# Patient Record
Sex: FEMALE | Race: BLACK OR AFRICAN AMERICAN | Employment: OTHER | URBAN - NONMETROPOLITAN AREA
[De-identification: names, ages, dates, MRNs, and addresses within clinical notes are randomized per-mention and may not be internally consistent; named-entity substitution may affect disease eponyms.]

---

## 2021-01-20 DIAGNOSIS — C50.919 CARCINOMA OF FEMALE BREAST, UNSPECIFIED ESTROGEN RECEPTOR STATUS, UNSPECIFIED LATERALITY, UNSPECIFIED SITE OF BREAST (HCC): Primary | ICD-10-CM

## 2021-01-20 NOTE — PROGRESS NOTES
MEDICAL ONCOLOGY CONSULTATION    Pt Name: Doretha Preea  MRN: [id number]  YOB: 1965  Date of evaluation: 1/22/2021    REASON FOR CONSULTATION:  Breast Cancer  REQUESTING PHYSICIAN: Dr. Matthew Weaver    History Obtained From:  patient and old medical records    HISTORY OF PRESENT ILLNESS:    Diagnosis  · Adenocarcinoma of right breast diagnosed 2006  · Stage IIIA, [id number]  · ER/SC Positive, HER-2 bruce/ihc 1+  · April 2013-RECURRENCE of grade 3 adenocarcinoma in the axillary region  · Genetic testing- BRCA 1&2 Negative  · 2014 (?) RECURRENCE in the axillary skin  · 01/05/2017- METASTATIC DISEASE with lung, hilar, and axillary mets    Treatment Summary  · 2006- Neoadjuvant chemotherapy with AC x 4 cycles followed by Taxol  · 2007- Lumpectomy with axillary lymph node dissection  · 2007- Adjuvant radiation therapy to whole breast and axillary region  · Did not take tamoxifen due to cost  · April 2013- RECURRENCE  · Neoadjuvant chemotherapy with 2 cycles of Taxotere followed by Doxil  · 10/14/2013- Right Breast Mastectomy and Axillary Dissection  · 01/23/2014- Completion of adjuvant RT to chest wall and axillary lymph node with Dr. Dustin Roberts  · 02/14- Initiated adjuvant endocrine therapy with tamoxifen  · 2014 (?) RECURRENCE in the axillary skin  · 2014 (?) Surgical resection of the axillary skin  · 2015 (?) YVETTE/BSO  · 09/19/2016- Bilateral exchange, nipple reconstruction and fat grafting and removal of PAC   · 2016- Switched to aromatase inhibitor  · 01/05/2017- METASTATIC DISEASE with lung, hilar, and axillary mets  · 01/11/2017-06/17/2018- Single agent Abraxane x 13 cycles  · 06/27/2018-09/17/2018- Gemzar  · 10/10/2018-Faslodex every 4 weeks/palbociclib      Cancer History: Inga Mckeon was first seen by me on 1/21/2021. She was referred to [facility] of care for history of recurrent metastatic breast cancer. She has been in remission as per the latest CT scan. She is currently on Faslodex and palbociclib. She has received several lines therapy as described below. She moved from [place] to [place] to stay closer to her parents. Her medical history is complex. Further details as below. · 2006- Neoadjuvant chemotherapy with AC x 4 cycles followed by Taxol AC was complicated by viral meningitis; patient experienced neuropathy after 3 doses of Taxol and was switched to Taxotere for last dose  · 2007- Lumpectomy with axillary lymph node dissection 1.9cm, grade 2. No LVI. 1 of 14 lymph nodes positive for malignancy (1/14) [id number]  · 2007- Adjuvant Radiation Therapy to whole breast and axillary region  · April 2013- RECURRENCE presenting as mass in the axillary region  · Neoadjuvant chemotherapy with 2 cycles of Taxotere followed by Doxil- did not have good response to treatment  · 2013- Preop PET/CTshowed 2.8cm, right axillary lymph node with uptake. · 10/14/2013- Right Breast Mastectomy and Axillary Dissection Right breast had benign tissue with fibrous, negative for carcinoma. Axillary contents demonstrated invasive ductal carcinoma, involving fibroadipose tissue and tendinous tissue. 3 benign lymph nodes (0/3). Yusra grade 3. 3.0cm in greatest gross dimensions. Carcinoma permeates among soft tissues and in the right axilla with no apparent involvement of lymph nodes.    · 01/23/2014- Completion of adjuvant radiation therapy to chest wall and axillary lymph node with Dr. Maryjo Jeffries  · 02/14- Initiated adjuvant endocrine therapy with tamoxifen  · 2014 (?) RECURRENCE in the axillary skin  · 2014 (?) Surgical resection of the axillary skin pathology demonstrating tumor at cauterized margin  · 2015 (?) YVETTE/BSO · 09/19/2016- Bilateral exchange, nipple reconstruction and fat grafting and removal of PAC  · 2016- Switched to aromatase inhibitor patient was non compliant  · 01/05/2017- METASTATIC DISEASE Presented with respiratory failure. CTA Pulmonary showed numerous nodules and masses throughout both lung fields, compatible with metastatic disease. Bilateral hilar adenopathy seen. · 01/11/2017-06/17/2018- Single agent Abraxane x 13 cycles  · 03/15/2017- CT Chest W Contrast Interval decrease in maximal diameter of essentially all the multiple metastatic pulmonary nodules. The average difference is approximately 25%. Some of the much smaller ones have disappeared. Bilateral hilar and aortopulmonary window adenopathy is also similarly smaller. · 06/07/2017- CT Chest W Contrast Multiple lung nodules seen bilaterally. Most of the lung nodules show interval improvement with decreased size by 1 to 3mm. Mediastinal and bilateral hilar adenopathy seen with improvement. · 07/24/2017- PET/CT scan showed marked improvement, is minimal abnormal, has excellent response to therapy  · 02/01/2018- PET/CT scan Numerous bilateral lung masses and nodules, the majority of which do not have appreciable abnormal FDG uptake. The largest mass in the left infrahilar region of the left lower lobe demonstrates a maximum SUV of 3.3. Mediastinal lymphadenopathy. No evidence of metastatic disease in the abdomen or pelvis. · 06/21/2018- CT Chest/Abdomen/Pelvis Multiple lung mets, mild bilateral hilar and mediastinal lymph nodes.  No masses or evidence of metastatic disease in the abdomen or pelvis  · 06/27/2018-09/17/2018- Gemzar  · 09/28/2018- CT Pulmonary Multiple lung mets, mild bilateral hilar and mediastinal lymph nodes  · 10/10/2018-12/21/2020- Faslodex every 4 weeks · 12/14/2018- CT Chest showed definite decrease in the maximal diameter and volume of all left and right metastatic nodules. Somewhat enlarged paratracheal and left axillary lymph nodes are relatively unchanged. · 04/22/2019- MRI Cervical Spine W WO Contrast Unremarkable  · 04/22/2019- MRI Brain W WO Contrast No enhancing lesions in the brain and no evidence of metastatic disease. · 07/27/2019- CTA Pulmonary showed essentially complete disappearance of the pulmonary metastatic disease. Several tiny flat peripheral nodules are the only sequela. The tiny ones on the right are unchanged, the tiny ones on the left are even smaller. There is no longer any active metastatic disease in either lung. · 12/19/2019- CT Chest/Abdomen/Pelvis Small, tiny subpleural nodules right upper and right midlung field as well as left lung base has remained unchanged. No new focal parenchymal abnormality seen. No adenopathy seen. There is no abdominal or pelvic mass, adenopathy or fluid collection. No lytic or sclerotic bony lesions, but there is a possibility of osteopenia and/or osteoporosis.    · 02/17/2020- DEXA Bone Density showed osteopenia of lumbar spine, T-score -1.8, and left femoral neck, T-score -2.0    CA 27.29 Dynamics  11/05/2013- 23  01/21/2014- 19  06/10/2014-17  09/09/2014- 18  12/09/2014- 12.8  03/03/2015- 19  06/23/2015- 23.4  02/15/2016- 18.8  05/06/2016- 9.2  12/06/2016- 15.9  06/07/2018- 30.6  09/17/2018- 32  12/03/2018- 42.6  01/07/2019- 21.7  03/04/2019- 16.3  03/05/2019- 15.0  04/03/2019- 17.5  05/08/2019- 18.8  06/05/2019- 17.1  07/18/2019- 25.9  08/15/2019- 27.6  09/06/2019- 18.6  10/16/2019- 13.7  12/18/2019- 20.2  01/22/2020- 15.3  02/17/2020- 17.1  03/18/2020- 16.4  06/19/2020- 22.1  08/21/2020- 13.2  10/26/2020- 17.1  11/23/2020- 20.4  12/21/2020- 27.6    Past Medical History:    Past Medical History:   Diagnosis Date    Back pain     Breast cancer ([facility] [address]     Breast cancer with lung mets  GERD (gastroesophageal reflux disease)     Hypertension     Neuropathy     Post chemo treatment neuropathy       Past Surgical History:    Past Surgical History:   Procedure Laterality Date    BREAST LUMPECTOMY Right     2006    COSMETIC SURGERY      Bilateral breast implants    HYSTERECTOMY, TOTAL ABDOMINAL  2015    JOINT REPLACEMENT      knee pain    MASTECTOMY, BILATERAL      Right 2013 & Left 2016       Social History:    · Marital status, children: Single, 2 children-female  · Smoking status: no  · ETOH status: no  · Profession: Disabled  · Resides: [place], [place]  · Recent trips: Moved from [place]  · Pets: no    Family History:   Family History   Problem Relation Age of Onset    Cancer Other         Father's side-Prostate cancer, Multiple Myeloma & Bone cancer       Current Hospital Medications:    Current Outpatient Medications   Medication Sig Dispense Refill    Fluticasone Furoate-Vilanterol (BREO ELLIPTA IN) Inhale into the lungs      cyclobenzaprine (FLEXERIL) 10 MG tablet Take 10 mg by mouth 3 times daily as needed for Muscle spasms      famotidine (PEPCID) 40 MG tablet Take 40 mg by mouth daily      gabapentin (NEURONTIN) 300 MG capsule Take 600 mg by mouth 3 times daily.  guaiFENesin-codeine (GUAIFENESIN AC) 100-10 MG/5ML liquid Take 5 mLs by mouth 3 times daily as needed for Cough.  HYDROcodone-acetaminophen (NORCO) 7.5-325 MG per tablet Take 1 tablet by mouth every 6 hours as needed for Pain.       montelukast (SINGULAIR) 10 MG tablet Take 10 mg by mouth nightly      Albuterol Sulfate (PROAIR HFA IN) Inhale into the lungs      benzonatate (TESSALON) 100 MG capsule Take 100 mg by mouth 3 times daily as needed for Cough      Cholecalciferol (VITAMIN D3) 125 MCG (5000 UT) TABS Take by mouth      lisinopril (PRINIVIL;ZESTRIL) 10 MG tablet Take 10 mg by mouth daily      palbociclib (IBRANCE) 125 MG tablet Take 125 mg by mouth daily      HYDROXYZINE HCL PO Take by mouth  acyclovir (ZOVIRAX) 800 MG tablet Take 800 mg by mouth 2 times daily      ondansetron (ZOFRAN) 4 MG tablet Take 4 mg by mouth every 8 hours as needed for Nausea or Vomiting      venlafaxine (EFFEXOR) 37.5 MG tablet Take 37.5 mg by mouth 3 times daily      diclofenac (VOLTAREN) 0.1 % ophthalmic solution 1 drop 4 times daily      alclomethasone (ACLOVATE) 0.05 % cream Apply topically 2 times daily Apply topically 2 times daily.  meloxicam (MOBIC) 15 MG tablet Take 15 mg by mouth daily      pantoprazole (PROTONIX) 40 MG tablet Take 40 mg by mouth daily      ciprofloxacin (CILOXAN) 0.3 % ophthalmic solution 1 drop every 2 hours      triamcinolone (KENALOG) 0.1 % cream Apply topically 2 times daily Apply topically 2 times daily.  chlorhexidine (PERIDEX) 0.12 % solution Take 15 mLs by mouth 2 times daily       No current facility-administered medications for this visit. Allergies: No Known Allergies      Subjective   REVIEW OF SYSTEMS:   CONSTITUTIONAL: no fever, no night sweats, fatigue, night sweats, loss of appetite, hot flashes;   HEENT:hoarseness, oral ulcers, no blurring of vision, no double vision, no hearing difficulty, no tinnitus, no ulceration, no dysplasia, no epistaxis;  LUNGS: cough, no hemoptysis,  wheeze,  shortness of breath on exertion;  CARDIOVASCULAR: no palpitation, no chest pain, shortness of breath on exertion;  GI: heartburn, no abdominal pain, no nausea, no vomiting, ocassional diarrhea,  constipation;  PATRICK: no dysuria, no hematuria, no frequency or urgency, no nephrolithiasis;  MUSCULOSKELETAL: neck/back pain,  joint pain, no swelling, no stiffness;  ENDOCRINE: no polyuria, no polydipsia, no cold & heat intolerance;  HEMATOLOGY: no easy bruising or bleeding, no history of clotting disorder;  DERMATOLOGY: no skin rash, no eczema, no pruritus;  PSYCHIATRY: depression, no anxiety, no panic attacks, no suicidal ideation, no homicidal ideation; NEUROLOGY: balance issues due to back pain/neuropathy headaches, no syncope, no seizures, no numbness or tingling of hands, no numbness or tingling of feet, no paresis;    Objective   /84   Pulse 90   Temp 97.1 °F (36.2 °C)   Ht 5' 6\" (1.676 m)   Wt 186 lb 8 oz (84.6 kg)   SpO2 98%   BMI 30.10 kg/m²     PHYSICAL EXAM:  CONSTITUTIONAL: Alert, appropriate, no acute distress  EYES: Non icteric, EOM intact, pupils equal round   ENT: Mucus membranes moist, no oral pharyngeal lesions, external inspection of ears and nose are normal  NECK: Supple, no masses. No palpable thyroid mass  CHEST/LUNGS: CTA bilaterally, normal respiratory effort   CARDIOVASCULAR: RRR, no murmurs. No lower extremity edema  ABDOMEN: soft non-tender, active bowel sounds, no HSM. No palpable masses  EXTREMITIES: warm, full ROM in all 4 extremities, no focal weakness. SKIN: warm, dry with no rashes or lesions  LYMPH: No cervical, clavicular, axillary, or inguinal lymphadenopathy  NEUROLOGIC: follows commands, non focal   PSYCH: mood and affect appropriate.   Alert and oriented to time, place, person        LABORATORY RESULTS REVIEWED/ANALYZED BY ME:  Lab Results   Component Value Date    WBC 4.57 01/21/2021    HGB 11.0 (L) 01/21/2021    HCT 33.1 (L) 01/21/2021    .1 (H) 01/21/2021     01/21/2021     Lab Results   Component Value Date    NEUTROABS 1.83 01/21/2021     Lab Results   Component Value Date     01/21/2021    K 4.0 01/21/2021     01/21/2021    CO2 26 01/21/2021    BUN 15 01/21/2021    CREATININE 0.8 01/21/2021    GLUCOSE 86 01/21/2021    CALCIUM 8.5 01/21/2021    PROT 6.9 01/21/2021    LABALBU 4.0 01/21/2021    BILITOT 0.9 01/21/2021    ALKPHOS 98 01/21/2021    AST 18 01/21/2021    ALT 9 01/21/2021    LABGLOM >60 01/21/2021    GLOB 2.9 01/21/2021       RADIOLOGY STUDIES REVIEWED BY ME:  None available    ASSESSMENT:    Orders Placed This Encounter   Procedures  CT ABDOMEN PELVIS W IV CONTRAST Additional Contrast? Oral     Standing Status:   Future     Standing Expiration Date:   1/21/2022     Order Specific Question:   Additional Contrast?     Answer:   Oral     Order Specific Question:   Reason for exam:     Answer:   history metastatic breast cancer    CT CHEST W CONTRAST     Standing Status:   Future     Standing Expiration Date:   1/21/2022     Order Specific Question:   Reason for exam:     Answer:   history metastatic breast cancer    Vitamin B12     Standing Status:   Future     Number of Occurrences:   1     Standing Expiration Date:   1/21/2022    Cancer Antigen 15-3     Standing Status:   Future     Number of Occurrences:   1     Standing Expiration Date:   1/21/2022    Cancer Antigen 27.29     Standing Status:   Future     Standing Expiration Date:   1/21/2022    Comprehensive Metabolic Panel     Standing Status:   Future     Number of Occurrences:   1     Standing Expiration Date:   1/21/2022   Alba Lesches, MD, [facility]     Referral Priority:   Routine     Referral Type:   Eval and Treat     Referral Reason:   Specialty Services Required     Referred to Provider:   Mariela Whitley MD     Requested Specialty:   Family Medicine     Number of Visits Requested:   1      Kassandra Stevens was seen today for new patient. Diagnoses and all orders for this visit:    Carcinoma of female breast, unspecified estrogen receptor status, unspecified laterality, unspecified site of breast ([facility] [address]  -     CT ABDOMEN PELVIS W IV CONTRAST Additional Contrast? Oral; Future  -     CT CHEST W CONTRAST; Future  -     Vitamin B12; Future  -     Cancer Antigen 15-3; Future  -     Cancer Antigen 27.29; Future  -     Alba Lesches, MD, [place], [place]  -     Comprehensive Metabolic Panel;  Future    Care plan discussed with patient    History of non anemic vitamin B12 deficiency  -     Vitamin B12; Future    Metastatic breast cancer ([facility] [address] -     CT ABDOMEN PELVIS W IV CONTRAST Additional Contrast? Oral; Future  -     CT CHEST W CONTRAST; Future  -     Vitamin B12; Future  -     Cancer Antigen 15-3; Future  -     Cancer Antigen 27.29; Future  -     Sergio Wynn MD, [facility] [place]  -     Comprehensive Metabolic Panel; Future    B12 deficiency    Metastatic breast cancer, hormone sensitive  The patient was counseled today about diagnosis, staging, prognosis, diagnostic tests, medications, side effects and disease management. Currently Faslodex/Ibrance. Regimen:  Faslodex to 500 mg subcutaneous every 4 weeks. Continue Ibrance days 121 q. 28 days    Clinical/laboratory assessment of toxicity for Ibrance      B12 deficiencyB12 levels 189. Recommended B12 2000 mcg p.o. daily. Will start B12 injections 1000mcg with Faslodex. PLAN:  CT chest, abdomen, pelvis next available  CBC CMP CA 15-3 CA 27-29 B12   Faslodex 500mg today and every 28 days  Refer to Dr. Jodi Gil for PCP care  RTC with MD 4 weeks  Continue Ibrance  B12 2000 mcg p.o. daily    I, Katherine Westbrook, am scribing for Mari Hoang MD. Electronically signed by Katherine Westbrook RN on 1/22/2021 at 11:07 AM CST. I, Dr Buffy Mc, personally performed the services described in this documentation as scribed by Katherine Westbrook RN in my presence and is both accurate and complete. I have seen, examined and reviewed this patient medication list, appropriate labs and imaging studies. I reviewed relevant medical records and others physicians notes. I discussed the plans of care with the patient. I answered all the questions to the patients satisfaction. I have also reviewed the chief complaint (CC) and part of the history (History of Present Illness (HPI), Past Family Social History [facility]), or Review of Systems (ROS) and made changes when appropriated.

## 2021-01-21 ENCOUNTER — OFFICE VISIT (OUTPATIENT)
Dept: HEMATOLOGY | Age: 56
End: 2021-01-21
Payer: MEDICARE

## 2021-01-21 ENCOUNTER — HOSPITAL ENCOUNTER (OUTPATIENT)
Dept: INFUSION THERAPY | Age: 56
Discharge: HOME | End: 2021-01-21
Payer: MEDICARE

## 2021-01-21 VITALS
HEIGHT: 66 IN | SYSTOLIC BLOOD PRESSURE: 136 MMHG | TEMPERATURE: 97.1 F | DIASTOLIC BLOOD PRESSURE: 84 MMHG | WEIGHT: 186.5 LBS | OXYGEN SATURATION: 98 % | BODY MASS INDEX: 29.97 KG/M2 | HEART RATE: 90 BPM

## 2021-01-21 DIAGNOSIS — I87.8 POOR VENOUS ACCESS: ICD-10-CM

## 2021-01-21 DIAGNOSIS — Z86.39 HISTORY OF NON ANEMIC VITAMIN B12 DEFICIENCY: ICD-10-CM

## 2021-01-21 DIAGNOSIS — C50.919 CARCINOMA OF FEMALE BREAST, UNSPECIFIED ESTROGEN RECEPTOR STATUS, UNSPECIFIED LATERALITY, UNSPECIFIED SITE OF BREAST (HCC): Primary | ICD-10-CM

## 2021-01-21 DIAGNOSIS — Z71.89 CARE PLAN DISCUSSED WITH PATIENT: ICD-10-CM

## 2021-01-21 DIAGNOSIS — C50.919 METASTATIC BREAST CANCER (HCC): ICD-10-CM

## 2021-01-21 DIAGNOSIS — E53.8 B12 DEFICIENCY: ICD-10-CM

## 2021-01-21 LAB
ALBUMIN SERPL-MCNC: 4 G/DL (ref 3.5–5.2)
ALP BLD-CCNC: 98 U/L (ref 35–104)
ALT SERPL-CCNC: 9 U/L (ref 9–52)
ANION GAP SERPL CALCULATED.3IONS-SCNC: 9 MMOL/L (ref 7–19)
AST SERPL-CCNC: 18 U/L (ref 14–36)
BASOPHILS ABSOLUTE: 0.01 K/UL (ref 0.01–0.08)
BASOPHILS RELATIVE PERCENT: 0.2 % (ref 0.1–1.2)
BILIRUB SERPL-MCNC: 0.9 MG/DL (ref 0.2–1.3)
BUN BLDV-MCNC: 15 MG/DL (ref 7–17)
CA 15-3: 23.1 U/ML (ref 0–35)
CALCIUM SERPL-MCNC: 8.5 MG/DL (ref 8.4–10.2)
CHLORIDE BLD-SCNC: 108 MMOL/L (ref 98–111)
CO2: 26 MMOL/L (ref 22–29)
CREAT SERPL-MCNC: 0.8 MG/DL (ref 0.5–1)
EOSINOPHILS ABSOLUTE: 0.02 K/UL (ref 0.04–0.54)
EOSINOPHILS RELATIVE PERCENT: 0.4 % (ref 0.7–7)
GFR NON-AFRICAN AMERICAN: >60
GLOBULIN: 2.9 G/DL
GLUCOSE BLD-MCNC: 86 MG/DL (ref 74–106)
HCT VFR BLD CALC: 33.1 % (ref 34.1–44.9)
HEMOGLOBIN: 11 G/DL (ref 11.2–15.7)
LYMPHOCYTES ABSOLUTE: 2.48 K/UL (ref 1.18–3.74)
LYMPHOCYTES RELATIVE PERCENT: 54.3 % (ref 19.3–53.1)
MCH RBC QN AUTO: 35.6 PG (ref 25.6–32.2)
MCHC RBC AUTO-ENTMCNC: 33.2 G/DL (ref 32.3–35.5)
MCV RBC AUTO: 107.1 FL (ref 79.4–94.8)
MONOCYTES ABSOLUTE: 0.23 K/UL (ref 0.24–0.82)
MONOCYTES RELATIVE PERCENT: 5 % (ref 4.7–12.5)
NEUTROPHILS ABSOLUTE: 1.83 K/UL (ref 1.56–6.13)
NEUTROPHILS RELATIVE PERCENT: 40.1 % (ref 34–71.1)
PDW BLD-RTO: 18.1 % (ref 11.7–14.4)
PLATELET # BLD: 232 K/UL (ref 182–369)
PMV BLD AUTO: 8.6 FL (ref 7.4–10.4)
POTASSIUM SERPL-SCNC: 4 MMOL/L (ref 3.5–5.1)
RBC # BLD: 3.09 M/UL (ref 3.93–5.22)
SODIUM BLD-SCNC: 143 MMOL/L (ref 137–145)
TOTAL PROTEIN: 6.9 G/DL (ref 6.3–8.2)
VITAMIN B-12: 189 PG/ML (ref 239–931)
WBC # BLD: 4.57 K/UL (ref 3.98–10.04)

## 2021-01-21 PROCEDURE — G8427 DOCREV CUR MEDS BY ELIG CLIN: HCPCS | Performed by: INTERNAL MEDICINE

## 2021-01-21 PROCEDURE — 80053 COMPREHEN METABOLIC PANEL: CPT

## 2021-01-21 PROCEDURE — 3017F COLORECTAL CA SCREEN DOC REV: CPT | Performed by: INTERNAL MEDICINE

## 2021-01-21 PROCEDURE — G8484 FLU IMMUNIZE NO ADMIN: HCPCS | Performed by: INTERNAL MEDICINE

## 2021-01-21 PROCEDURE — 96401 CHEMO ANTI-NEOPL SQ/IM: CPT

## 2021-01-21 PROCEDURE — 6360000002 HC RX W HCPCS: Performed by: INTERNAL MEDICINE

## 2021-01-21 PROCEDURE — 85025 COMPLETE CBC W/AUTO DIFF WBC: CPT

## 2021-01-21 PROCEDURE — 82607 VITAMIN B-12: CPT

## 2021-01-21 PROCEDURE — 99205 OFFICE O/P NEW HI 60 MIN: CPT | Performed by: INTERNAL MEDICINE

## 2021-01-21 PROCEDURE — 96402 CHEMO HORMON ANTINEOPL SQ/IM: CPT

## 2021-01-21 PROCEDURE — 86300 IMMUNOASSAY TUMOR CA 15-3: CPT

## 2021-01-21 PROCEDURE — 96523 IRRIG DRUG DELIVERY DEVICE: CPT

## 2021-01-21 PROCEDURE — 4004F PT TOBACCO SCREEN RCVD TLK: CPT | Performed by: INTERNAL MEDICINE

## 2021-01-21 PROCEDURE — G8417 CALC BMI ABV UP PARAM F/U: HCPCS | Performed by: INTERNAL MEDICINE

## 2021-01-21 RX ORDER — BENZONATATE 100 MG/1
100 CAPSULE ORAL 3 TIMES DAILY PRN
COMMUNITY
End: 2022-01-31

## 2021-01-21 RX ORDER — GABAPENTIN 300 MG/1
600 CAPSULE ORAL 3 TIMES DAILY
COMMUNITY
End: 2021-04-01 | Stop reason: SDUPTHER

## 2021-01-21 RX ORDER — ONDANSETRON 4 MG/1
4 TABLET, FILM COATED ORAL EVERY 8 HOURS PRN
COMMUNITY

## 2021-01-21 RX ORDER — PANTOPRAZOLE SODIUM 40 MG/1
40 TABLET, DELAYED RELEASE ORAL DAILY
COMMUNITY
End: 2021-03-10 | Stop reason: SDUPTHER

## 2021-01-21 RX ORDER — DICLOFENAC SODIUM 1 MG/ML
1 SOLUTION/ DROPS OPHTHALMIC 4 TIMES DAILY
Status: ON HOLD | COMMUNITY
End: 2021-09-24

## 2021-01-21 RX ORDER — TRIAMCINOLONE ACETONIDE 1 MG/G
CREAM TOPICAL 2 TIMES DAILY
COMMUNITY
End: 2021-09-14 | Stop reason: SDUPTHER

## 2021-01-21 RX ORDER — MONTELUKAST SODIUM 10 MG/1
10 TABLET ORAL NIGHTLY
Status: ON HOLD | COMMUNITY
End: 2021-09-24

## 2021-01-21 RX ORDER — PALBOCICLIB 125 MG/1
125 TABLET, FILM COATED ORAL DAILY
COMMUNITY
End: 2021-02-03 | Stop reason: SDUPTHER

## 2021-01-21 RX ORDER — CHLORHEXIDINE GLUCONATE 0.12 MG/ML
15 RINSE ORAL 2 TIMES DAILY
Status: ON HOLD | COMMUNITY
End: 2021-10-12 | Stop reason: HOSPADM

## 2021-01-21 RX ORDER — LAMOTRIGINE 25 MG/1
250 TABLET ORAL ONCE
Status: COMPLETED | OUTPATIENT
Start: 2021-01-21 | End: 2021-01-21

## 2021-01-21 RX ORDER — HYDROCODONE BITARTRATE AND ACETAMINOPHEN 7.5; 325 MG/1; MG/1
1 TABLET ORAL EVERY 6 HOURS PRN
Status: ON HOLD | COMMUNITY
End: 2021-09-27 | Stop reason: HOSPADM

## 2021-01-21 RX ORDER — CODEINE PHOSPHATE AND GUAIFENESIN 10; 100 MG/5ML; MG/5ML
5 SOLUTION ORAL 3 TIMES DAILY PRN
COMMUNITY

## 2021-01-21 RX ORDER — FAMOTIDINE 40 MG/1
40 TABLET, FILM COATED ORAL DAILY
COMMUNITY
End: 2021-03-10

## 2021-01-21 RX ORDER — MELOXICAM 15 MG/1
15 TABLET ORAL DAILY
COMMUNITY
End: 2021-10-21 | Stop reason: ALTCHOICE

## 2021-01-21 RX ORDER — SODIUM CHLORIDE 0.9 % (FLUSH) 0.9 %
20 SYRINGE (ML) INJECTION PRN
Status: CANCELLED | OUTPATIENT
Start: 2021-01-21

## 2021-01-21 RX ORDER — CIPROFLOXACIN HYDROCHLORIDE 3.5 MG/ML
1 SOLUTION/ DROPS TOPICAL
COMMUNITY
End: 2021-10-05

## 2021-01-21 RX ORDER — VENLAFAXINE 37.5 MG/1
37.5 TABLET ORAL 3 TIMES DAILY
Status: ON HOLD | COMMUNITY
End: 2021-09-24

## 2021-01-21 RX ORDER — LISINOPRIL 10 MG/1
10 TABLET ORAL DAILY
COMMUNITY

## 2021-01-21 RX ORDER — ACYCLOVIR 800 MG/1
800 TABLET ORAL 2 TIMES DAILY PRN
COMMUNITY

## 2021-01-21 RX ORDER — HEPARIN SODIUM (PORCINE) LOCK FLUSH IV SOLN 100 UNIT/ML 100 UNIT/ML
SOLUTION INTRAVENOUS
Status: DISCONTINUED
Start: 2021-01-21 | End: 2021-01-21 | Stop reason: WASHOUT

## 2021-01-21 RX ORDER — SODIUM CHLORIDE 0.9 % (FLUSH) 0.9 %
10 SYRINGE (ML) INJECTION PRN
Status: CANCELLED | OUTPATIENT
Start: 2021-01-21

## 2021-01-21 RX ORDER — CYCLOBENZAPRINE HCL 10 MG
10 TABLET ORAL 3 TIMES DAILY PRN
COMMUNITY
End: 2022-05-25 | Stop reason: SDUPTHER

## 2021-01-21 RX ORDER — HEPARIN SODIUM (PORCINE) LOCK FLUSH IV SOLN 100 UNIT/ML 100 UNIT/ML
500 SOLUTION INTRAVENOUS PRN
Status: CANCELLED | OUTPATIENT
Start: 2021-01-21

## 2021-01-21 RX ADMIN — FULVESTRANT 250 MG: 50 INJECTION, SOLUTION INTRAMUSCULAR at 11:58

## 2021-01-22 ENCOUNTER — TELEPHONE (OUTPATIENT)
Dept: HEMATOLOGY | Age: 56
End: 2021-01-22

## 2021-01-22 DIAGNOSIS — D50.8 OTHER IRON DEFICIENCY ANEMIA: ICD-10-CM

## 2021-01-22 PROBLEM — D50.9 IRON DEFICIENCY ANEMIA: Status: ACTIVE | Noted: 2021-01-22

## 2021-01-22 RX ORDER — CYANOCOBALAMIN 1000 UG/ML
1000 INJECTION INTRAMUSCULAR; SUBCUTANEOUS ONCE
Status: CANCELLED
Start: 2021-02-18

## 2021-01-22 NOTE — TELEPHONE ENCOUNTER
Call to pt to inform her all labs were WNL except for B12 which was low. Dr. Gordon Garcia recommends pt to take oral OTC B12 2000 daily and will start B12 injections at next visit. Pt voiced understanding.

## 2021-02-03 ENCOUNTER — TELEPHONE (OUTPATIENT)
Dept: HEMATOLOGY | Age: 56
End: 2021-02-03

## 2021-02-03 RX ORDER — PALBOCICLIB 125 MG/1
125 TABLET, FILM COATED ORAL DAILY
Qty: 21 TABLET | Refills: 5 | Status: SHIPPED | OUTPATIENT
Start: 2021-02-03 | End: 2021-07-23

## 2021-02-18 ENCOUNTER — HOSPITAL ENCOUNTER (OUTPATIENT)
Dept: INFUSION THERAPY | Age: 56
End: 2021-02-18
Payer: MEDICAID

## 2021-02-25 ENCOUNTER — HOSPITAL ENCOUNTER (OUTPATIENT)
Dept: CT IMAGING | Age: 56
Discharge: HOME | End: 2021-02-25
Payer: MEDICARE

## 2021-02-25 DIAGNOSIS — C50.919 METASTATIC BREAST CANCER (HCC): ICD-10-CM

## 2021-02-25 DIAGNOSIS — C50.919 CARCINOMA OF FEMALE BREAST, UNSPECIFIED ESTROGEN RECEPTOR STATUS, UNSPECIFIED LATERALITY, UNSPECIFIED SITE OF BREAST (HCC): ICD-10-CM

## 2021-02-25 PROCEDURE — 6360000004 HC RX CONTRAST MEDICATION: Performed by: INTERNAL MEDICINE

## 2021-02-25 PROCEDURE — 71260 CT THORAX DX C+: CPT

## 2021-02-25 PROCEDURE — 74177 CT ABD & PELVIS W/CONTRAST: CPT

## 2021-02-25 RX ADMIN — IOPAMIDOL 75 ML: 755 INJECTION, SOLUTION INTRAVENOUS at 10:13

## 2021-02-25 NOTE — PROGRESS NOTES
MEDICAL ONCOLOGY PROGRESS NOTE    Pt Name: Deedee Benjamin  MRN: [id number]  YOB: 1965  Date of evaluation: 3/1/2021    HISTORY OF PRESENT ILLNESS:    Diagnosis  · Grade III Adenocarcinoma of right breast diagnosed 2006  · Stage IIIA, [id number]  · ER/MO Positive, HER-2 bruce/ihc 1+  · April 2013-RECURRENCE in the right axillary region  · Genetic testing- BRCA 1&2 Negative  · 2014 (?) RECURRENCE in the axillary skin  · 01/05/2017- METASTATIC DISEASE with lung, hilar, and axillary lymph node mets  · Osteopenia    Treatment Summary  · 2006- Neoadjuvant chemotherapy with AC x 4 cycles followed by Taxol  · 2007- Lumpectomy with axillary lymph node dissection  · 2007- Adjuvant radiation therapy to whole breast and axillary region  · Did not take tamoxifen due to cost  · April 2013- RECURRENCE  · Neoadjuvant chemotherapy with 2 cycles of Taxotere followed by Doxil  · 10/14/2013- Right Breast Mastectomy and Axillary Dissection  · 01/23/2014- Completion of adjuvant RT to chest wall and axillary lymph node with Dr. Loree Valdez  · 02/14- Initiated adjuvant endocrine therapy with tamoxifen  · 2014 (?) RECURRENCE in the axillary skin  · 2014 (?) Surgical resection of the axillary skin  · 2015 (?) YVETTE/BSO  · 09/19/2016- Bilateral exchange, nipple reconstruction and fat grafting and removal of PAC   · 2016- Switched to aromatase inhibitor  · 01/05/2017- METASTATIC DISEASE with lung, hilar, and axillary mets  · 01/11/2017-06/17/2018- Single agent Abraxane x 13 cycles  · 06/27/2018-09/17/2018- Gemzar  · 10/10/2018-Faslodex every 4 weeks/palbociclib    The patient is a very pleasant 56years old female who presents today for continuation of her treatment with Faslodex and palbociclib. She is here today for her Faslodex treatment. She did have CT scans to assess her disease status. She has been tolerating treatment quite well. She denies any fever chills, GI toxicity nausea vomiting. She denies any respiratory symptoms.   She does complains of occasional spasms in the right flank area. She denies any history of nephrolithiasis. She is here today to assess treatment toxicity, disease management and discuss results of her CT scans. Cancer History:  Raul Domingo was first seen by me on 1/21/2021. She was referred to [facility] of care for history of recurrent metastatic breast cancer. She has been in remission as per the latest CT scan. She is currently on Faslodex and palbociclib. She has received several lines therapy as described below. She moved from [place] to [place] to stay closer to her parents. Her medical history is complex. Further details as below. · 2006- Neoadjuvant chemotherapy with AC x 4 cycles followed by Taxol AC was complicated by viral meningitis; patient experienced neuropathy after 3 doses of Taxol and was switched to Taxotere for last dose  · 2007- Lumpectomy with axillary lymph node dissection 1.9cm, grade 2. No LVI. 1 of 14 lymph nodes positive for malignancy (1/14) [id number]  · 2007- Adjuvant Radiation Therapy to whole breast and axillary region  · April 2013- RECURRENCE presenting as mass in the axillary region  · Neoadjuvant chemotherapy with 2 cycles of Taxotere followed by Doxil- did not have good response to treatment  · 2013- Preop PET/CTshowed 2.8cm, right axillary lymph node with uptake. · 10/14/2013- Right Breast Mastectomy and Axillary Dissection Right breast had benign tissue with fibrous, negative for carcinoma. Axillary contents demonstrated invasive ductal carcinoma, involving fibroadipose tissue and tendinous tissue. 3 benign lymph nodes (0/3). [place] grade 3. 3.0cm in greatest gross dimensions. Carcinoma permeates among soft tissues and in the right axilla with no apparent involvement of lymph nodes.    · 01/23/2014- Completion of adjuvant radiation therapy to chest wall and axillary lymph node with Dr. Nicholas Trinidad  · 02/14- Initiated adjuvant endocrine therapy with tamoxifen  · 2014 (?) RECURRENCE in the axillary skin  · 2014 (?) Surgical resection of the axillary skin pathology demonstrating tumor at cauterized margin  · 2015 (?) YVETTE/BSO  · 09/19/2016- Bilateral exchange, nipple reconstruction and fat grafting and removal of PAC  · 2016- Switched to aromatase inhibitor patient was non compliant  · 01/05/2017- METASTATIC DISEASE Presented with respiratory failure. CTA Pulmonary showed numerous nodules and masses throughout both lung fields, compatible with metastatic disease. Bilateral hilar adenopathy seen. · 01/11/2017-06/17/2018- Single agent Abraxane x 13 cycles  · 03/15/2017- CT Chest W Contrast Interval decrease in maximal diameter of essentially all the multiple metastatic pulmonary nodules. The average difference is approximately 25%. Some of the much smaller ones have disappeared. Bilateral hilar and aortopulmonary window adenopathy is also similarly smaller. · 06/07/2017- CT Chest W Contrast Multiple lung nodules seen bilaterally. Most of the lung nodules show interval improvement with decreased size by 1 to 3mm. Mediastinal and bilateral hilar adenopathy seen with improvement. · 07/24/2017- PET/CT scan showed marked improvement, is minimal abnormal, has excellent response to therapy  · 02/01/2018- PET/CT scan Numerous bilateral lung masses and nodules, the majority of which do not have appreciable abnormal FDG uptake. The largest mass in the left infrahilar region of the left lower lobe demonstrates a maximum SUV of 3.3. Mediastinal lymphadenopathy. No evidence of metastatic disease in the abdomen or pelvis. · 06/21/2018- CT Chest/Abdomen/Pelvis Multiple lung mets, mild bilateral hilar and mediastinal lymph nodes.  No masses or evidence of metastatic disease in the abdomen or pelvis  · 06/27/2018-09/17/2018- Gemzar  · 09/28/2018- CT Pulmonary Multiple lung mets, mild bilateral hilar and mediastinal lymph nodes  · 10/10/2018-12/21/2020- Faslodex every 4 weeks  · 12/14/2018- CT Chest showed definite decrease in the maximal diameter and volume of all left and right metastatic nodules. Somewhat enlarged paratracheal and left axillary lymph nodes are relatively unchanged. · 04/22/2019- MRI Cervical Spine W WO Contrast Unremarkable  · 04/22/2019- MRI Brain W WO Contrast No enhancing lesions in the brain and no evidence of metastatic disease. · 07/27/2019- CTA Pulmonary showed essentially complete disappearance of the pulmonary metastatic disease. Several tiny flat peripheral nodules are the only sequela. The tiny ones on the right are unchanged, the tiny ones on the left are even smaller. There is no longer any active metastatic disease in either lung. · 12/19/2019- CT Chest/Abdomen/Pelvis Small, tiny subpleural nodules right upper and right midlung field as well as left lung base has remained unchanged. No new focal parenchymal abnormality seen. No adenopathy seen. There is no abdominal or pelvic mass, adenopathy or fluid collection. No lytic or sclerotic bony lesions, but there is a possibility of osteopenia and/or osteoporosis. · 02/17/2020- DEXA Bone Density showed osteopenia of lumbar spine, T-score -1.8, and left femoral neck, T-score -2.0  · 2/25/21 Ct Abdomen Pelvis W Iv Contrast  No evidence of metastatic disease in the abdomen or pelvis. · 2/26/21 Ct Chest W Contrast Tiny nodules in the right lung described above probably represent small foci of pleural thickening due to chronic inflammatory process or small noncalcified granulomas. No features to suggest malignancy or metastatic disease. Bilateral breast implants without complication. · 3/1/2021discussed the results CT chest abdomen pelvis. Essentially no clear evidence of metastatic disease. This is consistent with excellent response to treatment. Continue current treatment.       CA 27.29 Dynamics  11/05/2013- 23  01/21/2014- 19  06/10/2014-17 09/09/2014- 18  12/09/2014- 12.8  03/03/2015- 19 06/23/2015- 23.4  02/15/2016- 18.8  05/06/2016- 9.2  12/06/2016- 15.9  06/07/2018- 30.6  09/17/2018- 32  12/03/2018- 42.6  01/07/2019- 21.7  03/04/2019- 16.3  03/05/2019- 15.0  04/03/2019- 17.5  05/08/2019- 18.8  06/05/2019- 17.1  07/18/2019- 25.9  08/15/2019- 27.6  09/06/2019- 18.6  10/16/2019- 13.7  12/18/2019- 20.2  01/22/2020- 15.3  02/17/2020- 17.1  03/18/2020- 16.4  06/19/2020- 22.1  08/21/2020- 13.2  10/26/2020- 17.1  11/23/2020- 20.4  12/21/2020- 27.6  01/21/2021- 20.3    Past Medical History:    Past Medical History:   Diagnosis Date    Back pain     Breast cancer ([facility] [address]     Breast cancer with lung mets    GERD (gastroesophageal reflux disease)     Hypertension     Neuropathy     Post chemo treatment neuropathy       Past Surgical History:    Past Surgical History:   Procedure Laterality Date    BREAST LUMPECTOMY Right     2006    COSMETIC SURGERY      Bilateral breast implants    HYSTERECTOMY, TOTAL ABDOMINAL  2015    JOINT REPLACEMENT      knee pain    MASTECTOMY, BILATERAL      Right 2013 & Left 2016       Social History:    Marital status, children: Single, 2 children-female  Smoking status: no  ETOH status: no  Profession: Disabled  Resides: [address]  Recent trips: Moved from [address] [place] [address] [place]: no    Family History:   Family History   Problem Relation Age of Onset    Cancer Other         Father's side-Prostate cancer, Multiple Myeloma & Bone cancer       Current Hospital Medications:    Current Outpatient Medications   Medication Sig Dispense Refill    palbociclib (IBRANCE) 125 MG tablet Take 125 mg by mouth daily 21 tablet 5    Fluticasone Furoate-Vilanterol (BREO ELLIPTA IN) Inhale into the lungs      cyclobenzaprine (FLEXERIL) 10 MG tablet Take 10 mg by mouth 3 times daily as needed for Muscle spasms      famotidine (PEPCID) 40 MG tablet Take 40 mg by mouth daily      gabapentin (NEURONTIN) 300 MG capsule Take 600 mg by mouth 3 times daily.        guaiFENesin-codeine (GUAIFENESIN AC) 100-10 MG/5ML liquid Take 5 mLs by mouth 3 times daily as needed for Cough.  HYDROcodone-acetaminophen (NORCO) 7.5-325 MG per tablet Take 1 tablet by mouth every 6 hours as needed for Pain.  montelukast (SINGULAIR) 10 MG tablet Take 10 mg by mouth nightly      Albuterol Sulfate (PROAIR HFA IN) Inhale into the lungs      benzonatate (TESSALON) 100 MG capsule Take 100 mg by mouth 3 times daily as needed for Cough      Cholecalciferol (VITAMIN D3) 125 MCG (5000 UT) TABS Take by mouth      lisinopril (PRINIVIL;ZESTRIL) 10 MG tablet Take 10 mg by mouth daily      HYDROXYZINE HCL PO Take by mouth      acyclovir (ZOVIRAX) 800 MG tablet Take 800 mg by mouth 2 times daily      ondansetron (ZOFRAN) 4 MG tablet Take 4 mg by mouth every 8 hours as needed for Nausea or Vomiting      venlafaxine (EFFEXOR) 37.5 MG tablet Take 37.5 mg by mouth 3 times daily      diclofenac (VOLTAREN) 0.1 % ophthalmic solution 1 drop 4 times daily      alclomethasone (ACLOVATE) 0.05 % cream Apply topically 2 times daily Apply topically 2 times daily.  meloxicam (MOBIC) 15 MG tablet Take 15 mg by mouth daily      pantoprazole (PROTONIX) 40 MG tablet Take 40 mg by mouth daily      ciprofloxacin (CILOXAN) 0.3 % ophthalmic solution 1 drop every 2 hours      triamcinolone (KENALOG) 0.1 % cream Apply topically 2 times daily Apply topically 2 times daily.  chlorhexidine (PERIDEX) 0.12 % solution Take 15 mLs by mouth 2 times daily       No current facility-administered medications for this visit. Facility-Administered Medications Ordered in Other Visits   Medication Dose Route Frequency Provider Last Rate Last Admin    sodium chloride flush 0.9 % injection 20 mL  20 mL Intravenous PRN Benjamin Orellana MD   20 mL at 03/01/21 1322    heparin flush 100 UNIT/ML injection 500 Units  500 Units Intracatheter PRN Benjamin Orellana MD   500 Units at 03/01/21 1322       Allergies:    Allergies   Allergen Reactions    No Known Allergies          Subjective   REVIEW OF SYSTEMS:   CONSTITUTIONAL: no fever, no night sweats, fatigue;  HEENT: no blurring of vision, no double vision, no hearing difficulty, no tinnitus, no ulceration, no dysplasia, no epistaxis;  LUNGS: cough, no hemoptysis, no wheeze, no shortness of breath;  CARDIOVASCULAR: no palpitation, no chest pain, no shortness of breath;  GI: heartburn, no abdominal pain, no nausea, no vomiting, no diarrhea,  No constipation;  PATRICK: no dysuria, no hematuria, no frequency or urgency, no nephrolithiasis;  MUSCULOSKELETAL: no joint pain, no swelling, no stiffness; muscle spaspm/pain in right rib area  ENDOCRINE: no polyuria, no polydipsia, no cold & heat intolerance;  HEMATOLOGY: no easy bruising or bleeding, no history of clotting disorder;  DERMATOLOGY: no skin rash, no eczema, no pruritus;  PSYCHIATRY: no depression, no anxiety, no panic attacks, no suicidal ideation, no homicidal ideation;  NEUROLOGY: no syncope, no seizures, no numbness or tingling of hands, no numbness or tingling of feet, no paresis;    Objective   BP (!) 146/78   Pulse 84   Temp 98.6 °F (37 °C)   Ht 5' 6\" (1.676 m)   Wt 180 lb (81.6 kg)   SpO2 98%   BMI 29.05 kg/m²     PHYSICAL EXAM:  CONSTITUTIONAL: Alert, appropriate, no acute distress  EYES: Non icteric, EOM intact, pupils equal round   ENT: Mucus membranes moist, no oral pharyngeal lesions, external inspection of ears and nose are normal  NECK: Supple, no masses. No palpable thyroid mass  CHEST/LUNGS: CTA bilaterally, normal respiratory effort   CARDIOVASCULAR: RRR, no murmurs. No lower extremity edema  ABDOMEN: soft non-tender, active bowel sounds, no HSM. No palpable masses  EXTREMITIES: warm, full ROM in all 4 extremities, no focal weakness. SKIN: warm, dry with no rashes or lesions  LYMPH: No cervical, clavicular, axillary, or inguinal lymphadenopathy  NEUROLOGIC: follows commands, non focal   PSYCH: mood and affect appropriate.   Alert and oriented to time, place, person        LABORATORY RESULTS REVIEWED/ANALYZED BY ME:  1/21/2021- CA 15-3- 23.1, CA 27.29- 20.3, Vitamin B12- 189    Lab Results   Component Value Date    WBC 6.05 03/01/2021    HGB 12.2 03/01/2021    HCT 35.6 03/01/2021    .5 (H) 03/01/2021     03/01/2021     Lab Results   Component Value Date    NEUTROABS 2.65 03/01/2021     Lab Results   Component Value Date     01/21/2021    K 4.0 01/21/2021     01/21/2021    CO2 26 01/21/2021    BUN 15 01/21/2021    CREATININE 0.8 01/21/2021    GLUCOSE 86 01/21/2021    CALCIUM 8.5 01/21/2021    PROT 6.9 01/21/2021    LABALBU 4.0 01/21/2021    BILITOT 0.9 01/21/2021    ALKPHOS 98 01/21/2021    AST 18 01/21/2021    ALT 9 01/21/2021    LABGLOM >60 01/21/2021    GLOB 2.9 01/21/2021     My analysisthe CBC showed a high . Normal hemoglobin 12.2 normal WBC and normal ANC. LFTs were normal.  Normal kidney function. RADIOLOGY STUDIES REVIEWED BY ME:  Ct Abdomen Pelvis W Iv Contrast Additional Contrast? Oral    Result Date: 2/25/2021  Impression: No evidence of metastatic disease in the abdomen or pelvis. Signed by Dr Genny Lucas on 2/25/2021 10:43 AM    Ct Chest W Contrast    Result Date: 2/26/2021  Tiny nodules in the right lung described above probably represent small foci of pleural thickening due to chronic inflammatory process or small noncalcified granulomas. No features to suggest malignancy or metastatic disease. Bilateral breast implants without complication. Signed by Dr Ervin Maier on 2/26/2021 8:37 AM    My interpretationI personally reviewed the CT chest abdomen pelvis that showed no gross evidence of metastatic disease or disease progression.     ASSESSMENT:    Orders Placed This Encounter   Procedures    Comprehensive Metabolic Panel     Standing Status:   Future     Standing Expiration Date:   3/1/2022    Methylmalonic Acid, Serum     Standing Status:   Future     Standing Expiration Date: 3/1/2022    Vitamin B12     With [facility]     Standing Status:   Future     Standing Expiration Date:   3/1/2022      Cora Lenz was seen today for follow-up. Diagnoses and all orders for this visit:    Carcinoma of female breast, unspecified estrogen receptor status, unspecified laterality, unspecified site of breast ([facility] [address]  -     Comprehensive Metabolic Panel; Future    Care plan discussed with patient    Chemotherapy management, encounter for    Adverse effect of chemotherapy, subsequent encounter    B12 deficiency  -     Methylmalonic Acid, Serum; Future  -     Vitamin B12; Future    Metastatic breast cancer, hormone sensitive  2/25/2021discussed the results of CT chest abdomen pelvis. No evidence of disease progression. In fact, excellent response with no measurable metastatic disease at this time. 1/21/2001CA 15-3 = 23, CA 27-29 = 20.3. Currently Faslodex/Ibrance. Regimen:  Faslodex to 500 mg subcutaneous every 4 weeks. Continue Ibrance days 121 q. 28 days    Clinical/laboratory assessment of toxicity for Ibrance      B12 deficiencyB12 levels 189. Recommended B12 2000 mcg p.o. daily. Will start B12 injections today 1000mcg with Faslodex. PLAN:  CBC, CMP, Vitamin B12, Methylmalonic acid today   Faslodex 500mg today and every 28 days +1000mcg  Follow up with Dr. Vijaya Mendieta for PCP care  RTC with MD 4 weeks  Continue Javier Armstrong am scribing for Pricilla Daniels MD. Electronically signed by Malissa Lr RN on 3/1/2021 at 4:17 PM CST. I, Dr Teresita Bryson, personally performed the services described in this documentation as scribed by Malissa Lr RN in my presence and is both accurate and complete. I have seen, examined and reviewed this patient medication list, appropriate labs and imaging studies. I reviewed relevant medical records and others physicians notes. I discussed the plans of care with the patient. I answered all the questions to the patients satisfaction. I have also reviewed the chief complaint (CC) and part of the history (History of Present Illness (HPI), Past Family Social History [facility]), or Review of Systems (ROS) and made changes when appropriated.        (Please note that portions of this note were completed with a voice recognition program. Efforts were made to edit the dictations but occasionally words are mis-transcribed.)      Marques Roy MD    03/01/21  4:09 PM

## 2021-03-01 ENCOUNTER — HOSPITAL ENCOUNTER (OUTPATIENT)
Dept: INFUSION THERAPY | Age: 56
Discharge: HOME | End: 2021-03-01
Payer: MEDICARE

## 2021-03-01 ENCOUNTER — OFFICE VISIT (OUTPATIENT)
Dept: HEMATOLOGY | Age: 56
End: 2021-03-01
Payer: MEDICARE

## 2021-03-01 VITALS
HEART RATE: 84 BPM | BODY MASS INDEX: 28.93 KG/M2 | HEIGHT: 66 IN | DIASTOLIC BLOOD PRESSURE: 78 MMHG | OXYGEN SATURATION: 98 % | TEMPERATURE: 98.6 F | WEIGHT: 180 LBS | SYSTOLIC BLOOD PRESSURE: 146 MMHG

## 2021-03-01 VITALS
TEMPERATURE: 98.6 F | SYSTOLIC BLOOD PRESSURE: 146 MMHG | DIASTOLIC BLOOD PRESSURE: 78 MMHG | BODY MASS INDEX: 28.93 KG/M2 | HEIGHT: 66 IN | HEART RATE: 84 BPM | OXYGEN SATURATION: 98 % | WEIGHT: 180 LBS

## 2021-03-01 DIAGNOSIS — C50.919 CARCINOMA OF FEMALE BREAST, UNSPECIFIED ESTROGEN RECEPTOR STATUS, UNSPECIFIED LATERALITY, UNSPECIFIED SITE OF BREAST (HCC): Primary | ICD-10-CM

## 2021-03-01 DIAGNOSIS — Z51.11 CHEMOTHERAPY MANAGEMENT, ENCOUNTER FOR: ICD-10-CM

## 2021-03-01 DIAGNOSIS — T45.1X5D ADVERSE EFFECT OF CHEMOTHERAPY, SUBSEQUENT ENCOUNTER: ICD-10-CM

## 2021-03-01 DIAGNOSIS — E53.8 B12 DEFICIENCY: ICD-10-CM

## 2021-03-01 DIAGNOSIS — Z71.89 CARE PLAN DISCUSSED WITH PATIENT: ICD-10-CM

## 2021-03-01 DIAGNOSIS — I87.8 POOR VENOUS ACCESS: ICD-10-CM

## 2021-03-01 DIAGNOSIS — D50.8 OTHER IRON DEFICIENCY ANEMIA: ICD-10-CM

## 2021-03-01 LAB
BASOPHILS ABSOLUTE: 0.04 K/UL (ref 0.01–0.08)
BASOPHILS RELATIVE PERCENT: 0.7 % (ref 0.1–1.2)
EOSINOPHILS ABSOLUTE: 0.02 K/UL (ref 0.04–0.54)
EOSINOPHILS RELATIVE PERCENT: 0.3 % (ref 0.7–7)
HCT VFR BLD CALC: 35.6 % (ref 34.1–44.9)
HEMOGLOBIN: 12.2 G/DL (ref 11.2–15.7)
LYMPHOCYTES ABSOLUTE: 2.69 K/UL (ref 1.18–3.74)
LYMPHOCYTES RELATIVE PERCENT: 44.5 % (ref 19.3–53.1)
MCH RBC QN AUTO: 37.5 PG (ref 25.6–32.2)
MCHC RBC AUTO-ENTMCNC: 34.3 G/DL (ref 32.3–35.5)
MCV RBC AUTO: 109.5 FL (ref 79.4–94.8)
MONOCYTES ABSOLUTE: 0.65 K/UL (ref 0.24–0.82)
MONOCYTES RELATIVE PERCENT: 10.7 % (ref 4.7–12.5)
NEUTROPHILS ABSOLUTE: 2.65 K/UL (ref 1.56–6.13)
NEUTROPHILS RELATIVE PERCENT: 43.8 % (ref 34–71.1)
PDW BLD-RTO: 15 % (ref 11.7–14.4)
PLATELET # BLD: 221 K/UL (ref 182–369)
PMV BLD AUTO: 10 FL (ref 7.4–10.4)
RBC # BLD: 3.25 M/UL (ref 3.93–5.22)
WBC # BLD: 6.05 K/UL (ref 3.98–10.04)

## 2021-03-01 PROCEDURE — 4004F PT TOBACCO SCREEN RCVD TLK: CPT | Performed by: INTERNAL MEDICINE

## 2021-03-01 PROCEDURE — 85025 COMPLETE CBC W/AUTO DIFF WBC: CPT

## 2021-03-01 PROCEDURE — 96372 THER/PROPH/DIAG INJ SC/IM: CPT

## 2021-03-01 PROCEDURE — 6360000002 HC RX W HCPCS: Performed by: INTERNAL MEDICINE

## 2021-03-01 PROCEDURE — 96401 CHEMO ANTI-NEOPL SQ/IM: CPT

## 2021-03-01 PROCEDURE — 96402 CHEMO HORMON ANTINEOPL SQ/IM: CPT

## 2021-03-01 PROCEDURE — 96523 IRRIG DRUG DELIVERY DEVICE: CPT

## 2021-03-01 PROCEDURE — 3017F COLORECTAL CA SCREEN DOC REV: CPT | Performed by: INTERNAL MEDICINE

## 2021-03-01 PROCEDURE — G8427 DOCREV CUR MEDS BY ELIG CLIN: HCPCS | Performed by: INTERNAL MEDICINE

## 2021-03-01 PROCEDURE — 2580000003 HC RX 258: Performed by: INTERNAL MEDICINE

## 2021-03-01 PROCEDURE — 99214 OFFICE O/P EST MOD 30 MIN: CPT | Performed by: INTERNAL MEDICINE

## 2021-03-01 PROCEDURE — G8417 CALC BMI ABV UP PARAM F/U: HCPCS | Performed by: INTERNAL MEDICINE

## 2021-03-01 PROCEDURE — G8484 FLU IMMUNIZE NO ADMIN: HCPCS | Performed by: INTERNAL MEDICINE

## 2021-03-01 RX ORDER — SODIUM CHLORIDE 0.9 % (FLUSH) 0.9 %
20 SYRINGE (ML) INJECTION PRN
Status: CANCELLED | OUTPATIENT
Start: 2021-03-01

## 2021-03-01 RX ORDER — HEPARIN SODIUM (PORCINE) LOCK FLUSH IV SOLN 100 UNIT/ML 100 UNIT/ML
500 SOLUTION INTRAVENOUS PRN
Status: DISCONTINUED | OUTPATIENT
Start: 2021-03-01 | End: 2021-03-02 | Stop reason: HOSPADM

## 2021-03-01 RX ORDER — EPINEPHRINE 1 MG/ML
0.3 INJECTION, SOLUTION, CONCENTRATE INTRAVENOUS PRN
Status: CANCELLED | OUTPATIENT
Start: 2021-03-01

## 2021-03-01 RX ORDER — CYANOCOBALAMIN 1000 UG/ML
1000 INJECTION INTRAMUSCULAR; SUBCUTANEOUS ONCE
Status: COMPLETED
Start: 2021-03-01 | End: 2021-03-01

## 2021-03-01 RX ORDER — DIPHENHYDRAMINE HYDROCHLORIDE 50 MG/ML
50 INJECTION INTRAMUSCULAR; INTRAVENOUS ONCE
Status: CANCELLED | OUTPATIENT
Start: 2021-03-01 | End: 2021-03-01

## 2021-03-01 RX ORDER — LAMOTRIGINE 25 MG/1
250 TABLET ORAL ONCE
Status: COMPLETED | OUTPATIENT
Start: 2021-03-01 | End: 2021-03-01

## 2021-03-01 RX ORDER — CYANOCOBALAMIN 1000 UG/ML
1000 INJECTION INTRAMUSCULAR; SUBCUTANEOUS ONCE
Status: CANCELLED
Start: 2021-03-29

## 2021-03-01 RX ORDER — METHYLPREDNISOLONE SODIUM SUCCINATE 125 MG/2ML
125 INJECTION, POWDER, LYOPHILIZED, FOR SOLUTION INTRAMUSCULAR; INTRAVENOUS ONCE
Status: CANCELLED | OUTPATIENT
Start: 2021-03-01 | End: 2021-03-01

## 2021-03-01 RX ORDER — SODIUM CHLORIDE 0.9 % (FLUSH) 0.9 %
20 SYRINGE (ML) INJECTION PRN
Status: DISCONTINUED | OUTPATIENT
Start: 2021-03-01 | End: 2021-03-02 | Stop reason: HOSPADM

## 2021-03-01 RX ORDER — LAMOTRIGINE 25 MG/1
250 TABLET ORAL ONCE
Status: CANCELLED | OUTPATIENT
Start: 2021-03-01 | End: 2021-03-01

## 2021-03-01 RX ORDER — HEPARIN SODIUM (PORCINE) LOCK FLUSH IV SOLN 100 UNIT/ML 100 UNIT/ML
500 SOLUTION INTRAVENOUS PRN
Status: CANCELLED | OUTPATIENT
Start: 2021-03-01

## 2021-03-01 RX ORDER — SODIUM CHLORIDE 9 MG/ML
INJECTION, SOLUTION INTRAVENOUS CONTINUOUS
Status: CANCELLED | OUTPATIENT
Start: 2021-03-01

## 2021-03-01 RX ORDER — SODIUM CHLORIDE 0.9 % (FLUSH) 0.9 %
10 SYRINGE (ML) INJECTION PRN
Status: CANCELLED | OUTPATIENT
Start: 2021-03-01

## 2021-03-01 RX ADMIN — CYANOCOBALAMIN 1000 MCG: 1000 INJECTION, SOLUTION INTRAMUSCULAR; SUBCUTANEOUS at 13:02

## 2021-03-01 RX ADMIN — SODIUM CHLORIDE, PRESERVATIVE FREE 20 ML: 5 INJECTION INTRAVENOUS at 13:22

## 2021-03-01 RX ADMIN — HEPARIN 500 UNITS: 100 SYRINGE at 13:22

## 2021-03-01 RX ADMIN — FULVESTRANT 250 MG: 50 INJECTION, SOLUTION INTRAMUSCULAR at 13:03

## 2021-03-10 RX ORDER — PANTOPRAZOLE SODIUM 40 MG/1
40 TABLET, DELAYED RELEASE ORAL DAILY
Qty: 90 TABLET | Refills: 0 | Status: SHIPPED | OUTPATIENT
Start: 2021-03-10 | End: 2021-05-19

## 2021-03-29 ENCOUNTER — HOSPITAL ENCOUNTER (OUTPATIENT)
Dept: INFUSION THERAPY | Age: 56
End: 2021-03-29
Payer: MEDICARE

## 2021-03-31 NOTE — PROGRESS NOTES
MEDICAL ONCOLOGY PROGRESS NOTE    Pt Name: Brian Pemberton  MRN: [id number]  [place]: 1965  Date of evaluation: 4/1/2021    HISTORY OF PRESENT ILLNESS:    Reason for MD visit: Disease/toxicity management      Diagnosis  · Grade III Adenocarcinoma of right breast diagnosed 2006  · Stage IIIA, [id number]  · ER/SD Positive, HER-2 bruce/ihc 1+  · April 2013-RECURRENCE in the right axillary region  · Genetic testing- BRCA 1&2 Negative  · 2014 (?) RECURRENCE in the axillary skin  · 01/05/2017- METASTATIC DISEASE with lung, hilar, and axillary lymph node mets  · Osteopenia    Treatment Summary  · 2006- Neoadjuvant chemotherapy with AC x 4 cycles followed by Taxol  · 2007- Lumpectomy with axillary lymph node dissection  · 2007- Adjuvant radiation therapy to whole breast and axillary region  · Did not take tamoxifen due to cost  · April 2013- RECURRENCE  · Neoadjuvant chemotherapy with 2 cycles of Taxotere followed by Doxil  · 10/14/2013- Right Breast Mastectomy and Axillary Dissection  · 01/23/2014- Completion of adjuvant RT to chest wall and axillary lymph node with Dr. Noel Chris  · 02/14- Initiated adjuvant endocrine therapy with tamoxifen  · 2014 (?) RECURRENCE in the axillary skin  · 2014 (?) Surgical resection of the axillary skin  · 2015 (?) YVETTE/BSO  · 09/19/2016- Bilateral exchange, nipple reconstruction and fat grafting and removal of PAC   · 2016- Switched to aromatase inhibitor  · 01/05/2017- METASTATIC DISEASE with lung, hilar, and axillary mets  · 01/11/2017-06/17/2018- Single agent Abraxane x 13 cycles  · 06/27/2018-09/17/2018- Gemzar  · 10/10/2018-Faslodex every 4 weeks/palbociclib    The patient is a very pleasant 49years old female who presents today for continuation of her treatment with Faslodex and palbociclib. She is here today for her Faslodex treatment. She did have CT scans to assess her disease status. She has been tolerating treatment quite well.   She denies any fever chills, GI toxicity nausea vomiting. She denies any respiratory symptoms. She is here today to assess treatment toxicity, disease management. She has some back issues. She also has complaint of pain in the right hand. She has been seen by her PCP for steroid injections. Cancer History:  Carly Soto was first seen by me on 1/21/2021. She was referred to [facility] of care for history of recurrent metastatic breast cancer. She has been in remission as per the latest CT scan. She is currently on Faslodex and palbociclib. She has received several lines therapy as described below. She moved from [place] to [place] to stay closer to her parents. Her medical history is complex. Further details as below. · 2006- Neoadjuvant chemotherapy with AC x 4 cycles followed by Taxol AC was complicated by viral meningitis; patient experienced neuropathy after 3 doses of Taxol and was switched to Taxotere for last dose  · 2007- Lumpectomy with axillary lymph node dissection 1.9cm, grade 2. No LVI. 1 of 14 lymph nodes positive for malignancy (1/14) [id number]  · 2007- Adjuvant Radiation Therapy to whole breast and axillary region  · April 2013- RECURRENCE presenting as mass in the axillary region  · Neoadjuvant chemotherapy with 2 cycles of Taxotere followed by Doxil- did not have good response to treatment  · 2013- Preop PET/CTshowed 2.8cm, right axillary lymph node with uptake. · 10/14/2013- Right Breast Mastectomy and Axillary Dissection Right breast had benign tissue with fibrous, negative for carcinoma. Axillary contents demonstrated invasive ductal carcinoma, involving fibroadipose tissue and tendinous tissue. 3 benign lymph nodes (0/3). Yusra grade 3. 3.0cm in greatest gross dimensions. Carcinoma permeates among soft tissues and in the right axilla with no apparent involvement of lymph nodes.    · 01/23/2014- Completion of adjuvant radiation therapy to chest wall and axillary lymph node with Dr. Boby Valdez  · 02/14- Initiated adjuvant endocrine therapy with tamoxifen  · 2014 (?) RECURRENCE in the axillary skin  · 2014 (?) Surgical resection of the axillary skin pathology demonstrating tumor at cauterized margin  · 2015 (?) YVETTE/BSO  · 09/19/2016- Bilateral exchange, nipple reconstruction and fat grafting and removal of PAC  · 2016- Switched to aromatase inhibitor patient was non compliant  · 01/05/2017- METASTATIC DISEASE Presented with respiratory failure. CTA Pulmonary showed numerous nodules and masses throughout both lung fields, compatible with metastatic disease. Bilateral hilar adenopathy seen. · 01/11/2017-06/17/2018- Single agent Abraxane x 13 cycles  · 03/15/2017- CT Chest W Contrast Interval decrease in maximal diameter of essentially all the multiple metastatic pulmonary nodules. The average difference is approximately 25%. Some of the much smaller ones have disappeared. Bilateral hilar and aortopulmonary window adenopathy is also similarly smaller. · 06/07/2017- CT Chest W Contrast Multiple lung nodules seen bilaterally. Most of the lung nodules show interval improvement with decreased size by 1 to 3mm. Mediastinal and bilateral hilar adenopathy seen with improvement. · 07/24/2017- PET/CT scan showed marked improvement, is minimal abnormal, has excellent response to therapy  · 02/01/2018- PET/CT scan Numerous bilateral lung masses and nodules, the majority of which do not have appreciable abnormal FDG uptake. The largest mass in the left infrahilar region of the left lower lobe demonstrates a maximum SUV of 3.3. Mediastinal lymphadenopathy. No evidence of metastatic disease in the abdomen or pelvis. · 06/21/2018- CT Chest/Abdomen/Pelvis Multiple lung mets, mild bilateral hilar and mediastinal lymph nodes.  No masses or evidence of metastatic disease in the abdomen or pelvis  · 06/27/2018-09/17/2018- Gemzar  · 09/28/2018- CT Pulmonary Multiple lung mets, mild bilateral hilar and mediastinal lymph nodes  · 10/10/2018-12/21/2020- Faslodex every 4 weeks  · 12/14/2018- CT Chest showed definite decrease in the maximal diameter and volume of all left and right metastatic nodules. Somewhat enlarged paratracheal and left axillary lymph nodes are relatively unchanged. · 04/22/2019- MRI Cervical Spine W WO Contrast Unremarkable  · 04/22/2019- MRI Brain W WO Contrast No enhancing lesions in the brain and no evidence of metastatic disease. · 07/27/2019- CTA Pulmonary showed essentially complete disappearance of the pulmonary metastatic disease. Several tiny flat peripheral nodules are the only sequela. The tiny ones on the right are unchanged, the tiny ones on the left are even smaller. There is no longer any active metastatic disease in either lung. · 12/19/2019- CT Chest/Abdomen/Pelvis Small, tiny subpleural nodules right upper and right midlung field as well as left lung base has remained unchanged. No new focal parenchymal abnormality seen. No adenopathy seen. There is no abdominal or pelvic mass, adenopathy or fluid collection. No lytic or sclerotic bony lesions, but there is a possibility of osteopenia and/or osteoporosis. · 02/17/2020- DEXA Bone Density showed osteopenia of lumbar spine, T-score -1.8, and left femoral neck, T-score -2.0  · 2/25/21 Ct Abdomen Pelvis W Iv Contrast  No evidence of metastatic disease in the abdomen or pelvis. · 2/26/21 Ct Chest W Contrast Tiny nodules in the right lung described above probably represent small foci of pleural thickening due to chronic inflammatory process or small noncalcified granulomas. No features to suggest malignancy or metastatic disease. Bilateral breast implants without complication. · 3/1/2021discussed the results CT chest abdomen pelvis. Essentially no clear evidence of metastatic disease. This is consistent with excellent response to treatment. Continue current treatment.       CA 27.29 Dynamics  11/05/2013- 23 01/21/2014- 19  06/10/2014-17 Allergen Reactions    No Known Allergies          Subjective   REVIEW OF SYSTEMS:   CONSTITUTIONAL: no fever, no night sweats, moderate fatigue;  HEENT: no blurring of vision, no double vision, no hearing difficulty, no tinnitus, no ulceration, no dysplasia, no epistaxis;  LUNGS: cough, no hemoptysis, no wheeze, no shortness of breath;  CARDIOVASCULAR: no palpitation, no chest pain, no shortness of breath;  GI:  no abdominal pain, no nausea, no vomiting, no diarrhea,  No constipation;  PATRICK: no dysuria, no hematuria, no frequency or urgency, no nephrolithiasis;  MUSCULOSKELETAL: no joint pain, no swelling, no stiffness; pain in hand  ENDOCRINE: no polyuria, no polydipsia, no cold & heat intolerance;  HEMATOLOGY: no easy bruising or bleeding, no history of clotting disorder;  DERMATOLOGY: no skin rash, no eczema, no pruritus;  PSYCHIATRY: no depression, no anxiety, no panic attacks, no suicidal ideation, no homicidal ideation;  NEUROLOGY: no syncope, no seizures, no numbness or tingling of hands, no numbness or tingling of feet, no paresis;    Objective   BP (!) 134/96   Pulse 113   Temp 97.3 °F (36.3 °C)   Ht 5' 6\" (1.676 m)   Wt 186 lb 14.4 oz (84.8 kg)   SpO2 94%   BMI 30.17 kg/m²     PHYSICAL EXAM:  CONSTITUTIONAL: Alert, appropriate, no acute distress  EYES: Non icteric, EOM intact, pupils equal round   ENT: Mucus membranes moist, no oral pharyngeal lesions, external inspection of ears and nose are normal  NECK: Supple, no masses. No palpable thyroid mass  CHEST/LUNGS: CTA bilaterally, normal respiratory effort   CARDIOVASCULAR: RRR, no murmurs. No lower extremity edema  ABDOMEN: soft non-tender, active bowel sounds, no HSM. No palpable masses  EXTREMITIES: warm, full ROM in all 4 extremities, no focal weakness. SKIN: warm, dry with no rashes or lesions  LYMPH: No cervical, clavicular, axillary, or inguinal lymphadenopathy  NEUROLOGIC: follows commands, non focal   PSYCH: mood and affect appropriate. Alert and oriented to time, place, person    LABORATORY RESULTS REVIEWED/ANALYZED BY ME:  3/1/2021- Methylmalonic Acid 171, Vitamin B12 >2000    Lab Results   Component Value Date    WBC 6.42 04/01/2021    HGB 12.4 04/01/2021    HCT 35.5 04/01/2021    .0 (H) 04/01/2021     04/01/2021     Lab Results   Component Value Date    NEUTROABS 3.55 04/01/2021       RADIOLOGY STUDIES REVIEWED BY ME:  No results found. ASSESSMENT:    Orders Placed This Encounter   Procedures    Cancer Antigen 27.29     Standing Status:   Future     Standing Expiration Date:   4/1/2022    Cancer Antigen 15-3     Standing Status:   Future     Standing Expiration Date:   4/1/2022    CEA     Standing Status:   Future     Standing Expiration Date:   4/1/2022    Comprehensive Metabolic Panel     Standing Status:   Future     Standing Expiration Date:   4/1/2022      Dwayne Cooper was seen today for follow-up. Diagnoses and all orders for this visit:    Carcinoma of female breast, unspecified estrogen receptor status, unspecified laterality, unspecified site of breast ([facility] [address]  -     Cancer Antigen 27.29; Future  -     Cancer Antigen 15-3; Future  -     CEA; Future  -     Comprehensive Metabolic Panel; Future    Chemotherapy management, encounter for    Adverse effect of chemotherapy, subsequent encounter    Care plan discussed with patient  -     gabapentin (NEURONTIN) 300 MG capsule; Take 2 capsules by mouth 3 times daily for 30 days. Chemotherapy-induced neuropathy (HCC)  -     gabapentin (NEURONTIN) 300 MG capsule; Take 2 capsules by mouth 3 times daily for 30 days. Metastatic breast cancer, hormone sensitive  2/25/2021discussed the results of CT chest abdomen pelvis. No evidence of disease progression. In fact, excellent response with no measurable metastatic disease at this time. 1/21/2001CA 15-3 = 23, CA 27-29 = 20.3. Currently Faslodex/Ibrance. Regimen:  Faslodex to 500 mg subcutaneous every 4 weeks.   Continue Ibrance days 121 q. 28 days    Clinical/laboratory assessment of toxicity for Ibrance      B12 deficiencyB12 levels 189. Recommended B12 2000 mcg p.o. daily. Will start B12 injections today 1000mcg with Faslodex. 3/1/2021- Methylmalonic Acid 171, Vitamin B12 >2000 Hold B12 at this time    Chemotherapy-induced neuropathycontinue gabapentin. PLAN:  CBC, CMP, CEA, CA 15-3, CA 27.29 today   Faslodex 500mg today and every 28 days +1000mcg  Follow up with Dr. Ervin Vela for PCP care  Last port flush 3/1  Repeat scans June 2021  Refilled gabapentin today  RTC with MD 4 weeks  Continue Esperanza Torres, ben scribing for Austen Olson MD. Electronically signed by Mehdi Heaton RN on 4/1/2021 at 4:17 PM CST. I, Dr Gregory Carey, personally performed the services described in this documentation as scribed by Mehdi Heaton RN in my presence and is both accurate and complete. I have seen, examined and reviewed this patient medication list, appropriate labs and imaging studies. I reviewed relevant medical records and others physicians notes. I discussed the plans of care with the patient. I answered all the questions to the patients satisfaction. I have also reviewed the chief complaint (CC) and part of the history (History of Present Illness (HPI), Past Family Social History [facility]), or Review of Systems (ROS) and made changes when appropriated.        (Please note that portions of this note were completed with a voice recognition program. Efforts were made to edit the dictations but occasionally words are mis-transcribed.)      Austen Olson MD    04/01/21  4:02 PM

## 2021-04-01 ENCOUNTER — OFFICE VISIT (OUTPATIENT)
Dept: HEMATOLOGY | Age: 56
End: 2021-04-01
Payer: MEDICARE

## 2021-04-01 ENCOUNTER — HOSPITAL ENCOUNTER (OUTPATIENT)
Dept: INFUSION THERAPY | Age: 56
Discharge: HOME | End: 2021-04-01
Payer: MEDICARE

## 2021-04-01 VITALS
SYSTOLIC BLOOD PRESSURE: 134 MMHG | DIASTOLIC BLOOD PRESSURE: 96 MMHG | TEMPERATURE: 97.3 F | WEIGHT: 186.9 LBS | OXYGEN SATURATION: 94 % | HEIGHT: 66 IN | HEART RATE: 113 BPM | BODY MASS INDEX: 30.04 KG/M2

## 2021-04-01 DIAGNOSIS — C50.919 CARCINOMA OF FEMALE BREAST, UNSPECIFIED ESTROGEN RECEPTOR STATUS, UNSPECIFIED LATERALITY, UNSPECIFIED SITE OF BREAST (HCC): Primary | ICD-10-CM

## 2021-04-01 DIAGNOSIS — T45.1X5D ADVERSE EFFECT OF CHEMOTHERAPY, SUBSEQUENT ENCOUNTER: ICD-10-CM

## 2021-04-01 DIAGNOSIS — G62.0 CHEMOTHERAPY-INDUCED NEUROPATHY (HCC): ICD-10-CM

## 2021-04-01 DIAGNOSIS — Z71.89 CARE PLAN DISCUSSED WITH PATIENT: ICD-10-CM

## 2021-04-01 DIAGNOSIS — Z51.11 CHEMOTHERAPY MANAGEMENT, ENCOUNTER FOR: ICD-10-CM

## 2021-04-01 DIAGNOSIS — I87.8 POOR VENOUS ACCESS: ICD-10-CM

## 2021-04-01 DIAGNOSIS — T45.1X5A CHEMOTHERAPY-INDUCED NEUROPATHY (HCC): ICD-10-CM

## 2021-04-01 LAB
BASOPHILS ABSOLUTE: 0.03 K/UL (ref 0.01–0.08)
BASOPHILS RELATIVE PERCENT: 0.5 % (ref 0.1–1.2)
EOSINOPHILS ABSOLUTE: 0.01 K/UL (ref 0.04–0.54)
EOSINOPHILS RELATIVE PERCENT: 0.2 % (ref 0.7–7)
HCT VFR BLD CALC: 35.5 % (ref 34.1–44.9)
HEMOGLOBIN: 12.4 G/DL (ref 11.2–15.7)
LYMPHOCYTES ABSOLUTE: 2.39 K/UL (ref 1.18–3.74)
LYMPHOCYTES RELATIVE PERCENT: 37.2 % (ref 19.3–53.1)
MCH RBC QN AUTO: 37 PG (ref 25.6–32.2)
MCHC RBC AUTO-ENTMCNC: 34.9 G/DL (ref 32.3–35.5)
MCV RBC AUTO: 106 FL (ref 79.4–94.8)
MONOCYTES ABSOLUTE: 0.44 K/UL (ref 0.24–0.82)
MONOCYTES RELATIVE PERCENT: 6.9 % (ref 4.7–12.5)
NEUTROPHILS ABSOLUTE: 3.55 K/UL (ref 1.56–6.13)
NEUTROPHILS RELATIVE PERCENT: 55.2 % (ref 34–71.1)
PDW BLD-RTO: 13.6 % (ref 11.7–14.4)
PLATELET # BLD: 266 K/UL (ref 182–369)
PMV BLD AUTO: 9.5 FL (ref 7.4–10.4)
RBC # BLD: 3.35 M/UL (ref 3.93–5.22)
WBC # BLD: 6.42 K/UL (ref 3.98–10.04)

## 2021-04-01 PROCEDURE — 4004F PT TOBACCO SCREEN RCVD TLK: CPT | Performed by: INTERNAL MEDICINE

## 2021-04-01 PROCEDURE — 96523 IRRIG DRUG DELIVERY DEVICE: CPT

## 2021-04-01 PROCEDURE — 2580000003 HC RX 258: Performed by: INTERNAL MEDICINE

## 2021-04-01 PROCEDURE — 99214 OFFICE O/P EST MOD 30 MIN: CPT | Performed by: INTERNAL MEDICINE

## 2021-04-01 PROCEDURE — 96402 CHEMO HORMON ANTINEOPL SQ/IM: CPT

## 2021-04-01 PROCEDURE — G8417 CALC BMI ABV UP PARAM F/U: HCPCS | Performed by: INTERNAL MEDICINE

## 2021-04-01 PROCEDURE — 3017F COLORECTAL CA SCREEN DOC REV: CPT | Performed by: INTERNAL MEDICINE

## 2021-04-01 PROCEDURE — G8427 DOCREV CUR MEDS BY ELIG CLIN: HCPCS | Performed by: INTERNAL MEDICINE

## 2021-04-01 PROCEDURE — 96401 CHEMO ANTI-NEOPL SQ/IM: CPT

## 2021-04-01 PROCEDURE — 6360000002 HC RX W HCPCS: Performed by: INTERNAL MEDICINE

## 2021-04-01 PROCEDURE — 85025 COMPLETE CBC W/AUTO DIFF WBC: CPT

## 2021-04-01 RX ORDER — SODIUM CHLORIDE 0.9 % (FLUSH) 0.9 %
20 SYRINGE (ML) INJECTION PRN
Status: DISCONTINUED | OUTPATIENT
Start: 2021-04-01 | End: 2021-04-02 | Stop reason: HOSPADM

## 2021-04-01 RX ORDER — HEPARIN SODIUM (PORCINE) LOCK FLUSH IV SOLN 100 UNIT/ML 100 UNIT/ML
500 SOLUTION INTRAVENOUS PRN
Status: CANCELLED | OUTPATIENT
Start: 2021-04-01

## 2021-04-01 RX ORDER — SODIUM CHLORIDE 0.9 % (FLUSH) 0.9 %
20 SYRINGE (ML) INJECTION PRN
Status: CANCELLED | OUTPATIENT
Start: 2021-04-01

## 2021-04-01 RX ORDER — LAMOTRIGINE 25 MG/1
250 TABLET ORAL ONCE
Status: CANCELLED | OUTPATIENT
Start: 2021-04-01 | End: 2021-04-01

## 2021-04-01 RX ORDER — HEPARIN SODIUM (PORCINE) LOCK FLUSH IV SOLN 100 UNIT/ML 100 UNIT/ML
500 SOLUTION INTRAVENOUS PRN
Status: DISCONTINUED | OUTPATIENT
Start: 2021-04-01 | End: 2021-04-02 | Stop reason: HOSPADM

## 2021-04-01 RX ORDER — LAMOTRIGINE 25 MG/1
500 TABLET ORAL ONCE
Status: COMPLETED | OUTPATIENT
Start: 2021-04-01 | End: 2021-04-01

## 2021-04-01 RX ORDER — SODIUM CHLORIDE 0.9 % (FLUSH) 0.9 %
10 SYRINGE (ML) INJECTION PRN
Status: CANCELLED | OUTPATIENT
Start: 2021-04-01

## 2021-04-01 RX ORDER — GABAPENTIN 300 MG/1
600 CAPSULE ORAL 3 TIMES DAILY
Qty: 180 CAPSULE | Refills: 0 | Status: SHIPPED | OUTPATIENT
Start: 2021-04-01 | End: 2021-05-19

## 2021-04-01 RX ADMIN — FULVESTRANT 500 MG: 50 INJECTION, SOLUTION INTRAMUSCULAR at 13:14

## 2021-04-01 RX ADMIN — SODIUM CHLORIDE, PRESERVATIVE FREE 20 ML: 5 INJECTION INTRAVENOUS at 13:18

## 2021-04-01 RX ADMIN — HEPARIN 500 UNITS: 100 SYRINGE at 13:18

## 2021-04-08 PROBLEM — N95.1 MENOPAUSAL SYMPTOM: Status: ACTIVE | Noted: 2021-04-08

## 2021-04-08 PROBLEM — D26.1: Status: ACTIVE | Noted: 2021-04-08

## 2021-04-08 RX ORDER — HYDROXYZINE HYDROCHLORIDE 10 MG/1
TABLET, FILM COATED ORAL
COMMUNITY
Start: 2021-03-09 | End: 2021-06-03

## 2021-04-08 RX ORDER — FAMOTIDINE 40 MG/1
TABLET, FILM COATED ORAL
COMMUNITY
End: 2021-05-28 | Stop reason: SDUPTHER

## 2021-04-09 ENCOUNTER — OFFICE VISIT (OUTPATIENT)
Dept: FAMILY MEDICINE CLINIC | Age: 56
End: 2021-04-09
Payer: MEDICARE

## 2021-04-09 VITALS
TEMPERATURE: 97.9 F | HEART RATE: 94 BPM | DIASTOLIC BLOOD PRESSURE: 78 MMHG | OXYGEN SATURATION: 96 % | BODY MASS INDEX: 29.92 KG/M2 | WEIGHT: 186.2 LBS | HEIGHT: 66 IN | SYSTOLIC BLOOD PRESSURE: 126 MMHG

## 2021-04-09 DIAGNOSIS — Z13.220 SCREENING FOR HYPERLIPIDEMIA: ICD-10-CM

## 2021-04-09 DIAGNOSIS — G89.4 CHRONIC PAIN SYNDROME: Primary | ICD-10-CM

## 2021-04-09 DIAGNOSIS — R06.00 DYSPNEA AND RESPIRATORY ABNORMALITIES: ICD-10-CM

## 2021-04-09 DIAGNOSIS — E66.9 OBESITY (BMI 30-39.9): ICD-10-CM

## 2021-04-09 DIAGNOSIS — R06.89 DYSPNEA AND RESPIRATORY ABNORMALITIES: ICD-10-CM

## 2021-04-09 DIAGNOSIS — M06.062 RHEUMATOID ARTHRITIS INVOLVING BOTH KNEES WITH NEGATIVE RHEUMATOID FACTOR (HCC): ICD-10-CM

## 2021-04-09 DIAGNOSIS — C50.919 CARCINOMA OF FEMALE BREAST, UNSPECIFIED ESTROGEN RECEPTOR STATUS, UNSPECIFIED LATERALITY, UNSPECIFIED SITE OF BREAST (HCC): ICD-10-CM

## 2021-04-09 DIAGNOSIS — M85.672 OTHER CYST OF BONE, LEFT ANKLE AND FOOT: ICD-10-CM

## 2021-04-09 DIAGNOSIS — M06.061 RHEUMATOID ARTHRITIS INVOLVING BOTH KNEES WITH NEGATIVE RHEUMATOID FACTOR (HCC): ICD-10-CM

## 2021-04-09 DIAGNOSIS — M25.531 RIGHT WRIST PAIN: ICD-10-CM

## 2021-04-09 PROCEDURE — 99204 OFFICE O/P NEW MOD 45 MIN: CPT | Performed by: FAMILY MEDICINE

## 2021-04-09 PROCEDURE — 3017F COLORECTAL CA SCREEN DOC REV: CPT | Performed by: FAMILY MEDICINE

## 2021-04-09 PROCEDURE — G8417 CALC BMI ABV UP PARAM F/U: HCPCS | Performed by: FAMILY MEDICINE

## 2021-04-09 PROCEDURE — 1036F TOBACCO NON-USER: CPT | Performed by: FAMILY MEDICINE

## 2021-04-09 PROCEDURE — G8427 DOCREV CUR MEDS BY ELIG CLIN: HCPCS | Performed by: FAMILY MEDICINE

## 2021-04-09 ASSESSMENT — PATIENT HEALTH QUESTIONNAIRE - PHQ9
SUM OF ALL RESPONSES TO PHQ QUESTIONS 1-9: 1
SUM OF ALL RESPONSES TO PHQ9 QUESTIONS 1 & 2: 1

## 2021-04-09 NOTE — PROGRESS NOTES
[facility] PHYSICIAN SERVICES  [facility] FAMILY MEDICINE  [address] [address]  Dept: [phone]  Dept Fax: [phone]  Loc: [phone]    Subjective:     Lisa Swanson is a 49 y.o. female who presents today for her medical conditions/complaints as noted below. Lisa Swanson is c/o of New Patient        HPI:   Patient presents establish care. She recently moved to the area from [place]. She is referred to me Dr. Jalil Gonzalez. She has a rather complex medical history including stage IV breast cancer diagnosed in 2006, status post mastectomy and neoadjuvant radiation, currently on chemo per Dr. Jalil Gonzalez. She states that she had a PCP in [place], Dr. Ricky Porras, but primarily saw her oncologist Dr. Porter Obando for \"pretty much everything. \"  Main concerns today are uncontrolled chronic back pain, knee pain, and wrist pain. She is requesting referrals to rheumatology for management of her RA, pain management, and orthopedics. She was seen by all of these specialists in [place]. We also discussed health maintenance recommendations, but she would like to get pain control first before proceeding with these. Her other main concern is a new \" knot\" on the dorsal aspect of her left foot. She denies any accident or injury. It is not painful. She first noticed it 1 week ago. She hopes it is \"just a cyst.\"      PHQ Scores 4/9/2021   PHQ2 Score 1   PHQ9 Score 1     Interpretation of Total Score Depression Severity: 1-4 = Minimal depression, 5-9 = Mild depression, 10-14 = Moderate depression, 15-19 = Moderately severe depression, 20-27 = Severe depression     No flowsheet data found. Interpretation of CLEMENCIA-7 score: 5-9 = mild anxiety, 10-14 = moderate anxiety, 15+ = severe anxiety. Recommend referral to behavioral health for scores 10 or greater.      Past Medical History:   Diagnosis Date    Back pain     Breast cancer ([address]     Breast cancer with lung mets    GERD (gastroesophageal reflux disease)     Hypertension     Neuropathy     Post chemo treatment neuropathy     Past Surgical History:   Procedure Laterality Date    BREAST LUMPECTOMY Right     2006    COSMETIC SURGERY      Bilateral breast implants    HYSTERECTOMY, TOTAL ABDOMINAL  2015    JOINT REPLACEMENT      knee pain    MASTECTOMY, BILATERAL      Right 2013 & Left 2016       Family History   Problem Relation Age of Onset    Cancer Other         Father's side-Prostate cancer, Multiple Myeloma & Bone cancer       Social History     Tobacco Use    Smoking status: Former Smoker     Types: Cigarettes    Smokeless tobacco: Never Used   Substance Use Topics    Alcohol use: Not Currently      Current Outpatient Medications   Medication Sig Dispense Refill    hydrOXYzine (ATARAX) 10 MG tablet TAKE 1 TABLET BY MOUTH EVERY 72 HOURS      famotidine (PEPCID) 40 MG tablet famotidine   tab 40mgfamotidine      gabapentin (NEURONTIN) 300 MG capsule Take 2 capsules by mouth 3 times daily for 30 days. 180 capsule 0    pantoprazole (PROTONIX) 40 MG tablet Take 1 tablet by mouth daily 90 tablet 0    palbociclib (IBRANCE) 125 MG tablet Take 125 mg by mouth daily 21 tablet 5    Fluticasone Furoate-Vilanterol (BREO ELLIPTA IN) Inhale into the lungs      cyclobenzaprine (FLEXERIL) 10 MG tablet Take 10 mg by mouth 3 times daily as needed for Muscle spasms      guaiFENesin-codeine (GUAIFENESIN AC) 100-10 MG/5ML liquid Take 5 mLs by mouth 3 times daily as needed for Cough.  HYDROcodone-acetaminophen (NORCO) 7.5-325 MG per tablet Take 1 tablet by mouth every 6 hours as needed for Pain.       montelukast (SINGULAIR) 10 MG tablet Take 10 mg by mouth nightly      Albuterol Sulfate (PROAIR HFA IN) Inhale into the lungs      benzonatate (TESSALON) 100 MG capsule Take 100 mg by mouth 3 times daily as needed for Cough      Cholecalciferol (VITAMIN D3) 125 MCG (5000 UT) TABS Take by mouth      lisinopril (PRINIVIL;ZESTRIL) 10 MG tablet Take 10 mg by mouth daily      acyclovir (ZOVIRAX) 800 MG tablet Take 800 mg by mouth 2 times daily      ondansetron (ZOFRAN) 4 MG tablet Take 4 mg by mouth every 8 hours as needed for Nausea or Vomiting      venlafaxine (EFFEXOR) 37.5 MG tablet Take 37.5 mg by mouth 3 times daily      diclofenac (VOLTAREN) 0.1 % ophthalmic solution 1 drop 4 times daily      alclomethasone (ACLOVATE) 0.05 % cream Apply topically 2 times daily Apply topically 2 times daily.  meloxicam (MOBIC) 15 MG tablet Take 15 mg by mouth daily      ciprofloxacin (CILOXAN) 0.3 % ophthalmic solution 1 drop every 2 hours      triamcinolone (KENALOG) 0.1 % cream Apply topically 2 times daily Apply topically 2 times daily.  chlorhexidine (PERIDEX) 0.12 % solution Take 15 mLs by mouth 2 times daily       No current facility-administered medications for this visit. Allergies   Allergen Reactions    No Known Allergies        Review of Systems   Constitutional: Negative for chills and fever. HENT: Negative for facial swelling and mouth sores. Eyes: Negative for discharge and itching. Respiratory: Negative for apnea and stridor. Cardiovascular: Negative for chest pain and palpitations. Gastrointestinal: Negative for nausea and vomiting. Endocrine: Negative for cold intolerance and heat intolerance. Genitourinary: Negative for frequency and urgency. Musculoskeletal: Positive for arthralgias, back pain and joint swelling. Skin: Negative for color change and rash. See HPI for visit specific review of symptoms. All others negative      Objective:   /78   Pulse 94   Temp 97.9 °F (36.6 °C)   Ht 5' 6\" (1.676 m)   Wt 186 lb 3.2 oz (84.5 kg)   SpO2 96%   BMI 30.05 kg/m²   Physical Exam  Vitals signs reviewed. Constitutional:       Appearance: She is well-developed. She is obese. HENT:      Head: Normocephalic. Mouth/Throat:      Lips: Pink.       Mouth: Mucous membranes are moist. Eyes:      Conjunctiva/sclera: Conjunctivae normal.      Pupils: Pupils are equal, round, and reactive to light. Neck:      Musculoskeletal: Normal range of motion and neck supple. Cardiovascular:      Rate and Rhythm: Normal rate and regular rhythm. Chest Wall: No thrill. Heart sounds: Normal heart sounds. Pulmonary:      Effort: Pulmonary effort is normal. No respiratory distress. Breath sounds: Normal breath sounds. Abdominal:      General: Bowel sounds are normal. There is no distension. Palpations: Abdomen is soft. There is no hepatomegaly. Tenderness: There is no abdominal tenderness. Musculoskeletal: Normal range of motion. Skin:     General: Skin is warm and dry. Capillary Refill: Capillary refill takes less than 2 seconds. Neurological:      Mental Status: She is alert and oriented to person, place, and time. Cranial Nerves: No cranial nerve deficit.    Psychiatric:         Behavior: Behavior normal.           Lab Review   Recent Results (from the past 672 hour(s))   CBC Auto Differential    Collection Time: 04/01/21 12:40 PM   Result Value Ref Range    WBC 6.42 3.98 - 10.04 K/uL    RBC 3.35 (L) 3.93 - 5.22 M/uL    Hemoglobin 12.4 11.2 - 15.7 g/dL    Hematocrit 35.5 34.1 - 44.9 %    .0 (H) 79.4 - 94.8 fL    MCH 37.0 (H) 25.6 - 32.2 pg    MCHC 34.9 32.3 - 35.5 g/dL    RDW 13.6 11.7 - 14.4 %    Platelets 233 903 - 724 K/uL    MPV 9.5 7.4 - 10.4 fL    Neutrophils % 55.2 34.0 - 71.1 %    Lymphocytes % 37.2 19.3 - 53.1 %    Monocytes % 6.9 4.7 - 12.5 %    Eosinophils % 0.2 (L) 0.7 - 7.0 %    Basophils % 0.5 0.1 - 1.2 %    Neutrophils Absolute 3.55 1.56 - 6.13 K/uL    Lymphocytes Absolute 2.39 1.18 - 3.74 K/uL    Monocytes Absolute 0.44 0.24 - 0.82 K/uL    Eosinophils Absolute 0.01 (L) 0.04 - 0.54 K/uL    Basophils Absolute 0.03 0.01 - 0.08 K/uL   Comprehensive Metabolic Panel    Collection Time: 04/01/21  3:33 PM   Result Value Ref Range    Glucose 68 65 - 99 mg/dL    BUN 8 6 - 24 mg/dL    CREATININE 0.89 0.57 - 1.00 mg/dL    GFR Non-African American 74 >59 mL/min/1.73    GFR African American 85 >59 mL/min/1.73    BUN/Creatinine Ratio 9 9 - 23    Sodium 140 134 - 144 mmol/L    Potassium 4.3 3.5 - 5.2 mmol/L    Chloride 103 96 - 106 mmol/L    CO2 18 (L) 20 - 29 mmol/L    Calcium 9.3 8.7 - 10.2 mg/dL    Total Protein 7.4 6.0 - 8.5 g/dL    Albumin 4.6 3.8 - 4.9 g/dL    Globulin 2.8 1.5 - 4.5 g/dL    Albumin/Globulin Ratio 1.6 1.2 - 2.2    Total Bilirubin 0.7 0.0 - 1.2 mg/dL    Alkaline Phosphatase 103 39 - 117 IU/L    AST 17 0 - 40 IU/L    ALT 7 0 - 32 IU/L   CEA    Collection Time: 04/01/21  3:33 PM   Result Value Ref Range    CEA 1.6 0.0 - 4.7 ng/mL   Cancer Antigen 27.29    Collection Time: 04/01/21  3:33 PM   Result Value Ref Range    CA 27.29 33.5 0.0 - 38.6 U/mL   Cancer Antigen 15-3    Collection Time: 04/01/21  3:33 PM   Result Value Ref Range    CA 15-3 31.6 (H) 0.0 - 25.0 U/mL               Assessment & Plan: The following diagnoses and conditions are stable with no further orders unless indicated:    Patient Active Problem List    Diagnosis Date Noted    Other cyst of bone, left ankle and foot 04/10/2021     Overview Note:     No trauma. Advised I believe this is most likely a cyst.  Will monitor closely, if change in size or becomes more painful, will consider x-rays.  Obesity (BMI 30-39.9) 04/09/2021     Overview Note:     BMI 30.05      Dyspnea and respiratory abnormalities 04/09/2021     Overview Note:     Patient has been prescribed Breo Ellipta and albuterol. She states she was never diagnosed with COPD or asthma, was told the cancer \"affected my lungs. \"  She denies any shortness of breath today. Continue current medications.  Chronic pain syndrome 04/09/2021     Overview Note:     Refer to pain management as requested.  Right wrist pain 04/09/2021     Overview Note:     Refer to orthopedics as requested.       Rheumatoid arthritis involving both knees with negative rheumatoid factor ([facility] [address] 04/09/2021     Overview Note:     Refer to rheumatology as requested.  Menopausal symptom 04/08/2021    Benign neoplasm of body of uterus 04/08/2021    Iron deficiency anemia 01/22/2021    Carcinoma of female breast ([facility] [address] 01/21/2021    Poor venous access 01/21/2021        Helga Molina was seen today for new patient. Diagnoses and all orders for this visit:    Chronic pain syndrome  -     External Referral To Pain Clinic    Carcinoma of female breast, unspecified estrogen receptor status, unspecified laterality, unspecified site of breast [facility]    Screening for hyperlipidemia  -     Lipid Panel; Future    Obesity (BMI 30-39. 9)    Right wrist pain  -     Bruce Hahn MD, Orthopaedic Surgery, [place]    Rheumatoid arthritis involving both knees with negative rheumatoid factor (HCC)  -     [facility] - Lb Rosa MD, Rheumatology, [place]    Dyspnea and respiratory abnormalities    Other cyst of bone, left ankle and foot      Over 50% of the total visit time of 45 minutes was spent on counseling and/or coordination of care of -review of medical records, updating of chart, discussion of symptoms and plan. He brought in her records from both oncology and pain management in [place], I reviewed these and they were also scanned into her chart. 1. Chronic pain syndrome    2. Carcinoma of female breast, unspecified estrogen receptor status, unspecified laterality, unspecified site of breast ([facility]    3. Screening for hyperlipidemia    4. Obesity (BMI 30-39.9)    5. Right wrist pain    6. Rheumatoid arthritis involving both knees with negative rheumatoid factor (HCC)    7. Dyspnea and respiratory abnormalities    8.  Other cyst of bone, left ankle and foot         Health Maintenance   Topic Date Due    Pneumococcal 0-64 years Vaccine (1 of 3 - PCV13) Never done    COVID-19 Vaccine (1) Never done    DTaP/Tdap/Td vaccine (1 - Tdap) Never done   [employer] Lipid screen  Never done    Shingles Vaccine (1 of 2) Never done    Colon cancer screen colonoscopy  Never done    Flu vaccine (Season Ended) 09/01/2021    Potassium monitoring  04/01/2022    Creatinine monitoring  04/01/2022    Hepatitis A vaccine  Aged Out    Hepatitis B vaccine  Aged Out    Hib vaccine  Aged Out    Meningococcal (ACWY) vaccine  Aged Out    Hepatitis C screen  Discontinued    HIV screen  Discontinued     Agreeable for AWV. Discussed importance of Covid-19 vaccination, unless contraindicated due to her chemotherapy or otherwise, I recommend she receive the Covid vaccines. We also discussed colon cancer screening options. She is leaning towards Cologuard, but she wants to get her pain under control first \"before doing any of that. \"    Return in about 6 months (around 10/9/2021) for AWV 40 minute appt, virtual visit. Discussed use, benefit, and side effects of prescribed medications. All patient questions answered. Pt voiced understanding. Reviewed health maintenance. Instructed to continue current medications, diet and exercise. Patient agreedwith treatment plan. Follow up as directed. Old records reviewed, where available.     Grisel De La Rosa MD    Note:  dictated using Dragon software

## 2021-04-10 PROBLEM — M85.672 OTHER CYST OF BONE, LEFT ANKLE AND FOOT: Status: ACTIVE | Noted: 2021-04-10

## 2021-04-10 ASSESSMENT — ENCOUNTER SYMPTOMS
EYE ITCHING: 0
APNEA: 0
STRIDOR: 0
COLOR CHANGE: 0
BACK PAIN: 1
VOMITING: 0
EYE DISCHARGE: 0
NAUSEA: 0
FACIAL SWELLING: 0

## 2021-05-05 NOTE — PROGRESS NOTES
MEDICAL ONCOLOGY PROGRESS NOTE    Pt Name: Christian Alan  MRN: [id number]  [place]: 1965  Date of evaluation: 5/6/2021    HISTORY OF PRESENT ILLNESS:    Reason for MD visit: Disease/toxicity management      Diagnosis  · Grade III Adenocarcinoma of right breast diagnosed 2006  · Stage IIIA, [id number]  · ER/OR Positive, HER-2 bruce/ihc 1+  · April 2013-RECURRENCE in the right axillary region  · Genetic testing- BRCA 1&2 Negative  · 2014 (?) RECURRENCE in the axillary skin  · 01/05/2017- METASTATIC DISEASE with lung, hilar, and axillary lymph node mets  · Osteopenia    Treatment Summary  · 2006- Neoadjuvant chemotherapy with AC x 4 cycles followed by Taxol  · 2007- Lumpectomy with axillary lymph node dissection  · 2007- Adjuvant radiation therapy to whole breast and axillary region  · Did not take tamoxifen due to cost  · April 2013- RECURRENCE  · Neoadjuvant chemotherapy with 2 cycles of Taxotere followed by Doxil  · 10/14/2013- Right Breast Mastectomy and Axillary Dissection  · 01/23/2014- Completion of adjuvant RT to chest wall and axillary lymph node with Dr. Jennifer Tamayo  · 02/14- Initiated adjuvant endocrine therapy with tamoxifen  · 2014 (?) RECURRENCE in the axillary skin  · 2014 (?) Surgical resection of the axillary skin  · 2015 (?) YVETTE/BSO  · 09/19/2016- Bilateral exchange, nipple reconstruction and fat grafting and removal of PAC   · 2016- Switched to aromatase inhibitor  · 01/05/2017- METASTATIC DISEASE with lung, hilar, and axillary mets  · 01/11/2017-06/17/2018- Single agent Abraxane x 13 cycles  · 06/27/2018-09/17/2018- Gemzar  · 10/10/2018-Faslodex every 4 weeks/palbociclib    The patient is a very pleasant 49years old female who presents today for continuation of her treatment with Faslodex and palbociclib. She is here today for her Faslodex treatment. She did have CT scans to assess her disease status in February 2021 that showed no evidence of progression.   She has been tolerating treatment quite well.  She has been dealing with a severe back pain issues for a while. She is scheduled to be seen by pain management. She has also been seen by orthopedics and had injections in her right wrist today. She says she does not feel well today. She feels like she had a bug bite in the internal aspect of her right arm. She denies any fever or chills. She feels nauseated. This happened about 3 days ago. She had axillary dissection on the same side and is concerned about risk of cellulitis and lymphedema. Cancer History:  Jannie Duverney was first seen by me on 1/21/2021. She was referred to [facility] for history of recurrent metastatic breast cancer. She has been in remission as per the latest CT scan. She is currently on Faslodex and palbociclib. She has received several lines therapy as described below. She moved from [place] to [facility] to stay closer to her parents. Her medical history is complex. Further details as below. · 2006- Neoadjuvant chemotherapy with AC x 4 cycles followed by Taxol AC was complicated by viral meningitis; patient experienced neuropathy after 3 doses of Taxol and was switched to Taxotere for last dose  · 2007- Lumpectomy with axillary lymph node dissection 1.9cm, grade 2. No LVI. 1 of 14 lymph nodes positive for malignancy (1/14) [id number]  · 2007- Adjuvant Radiation Therapy to whole breast and axillary region  · April 2013- RECURRENCE presenting as mass in the axillary region  · Neoadjuvant chemotherapy with 2 cycles of Taxotere followed by Doxil- did not have good response to treatment  · 2013- Preop PET/CTshowed 2.8cm, right axillary lymph node with uptake. · 10/14/2013- Right Breast Mastectomy and Axillary Dissection Right breast had benign tissue with fibrous, negative for carcinoma. Axillary contents demonstrated invasive ductal carcinoma, involving fibroadipose tissue and tendinous tissue. 3 benign lymph nodes (0/3).  Yusra grade 3. 3.0cm in greatest gross dimensions. Carcinoma permeates among soft tissues and in the right axilla with no apparent involvement of lymph nodes. · 01/23/2014- Completion of adjuvant radiation therapy to chest wall and axillary lymph node with Dr. Lexis Pichardo  · 02/14- Initiated adjuvant endocrine therapy with tamoxifen  · 2014 (?) RECURRENCE in the axillary skin  · 2014 (?) Surgical resection of the axillary skin pathology demonstrating tumor at cauterized margin  · 2015 (?) YVETTE/BSO  · 09/19/2016- Bilateral exchange, nipple reconstruction and fat grafting and removal of PAC  · 2016- Switched to aromatase inhibitor patient was non compliant  · 01/05/2017- METASTATIC DISEASE Presented with respiratory failure. CTA Pulmonary showed numerous nodules and masses throughout both lung fields, compatible with metastatic disease. Bilateral hilar adenopathy seen. · 01/11/2017-06/17/2018- Single agent Abraxane x 13 cycles  · 03/15/2017- CT Chest W Contrast Interval decrease in maximal diameter of essentially all the multiple metastatic pulmonary nodules. The average difference is approximately 25%. Some of the much smaller ones have disappeared. Bilateral hilar and aortopulmonary window adenopathy is also similarly smaller. · 06/07/2017- CT Chest W Contrast Multiple lung nodules seen bilaterally. Most of the lung nodules show interval improvement with decreased size by 1 to 3mm. Mediastinal and bilateral hilar adenopathy seen with improvement. · 07/24/2017- PET/CT scan showed marked improvement, is minimal abnormal, has excellent response to therapy  · 02/01/2018- PET/CT scan Numerous bilateral lung masses and nodules, the majority of which do not have appreciable abnormal FDG uptake. The largest mass in the left infrahilar region of the left lower lobe demonstrates a maximum SUV of 3.3. Mediastinal lymphadenopathy. No evidence of metastatic disease in the abdomen or pelvis.    · 06/21/2018- CT Chest/Abdomen/Pelvis Multiple lung mets, mild bilateral hilar and mediastinal lymph nodes. No masses or evidence of metastatic disease in the abdomen or pelvis  · 06/27/2018-09/17/2018- Gemzar  · 09/28/2018- CT Pulmonary Multiple lung mets, mild bilateral hilar and mediastinal lymph nodes  · 10/10/2018-12/21/2020- Faslodex every 4 weeks  · 12/14/2018- CT Chest showed definite decrease in the maximal diameter and volume of all left and right metastatic nodules. Somewhat enlarged paratracheal and left axillary lymph nodes are relatively unchanged. · 04/22/2019- MRI Cervical Spine W WO Contrast Unremarkable  · 04/22/2019- MRI Brain W WO Contrast No enhancing lesions in the brain and no evidence of metastatic disease. · 07/27/2019- CTA Pulmonary showed essentially complete disappearance of the pulmonary metastatic disease. Several tiny flat peripheral nodules are the only sequela. The tiny ones on the right are unchanged, the tiny ones on the left are even smaller. There is no longer any active metastatic disease in either lung. · 12/19/2019- CT Chest/Abdomen/Pelvis Small, tiny subpleural nodules right upper and right midlung field as well as left lung base has remained unchanged. No new focal parenchymal abnormality seen. No adenopathy seen. There is no abdominal or pelvic mass, adenopathy or fluid collection. No lytic or sclerotic bony lesions, but there is a possibility of osteopenia and/or osteoporosis. · 02/17/2020- DEXA Bone Density showed osteopenia of lumbar spine, T-score -1.8, and left femoral neck, T-score -2.0  · 2/25/21 Ct Abdomen Pelvis W Iv Contrast  No evidence of metastatic disease in the abdomen or pelvis. · 2/26/21 Ct Chest W Contrast Tiny nodules in the right lung described above probably represent small foci of pleural thickening due to chronic inflammatory process or small noncalcified granulomas. No features to suggest malignancy or metastatic disease. Bilateral breast implants without complication.    · 3/1/2021discussed the results CT chest abdomen pelvis. Essentially no clear evidence of metastatic disease. This is consistent with excellent response to treatment. Continue current treatment. · 4/1/2021  31.6 CA 27-29 33.5 CEA 1.6       CA 27.29 [employer]  01/21/2021- 20.3  04/01/2021 33.5      Past Medical History:    Past Medical History:   Diagnosis Date    Back pain     Breast cancer ([address]     Breast cancer with lung mets    GERD (gastroesophageal reflux disease)     Hypertension     Neuropathy     Post chemo treatment neuropathy       Past Surgical History:    Past Surgical History:   Procedure Laterality Date    BREAST LUMPECTOMY Right     2006    COSMETIC SURGERY      Bilateral breast implants    HYSTERECTOMY, TOTAL ABDOMINAL  2015    JOINT REPLACEMENT      knee pain    MASTECTOMY, BILATERAL      Right 2013 & Left 2016       Social History:    Marital status, children: Single, 2 children-female  Smoking status: no  ETOH status: no  Profession: Disabled  [place] [id number] [address]  Recent trips: Moved from [facility] [place]: no    Family History:   Family History   Problem Relation Age of Onset    Cancer Other         Father's side-Prostate cancer, Multiple Myeloma & Bone cancer       Current Hospital Medications:    Current Outpatient Medications   Medication Sig Dispense Refill    doxycycline hyclate (VIBRA-TABS) 100 MG tablet Take 1 tablet by mouth 2 times daily for 7 days 14 tablet 0    hydrOXYzine (ATARAX) 10 MG tablet TAKE 1 TABLET BY MOUTH EVERY 72 HOURS      famotidine (PEPCID) 40 MG tablet famotidine   tab 40mgfamotidine      gabapentin (NEURONTIN) 300 MG capsule Take 2 capsules by mouth 3 times daily for 30 days.  180 capsule 0    pantoprazole (PROTONIX) 40 MG tablet Take 1 tablet by mouth daily 90 tablet 0    palbociclib (IBRANCE) 125 MG tablet Take 125 mg by mouth daily 21 tablet 5    Fluticasone Furoate-Vilanterol (BREO ELLIPTA IN) Inhale into the lungs      cyclobenzaprine (FLEXERIL) 10 MG tablet SYSTEMS:   CONSTITUTIONAL: no fever, no night sweats, moderate fatigue;  HEENT: no blurring of vision, no double vision, no hearing difficulty, no tinnitus, no ulceration, no dysplasia, no epistaxis;  LUNGS: cough, no hemoptysis, no wheeze, no shortness of breath;  CARDIOVASCULAR: no palpitation, no chest pain, no shortness of breath;  GI:  no abdominal pain,  nausea, no vomiting, no diarrhea,  No constipation;  PATRICK: no dysuria, no hematuria, no frequency or urgency, no nephrolithiasis;  MUSCULOSKELETAL: Back pain, no swelling, no stiffness; pain in hand  ENDOCRINE: no polyuria, no polydipsia, no cold & heat intolerance;  HEMATOLOGY: no easy bruising or bleeding, no history of clotting disorder;  DERMATOLOGY: Mild erythema medial aspect of right arm, no eczema, no pruritus;  PSYCHIATRY: no depression, no anxiety, no panic attacks, no suicidal ideation, no homicidal ideation;  NEUROLOGY: no syncope, no seizures, no numbness or tingling of hands, no numbness or tingling of feet, no paresis;    Objective   BP (!) 140/66   Pulse 92   Temp 97.1 °F (36.2 °C)   Ht 5' 6\" (1.676 m)   Wt 180 lb 8 oz (81.9 kg)   SpO2 94%   BMI 29.13 kg/m²     PHYSICAL EXAM:  CONSTITUTIONAL: Alert, appropriate, no acute distress  EYES: Non icteric, EOM intact, pupils equal round   ENT: Mucus membranes moist, no oral pharyngeal lesions, external inspection of ears and nose are normal  NECK: Supple, no masses. No palpable thyroid mass  CHEST/LUNGS: CTA bilaterally, normal respiratory effort   CARDIOVASCULAR: RRR, no murmurs. No lower extremity edema  ABDOMEN: soft non-tender, active bowel sounds, no HSM. No palpable masses  EXTREMITIES: warm, full ROM in all 4 extremities, no focal weakness. SKIN: Mild erythema medial aspect of right arm, warm, dry with no rashes or lesions  LYMPH: No cervical, clavicular, axillary, or inguinal lymphadenopathy  NEUROLOGIC: follows commands, non focal   PSYCH: mood and affect appropriate.   Alert and oriented to time, place, person    LABORATORY RESULTS REVIEWED/ANALYZED BY ME:  4/1/2021   31.6  CA 27-29 33.5  CEA 1.6  Lab Results   Component Value Date    WBC 3.53 (L) 05/06/2021    HGB 10.9 (L) 05/06/2021    HCT 33.1 (L) 05/06/2021    .8 (H) 05/06/2021     05/06/2021     Lab Results   Component Value Date    NEUTROABS 2.55 05/06/2021     Lab Results   Component Value Date     04/01/2021    K 4.3 04/01/2021     04/01/2021    CO2 18 (L) 04/01/2021    BUN 8 04/01/2021    CREATININE 0.89 04/01/2021    GLUCOSE 68 04/01/2021    CALCIUM 9.3 04/01/2021    PROT 7.4 04/01/2021    LABALBU 4.6 04/01/2021    BILITOT 0.7 04/01/2021    ALKPHOS 103 04/01/2021    AST 17 04/01/2021    ALT 7 04/01/2021    LABGLOM 74 04/01/2021    GFRAA 85 04/01/2021    AGRATIO 1.6 04/01/2021    GLOB 2.8 04/01/2021     RADIOLOGY STUDIES REVIEWED BY ME:  No results found. ASSESSMENT:    No orders of the defined types were placed in this encounter. Melanie Gao was seen today for follow-up. Diagnoses and all orders for this visit:    Carcinoma of female breast, unspecified estrogen receptor status, unspecified laterality, unspecified site of breast ([address]    Chemotherapy management, encounter for    Adverse effect of chemotherapy, subsequent encounter    Care plan discussed with patient    Chemotherapy-induced neuropathy ([address]    Skin infection  Comments:  Right armprescribed doxycycline 100 mg p.o. twice daily x7 days  Orders:  -     doxycycline hyclate (VIBRA-TABS) 100 MG tablet; Take 1 tablet by mouth 2 times daily for 7 days    Bug bite, initial encounter  -     doxycycline hyclate (VIBRA-TABS) 100 MG tablet; Take 1 tablet by mouth 2 times daily for 7 days    Antineoplastic chemotherapy induced anemia    At risk for lymphedema      Metastatic breast cancer, hormone sensitive  2/25/2021discussed the results of CT chest abdomen pelvis. No evidence of disease progression.   In fact, excellent response with no occasionally words are mis-transcribed.)      Harsha Lara MD    05/06/21  3:39 PM

## 2021-05-06 ENCOUNTER — OFFICE VISIT (OUTPATIENT)
Dept: HEMATOLOGY | Age: 56
End: 2021-05-06
Payer: MEDICARE

## 2021-05-06 ENCOUNTER — HOSPITAL ENCOUNTER (OUTPATIENT)
Dept: GENERAL RADIOLOGY | Age: 56
Discharge: HOME | End: 2021-05-06
Payer: MEDICARE

## 2021-05-06 ENCOUNTER — HOSPITAL ENCOUNTER (OUTPATIENT)
Dept: INFUSION THERAPY | Age: 56
Discharge: HOME | End: 2021-05-06
Payer: MEDICARE

## 2021-05-06 VITALS
HEIGHT: 66 IN | BODY MASS INDEX: 29.01 KG/M2 | DIASTOLIC BLOOD PRESSURE: 66 MMHG | TEMPERATURE: 97.1 F | HEART RATE: 92 BPM | OXYGEN SATURATION: 94 % | SYSTOLIC BLOOD PRESSURE: 140 MMHG | WEIGHT: 180.5 LBS

## 2021-05-06 DIAGNOSIS — L08.9 SKIN INFECTION: ICD-10-CM

## 2021-05-06 DIAGNOSIS — T45.1X5D ADVERSE EFFECT OF CHEMOTHERAPY, SUBSEQUENT ENCOUNTER: ICD-10-CM
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
## 2021-05-06 DIAGNOSIS — M25.531 ARTHRALGIA OF RIGHT WRIST: ICD-10-CM

## 2021-05-06 DIAGNOSIS — T45.1X5A CHEMOTHERAPY-INDUCED NEUROPATHY (HCC): ICD-10-CM

## 2021-05-06 DIAGNOSIS — G62.0 CHEMOTHERAPY-INDUCED NEUROPATHY (HCC): ICD-10-CM

## 2021-05-06 DIAGNOSIS — I87.8 POOR VENOUS ACCESS: ICD-10-CM

## 2021-05-06 DIAGNOSIS — D64.81 ANTINEOPLASTIC CHEMOTHERAPY INDUCED ANEMIA: ICD-10-CM

## 2021-05-06 DIAGNOSIS — Z71.89 CARE PLAN DISCUSSED WITH PATIENT: ICD-10-CM

## 2021-05-06 DIAGNOSIS — C50.919 CARCINOMA OF FEMALE BREAST, UNSPECIFIED ESTROGEN RECEPTOR STATUS, UNSPECIFIED LATERALITY, UNSPECIFIED SITE OF BREAST (HCC): Primary | ICD-10-CM

## 2021-05-06 DIAGNOSIS — Z91.89 AT RISK FOR LYMPHEDEMA: ICD-10-CM

## 2021-05-06 DIAGNOSIS — T45.1X5A ANTINEOPLASTIC CHEMOTHERAPY INDUCED ANEMIA: ICD-10-CM

## 2021-05-06 DIAGNOSIS — Z51.11 CHEMOTHERAPY MANAGEMENT, ENCOUNTER FOR: ICD-10-CM

## 2021-05-06 LAB
BASOPHILS ABSOLUTE: 0.01 K/UL (ref 0.01–0.08)
BASOPHILS RELATIVE PERCENT: 0.3 % (ref 0.1–1.2)
EOSINOPHILS ABSOLUTE: 0.01 K/UL (ref 0.04–0.54)
EOSINOPHILS RELATIVE PERCENT: 0.3 % (ref 0.7–7)
HCT VFR BLD CALC: 33.1 % (ref 34.1–44.9)
HEMOGLOBIN: 10.9 G/DL (ref 11.2–15.7)
LYMPHOCYTES ABSOLUTE: 0.86 K/UL (ref 1.18–3.74)
LYMPHOCYTES RELATIVE PERCENT: 24.4 % (ref 19.3–53.1)
MCH RBC QN AUTO: 35.5 PG (ref 25.6–32.2)
MCHC RBC AUTO-ENTMCNC: 32.9 G/DL (ref 32.3–35.5)
MCV RBC AUTO: 107.8 FL (ref 79.4–94.8)
MONOCYTES ABSOLUTE: 0.1 K/UL (ref 0.24–0.82)
MONOCYTES RELATIVE PERCENT: 2.8 % (ref 4.7–12.5)
NEUTROPHILS ABSOLUTE: 2.55 K/UL (ref 1.56–6.13)
NEUTROPHILS RELATIVE PERCENT: 72.2 % (ref 34–71.1)
PDW BLD-RTO: 14.5 % (ref 11.7–14.4)
PLATELET # BLD: 326 K/UL (ref 182–369)
PMV BLD AUTO: 9.2 FL (ref 7.4–10.4)
RBC # BLD: 3.07 M/UL (ref 3.93–5.22)
WBC # BLD: 3.53 K/UL (ref 3.98–10.04)

## 2021-05-06 PROCEDURE — 6360000004 HC RX CONTRAST MEDICATION: Performed by: PHYSICIAN ASSISTANT

## 2021-05-06 PROCEDURE — 6360000002 HC RX W HCPCS: Performed by: PHYSICIAN ASSISTANT

## 2021-05-06 PROCEDURE — 85025 COMPLETE CBC W/AUTO DIFF WBC: CPT

## 2021-05-06 PROCEDURE — 77002 NEEDLE LOCALIZATION BY XRAY: CPT

## 2021-05-06 PROCEDURE — 3017F COLORECTAL CA SCREEN DOC REV: CPT | Performed by: INTERNAL MEDICINE

## 2021-05-06 PROCEDURE — 99214 OFFICE O/P EST MOD 30 MIN: CPT | Performed by: INTERNAL MEDICINE

## 2021-05-06 PROCEDURE — 6360000002 HC RX W HCPCS: Performed by: INTERNAL MEDICINE

## 2021-05-06 PROCEDURE — 2500000003 HC RX 250 WO HCPCS: Performed by: PHYSICIAN ASSISTANT

## 2021-05-06 PROCEDURE — 96523 IRRIG DRUG DELIVERY DEVICE: CPT

## 2021-05-06 PROCEDURE — G8427 DOCREV CUR MEDS BY ELIG CLIN: HCPCS | Performed by: INTERNAL MEDICINE

## 2021-05-06 PROCEDURE — 96402 CHEMO HORMON ANTINEOPL SQ/IM: CPT

## 2021-05-06 PROCEDURE — G8417 CALC BMI ABV UP PARAM F/U: HCPCS | Performed by: INTERNAL MEDICINE

## 2021-05-06 PROCEDURE — 99211 OFF/OP EST MAY X REQ PHY/QHP: CPT

## 2021-05-06 PROCEDURE — 20605 DRAIN/INJ JOINT/BURSA W/O US: CPT

## 2021-05-06 PROCEDURE — 1036F TOBACCO NON-USER: CPT | Performed by: INTERNAL MEDICINE

## 2021-05-06 RX ORDER — SODIUM CHLORIDE 0.9 % (FLUSH) 0.9 %
20 SYRINGE (ML) INJECTION PRN
Status: CANCELLED | OUTPATIENT
Start: 2021-05-06

## 2021-05-06 RX ORDER — LAMOTRIGINE 25 MG/1
250 TABLET ORAL ONCE
Status: CANCELLED | OUTPATIENT
Start: 2021-05-06 | End: 2021-05-06

## 2021-05-06 RX ORDER — LAMOTRIGINE 25 MG/1
250 TABLET ORAL ONCE
Status: COMPLETED | OUTPATIENT
Start: 2021-05-06 | End: 2021-05-06

## 2021-05-06 RX ORDER — LIDOCAINE HYDROCHLORIDE 10 MG/ML
0.5 INJECTION, SOLUTION EPIDURAL; INFILTRATION; INTRACAUDAL; PERINEURAL ONCE
Status: COMPLETED | OUTPATIENT
Start: 2021-05-06 | End: 2021-05-06

## 2021-05-06 RX ORDER — BETAMETHASONE SODIUM PHOSPHATE AND BETAMETHASONE ACETATE 3; 3 MG/ML; MG/ML
3 INJECTION, SUSPENSION INTRA-ARTICULAR; INTRALESIONAL; INTRAMUSCULAR; SOFT TISSUE ONCE
Status: COMPLETED | OUTPATIENT
Start: 2021-05-06 | End: 2021-05-06

## 2021-05-06 RX ORDER — DOXYCYCLINE HYCLATE 100 MG
100 TABLET ORAL 2 TIMES DAILY
Qty: 14 TABLET | Refills: 0 | Status: SHIPPED | OUTPATIENT
Start: 2021-05-06 | End: 2021-05-13

## 2021-05-06 RX ORDER — SODIUM CHLORIDE 0.9 % (FLUSH) 0.9 %
20 SYRINGE (ML) INJECTION PRN
Status: DISCONTINUED | OUTPATIENT
Start: 2021-05-06 | End: 2021-05-07 | Stop reason: HOSPADM

## 2021-05-06 RX ORDER — HEPARIN SODIUM (PORCINE) LOCK FLUSH IV SOLN 100 UNIT/ML 100 UNIT/ML
500 SOLUTION INTRAVENOUS PRN
Status: DISCONTINUED | OUTPATIENT
Start: 2021-05-06 | End: 2021-05-07 | Stop reason: HOSPADM

## 2021-05-06 RX ORDER — HEPARIN SODIUM (PORCINE) LOCK FLUSH IV SOLN 100 UNIT/ML 100 UNIT/ML
500 SOLUTION INTRAVENOUS PRN
Status: CANCELLED | OUTPATIENT
Start: 2021-05-06

## 2021-05-06 RX ORDER — SODIUM CHLORIDE 0.9 % (FLUSH) 0.9 %
10 SYRINGE (ML) INJECTION PRN
Status: CANCELLED | OUTPATIENT
Start: 2021-05-06

## 2021-05-06 RX ADMIN — IOPAMIDOL 2 ML: 408 INJECTION, SOLUTION INTRATHECAL at 09:57

## 2021-05-06 RX ADMIN — Medication 3 MG: at 10:57

## 2021-05-06 RX ADMIN — FULVESTRANT 250 MG: 50 INJECTION, SOLUTION INTRAMUSCULAR at 12:54

## 2021-05-06 RX ADMIN — LIDOCAINE HYDROCHLORIDE 0.5 ML: 10 INJECTION, SOLUTION EPIDURAL; INFILTRATION; INTRACAUDAL; PERINEURAL at 09:45

## 2021-05-18 DIAGNOSIS — G62.0 CHEMOTHERAPY-INDUCED NEUROPATHY (HCC): ICD-10-CM

## 2021-05-18 DIAGNOSIS — T45.1X5A CHEMOTHERAPY-INDUCED NEUROPATHY (HCC): ICD-10-CM

## 2021-05-18 DIAGNOSIS — Z71.89 CARE PLAN DISCUSSED WITH PATIENT: ICD-10-CM

## 2021-05-19 RX ORDER — GABAPENTIN 300 MG/1
600 CAPSULE ORAL 3 TIMES DAILY
Qty: 180 CAPSULE | Refills: 0 | Status: SHIPPED | OUTPATIENT
Start: 2021-05-19 | End: 2022-01-03

## 2021-05-19 RX ORDER — PANTOPRAZOLE SODIUM 40 MG/1
TABLET, DELAYED RELEASE ORAL
Qty: 90 TABLET | Refills: 0 | Status: SHIPPED | OUTPATIENT
Start: 2021-05-19 | End: 2021-08-12

## 2021-05-20 ENCOUNTER — HOSPITAL ENCOUNTER (OUTPATIENT)
Dept: GENERAL RADIOLOGY | Facility: HOSPITAL | Age: 56
Discharge: HOME | End: 2021-05-20
Admitting: PAIN MEDICINE

## 2021-05-20 ENCOUNTER — TRANSCRIBE ORDERS (OUTPATIENT)
Dept: ADMINISTRATIVE | Facility: HOSPITAL | Age: 56
End: 2021-05-20

## 2021-05-20 DIAGNOSIS — M54.50 LOW BACK PAIN, UNSPECIFIED BACK PAIN LATERALITY, UNSPECIFIED CHRONICITY, UNSPECIFIED WHETHER SCIATICA PRESENT: Primary | ICD-10-CM

## 2021-05-20 PROCEDURE — 72114 X-RAY EXAM L-S SPINE BENDING: CPT

## 2021-05-21 ENCOUNTER — VIRTUAL VISIT (OUTPATIENT)
Dept: FAMILY MEDICINE CLINIC | Age: 56
End: 2021-05-21
Payer: MEDICARE
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
## 2021-05-21 DIAGNOSIS — S90.861A INSECT BITE OF RIGHT FOOT, INITIAL ENCOUNTER: Primary | ICD-10-CM

## 2021-05-21 PROCEDURE — 99214 OFFICE O/P EST MOD 30 MIN: CPT | Performed by: FAMILY MEDICINE

## 2021-05-21 PROCEDURE — G8427 DOCREV CUR MEDS BY ELIG CLIN: HCPCS | Performed by: FAMILY MEDICINE

## 2021-05-21 PROCEDURE — 3017F COLORECTAL CA SCREEN DOC REV: CPT | Performed by: FAMILY MEDICINE

## 2021-05-21 RX ORDER — CLINDAMYCIN HYDROCHLORIDE 300 MG/1
300 CAPSULE ORAL 4 TIMES DAILY
Qty: 40 CAPSULE | Refills: 0 | Status: SHIPPED | OUTPATIENT
Start: 2021-05-21 | End: 2021-05-31

## 2021-05-21 ASSESSMENT — ENCOUNTER SYMPTOMS
VOMITING: 0
BACK PAIN: 0
COLOR CHANGE: 0
APNEA: 0
EYE DISCHARGE: 0
NAUSEA: 0
STRIDOR: 0
FACIAL SWELLING: 0
EYE ITCHING: 0

## 2021-05-21 NOTE — PROGRESS NOTES
Madi Garcia (:  1965) is a 49 y.o. female,Established patient, here for evaluation of the following chief complaint(s): Rash         ASSESSMENT/PLAN:  1. Insect bite of right foot, initial encounter  -     clindamycin (CLEOCIN) 300 MG capsule; Take 1 capsule by mouth 4 times daily for 10 days, Disp-40 capsule, R-0Normal    I have low suspicion for DVT, however it is unilateral and she does have history of cancer managed by heme-onc. Unfortunately, it is Friday after 5 PM at the time I am doing the video visit. Will empirically treat for cellulitis with clindamycin, patient advised if symptoms persist would recommend that she contact the office Monday to set up venous duplex. If symptoms worsen over the weekend, advised to go to care or ED. She voiced understanding. Return if symptoms worsen or fail to improve. SUBJECTIVE/OBJECTIVE:  HPI  Patient presents with swelling and redness of her right foot. She first noticed itching 2 days ago, and thinks she might of gotten bitten by something but is not sure what. Dr. Jalil Olmedo recently gave her a prescription for doxycycline for a similar rash on her left arm. Yesterday, she noted redness which is gradually worsening. When she first saw it was \" a [place] the size of three quarters adjacent to 1 another. \"  She denies any recent travel, nor immobilization. She denies any calf tenderness as such, but does have pain while walking. Review of Systems   Constitutional: Negative for chills and fever. HENT: Negative for facial swelling and mouth sores. Eyes: Negative for discharge and itching. Respiratory: Negative for apnea and stridor. Cardiovascular: Positive for leg swelling. Negative for chest pain and palpitations. Gastrointestinal: Negative for nausea and vomiting. Endocrine: Negative for cold intolerance and heat intolerance. Genitourinary: Negative for frequency and urgency.    Musculoskeletal: Negative for arthralgias and back pain. Skin: Positive for rash. Negative for color change. No flowsheet data found. Physical Exam    [INSTRUCTIONS:  \"[x]\" Indicates a positive item  \"[]\" Indicates a negative item  -- DELETE ALL ITEMS NOT EXAMINED]    Constitutional: [x] Appears well-developed and well-nourished [x] No apparent distress      [] Abnormal -     Mental status: [x] Alert and awake  [x] Oriented to person/place/time [x] Able to follow commands    [] Abnormal -     Eyes:   EOM    [x]  Normal    [] Abnormal -   Sclera  [x]  Normal    [] Abnormal -          Discharge [x]  None visible   [] Abnormal -     HENT: [x] Normocephalic, atraumatic  [] Abnormal -   [x] Mouth/Throat: Mucous membranes are moist    External Ears [x] Normal  [] Abnormal -    Neck: [x] No visualized mass [] Abnormal -     Pulmonary/Chest: [x] Respiratory effort normal   [x] No visualized signs of difficulty breathing or respiratory distress        [] Abnormal -      Musculoskeletal:   [x] Normal gait with no signs of ataxia         [x] Normal range of motion of neck        [] Abnormal -     Neurological:        [x] No Facial Asymmetry (Cranial nerve 7 motor function) (limited exam due to video visit)          [x] No gaze palsy        [] Abnormal -          Skin:        [x] No significant exanthematous lesions or discoloration noted on facial skin         [] Abnormal -            Psychiatric:       [x] Normal Affect [] Abnormal -        [x] No Hallucinations    Other pertinent observable physical exam findings:-Redness and swelling of dorsum of right foot. No appreciable calf tenderness, however difficult to appreciate on video          On this date 5/21/2021 I have spent 30 minutes reviewing previous notes, test results and face to face (virtual) with the patient discussing the diagnosis and importance of compliance with the treatment plan as well as documenting on the day of the visit.       Edwin Bond, was evaluated through a synchronous (real-time) audio-video encounter. The patient (or guardian if applicable) is aware that this is a billable service. Verbal consent to proceed has been obtained within the past 12 months. The visit was conducted pursuant to the emergency declaration under the [facility], [address] [place] [address] authority and the [employer] Act. Patient identification was verified, and a caregiver was present when appropriate. The patient was located in a state where the provider was credentialed to provide care.       An electronic signature was used to authenticate this note.    --Dina Waters MD

## 2021-05-28 ENCOUNTER — OFFICE VISIT (OUTPATIENT)
Dept: FAMILY MEDICINE CLINIC | Age: 56
End: 2021-05-28
Payer: MEDICARE

## 2021-05-28 VITALS
HEIGHT: 66 IN | WEIGHT: 175 LBS | OXYGEN SATURATION: 95 % | DIASTOLIC BLOOD PRESSURE: 82 MMHG | BODY MASS INDEX: 28.12 KG/M2 | HEART RATE: 102 BPM | SYSTOLIC BLOOD PRESSURE: 112 MMHG | TEMPERATURE: 96.8 F | RESPIRATION RATE: 18 BRPM

## 2021-05-28 DIAGNOSIS — L50.9 URTICARIA: Primary | ICD-10-CM

## 2021-05-28 DIAGNOSIS — C50.919 CARCINOMA OF FEMALE BREAST, UNSPECIFIED ESTROGEN RECEPTOR STATUS, UNSPECIFIED LATERALITY, UNSPECIFIED SITE OF BREAST (HCC): ICD-10-CM

## 2021-05-28 DIAGNOSIS — K21.9 GASTROESOPHAGEAL REFLUX DISEASE WITHOUT ESOPHAGITIS: ICD-10-CM

## 2021-05-28 PROCEDURE — 3017F COLORECTAL CA SCREEN DOC REV: CPT | Performed by: NURSE PRACTITIONER

## 2021-05-28 PROCEDURE — G8427 DOCREV CUR MEDS BY ELIG CLIN: HCPCS | Performed by: NURSE PRACTITIONER

## 2021-05-28 PROCEDURE — 99213 OFFICE O/P EST LOW 20 MIN: CPT | Performed by: NURSE PRACTITIONER

## 2021-05-28 PROCEDURE — 96372 THER/PROPH/DIAG INJ SC/IM: CPT | Performed by: NURSE PRACTITIONER

## 2021-05-28 PROCEDURE — 1036F TOBACCO NON-USER: CPT | Performed by: NURSE PRACTITIONER

## 2021-05-28 PROCEDURE — G8417 CALC BMI ABV UP PARAM F/U: HCPCS | Performed by: NURSE PRACTITIONER

## 2021-05-28 RX ORDER — HYDROXYZINE HYDROCHLORIDE 25 MG/1
25 TABLET, FILM COATED ORAL EVERY 6 HOURS PRN
Qty: 28 TABLET | Refills: 0 | Status: SHIPPED | OUTPATIENT
Start: 2021-05-28 | End: 2021-06-03 | Stop reason: SDUPTHER

## 2021-05-28 RX ORDER — TRIAMCINOLONE ACETONIDE 1 MG/G
CREAM TOPICAL
Qty: 30 G | Refills: 0 | Status: ON HOLD | OUTPATIENT
Start: 2021-05-28 | End: 2021-09-24

## 2021-05-28 RX ORDER — FAMOTIDINE 20 MG/1
20 TABLET, FILM COATED ORAL 2 TIMES DAILY
Qty: 28 TABLET | Refills: 0 | Status: ON HOLD | OUTPATIENT
Start: 2021-05-28 | End: 2021-09-17 | Stop reason: ALTCHOICE

## 2021-05-28 RX ORDER — METHYLPREDNISOLONE ACETATE 80 MG/ML
80 INJECTION, SUSPENSION INTRA-ARTICULAR; INTRALESIONAL; INTRAMUSCULAR; SOFT TISSUE ONCE
Status: COMPLETED | OUTPATIENT
Start: 2021-05-28 | End: 2021-05-28

## 2021-05-28 RX ORDER — PREDNISONE 20 MG/1
TABLET ORAL
Qty: 12 TABLET | Refills: 0 | Status: ON HOLD | OUTPATIENT
Start: 2021-05-28 | End: 2021-09-17 | Stop reason: ALTCHOICE

## 2021-05-28 RX ADMIN — METHYLPREDNISOLONE ACETATE 80 MG: 80 INJECTION, SUSPENSION INTRA-ARTICULAR; INTRALESIONAL; INTRAMUSCULAR; SOFT TISSUE at 14:43

## 2021-05-28 ASSESSMENT — ENCOUNTER SYMPTOMS
SHORTNESS OF BREATH: 0
DIARRHEA: 0
CHEST TIGHTNESS: 0
NAUSEA: 0
WHEEZING: 0
ABDOMINAL PAIN: 0
COUGH: 0
SORE THROAT: 0

## 2021-05-28 NOTE — PROGRESS NOTES
Administrations This Visit     methylPREDNISolone acetate (DEPO-MEDROL) injection 80 mg     Admin Date  05/28/2021  14:43 Action  Given Dose  80 mg Route  Intramuscular Site  Ventrogluteal Left  Administered By  Abdullahi Garcia LPN    Ordering Provider: BRITANY Haddad    NDC: 8356-5607-85    Lot#: [id number]    : [id number]     Patient Supplied?: No

## 2021-05-28 NOTE — PROGRESS NOTES
Carlos Kay is a 49 y.o. female who presents today for  Chief Complaint   Patient presents with    Allergic Reaction       HPI:  Pt of Dr. Vinny Lopez. She developed a rash yesterday. She thinks it started on her neck. She initially had itching to the neck, back, chest.  Then noticed the rash to the back of her neck. Now has spread to chest, back, abdomen. Very pruritic. She started clindamycin 1 week ago for a foot infection which has improved. No other new medications. No new lotion, soap, etc.  She is allergic to synthetic wigs. She got a new wig recently, tried it on and cut it with some of the hair falling on her neck and chest, upper back. She has tried benadryl and hydroxyzine 10 mg with no improvement. She takes protonix as needed for GERD. She has taken pepcid in the past but does not have any at home. She has breast cancer diagnosed several years ago, currently undergoing tx per oncology with oral chemo and injections. Review of Systems   Constitutional: Negative for chills and fever. HENT: Negative for congestion, ear pain and sore throat. Respiratory: Negative for cough, chest tightness, shortness of breath and wheezing. Cardiovascular: Negative for chest pain. Gastrointestinal: Negative for abdominal pain, diarrhea and nausea. Musculoskeletal: Negative for arthralgias and myalgias. Skin: Positive for rash. Past Medical History:   Diagnosis Date    Back pain     Breast cancer ([facility] [address]     Breast cancer with lung mets    GERD (gastroesophageal reflux disease)     Hypertension     Neuropathy     Post chemo treatment neuropathy       Current Outpatient Medications   Medication Sig Dispense Refill    predniSONE (DELTASONE) 20 MG tablet Take 3 tablets by mouth daily x 2 days, 2 tabs daily x 2 days, then 1 tab daily x 2 days.  12 tablet 0    hydrOXYzine (ATARAX) 25 MG tablet Take 1 tablet by mouth every 6 hours as needed for Itching 28 tablet 0    famotidine (PEPCID) 20 MG tablet Take 1 tablet by mouth 2 times daily for 14 days 28 tablet 0    triamcinolone (KENALOG) 0.1 % cream Apply topically sparingly 2 times daily as needed for rash. 30 g 0    clindamycin (CLEOCIN) 300 MG capsule Take 1 capsule by mouth 4 times daily for 10 days 40 capsule 0    pantoprazole (PROTONIX) 40 MG tablet TAKE 1 TABLET BY MOUTH EVERY DAY 90 tablet 0    gabapentin (NEURONTIN) 300 MG capsule TAKE 2 CAPSULES BY MOUTH 3 TIMES DAILY FOR 30 DAYS. 180 capsule 0    hydrOXYzine (ATARAX) 10 MG tablet TAKE 1 TABLET BY MOUTH EVERY 72 HOURS      palbociclib (IBRANCE) 125 MG tablet Take 125 mg by mouth daily 21 tablet 5    Fluticasone Furoate-Vilanterol (BREO ELLIPTA IN) Inhale into the lungs      cyclobenzaprine (FLEXERIL) 10 MG tablet Take 10 mg by mouth 3 times daily as needed for Muscle spasms      guaiFENesin-codeine (GUAIFENESIN AC) 100-10 MG/5ML liquid Take 5 mLs by mouth 3 times daily as needed for Cough.  HYDROcodone-acetaminophen (NORCO) 7.5-325 MG per tablet Take 1 tablet by mouth every 6 hours as needed for Pain.  montelukast (SINGULAIR) 10 MG tablet Take 10 mg by mouth nightly      Albuterol Sulfate (PROAIR HFA IN) Inhale into the lungs      benzonatate (TESSALON) 100 MG capsule Take 100 mg by mouth 3 times daily as needed for Cough      Cholecalciferol (VITAMIN D3) 125 MCG (5000 UT) TABS Take by mouth      lisinopril (PRINIVIL;ZESTRIL) 10 MG tablet Take 10 mg by mouth daily      acyclovir (ZOVIRAX) 800 MG tablet Take 800 mg by mouth 2 times daily      ondansetron (ZOFRAN) 4 MG tablet Take 4 mg by mouth every 8 hours as needed for Nausea or Vomiting      venlafaxine (EFFEXOR) 37.5 MG tablet Take 37.5 mg by mouth 3 times daily      diclofenac (VOLTAREN) 0.1 % ophthalmic solution 1 drop 4 times daily      alclomethasone (ACLOVATE) 0.05 % cream Apply topically 2 times daily Apply topically 2 times daily.       meloxicam (MOBIC) 15 MG tablet Take 15 mg by mouth daily  ciprofloxacin (CILOXAN) 0.3 % ophthalmic solution 1 drop every 2 hours      triamcinolone (KENALOG) 0.1 % cream Apply topically 2 times daily Apply topically 2 times daily.  chlorhexidine (PERIDEX) 0.12 % solution Take 15 mLs by mouth 2 times daily       Current Facility-Administered Medications   Medication Dose Route Frequency Provider Last Rate Last Admin    methylPREDNISolone acetate (DEPO-MEDROL) injection 80 mg  80 mg Intramuscular Once Brianne ValentinBRITANY           Allergies   Allergen Reactions    No Known Allergies        Past Surgical History:   Procedure Laterality Date    BREAST LUMPECTOMY Right     2006    COSMETIC SURGERY      Bilateral breast implants    HYSTERECTOMY, TOTAL ABDOMINAL  2015    JOINT REPLACEMENT      knee pain    MASTECTOMY, BILATERAL      Right 2013 & Left 2016       Social History     Tobacco Use    Smoking status: Former Smoker     Types: Cigarettes    Smokeless tobacco: Never Used   Substance Use Topics    Alcohol use: Not Currently    Drug use: Never       Family History   Problem Relation Age of Onset    Cancer Other         Father's side-Prostate cancer, Multiple Myeloma & Bone cancer       /82   Pulse 102   Temp 96.8 °F (36 °C) (Temporal)   Resp 18   Ht 5' 6\" (1.676 m)   Wt 175 lb (79.4 kg)   SpO2 95%   BMI 28.25 kg/m²     Physical Exam  Vitals reviewed. Constitutional:       General: She is not in acute distress. Appearance: Normal appearance. She is well-developed. HENT:      Head: Normocephalic. Eyes:      Conjunctiva/sclera: Conjunctivae normal.      Pupils: Pupils are equal, round, and reactive to light. Neck:      Thyroid: No thyromegaly. Vascular: No carotid bruit or JVD. Trachea: No tracheal deviation. Cardiovascular:      Rate and Rhythm: Normal rate and regular rhythm. Heart sounds: Normal heart sounds. No murmur heard. Pulmonary:      Effort: Pulmonary effort is normal. No respiratory distress. Breath sounds: Normal breath sounds. No wheezing or rhonchi. Musculoskeletal:         General: Normal range of motion. Cervical back: Normal range of motion and neck supple. Lymphadenopathy:      Cervical: No cervical adenopathy. Skin:     General: Skin is warm and dry. Findings: Rash present. Comments: Diffuse rash noted to upper body. Several small papular lesions/welts noted to posterior neck. Similar welts to chest, breasts, abdomen, chest.  Large urticarial lesion noted to back with mild erythema   Neurological:      Mental Status: She is alert. Psychiatric:         Mood and Affect: Mood normal.         Behavior: Behavior normal.         Thought Content: Thought content normal.         ASSESSMENT/PLAN:  1. Urticaria  -Possibly due to clindamycin and/or synthetic wig. -Depo-Medrol 80 mg IM in office  -Start prednisone taper tomorrow  -Advised famotidine 20 mg twice daily x14 days. She may stop the pantoprazole while taking.  -Hydroxyzine 25 mg every 6 hours as needed for itching. Stop the 10 mg dose she has at home.  -We will refill triamcinolone 0.1% cream that she can use topically to small areas. Advised avoid use on face. She has used this in the past with improvement. 2. Carcinoma of female breast, unspecified estrogen receptor status, unspecified laterality, unspecified site of breast ([address]  -Continue with recommendations per oncology    3. Gastroesophageal reflux disease without esophagitis  -Switch to famotidine for 2 weeks due to rash. Then may resume pantoprazole. Return for as scheduled. Chaparro Velazco was seen today for allergic reaction. Diagnoses and all orders for this visit:    Urticaria    Carcinoma of female breast, unspecified estrogen receptor status, unspecified laterality, unspecified site of breast ([address]    Gastroesophageal reflux disease without esophagitis    Other orders  -     predniSONE (DELTASONE) 20 MG tablet;  Take 3 tablets by mouth daily x 2 days, 2 tabs daily x 2 days, then 1 tab daily x 2 days. -     methylPREDNISolone acetate (DEPO-MEDROL) injection 80 mg  -     hydrOXYzine (ATARAX) 25 MG tablet; Take 1 tablet by mouth every 6 hours as needed for Itching  -     famotidine (PEPCID) 20 MG tablet; Take 1 tablet by mouth 2 times daily for 14 days  -     triamcinolone (KENALOG) 0.1 % cream; Apply topically sparingly 2 times daily as needed for rash. Medications Discontinued During This Encounter   Medication Reason    famotidine (PEPCID) 40 MG tablet REORDER     Patient Instructions   -Start prednisone taper tomorrow morning (5/29)  -Famotidine 1 tablet twice daily x 14 days in place of pantoprazole  -Hydroxyzine 25 mg every 6 hours as needed for itching      Patient voicesunderstanding and agrees to plans along with risks and benefits of plan. Counseling:  Enma Malorie case, medications and options were discussed in detail. Patient was instructed to call the office if she questionsregarding her treatment. Should her conditions worsen, she should return to office to be reassessed by Oral Husbands, BRITANY. she Should to go the closest Emergency Department for any emergency. They verbalizedunderstanding the above instructions. Return for as scheduled.

## 2021-06-02 NOTE — PROGRESS NOTES
10/14/2013- Right Breast Mastectomy and Axillary Dissection Right breast had benign tissue with fibrous, negative for carcinoma. Axillary contents demonstrated invasive ductal carcinoma, involving fibroadipose tissue and tendinous tissue. 3 benign lymph nodes (0/3). Yusra grade 3. 3.0cm in greatest gross dimensions. Carcinoma permeates among soft tissues and in the right axilla with no apparent involvement of lymph nodes. · 01/23/2014- Completion of adjuvant radiation therapy to chest wall and axillary lymph node with Dr. Shweta Leigh  · 02/14- Initiated adjuvant endocrine therapy with tamoxifen  · 2014 (?) RECURRENCE in the axillary skin  · 2014 (?) Surgical resection of the axillary skin pathology demonstrating tumor at cauterized margin  · 2015 (?) YVETTE/BSO  · 09/19/2016- Bilateral exchange, nipple reconstruction and fat grafting and removal of PAC  · 2016- Switched to aromatase inhibitor patient was non compliant  · 01/05/2017- METASTATIC DISEASE Presented with respiratory failure. CTA Pulmonary showed numerous nodules and masses throughout both lung fields, compatible with metastatic disease. Bilateral hilar adenopathy seen. · 01/11/2017-06/17/2018- Single agent Abraxane x 13 cycles  · 03/15/2017- CT Chest W Contrast Interval decrease in maximal diameter of essentially all the multiple metastatic pulmonary nodules. The average difference is approximately 25%. Some of the much smaller ones have disappeared. Bilateral hilar and aortopulmonary window adenopathy is also similarly smaller. · 06/07/2017- CT Chest W Contrast Multiple lung nodules seen bilaterally. Most of the lung nodules show interval improvement with decreased size by 1 to 3mm. Mediastinal and bilateral hilar adenopathy seen with improvement.   · 07/24/2017- PET/CT scan showed marked improvement, is minimal abnormal, has excellent response to therapy  · 02/01/2018- PET/CT scan Numerous bilateral lung masses and nodules, the majority of which do not have appreciable abnormal FDG uptake. The largest mass in the left infrahilar region of the left lower lobe demonstrates a maximum SUV of 3.3. Mediastinal lymphadenopathy. No evidence of metastatic disease in the abdomen or pelvis. · 06/21/2018- CT Chest/Abdomen/Pelvis Multiple lung mets, mild bilateral hilar and mediastinal lymph nodes. No masses or evidence of metastatic disease in the abdomen or pelvis  · 06/27/2018-09/17/2018- Gemzar  · 09/28/2018- CT Pulmonary Multiple lung mets, mild bilateral hilar and mediastinal lymph nodes  · 10/10/2018-12/21/2020- Faslodex every 4 weeks  · 12/14/2018- CT Chest showed definite decrease in the maximal diameter and volume of all left and right metastatic nodules. Somewhat enlarged paratracheal and left axillary lymph nodes are relatively unchanged. · 04/22/2019- MRI Cervical Spine W WO Contrast Unremarkable  · 04/22/2019- MRI Brain W WO Contrast No enhancing lesions in the brain and no evidence of metastatic disease. · 07/27/2019- CTA Pulmonary showed essentially complete disappearance of the pulmonary metastatic disease. Several tiny flat peripheral nodules are the only sequela. The tiny ones on the right are unchanged, the tiny ones on the left are even smaller. There is no longer any active metastatic disease in either lung. · 12/19/2019- CT Chest/Abdomen/Pelvis Small, tiny subpleural nodules right upper and right midlung field as well as left lung base has remained unchanged. No new focal parenchymal abnormality seen. No adenopathy seen. There is no abdominal or pelvic mass, adenopathy or fluid collection. No lytic or sclerotic bony lesions, but there is a possibility of osteopenia and/or osteoporosis. · 02/17/2020- DEXA Bone Density showed osteopenia of lumbar spine, T-score -1.8, and left femoral neck, T-score -2.0  · 2/25/21 Ct Abdomen Pelvis W Iv Contrast  No evidence of metastatic disease in the abdomen or pelvis.    · 2/26/21 Ct Chest W Contrast Tiny nodules in the right lung described above probably represent small foci of pleural thickening due to chronic inflammatory process or small noncalcified granulomas. No features to suggest malignancy or metastatic disease. Bilateral breast implants without complication. · 3/1/2021discussed the results CT chest abdomen pelvis. Essentially no clear evidence of metastatic disease. This is consistent with excellent response to treatment. Continue current treatment. · 4/1/2021  31.6 CA 27-29 33.5 CEA 1.6         CA 27.29 [employer]  01/21/2021- 20.3  04/01/2021 33.5      Past Medical History:    Past Medical History:   Diagnosis Date    Back pain     Breast cancer ([address]     Breast cancer with lung mets    GERD (gastroesophageal reflux disease)     Hypertension     Neuropathy     Post chemo treatment neuropathy       Past Surgical History:    Past Surgical History:   Procedure Laterality Date    BREAST LUMPECTOMY Right     2006    COSMETIC SURGERY      Bilateral breast implants    HYSTERECTOMY, TOTAL ABDOMINAL  2015    JOINT REPLACEMENT      knee pain    MASTECTOMY, BILATERAL      Right 2013 & Left 2016       Social History:    Marital status, children: Single, 2 children-female  Smoking status: no  ETOH status: no  Profession: Disabled  Resides: [place], [place]  Recent trips: Moved from [address] [place] [address] [place]: no    Family History:   Family History   Problem Relation Age of Onset    Cancer Other         Father's side-Prostate cancer, Multiple Myeloma & Bone cancer       Current Hospital Medications:    Current Outpatient Medications   Medication Sig Dispense Refill    NARCAN 4 MG/0.1ML LIQD nasal spray       hydrOXYzine (ATARAX) 25 MG tablet Take 1 tablet by mouth every 6 hours as needed for Itching 30 tablet 0    predniSONE (DELTASONE) 20 MG tablet Take 3 tablets by mouth daily x 2 days, 2 tabs daily x 2 days, then 1 tab daily x 2 days.  12 tablet 0    famotidine (PEPCID) 20 MG tablet Take 1 tablet by mouth 2 times daily for 14 days 28 tablet 0    pantoprazole (PROTONIX) 40 MG tablet TAKE 1 TABLET BY MOUTH EVERY DAY 90 tablet 0    gabapentin (NEURONTIN) 300 MG capsule TAKE 2 CAPSULES BY MOUTH 3 TIMES DAILY FOR 30 DAYS. 180 capsule 0    palbociclib (IBRANCE) 125 MG tablet Take 125 mg by mouth daily 21 tablet 5    Fluticasone Furoate-Vilanterol (BREO ELLIPTA IN) Inhale into the lungs      cyclobenzaprine (FLEXERIL) 10 MG tablet Take 10 mg by mouth 3 times daily as needed for Muscle spasms      guaiFENesin-codeine (GUAIFENESIN AC) 100-10 MG/5ML liquid Take 5 mLs by mouth 3 times daily as needed for Cough.  HYDROcodone-acetaminophen (NORCO) 7.5-325 MG per tablet Take 1 tablet by mouth every 6 hours as needed for Pain.  montelukast (SINGULAIR) 10 MG tablet Take 10 mg by mouth nightly      Albuterol Sulfate (PROAIR HFA IN) Inhale into the lungs      benzonatate (TESSALON) 100 MG capsule Take 100 mg by mouth 3 times daily as needed for Cough      Cholecalciferol (VITAMIN D3) 125 MCG (5000 UT) TABS Take by mouth      lisinopril (PRINIVIL;ZESTRIL) 10 MG tablet Take 10 mg by mouth daily      acyclovir (ZOVIRAX) 800 MG tablet Take 800 mg by mouth 2 times daily      ondansetron (ZOFRAN) 4 MG tablet Take 4 mg by mouth every 8 hours as needed for Nausea or Vomiting      venlafaxine (EFFEXOR) 37.5 MG tablet Take 37.5 mg by mouth 3 times daily      diclofenac (VOLTAREN) 0.1 % ophthalmic solution 1 drop 4 times daily      alclomethasone (ACLOVATE) 0.05 % cream Apply topically 2 times daily Apply topically 2 times daily.  meloxicam (MOBIC) 15 MG tablet Take 15 mg by mouth daily      ciprofloxacin (CILOXAN) 0.3 % ophthalmic solution 1 drop every 2 hours      triamcinolone (KENALOG) 0.1 % cream Apply topically 2 times daily Apply topically 2 times daily.       chlorhexidine (PERIDEX) 0.12 % solution Take 15 mLs by mouth 2 times daily      triamcinolone (KENALOG) 0.1 % cream Apply topically sparingly 2 times daily as needed for rash. (Patient not taking: Reported on 6/3/2021) 30 g 0     No current facility-administered medications for this visit. Facility-Administered Medications Ordered in Other Visits   Medication Dose Route Frequency Provider Last Rate Last Admin    sodium chloride flush 0.9 % injection 20 mL  20 mL Intravenous PRN Jose Nichole MD   20 mL at 06/03/21 1403    heparin flush 100 UNIT/ML injection 500 Units  500 Units Intracatheter PRN Jose Nichole MD   500 Units at 06/03/21 1403       Allergies:    Allergies   Allergen Reactions    Clindamycin/Lincomycin Hives         Subjective   REVIEW OF SYSTEMS:   CONSTITUTIONAL: no fever, no night sweats, moderate fatigue;  HEENT: no blurring of vision, no double vision, no hearing difficulty, no tinnitus, no ulceration, no dysplasia, no epistaxis;  LUNGS: cough, no hemoptysis, no wheeze, no shortness of breath;  CARDIOVASCULAR: no palpitation, no chest pain, no shortness of breath;  GI:  no abdominal pain,  nausea, no vomiting, no diarrhea,  No constipation;  PATRICK: no dysuria, no hematuria, no frequency or urgency, no nephrolithiasis;  MUSCULOSKELETAL: Back pain, no swelling, no stiffness; pain in hand  ENDOCRINE: no polyuria, no polydipsia, no cold & heat intolerance;  HEMATOLOGY: no easy bruising or bleeding, no history of clotting disorder;  DERMATOLOGY: Mild erythema medial aspect of right arm, no eczema, no pruritus;  PSYCHIATRY: no depression, no anxiety, no panic attacks, no suicidal ideation, no homicidal ideation;  NEUROLOGY: no syncope, no seizures, no numbness or tingling of hands, no numbness or tingling of feet, no paresis;    Objective   BP (!) 144/88   Pulse 104   Ht 5' 6\" (1.676 m)   Wt 175 lb 4.8 oz (79.5 kg)   SpO2 98%   BMI 28.29 kg/m²     PHYSICAL EXAM:  CONSTITUTIONAL: Alert, appropriate, no acute distress  EYES: Non icteric, EOM intact, pupils equal round   ENT: Mucus membranes moist, no oral pharyngeal lesions, external inspection of ears and nose are normal  NECK: Supple, no masses. No palpable thyroid mass  CHEST/LUNGS: CTA bilaterally, normal respiratory effort   CARDIOVASCULAR: RRR, no murmurs. No lower extremity edema  ABDOMEN: soft non-tender, active bowel sounds, no HSM. No palpable masses  EXTREMITIES: warm, full ROM in all 4 extremities, no focal weakness. SKIN: Mild erythema medial aspect of right arm, warm, dry with no rashes or lesions  LYMPH: No cervical, clavicular, axillary, or inguinal lymphadenopathy  NEUROLOGIC: follows commands, non focal   PSYCH: mood and affect appropriate. Alert and oriented to time, place, person    LABORATORY RESULTS REVIEWED/ANALYZED BY ME:  Lab Results   Component Value Date    WBC 6.52 06/03/2021    HGB 12.7 06/03/2021    HCT 36.6 06/03/2021    .0 (H) 06/03/2021     06/03/2021     Lab Results   Component Value Date    NEUTROABS 3.41 06/03/2021       RADIOLOGY STUDIES REVIEWED BY ME:  No results found. ASSESSMENT:    Orders Placed This Encounter   Procedures    Cancer Antigen 15-3     Standing Status:   Future     Number of Occurrences:   1     Standing Expiration Date:   6/3/2022    Cancer Antigen 27.29     Standing Status:   Future     Standing Expiration Date:   6/3/2022    CEA     Standing Status:   Future     Number of Occurrences:   1     Standing Expiration Date:   6/3/2022    Comprehensive Metabolic Panel     Standing Status:   Future     Number of Occurrences:   1     Standing Expiration Date:   6/3/2022    Washington Stokes MD, General Surgery, [place]     Referral Priority:   Routine     Referral Type:   Eval and Treat     Referral Reason:   Specialty Services Required     Referred to Provider:   Adonis Seip, MD     Requested Specialty:   General Surgery     Number of Visits Requested:   1      Mercy Phan was seen today for follow-up.     Diagnoses and all orders for this visit:    Carcinoma of female breast, unspecified estrogen receptor described in this documentation as scribed by Elise Akbar RN in my presence and is both accurate and complete. I have seen, examined and reviewed this patient medication list, appropriate labs and imaging studies. I reviewed relevant medical records and others physicians notes. I discussed the plans of care with the patient. I answered all the questions to the patients satisfaction. I have also reviewed the chief complaint (CC) and part of the history (History of Present Illness (HPI), Past Family Social History [facility]), or Review of Systems (ROS) and made changes when appropriated.        (Please note that portions of this note were completed with a voice recognition program. Efforts were made to edit the dictations but occasionally words are mis-transcribed.)      Jon Novak MD    06/03/21  3:14 PM

## 2021-06-03 ENCOUNTER — HOSPITAL ENCOUNTER (OUTPATIENT)
Dept: INFUSION THERAPY | Age: 56
Discharge: HOME | End: 2021-06-03
Payer: MEDICARE

## 2021-06-03 ENCOUNTER — OFFICE VISIT (OUTPATIENT)
Dept: HEMATOLOGY | Age: 56
End: 2021-06-03
Payer: MEDICARE

## 2021-06-03 VITALS
SYSTOLIC BLOOD PRESSURE: 144 MMHG | BODY MASS INDEX: 28.17 KG/M2 | HEIGHT: 66 IN | OXYGEN SATURATION: 98 % | DIASTOLIC BLOOD PRESSURE: 88 MMHG | WEIGHT: 175.3 LBS | HEART RATE: 104 BPM

## 2021-06-03 DIAGNOSIS — L29.9 ITCHING: ICD-10-CM

## 2021-06-03 DIAGNOSIS — C50.919 CARCINOMA OF FEMALE BREAST, UNSPECIFIED ESTROGEN RECEPTOR STATUS, UNSPECIFIED LATERALITY, UNSPECIFIED SITE OF BREAST (HCC): Primary | ICD-10-CM

## 2021-06-03 DIAGNOSIS — Z51.11 CHEMOTHERAPY MANAGEMENT, ENCOUNTER FOR: ICD-10-CM

## 2021-06-03 DIAGNOSIS — I87.8 POOR VENOUS ACCESS: ICD-10-CM

## 2021-06-03 DIAGNOSIS — T45.1X5D ADVERSE EFFECT OF CHEMOTHERAPY, SUBSEQUENT ENCOUNTER: ICD-10-CM

## 2021-06-03 DIAGNOSIS — Z71.89 CARE PLAN DISCUSSED WITH PATIENT: ICD-10-CM

## 2021-06-03 LAB
ALBUMIN SERPL-MCNC: 3.8 G/DL (ref 3.5–5.2)
ALP BLD-CCNC: 92 U/L (ref 35–104)
ALT SERPL-CCNC: 13 U/L (ref 9–52)
ANION GAP SERPL CALCULATED.3IONS-SCNC: 11 MMOL/L (ref 7–19)
AST SERPL-CCNC: 15 U/L (ref 14–36)
BASOPHILS ABSOLUTE: 0.02 K/UL (ref 0.01–0.08)
BASOPHILS RELATIVE PERCENT: 0.3 % (ref 0.1–1.2)
BILIRUB SERPL-MCNC: 1 MG/DL (ref 0.2–1.3)
BUN BLDV-MCNC: 14 MG/DL (ref 7–17)
CA 15-3: 23.3 U/ML (ref 0–35)
CALCIUM SERPL-MCNC: 8.1 MG/DL (ref 8.4–10.2)
CEA: 1.8 NG/ML (ref 0–3)
CHLORIDE BLD-SCNC: 104 MMOL/L (ref 98–111)
CO2: 28 MMOL/L (ref 22–29)
CREAT SERPL-MCNC: 0.7 MG/DL (ref 0.5–1)
EOSINOPHILS ABSOLUTE: 0.03 K/UL (ref 0.04–0.54)
EOSINOPHILS RELATIVE PERCENT: 0.5 % (ref 0.7–7)
GFR NON-AFRICAN AMERICAN: >60
GLOBULIN: 2.6 G/DL
GLUCOSE BLD-MCNC: 105 MG/DL (ref 74–106)
HCT VFR BLD CALC: 36.6 % (ref 34.1–44.9)
HEMOGLOBIN: 12.7 G/DL (ref 11.2–15.7)
LYMPHOCYTES ABSOLUTE: 2.6 K/UL (ref 1.18–3.74)
LYMPHOCYTES RELATIVE PERCENT: 39.9 % (ref 19.3–53.1)
MCH RBC QN AUTO: 36.1 PG (ref 25.6–32.2)
MCHC RBC AUTO-ENTMCNC: 34.7 G/DL (ref 32.3–35.5)
MCV RBC AUTO: 104 FL (ref 79.4–94.8)
MONOCYTES ABSOLUTE: 0.46 K/UL (ref 0.24–0.82)
MONOCYTES RELATIVE PERCENT: 7.1 % (ref 4.7–12.5)
NEUTROPHILS ABSOLUTE: 3.41 K/UL (ref 1.56–6.13)
NEUTROPHILS RELATIVE PERCENT: 52.2 % (ref 34–71.1)
PDW BLD-RTO: 14.3 % (ref 11.7–14.4)
PLATELET # BLD: 254 K/UL (ref 182–369)
PMV BLD AUTO: 9.7 FL (ref 7.4–10.4)
POTASSIUM SERPL-SCNC: 3.3 MMOL/L (ref 3.5–5.1)
RBC # BLD: 3.52 M/UL (ref 3.93–5.22)
SODIUM BLD-SCNC: 143 MMOL/L (ref 137–145)
TOTAL PROTEIN: 6.5 G/DL (ref 6.3–8.2)
WBC # BLD: 6.52 K/UL (ref 3.98–10.04)

## 2021-06-03 PROCEDURE — 99211 OFF/OP EST MAY X REQ PHY/QHP: CPT

## 2021-06-03 PROCEDURE — 99214 OFFICE O/P EST MOD 30 MIN: CPT | Performed by: INTERNAL MEDICINE

## 2021-06-03 PROCEDURE — 82378 CARCINOEMBRYONIC ANTIGEN: CPT

## 2021-06-03 PROCEDURE — 3017F COLORECTAL CA SCREEN DOC REV: CPT | Performed by: INTERNAL MEDICINE

## 2021-06-03 PROCEDURE — 80053 COMPREHEN METABOLIC PANEL: CPT

## 2021-06-03 PROCEDURE — 2580000003 HC RX 258: Performed by: INTERNAL MEDICINE

## 2021-06-03 PROCEDURE — 96523 IRRIG DRUG DELIVERY DEVICE: CPT

## 2021-06-03 PROCEDURE — 1036F TOBACCO NON-USER: CPT | Performed by: INTERNAL MEDICINE

## 2021-06-03 PROCEDURE — 85025 COMPLETE CBC W/AUTO DIFF WBC: CPT

## 2021-06-03 PROCEDURE — 86300 IMMUNOASSAY TUMOR CA 15-3: CPT

## 2021-06-03 PROCEDURE — 96401 CHEMO ANTI-NEOPL SQ/IM: CPT

## 2021-06-03 PROCEDURE — G8427 DOCREV CUR MEDS BY ELIG CLIN: HCPCS | Performed by: INTERNAL MEDICINE

## 2021-06-03 PROCEDURE — 6360000002 HC RX W HCPCS: Performed by: INTERNAL MEDICINE

## 2021-06-03 PROCEDURE — G8417 CALC BMI ABV UP PARAM F/U: HCPCS | Performed by: INTERNAL MEDICINE

## 2021-06-03 RX ORDER — LAMOTRIGINE 25 MG/1
250 TABLET ORAL ONCE
Status: COMPLETED | OUTPATIENT
Start: 2021-06-03 | End: 2021-06-03

## 2021-06-03 RX ORDER — POTASSIUM CHLORIDE 20 MEQ/1
20 TABLET, EXTENDED RELEASE ORAL DAILY
Qty: 7 TABLET | Refills: 0 | Status: ON HOLD | OUTPATIENT
Start: 2021-06-03 | End: 2021-09-27 | Stop reason: HOSPADM

## 2021-06-03 RX ORDER — SODIUM CHLORIDE 0.9 % (FLUSH) 0.9 %
20 SYRINGE (ML) INJECTION PRN
Status: DISCONTINUED | OUTPATIENT
Start: 2021-06-03 | End: 2021-06-04 | Stop reason: HOSPADM

## 2021-06-03 RX ORDER — HEPARIN SODIUM (PORCINE) LOCK FLUSH IV SOLN 100 UNIT/ML 100 UNIT/ML
500 SOLUTION INTRAVENOUS PRN
Status: DISCONTINUED | OUTPATIENT
Start: 2021-06-03 | End: 2021-06-04 | Stop reason: HOSPADM

## 2021-06-03 RX ORDER — NALOXONE HYDROCHLORIDE 4 MG/.1ML
SPRAY NASAL
COMMUNITY
Start: 2021-05-20

## 2021-06-03 RX ORDER — SODIUM CHLORIDE 0.9 % (FLUSH) 0.9 %
20 SYRINGE (ML) INJECTION PRN
Status: CANCELLED | OUTPATIENT
Start: 2021-06-03

## 2021-06-03 RX ORDER — HEPARIN SODIUM (PORCINE) LOCK FLUSH IV SOLN 100 UNIT/ML 100 UNIT/ML
500 SOLUTION INTRAVENOUS PRN
Status: CANCELLED | OUTPATIENT
Start: 2021-06-03

## 2021-06-03 RX ORDER — SODIUM CHLORIDE 0.9 % (FLUSH) 0.9 %
10 SYRINGE (ML) INJECTION PRN
Status: CANCELLED | OUTPATIENT
Start: 2021-06-03

## 2021-06-03 RX ORDER — HYDROXYZINE HYDROCHLORIDE 25 MG/1
25 TABLET, FILM COATED ORAL EVERY 6 HOURS PRN
Qty: 30 TABLET | Refills: 0 | Status: SHIPPED | OUTPATIENT
Start: 2021-06-03 | End: 2021-06-10

## 2021-06-03 RX ADMIN — FULVESTRANT 250 MG: 50 INJECTION, SOLUTION INTRAMUSCULAR at 14:03

## 2021-06-03 RX ADMIN — SODIUM CHLORIDE, PRESERVATIVE FREE 20 ML: 5 INJECTION INTRAVENOUS at 14:03

## 2021-06-03 RX ADMIN — HEPARIN 500 UNITS: 100 SYRINGE at 14:03

## 2021-06-24 NOTE — PROGRESS NOTES
care for history of recurrent metastatic breast cancer. She has been in remission as per the latest CT scan. She is currently on Faslodex and palbociclib. She has received several lines therapy as described below. She moved from [place] to [place] to stay closer to her parents. Her medical history is complex. Further details as below. · 2006- Neoadjuvant chemotherapy with AC x 4 cycles followed by Taxol AC was complicated by viral meningitis; patient experienced neuropathy after 3 doses of Taxol and was switched to Taxotere for last dose  · 2007- Lumpectomy with axillary lymph node dissection 1.9cm, grade 2. No LVI. 1 of 14 lymph nodes positive for malignancy (1/14) [id number]  · 2007- Adjuvant Radiation Therapy to whole breast and axillary region  · April 2013- RECURRENCE presenting as mass in the axillary region  · Neoadjuvant chemotherapy with 2 cycles of Taxotere followed by Doxil- did not have good response to treatment  · 2013- Preop PET/CTshowed 2.8cm, right axillary lymph node with uptake. · 10/14/2013- Right Breast Mastectomy and Axillary Dissection Right breast had benign tissue with fibrous, negative for carcinoma. Axillary contents demonstrated invasive ductal carcinoma, involving fibroadipose tissue and tendinous tissue. 3 benign lymph nodes (0/3). Yusra grade 3. 3.0cm in greatest gross dimensions. Carcinoma permeates among soft tissues and in the right axilla with no apparent involvement of lymph nodes.    · 01/23/2014- Completion of adjuvant radiation therapy to chest wall and axillary lymph node with Dr. Bharati Sheffield  · 02/14- Initiated adjuvant endocrine therapy with tamoxifen  · 2014 (?) RECURRENCE in the axillary skin  · 2014 (?) Surgical resection of the axillary skin pathology demonstrating tumor at cauterized margin  · 2015 (?) YVETTE/BSO  · 09/19/2016- Bilateral exchange, nipple reconstruction and fat grafting and removal of PAC  · 2016- Switched to aromatase inhibitor patient was non compliant  · 01/05/2017- METASTATIC DISEASE Presented with respiratory failure. CTA Pulmonary showed numerous nodules and masses throughout both lung fields, compatible with metastatic disease. Bilateral hilar adenopathy seen. · 01/11/2017-06/17/2018- Single agent Abraxane x 13 cycles  · 03/15/2017- CT Chest W Contrast Interval decrease in maximal diameter of essentially all the multiple metastatic pulmonary nodules. The average difference is approximately 25%. Some of the much smaller ones have disappeared. Bilateral hilar and aortopulmonary window adenopathy is also similarly smaller. · 06/07/2017- CT Chest W Contrast Multiple lung nodules seen bilaterally. Most of the lung nodules show interval improvement with decreased size by 1 to 3mm. Mediastinal and bilateral hilar adenopathy seen with improvement. · 07/24/2017- PET/CT scan showed marked improvement, is minimal abnormal, has excellent response to therapy  · 02/01/2018- PET/CT scan Numerous bilateral lung masses and nodules, the majority of which do not have appreciable abnormal FDG uptake. The largest mass in the left infrahilar region of the left lower lobe demonstrates a maximum SUV of 3.3. Mediastinal lymphadenopathy. No evidence of metastatic disease in the abdomen or pelvis. · 06/21/2018- CT Chest/Abdomen/Pelvis Multiple lung mets, mild bilateral hilar and mediastinal lymph nodes. No masses or evidence of metastatic disease in the abdomen or pelvis  · 06/27/2018-09/17/2018- Gemzar  · 09/28/2018- CT Pulmonary Multiple lung mets, mild bilateral hilar and mediastinal lymph nodes  · 10/10/2018-12/21/2020- Faslodex every 4 weeks  · 12/14/2018- CT Chest showed definite decrease in the maximal diameter and volume of all left and right metastatic nodules. Somewhat enlarged paratracheal and left axillary lymph nodes are relatively unchanged.    · 04/22/2019- MRI Cervical Spine W WO Contrast Unremarkable  · 04/22/2019- MRI Brain W WO Contrast No enhancing lesions in the brain and no evidence of metastatic disease. · 07/27/2019- CTA Pulmonary showed essentially complete disappearance of the pulmonary metastatic disease. Several tiny flat peripheral nodules are the only sequela. The tiny ones on the right are unchanged, the tiny ones on the left are even smaller. There is no longer any active metastatic disease in either lung. · 12/19/2019- CT Chest/Abdomen/Pelvis Small, tiny subpleural nodules right upper and right midlung field as well as left lung base has remained unchanged. No new focal parenchymal abnormality seen. No adenopathy seen. There is no abdominal or pelvic mass, adenopathy or fluid collection. No lytic or sclerotic bony lesions, but there is a possibility of osteopenia and/or osteoporosis. · 02/17/2020- DEXA Bone Density showed osteopenia of lumbar spine, T-score -1.8, and left femoral neck, T-score -2.0  · 2/25/21 Ct Abdomen Pelvis W Iv Contrast  No evidence of metastatic disease in the abdomen or pelvis. · 2/26/21 Ct Chest W Contrast Tiny nodules in the right lung described above probably represent small foci of pleural thickening due to chronic inflammatory process or small noncalcified granulomas. No features to suggest malignancy or metastatic disease. Bilateral breast implants without complication. · 3/1/2021discussed the results CT chest abdomen pelvis. Essentially no clear evidence of metastatic disease. This is consistent with excellent response to treatment. Continue current treatment.    · 4/1/2021 = 31.6 CA 27-29= 33.5 CEA= 1.6   · 6/3/21 CA 15-3= 23.3 CA 27-29= 25.6  CEA= 1.8     CA 27.29 [employer]  01/21/2021- 20.3  04/01/2021 33.5  06/03/2021 25.6    Past Medical History:    Past Medical History:   Diagnosis Date    Back pain     Breast cancer ([address]     Breast cancer with lung mets    GERD (gastroesophageal reflux disease)     Hypertension     Neuropathy     Post chemo treatment neuropathy       Past Surgical History:    Past Surgical History:   Procedure Laterality Date    BREAST LUMPECTOMY Right     2006    COSMETIC SURGERY      Bilateral breast implants    HYSTERECTOMY, TOTAL ABDOMINAL  2015    JOINT REPLACEMENT      knee pain    MASTECTOMY, BILATERAL      Right 2013 & Left 2016       Social History:    Marital status, children: Single, 2 children-female  Smoking status: no  ETOH status: no  Profession: Disabled  Resides: [place], [place]  Recent trips: Moved from [address] [place] [address] López: no    Family History:   Family History   Problem Relation Age of Onset    Cancer Other         Father's side-Prostate cancer, Multiple Myeloma & Bone cancer       Current Hospital Medications:    Current Outpatient Medications   Medication Sig Dispense Refill    NARCAN 4 MG/0.1ML LIQD nasal spray       predniSONE (DELTASONE) 20 MG tablet Take 3 tablets by mouth daily x 2 days, 2 tabs daily x 2 days, then 1 tab daily x 2 days. 12 tablet 0    triamcinolone (KENALOG) 0.1 % cream Apply topically sparingly 2 times daily as needed for rash. 30 g 0    pantoprazole (PROTONIX) 40 MG tablet TAKE 1 TABLET BY MOUTH EVERY DAY 90 tablet 0    palbociclib (IBRANCE) 125 MG tablet Take 125 mg by mouth daily 21 tablet 5    Fluticasone Furoate-Vilanterol (BREO ELLIPTA IN) Inhale into the lungs      cyclobenzaprine (FLEXERIL) 10 MG tablet Take 10 mg by mouth 3 times daily as needed for Muscle spasms      guaiFENesin-codeine (GUAIFENESIN AC) 100-10 MG/5ML liquid Take 5 mLs by mouth 3 times daily as needed for Cough.  HYDROcodone-acetaminophen (NORCO) 7.5-325 MG per tablet Take 1 tablet by mouth every 6 hours as needed for Pain.       montelukast (SINGULAIR) 10 MG tablet Take 10 mg by mouth nightly      Albuterol Sulfate (PROAIR HFA IN) Inhale into the lungs      benzonatate (TESSALON) 100 MG capsule Take 100 mg by mouth 3 times daily as needed for Cough      Cholecalciferol (VITAMIN D3) 125 MCG (5000 UT) TABS Take by mouth      lisinopril (PRINIVIL;ZESTRIL) 10 MG tablet Take 10 mg by mouth daily      acyclovir (ZOVIRAX) 800 MG tablet Take 800 mg by mouth 2 times daily      ondansetron (ZOFRAN) 4 MG tablet Take 4 mg by mouth every 8 hours as needed for Nausea or Vomiting      venlafaxine (EFFEXOR) 37.5 MG tablet Take 37.5 mg by mouth 3 times daily      diclofenac (VOLTAREN) 0.1 % ophthalmic solution 1 drop 4 times daily      alclomethasone (ACLOVATE) 0.05 % cream Apply topically 2 times daily Apply topically 2 times daily.  meloxicam (MOBIC) 15 MG tablet Take 15 mg by mouth daily      ciprofloxacin (CILOXAN) 0.3 % ophthalmic solution 1 drop every 2 hours      triamcinolone (KENALOG) 0.1 % cream Apply topically 2 times daily Apply topically 2 times daily.  chlorhexidine (PERIDEX) 0.12 % solution Take 15 mLs by mouth 2 times daily      potassium chloride (KLOR-CON M) 20 MEQ extended release tablet Take 1 tablet by mouth daily for 7 days 7 tablet 0    famotidine (PEPCID) 20 MG tablet Take 1 tablet by mouth 2 times daily for 14 days 28 tablet 0    gabapentin (NEURONTIN) 300 MG capsule TAKE 2 CAPSULES BY MOUTH 3 TIMES DAILY FOR 30 DAYS. 180 capsule 0     No current facility-administered medications for this visit. Facility-Administered Medications Ordered in Other Visits   Medication Dose Route Frequency Provider Last Rate Last Admin    sodium chloride flush 0.9 % injection 20 mL  20 mL Intravenous PRN Caren Mcadams MD        heparin flush 100 UNIT/ML injection 500 Units  500 Units Intracatheter PRN Caren Mcadams MD           Allergies:    Allergies   Allergen Reactions    Clindamycin/Lincomycin Hives         Subjective   REVIEW OF SYSTEMS:   CONSTITUTIONAL: no fever, no night sweats, moderate fatigue;  HEENT: no blurring of vision, no double vision, no hearing difficulty, no tinnitus, no ulceration, no dysplasia, no epistaxis;  LUNGS: cough, no hemoptysis, no wheeze, no shortness of breath;  CARDIOVASCULAR: no palpitation, no chest pain, no shortness of breath;  GI:  no abdominal pain,  nausea, no vomiting, no diarrhea,  No constipation;  PATRICK: no dysuria, no hematuria, no frequency or urgency, no nephrolithiasis;  MUSCULOSKELETAL: Back pain, no swelling, no stiffness; pain in hand  ENDOCRINE: no polyuria, no polydipsia, no cold & heat intolerance;  HEMATOLOGY: no easy bruising or bleeding, no history of clotting disorder;  DERMATOLOGY: Mild erythema medial aspect of right arm, no eczema, no pruritus;  PSYCHIATRY: no depression, no anxiety, no panic attacks, no suicidal ideation, no homicidal ideation;  NEUROLOGY: no syncope, no seizures, no numbness or tingling of hands, no numbness or tingling of feet, no paresis;    Objective   /72   Pulse 80   Ht 5' 6\" (1.676 m)   Wt 167 lb 11.2 oz (76.1 kg)   SpO2 97%   BMI 27.07 kg/m²     PHYSICAL EXAM:  CONSTITUTIONAL: Alert, appropriate, no acute distress  EYES: Non icteric, EOM intact, pupils equal round   ENT: Mucus membranes moist, no oral pharyngeal lesions, external inspection of ears and nose are normal  NECK: Supple, no masses. No palpable thyroid mass  CHEST/LUNGS: CTA bilaterally, normal respiratory effort   CARDIOVASCULAR: RRR, no murmurs. No lower extremity edema  ABDOMEN: soft non-tender, active bowel sounds, no HSM. No palpable masses  EXTREMITIES: warm, full ROM in all 4 extremities, no focal weakness. SKIN: Mild erythema medial aspect of right arm, warm, dry with no rashes or lesions  LYMPH: No cervical, clavicular, axillary, or inguinal lymphadenopathy  NEUROLOGIC: follows commands, non focal   PSYCH: mood and affect appropriate. Alert and oriented to time, place, person    LABORATORY RESULTS REVIEWED/ANALYZED BY ME:  6/3/21  CEA 1.8  CA 15-3 23.3  CA 27-29 25.6    RADIOLOGY STUDIES REVIEWED BY ME:  No results found. ASSESSMENT:    No orders of the defined types were placed in this encounter. Noel was seen today for follow-up.     Diagnoses and all orders for this visit:    Carcinoma of female breast, unspecified estrogen receptor status, unspecified laterality, unspecified site of breast [facility])    Care plan discussed with patient    Antineoplastic drugs causing adverse effect, sequela      Metastatic breast cancer, hormone sensitive  2/25/2021discussed the results of CT chest abdomen pelvis. No evidence of disease progression. In fact, excellent response with no measurable metastatic disease at this time. 1/21/2001CA 15-3 = 23, CA 27-29 = 20.3. Currently Faslodex/Ibrance. 6/3/2021CA 15-323, CA 27-2925, CEA 1.8    Regimen:  Faslodex to 500 mg subcutaneous every 4 weeks. Continue Ibrance days 121 q. 28 days    Clinical/laboratory assessment of toxicity for Ibrance      B12 deficiencyB12 levels 189. Recommended B12 2000 mcg p.o. daily. Will start B12 injections today 1000mcg with Faslodex. 3/1/2021- Methylmalonic Acid 171, Vitamin B12 >2000 Hold B12 at this time    Chemotherapy-induced neuropathycontinue gabapentin. Chemotherapy-induced anemialikely related to treatment. Hb 12.2    PLAN:  · Continue Faslodex today and every 28 days   · Follow up with Dr. Merari Franco for PCP care  · CBC CMP CEA CA 15-3 CA 27-29 today  · Port flush 4 weeks  · Repeat scans Aug 2021  · Refilled gabapentin today  · RTC with MD 4 weeks  · Continue Bryon Edmondson am scribing for Katelyn Jade MD. Electronically signed by Emilee Costa RN on 7/1/2021 at 4:35 PM CDT. I, Dr Chiquis Moralez, personally performed the services described in this documentation as scribed by Emilee Costa RN in my presence and is both accurate and complete. I have seen, examined and reviewed this patient medication list, appropriate labs and imaging studies. I reviewed relevant medical records and others physicians notes. I discussed the plans of care with the patient. I answered all the questions to the patients satisfaction.  I have also reviewed the chief complaint (CC) and part of the history (History of Present Illness (HPI), Past Family Social History ([address] [place] [address] Nw), or Review of Systems (ROS) and made changes when appropriated.        (Please note that portions of this note were completed with a voice recognition program. Efforts were made to edit the dictations but occasionally words are mis-transcribed.)      Ana Muller MD    07/01/21  5:17 PM

## 2021-07-01 ENCOUNTER — HOSPITAL ENCOUNTER (OUTPATIENT)
Dept: INFUSION THERAPY | Age: 56
Discharge: HOME | End: 2021-07-01
Payer: MEDICARE

## 2021-07-01 ENCOUNTER — OFFICE VISIT (OUTPATIENT)
Dept: HEMATOLOGY | Age: 56
End: 2021-07-01
Payer: MEDICARE

## 2021-07-01 VITALS
BODY MASS INDEX: 26.95 KG/M2 | DIASTOLIC BLOOD PRESSURE: 72 MMHG | OXYGEN SATURATION: 97 % | HEART RATE: 80 BPM | WEIGHT: 167.7 LBS | HEIGHT: 66 IN | SYSTOLIC BLOOD PRESSURE: 130 MMHG

## 2021-07-01 DIAGNOSIS — C50.919 CARCINOMA OF FEMALE BREAST, UNSPECIFIED ESTROGEN RECEPTOR STATUS, UNSPECIFIED LATERALITY, UNSPECIFIED SITE OF BREAST (HCC): Primary | ICD-10-CM

## 2021-07-01 DIAGNOSIS — I87.8 POOR VENOUS ACCESS: ICD-10-CM

## 2021-07-01 DIAGNOSIS — Z71.89 CARE PLAN DISCUSSED WITH PATIENT: ICD-10-CM

## 2021-07-01 DIAGNOSIS — T45.1X5S ANTINEOPLASTIC DRUGS CAUSING ADVERSE EFFECT, SEQUELA: ICD-10-CM

## 2021-07-01 LAB
BASOPHILS ABSOLUTE: 0.01 K/UL (ref 0.01–0.08)
BASOPHILS RELATIVE PERCENT: 0.2 % (ref 0.1–1.2)
EOSINOPHILS ABSOLUTE: 0.04 K/UL (ref 0.04–0.54)
EOSINOPHILS RELATIVE PERCENT: 1 % (ref 0.7–7)
HCT VFR BLD CALC: 35.8 % (ref 34.1–44.9)
HEMOGLOBIN: 12.2 G/DL (ref 11.2–15.7)
LYMPHOCYTES ABSOLUTE: 1.93 K/UL (ref 1.18–3.74)
LYMPHOCYTES RELATIVE PERCENT: 47.2 % (ref 19.3–53.1)
MCH RBC QN AUTO: 36.5 PG (ref 25.6–32.2)
MCHC RBC AUTO-ENTMCNC: 34.1 G/DL (ref 32.3–35.5)
MCV RBC AUTO: 107.2 FL (ref 79.4–94.8)
MONOCYTES ABSOLUTE: 0.39 K/UL (ref 0.24–0.82)
MONOCYTES RELATIVE PERCENT: 9.5 % (ref 4.7–12.5)
NEUTROPHILS ABSOLUTE: 1.72 K/UL (ref 1.56–6.13)
NEUTROPHILS RELATIVE PERCENT: 42.1 % (ref 34–71.1)
PDW BLD-RTO: 13.8 % (ref 11.7–14.4)
PLATELET # BLD: 182 K/UL (ref 182–369)
PMV BLD AUTO: 9.6 FL (ref 7.4–10.4)
RBC # BLD: 3.34 M/UL (ref 3.93–5.22)
WBC # BLD: 4.09 K/UL (ref 3.98–10.04)

## 2021-07-01 PROCEDURE — 3017F COLORECTAL CA SCREEN DOC REV: CPT | Performed by: INTERNAL MEDICINE

## 2021-07-01 PROCEDURE — 85025 COMPLETE CBC W/AUTO DIFF WBC: CPT

## 2021-07-01 PROCEDURE — G8417 CALC BMI ABV UP PARAM F/U: HCPCS | Performed by: INTERNAL MEDICINE

## 2021-07-01 PROCEDURE — 96401 CHEMO ANTI-NEOPL SQ/IM: CPT

## 2021-07-01 PROCEDURE — 6360000002 HC RX W HCPCS: Performed by: INTERNAL MEDICINE

## 2021-07-01 PROCEDURE — G8427 DOCREV CUR MEDS BY ELIG CLIN: HCPCS | Performed by: INTERNAL MEDICINE

## 2021-07-01 PROCEDURE — 99214 OFFICE O/P EST MOD 30 MIN: CPT | Performed by: INTERNAL MEDICINE

## 2021-07-01 PROCEDURE — 99211 OFF/OP EST MAY X REQ PHY/QHP: CPT

## 2021-07-01 PROCEDURE — 96523 IRRIG DRUG DELIVERY DEVICE: CPT

## 2021-07-01 PROCEDURE — 96402 CHEMO HORMON ANTINEOPL SQ/IM: CPT

## 2021-07-01 PROCEDURE — 1036F TOBACCO NON-USER: CPT | Performed by: INTERNAL MEDICINE

## 2021-07-01 RX ORDER — SODIUM CHLORIDE 0.9 % (FLUSH) 0.9 %
20 SYRINGE (ML) INJECTION PRN
Status: CANCELLED | OUTPATIENT
Start: 2021-07-01

## 2021-07-01 RX ORDER — LAMOTRIGINE 25 MG/1
250 TABLET ORAL ONCE
Status: COMPLETED | OUTPATIENT
Start: 2021-07-01 | End: 2021-07-01

## 2021-07-01 RX ORDER — SODIUM CHLORIDE 0.9 % (FLUSH) 0.9 %
10 SYRINGE (ML) INJECTION PRN
Status: CANCELLED | OUTPATIENT
Start: 2021-07-01

## 2021-07-01 RX ORDER — SODIUM CHLORIDE 0.9 % (FLUSH) 0.9 %
20 SYRINGE (ML) INJECTION PRN
Status: DISCONTINUED | OUTPATIENT
Start: 2021-07-01 | End: 2021-07-02 | Stop reason: HOSPADM

## 2021-07-01 RX ORDER — HEPARIN SODIUM (PORCINE) LOCK FLUSH IV SOLN 100 UNIT/ML 100 UNIT/ML
500 SOLUTION INTRAVENOUS PRN
Status: DISCONTINUED | OUTPATIENT
Start: 2021-07-01 | End: 2021-07-02 | Stop reason: HOSPADM

## 2021-07-01 RX ORDER — HEPARIN SODIUM (PORCINE) LOCK FLUSH IV SOLN 100 UNIT/ML 100 UNIT/ML
500 SOLUTION INTRAVENOUS PRN
Status: CANCELLED | OUTPATIENT
Start: 2021-07-01

## 2021-07-01 RX ADMIN — FULVESTRANT 250 MG: 50 INJECTION, SOLUTION INTRAMUSCULAR at 15:09

## 2021-07-01 RX ADMIN — FULVESTRANT 250 MG: 50 INJECTION, SOLUTION INTRAMUSCULAR at 15:10

## 2021-07-13 NOTE — PROGRESS NOTES
HISTORY OF PRESENT ILLNESS:    Ms. Kim Lentz is a 49 y.o. black female who presents today to discuss revision of her reconstruction. 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 It was ER/CT positive and she underwent neoadjuvant chemotherapy with AC followed by Taxol. In 2007 she underwent a right lumpectomy with axillary node dissection and received adjuvant radiation therapy to the breast and axilla. She did not take antihormonal therapy due to cost.    She recurred in 2013 and underwent 2 cycles of Taxotere followed by Doxil and then underwent right mastectomy and axillary node dissection. This was followed by completion of radiation therapy to her chest wall and axillary nodes. In 2016 she underwent implant exchange and a prophylactic mastectomy on the left. She was also switched to aromatase inhibitor. She subsequent developed metastatic disease with lung, hilar, and axillary metastases and has been on Abraxane, Gemzar, and is now doing monthly Faslodex with palbociclib        BREAST EXAM:    She has bilateral mastectomy and reconstruction incisions with no palpable abnormalities. The left breast is drifting laterally and this is bothering her. I think she could be helped by an implant exchange with a capsulorrhaphy laterally to improve this. IMPRESSION: Metastatic breast cancer with bilateral mastectomy and reconstruction. Left implant drifting laterally                           She has no measurable disease at this time. PLAN: I would recommend seeing her back in a couple weeks. She will bring in her implant information at that time. She will also get a surgical bra. . At that time she can see Trisha Stafford for genetic testing and to discuss nipple tattooing. DISCUSSION:  We discussed the risks and benefits of surgery at some length. She does have metastatic disease but is responding quite well to current therapy.       I

## 2021-07-14 ENCOUNTER — OFFICE VISIT (OUTPATIENT)
Dept: SURGERY | Age: 56
End: 2021-07-14
Payer: MEDICARE

## 2021-07-14 VITALS
WEIGHT: 167 LBS | DIASTOLIC BLOOD PRESSURE: 80 MMHG | HEIGHT: 66 IN | HEART RATE: 80 BPM | BODY MASS INDEX: 26.84 KG/M2 | SYSTOLIC BLOOD PRESSURE: 138 MMHG

## 2021-07-14 DIAGNOSIS — C50.919 CARCINOMA OF FEMALE BREAST, UNSPECIFIED ESTROGEN RECEPTOR STATUS, UNSPECIFIED LATERALITY, UNSPECIFIED SITE OF BREAST (HCC): Primary | ICD-10-CM

## 2021-07-14 PROCEDURE — G8417 CALC BMI ABV UP PARAM F/U: HCPCS | Performed by: SURGERY

## 2021-07-14 PROCEDURE — 1036F TOBACCO NON-USER: CPT | Performed by: SURGERY

## 2021-07-14 PROCEDURE — 99203 OFFICE O/P NEW LOW 30 MIN: CPT | Performed by: SURGERY

## 2021-07-14 PROCEDURE — G8427 DOCREV CUR MEDS BY ELIG CLIN: HCPCS | Performed by: SURGERY

## 2021-07-14 PROCEDURE — 3017F COLORECTAL CA SCREEN DOC REV: CPT | Performed by: SURGERY

## 2021-07-14 NOTE — Clinical Note
Follow-up in 2 weeks for physical exam She will bring her implant information, she will get an appropriate bra She will see Denton Sanchez for genetic testing and discussion of nipple tattooing

## 2021-07-21 NOTE — PROGRESS NOTES
MEDICAL ONCOLOGY PROGRESS NOTE    Pt Name: Joshua Beaver  MRN: [id number]  YOB: 1965  Date of evaluation: 7/29/2021    HISTORY OF PRESENT ILLNESS:    Reason for MD visit: Disease/toxicity management  Karen Navas is here today for monitoring for breast cancer and toxicity assessment. She had significant fatigue during last cycle of Ibrance. She denies any nausea vomiting diarrhea. She denies any febrile episode. Diagnosis  · Grade III Adenocarcinoma of right breast diagnosed 2006  · Stage IIIA, [id number]  · ER/IN Positive, HER-2 bruce/ihc 1+  · April 2013-RECURRENCE in the right axillary region  · Genetic testing- BRCA 1&2 Negative  · 2014 (?) RECURRENCE in the axillary skin  · 01/05/2017- METASTATIC DISEASE with lung, hilar, and axillary lymph node mets  · Osteopenia    Treatment Summary  · 2006- Neoadjuvant chemotherapy with AC x 4 cycles followed by Taxol  · 2007- Lumpectomy with axillary lymph node dissection  · 2007- Adjuvant radiation therapy to whole breast and axillary region  · Did not take tamoxifen due to cost  · April 2013- RECURRENCE  · Neoadjuvant chemotherapy with 2 cycles of Taxotere followed by Doxil  · 10/14/2013- Right Breast Mastectomy and Axillary Dissection  · 01/23/2014- Completion of adjuvant RT to chest wall and axillary lymph node with Dr. Carmen Duong  · 02/14- Initiated adjuvant endocrine therapy with tamoxifen  · 2014 (?) RECURRENCE in the axillary skin  · 2014 (?) Surgical resection of the axillary skin  · 2015 (?) YVETTE/BSO  · 09/19/2016- Bilateral exchange, nipple reconstruction and fat grafting and removal of PAC   · 2016- Switched to aromatase inhibitor  · 01/05/2017- METASTATIC DISEASE with lung, hilar, and axillary mets  · 01/11/2017-06/17/2018- Single agent Abraxane x 13 cycles  · 06/27/2018-09/17/2018- Gemzar  · 10/10/2018-Faslodex every 4 weeks/palbociclib    Cancer History:  Karen Navas was first seen by me on 1/21/2021.   She was referred to establish continued of care for history of recurrent metastatic breast cancer. She has been in remission as per the latest CT scan. She is currently on Faslodex and palbociclib. She has received several lines therapy as described below. She moved from [place] to [place] to stay closer to her parents. Her medical history is complex. Further details as below. · 2006- Neoadjuvant chemotherapy with AC x 4 cycles followed by Taxol AC was complicated by viral meningitis; patient experienced neuropathy after 3 doses of Taxol and was switched to Taxotere for last dose  · 2007- Lumpectomy with axillary lymph node dissection 1.9cm, grade 2. No LVI. 1 of 14 lymph nodes positive for malignancy (1/14) [id number]  · 2007- Adjuvant Radiation Therapy to whole breast and axillary region  · April 2013- RECURRENCE presenting as mass in the axillary region  · Neoadjuvant chemotherapy with 2 cycles of Taxotere followed by Doxil- did not have good response to treatment  · 2013- Preop PET/CTshowed 2.8cm, right axillary lymph node with uptake. · 10/14/2013- Right Breast Mastectomy and Axillary Dissection Right breast had benign tissue with fibrous, negative for carcinoma. Axillary contents demonstrated invasive ductal carcinoma, involving fibroadipose tissue and tendinous tissue. 3 benign lymph nodes (0/3). Yusra grade 3. 3.0cm in greatest gross dimensions. Carcinoma permeates among soft tissues and in the right axilla with no apparent involvement of lymph nodes.    · 01/23/2014- Completion of adjuvant radiation therapy to chest wall and axillary lymph node with Dr. Aidan Medina  · 02/14- Initiated adjuvant endocrine therapy with tamoxifen  · 2014 (?) RECURRENCE in the axillary skin  · 2014 (?) Surgical resection of the axillary skin pathology demonstrating tumor at cauterized margin  · 2015 (?) YVETTE/BSO  · 09/19/2016- Bilateral exchange, nipple reconstruction and fat grafting and removal of PAC  · 2016- Switched to aromatase inhibitor patient was non lesions in the brain and no evidence of metastatic disease. · 07/27/2019- CTA Pulmonary showed essentially complete disappearance of the pulmonary metastatic disease. Several tiny flat peripheral nodules are the only sequela. The tiny ones on the right are unchanged, the tiny ones on the left are even smaller. There is no longer any active metastatic disease in either lung. · 12/19/2019- CT Chest/Abdomen/Pelvis Small, tiny subpleural nodules right upper and right midlung field as well as left lung base has remained unchanged. No new focal parenchymal abnormality seen. No adenopathy seen. There is no abdominal or pelvic mass, adenopathy or fluid collection. No lytic or sclerotic bony lesions, but there is a possibility of osteopenia and/or osteoporosis. · 02/17/2020- DEXA Bone Density showed osteopenia of lumbar spine, T-score -1.8, and left femoral neck, T-score -2.0  · 2/25/21 Ct Abdomen Pelvis W Iv Contrast  No evidence of metastatic disease in the abdomen or pelvis. · 2/26/21 Ct Chest W Contrast Tiny nodules in the right lung described above probably represent small foci of pleural thickening due to chronic inflammatory process or small noncalcified granulomas. No features to suggest malignancy or metastatic disease. Bilateral breast implants without complication. · 3/1/2021discussed the results CT chest abdomen pelvis. Essentially no clear evidence of metastatic disease. This is consistent with excellent response to treatment. Continue current treatment.    · 4/1/2021 = 31.6 CA 27-29= 33.5 CEA= 1.6   · 6/3/21 CA 15-3= 23.3 CA 27-29= 25.6  CEA= 1.8     CA 27.29 [employer]  01/21/2021- 20.3  04/01/2021 33.5  06/03/2021 25.6    Past Medical History:    Past Medical History:   Diagnosis Date    Back pain     Breast cancer ([address]     Breast cancer with lung mets    GERD (gastroesophageal reflux disease)     Hypertension     Neuropathy     Post chemo treatment neuropathy       Past Surgical History:    Past Surgical History:   Procedure Laterality Date    BREAST LUMPECTOMY Right     2006    COSMETIC SURGERY      Bilateral breast implants    HYSTERECTOMY, TOTAL ABDOMINAL  2015    JOINT REPLACEMENT      knee pain    MASTECTOMY, BILATERAL      Right 2013 & Left 2016       Social History:    Marital status, children: Single, 2 children-female  Smoking status: no  ETOH status: no  Profession: Disabled  Resides: [place], [place]  Recent trips: Moved from [address] [place] [address] Cochran: no    Family History:   Family History   Problem Relation Age of Onset    Prostate Cancer Father        Current Hospital Medications:    Current Outpatient Medications   Medication Sig Dispense Refill    IBRANCE 125 MG tablet Take 1 tablet (125mg) by mouth once daily. 21 tablet 3    NARCAN 4 MG/0.1ML LIQD nasal spray       predniSONE (DELTASONE) 20 MG tablet Take 3 tablets by mouth daily x 2 days, 2 tabs daily x 2 days, then 1 tab daily x 2 days. 12 tablet 0    triamcinolone (KENALOG) 0.1 % cream Apply topically sparingly 2 times daily as needed for rash. 30 g 0    pantoprazole (PROTONIX) 40 MG tablet TAKE 1 TABLET BY MOUTH EVERY DAY 90 tablet 0    Fluticasone Furoate-Vilanterol (BREO ELLIPTA IN) Inhale into the lungs      cyclobenzaprine (FLEXERIL) 10 MG tablet Take 10 mg by mouth 3 times daily as needed for Muscle spasms      guaiFENesin-codeine (GUAIFENESIN AC) 100-10 MG/5ML liquid Take 5 mLs by mouth 3 times daily as needed for Cough.  HYDROcodone-acetaminophen (NORCO) 7.5-325 MG per tablet Take 1 tablet by mouth every 6 hours as needed for Pain.       montelukast (SINGULAIR) 10 MG tablet Take 10 mg by mouth nightly      Albuterol Sulfate (PROAIR HFA IN) Inhale into the lungs      benzonatate (TESSALON) 100 MG capsule Take 100 mg by mouth 3 times daily as needed for Cough      Cholecalciferol (VITAMIN D3) 125 MCG (5000 UT) TABS Take by mouth      lisinopril (PRINIVIL;ZESTRIL) 10 MG tablet Take 10 mg by mouth daily      acyclovir (ZOVIRAX) 800 MG tablet Take 800 mg by mouth 2 times daily      ondansetron (ZOFRAN) 4 MG tablet Take 4 mg by mouth every 8 hours as needed for Nausea or Vomiting      venlafaxine (EFFEXOR) 37.5 MG tablet Take 37.5 mg by mouth 3 times daily      diclofenac (VOLTAREN) 0.1 % ophthalmic solution 1 drop 4 times daily      alclomethasone (ACLOVATE) 0.05 % cream Apply topically 2 times daily Apply topically 2 times daily.  meloxicam (MOBIC) 15 MG tablet Take 15 mg by mouth daily      ciprofloxacin (CILOXAN) 0.3 % ophthalmic solution 1 drop every 2 hours      triamcinolone (KENALOG) 0.1 % cream Apply topically 2 times daily Apply topically 2 times daily.  chlorhexidine (PERIDEX) 0.12 % solution Take 15 mLs by mouth 2 times daily      potassium chloride (KLOR-CON M) 20 MEQ extended release tablet Take 1 tablet by mouth daily for 7 days 7 tablet 0    famotidine (PEPCID) 20 MG tablet Take 1 tablet by mouth 2 times daily for 14 days 28 tablet 0    gabapentin (NEURONTIN) 300 MG capsule TAKE 2 CAPSULES BY MOUTH 3 TIMES DAILY FOR 30 DAYS. 180 capsule 0     No current facility-administered medications for this visit. Facility-Administered Medications Ordered in Other Visits   Medication Dose Route Frequency Provider Last Rate Last Admin    sodium chloride flush 0.9 % injection 20 mL  20 mL Intravenous PRN Leopold Georgis, MD   20 mL at 07/29/21 1255    heparin flush 100 UNIT/ML injection 500 Units  500 Units Intracatheter PRN Leopold Georgis, MD   500 Units at 07/29/21 1254       Allergies:    Allergies   Allergen Reactions    Clindamycin/Lincomycin Hives         Subjective   REVIEW OF SYSTEMS:   CONSTITUTIONAL: no fever, no night sweats, moderate fatigue;  HEENT: no blurring of vision, no double vision, no hearing difficulty, no tinnitus, no ulceration, no dysplasia, no epistaxis;  LUNGS: cough, no hemoptysis, no wheeze, no shortness of breath;  CARDIOVASCULAR: no palpitation, no chest pain, no shortness of breath;  GI:  no abdominal pain,  nausea, no vomiting, no diarrhea,  No constipation;  PATRICK: no dysuria, no hematuria, no frequency or urgency, no nephrolithiasis;  MUSCULOSKELETAL: Back pain, no swelling, no stiffness; pain in hand  ENDOCRINE: no polyuria, no polydipsia, no cold & heat intolerance;  HEMATOLOGY: no easy bruising or bleeding, no history of clotting disorder;  DERMATOLOGY: Mild erythema medial aspect of right arm, no eczema, no pruritus;  PSYCHIATRY: no depression, no anxiety, no panic attacks, no suicidal ideation, no homicidal ideation;  NEUROLOGY: no syncope, no seizures, no numbness or tingling of hands, no numbness or tingling of feet, no paresis;    Objective   /68   Pulse 85   Ht 5' 6\" (1.676 m)   Wt 176 lb 3.2 oz (79.9 kg)   SpO2 99%   BMI 28.44 kg/m²     PHYSICAL EXAM:  CONSTITUTIONAL: Alert, appropriate, no acute distress  EYES: Non icteric, EOM intact, pupils equal round   ENT: Mucus membranes moist, no oral pharyngeal lesions, external inspection of ears and nose are normal  NECK: Supple, no masses. No palpable thyroid mass  CHEST/LUNGS: CTA bilaterally, normal respiratory effort   CARDIOVASCULAR: RRR, no murmurs. No lower extremity edema  ABDOMEN: soft non-tender, active bowel sounds, no HSM. No palpable masses  EXTREMITIES: warm, full ROM in all 4 extremities, no focal weakness. SKIN: Mild erythema medial aspect of right arm, warm, dry with no rashes or lesions  LYMPH: No cervical, clavicular, axillary, or inguinal lymphadenopathy  NEUROLOGIC: follows commands, non focal   PSYCH: mood and affect appropriate.   Alert and oriented to time, place, person    LABORATORY RESULTS REVIEWED/ANALYZED BY ME:  Lab Results   Component Value Date    WBC 3.62 (L) 07/29/2021    HGB 11.8 07/29/2021    HCT 36.1 07/29/2021    .4 (H) 07/29/2021     (L) 07/29/2021     Lab Results   Component Value Date    NEUTROABS 1.55 (L) 07/29/2021     Lab Results Component Value Date     06/03/2021    K 3.3 (L) 06/03/2021     06/03/2021    CO2 28 06/03/2021    BUN 14 06/03/2021    CREATININE 0.7 06/03/2021    GLUCOSE 105 06/03/2021    CALCIUM 8.1 (L) 06/03/2021    PROT 6.5 06/03/2021    LABALBU 3.8 06/03/2021    BILITOT 1.0 06/03/2021    ALKPHOS 92 06/03/2021    AST 15 06/03/2021    ALT 13 06/03/2021    LABGLOM >60 06/03/2021    GFRAA 85 04/01/2021    AGRATIO 1.6 04/01/2021    GLOB 2.6 06/03/2021     RADIOLOGY STUDIES REVIEWED BY ME:  No results found. ASSESSMENT:    Orders Placed This Encounter   Procedures    CT CHEST W CONTRAST     Standing Status:   Future     Standing Expiration Date:   7/29/2022     Scheduling Instructions:      sched before next visit     Order Specific Question:   Reason for exam:     Answer:   f/u    CT ABDOMEN PELVIS W IV CONTRAST Additional Contrast? Oral     Standing Status:   Future     Standing Expiration Date:   7/29/2022     Scheduling Instructions:      sched before next visit     Order Specific Question:   Additional Contrast?     Answer:   Oral     Order Specific Question:   Reason for exam:     Answer:   f/u breast cancer    CEA     Standing Status:   Future     Number of Occurrences:   1     Standing Expiration Date:   7/29/2022    Cancer Antigen 27.29     Standing Status:   Future     Standing Expiration Date:   7/29/2022    Cancer Antigen 15-3     Standing Status:   Future     Number of Occurrences:   1     Standing Expiration Date:   7/29/2022    Comprehensive Metabolic Panel     Standing Status:   Future     Number of Occurrences:   1     Standing Expiration Date:   7/29/2022      Jersey Leonard was seen today for follow-up. Diagnoses and all orders for this visit:    Carcinoma of female breast, unspecified estrogen receptor status, unspecified laterality, unspecified site of breast ([facility] [address]  -     CEA; Future  -     Cancer Antigen 27.29; Future  -     Cancer Antigen 15-3;  Future  -     Comprehensive Metabolic Panel; Future  -     CT CHEST W CONTRAST; Future  -     CT ABDOMEN PELVIS W IV CONTRAST Additional Contrast? Oral; Future    Care plan discussed with patient    Antineoplastic drugs causing adverse effect, sequela    Use of fulvestrant (Faslodex)      Metastatic breast cancer, hormone sensitive  2/25/2021discussed the results of CT chest abdomen pelvis. No evidence of disease progression. In fact, excellent response with no measurable metastatic disease at this time. 1/21/2001CA 15-3 = 23, CA 27-29 = 20.3. Currently Faslodex/Ibrance. 6/3/2021CA 15-323, CA 27-2925, CEA 1.8    Regimen:  Faslodex to 500 mg subcutaneous every 4 weeks. Continue Ibrance days 121 q. 28 days    Clinical/laboratory assessment of toxicity for Ibrance      B12 deficiencyB12 levels 189. Recommended B12 2000 mcg p.o. daily. Will start B12 injections today 1000mcg with Faslodex. 3/1/2021- Methylmalonic Acid 171, Vitamin B12 >2000 Hold B12 at this time    Chemotherapy-induced neuropathycontinue gabapentin. Chemotherapy-induced anemialikely related to treatment. Hb 12.2    PLAN:  · Continue Faslodex today and every 28 days   · Follow up with Dr. Clara Chand for PCP care  · CBC CMP CEA CA 15-3 CA 27-29 today  · Port flush 4 weeks  · CT chest abdomen pelvis in 3 weeks  · RTC with MD 4 weeks  · Continue Joe Funk am scribing for Leopold Georgis, MD. Electronically signed by Edwardo Menard RN on 7/29/2021 at 4:36 PM CDT. I, Dr Victoria Pisano, personally performed the services described in this documentation as scribed by Edwardo Menard RN in my presence and is both accurate and complete. I have seen, examined and reviewed this patient medication list, appropriate labs and imaging studies. I reviewed relevant medical records and others physicians notes. I discussed the plans of care with the patient. I answered all the questions to the patients satisfaction.  I have also reviewed the chief complaint (CC) and part of the history (History of Present Illness (HPI), Past Family Social History [facility]), or Review of Systems (ROS) and made changes when appropriated.        (Please note that portions of this note were completed with a voice recognition program. Efforts were made to edit the dictations but occasionally words are mis-transcribed.)      Caren Mcadams MD    07/29/21  1:51 PM

## 2021-07-23 RX ORDER — PALBOCICLIB 125 MG/1
TABLET, FILM COATED ORAL
Qty: 21 TABLET | Refills: 3 | Status: SHIPPED | OUTPATIENT
Start: 2021-07-23 | End: 2022-01-25 | Stop reason: SDUPTHER

## 2021-07-29 ENCOUNTER — OFFICE VISIT (OUTPATIENT)
Dept: HEMATOLOGY | Age: 56
End: 2021-07-29
Payer: MEDICARE

## 2021-07-29 ENCOUNTER — HOSPITAL ENCOUNTER (OUTPATIENT)
Dept: INFUSION THERAPY | Age: 56
Discharge: HOME | End: 2021-07-29
Payer: MEDICARE

## 2021-07-29 VITALS
SYSTOLIC BLOOD PRESSURE: 122 MMHG | BODY MASS INDEX: 28.32 KG/M2 | WEIGHT: 176.2 LBS | HEART RATE: 85 BPM | DIASTOLIC BLOOD PRESSURE: 68 MMHG | HEIGHT: 66 IN | OXYGEN SATURATION: 99 %

## 2021-07-29 DIAGNOSIS — I87.8 POOR VENOUS ACCESS: ICD-10-CM

## 2021-07-29 DIAGNOSIS — C50.919 CARCINOMA OF FEMALE BREAST, UNSPECIFIED ESTROGEN RECEPTOR STATUS, UNSPECIFIED LATERALITY, UNSPECIFIED SITE OF BREAST (HCC): Primary | ICD-10-CM

## 2021-07-29 DIAGNOSIS — Z71.89 CARE PLAN DISCUSSED WITH PATIENT: ICD-10-CM

## 2021-07-29 DIAGNOSIS — Z79.818 USE OF FULVESTRANT (FASLODEX): ICD-10-CM

## 2021-07-29 DIAGNOSIS — T45.1X5S ANTINEOPLASTIC DRUGS CAUSING ADVERSE EFFECT, SEQUELA: ICD-10-CM

## 2021-07-29 LAB
ALBUMIN SERPL-MCNC: 4.2 G/DL (ref 3.5–5.2)
ALP BLD-CCNC: 100 U/L (ref 35–104)
ALT SERPL-CCNC: 8 U/L (ref 9–52)
ANION GAP SERPL CALCULATED.3IONS-SCNC: 8 MMOL/L (ref 7–19)
AST SERPL-CCNC: 18 U/L (ref 14–36)
BASOPHILS ABSOLUTE: 0.02 K/UL (ref 0.01–0.08)
BASOPHILS RELATIVE PERCENT: 0.6 % (ref 0.1–1.2)
BILIRUB SERPL-MCNC: 1.1 MG/DL (ref 0.2–1.3)
BUN BLDV-MCNC: 9 MG/DL (ref 7–17)
CA 15-3: 21.5 U/ML (ref 0–35)
CALCIUM SERPL-MCNC: 8.8 MG/DL (ref 8.4–10.2)
CEA: 1.7 NG/ML (ref 0–3)
CHLORIDE BLD-SCNC: 105 MMOL/L (ref 98–111)
CO2: 28 MMOL/L (ref 22–29)
CREAT SERPL-MCNC: 0.7 MG/DL (ref 0.5–1)
EOSINOPHILS ABSOLUTE: 0.01 K/UL (ref 0.04–0.54)
EOSINOPHILS RELATIVE PERCENT: 0.3 % (ref 0.7–7)
GFR NON-AFRICAN AMERICAN: >60
GLOBULIN: 2.8 G/DL
GLUCOSE BLD-MCNC: 86 MG/DL (ref 74–106)
HCT VFR BLD CALC: 36.1 % (ref 34.1–44.9)
HEMOGLOBIN: 11.8 G/DL (ref 11.2–15.7)
LYMPHOCYTES ABSOLUTE: 1.82 K/UL (ref 1.18–3.74)
LYMPHOCYTES RELATIVE PERCENT: 50.3 % (ref 19.3–53.1)
MCH RBC QN AUTO: 35.1 PG (ref 25.6–32.2)
MCHC RBC AUTO-ENTMCNC: 32.7 G/DL (ref 32.3–35.5)
MCV RBC AUTO: 107.4 FL (ref 79.4–94.8)
MONOCYTES ABSOLUTE: 0.22 K/UL (ref 0.24–0.82)
MONOCYTES RELATIVE PERCENT: 6.1 % (ref 4.7–12.5)
NEUTROPHILS ABSOLUTE: 1.55 K/UL (ref 1.56–6.13)
NEUTROPHILS RELATIVE PERCENT: 42.7 % (ref 34–71.1)
PDW BLD-RTO: 14.1 % (ref 11.7–14.4)
PLATELET # BLD: 164 K/UL (ref 182–369)
PMV BLD AUTO: 9.7 FL (ref 7.4–10.4)
POTASSIUM SERPL-SCNC: 3.8 MMOL/L (ref 3.5–5.1)
RBC # BLD: 3.36 M/UL (ref 3.93–5.22)
SODIUM BLD-SCNC: 141 MMOL/L (ref 137–145)
TOTAL PROTEIN: 7 G/DL (ref 6.3–8.2)
WBC # BLD: 3.62 K/UL (ref 3.98–10.04)

## 2021-07-29 PROCEDURE — G8427 DOCREV CUR MEDS BY ELIG CLIN: HCPCS | Performed by: INTERNAL MEDICINE

## 2021-07-29 PROCEDURE — 99214 OFFICE O/P EST MOD 30 MIN: CPT | Performed by: INTERNAL MEDICINE

## 2021-07-29 PROCEDURE — 80053 COMPREHEN METABOLIC PANEL: CPT

## 2021-07-29 PROCEDURE — 85025 COMPLETE CBC W/AUTO DIFF WBC: CPT

## 2021-07-29 PROCEDURE — 96523 IRRIG DRUG DELIVERY DEVICE: CPT

## 2021-07-29 PROCEDURE — 82378 CARCINOEMBRYONIC ANTIGEN: CPT

## 2021-07-29 PROCEDURE — 2580000003 HC RX 258: Performed by: INTERNAL MEDICINE

## 2021-07-29 PROCEDURE — 86300 IMMUNOASSAY TUMOR CA 15-3: CPT

## 2021-07-29 PROCEDURE — G8417 CALC BMI ABV UP PARAM F/U: HCPCS | Performed by: INTERNAL MEDICINE

## 2021-07-29 PROCEDURE — 96401 CHEMO ANTI-NEOPL SQ/IM: CPT

## 2021-07-29 PROCEDURE — 99212 OFFICE O/P EST SF 10 MIN: CPT

## 2021-07-29 PROCEDURE — 96402 CHEMO HORMON ANTINEOPL SQ/IM: CPT

## 2021-07-29 PROCEDURE — 3017F COLORECTAL CA SCREEN DOC REV: CPT | Performed by: INTERNAL MEDICINE

## 2021-07-29 PROCEDURE — 6360000002 HC RX W HCPCS: Performed by: INTERNAL MEDICINE

## 2021-07-29 PROCEDURE — 1036F TOBACCO NON-USER: CPT | Performed by: INTERNAL MEDICINE

## 2021-07-29 RX ORDER — HEPARIN SODIUM (PORCINE) LOCK FLUSH IV SOLN 100 UNIT/ML 100 UNIT/ML
500 SOLUTION INTRAVENOUS PRN
Status: DISCONTINUED | OUTPATIENT
Start: 2021-07-29 | End: 2021-07-30 | Stop reason: HOSPADM

## 2021-07-29 RX ORDER — SODIUM CHLORIDE 0.9 % (FLUSH) 0.9 %
20 SYRINGE (ML) INJECTION PRN
Status: DISCONTINUED | OUTPATIENT
Start: 2021-07-29 | End: 2021-07-30 | Stop reason: HOSPADM

## 2021-07-29 RX ORDER — LAMOTRIGINE 25 MG/1
250 TABLET ORAL ONCE
Status: COMPLETED | OUTPATIENT
Start: 2021-07-29 | End: 2021-07-29

## 2021-07-29 RX ORDER — HEPARIN SODIUM (PORCINE) LOCK FLUSH IV SOLN 100 UNIT/ML 100 UNIT/ML
500 SOLUTION INTRAVENOUS PRN
Status: CANCELLED | OUTPATIENT
Start: 2021-07-29

## 2021-07-29 RX ORDER — SODIUM CHLORIDE 0.9 % (FLUSH) 0.9 %
20 SYRINGE (ML) INJECTION PRN
Status: CANCELLED | OUTPATIENT
Start: 2021-07-29

## 2021-07-29 RX ORDER — SODIUM CHLORIDE 0.9 % (FLUSH) 0.9 %
10 SYRINGE (ML) INJECTION PRN
Status: CANCELLED | OUTPATIENT
Start: 2021-07-29

## 2021-07-29 RX ADMIN — FULVESTRANT 250 MG: 50 INJECTION INTRAMUSCULAR at 12:55

## 2021-07-29 RX ADMIN — SODIUM CHLORIDE, PRESERVATIVE FREE 20 ML: 5 INJECTION INTRAVENOUS at 12:55

## 2021-07-29 RX ADMIN — HEPARIN 500 UNITS: 100 SYRINGE at 12:54

## 2021-08-03 NOTE — PROGRESS NOTES
HISTORY OF PRESENT ILLNESS:    Ms. Kim Lentz is a 49 y.o. black female who presents today for a 2 week follow up. 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 It was ER/CA positive and she underwent neoadjuvant chemotherapy with AC followed by Taxol. In 2007 she underwent a right lumpectomy with axillary node dissection and received adjuvant radiation therapy to the breast and axilla. She did not take antihormonal therapy due to cost.    She recurred in 2013 and underwent 2 cycles of Taxotere followed by Doxil and then underwent right mastectomy and axillary node dissection. This was followed by completion of radiation therapy to her chest wall and axillary nodes. In 2016 she underwent implant exchange and a prophylactic mastectomy on the left. She was also switched to aromatase inhibitor. She subsequent developed metastatic disease with lung, hilar, and axillary metastases and has been on Abraxane, Gemzar, and is now doing monthly Faslodex with palbociclib    She currently has [place] implants. 590 cc on the right. She has an 800 cc implant on the left. BREAST EXAM:    She has bilateral mastectomy and reconstruction incisions with no palpable abnormalities. The left breast is drifting laterally and this is bothering her. I think she could be helped by an implant exchange with a capsulorrhaphy laterally to improve this. IMPRESSION: Metastatic breast cancer with bilateral mastectomy and reconstruction. Left implant drifting laterally                           She has no measurable disease at this time. PLAN: Left revision reconstruction at her convenience this fall at [facility]. We will schedule for revision reconstructed breast, implant exchange, open surgery on the capsule, and left pectoral block. I would recommend seeing her back a day or so prior to her surgery   She will also get a surgical bra. . She has seen Trisha Stafford for genetic testing and to discuss nipple tattooing. DISCUSSION:  We discussed the risks and benefits of surgery at some length. She does have metastatic disease but is responding quite well to current therapy. I have seen, examined and reviewed this patient medication list, appropriate labs and imaging studies. I reviewed relevant medical records and others physicians notes. I discussed the plans of care with the patient. I answered all the questions to the patients satisfaction. I, Dr Krystian Linder, personally performed the services described in this documentation as scribed by Gloria Velasco MA in my presence and is both accurate and complete. (Please note that portions of this note were completed with a voice recognition program. Efforts were made to edit the dictations but occasionally words are mis-transcribed.)  Over 50% of the total visit time of 30 minutes in face to face encounter with the patient, out of which more than 50% of the time was spent in counseling patient or family and coordination of care. Counseling included but was not limited to time spent reviewing labs, imaging studies/ treatment plan and answering questions.

## 2021-08-05 ENCOUNTER — OFFICE VISIT (OUTPATIENT)
Dept: SURGERY | Age: 56
End: 2021-08-05
Payer: MEDICARE

## 2021-08-05 VITALS — SYSTOLIC BLOOD PRESSURE: 120 MMHG | HEART RATE: 80 BPM | DIASTOLIC BLOOD PRESSURE: 70 MMHG

## 2021-08-05 DIAGNOSIS — C50.919 CARCINOMA OF FEMALE BREAST, UNSPECIFIED ESTROGEN RECEPTOR STATUS, UNSPECIFIED LATERALITY, UNSPECIFIED SITE OF BREAST (HCC): Primary | ICD-10-CM

## 2021-08-05 DIAGNOSIS — Z90.13 STATUS POST BILATERAL MASTECTOMY: ICD-10-CM

## 2021-08-05 PROCEDURE — G8417 CALC BMI ABV UP PARAM F/U: HCPCS | Performed by: SURGERY

## 2021-08-05 PROCEDURE — G8427 DOCREV CUR MEDS BY ELIG CLIN: HCPCS | Performed by: SURGERY

## 2021-08-05 PROCEDURE — 1036F TOBACCO NON-USER: CPT | Performed by: SURGERY

## 2021-08-05 PROCEDURE — 99214 OFFICE O/P EST MOD 30 MIN: CPT | Performed by: SURGERY

## 2021-08-05 PROCEDURE — 3017F COLORECTAL CA SCREEN DOC REV: CPT | Performed by: SURGERY

## 2021-08-05 NOTE — Clinical Note
Revision left reconstructed breast at [facility] at her convenience 700-800 cc extra projection implants Pec block, surgery of capsule, implant exchange, call if questions

## 2021-08-06 ENCOUNTER — HOSPITAL ENCOUNTER (OUTPATIENT)
Dept: MRI IMAGING | Age: 56
Discharge: HOME | End: 2021-08-06
Payer: MEDICARE

## 2021-08-06 DIAGNOSIS — M54.16 LUMBAR RADICULOPATHY: ICD-10-CM

## 2021-08-06 PROBLEM — Z90.13 STATUS POST BILATERAL MASTECTOMY: Status: ACTIVE | Noted: 2021-08-06

## 2021-08-06 PROCEDURE — 72148 MRI LUMBAR SPINE W/O DYE: CPT

## 2021-08-12 DIAGNOSIS — T45.1X5A CHEMOTHERAPY-INDUCED NEUROPATHY (HCC): ICD-10-CM

## 2021-08-12 DIAGNOSIS — G62.0 CHEMOTHERAPY-INDUCED NEUROPATHY (HCC): ICD-10-CM

## 2021-08-12 RX ORDER — PANTOPRAZOLE SODIUM 40 MG/1
TABLET, DELAYED RELEASE ORAL
Qty: 90 TABLET | Refills: 0 | Status: SHIPPED | OUTPATIENT
Start: 2021-08-12

## 2021-08-19 ENCOUNTER — HOSPITAL ENCOUNTER (OUTPATIENT)
Dept: CT IMAGING | Age: 56
Discharge: HOME | End: 2021-08-19
Payer: MEDICARE

## 2021-08-19 ENCOUNTER — HOSPITAL ENCOUNTER (OUTPATIENT)
Dept: INFUSION THERAPY | Age: 56
Discharge: HOME | End: 2021-08-19
Payer: MEDICARE

## 2021-08-19 DIAGNOSIS — C50.919 CARCINOMA OF FEMALE BREAST, UNSPECIFIED ESTROGEN RECEPTOR STATUS, UNSPECIFIED LATERALITY, UNSPECIFIED SITE OF BREAST (HCC): ICD-10-CM

## 2021-08-19 DIAGNOSIS — I87.8 POOR VENOUS ACCESS: Primary | ICD-10-CM

## 2021-08-19 PROCEDURE — 74177 CT ABD & PELVIS W/CONTRAST: CPT

## 2021-08-19 PROCEDURE — 96523 IRRIG DRUG DELIVERY DEVICE: CPT

## 2021-08-19 PROCEDURE — 6360000002 HC RX W HCPCS: Performed by: INTERNAL MEDICINE

## 2021-08-19 PROCEDURE — 71260 CT THORAX DX C+: CPT

## 2021-08-19 PROCEDURE — 2580000003 HC RX 258: Performed by: INTERNAL MEDICINE

## 2021-08-19 PROCEDURE — 6360000004 HC RX CONTRAST MEDICATION: Performed by: INTERNAL MEDICINE

## 2021-08-19 RX ORDER — HEPARIN SODIUM (PORCINE) LOCK FLUSH IV SOLN 100 UNIT/ML 100 UNIT/ML
500 SOLUTION INTRAVENOUS PRN
Status: DISCONTINUED | OUTPATIENT
Start: 2021-08-19 | End: 2021-08-20 | Stop reason: HOSPADM

## 2021-08-19 RX ORDER — SODIUM CHLORIDE 0.9 % (FLUSH) 0.9 %
10 SYRINGE (ML) INJECTION PRN
Status: DISCONTINUED | OUTPATIENT
Start: 2021-08-19 | End: 2021-08-20 | Stop reason: HOSPADM

## 2021-08-19 RX ORDER — SODIUM CHLORIDE 0.9 % (FLUSH) 0.9 %
10 SYRINGE (ML) INJECTION PRN
Status: CANCELLED | OUTPATIENT
Start: 2021-08-19

## 2021-08-19 RX ORDER — SODIUM CHLORIDE 0.9 % (FLUSH) 0.9 %
20 SYRINGE (ML) INJECTION PRN
Status: CANCELLED | OUTPATIENT
Start: 2021-08-19

## 2021-08-19 RX ORDER — HEPARIN SODIUM (PORCINE) LOCK FLUSH IV SOLN 100 UNIT/ML 100 UNIT/ML
500 SOLUTION INTRAVENOUS PRN
Status: CANCELLED | OUTPATIENT
Start: 2021-08-19

## 2021-08-19 RX ADMIN — HEPARIN 500 UNITS: 100 SYRINGE at 11:00

## 2021-08-19 RX ADMIN — IOPAMIDOL 75 ML: 755 INJECTION, SOLUTION INTRAVENOUS at 11:01

## 2021-08-19 RX ADMIN — SODIUM CHLORIDE, PRESERVATIVE FREE 10 ML: 5 INJECTION INTRAVENOUS at 11:00

## 2021-08-23 NOTE — PROGRESS NOTES
MEDICAL ONCOLOGY PROGRESS NOTE    Pt Name: Celestine Berman  MRN: [id number]  YOB: 1965  Date of evaluation: 8/26/2021    HISTORY OF PRESENT ILLNESS:    Reason for MD visit: Disease/toxicity management  Manpreet Mccloud is here today for monitoring for breast cancer and toxicity assessment. She had significant fatigue during last cycle of Ibrance. She denies any nausea vomiting diarrhea. She denies any febrile episode. Fela Rodríguez has complaints of cough and congestion today. She is afebrile. She denies any recent COVID-19 contact. She has not been vaccinated.     Diagnosis  · Grade III Adenocarcinoma of right breast diagnosed 2006  · Stage IIIA, [id number]  · ER/DE Positive, HER-2 bruce/ihc 1+  · April 2013-RECURRENCE in the right axillary region  · Genetic testing- BRCA 1&2 Negative  · 2014 (?) RECURRENCE in the axillary skin  · 01/05/2017- METASTATIC DISEASE with lung, hilar, and axillary lymph node mets  · Osteopenia    Treatment Summary  · 2006- Neoadjuvant chemotherapy with AC x 4 cycles followed by Taxol  · 2007- Lumpectomy with axillary lymph node dissection  · 2007- Adjuvant radiation therapy to whole breast and axillary region  · Did not take tamoxifen due to cost  · April 2013- RECURRENCE  · Neoadjuvant chemotherapy with 2 cycles of Taxotere followed by Doxil  · 10/14/2013- Right Breast Mastectomy and Axillary Dissection  · 01/23/2014- Completion of adjuvant RT to chest wall and axillary lymph node with Dr. Zuleima Odonnell  · 02/14- Initiated adjuvant endocrine therapy with tamoxifen  · 2014 (?) RECURRENCE in the axillary skin  · 2014 (?) Surgical resection of the axillary skin  · 2015 (?) YVETTE/BSO  · 09/19/2016- Bilateral exchange, nipple reconstruction and fat grafting and removal of PAC   · 2016- Switched to aromatase inhibitor  · 01/05/2017- METASTATIC DISEASE with lung, hilar, and axillary mets  · 01/11/2017-06/17/2018- Single agent Abraxane x 13 cycles  · 06/27/2018-09/17/2018- Gemzar  · 10/10/2018-Faslodex every 4 weeks/palbociclib    Cancer History:  Mirella Gilliland was first seen by me on 1/21/2021. She was referred to [facility] of care for history of recurrent metastatic breast cancer. She has been in remission as per the latest CT scan. She is currently on Faslodex and palbociclib. She has received several lines therapy as described below. She moved from [place] to [facility] to stay closer to her parents. Her medical history is complex. Further details as below. · 2006- Neoadjuvant chemotherapy with AC x 4 cycles followed by Taxol AC was complicated by viral meningitis; patient experienced neuropathy after 3 doses of Taxol and was switched to Taxotere for last dose  · 2007- Lumpectomy with axillary lymph node dissection 1.9cm, grade 2. No LVI. 1 of 14 lymph nodes positive for malignancy (1/14) [id number]  · 2007- Adjuvant Radiation Therapy to whole breast and axillary region  · April 2013- RECURRENCE presenting as mass in the axillary region  · Neoadjuvant chemotherapy with 2 cycles of Taxotere followed by Doxil- did not have good response to treatment  · 2013- Preop PET/CTshowed 2.8cm, right axillary lymph node with uptake. · 10/14/2013- Right Breast Mastectomy and Axillary Dissection Right breast had benign tissue with fibrous, negative for carcinoma. Axillary contents demonstrated invasive ductal carcinoma, involving fibroadipose tissue and tendinous tissue. 3 benign lymph nodes (0/3). Yusra grade 3. 3.0cm in greatest gross dimensions. Carcinoma permeates among soft tissues and in the right axilla with no apparent involvement of lymph nodes.    · 01/23/2014- Completion of adjuvant radiation therapy to chest wall and axillary lymph node with Dr. Josie Cadet  · 02/14- Initiated adjuvant endocrine therapy with tamoxifen  · 2014 (?) RECURRENCE in the axillary skin  · 2014 (?) Surgical resection of the axillary skin pathology demonstrating tumor at cauterized margin  · 2015 (?) YVETTE/BSO  · 09/19/2016- Bilateral exchange, nipple reconstruction and fat grafting and removal of PAC  · 2016- Switched to aromatase inhibitor patient was non compliant  · 01/05/2017- METASTATIC DISEASE Presented with respiratory failure. CTA Pulmonary showed numerous nodules and masses throughout both lung fields, compatible with metastatic disease. Bilateral hilar adenopathy seen. · 01/11/2017-06/17/2018- Single agent Abraxane x 13 cycles  · 03/15/2017- CT Chest W Contrast Interval decrease in maximal diameter of essentially all the multiple metastatic pulmonary nodules. The average difference is approximately 25%. Some of the much smaller ones have disappeared. Bilateral hilar and aortopulmonary window adenopathy is also similarly smaller. · 06/07/2017- CT Chest W Contrast Multiple lung nodules seen bilaterally. Most of the lung nodules show interval improvement with decreased size by 1 to 3mm. Mediastinal and bilateral hilar adenopathy seen with improvement. · 07/24/2017- PET/CT scan showed marked improvement, is minimal abnormal, has excellent response to therapy  · 02/01/2018- PET/CT scan Numerous bilateral lung masses and nodules, the majority of which do not have appreciable abnormal FDG uptake. The largest mass in the left infrahilar region of the left lower lobe demonstrates a maximum SUV of 3.3. Mediastinal lymphadenopathy. No evidence of metastatic disease in the abdomen or pelvis. · 06/21/2018- CT Chest/Abdomen/Pelvis Multiple lung mets, mild bilateral hilar and mediastinal lymph nodes. No masses or evidence of metastatic disease in the abdomen or pelvis  · 06/27/2018-09/17/2018- Gemzar  · 09/28/2018- CT Pulmonary Multiple lung mets, mild bilateral hilar and mediastinal lymph nodes  · 10/10/2018-12/21/2020- Faslodex every 4 weeks  · 12/14/2018- CT Chest showed definite decrease in the maximal diameter and volume of all left and right metastatic nodules.  Somewhat enlarged defect in the left lower lobe bronchus versus a postobstructive airspace disease. This too may be further characterized with PET CT versus direct visualization. · 8/19/2021 CT Abd/Pelvis A stable CT scan of the abdomen and pelvis. No finding to suggest neoplastic/metastatic disease. CA 27.29 [employer]  01/21/2021- 20.3  04/01/2021 33.5  06/03/2021 25.6  07/29/2021 26.0    Past Medical History:    Past Medical History:   Diagnosis Date    Back pain     Breast cancer ([address]     Breast cancer with lung mets    GERD (gastroesophageal reflux disease)     Hypertension     Neuropathy     Post chemo treatment neuropathy       Past Surgical History:    Past Surgical History:   Procedure Laterality Date    BREAST LUMPECTOMY Right     2006    COSMETIC SURGERY      Bilateral breast implants    HYSTERECTOMY, TOTAL ABDOMINAL  2015    JOINT REPLACEMENT      knee pain    MASTECTOMY, BILATERAL      Right 2013 & Left 2016       Social History:    Marital status, children: Single, 2 children-female  Smoking status: no  ETOH status: no  Profession: Disabled  Resides: [place], [place]  Recent trips: Moved from [address] [place] [address] López: no    Family History:   Family History   Problem Relation Age of Onset    Prostate Cancer Father        Current Hospital Medications:    Current Outpatient Medications   Medication Sig Dispense Refill    pantoprazole (PROTONIX) 40 MG tablet TAKE 1 TABLET BY MOUTH EVERY DAY 90 tablet 0    mupirocin (BACTROBAN) 2 % ointment Apply to bilateral nares BID for 5 days prior to surgery. 30 g 0    IBRANCE 125 MG tablet Take 1 tablet (125mg) by mouth once daily. 21 tablet 3    NARCAN 4 MG/0.1ML LIQD nasal spray       predniSONE (DELTASONE) 20 MG tablet Take 3 tablets by mouth daily x 2 days, 2 tabs daily x 2 days, then 1 tab daily x 2 days. 12 tablet 0    triamcinolone (KENALOG) 0.1 % cream Apply topically sparingly 2 times daily as needed for rash.  30 g 0    Fluticasone Furoate-Vilanterol (BREO ELLIPTA IN) Reactions    Clindamycin/Lincomycin Hives         Subjective   REVIEW OF SYSTEMS:   CONSTITUTIONAL: no fever, no night sweats, no fatigue;  HEENT: no blurring of vision, no double vision, no hearing difficulty, no tinnitus, no ulceration, no dysplasia, no epistaxis;   LUNGS: Productive cough, no hemoptysis, no wheeze,   shortness of breath;  CARDIOVASCULAR: no palpitation, no chest pain, no shortness of breath;  GI: no abdominal pain, no nausea, no vomiting, no diarrhea, no constipation;  PATRICK: no dysuria, no hematuria, no frequency or urgency, no nephrolithiasis;  MUSCULOSKELETAL: no joint pain, no swelling, no stiffness;  ENDOCRINE: no polyuria, no polydipsia, no cold or heat intolerance;  HEMATOLOGY: no easy bruising or bleeding, no history of clotting disorder;  DERMATOLOGY: no skin rash, no eczema, no pruritus;  PSYCHIATRY: no depression, no anxiety, no panic attacks, no suicidal ideation, no homicidal ideation;  NEUROLOGY: no syncope, no seizures, no numbness or tingling of hands, no numbness or tingling of feet, no paresis;     PHYSICAL EXAM:  /70   Pulse 97   Ht 5' 6\" (1.676 m)   Wt 179 lb 9.6 oz (81.5 kg)   SpO2 95%   BMI 28.99 kg/m²     CONSTITUTIONAL: Alert, appropriate, no acute distress  EYES: Non icteric, EOM intact, pupils equal round   ENT: Mucus membranes moist, no oral pharyngeal lesions, external inspection of ears and nose are normal.  NECK: Supple, no masses. No palpable thyroid mass  CHEST/LUNGS: CTA bilaterally, normal respiratory effort   CARDIOVASCULAR: RRR, no murmurs. No lower extremity edema  ABDOMEN: soft non-tender, active bowel sounds, no HSM. No palpable masses  EXTREMITIES: warm, full ROM in all 4 extremities, no focal weakness. SKIN: warm, dry with no rashes or lesions  LYMPH: No cervical, clavicular, axillary, or inguinal lymphadenopathy  NEUROLOGIC: follows commands, non focal   PSYCH: mood and affect appropriate.   Alert and oriented to time, place, person    LABORATORY RESULTS REVIEWED/ANALYZED BY ME:  Lab Results   Component Value Date    WBC 4.21 08/26/2021    HGB 12.2 08/26/2021    HCT 36.8 08/26/2021    .9 (H) 08/26/2021     08/26/2021     Lab Results   Component Value Date    NEUTROABS 2.03 08/26/2021       RADIOLOGY STUDIES REVIEWED BY ME:  8/19/2021 CT Chest  Left lower lobe pleural-based nodular 1.7 x 0.9 cm isindeterminate. PET/CT recommended. Feeding defect in the left lower lobe bronchus versus a postobstructive airspace disease. This too may be further characterized with PET CT versus direct visualization. 8/19/2021 CT Abd/Pelvis A stable CT scan of the abdomen and pelvis. No finding to suggest neoplastic/metastatic disease. My assessmentI personally reviewed the images of the CT chest.  Irregular shaped nodule in the left lower lobe close to the left lower lobe bronchus. Filling defect in the airway. ASSESSMENT:    No orders of the defined types were placed in this encounter. Dustin Cunningham was seen today for follow-up. Diagnoses and all orders for this visit:    Cough  -     Cancel: COVID-19; Future    Chest congestion  -     Cancel: COVID-19; Future    Nonintractable headache, unspecified chronicity pattern, unspecified headache type  -     Cancel: COVID-19; Future    Lung nodule    Care plan discussed with patient    Carcinoma of female breast, unspecified estrogen receptor status, unspecified laterality, unspecified site of breast ([facility]    Suspected COVID-19 virus infection      Metastatic breast cancer, hormone sensitive  2/25/2021discussed the results of CT chest abdomen pelvis. No evidence of disease progression. In fact, excellent response with no measurable metastatic disease at this time. 1/21/2021CA 15-3 = 23, CA 27-29 = 20.3. Currently Faslodex/Ibrance. 6/3/2021CA 15-323, CA 27-2925, CEA 1.8  Regimen:  Faslodex to 500 mg subcutaneous every 4 weeks.   Continue Ibrance days 949 q. 28 days    Clinical/laboratory assessment of toxicity for Ibrance      8/19/2021CT chest/abdomen/pelvisno evidence of metastatic disease. Findings of a pulmonary lung nodule in the left lower lobe    B12 deficiencyB12 levels 189. Recommended B12 2000 mcg p.o. daily. Will start B12 injections today 1000mcg with Faslodex. 3/1/2021- Methylmalonic Acid 171, Vitamin B12 >2000 Hold B12 at this time    Chemotherapy-induced neuropathycontinue gabapentin. Chemotherapy-induced anemialikely related to treatment. Hb 12.2    COVID-19 vaccination responsepatient has not been vaccinated. She has symptoms of pulmonary congestion today and cough. Refer to outpatient for stat COVID-19 test  Addendum: COVID-19 test negative    PLAN:  · Hold Faslodex today-patient will call to schedule   · Follow up with Dr. Merari Franco for PCP care  · CBC CMP CEA CA 15-3 CA 27-29 today  · Port flush 4 weeks  · Refer to pulmonary  · RTC with MD 4 weeks  · Continue Ibrance  · Outpatient for COVID-19 test    I, Deloris Guerrier, am scribing for Katelyn Jade MD. Electronically signed by Deloris Guerrier MA on 8/26/2021 at 4:18 PM CDT. I, Dr Chiquis Moralez, personally performed the services described in this documentation as scribed by Deloris Guerrier MA in my presence and is both accurate and complete. I have seen, examined and reviewed this patient medication list, appropriate labs and imaging studies. I reviewed relevant medical records and others physicians notes. I discussed the plans of care with the patient. I answered all the questions to the patients satisfaction. I have also reviewed the chief complaint (CC) and part of the history (History of Present Illness (HPI), Past Family Social History [facility]), or Review of Systems (ROS) and made changes when appropriated.        (Please note that portions of this note were completed with a voice recognition program. Efforts were made to edit the dictations but occasionally words are mis-transcribed.)      Jos Bhatia MD    08/26/21  3:46 PM

## 2021-08-26 ENCOUNTER — OFFICE VISIT (OUTPATIENT)
Dept: HEMATOLOGY | Age: 56
End: 2021-08-26
Payer: MEDICARE

## 2021-08-26 ENCOUNTER — HOSPITAL ENCOUNTER (OUTPATIENT)
Dept: INFUSION THERAPY | Age: 56
Discharge: HOME | End: 2021-08-26
Payer: MEDICARE

## 2021-08-26 VITALS
BODY MASS INDEX: 28.87 KG/M2 | HEIGHT: 66 IN | HEART RATE: 97 BPM | DIASTOLIC BLOOD PRESSURE: 70 MMHG | SYSTOLIC BLOOD PRESSURE: 120 MMHG | WEIGHT: 179.6 LBS | OXYGEN SATURATION: 95 %

## 2021-08-26 DIAGNOSIS — R51.9 NONINTRACTABLE HEADACHE, UNSPECIFIED CHRONICITY PATTERN, UNSPECIFIED HEADACHE TYPE: ICD-10-CM

## 2021-08-26 DIAGNOSIS — Z20.822 SUSPECTED COVID-19 VIRUS INFECTION: ICD-10-CM

## 2021-08-26 DIAGNOSIS — R91.1 LUNG NODULE: ICD-10-CM

## 2021-08-26 DIAGNOSIS — R91.1 LEFT LOWER LOBE PULMONARY NODULE: ICD-10-CM

## 2021-08-26 DIAGNOSIS — R05.9 COUGH: Primary | ICD-10-CM

## 2021-08-26 DIAGNOSIS — Z71.89 CARE PLAN DISCUSSED WITH PATIENT: ICD-10-CM

## 2021-08-26 DIAGNOSIS — C50.919 CARCINOMA OF FEMALE BREAST, UNSPECIFIED ESTROGEN RECEPTOR STATUS, UNSPECIFIED LATERALITY, UNSPECIFIED SITE OF BREAST (HCC): ICD-10-CM

## 2021-08-26 DIAGNOSIS — R09.89 CHEST CONGESTION: ICD-10-CM

## 2021-08-26 DIAGNOSIS — R93.89 ABNORMAL CT OF THE CHEST: ICD-10-CM

## 2021-08-26 DIAGNOSIS — R91.1 LUNG NODULE: Primary | ICD-10-CM

## 2021-08-26 LAB
BASOPHILS ABSOLUTE: 0.02 K/UL (ref 0.01–0.08)
BASOPHILS RELATIVE PERCENT: 0.5 % (ref 0.1–1.2)
EOSINOPHILS ABSOLUTE: 0.03 K/UL (ref 0.04–0.54)
EOSINOPHILS RELATIVE PERCENT: 0.7 % (ref 0.7–7)
HCT VFR BLD CALC: 36.8 % (ref 34.1–44.9)
HEMOGLOBIN: 12.2 G/DL (ref 11.2–15.7)
LYMPHOCYTES ABSOLUTE: 1.88 K/UL (ref 1.18–3.74)
LYMPHOCYTES RELATIVE PERCENT: 44.7 % (ref 19.3–53.1)
MCH RBC QN AUTO: 36.1 PG (ref 25.6–32.2)
MCHC RBC AUTO-ENTMCNC: 33.2 G/DL (ref 32.3–35.5)
MCV RBC AUTO: 108.9 FL (ref 79.4–94.8)
MONOCYTES ABSOLUTE: 0.25 K/UL (ref 0.24–0.82)
MONOCYTES RELATIVE PERCENT: 5.9 % (ref 4.7–12.5)
NEUTROPHILS ABSOLUTE: 2.03 K/UL (ref 1.56–6.13)
NEUTROPHILS RELATIVE PERCENT: 48.2 % (ref 34–71.1)
PDW BLD-RTO: 14.8 % (ref 11.7–14.4)
PLATELET # BLD: 206 K/UL (ref 182–369)
PMV BLD AUTO: 9.8 FL (ref 7.4–10.4)
RBC # BLD: 3.38 M/UL (ref 3.93–5.22)
WBC # BLD: 4.21 K/UL (ref 3.98–10.04)

## 2021-08-26 PROCEDURE — 3017F COLORECTAL CA SCREEN DOC REV: CPT | Performed by: INTERNAL MEDICINE

## 2021-08-26 PROCEDURE — G8427 DOCREV CUR MEDS BY ELIG CLIN: HCPCS | Performed by: INTERNAL MEDICINE

## 2021-08-26 PROCEDURE — G8417 CALC BMI ABV UP PARAM F/U: HCPCS | Performed by: INTERNAL MEDICINE

## 2021-08-26 PROCEDURE — 99212 OFFICE O/P EST SF 10 MIN: CPT

## 2021-08-26 PROCEDURE — 99215 OFFICE O/P EST HI 40 MIN: CPT | Performed by: INTERNAL MEDICINE

## 2021-08-26 PROCEDURE — 85025 COMPLETE CBC W/AUTO DIFF WBC: CPT

## 2021-08-26 PROCEDURE — 1036F TOBACCO NON-USER: CPT | Performed by: INTERNAL MEDICINE

## 2021-08-26 NOTE — PROGRESS NOTES
Call to pt with LISA left informing her that today's COVID test was negative and she can schedule an appointment for her Faslodex shots and port flush at anytime. Signer also informed her that Dr. Bárbara Decker wants her to be seen by Dr. Arthur Simmons due LLL lung nodule seen on 8/19 CT scan.

## 2021-08-27 ENCOUNTER — HOSPITAL ENCOUNTER (OUTPATIENT)
Dept: INFUSION THERAPY | Age: 56
Discharge: HOME | End: 2021-08-27
Payer: MEDICARE

## 2021-08-27 DIAGNOSIS — C50.919 CARCINOMA OF FEMALE BREAST, UNSPECIFIED ESTROGEN RECEPTOR STATUS, UNSPECIFIED LATERALITY, UNSPECIFIED SITE OF BREAST (HCC): Primary | ICD-10-CM

## 2021-08-27 DIAGNOSIS — E07.9 THYROID DISORDER: ICD-10-CM

## 2021-08-27 DIAGNOSIS — I87.8 POOR VENOUS ACCESS: ICD-10-CM

## 2021-08-27 LAB
ALBUMIN SERPL-MCNC: 4.2 G/DL (ref 3.5–5.2)
ALP BLD-CCNC: 98 U/L (ref 35–104)
ALT SERPL-CCNC: 9 U/L (ref 9–52)
ANION GAP SERPL CALCULATED.3IONS-SCNC: 8 MMOL/L (ref 7–19)
AST SERPL-CCNC: 38 U/L (ref 14–36)
BILIRUB SERPL-MCNC: 1.9 MG/DL (ref 0.2–1.3)
BUN BLDV-MCNC: 10 MG/DL (ref 7–17)
CALCIUM SERPL-MCNC: 8.9 MG/DL (ref 8.4–10.2)
CHLORIDE BLD-SCNC: 106 MMOL/L (ref 98–111)
CO2: 28 MMOL/L (ref 22–29)
CREAT SERPL-MCNC: 0.6 MG/DL (ref 0.5–1)
GFR NON-AFRICAN AMERICAN: >60
GLOBULIN: 3.3 G/DL
GLUCOSE BLD-MCNC: 96 MG/DL (ref 74–106)
POTASSIUM SERPL-SCNC: 4 MMOL/L (ref 3.5–5.1)
SODIUM BLD-SCNC: 142 MMOL/L (ref 137–145)
TOTAL PROTEIN: 7.5 G/DL (ref 6.3–8.2)
TSH SERPL DL<=0.05 MIU/L-ACNC: 1.82 UIU/ML (ref 0.47–4.68)

## 2021-08-27 PROCEDURE — 96401 CHEMO ANTI-NEOPL SQ/IM: CPT

## 2021-08-27 PROCEDURE — 80053 COMPREHEN METABOLIC PANEL: CPT

## 2021-08-27 PROCEDURE — 84443 ASSAY THYROID STIM HORMONE: CPT

## 2021-08-27 PROCEDURE — 2580000003 HC RX 258: Performed by: INTERNAL MEDICINE

## 2021-08-27 PROCEDURE — 96402 CHEMO HORMON ANTINEOPL SQ/IM: CPT

## 2021-08-27 PROCEDURE — 36415 COLL VENOUS BLD VENIPUNCTURE: CPT

## 2021-08-27 PROCEDURE — 6360000002 HC RX W HCPCS: Performed by: INTERNAL MEDICINE

## 2021-08-27 PROCEDURE — 96523 IRRIG DRUG DELIVERY DEVICE: CPT

## 2021-08-27 RX ORDER — LAMOTRIGINE 25 MG/1
250 TABLET ORAL ONCE
Status: COMPLETED | OUTPATIENT
Start: 2021-08-27 | End: 2021-08-27

## 2021-08-27 RX ORDER — SODIUM CHLORIDE 0.9 % (FLUSH) 0.9 %
10 SYRINGE (ML) INJECTION PRN
Status: DISCONTINUED | OUTPATIENT
Start: 2021-08-27 | End: 2021-08-28 | Stop reason: HOSPADM

## 2021-08-27 RX ORDER — SODIUM CHLORIDE 0.9 % (FLUSH) 0.9 %
10 SYRINGE (ML) INJECTION PRN
Status: CANCELLED | OUTPATIENT
Start: 2021-08-27

## 2021-08-27 RX ORDER — HEPARIN SODIUM (PORCINE) LOCK FLUSH IV SOLN 100 UNIT/ML 100 UNIT/ML
500 SOLUTION INTRAVENOUS PRN
Status: DISCONTINUED | OUTPATIENT
Start: 2021-08-27 | End: 2021-08-28 | Stop reason: HOSPADM

## 2021-08-27 RX ORDER — HEPARIN SODIUM (PORCINE) LOCK FLUSH IV SOLN 100 UNIT/ML 100 UNIT/ML
500 SOLUTION INTRAVENOUS PRN
Status: CANCELLED | OUTPATIENT
Start: 2021-08-27

## 2021-08-27 RX ORDER — SODIUM CHLORIDE 0.9 % (FLUSH) 0.9 %
20 SYRINGE (ML) INJECTION PRN
Status: CANCELLED | OUTPATIENT
Start: 2021-08-27

## 2021-08-27 RX ADMIN — SODIUM CHLORIDE, PRESERVATIVE FREE 10 ML: 5 INJECTION INTRAVENOUS at 11:28

## 2021-08-27 RX ADMIN — FULVESTRANT 250 MG: 50 INJECTION, SOLUTION INTRAMUSCULAR at 11:29

## 2021-08-27 RX ADMIN — HEPARIN 500 UNITS: 100 SYRINGE at 11:28

## 2021-09-02 ENCOUNTER — OFFICE VISIT (OUTPATIENT)
Dept: PULMONOLOGY | Age: 56
End: 2021-09-02
Payer: MEDICARE

## 2021-09-02 VITALS
BODY MASS INDEX: 28.28 KG/M2 | OXYGEN SATURATION: 99 % | HEART RATE: 107 BPM | RESPIRATION RATE: 18 BRPM | SYSTOLIC BLOOD PRESSURE: 118 MMHG | DIASTOLIC BLOOD PRESSURE: 82 MMHG | HEIGHT: 66 IN | TEMPERATURE: 96.9 F | WEIGHT: 176 LBS

## 2021-09-02 DIAGNOSIS — R06.2 WHEEZING: ICD-10-CM

## 2021-09-02 DIAGNOSIS — J20.9 ACUTE PURULENT BRONCHITIS: ICD-10-CM

## 2021-09-02 DIAGNOSIS — C50.919 CARCINOMA OF FEMALE BREAST, UNSPECIFIED ESTROGEN RECEPTOR STATUS, UNSPECIFIED LATERALITY, UNSPECIFIED SITE OF BREAST (HCC): ICD-10-CM

## 2021-09-02 DIAGNOSIS — R91.1 LUNG NODULE: Primary | ICD-10-CM

## 2021-09-02 DIAGNOSIS — Z90.13 STATUS POST BILATERAL MASTECTOMY: ICD-10-CM

## 2021-09-02 PROCEDURE — 99204 OFFICE O/P NEW MOD 45 MIN: CPT | Performed by: INTERNAL MEDICINE

## 2021-09-02 PROCEDURE — 3017F COLORECTAL CA SCREEN DOC REV: CPT | Performed by: INTERNAL MEDICINE

## 2021-09-02 PROCEDURE — G8417 CALC BMI ABV UP PARAM F/U: HCPCS | Performed by: INTERNAL MEDICINE

## 2021-09-02 PROCEDURE — 1036F TOBACCO NON-USER: CPT | Performed by: INTERNAL MEDICINE

## 2021-09-02 PROCEDURE — G8427 DOCREV CUR MEDS BY ELIG CLIN: HCPCS | Performed by: INTERNAL MEDICINE

## 2021-09-02 RX ORDER — FLUTICASONE FUROATE AND VILANTEROL TRIFENATATE 100; 25 UG/1; UG/1
1 POWDER RESPIRATORY (INHALATION) DAILY
Qty: 1 EACH | Refills: 5 | Status: SHIPPED | OUTPATIENT
Start: 2021-09-02 | End: 2021-12-23

## 2021-09-02 RX ORDER — ALBUTEROL SULFATE 90 UG/1
2 AEROSOL, METERED RESPIRATORY (INHALATION) 4 TIMES DAILY PRN
Qty: 18 G | Refills: 5 | Status: SHIPPED | OUTPATIENT
Start: 2021-09-02 | End: 2021-09-02 | Stop reason: SDUPTHER

## 2021-09-02 RX ORDER — PREDNISONE 10 MG/1
TABLET ORAL
Qty: 35 TABLET | Refills: 0 | Status: ON HOLD | OUTPATIENT
Start: 2021-09-02 | End: 2021-09-17 | Stop reason: ALTCHOICE

## 2021-09-02 RX ORDER — CEFUROXIME AXETIL 500 MG/1
500 TABLET ORAL 2 TIMES DAILY
Qty: 14 TABLET | Refills: 0 | Status: SHIPPED | OUTPATIENT
Start: 2021-09-02 | End: 2021-09-09

## 2021-09-02 ASSESSMENT — ENCOUNTER SYMPTOMS
SHORTNESS OF BREATH: 1
CHEST TIGHTNESS: 0
APNEA: 0
ABDOMINAL PAIN: 0
BACK PAIN: 0
WHEEZING: 0
ANAL BLEEDING: 0
COUGH: 1
RHINORRHEA: 0
ABDOMINAL DISTENTION: 0

## 2021-09-02 NOTE — PROGRESS NOTES
Samples of this drug (Breo) were given to the patient, quantity 2, Lot Number [id number] exp 2/22 and [id number] exp 7/22.

## 2021-09-02 NOTE — PROGRESS NOTES
Received call from [facility] pharmacy stating patient's insurance requires brand name only. Resent to pharmacy.

## 2021-09-02 NOTE — PROGRESS NOTES
Pulmonary and Sleep Medicine    Dae Rivas (:  1965) is a 49 y.o. female,New patient, here for evaluation of the following chief complaint(s):  New Patient (Left lower lobe nodule, Abnormal CT of the chest)      ASSESSMENT/PLAN:  1. Lung nodule  -     CT CHEST W CONTRAST; Future  2. Acute purulent bronchitis  -     predniSONE (DELTASONE) 10 MG tablet; 40 mg a day and taper by 10 mg every third day to off, Disp-35 tablet, R-0Normal  -     cefUROXime (CEFTIN) 500 MG tablet; Take 1 tablet by mouth 2 times daily for 7 days, Disp-14 tablet, R-0Normal  3. Carcinoma of female breast, unspecified estrogen receptor status, unspecified laterality, unspecified site of breast [facility]  4. Status post bilateral mastectomy and reconstruction   5. Wheezing  -     fluticasone-vilanterol (BREO ELLIPTA) 100-25 MCG/INH AEPB inhaler; Inhale 1 puff into the lungs daily, Disp-1 each, R-5Normal        Lung nodule of unclear etiology at this time. Possible but less likely single metastatic focus. She did have metastatic disease in her lung previously that was FDG negative on PET scan. That subsided with treatment. It is likely that the findings on the CT might be related to her acute respiratory symptoms. We will treat her for purulent bronchitis at this time. Repeat CT of the chest in 1 month. Reevaluate her at that time. Resume breo. Otis bAarca MD, [facility]    Return in about 4 weeks (around 2021). SUBJECTIVE/OBJECTIVE:  She is here for evaluation of lung nodule. She does have a history of breast cancer with metastatic disease to the lung on multiple recurrent episodes in her axilla. She had a CT of the chest on the  of this month for evaluation of a cough. She was found to have a 1.7 cm x 2.9 cm left lower lobe nodule. She did use rescue inhaler and felt it helped her in the past.  Also she was on Breo in the past and she felt it helped her symptoms. Denies smoking.   Denies unintentional weight loss. No fever. Tested negative for Covid recently. Prior to Visit Medications    Medication Sig Taking? Authorizing Provider   pantoprazole (PROTONIX) 40 MG tablet TAKE 1 TABLET BY MOUTH EVERY DAY Yes Pavel Moctezuma MD   mupirocin (BACTROBAN) 2 % ointment Apply to bilateral nares BID for 5 days prior to surgery. Yes Jose Burger PA-C   IBRANCE 125 MG tablet Take 1 tablet (125mg) by mouth once daily. Yes Pavel Moctezuma MD   NARCAN 4 MG/0.1ML LIQD nasal spray  Yes Historical Provider, MD   potassium chloride (KLOR-CON M) 20 MEQ extended release tablet Take 1 tablet by mouth daily for 7 days Yes Pavel Moctezuma MD   predniSONE (DELTASONE) 20 MG tablet Take 3 tablets by mouth daily x 2 days, 2 tabs daily x 2 days, then 1 tab daily x 2 days. Yes BRITANY Sargent   famotidine (PEPCID) 20 MG tablet Take 1 tablet by mouth 2 times daily for 14 days Yes BRITANY Sargent   triamcinolone (KENALOG) 0.1 % cream Apply topically sparingly 2 times daily as needed for rash. Yes [employer] BRITANY Perez   gabapentin (NEURONTIN) 300 MG capsule TAKE 2 CAPSULES BY MOUTH 3 TIMES DAILY FOR 30 DAYS. Yes Pavel Moctezuma MD   Fluticasone Furoate-Vilanterol (BREO ELLIPTA IN) Inhale into the lungs Yes Historical Provider, MD   cyclobenzaprine (FLEXERIL) 10 MG tablet Take 10 mg by mouth 3 times daily as needed for Muscle spasms Yes Historical Provider, MD   guaiFENesin-codeine (GUAIFENESIN AC) 100-10 MG/5ML liquid Take 5 mLs by mouth 3 times daily as needed for Cough. Yes Historical Provider, MD   HYDROcodone-acetaminophen (NORCO) 7.5-325 MG per tablet Take 1 tablet by mouth every 6 hours as needed for Pain.  Yes Historical Provider, MD   montelukast (SINGULAIR) 10 MG tablet Take 10 mg by mouth nightly Yes Historical Provider, MD   Albuterol Sulfate (PROAIR HFA IN) Inhale into the lungs Yes Historical Provider, MD   benzonatate (TESSALON) 100 MG capsule Take 100 mg by mouth 3 times daily as needed for Cough Yes Historical Provider, MD   Cholecalciferol (VITAMIN D3) 125 MCG (5000 UT) TABS Take by mouth Yes Historical Provider, MD   lisinopril (PRINIVIL;ZESTRIL) 10 MG tablet Take 10 mg by mouth daily Yes Historical Provider, MD   acyclovir (ZOVIRAX) 800 MG tablet Take 800 mg by mouth 2 times daily Yes Historical Provider, MD   ondansetron (ZOFRAN) 4 MG tablet Take 4 mg by mouth every 8 hours as needed for Nausea or Vomiting Yes Historical Provider, MD   venlafaxine (EFFEXOR) 37.5 MG tablet Take 37.5 mg by mouth 3 times daily Yes Historical Provider, MD   diclofenac (VOLTAREN) 0.1 % ophthalmic solution 1 drop 4 times daily Yes Historical Provider, MD   alclomethasone (ACLOVATE) 0.05 % cream Apply topically 2 times daily Apply topically 2 times daily. Yes Historical Provider, MD   meloxicam (MOBIC) 15 MG tablet Take 15 mg by mouth daily Yes Historical Provider, MD   ciprofloxacin (CILOXAN) 0.3 % ophthalmic solution 1 drop every 2 hours Yes Historical Provider, MD   triamcinolone (KENALOG) 0.1 % cream Apply topically 2 times daily Apply topically 2 times daily. Yes Historical Provider, MD   chlorhexidine (PERIDEX) 0.12 % solution Take 15 mLs by mouth 2 times daily Yes Historical Provider, MD        Review of Systems   Constitutional: Negative for activity change, appetite change, chills, diaphoresis and fatigue. HENT: Negative for congestion, dental problem, drooling, ear discharge, postnasal drip and rhinorrhea. Eyes: Negative for visual disturbance. Respiratory: Positive for cough and shortness of breath. Negative for apnea, chest tightness and wheezing. Gastrointestinal: Negative for abdominal distention, abdominal pain and anal bleeding. Endocrine: Negative for cold intolerance, heat intolerance and polydipsia. Genitourinary: Negative for difficulty urinating, dysuria, enuresis and flank pain. Musculoskeletal: Negative for arthralgias, back pain and gait problem. Allergic/Immunologic: Negative for environmental allergies. Neurological: Negative for dizziness, facial asymmetry, light-headedness and headaches. Vitals:    09/02/21 0916   BP: 118/82   Pulse: 107   Resp: 18   Temp: 96.9 °F (36.1 °C)   SpO2: 99%     Physical Exam  Vitals reviewed. Constitutional:       Appearance: Normal appearance. HENT:      Head: Normocephalic and atraumatic. Nose: Nose normal.   Eyes:      Extraocular Movements: Extraocular movements intact. Conjunctiva/sclera: Conjunctivae normal.   Cardiovascular:      Rate and Rhythm: Normal rate and regular rhythm. Heart sounds: No murmur heard. No friction rub. Pulmonary:      Effort: Pulmonary effort is normal. No respiratory distress. Breath sounds: Normal breath sounds. No stridor. No wheezing, rhonchi or rales. Abdominal:      General: There is no distension. Palpations: There is no mass. Tenderness: There is no abdominal tenderness. There is no guarding or rebound. Musculoskeletal:      Cervical back: Normal range of motion and neck supple. Neurological:      Mental Status: She is alert and oriented to person, place, and time. This note was generated used a voice recognition software. Errors in voice recognition may have occurred. An electronic signature was used to authenticate this note.     --Chico Garza MD

## 2021-09-08 ENCOUNTER — HOSPITAL ENCOUNTER (OUTPATIENT)
Dept: NON INVASIVE DIAGNOSTICS | Age: 56
Discharge: HOME | End: 2021-09-08
Payer: MEDICARE

## 2021-09-08 DIAGNOSIS — Z01.818 PREOP TESTING: ICD-10-CM

## 2021-09-08 LAB
EKG P AXIS: 47 DEGREES
EKG P-R INTERVAL: 126 MS
EKG Q-T INTERVAL: 356 MS
EKG QRS DURATION: 72 MS
EKG QTC CALCULATION (BAZETT): 392 MS
EKG T AXIS: 24 DEGREES

## 2021-09-08 PROCEDURE — 93005 ELECTROCARDIOGRAM TRACING: CPT | Performed by: ANESTHESIOLOGY

## 2021-09-13 ENCOUNTER — OFFICE VISIT (OUTPATIENT)
Age: 56
End: 2021-09-13

## 2021-09-13 DIAGNOSIS — Z11.59 SCREENING FOR VIRAL DISEASE: Primary | ICD-10-CM

## 2021-09-13 LAB — SARS-COV-2, PCR: NOT DETECTED

## 2021-09-13 PROCEDURE — 99999 PR OFFICE/OUTPT VISIT,PROCEDURE ONLY: CPT | Performed by: NURSE PRACTITIONER

## 2021-09-14 ENCOUNTER — HOSPITAL ENCOUNTER (OUTPATIENT)
Dept: PREADMISSION TESTING | Age: 56
Discharge: HOME | End: 2021-09-18
Payer: MEDICARE

## 2021-09-14 VITALS — WEIGHT: 179 LBS | HEIGHT: 66 IN | BODY MASS INDEX: 28.77 KG/M2

## 2021-09-14 RX ORDER — CELECOXIB 200 MG/1
200 CAPSULE ORAL ONCE
Status: CANCELLED | OUTPATIENT
Start: 2021-09-17

## 2021-09-14 RX ORDER — GABAPENTIN 300 MG/1
300 CAPSULE ORAL ONCE
Status: CANCELLED | OUTPATIENT
Start: 2021-09-17

## 2021-09-14 RX ORDER — ACETAMINOPHEN 325 MG/1
975 TABLET ORAL ONCE
Status: CANCELLED | OUTPATIENT
Start: 2021-09-17

## 2021-09-17 ENCOUNTER — HOSPITAL ENCOUNTER (OUTPATIENT)
Age: 56
Setting detail: OUTPATIENT SURGERY
Discharge: HOME | End: 2021-09-17
Attending: SURGERY | Admitting: SURGERY
Payer: MEDICARE

## 2021-09-17 ENCOUNTER — ANESTHESIA (OUTPATIENT)
Dept: OPERATING ROOM | Age: 56
End: 2021-09-17
Payer: MEDICARE

## 2021-09-17 ENCOUNTER — ANESTHESIA EVENT (OUTPATIENT)
Dept: OPERATING ROOM | Age: 56
End: 2021-09-17
Payer: MEDICARE

## 2021-09-17 VITALS
DIASTOLIC BLOOD PRESSURE: 56 MMHG | OXYGEN SATURATION: 98 % | SYSTOLIC BLOOD PRESSURE: 114 MMHG | RESPIRATION RATE: 11 BRPM | TEMPERATURE: 97 F

## 2021-09-17 VITALS
TEMPERATURE: 97.3 F | OXYGEN SATURATION: 98 % | DIASTOLIC BLOOD PRESSURE: 88 MMHG | HEART RATE: 79 BPM | RESPIRATION RATE: 18 BRPM | SYSTOLIC BLOOD PRESSURE: 137 MMHG

## 2021-09-17 DIAGNOSIS — Z98.890 STATUS POST BILATERAL BREAST RECONSTRUCTION: Primary | ICD-10-CM

## 2021-09-17 PROCEDURE — 2580000003 HC RX 258: Performed by: ANESTHESIOLOGY

## 2021-09-17 PROCEDURE — 2720000010 HC SURG SUPPLY STERILE: Performed by: SURGERY

## 2021-09-17 PROCEDURE — 7100000000 HC PACU RECOVERY - FIRST 15 MIN: Performed by: SURGERY

## 2021-09-17 PROCEDURE — 2580000003 HC RX 258: Performed by: SURGERY

## 2021-09-17 PROCEDURE — 3600000015 HC SURGERY LEVEL 5 ADDTL 15MIN: Performed by: SURGERY

## 2021-09-17 PROCEDURE — 6360000002 HC RX W HCPCS

## 2021-09-17 PROCEDURE — C1789 PROSTHESIS, BREAST, IMP: HCPCS | Performed by: SURGERY

## 2021-09-17 PROCEDURE — 2500000003 HC RX 250 WO HCPCS

## 2021-09-17 PROCEDURE — 19380 REVJ RECONSTRUCTED BREAST: CPT | Performed by: PHYSICIAN ASSISTANT

## 2021-09-17 PROCEDURE — 7100000001 HC PACU RECOVERY - ADDTL 15 MIN: Performed by: SURGERY

## 2021-09-17 PROCEDURE — 19340 INSJ BREAST IMPLT SM D MAST: CPT | Performed by: SURGERY

## 2021-09-17 PROCEDURE — 6360000002 HC RX W HCPCS: Performed by: SURGERY

## 2021-09-17 PROCEDURE — 19340 INSJ BREAST IMPLT SM D MAST: CPT | Performed by: PHYSICIAN ASSISTANT

## 2021-09-17 PROCEDURE — 7100000011 HC PHASE II RECOVERY - ADDTL 15 MIN: Performed by: SURGERY

## 2021-09-17 PROCEDURE — 2709999900 HC NON-CHARGEABLE SUPPLY: Performed by: SURGERY

## 2021-09-17 PROCEDURE — 3700000000 HC ANESTHESIA ATTENDED CARE: Performed by: SURGERY

## 2021-09-17 PROCEDURE — C1713 ANCHOR/SCREW BN/BN,TIS/BN: HCPCS | Performed by: SURGERY

## 2021-09-17 PROCEDURE — 3600000005 HC SURGERY LEVEL 5 BASE: Performed by: SURGERY

## 2021-09-17 PROCEDURE — 7100000010 HC PHASE II RECOVERY - FIRST 15 MIN: Performed by: SURGERY

## 2021-09-17 PROCEDURE — 19380 REVJ RECONSTRUCTED BREAST: CPT | Performed by: SURGERY

## 2021-09-17 PROCEDURE — 2500000003 HC RX 250 WO HCPCS: Performed by: SURGERY

## 2021-09-17 PROCEDURE — 6370000000 HC RX 637 (ALT 250 FOR IP): Performed by: ANESTHESIOLOGY

## 2021-09-17 PROCEDURE — 3700000001 HC ADD 15 MINUTES (ANESTHESIA): Performed by: SURGERY

## 2021-09-17 PROCEDURE — 6370000000 HC RX 637 (ALT 250 FOR IP): Performed by: SURGERY

## 2021-09-17 DEVICE — IMPLANTABLE DEVICE: Type: IMPLANTABLE DEVICE | Site: BREAST | Status: FUNCTIONAL

## 2021-09-17 RX ORDER — PROMETHAZINE HYDROCHLORIDE 25 MG/ML
6.25 INJECTION, SOLUTION INTRAMUSCULAR; INTRAVENOUS
Status: DISCONTINUED | OUTPATIENT
Start: 2021-09-17 | End: 2021-09-17 | Stop reason: HOSPADM

## 2021-09-17 RX ORDER — PROPOFOL 10 MG/ML
INJECTION, EMULSION INTRAVENOUS PRN
Status: DISCONTINUED | OUTPATIENT
Start: 2021-09-17 | End: 2021-09-17 | Stop reason: SDUPTHER

## 2021-09-17 RX ORDER — CELECOXIB 200 MG/1
200 CAPSULE ORAL ONCE
Status: COMPLETED | OUTPATIENT
Start: 2021-09-17 | End: 2021-09-17

## 2021-09-17 RX ORDER — ONDANSETRON 2 MG/ML
INJECTION INTRAMUSCULAR; INTRAVENOUS PRN
Status: DISCONTINUED | OUTPATIENT
Start: 2021-09-17 | End: 2021-09-17 | Stop reason: SDUPTHER

## 2021-09-17 RX ORDER — SODIUM CHLORIDE, SODIUM LACTATE, POTASSIUM CHLORIDE, CALCIUM CHLORIDE 600; 310; 30; 20 MG/100ML; MG/100ML; MG/100ML; MG/100ML
INJECTION, SOLUTION INTRAVENOUS CONTINUOUS
Status: DISCONTINUED | OUTPATIENT
Start: 2021-09-17 | End: 2021-09-17 | Stop reason: HOSPADM

## 2021-09-17 RX ORDER — LABETALOL HYDROCHLORIDE 5 MG/ML
5 INJECTION, SOLUTION INTRAVENOUS EVERY 10 MIN PRN
Status: DISCONTINUED | OUTPATIENT
Start: 2021-09-17 | End: 2021-09-17 | Stop reason: HOSPADM

## 2021-09-17 RX ORDER — PROPOFOL 10 MG/ML
INJECTION, EMULSION INTRAVENOUS CONTINUOUS PRN
Status: DISCONTINUED | OUTPATIENT
Start: 2021-09-17 | End: 2021-09-17 | Stop reason: SDUPTHER

## 2021-09-17 RX ORDER — LIDOCAINE HYDROCHLORIDE 10 MG/ML
1 INJECTION, SOLUTION EPIDURAL; INFILTRATION; INTRACAUDAL; PERINEURAL
Status: DISCONTINUED | OUTPATIENT
Start: 2021-09-17 | End: 2021-09-17 | Stop reason: HOSPADM

## 2021-09-17 RX ORDER — MORPHINE SULFATE 4 MG/ML
4 INJECTION, SOLUTION INTRAMUSCULAR; INTRAVENOUS
Status: DISCONTINUED | OUTPATIENT
Start: 2021-09-17 | End: 2021-09-17 | Stop reason: HOSPADM

## 2021-09-17 RX ORDER — FENTANYL CITRATE 50 UG/ML
50 INJECTION, SOLUTION INTRAMUSCULAR; INTRAVENOUS
Status: DISCONTINUED | OUTPATIENT
Start: 2021-09-17 | End: 2021-09-17 | Stop reason: HOSPADM

## 2021-09-17 RX ORDER — SODIUM CHLORIDE 0.9 % (FLUSH) 0.9 %
5-40 SYRINGE (ML) INJECTION PRN
Status: DISCONTINUED | OUTPATIENT
Start: 2021-09-17 | End: 2021-09-17 | Stop reason: HOSPADM

## 2021-09-17 RX ORDER — HYDROCODONE BITARTRATE AND ACETAMINOPHEN 10; 325 MG/1; MG/1
1 TABLET ORAL EVERY 6 HOURS PRN
Qty: 12 TABLET | Refills: 0 | Status: ON HOLD | OUTPATIENT
Start: 2021-09-17 | End: 2021-10-12 | Stop reason: HOSPADM

## 2021-09-17 RX ORDER — SODIUM CHLORIDE 9 MG/ML
25 INJECTION, SOLUTION INTRAVENOUS PRN
Status: DISCONTINUED | OUTPATIENT
Start: 2021-09-17 | End: 2021-09-17 | Stop reason: HOSPADM

## 2021-09-17 RX ORDER — GABAPENTIN 300 MG/1
300 CAPSULE ORAL ONCE
Status: COMPLETED | OUTPATIENT
Start: 2021-09-17 | End: 2021-09-17

## 2021-09-17 RX ORDER — FENTANYL CITRATE 50 UG/ML
INJECTION, SOLUTION INTRAMUSCULAR; INTRAVENOUS PRN
Status: DISCONTINUED | OUTPATIENT
Start: 2021-09-17 | End: 2021-09-17 | Stop reason: SDUPTHER

## 2021-09-17 RX ORDER — ENALAPRILAT 2.5 MG/2ML
1.25 INJECTION INTRAVENOUS
Status: DISCONTINUED | OUTPATIENT
Start: 2021-09-17 | End: 2021-09-17 | Stop reason: HOSPADM

## 2021-09-17 RX ORDER — MEPERIDINE HYDROCHLORIDE 25 MG/ML
12.5 INJECTION INTRAMUSCULAR; INTRAVENOUS; SUBCUTANEOUS EVERY 5 MIN PRN
Status: DISCONTINUED | OUTPATIENT
Start: 2021-09-17 | End: 2021-09-17 | Stop reason: HOSPADM

## 2021-09-17 RX ORDER — LIDOCAINE HYDROCHLORIDE 10 MG/ML
INJECTION, SOLUTION EPIDURAL; INFILTRATION; INTRACAUDAL; PERINEURAL PRN
Status: DISCONTINUED | OUTPATIENT
Start: 2021-09-17 | End: 2021-09-17 | Stop reason: SDUPTHER

## 2021-09-17 RX ORDER — ACETAMINOPHEN 325 MG/1
975 TABLET ORAL ONCE
Status: COMPLETED | OUTPATIENT
Start: 2021-09-17 | End: 2021-09-17

## 2021-09-17 RX ORDER — HYDROCODONE BITARTRATE AND ACETAMINOPHEN 7.5; 325 MG/1; MG/1
1 TABLET ORAL ONCE
Status: COMPLETED | OUTPATIENT
Start: 2021-09-17 | End: 2021-09-17

## 2021-09-17 RX ORDER — SCOLOPAMINE TRANSDERMAL SYSTEM 1 MG/1
1 PATCH, EXTENDED RELEASE TRANSDERMAL ONCE
Status: DISCONTINUED | OUTPATIENT
Start: 2021-09-17 | End: 2021-09-17 | Stop reason: HOSPADM

## 2021-09-17 RX ORDER — HYDROMORPHONE HYDROCHLORIDE 1 MG/ML
0.25 INJECTION, SOLUTION INTRAMUSCULAR; INTRAVENOUS; SUBCUTANEOUS EVERY 5 MIN PRN
Status: DISCONTINUED | OUTPATIENT
Start: 2021-09-17 | End: 2021-09-17 | Stop reason: HOSPADM

## 2021-09-17 RX ORDER — DIPHENHYDRAMINE HYDROCHLORIDE 50 MG/ML
12.5 INJECTION INTRAMUSCULAR; INTRAVENOUS
Status: DISCONTINUED | OUTPATIENT
Start: 2021-09-17 | End: 2021-09-17 | Stop reason: HOSPADM

## 2021-09-17 RX ORDER — HYDROMORPHONE HYDROCHLORIDE 1 MG/ML
0.5 INJECTION, SOLUTION INTRAMUSCULAR; INTRAVENOUS; SUBCUTANEOUS EVERY 5 MIN PRN
Status: DISCONTINUED | OUTPATIENT
Start: 2021-09-17 | End: 2021-09-17 | Stop reason: HOSPADM

## 2021-09-17 RX ORDER — METOCLOPRAMIDE HYDROCHLORIDE 5 MG/ML
10 INJECTION INTRAMUSCULAR; INTRAVENOUS
Status: DISCONTINUED | OUTPATIENT
Start: 2021-09-17 | End: 2021-09-17 | Stop reason: HOSPADM

## 2021-09-17 RX ORDER — MORPHINE SULFATE 4 MG/ML
4 INJECTION, SOLUTION INTRAMUSCULAR; INTRAVENOUS EVERY 5 MIN PRN
Status: DISCONTINUED | OUTPATIENT
Start: 2021-09-17 | End: 2021-09-17 | Stop reason: HOSPADM

## 2021-09-17 RX ORDER — MIDAZOLAM HYDROCHLORIDE 1 MG/ML
2 INJECTION INTRAMUSCULAR; INTRAVENOUS
Status: DISCONTINUED | OUTPATIENT
Start: 2021-09-17 | End: 2021-09-17 | Stop reason: HOSPADM

## 2021-09-17 RX ORDER — SODIUM CHLORIDE 0.9 % (FLUSH) 0.9 %
5-40 SYRINGE (ML) INJECTION EVERY 12 HOURS SCHEDULED
Status: DISCONTINUED | OUTPATIENT
Start: 2021-09-17 | End: 2021-09-17 | Stop reason: HOSPADM

## 2021-09-17 RX ORDER — MORPHINE SULFATE 2 MG/ML
2 INJECTION, SOLUTION INTRAMUSCULAR; INTRAVENOUS EVERY 5 MIN PRN
Status: DISCONTINUED | OUTPATIENT
Start: 2021-09-17 | End: 2021-09-17 | Stop reason: HOSPADM

## 2021-09-17 RX ORDER — HYDRALAZINE HYDROCHLORIDE 20 MG/ML
5 INJECTION INTRAMUSCULAR; INTRAVENOUS EVERY 10 MIN PRN
Status: DISCONTINUED | OUTPATIENT
Start: 2021-09-17 | End: 2021-09-17 | Stop reason: HOSPADM

## 2021-09-17 RX ADMIN — FENTANYL CITRATE 50 MCG: 50 INJECTION, SOLUTION INTRAMUSCULAR; INTRAVENOUS at 10:12

## 2021-09-17 RX ADMIN — PROPOFOL 50 MG: 10 INJECTION, EMULSION INTRAVENOUS at 10:25

## 2021-09-17 RX ADMIN — HYDROCODONE BITARTRATE AND ACETAMINOPHEN 1 TABLET: 7.5; 325 TABLET ORAL at 13:56

## 2021-09-17 RX ADMIN — GABAPENTIN 300 MG: 300 CAPSULE ORAL at 09:33

## 2021-09-17 RX ADMIN — LIDOCAINE HYDROCHLORIDE 50 MG: 10 INJECTION, SOLUTION EPIDURAL; INFILTRATION; INTRACAUDAL; PERINEURAL at 10:12

## 2021-09-17 RX ADMIN — PHENYLEPHRINE HYDROCHLORIDE 80 MCG: 10 INJECTION INTRAVENOUS at 10:40

## 2021-09-17 RX ADMIN — CELECOXIB 200 MG: 200 CAPSULE ORAL at 09:33

## 2021-09-17 RX ADMIN — FENTANYL CITRATE 50 MCG: 50 INJECTION, SOLUTION INTRAMUSCULAR; INTRAVENOUS at 10:16

## 2021-09-17 RX ADMIN — PROPOFOL 180 MCG/KG/MIN: 10 INJECTION, EMULSION INTRAVENOUS at 10:12

## 2021-09-17 RX ADMIN — SODIUM CHLORIDE, SODIUM LACTATE, POTASSIUM CHLORIDE, AND CALCIUM CHLORIDE: 600; 310; 30; 20 INJECTION, SOLUTION INTRAVENOUS at 09:33

## 2021-09-17 RX ADMIN — Medication 2000 MG: at 10:15

## 2021-09-17 RX ADMIN — PHENYLEPHRINE HYDROCHLORIDE 80 MCG: 10 INJECTION INTRAVENOUS at 10:46

## 2021-09-17 RX ADMIN — ONDANSETRON HYDROCHLORIDE 4 MG: 2 INJECTION, SOLUTION INTRAMUSCULAR; INTRAVENOUS at 10:16

## 2021-09-17 RX ADMIN — ACETAMINOPHEN 975 MG: 325 TABLET ORAL at 09:33

## 2021-09-17 RX ADMIN — HYDROMORPHONE HYDROCHLORIDE 0.5 MG: 1 INJECTION, SOLUTION INTRAMUSCULAR; INTRAVENOUS; SUBCUTANEOUS at 10:28

## 2021-09-17 RX ADMIN — HYDROMORPHONE HYDROCHLORIDE 0.5 MG: 1 INJECTION, SOLUTION INTRAMUSCULAR; INTRAVENOUS; SUBCUTANEOUS at 12:23

## 2021-09-17 RX ADMIN — SODIUM CHLORIDE, SODIUM LACTATE, POTASSIUM CHLORIDE, AND CALCIUM CHLORIDE: 600; 310; 30; 20 INJECTION, SOLUTION INTRAVENOUS at 12:12

## 2021-09-17 ASSESSMENT — PAIN SCALES - GENERAL
PAINLEVEL_OUTOF10: 8
PAINLEVEL_OUTOF10: 7
PAINLEVEL_OUTOF10: 0
PAINLEVEL_OUTOF10: 0
PAINLEVEL_OUTOF10: 8
PAINLEVEL_OUTOF10: 6

## 2021-09-17 ASSESSMENT — LIFESTYLE VARIABLES: SMOKING_STATUS: 0

## 2021-09-17 NOTE — ANESTHESIA POSTPROCEDURE EVALUATION
Department of Anesthesiology  Postprocedure Note    Patient: Dorie Cabello  MRN: [id number]  YOB: 1965  Date of evaluation: 9/17/2021  Time:  12:28 PM     Procedure Summary     Date: 09/17/21 Room / Location: [address]    Anesthesia Start: 1008 Anesthesia Stop: 6126    Procedure: Revision left reconstructed breast, implant (Left Breast) Diagnosis: (C50.919, Z90.13)    Surgeons: Brandyn Wilson MD Responsible Provider: BRITANY Khan CRNA    Anesthesia Type: general ASA Status: 2          Anesthesia Type: general    Dena Phase I:      Dena Phase II:      Last vitals: Reviewed and per EMR flowsheets.        Anesthesia Post Evaluation    Patient location during evaluation: PACU  Patient participation: complete - patient participated  Level of consciousness: awake and alert  Pain score: 0  Airway patency: patent  Nausea & Vomiting: no nausea and no vomiting  Complications: no  Cardiovascular status: hemodynamically stable and blood pressure returned to baseline  Respiratory status: acceptable and room air  Hydration status: stable

## 2021-09-17 NOTE — BRIEF OP NOTE
Brief Postoperative Note      DATE OF PROCEDURE: 9/17/2021     SURGEON: Manpreet Bangura MD    PREOPERATIVE DIAGNOSIS: C50.919, Z90.13    POSTOPERATIVE DIAGNOSIS: Same     OPERATION: Procedure(s):  Revision left reconstructed breast, implant liposuction lateral breast, crescent mastopexy    ANESTHESIA: General    ESTIMATED BLOOD LOSS: Minimal    COMPLICATIONS: None. SPECIMENS: * No specimens in log *    DRAINS: None    The patient tolerated the procedure well.     Electronically signed by Manpreet Bangura MD  on 9/17/2021 at 12:32 PM

## 2021-09-17 NOTE — ANESTHESIA PRE PROCEDURE
Department of Anesthesiology  Preprocedure Note       Name:  Reed Persaud   Age:  49 y.o.  :  1965                                          MRN:  [id number]         Date:  2021      Surgeon: James Zelaya):  Murray Montes MD    Procedure: Procedure(s):  Revision left reconstructed breast, implant    Medications prior to admission:   Prior to Admission medications    Medication Sig Start Date End Date Taking? Authorizing Provider   fluticasone-vilanterol (BREO ELLIPTA) 100-25 MCG/INH AEPB inhaler Inhale 1 puff into the lungs daily 21  Yes Ashley Morrison MD   IBRANCE 125 MG tablet Take 1 tablet (125mg) by mouth once daily. 21  Yes Orlando Nelson MD   cyclobenzaprine (FLEXERIL) 10 MG tablet Take 10 mg by mouth 3 times daily as needed for Muscle spasms   Yes Historical Provider, MD   HYDROcodone-acetaminophen (NORCO) 7.5-325 MG per tablet Take 1 tablet by mouth every 6 hours as needed for Pain. Yes Historical Provider, MD   VENTOLIN  (90 Base) MCG/ACT inhaler Inhale 2 puffs into the lungs 4 times daily as needed for Wheezing 21   Ashley Morrison MD   pantoprazole (PROTONIX) 40 MG tablet TAKE 1 TABLET BY MOUTH EVERY DAY  Patient taking differently: Take 40 mg by mouth daily  21   Orlando Nelson MD   mupirocin (BACTROBAN) 2 % ointment Apply to bilateral nares BID for 5 days prior to surgery. 21   Ana Sanchez PA-C   NARCAN 4 MG/0.1ML LIQD nasal spray  21   Historical Provider, MD   potassium chloride (KLOR-CON M) 20 MEQ extended release tablet Take 1 tablet by mouth daily for 7 days 6/3/21 9/2/21  Orlando Nelson MD   triamcinolone (KENALOG) 0.1 % cream Apply topically sparingly 2 times daily as needed for rash. 21   BRITANY Magallanes   gabapentin (NEURONTIN) 300 MG capsule TAKE 2 CAPSULES BY MOUTH 3 TIMES DAILY FOR 30 DAYS.  21  Orlando Nelson MD   guaiFENesin-codeine (GUAIFENESIN AC) 100-10 MG/5ML liquid Take 5 mLs by mouth 3 times daily as needed for Cough. Historical Provider, MD   montelukast (SINGULAIR) 10 MG tablet Take 10 mg by mouth nightly    Historical Provider, MD   Albuterol Sulfate (PROAIR HFA IN) Inhale 1 each into the lungs daily as needed     Historical Provider, MD   benzonatate (TESSALON) 100 MG capsule Take 100 mg by mouth 3 times daily as needed for Cough    Historical Provider, MD   Cholecalciferol (VITAMIN D3) 125 MCG (5000 UT) TABS Take 1 tablet by mouth daily     Historical Provider, MD   lisinopril (PRINIVIL;ZESTRIL) 10 MG tablet Take 10 mg by mouth daily    Historical Provider, MD   acyclovir (ZOVIRAX) 800 MG tablet Take 800 mg by mouth 2 times daily as needed     Historical Provider, MD   ondansetron (ZOFRAN) 4 MG tablet Take 4 mg by mouth every 8 hours as needed for Nausea or Vomiting    Historical Provider, MD   venlafaxine (EFFEXOR) 37.5 MG tablet Take 37.5 mg by mouth 3 times daily    Historical Provider, MD   diclofenac (VOLTAREN) 0.1 % ophthalmic solution 1 drop 4 times daily    Historical Provider, MD   alclomethasone (ACLOVATE) 0.05 % cream Apply topically 2 times daily Apply topically 2 times daily.     Historical Provider, MD   meloxicam (MOBIC) 15 MG tablet Take 15 mg by mouth daily    Historical Provider, MD   ciprofloxacin (CILOXAN) 0.3 % ophthalmic solution 1 drop every 2 hours    Historical Provider, MD   chlorhexidine (PERIDEX) 0.12 % solution Take 15 mLs by mouth 2 times daily    Historical Provider, MD       Current medications:    Current Facility-Administered Medications   Medication Dose Route Frequency Provider Last Rate Last Admin    lactated ringers infusion   IntraVENous Continuous Yoana Healy MD        acetaminophen (TYLENOL) tablet 975 mg  975 mg Oral Once Ines Velazquez MD        celecoxib (CELEBREX) capsule 200 mg  200 mg Oral Once Ines Velazquez MD        gabapentin (NEURONTIN) capsule 300 mg  300 mg Oral Once Ines Velazquez MD Allergies: Allergies   Allergen Reactions    Clindamycin/Lincomycin Hives       Problem List:    Patient Active Problem List   Diagnosis Code    Carcinoma of female breast ([facility] C50.919    Poor venous access I87.8    Iron deficiency anemia D50.9    Menopausal symptom N95.1    Benign neoplasm of body of uterus D26.1    Obesity (BMI 30-39. 9) E66.9    Dyspnea and respiratory abnormalities R06.00, R06.89    Chronic pain syndrome G89.4    Right wrist pain M25.531    Rheumatoid arthritis involving both knees with negative rheumatoid factor ([facility] M06.061, M06.062    Other cyst of bone, left ankle and foot M85.672    Status post bilateral mastectomy and reconstruction  Z90.13    Acute purulent bronchitis J20.8    Lung nodule R91.1       Past Medical History:        Diagnosis Date    Back pain     Breast cancer ([facility]     Breast cancer with lung mets (RIGHT)    GERD (gastroesophageal reflux disease)     Hypertension     Neuropathy     Post chemo treatment neuropathy       Past Surgical History:        Procedure Laterality Date    BREAST LUMPECTOMY Right     2006    COSMETIC SURGERY      Bilateral breast implants    HYSTERECTOMY, TOTAL ABDOMINAL  2015    MASTECTOMY, BILATERAL      Right 2013 & Left 2016       Social History:    Social History     Tobacco Use    Smoking status: Never Smoker    Smokeless tobacco: Never Used   Substance Use Topics    Alcohol use: Not Currently                                Counseling given: Not Answered      Vital Signs (Current): There were no vitals filed for this visit.                                            BP Readings from Last 3 Encounters:   09/02/21 118/82   08/26/21 120/70   08/05/21 120/70       NPO Status: Time of last liquid consumption: 0000                        Time of last solid consumption: 0000                        Date of last liquid consumption: 09/16/21                        Date of last solid food consumption: 09/16/21    BMI:   Wt Readings from Last 3 Encounters:   09/14/21 179 lb (81.2 kg)   09/02/21 176 lb (79.8 kg)   08/26/21 179 lb 9.6 oz (81.5 kg)     There is no height or weight on file to calculate BMI.    CBC:   Lab Results   Component Value Date    WBC 4.21 08/26/2021    RBC 3.38 08/26/2021    HGB 12.2 08/26/2021    HCT 36.8 08/26/2021    .9 08/26/2021    RDW 14.8 08/26/2021     08/26/2021       CMP:   Lab Results   Component Value Date     08/27/2021    K 4.0 08/27/2021     08/27/2021    CO2 28 08/27/2021    BUN 10 08/27/2021    CREATININE 0.6 08/27/2021    GFRAA 85 04/01/2021    AGRATIO 1.6 04/01/2021    LABGLOM >60 08/27/2021    GLUCOSE 96 08/27/2021    PROT 7.5 08/27/2021    CALCIUM 8.9 08/27/2021    BILITOT 1.9 08/27/2021    ALKPHOS 98 08/27/2021    AST 38 08/27/2021    ALT 9 08/27/2021       POC Tests: No results for input(s): POCGLU, POCNA, POCK, POCCL, POCBUN, POCHEMO, POCHCT in the last 72 hours.     Coags: No results found for: PROTIME, INR, APTT    HCG (If Applicable): No results found for: PREGTESTUR, PREGSERUM, HCG, HCGQUANT     ABGs: No results found for: PHART, PO2ART, HFW2AMV, PIS2FWQ, BEART, R7JYPYEY     Type & Screen (If Applicable):  No results found for: LABABO, LABRH    Drug/Infectious Status (If Applicable):  No results found for: HIV, HEPCAB    COVID-19 Screening (If Applicable):   Lab Results   Component Value Date    COVID19 Not Detected 09/13/2021           Anesthesia Evaluation  Patient summary reviewed and Nursing notes reviewed no history of anesthetic complications:   Airway: Mallampati: II  TM distance: >3 FB   Neck ROM: full  Mouth opening: > = 3 FB Dental: normal exam         Pulmonary:normal exam    (+) asthma:     (-) not a current smoker                          ROS comment: Breast ca lung mets   Cardiovascular:    (+) hypertension:,          Beta Blocker:  Not on Beta Blocker         Neuro/Psych:      (-) seizures and CVA           GI/Hepatic/Renal:   (+) GERD: well controlled,           Endo/Other:    (+) : arthritis:., malignancy/cancer. (-) diabetes mellitus               Abdominal:             Vascular: negative vascular ROS. Other Findings:             Anesthesia Plan      general     ASA 2       Induction: intravenous. MIPS: Postoperative opioids intended and Prophylactic antiemetics administered. Anesthetic plan and risks discussed with patient and spouse. Plan discussed with CRNA.                   Sotero Rausch MD   9/17/2021

## 2021-09-17 NOTE — H&P
Cam Rivera MD (Physician)   General Surgery     HISTORY OF PRESENT ILLNESS:     Ms. Marybel Lamb is a 49 y.o. black female who presents today for a 2 week follow up.     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 It was ER/AL positive and she underwent neoadjuvant chemotherapy with AC followed by Taxol. In 2007 she underwent a right lumpectomy with axillary node dissection and received adjuvant radiation therapy to the breast and axilla. She did not take antihormonal therapy due to cost.     She recurred in 2013 and underwent 2 cycles of Taxotere followed by Doxil and then underwent right mastectomy and axillary node dissection. This was followed by completion of radiation therapy to her chest wall and axillary nodes.     In 2016 she underwent implant exchange and a prophylactic mastectomy on the left. She was also switched to aromatase inhibitor.     She subsequent developed metastatic disease with lung, hilar, and axillary metastases and has been on Abraxane, Gemzar, and is now doing monthly Faslodex with palbociclib     She currently has [place] implants. 590 cc on the right. She has an 800 cc implant on the left.     Eli Bangura is a 49 y.o. female with the following history as recorded in [facility]:  Patient Active Problem List    Diagnosis Date Noted    Acute purulent bronchitis 09/02/2021    Lung nodule 09/02/2021    Status post bilateral mastectomy and reconstruction  08/06/2021    Other cyst of bone, left ankle and foot 04/10/2021    Obesity (BMI 30-39.9) 04/09/2021    Dyspnea and respiratory abnormalities 04/09/2021    Chronic pain syndrome 04/09/2021    Right wrist pain 04/09/2021    Rheumatoid arthritis involving both knees with negative rheumatoid factor ([address] 04/09/2021    Menopausal symptom 04/08/2021    Benign neoplasm of body of uterus 04/08/2021    Iron deficiency anemia 01/22/2021    Carcinoma of female breast ([address] 01/21/2021    Poor venous access 01/21/2021     Current Facility-Administered Medications   Medication Dose Route Frequency Provider Last Rate Last Admin    lactated ringers infusion   IntraVENous Continuous Adan Danielle  mL/hr at 09/17/21 0933 New Bag at 09/17/21 0933    morphine injection 4 mg  4 mg IntraVENous Once PRN Bernadette Ramirez MD        fentaNYL (SUBLIMAZE) injection 50 mcg  50 mcg IntraVENous Once PRN Bernadette Ramirez MD        lactated ringers infusion   IntraVENous Continuous Bernadette Ramirez MD        sodium chloride flush 0.9 % injection 5-40 mL  5-40 mL IntraVENous 2 times per day Bernadette Ramirez MD        sodium chloride flush 0.9 % injection 5-40 mL  5-40 mL IntraVENous PRN Bernadette Ramirez MD        0.9 % sodium chloride infusion  25 mL IntraVENous PRN Bernadette Ramirez MD        famotidine (PEPCID) injection 20 mg  20 mg IntraVENous Once Bernadette Ramirez MD        scopolamine (TRANSDERM-SCOP) transdermal patch 1 patch  1 patch TransDERmal Once Bernadette Ramirez MD   1 patch at 09/17/21 0935    lidocaine PF 1 % injection 1 mL  1 mL IntraDERmal Once PRN Bernadette Ramirez MD        midazolam (VERSED) injection 2 mg  2 mg IntraVENous Once PRN Bernadette Ramirez MD         Allergies: Clindamycin/lincomycin  Past Medical History:   Diagnosis Date    Back pain     Breast cancer ([address]     Breast cancer with lung mets (RIGHT)    GERD (gastroesophageal reflux disease)     Hypertension     Neuropathy     Post chemo treatment neuropathy     Past Surgical History:   Procedure Laterality Date    BREAST LUMPECTOMY Right     2006    COSMETIC SURGERY      Bilateral breast implants    HYSTERECTOMY, TOTAL ABDOMINAL  2015    MASTECTOMY, BILATERAL      Right 2013 & Left 2016     Family History   Problem Relation Age of Onset    Prostate Cancer Father      Social History     Tobacco Use    Smoking status: Never Smoker    Smokeless tobacco: Never Used   Substance Use Topics    Alcohol use: Not Currently discussed the risks and benefits of surgery at some length. She does have metastatic disease but is responding quite well to current therapy.        I have seen, examined and reviewed this patient medication list, appropriate labs and imaging studies. I reviewed relevant medical records and others physicians notes. I discussed the plans of care with the patient. I answered all the questions to the patients satisfaction. I, Dr Hanh Scales, personally performed the services described in this documentation as scribed by Aleta Morin MA in my presence and is both accurate and complete.     (Please note that portions of this note were completed with a voice recognition program. Efforts were made to edit the dictations but occasionally words are mis-transcribed.)  Over 50% of the total visit time of 30 minutes in face to face encounter with the patient, out of which more than 50% of the time was spent in counseling patient or family and coordination of care.  Counseling included but was not limited to time spent reviewing labs, imaging studies/ treatment plan and answering questions.

## 2021-09-18 NOTE — OP NOTE
[facility] [address] [facility]                                OPERATIVE REPORT    PATIENT NAME: Lenny Poe                    :        1965  MED REC NO:   [id number]                              ROOM:  ACCOUNT NO:   [de-identified]                           ADMIT DATE: 2021  PROVIDER:     Elaine Soto MD    DATE OF PROCEDURE:  2021    PREOPERATIVE DIAGNOSES:  1.  Status post bilateral mastectomy and reconstruction for breast  cancer with lateral drift on the left. 2.  Breast asymmetry reconstruction. POSTOPERATIVE DIAGNOSIS:  Status post bilateral mastectomy and  reconstruction for breast cancer with lateral drift on the left. PROCEDURES:  1. Left pectoral block. 2.  Removal of left 800 mL MENTOR implant. 3.  Surgery/capsulorrhaphy, left breast capsule. 4.  Placement of new left 700 mL implant. 5.  Suction-assisted lipectomy of lateral breast fat pad. 6.  [place] mastopexy. SURGEON:  Elaine Soto MD    ASSISTANT:  Yoly Scales PA-C. He was present for all portions of  the case including all critical portions as well as closure. ANESTHESIA:  Pec block with LMA. INDICATIONS:  The patient is a 72-year-old woman who is several years  status post bilateral mastectomy and reconstruction. She has known  metastatic breast cancer, but is currently under treatment and has no  evidence of disease. She has been having problems with her left  reconstruction drifting laterally and inferiorly, and wishes this to be  repaired. We discussed the risks and benefits of this. She understood  and was agreeable. OPERATIVE PROCEDURE:  Today, she was brought to the operating room, was  adequately sedated. She did require LMA for airway management and then  we proceeded with the left pectoral block.   We prepped the skin with  chlorhexidine and utilized a solution of 50 mL of 0.2% ropivacaine, 8 mg  of Decadron and 50 mL of saline. We used ultrasound guidance to inject  the intercostal perforators at the lateral sternal border. We also  injected the inframammary crease and the infraclavicular area. We then  injected the interpectoral space and lateral intercostal perforators  again with ultrasound guidance. We then re-prepped and re-draped, made  a curvilinear incision, re-opened her old reconstruction incision and  dissected down to the capsule which we opened and removed the 800 mL  MENTOR implant, it was intact. We irrigated copiously. We then  performed our lateral capsulorrhaphy using a combination of Bovie  electrocautery and 2-0 PDS sutures to tighten up the lateral aspect of  this defect. We then utilized several different sizers to assess the  most effective with the amount of space we could diminish. The 695 mL  sizer seemed to be the best choice. At this time, we also used our  tumescent solution on an approximately 10 x 15 cm area of her lateral  chest wall adjacent to the breast.  Then, excessive fat pad that was  really bothering her, we injected approximately 100 mL of 1/16%  Xylocaine, saline and epinephrine into this area without difficulty. We  then proceeded with a 6 mm liposuction trocar to liposuction this area,  got it nice and flat. We had approximately 60 mL of effluent and then  held pressure on this. We then using no-touch technique placed a  permanent implant using the [facility], a [facility] SoftTouch  breast implant, reference number SSX-700, serial number [id number]. We  inserted with a Cornelius funnel and it fit beautifully. We then closed  with 2-0 and 3-0 Vicryl and 4-0 Stratafix for the skin. When this was  completed, we sat her up again and preoperatively, she had some  asymmetry of the nipples, it was about 1 cm difference with the right  being higher.   We then performed a crescent mastopexy on the left  elevating the left nipple approximately 1 cm, closed this with 4-0  Stratafix as well. Prineo dressing was applied overall. A surgical bra  was then applied. Estimated blood loss, minimal, complications, none. She tolerated all this quite well.         Anamaria Blackburn MD    D: 09/17/2021 13:59:10      T: 09/17/2021 17:19:59     [email or web address]  Job#: [id number]     Doc#: [id number]    CC:

## 2021-09-21 NOTE — PROGRESS NOTES
HISTORY OF PRESENT ILLNESS:    Ms. Slick Hung is a 49 y.o. black female who presents today for a 2 week follow up. 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 It was ER/[place] positive and she underwent neoadjuvant chemotherapy with AC followed by Taxol. In 2007 she underwent a right lumpectomy with axillary node dissection and received adjuvant radiation therapy to the breast and axilla. She did not take antihormonal therapy due to cost.    She recurred in 2013 and underwent 2 cycles of Taxotere followed by Doxil and then underwent right mastectomy and axillary node dissection. This was followed by completion of radiation therapy to her chest wall and axillary nodes. In 2016 she underwent implant exchange and a prophylactic mastectomy on the left. She was also switched to aromatase inhibitor. She subsequent developed metastatic disease with lung, hilar, and axillary metastases and has been on Abraxane, Gemzar, and is now doing monthly Faslodex with palbociclib    She currently has [place] implants. 590 cc on the right. She has an 800 cc implant on the left. She is now status post revision of left mastectomy reconstruction due to lateral drift on 9/17/2021. PHYSICAL EXAM:  The  wounds look good with no evidence of infection, fluid accumulation, or skin necrosis. IMPRESSION:    Doing well status post revision of left reconstructed breast    PLAN: I will see her back in one month for exam. She will call us with any new concerns. I have seen, examined and reviewed this patient medication list, appropriate labs and imaging studies. I reviewed relevant medical records and others physicians notes. I discussed the plans of care with the patient. I answered all the questions to the patients satisfaction.   I, Dr Anish Oconnor, personally performed the services described in this documentation as scribed by Tevin Souza MA in my presence and is both accurate and complete. (Please note that portions of this note were completed with a voice recognition program. Efforts were made to edit the dictations but occasionally words are mis-transcribed.)  Over 50% of the total visit time of 15 minutes in face to face encounter with the patient, out of which more than 50% of the time was spent in counseling patient or family and coordination of care. Counseling included but was not limited to time spent reviewing labs, imaging studies/ treatment plan and answering questions.

## 2021-09-22 ENCOUNTER — OFFICE VISIT (OUTPATIENT)
Dept: SURGERY | Age: 56
End: 2021-09-22

## 2021-09-22 VITALS — SYSTOLIC BLOOD PRESSURE: 124 MMHG | DIASTOLIC BLOOD PRESSURE: 80 MMHG | HEART RATE: 76 BPM

## 2021-09-22 DIAGNOSIS — Z98.890 S/P BREAST RECONSTRUCTION, LEFT: ICD-10-CM

## 2021-09-22 PROCEDURE — 99024 POSTOP FOLLOW-UP VISIT: CPT | Performed by: SURGERY

## 2021-09-22 NOTE — PROGRESS NOTES
MEDICAL ONCOLOGY PROGRESS NOTE    Pt Name: Joaquín De La Cruz  MRN: [id number]  YOB: 1965  Date of evaluation: 9/24/2021    HISTORY OF PRESENT ILLNESS:    Reason for MD visit: Disease/toxicity management  Jessenia Goldman is here today for monitoring for breast cancer and toxicity assessment. She is currently on Faslodex/Ibrance. She has been tolerating treatment quite well. She received Faslodex once a month. She had breast reconstructive surgery with Dr. Nicolas Slater last Friday. She noticed significant swelling of the left upper extremity over the last for 5 days. She noticed the swelling before the surgery. She has significant discomfort related to the left upper extremity swelling  She was sent stat to the vascular lab. Extensive acute DVT involving the left upper extremity. Discussed with vascular on-call, hospitalist and also ER. The patient will be directed the ER for admission and start IV heparin. Vascular and hematology oncology will be consulted for further management inpatient. The patient has not yet been vaccinated for COVID-19. 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     Diagnosis  · Grade III Adenocarcinoma of right breast diagnosed 2006  · Stage IIIA, [id number]  · ER/AR Positive, HER-2 bruce/ihc 1+  · April 2013-RECURRENCE in the right axillary region  · Genetic testing- BRCA 1&2 Negative  · 2014 (?) RECURRENCE in the axillary skin  · 01/05/2017- METASTATIC DISEASE with lung, hilar, and axillary lymph node mets  · Osteopenia    Treatment Summary  · 2006- Neoadjuvant chemotherapy with AC x 4 cycles followed by Taxol  · 2007- Lumpectomy with axillary lymph node dissection  · 2007- Adjuvant radiation therapy to whole breast and axillary region  · Did not take tamoxifen due to cost  · April 2013- RECURRENCE  · Neoadjuvant chemotherapy with 2 cycles of Taxotere followed by Doxil  · 10/14/2013- Right Breast Mastectomy and Axillary Dissection  · 01/23/2014- Completion of adjuvant RT to chest wall and axillary lymph node with Dr. Aidan Mdeina  · 02/14- Initiated adjuvant endocrine therapy with tamoxifen  · 2014 (?) RECURRENCE in the axillary skin  · 2014 (?) Surgical resection of the axillary skin  · 2015 (?) YVETTE/BSO  · 09/19/2016- Bilateral exchange, nipple reconstruction and fat grafting and removal of PAC   · 2016- Switched to aromatase inhibitor  · 01/05/2017- METASTATIC DISEASE with lung, hilar, and axillary mets  · 01/11/2017-06/17/2018- Single agent Abraxane x 13 cycles  · 06/27/2018-09/17/2018- Gemzar  · 10/10/2018-Faslodex every 4 weeks/palbociclib    Cancer History:  Terrell Powers was first seen by me on 1/21/2021. She was referred to [facility] care for history of recurrent metastatic breast cancer. She has been in remission as per the latest CT scan. She is currently on Faslodex and palbociclib. She has received several lines therapy as described below. She moved from [place] to [place] to stay closer to her parents. Her medical history is complex. Further details as below. · 2006- Neoadjuvant chemotherapy with AC x 4 cycles followed by Taxol AC was complicated by viral meningitis; patient experienced neuropathy after 3 doses of Taxol and was switched to Taxotere for last dose  · 2007- Lumpectomy with axillary lymph node dissection 1.9cm, grade 2. No LVI. 1 of 14 lymph nodes positive for malignancy (1/14) [id number]  · 2007- Adjuvant Radiation Therapy to whole breast and axillary region  · April 2013- RECURRENCE presenting as mass in the axillary region  · Neoadjuvant chemotherapy with 2 cycles of Taxotere followed by Doxil- did not have good response to treatment  · 2013- Preop PET/CTshowed 2.8cm, right axillary lymph node with uptake. · 10/14/2013- Right Breast Mastectomy and Axillary Dissection Right breast had benign tissue with fibrous, negative for carcinoma.  Axillary contents demonstrated invasive ductal carcinoma, involving fibroadipose tissue and tendinous tissue. 3 benign lymph nodes (0/3). Yusra grade 3. 3.0cm in greatest gross dimensions. Carcinoma permeates among soft tissues and in the right axilla with no apparent involvement of lymph nodes. · 01/23/2014- Completion of adjuvant radiation therapy to chest wall and axillary lymph node with Dr. Carmen Duong  · 02/14- Initiated adjuvant endocrine therapy with tamoxifen  · 2014 (?) RECURRENCE in the axillary skin  · 2014 (?) Surgical resection of the axillary skin pathology demonstrating tumor at cauterized margin  · 2015 (?) YVETTE/BSO  · 09/19/2016- Bilateral exchange, nipple reconstruction and fat grafting and removal of PAC  · 2016- Switched to aromatase inhibitor patient was non compliant  · 01/05/2017- METASTATIC DISEASE Presented with respiratory failure. CTA Pulmonary showed numerous nodules and masses throughout both lung fields, compatible with metastatic disease. Bilateral hilar adenopathy seen. · 01/11/2017-06/17/2018- Single agent Abraxane x 13 cycles  · 03/15/2017- CT Chest W Contrast Interval decrease in maximal diameter of essentially all the multiple metastatic pulmonary nodules. The average difference is approximately 25%. Some of the much smaller ones have disappeared. Bilateral hilar and aortopulmonary window adenopathy is also similarly smaller. · 06/07/2017- CT Chest W Contrast Multiple lung nodules seen bilaterally. Most of the lung nodules show interval improvement with decreased size by 1 to 3mm. Mediastinal and bilateral hilar adenopathy seen with improvement. · 07/24/2017- PET/CT scan showed marked improvement, is minimal abnormal, has excellent response to therapy  · 02/01/2018- PET/CT scan Numerous bilateral lung masses and nodules, the majority of which do not have appreciable abnormal FDG uptake. The largest mass in the left infrahilar region of the left lower lobe demonstrates a maximum SUV of 3.3. Mediastinal lymphadenopathy.  No evidence of metastatic disease disease. Bilateral breast implants without complication. · 3/1/2021discussed the results CT chest abdomen pelvis. Essentially no clear evidence of metastatic disease. This is consistent with excellent response to treatment. Continue current treatment. · 4/1/2021 = 31.6 CA 27-29= 33.5 CEA= 1.6   · 6/3/21 CA 15-3= 23.3 CA 27-29= 25.6  CEA= 1.8   · 7/29/2021 CEA=1.7 CA 15-3=21.50 CA 27.29= 26.0  · 8/19/2021 CT Chest  Left lower lobe pleural-based nodular 1.7 x 0.9 cm is indeterminate. PET/CT recommended. Feeding defect in the left lower lobe bronchus versus a postobstructive airspace disease. This too may be further characterized with PET CT versus direct visualization. · 8/19/2021 CT Abd/Pelvis A stable CT scan of the abdomen and pelvis. No finding to suggest neoplastic/metastatic disease.      CA 27.29 Dynamics  01/21/2021- 20.3  04/01/2021 33.5  06/03/2021 25.6  07/29/2021 26.0    Past Medical History:    Past Medical History:   Diagnosis Date    Back pain     Breast cancer ([address]     Breast cancer with lung mets (RIGHT)    GERD (gastroesophageal reflux disease)     Hypertension     Neuropathy     Post chemo treatment neuropathy       Past Surgical History:    Past Surgical History:   Procedure Laterality Date    BREAST LUMPECTOMY Right     2006    BREAST RECONSTRUCTION Left 9/17/2021    Revision left reconstructed breast, implant performed by Marcela Guidry MD at [address] [place] [address]      Bilateral breast implants    HYSTERECTOMY, TOTAL ABDOMINAL  2015    MASTECTOMY, BILATERAL      Right 2013 & Left 2016       Social History:    Marital status, children: Single, 2 children-female  Smoking status: no  ETOH status: no  Profession: Disabled  Resides: [place], [place]  Recent trips: Moved from [facility] [place]: no    Family History:   Family History   Problem Relation Age of Onset    Prostate Cancer Father        Current Hospital Medications:    Current Outpatient Medications   Medication Sig Dispense Refill    HYDROcodone-acetaminophen (NORCO)  MG per tablet Take 1 tablet by mouth every 6 hours as needed for Pain. Intended supply: 30 days 12 tablet 0    fluticasone-vilanterol (BREO ELLIPTA) 100-25 MCG/INH AEPB inhaler Inhale 1 puff into the lungs daily 1 each 5    VENTOLIN  (90 Base) MCG/ACT inhaler Inhale 2 puffs into the lungs 4 times daily as needed for Wheezing 18 g 5    pantoprazole (PROTONIX) 40 MG tablet TAKE 1 TABLET BY MOUTH EVERY DAY (Patient taking differently: Take 40 mg by mouth daily ) 90 tablet 0    mupirocin (BACTROBAN) 2 % ointment Apply to bilateral nares BID for 5 days prior to surgery. 30 g 0    IBRANCE 125 MG tablet Take 1 tablet (125mg) by mouth once daily. 21 tablet 3    NARCAN 4 MG/0.1ML LIQD nasal spray       triamcinolone (KENALOG) 0.1 % cream Apply topically sparingly 2 times daily as needed for rash. 30 g 0    cyclobenzaprine (FLEXERIL) 10 MG tablet Take 10 mg by mouth 3 times daily as needed for Muscle spasms      guaiFENesin-codeine (GUAIFENESIN AC) 100-10 MG/5ML liquid Take 5 mLs by mouth 3 times daily as needed for Cough.  HYDROcodone-acetaminophen (NORCO) 7.5-325 MG per tablet Take 1 tablet by mouth every 6 hours as needed for Pain.       montelukast (SINGULAIR) 10 MG tablet Take 10 mg by mouth nightly      Albuterol Sulfate (PROAIR HFA IN) Inhale 1 each into the lungs daily as needed       benzonatate (TESSALON) 100 MG capsule Take 100 mg by mouth 3 times daily as needed for Cough      Cholecalciferol (VITAMIN D3) 125 MCG (5000 UT) TABS Take 1 tablet by mouth daily       lisinopril (PRINIVIL;ZESTRIL) 10 MG tablet Take 10 mg by mouth daily      acyclovir (ZOVIRAX) 800 MG tablet Take 800 mg by mouth 2 times daily as needed       ondansetron (ZOFRAN) 4 MG tablet Take 4 mg by mouth every 8 hours as needed for Nausea or Vomiting      venlafaxine (EFFEXOR) 37.5 MG tablet Take 37.5 mg by mouth 3 times daily      diclofenac (VOLTAREN) 0.1 % ophthalmic solution 1 drop 4 times daily      alclomethasone (ACLOVATE) 0.05 % cream Apply topically 2 times daily Apply topically 2 times daily.  meloxicam (MOBIC) 15 MG tablet Take 15 mg by mouth daily      ciprofloxacin (CILOXAN) 0.3 % ophthalmic solution 1 drop every 2 hours      chlorhexidine (PERIDEX) 0.12 % solution Take 15 mLs by mouth 2 times daily      potassium chloride (KLOR-CON M) 20 MEQ extended release tablet Take 1 tablet by mouth daily for 7 days 7 tablet 0    gabapentin (NEURONTIN) 300 MG capsule TAKE 2 CAPSULES BY MOUTH 3 TIMES DAILY FOR 30 DAYS. 180 capsule 0     No current facility-administered medications for this visit. Allergies:    Allergies   Allergen Reactions    Clindamycin/Lincomycin Hives         Subjective   REVIEW OF SYSTEMS:   CONSTITUTIONAL: no fever, no night sweats, fatigue;  HEENT: no blurring of vision, no double vision, no hearing difficulty, no tinnitus, no ulceration, no dysplasia, no epistaxis;   LUNGS: Productive cough, no hemoptysis, no wheeze,   shortness of breath;  CARDIOVASCULAR: no palpitation, no chest pain, no shortness of breath;  GI: no abdominal pain, no nausea, no vomiting, no diarrhea, no constipation;  PATRICK: no dysuria, no hematuria, no frequency or urgency, no nephrolithiasis;  MUSCULOSKELETAL: no joint pain, no swelling, no stiffness;  ENDOCRINE: no polyuria, no polydipsia, no cold or heat intolerance;  HEMATOLOGY: no easy bruising or bleeding, no history of clotting disorder;  DERMATOLOGY: no skin rash, no eczema, no pruritus;  PSYCHIATRY: no depression, no anxiety, no panic attacks, no suicidal ideation, no homicidal ideation;  NEUROLOGY: no syncope, no seizures, no numbness or tingling of hands, no numbness or tingling of feet, no paresis;       /60   Ht 5' 6\" (1.676 m)   Wt 185 lb 8 oz (84.1 kg)   BMI 29.94 kg/m²     PHYSICAL EXAM:  CONSTITUTIONAL: Alert, appropriate, no acute distress  EYES: Non icteric, EOM intact, pupils equal round   ENT: Mucus membranes moist, no oral pharyngeal lesions, external inspection of ears and nose are normal.  NECK: Supple, no masses. No palpable thyroid mass  CHEST/LUNGS: CTA bilaterally, normal respiratory effort   CARDIOVASCULAR: RRR, no murmurs. No lower extremity edema  ABDOMEN: soft non-tender, active bowel sounds, no HSM. No palpable masses  EXTREMITIES: warm, full ROM in all 4 extremities, no focal weakness. SKIN: warm, dry with no rashes or lesions  LYMPH: No cervical, clavicular, axillary, or inguinal lymphadenopathy  NEUROLOGIC: follows commands, non focal   PSYCH: mood and affect appropriate. Alert and oriented to time, place, person    LABORATORY RESULTS REVIEWED/ANALYZED BY ME:  Lab Results   Component Value Date    WBC 4.71 09/24/2021    HGB 11.2 09/24/2021    HCT 33.6 (L) 09/24/2021    .4 (H) 09/24/2021     09/24/2021     Lab Results   Component Value Date    NEUTROABS 2.47 09/24/2021     Lab Results   Component Value Date     08/27/2021    K 4.0 08/27/2021     08/27/2021    CO2 28 08/27/2021    BUN 10 08/27/2021    CREATININE 0.6 08/27/2021    GLUCOSE 96 08/27/2021    CALCIUM 8.9 08/27/2021    PROT 7.5 08/27/2021    LABALBU 4.2 08/27/2021    BILITOT 1.9 (H) 08/27/2021    ALKPHOS 98 08/27/2021    AST 38 (H) 08/27/2021    ALT 9 08/27/2021    LABGLOM >60 08/27/2021    GFRAA 85 04/01/2021    AGRATIO 1.6 04/01/2021    GLOB 3.3 08/27/2021           RADIOLOGY STUDIES REVIEWED BY ME:  None      ASSESSMENT:    Orders Placed This Encounter   Procedures    US DUP UPPER EXTREMITY LEFT VENOUS     Standing Status:   Future     Number of Occurrences:   1     Standing Expiration Date:   9/24/2022     Order Specific Question:   Reason for exam:     Answer:   new swelling and pain left arm      Wendi Ring was seen today for follow-up.     Diagnoses and all orders for this visit:    Pain in left arm  -     US DUP UPPER EXTREMITY LEFT VENOUS; Future    Left arm swelling  -     US DUP upper chest port ultrasound showed acute DVT likely related to port. Discussed with vascular. The patient will be admitted to the hospital. Discussed with hospitalist. Start IV heparin. Consult vascular/consult hematology oncology. PLAN:  · Faslodex today and every 4 weeks  · Left arm ultrasound venous study today   · Follow up with Dr. Rashid Veloz for PCP care  · CBC CMP CEA CA 15-3 CA 27-29 next visit  · Port flush 4 weeks  · Follow-up with pulmonary for lung nodule  · RTC with MD 4 weeks  · Continue Ibrance  · Admitted to the hospital for acute DVT. Discussed with vascular. I have seen, examined and reviewed this patient medication list, appropriate labs and imaging studies. I reviewed relevant medical records and others physicians notes. I discussed the plans of care with the patient. I answered all the questions to the patients satisfaction. I have also reviewed the chief complaint (CC) and part of the history (History of Present Illness (HPI), Past Family Social History [facility]), or Review of Systems (ROS) and made changes when appropriated.        (Please note that portions of this note were completed with a voice recognition program. Efforts were made to edit the dictations but occasionally words are mis-transcribed.)      Roland Dominguez MD    09/24/21  4:42 PM

## 2021-09-23 PROBLEM — Z98.890 S/P BREAST RECONSTRUCTION, LEFT: Status: ACTIVE | Noted: 2021-09-23

## 2021-09-24 ENCOUNTER — HOSPITAL ENCOUNTER (OUTPATIENT)
Dept: VASCULAR LAB | Age: 56
Discharge: HOME | DRG: 271 | End: 2021-09-24
Payer: MEDICARE

## 2021-09-24 ENCOUNTER — APPOINTMENT (OUTPATIENT)
Dept: GENERAL RADIOLOGY | Age: 56
DRG: 271 | End: 2021-09-24
Payer: MEDICARE

## 2021-09-24 ENCOUNTER — HOSPITAL ENCOUNTER (OUTPATIENT)
Dept: INFUSION THERAPY | Age: 56
Discharge: HOME | DRG: 271 | End: 2021-09-24
Payer: MEDICARE

## 2021-09-24 ENCOUNTER — HOSPITAL ENCOUNTER (INPATIENT)
Age: 56
LOS: 3 days | Discharge: HOME | DRG: 271 | End: 2021-09-27
Attending: EMERGENCY MEDICINE | Admitting: INTERNAL MEDICINE
Payer: MEDICARE

## 2021-09-24 ENCOUNTER — OFFICE VISIT (OUTPATIENT)
Dept: HEMATOLOGY | Age: 56
DRG: 271 | End: 2021-09-24
Payer: MEDICARE

## 2021-09-24 VITALS
BODY MASS INDEX: 29.81 KG/M2 | DIASTOLIC BLOOD PRESSURE: 60 MMHG | SYSTOLIC BLOOD PRESSURE: 130 MMHG | WEIGHT: 185.5 LBS | HEIGHT: 66 IN

## 2021-09-24 DIAGNOSIS — I82.622 ACUTE DEEP VEIN THROMBOSIS (DVT) OF LEFT UPPER EXTREMITY, UNSPECIFIED VEIN (HCC): ICD-10-CM

## 2021-09-24 DIAGNOSIS — C50.919 CARCINOMA OF FEMALE BREAST, UNSPECIFIED ESTROGEN RECEPTOR STATUS, UNSPECIFIED LATERALITY, UNSPECIFIED SITE OF BREAST (HCC): Primary | ICD-10-CM

## 2021-09-24 DIAGNOSIS — T45.1X5S ANTINEOPLASTIC DRUGS CAUSING ADVERSE EFFECT, SEQUELA: ICD-10-CM

## 2021-09-24 DIAGNOSIS — G62.0 CHEMOTHERAPY-INDUCED NEUROPATHY (HCC): ICD-10-CM

## 2021-09-24 DIAGNOSIS — M79.602 PAIN IN LEFT ARM: ICD-10-CM

## 2021-09-24 DIAGNOSIS — C50.919 CARCINOMA OF FEMALE BREAST, UNSPECIFIED ESTROGEN RECEPTOR STATUS, UNSPECIFIED LATERALITY, UNSPECIFIED SITE OF BREAST (HCC): ICD-10-CM

## 2021-09-24 DIAGNOSIS — Z71.89 COORDINATION OF COMPLEX CARE: ICD-10-CM

## 2021-09-24 DIAGNOSIS — T45.1X5A CHEMOTHERAPY-INDUCED NEUROPATHY (HCC): ICD-10-CM

## 2021-09-24 DIAGNOSIS — M79.89 LEFT ARM SWELLING: ICD-10-CM

## 2021-09-24 DIAGNOSIS — R91.1 LEFT LOWER LOBE PULMONARY NODULE: ICD-10-CM

## 2021-09-24 DIAGNOSIS — M79.602 PAIN IN LEFT ARM: Primary | ICD-10-CM

## 2021-09-24 DIAGNOSIS — Z71.89 CARE PLAN DISCUSSED WITH PATIENT: ICD-10-CM

## 2021-09-24 DIAGNOSIS — I82.622 ARM DVT (DEEP VENOUS THROMBOEMBOLISM), ACUTE, LEFT (HCC): Primary | ICD-10-CM

## 2021-09-24 LAB
ALBUMIN SERPL-MCNC: 4.1 G/DL (ref 3.5–5.2)
ALP BLD-CCNC: 99 U/L (ref 35–104)
ALT SERPL-CCNC: 29 U/L (ref 5–33)
ANION GAP SERPL CALCULATED.3IONS-SCNC: 10 MMOL/L (ref 7–19)
APTT: 49.2 SEC (ref 26–36.2)
AST SERPL-CCNC: 22 U/L (ref 5–32)
BASOPHILS ABSOLUTE: 0.02 K/UL (ref 0.01–0.08)
BASOPHILS RELATIVE PERCENT: 0.4 % (ref 0.1–1.2)
BILIRUB SERPL-MCNC: 0.7 MG/DL (ref 0.2–1.2)
BUN BLDV-MCNC: 15 MG/DL (ref 6–20)
CALCIUM SERPL-MCNC: 9 MG/DL (ref 8.6–10)
CHLORIDE BLD-SCNC: 105 MMOL/L (ref 98–111)
CO2: 26 MMOL/L (ref 22–29)
CREAT SERPL-MCNC: 0.5 MG/DL (ref 0.5–0.9)
EOSINOPHILS ABSOLUTE: 0.03 K/UL (ref 0.04–0.54)
EOSINOPHILS RELATIVE PERCENT: 0.6 % (ref 0.7–7)
GFR AFRICAN AMERICAN: >59
GFR NON-AFRICAN AMERICAN: >60
GLUCOSE BLD-MCNC: 93 MG/DL (ref 74–109)
HCT VFR BLD CALC: 33.6 % (ref 34.1–44.9)
HCT VFR BLD CALC: 34.1 % (ref 37–47)
HEMOGLOBIN: 10.9 G/DL (ref 12–16)
HEMOGLOBIN: 11.2 G/DL (ref 11.2–15.7)
INR BLD: 1.02 (ref 0.88–1.18)
LYMPHOCYTES ABSOLUTE: 1.85 K/UL (ref 1.18–3.74)
LYMPHOCYTES RELATIVE PERCENT: 39.3 % (ref 19.3–53.1)
MCH RBC QN AUTO: 34.9 PG (ref 27–31)
MCH RBC QN AUTO: 36.5 PG (ref 25.6–32.2)
MCHC RBC AUTO-ENTMCNC: 32 G/DL (ref 33–37)
MCHC RBC AUTO-ENTMCNC: 33.3 G/DL (ref 32.3–35.5)
MCV RBC AUTO: 109.3 FL (ref 81–99)
MCV RBC AUTO: 109.4 FL (ref 79.4–94.8)
MONOCYTES ABSOLUTE: 0.34 K/UL (ref 0.24–0.82)
MONOCYTES RELATIVE PERCENT: 7.2 % (ref 4.7–12.5)
NEUTROPHILS ABSOLUTE: 2.47 K/UL (ref 1.56–6.13)
NEUTROPHILS RELATIVE PERCENT: 52.5 % (ref 34–71.1)
PDW BLD-RTO: 14.3 % (ref 11.7–14.4)
PDW BLD-RTO: 14.9 % (ref 11.5–14.5)
PLATELET # BLD: 184 K/UL (ref 182–369)
PLATELET # BLD: 187 K/UL (ref 130–400)
PMV BLD AUTO: 9 FL (ref 9.4–12.3)
PMV BLD AUTO: 9.3 FL (ref 7.4–10.4)
POTASSIUM REFLEX MAGNESIUM: 3.8 MMOL/L (ref 3.5–5)
PRO-BNP: 82 PG/ML (ref 0–900)
PROTHROMBIN TIME: 13.6 SEC (ref 12–14.6)
RBC # BLD: 3.07 M/UL (ref 3.93–5.22)
RBC # BLD: 3.12 M/UL (ref 4.2–5.4)
SARS-COV-2, NAAT: NOT DETECTED
SODIUM BLD-SCNC: 141 MMOL/L (ref 136–145)
TOTAL PROTEIN: 7.1 G/DL (ref 6.6–8.7)
TROPONIN: <0.01 NG/ML (ref 0–0.03)
WBC # BLD: 4.4 K/UL (ref 4.8–10.8)
WBC # BLD: 4.71 K/UL (ref 3.98–10.04)

## 2021-09-24 PROCEDURE — 96402 CHEMO HORMON ANTINEOPL SQ/IM: CPT

## 2021-09-24 PROCEDURE — 36415 COLL VENOUS BLD VENIPUNCTURE: CPT

## 2021-09-24 PROCEDURE — 93971 EXTREMITY STUDY: CPT

## 2021-09-24 PROCEDURE — 99212 OFFICE O/P EST SF 10 MIN: CPT

## 2021-09-24 PROCEDURE — 85610 PROTHROMBIN TIME: CPT

## 2021-09-24 PROCEDURE — 6360000002 HC RX W HCPCS: Performed by: EMERGENCY MEDICINE

## 2021-09-24 PROCEDURE — 96401 CHEMO ANTI-NEOPL SQ/IM: CPT

## 2021-09-24 PROCEDURE — 87635 SARS-COV-2 COVID-19 AMP PRB: CPT

## 2021-09-24 PROCEDURE — 96374 THER/PROPH/DIAG INJ IV PUSH: CPT

## 2021-09-24 PROCEDURE — 84484 ASSAY OF TROPONIN QUANT: CPT

## 2021-09-24 PROCEDURE — 99284 EMERGENCY DEPT VISIT MOD MDM: CPT

## 2021-09-24 PROCEDURE — 85730 THROMBOPLASTIN TIME PARTIAL: CPT

## 2021-09-24 PROCEDURE — 83880 ASSAY OF NATRIURETIC PEPTIDE: CPT

## 2021-09-24 PROCEDURE — 85025 COMPLETE CBC W/AUTO DIFF WBC: CPT

## 2021-09-24 PROCEDURE — 71045 X-RAY EXAM CHEST 1 VIEW: CPT

## 2021-09-24 PROCEDURE — 6360000002 HC RX W HCPCS: Performed by: INTERNAL MEDICINE

## 2021-09-24 PROCEDURE — 80053 COMPREHEN METABOLIC PANEL: CPT

## 2021-09-24 PROCEDURE — G8427 DOCREV CUR MEDS BY ELIG CLIN: HCPCS | Performed by: INTERNAL MEDICINE

## 2021-09-24 PROCEDURE — G8417 CALC BMI ABV UP PARAM F/U: HCPCS | Performed by: INTERNAL MEDICINE

## 2021-09-24 PROCEDURE — 3017F COLORECTAL CA SCREEN DOC REV: CPT | Performed by: INTERNAL MEDICINE

## 2021-09-24 PROCEDURE — 2140000000 HC CCU INTERMEDIATE R&B

## 2021-09-24 PROCEDURE — 99215 OFFICE O/P EST HI 40 MIN: CPT | Performed by: INTERNAL MEDICINE

## 2021-09-24 PROCEDURE — 93005 ELECTROCARDIOGRAM TRACING: CPT | Performed by: EMERGENCY MEDICINE

## 2021-09-24 PROCEDURE — 1036F TOBACCO NON-USER: CPT | Performed by: INTERNAL MEDICINE

## 2021-09-24 PROCEDURE — 85027 COMPLETE CBC AUTOMATED: CPT

## 2021-09-24 RX ORDER — HEPARIN SODIUM 10000 [USP'U]/100ML
5-30 INJECTION, SOLUTION INTRAVENOUS CONTINUOUS
Status: DISCONTINUED | OUTPATIENT
Start: 2021-09-24 | End: 2021-09-26

## 2021-09-24 RX ORDER — CYCLOBENZAPRINE HCL 10 MG
10 TABLET ORAL 3 TIMES DAILY PRN
Status: DISCONTINUED | OUTPATIENT
Start: 2021-09-24 | End: 2021-09-27 | Stop reason: HOSPADM

## 2021-09-24 RX ORDER — LISINOPRIL 10 MG/1
10 TABLET ORAL DAILY
Status: DISCONTINUED | OUTPATIENT
Start: 2021-09-25 | End: 2021-09-27 | Stop reason: HOSPADM

## 2021-09-24 RX ORDER — CODEINE PHOSPHATE AND GUAIFENESIN 10; 100 MG/5ML; MG/5ML
10 SOLUTION ORAL 3 TIMES DAILY PRN
Status: DISCONTINUED | OUTPATIENT
Start: 2021-09-24 | End: 2021-09-27 | Stop reason: HOSPADM

## 2021-09-24 RX ORDER — VITAMIN B COMPLEX
2000 TABLET ORAL DAILY
Status: DISCONTINUED | OUTPATIENT
Start: 2021-09-25 | End: 2021-09-27 | Stop reason: HOSPADM

## 2021-09-24 RX ORDER — LAMOTRIGINE 25 MG/1
250 TABLET ORAL ONCE
Status: COMPLETED | OUTPATIENT
Start: 2021-09-24 | End: 2021-09-24

## 2021-09-24 RX ORDER — PANTOPRAZOLE SODIUM 40 MG/1
40 TABLET, DELAYED RELEASE ORAL DAILY
Status: DISCONTINUED | OUTPATIENT
Start: 2021-09-25 | End: 2021-09-27 | Stop reason: HOSPADM

## 2021-09-24 RX ORDER — VENLAFAXINE 37.5 MG/1
37.5 TABLET ORAL 3 TIMES DAILY
Status: DISCONTINUED | OUTPATIENT
Start: 2021-09-24 | End: 2021-09-25

## 2021-09-24 RX ORDER — CHLORHEXIDINE GLUCONATE 0.12 MG/ML
15 RINSE ORAL 2 TIMES DAILY
Status: DISCONTINUED | OUTPATIENT
Start: 2021-09-25 | End: 2021-09-25

## 2021-09-24 RX ORDER — CIPROFLOXACIN HYDROCHLORIDE 3.5 MG/ML
1 SOLUTION/ DROPS TOPICAL
Status: DISCONTINUED | OUTPATIENT
Start: 2021-09-25 | End: 2021-09-25

## 2021-09-24 RX ORDER — MONTELUKAST SODIUM 10 MG/1
10 TABLET ORAL NIGHTLY
Status: DISCONTINUED | OUTPATIENT
Start: 2021-09-24 | End: 2021-09-25

## 2021-09-24 RX ORDER — BUDESONIDE 0.5 MG/2ML
0.5 INHALANT ORAL EVERY 12 HOURS
Status: DISCONTINUED | OUTPATIENT
Start: 2021-09-24 | End: 2021-09-27 | Stop reason: HOSPADM

## 2021-09-24 RX ORDER — BENZONATATE 100 MG/1
100 CAPSULE ORAL 3 TIMES DAILY PRN
Status: DISCONTINUED | OUTPATIENT
Start: 2021-09-24 | End: 2021-09-27 | Stop reason: HOSPADM

## 2021-09-24 RX ORDER — ALBUTEROL SULFATE 2.5 MG/3ML
2.5 SOLUTION RESPIRATORY (INHALATION) EVERY 4 HOURS PRN
Status: DISCONTINUED | OUTPATIENT
Start: 2021-09-24 | End: 2021-09-27 | Stop reason: HOSPADM

## 2021-09-24 RX ORDER — GABAPENTIN 300 MG/1
600 CAPSULE ORAL 3 TIMES DAILY
Status: DISCONTINUED | OUTPATIENT
Start: 2021-09-24 | End: 2021-09-27 | Stop reason: HOSPADM

## 2021-09-24 RX ORDER — ARFORMOTEROL TARTRATE 15 UG/2ML
15 SOLUTION RESPIRATORY (INHALATION) 2 TIMES DAILY
Status: DISCONTINUED | OUTPATIENT
Start: 2021-09-24 | End: 2021-09-27 | Stop reason: HOSPADM

## 2021-09-24 RX ORDER — NALOXONE HYDROCHLORIDE 0.4 MG/ML
0.4 INJECTION, SOLUTION INTRAMUSCULAR; INTRAVENOUS; SUBCUTANEOUS PRN
Status: DISCONTINUED | OUTPATIENT
Start: 2021-09-24 | End: 2021-09-27 | Stop reason: HOSPADM

## 2021-09-24 RX ORDER — HEPARIN SODIUM 1000 [USP'U]/ML
80 INJECTION, SOLUTION INTRAVENOUS; SUBCUTANEOUS ONCE
Status: COMPLETED | OUTPATIENT
Start: 2021-09-24 | End: 2021-09-24

## 2021-09-24 RX ORDER — ACYCLOVIR 800 MG/1
800 TABLET ORAL 2 TIMES DAILY PRN
Status: DISCONTINUED | OUTPATIENT
Start: 2021-09-24 | End: 2021-09-27 | Stop reason: HOSPADM

## 2021-09-24 RX ORDER — HEPARIN SODIUM 1000 [USP'U]/ML
40 INJECTION, SOLUTION INTRAVENOUS; SUBCUTANEOUS PRN
Status: DISCONTINUED | OUTPATIENT
Start: 2021-09-24 | End: 2021-09-27 | Stop reason: HOSPADM

## 2021-09-24 RX ORDER — HEPARIN SODIUM 1000 [USP'U]/ML
80 INJECTION, SOLUTION INTRAVENOUS; SUBCUTANEOUS PRN
Status: DISCONTINUED | OUTPATIENT
Start: 2021-09-24 | End: 2021-09-27 | Stop reason: HOSPADM

## 2021-09-24 RX ORDER — MELOXICAM 7.5 MG/1
15 TABLET ORAL DAILY
Status: DISCONTINUED | OUTPATIENT
Start: 2021-09-25 | End: 2021-09-27 | Stop reason: HOSPADM

## 2021-09-24 RX ADMIN — FULVESTRANT 250 MG: 50 INJECTION, SOLUTION INTRAMUSCULAR at 15:11

## 2021-09-24 RX ADMIN — HEPARIN SODIUM 6680 UNITS: 1000 INJECTION INTRAVENOUS; SUBCUTANEOUS at 22:02

## 2021-09-24 RX ADMIN — HEPARIN SODIUM 18 UNITS/KG/HR: 10000 INJECTION, SOLUTION INTRAVENOUS at 22:02

## 2021-09-24 ASSESSMENT — PAIN SCALES - GENERAL: PAINLEVEL_OUTOF10: 7

## 2021-09-25 LAB
ANION GAP SERPL CALCULATED.3IONS-SCNC: 10 MMOL/L (ref 7–19)
APTT: 160.6 SEC (ref 26–36.2)
APTT: 71.1 SEC (ref 26–36.2)
APTT: 73.7 SEC (ref 26–36.2)
APTT: 75.7 SEC (ref 26–36.2)
BASOPHILS ABSOLUTE: 0 K/UL (ref 0–0.2)
BASOPHILS RELATIVE PERCENT: 0 % (ref 0–1)
BUN BLDV-MCNC: 13 MG/DL (ref 6–20)
CALCIUM SERPL-MCNC: 8.6 MG/DL (ref 8.6–10)
CHLORIDE BLD-SCNC: 106 MMOL/L (ref 98–111)
CO2: 27 MMOL/L (ref 22–29)
CREAT SERPL-MCNC: 0.6 MG/DL (ref 0.5–0.9)
EOSINOPHILS ABSOLUTE: 0.04 K/UL (ref 0–0.6)
EOSINOPHILS RELATIVE PERCENT: 1 % (ref 0–5)
GFR AFRICAN AMERICAN: >59
GFR NON-AFRICAN AMERICAN: >60
GLUCOSE BLD-MCNC: 158 MG/DL (ref 74–109)
HCT VFR BLD CALC: 30.7 % (ref 37–47)
HEMOGLOBIN: 9.9 G/DL (ref 12–16)
LYMPHOCYTES ABSOLUTE: 2.2 K/UL (ref 1.1–4.5)
LYMPHOCYTES RELATIVE PERCENT: 51 % (ref 20–40)
MACROCYTES: ABNORMAL
MCH RBC QN AUTO: 35 PG (ref 27–31)
MCHC RBC AUTO-ENTMCNC: 32.2 G/DL (ref 33–37)
MCV RBC AUTO: 108.5 FL (ref 81–99)
MONOCYTES ABSOLUTE: 0.2 K/UL (ref 0–0.9)
MONOCYTES RELATIVE PERCENT: 4 % (ref 0–10)
NEUTROPHILS ABSOLUTE: 1.9 K/UL (ref 1.5–7.5)
NEUTROPHILS RELATIVE PERCENT: 44 % (ref 50–65)
PDW BLD-RTO: 14.9 % (ref 11.5–14.5)
PLATELET # BLD: 187 K/UL (ref 130–400)
PLATELET SLIDE REVIEW: ADEQUATE
PMV BLD AUTO: 8.9 FL (ref 9.4–12.3)
POLYCHROMASIA: ABNORMAL
POTASSIUM REFLEX MAGNESIUM: 3.6 MMOL/L (ref 3.5–5)
RBC # BLD: 2.83 M/UL (ref 4.2–5.4)
SODIUM BLD-SCNC: 143 MMOL/L (ref 136–145)
TEAR DROP CELLS: ABNORMAL
WBC # BLD: 4.3 K/UL (ref 4.8–10.8)

## 2021-09-25 PROCEDURE — 2140000000 HC CCU INTERMEDIATE R&B

## 2021-09-25 PROCEDURE — 85007 BL SMEAR W/DIFF WBC COUNT: CPT

## 2021-09-25 PROCEDURE — 80048 BASIC METABOLIC PNL TOTAL CA: CPT

## 2021-09-25 PROCEDURE — 85730 THROMBOPLASTIN TIME PARTIAL: CPT

## 2021-09-25 PROCEDURE — 6360000002 HC RX W HCPCS: Performed by: HOSPITALIST

## 2021-09-25 PROCEDURE — 83516 IMMUNOASSAY NONANTIBODY: CPT

## 2021-09-25 PROCEDURE — 99223 1ST HOSP IP/OBS HIGH 75: CPT | Performed by: INTERNAL MEDICINE

## 2021-09-25 PROCEDURE — 94640 AIRWAY INHALATION TREATMENT: CPT

## 2021-09-25 PROCEDURE — 85027 COMPLETE CBC AUTOMATED: CPT

## 2021-09-25 PROCEDURE — 6370000000 HC RX 637 (ALT 250 FOR IP): Performed by: HOSPITALIST

## 2021-09-25 PROCEDURE — 6360000002 HC RX W HCPCS: Performed by: EMERGENCY MEDICINE

## 2021-09-25 PROCEDURE — 36415 COLL VENOUS BLD VENIPUNCTURE: CPT

## 2021-09-25 RX ORDER — HYDROCODONE BITARTRATE AND ACETAMINOPHEN 10; 325 MG/1; MG/1
1 TABLET ORAL EVERY 6 HOURS PRN
Status: DISCONTINUED | OUTPATIENT
Start: 2021-09-25 | End: 2021-09-27 | Stop reason: HOSPADM

## 2021-09-25 RX ORDER — HYDROCODONE BITARTRATE AND ACETAMINOPHEN 7.5; 325 MG/1; MG/1
1 TABLET ORAL EVERY 6 HOURS PRN
Status: DISCONTINUED | OUTPATIENT
Start: 2021-09-25 | End: 2021-09-27 | Stop reason: HOSPADM

## 2021-09-25 RX ADMIN — GABAPENTIN 600 MG: 300 CAPSULE ORAL at 01:55

## 2021-09-25 RX ADMIN — CYCLOBENZAPRINE 10 MG: 10 TABLET, FILM COATED ORAL at 01:55

## 2021-09-25 RX ADMIN — GABAPENTIN 600 MG: 300 CAPSULE ORAL at 09:01

## 2021-09-25 RX ADMIN — HYDROCODONE BITARTRATE AND ACETAMINOPHEN 1 TABLET: 10; 325 TABLET ORAL at 05:13

## 2021-09-25 RX ADMIN — BUDESONIDE 500 MCG: 0.5 SUSPENSION RESPIRATORY (INHALATION) at 19:08

## 2021-09-25 RX ADMIN — ARFORMOTEROL TARTRATE 15 MCG: 15 SOLUTION RESPIRATORY (INHALATION) at 19:08

## 2021-09-25 RX ADMIN — ARFORMOTEROL TARTRATE 15 MCG: 15 SOLUTION RESPIRATORY (INHALATION) at 06:31

## 2021-09-25 RX ADMIN — Medication 2000 UNITS: at 09:01

## 2021-09-25 RX ADMIN — HYDROCODONE BITARTRATE AND ACETAMINOPHEN 1 TABLET: 10; 325 TABLET ORAL at 23:50

## 2021-09-25 RX ADMIN — GABAPENTIN 600 MG: 300 CAPSULE ORAL at 14:15

## 2021-09-25 RX ADMIN — HEPARIN SODIUM 14 UNITS/KG/HR: 10000 INJECTION, SOLUTION INTRAVENOUS at 20:07

## 2021-09-25 RX ADMIN — PANTOPRAZOLE SODIUM 40 MG: 40 TABLET, DELAYED RELEASE ORAL at 09:01

## 2021-09-25 RX ADMIN — HEPARIN SODIUM 14 UNITS/KG/HR: 10000 INJECTION, SOLUTION INTRAVENOUS at 05:16

## 2021-09-25 RX ADMIN — IPRATROPIUM BROMIDE 0.5 MG: 0.5 SOLUTION RESPIRATORY (INHALATION) at 19:08

## 2021-09-25 RX ADMIN — MELOXICAM 15 MG: 7.5 TABLET ORAL at 09:01

## 2021-09-25 RX ADMIN — IPRATROPIUM BROMIDE 0.5 MG: 0.5 SOLUTION RESPIRATORY (INHALATION) at 06:31

## 2021-09-25 RX ADMIN — BUDESONIDE 500 MCG: 0.5 SUSPENSION RESPIRATORY (INHALATION) at 06:31

## 2021-09-25 RX ADMIN — CYCLOBENZAPRINE 10 MG: 10 TABLET, FILM COATED ORAL at 23:50

## 2021-09-25 RX ADMIN — LISINOPRIL 10 MG: 10 TABLET ORAL at 09:01

## 2021-09-25 RX ADMIN — IPRATROPIUM BROMIDE 0.5 MG: 0.5 SOLUTION RESPIRATORY (INHALATION) at 14:24

## 2021-09-25 RX ADMIN — GABAPENTIN 600 MG: 300 CAPSULE ORAL at 20:07

## 2021-09-25 RX ADMIN — IPRATROPIUM BROMIDE 0.5 MG: 0.5 SOLUTION RESPIRATORY (INHALATION) at 10:32

## 2021-09-25 ASSESSMENT — ENCOUNTER SYMPTOMS
NAUSEA: 1
DIARRHEA: 0
VOMITING: 0
SHORTNESS OF BREATH: 1

## 2021-09-25 ASSESSMENT — PAIN DESCRIPTION - PROGRESSION: CLINICAL_PROGRESSION: GRADUALLY IMPROVING

## 2021-09-25 ASSESSMENT — PAIN SCALES - GENERAL
PAINLEVEL_OUTOF10: 0
PAINLEVEL_OUTOF10: 2
PAINLEVEL_OUTOF10: 6
PAINLEVEL_OUTOF10: 0
PAINLEVEL_OUTOF10: 0
PAINLEVEL_OUTOF10: 8

## 2021-09-25 ASSESSMENT — PAIN DESCRIPTION - FREQUENCY: FREQUENCY: INTERMITTENT

## 2021-09-25 ASSESSMENT — PAIN DESCRIPTION - DESCRIPTORS: DESCRIPTORS: ACHING;DISCOMFORT

## 2021-09-25 ASSESSMENT — PAIN DESCRIPTION - DIRECTION: RADIATING_TOWARDS: GENERALIZED

## 2021-09-25 ASSESSMENT — PAIN DESCRIPTION - LOCATION: LOCATION: GENERALIZED

## 2021-09-25 ASSESSMENT — PAIN - FUNCTIONAL ASSESSMENT: PAIN_FUNCTIONAL_ASSESSMENT: PREVENTS OR INTERFERES SOME ACTIVE ACTIVITIES AND ADLS

## 2021-09-25 ASSESSMENT — PAIN DESCRIPTION - ONSET: ONSET: ON-GOING

## 2021-09-25 ASSESSMENT — PAIN DESCRIPTION - PAIN TYPE: TYPE: ACUTE PAIN;CHRONIC PAIN

## 2021-09-25 NOTE — PROGRESS NOTES
Ricki Francois arrived to room # 721-2. Presented with: DVT  Mental Status: Patient is oriented, alert, coherent, logical, thought processes intact and able to concentrate and follow conversation. Vitals:    09/24/21 2322   BP: (!) 163/82   Pulse: 99   Resp: 18   Temp: 97.3 °F (36.3 °C)   SpO2: 98%     Patient safety contract and falls prevention contract reviewed with patient Yes. Oriented Patient to room. Call light within reach. Yes.   Needs, issues or concerns expressed at this time: no.      Electronically signed by Simone Christian RN on 9/25/2021 at 4:34 AM

## 2021-09-25 NOTE — CONSULTS
Reason for Consult: Continuity of care regarding left upper extremity DVT    Requesting Physician: Dr. Bisi Morfin:    Chief Complaint   Patient presents with    Arm Pain     left arm pain, DVT         History Obtained From:  History obtained from the patient and medical record    HISTORY OF PRESENT ILLNESS:    Alden Solorzano is a very pleasant 56-year-old -American female. Alden Solorzano is under treatment by Dr. Justin Collet with Faslodex/Ibrance for the diagnosis of stage IV metastatic breast cancer. Alden Solorzano was referred yesterday, 9/24/2021, from Dr. Antonio Robledo clinic to the ED for admission with an acute DVT of the left upper extremity for management. She has a left anterior chest wall port.     Diagnosis  · Grade III Adenocarcinoma of right breast diagnosed 2006  · Stage IIIA, [id number]  · ER/MN Positive, HER-2 bruce/ihc 1+  · April 2013-RECURRENCE in the right axillary region  · Genetic testing- BRCA 1&2 Negative  · 2014 (?) RECURRENCE in the axillary skin  · 01/05/2017- METASTATIC DISEASE with lung, hilar, and axillary lymph node mets  · Osteopenia     Treatment Summary  · 2006- Neoadjuvant chemotherapy with AC x 4 cycles followed by Taxol  · 2007- Lumpectomy with axillary lymph node dissection  · 2007- Adjuvant radiation therapy to whole breast and axillary region  · Did not take tamoxifen due to cost  · April 2013- RECURRENCE  · Neoadjuvant chemotherapy with 2 cycles of Taxotere followed by Doxil  · 10/14/2013- Right Breast Mastectomy and Axillary Dissection  · 01/23/2014- Completion of adjuvant RT to chest wall and axillary lymph node with Dr. Barbara Silver  · 02/14- Initiated adjuvant endocrine therapy with tamoxifen  · 2014 (?) RECURRENCE in the axillary skin  · 2014 (?) Surgical resection of the axillary skin  · 2015 (?) YVETTE/BSO  · 09/19/2016- Bilateral exchange, nipple reconstruction and fat grafting and removal of PAC   · 2016- Switched to aromatase inhibitor  · 01/05/2017- METASTATIC DISEASE with lung, hilar, and axillary mets  · 01/11/2017-06/17/2018- Single agent Abraxane x 13 cycles  · 06/27/2018-09/17/2018- Gemzar  · 10/10/2018-Faslodex every 4 weeks/palbociclib     Cancer History:  Marques Webb was first seen by me on 1/21/2021. She was referred to [facility] of care for history of recurrent metastatic breast cancer. She has been in remission as per the latest CT scan. She is currently on Faslodex and palbociclib. She has received several lines therapy as described below. She moved from [place] to [place] to stay closer to her parents. Her medical history is complex. Further details as below. · 2006- Neoadjuvant chemotherapy with AC x 4 cycles followed by Taxol AC was complicated by viral meningitis; patient experienced neuropathy after 3 doses of Taxol and was switched to Taxotere for last dose  · 2007- Lumpectomy with axillary lymph node dissection 1.9cm, grade 2. No LVI. 1 of 14 lymph nodes positive for malignancy (1/14) [id number]  · 2007- Adjuvant Radiation Therapy to whole breast and axillary region  · April 2013- RECURRENCE presenting as mass in the axillary region  · Neoadjuvant chemotherapy with 2 cycles of Taxotere followed by Doxil- did not have good response to treatment  · 2013- Preop PET/CTshowed 2.8cm, right axillary lymph node with uptake. · 10/14/2013- Right Breast Mastectomy and Axillary Dissection Right breast had benign tissue with fibrous, negative for carcinoma. Axillary contents demonstrated invasive ductal carcinoma, involving fibroadipose tissue and tendinous tissue. 3 benign lymph nodes (0/3). Yusra grade 3. 3.0cm in greatest gross dimensions. Carcinoma permeates among soft tissues and in the right axilla with no apparent involvement of lymph nodes.    · 01/23/2014- Completion of adjuvant radiation therapy to chest wall and axillary lymph node with Dr. Serena Vyas  · 02/14- Initiated adjuvant endocrine therapy with tamoxifen  · 2014 (?) RECURRENCE in the axillary skin  · 2014 (?) Surgical resection of the axillary skin pathology demonstrating tumor at cauterized margin  · 2015 (?) YVETTE/BSO  · 09/19/2016- Bilateral exchange, nipple reconstruction and fat grafting and removal of PAC  · 2016- Switched to aromatase inhibitor patient was non compliant  · 01/05/2017- METASTATIC DISEASE Presented with respiratory failure. CTA Pulmonary showed numerous nodules and masses throughout both lung fields, compatible with metastatic disease. Bilateral hilar adenopathy seen. · 01/11/2017-06/17/2018- Single agent Abraxane x 13 cycles  · 03/15/2017- CT Chest W Contrast Interval decrease in maximal diameter of essentially all the multiple metastatic pulmonary nodules. The average difference is approximately 25%. Some of the much smaller ones have disappeared. Bilateral hilar and aortopulmonary window adenopathy is also similarly smaller. · 06/07/2017- CT Chest W Contrast Multiple lung nodules seen bilaterally. Most of the lung nodules show interval improvement with decreased size by 1 to 3mm. Mediastinal and bilateral hilar adenopathy seen with improvement. · 07/24/2017- PET/CT scan showed marked improvement, is minimal abnormal, has excellent response to therapy  · 02/01/2018- PET/CT scan Numerous bilateral lung masses and nodules, the majority of which do not have appreciable abnormal FDG uptake. The largest mass in the left infrahilar region of the left lower lobe demonstrates a maximum SUV of 3.3. Mediastinal lymphadenopathy. No evidence of metastatic disease in the abdomen or pelvis. · 06/21/2018- CT Chest/Abdomen/Pelvis Multiple lung mets, mild bilateral hilar and mediastinal lymph nodes.  No masses or evidence of metastatic disease in the abdomen or pelvis  · 06/27/2018-09/17/2018- Gemzar  · 09/28/2018- CT Pulmonary Multiple lung mets, mild bilateral hilar and mediastinal lymph nodes  · 10/10/2018-12/21/2020- Faslodex every 4 weeks  · 12/14/2018- CT Chest showed definite decrease in the maximal diameter and volume of all left and right metastatic nodules. Somewhat enlarged paratracheal and left axillary lymph nodes are relatively unchanged. · 04/22/2019- MRI Cervical Spine W WO Contrast Unremarkable  · 04/22/2019- MRI Brain W WO Contrast No enhancing lesions in the brain and no evidence of metastatic disease. · 07/27/2019- CTA Pulmonary showed essentially complete disappearance of the pulmonary metastatic disease. Several tiny flat peripheral nodules are the only sequela. The tiny ones on the right are unchanged, the tiny ones on the left are even smaller. There is no longer any active metastatic disease in either lung. · 12/19/2019- CT Chest/Abdomen/Pelvis Small, tiny subpleural nodules right upper and right midlung field as well as left lung base has remained unchanged. No new focal parenchymal abnormality seen. No adenopathy seen. There is no abdominal or pelvic mass, adenopathy or fluid collection. No lytic or sclerotic bony lesions, but there is a possibility of osteopenia and/or osteoporosis. · 02/17/2020- DEXA Bone Density showed osteopenia of lumbar spine, T-score -1.8, and left femoral neck, T-score -2.0  · 2/25/21 Ct Abdomen Pelvis W Iv Contrast  No evidence of metastatic disease in the abdomen or pelvis. · 2/26/21 Ct Chest W Contrast Tiny nodules in the right lung described above probably represent small foci of pleural thickening due to chronic inflammatory process or small noncalcified granulomas. No features to suggest malignancy or metastatic disease. Bilateral breast implants without complication. · 3/1/2021-discussed the results CT chest abdomen pelvis. Essentially no clear evidence of metastatic disease. This is consistent with excellent response to treatment. Continue current treatment.    · 4/1/2021 = 31.6 CA 27-29= 33.5 CEA= 1.6   · 6/3/21 CA 15-3= 23.3 CA 27-29= 25.6  CEA= 1.8   · 7/29/2021 CEA=1.7 CA 15-3=21.50 CA 27.29= 26.0  · 8/19/2021 CT Chest  Left lower lobe pleural-based nodular 1.7 x 0.9 cm is indeterminate. PET/CT recommended. Feeding defect in the left lower lobe bronchus versus a postobstructive airspace disease. This too may be further characterized with PET CT versus direct visualization. · 8/19/2021 CT Abd/Pelvis A stable CT scan of the abdomen and pelvis. No finding to suggest neoplastic/metastatic disease. CA 27.29 Dynamics  01/21/2021- 20.3  04/01/2021 33.5  06/03/2021 25.6  07/29/2021 26.0        Past Medical History:    Past Medical History:   Diagnosis Date    Breast cancer ([facility] [address]     Breast cancer with lung mets (RIGHT)    Chronic back pain     GERD (gastroesophageal reflux disease)     Hypertension     Neuropathy     Post chemo treatment neuropathy         Past Surgical History:    Past Surgical History:   Procedure Laterality Date    BREAST LUMPECTOMY Right     2006    BREAST RECONSTRUCTION Left 09/17/2021    Revision left reconstructed breast, implant performed by Star Andrews MD at [facility] [address] [place] [address]  2016    Bilateral breast implants    HYSTERECTOMY, TOTAL ABDOMINAL  2015    MASTECTOMY, BILATERAL      Right 2013 & Left 2016         Immunizations: There is no immunization history on file for this patient.       Current Hospital Medications:    Current Facility-Administered Medications   Medication Dose Route Frequency Provider Last Rate Last Admin    HYDROcodone-acetaminophen ([address]) 7.5-325 MG per tablet 1 tablet  1 tablet Oral Q6H PRN Jumana Macias MD        HYDROcodone-acetaminophen [facility])  MG per tablet 1 tablet  1 tablet Oral Q6H PRN Jumana Macias MD   1 tablet at 09/25/21 0513    [START ON 9/26/2021] palbociclib (IBRANCE) tablet TABS 125 mg  125 mg Oral Nightly Danielle Bauer MD        heparin (porcine) injection 6,680 Units  80 Units/kg IntraVENous PRN Carmen Simpson MD        heparin (porcine) injection 3,340 Units  40 Units/kg IntraVENous PRN Carmen Simpson MD of Systems:  Constitutional: Negative for chills, fatigue, fever or significant weight loss. HENT: Negative for congestion, hearing loss, nosebleeds or sore throat. Eyes: Negative for photophobia, pain, discharge, redness and visual disturbance. Respiratory: Negative for cough, shortness of breath, or wheezing. Cardiovascular: Negative for chest pain, palpitations or leg swelling. Gastrointestinal: Negative for abdominal pain, blood in stool, constipation, diarrhea, nausea or vomiting. Genitourinary: Negative for dysuria, flank pain, frequency, hematuria or urgency. Musculoskeletal: Left arm swelling and erythema  Skin: Negative for rash or petechiae. Neurological: Negative for tremors, seizures, syncope, weakness or headaches. Hematological: No active bruising or bleeding. Psychiatric/Behavioral: Negative for hallucinations. Objective:  Vitals:    Vitals:    09/25/21 0607   BP: 111/63   Pulse: 93   Resp: 18   Temp: 96.6 °F (35.9 °C)   SpO2: 96%       Physical Exam   Constitutional: Oriented to person, place, and time. No acute distress. Head: Normocephalic and atraumatic. Nose: Nose normal.   Mouth/Throat: Oropharynx is clear and moist. No oropharyngeal exudate. Eyes: Pupils are equal and round. Conjunctivae and EOM are normal. No scleral icterus. Neck: Normal range of motion. Neck supple. No JVD. No appreciable thyromegaly. Cardiovascular: Normal rate, regular rhythm, normal heart sounds and intact distal pulses. Exam reveals no gallop, murmurs or friction rub. Pulmonary/Chest: Effort normal and breath sounds normal. No respiratory distress. No wheezes. Abdominal: Soft. Bowel sounds are normal. No organomegally or masses. No tenderness. There is no rebound and no guarding. Musculoskeletal: Left arm swelling and erythema  Lymphadenopathy: No cervical, axillary or inguinal lymphadenopathy. Neurological: Alert and oriented to person, place, and time.  Cranial nerves are intact. Neurological exam is nonfocal  Skin: Skin is warm and dry. No rash noted. No erythema. No pallor. Psychiatric: Judgment normal.         DATA:    Labs:  General Labs:  CBC:   Lab Results   Component Value Date    WBC 4.3 (L) 09/25/2021    HGB 9.9 (L) 09/25/2021    HCT 30.7 (L) 09/25/2021    .5 (H) 09/25/2021     09/25/2021       CMP:    Lab Results   Component Value Date     09/25/2021    K 3.6 09/25/2021     09/25/2021    CO2 27 09/25/2021    BUN 13 09/25/2021    CREATININE 0.6 09/25/2021    GLUCOSE 158 (H) 09/25/2021    CALCIUM 8.6 09/25/2021    PROT 7.1 09/24/2021    LABALBU 4.1 09/24/2021    BILITOT 0.7 09/24/2021    ALKPHOS 99 09/24/2021    AST 22 09/24/2021    ALT 29 09/24/2021    LABGLOM >60 09/25/2021    GFRAA >59 09/25/2021    AGRATIO 1.6 04/01/2021    GLOB 3.3 08/27/2021         30 Day lookback of cultures:    Blood Culture Recent: No results for input(s): BC in the last 720 hours. Gram Stain Recent: No results for input(s): LABGRAM in the last 720 hours. Resp Culture Recent: No results for input(s): CULTRESP in the last 720 hours. Body Fluid Recent : No results for input(s): BFCX in the last 720 hours. MRSA Recent : No results for input(s): [facility] in the last 720 hours. Urine Culture Recent : No results for input(s): LABURIN in the last 720 hours. Organism Recent : No results for input(s): ORG in the last 720 hours. IMPRESSION/RECOMMENDATIONS:    #1  Acute left upper extremity DVT/left upper chest wall port    Noel is a very pleasant 79-year-old -American female. Noel is under treatment by Dr. Ari Sanchez with Faslodex/Ibrance for the diagnosis of stage IV metastatic breast cancer. Noel was referred yesterday, 9/24/2021, from Dr. Sade jackson to the ED for admission with an acute DVT of the left upper extremity for management. She has a left anterior chest wall port. She was admitted and placed on IV heparin.      A noninvasive venous study of the left upper extremity documented the following:  · Acute appearing deep vein thrombosis (DVT) is seen in the internal jugular vein subclavian axillary brachial ulnar , left upper extremity(ies). · Superficial venous thrombosis is visualized in the basilic and cephalic vein of the left upper extremity. A preliminary prothrombotic work-up with antiphospholipid syndrome testing will be initiated, further serological work-up will be done in outpatient setting under the direction of Dr. Yoana Desai. Awaiting input from vascular surgery. #2  Metastatic breast cancer, hormone sensitive    2/25/2021-discussed the results of CT chest abdomen pelvis. No evidence of disease progression. In fact, excellent response with no measurable metastatic disease at this time. 1/21/2021-CA 15-3 = 23, CA 27-29 = 20.3. Currently Faslodex/Ibrance. 6/3/2021-CA 15-3-23, CA 27-29-25, CEA 1.8  Regimen:  Faslodex to 500 mg subcutaneous every 4 weeks. Continue Ibrance days 1-21 q. 28 days     Clinical/laboratory assessment of toxicity for Ibrance       8/19/2021-CT chest/abdomen/pelvis-no evidence of metastatic disease. Findings of a pulmonary lung nodule in the left lower lobe     Essentially, good response to Faslodex/Ibrance. Last cancer marker 7/29/2001 were normal.       #3    B12 deficiency-     B12 levels 189. Recommended B12 2000 mcg p.o. daily. Will start B12 injections today 1000mcg with Faslodex. 3/1/2021- Methylmalonic Acid 171, Vitamin B12 >2000. Hold B12 at this time     #4  Chemotherapy-induced neuropathy-    continue gabapentin.     #5  Chemotherapy-induced anemia    -likely related to treatment. Hb 9.9 today    #6   COVID-19 vaccination response-    Patient has not been vaccinated. Encouraged to get vaccinated. SARS-COV-2   NOT detected on 9/24/2021    #7  Left lower lobe lung nodule-    She is currently being seen by pulmonary at [facility]. They believe that this could be inflammatory.  She received a trial of antibiotics and will have a repeat CT scan with them.         Eliza Manriquez MD    09/25/21  9:50 AM

## 2021-09-25 NOTE — PROGRESS NOTES
4 Eyes Skin Assessment    Wandra Ped is being assessed upon: Admission    I agree that I, Simone Christian, RN, along with April, RN (either 2 RN's or 1 LPN and 1 RN) have performed a thorough Head to Toe Skin Assessment on the patient. ALL assessment sites listed below have been assessed. Areas assessed by both nurses:     [x]   Head, Face, and Ears   [x]   Shoulders, Back, and Chest  [x]   Arms, Elbows, and Hands   [x]   Coccyx, Sacrum, and Ischium  [x]   Legs, Feet, and Heels    Does the Patient have Skin Breakdown?  No    Jose Prevention initiated: No  Wound Care Orders initiated: No    WOC nurse consulted for Pressure Injury (Stage 3,4, Unstageable, DTI, NWPT, and Complex wounds) and New or Established Ostomies: NA        Primary Nurse eSignature: Simone Christian RN on 9/25/2021 at 4:33 AM      Co-Signer eSignature: {Esignature:[id number]}

## 2021-09-25 NOTE — PROGRESS NOTES
[facility] Progress Note    Patient:  Concepcion Eugene  YOB: 1965  Date of Service: 9/25/2021  MRN: [id number]   Acct: [de-identified]   Primary Care Physician: Atul Painter MD  Advance Directive: No Order  Admit Date: 9/24/2021       Hospital Day: 1      CHIEF COMPLAINT:    Chief Complaint   Patient presents with    Arm Pain     left arm pain, DVT       9/25/2021 8:12 AM  Subjective / Interval History:   09/25/2021  Writer assumes care of patient this AM.  Left arm pain fairly controlled. Laying comfortably in bed in no acute distress. No acute changes or acute overnight event reported. Patient denies any acute complaints or distress at this time. Review of Systems:   Review of Systems  ROS: 14 point review of systems is negative except as specifically addressed above.     No diet orders on file  No intake or output data in the 24 hours ending 09/25/21 0812    Medications:   heparin (PORCINE) Infusion 14 Units/kg/hr (09/25/21 0516)     Current Facility-Administered Medications   Medication Dose Route Frequency Provider Last Rate Last Admin    HYDROcodone-acetaminophen ([place]) 7.5-325 MG per tablet 1 tablet  1 tablet Oral Q6H PRN Julian Martinez MD        HYDROcodone-acetaminophen [facility])  MG per tablet 1 tablet  1 tablet Oral Q6H PRN Julian Martinez MD   1 tablet at 09/25/21 0513    heparin (porcine) injection 6,680 Units  80 Units/kg IntraVENous PRN Lenka Bryant MD        heparin (porcine) injection 3,340 Units  40 Units/kg IntraVENous PRN Lenka Bryant MD        heparin 25,000 units in dextrose 5% 250 mL (premix) infusion  5-30 Units/kg/hr IntraVENous Continuous Lenka Bryant MD 11.7 mL/hr at 09/25/21 0516 14 Units/kg/hr at 09/25/21 0516    Arformoterol Tartrate (BROVANA) nebulizer solution 15 mcg  15 mcg Nebulization BID Julian Martinez MD   15 mcg at 09/25/21 0631    budesonide (PULMICORT) nebulizer suspension 500 mcg  0.5 mg Nebulization Q12H Favio Bhatti MD   500 mcg at 09/25/21 0631    albuterol (PROVENTIL) nebulizer solution 2.5 mg  2.5 mg Nebulization Q4H PRN Favio Bhatti MD        ipratropium (ATROVENT) 0.02 % nebulizer solution 0.5 mg  0.5 mg Nebulization 4x daily Favio Bhatti MD   0.5 mg at 09/25/21 0631    benzonatate (TESSALON) capsule 100 mg  100 mg Oral TID PRN Favio Bhatti MD        pantoprazole (PROTONIX) tablet 40 mg  40 mg Oral Daily Favio Bhatti MD        meloxicam JESSIKA HOBBS [facility]) tablet 15 mg  15 mg Oral Daily Favio Bhatti MD        lisinopril (PRINIVIL;ZESTRIL) tablet 10 mg  10 mg Oral Daily Favio Bhatti MD        naloxone [facility]) injection 0.4 mg  0.4 mg IntraVENous PRN Favio Bhatti MD        Vitamin D (CHOLECALCIFEROL) tablet 2,000 Units  2,000 Units Oral Daily Favio Bhatti MD        acyclovir (ZOVIRAX) tablet 800 mg  800 mg Oral BID PRN Favio Bhatti MD        cyclobenzaprine (FLEXERIL) tablet 10 mg  10 mg Oral TID PRN Favio Bhatti MD   10 mg at 09/25/21 0155    guaiFENesin-codeine (GUAIFENESIN AC) 100-10 MG/5ML liquid 10 mL  10 mL Oral TID PRN Favio Bhatti MD        gabapentin (NEURONTIN) capsule 600 mg  600 mg Oral TID Favio Bhatti MD   600 mg at 09/25/21 0155    palbociclib (IBRANCE) tablet TABS 125 mg  125 mg Oral Daily Favio Bhatti MD             heparin (PORCINE) Infusion 14 Units/kg/hr (09/25/21 0516)      Arformoterol Tartrate  15 mcg Nebulization BID    budesonide  0.5 mg Nebulization Q12H    ipratropium  0.5 mg Nebulization 4x daily    pantoprazole  40 mg Oral Daily    meloxicam  15 mg Oral Daily    lisinopril  10 mg Oral Daily    Vitamin D  2,000 Units Oral Daily    gabapentin  600 mg Oral TID    palbociclib  125 mg Oral Daily     HYDROcodone-acetaminophen, HYDROcodone-acetaminophen, heparin (porcine), heparin (porcine), albuterol, benzonatate, naloxone, acyclovir, cyclobenzaprine, guaiFENesin-codeine  No diet orders on file       Labs:   CBC with DIFF:  Recent Labs 09/24/21  1517 09/24/21 2030 09/25/21  0341   WBC 4.71 4.4* 4.3*   RBC 3.07* 3.12* 2.83*   HGB 11.2 10.9* 9.9*   HCT 33.6* 34.1* 30.7*   .4* 109.3* 108.5*   MCH 36.5* 34.9* 35.0*   MCHC 33.3 32.0* 32.2*   RDW 14.3 14.9* 14.9*    187 187   MPV 9.3 9.0* 8.9*   NEUTOPHILPCT 52.5  --  44.0*   LYMPHOPCT 39.3  --  51.0*   MONOPCT 7.2  --  4.0   EOSRELPCT 0.6*  --  1.0   BASOPCT 0.4  --  0.0   NEUTROABS 2.47  --  1.9   LYMPHSABS 1.85  --  2.2   MONOSABS 0.34  --  0.20   EOSABS 0.03*  --  0.04   BASOSABS 0.02  --  0.00       CMP/BMP:  Recent Labs     09/24/21 2030 09/25/21  0341    143   K 3.8 3.6    106   CO2 26 27   ANIONGAP 10 10   GLUCOSE 93 158*   BUN 15 13   CREATININE 0.5 0.6   LABGLOM >60 >60   CALCIUM 9.0 8.6   PROT 7.1  --    LABALBU 4.1  --    BILITOT 0.7  --    ALKPHOS 99  --    ALT 29  --    AST 22  --          CRP:  No results for input(s): CRP in the last 72 hours. Sed Rate:  No results for input(s): SEDRATE in the last 72 hours. HgBA1c:  No components found for: HGBA1C  FLP:  No results found for: TRIG, HDL, LDLCALC, LDLDIRECT, LABVLDL  TSH:    Lab Results   Component Value Date    TSH 1.820 08/27/2021     Troponin T:   Recent Labs     09/24/21 2030   TROPONINI <0.01     Pro-BNP: No results for input(s): BNP in the last 72 hours. INR:   Recent Labs     09/24/21 2030   INR 1.02     ABGs: No results found for: PHART, PO2ART, [id number]  UA:No results for input(s): NITRITE, COLORU, PHUR, LABCAST, WBCUA, RBCUA, MUCUS, TRICHOMONAS, YEAST, BACTERIA, CLARITYU, SPECGRAV, LEUKOCYTESUR, UROBILINOGEN, BILIRUBINUR, BLOODU, GLUCOSEU, AMORPHOUS in the last 72 hours. Invalid input(s): [place] [facility]      Culture Results:    No results for input(s): CXSURG in the last 720 hours. Blood Culture Recent: No results for input(s): BC in the last 720 hours. No results for input(s): BC, BLOODCULT2, ORG in the last 72 hours.           Cultures:   No results for input(s): CULTURE in the last 72 hours. No results for input(s): BC, Dave Grubbsl in the last 72 hours. No results for input(s): CXSURG in the last 72 hours. No results for input(s): MG, PHOS in the last 72 hours. Recent Labs     09/24/21 2030   AST 22   ALT 29   BILITOT 0.7   ALKPHOS 99         RAD  VL Extremity Venous Left    Result Date: 9/24/2021  Vascular Upper Extremities Veins Procedure  Demographics   Patient Name    Josefina Thompson Age                   49   Patient Number  [id number]          Gender                Female   Visit Number    [id number]       Interpreting          Ulysses Middleton                                  Physician   Date of Birth   1965      Referring Physician   Mary ArteagaIsabel Hernán   Accession       [id number]      [address] [place], [place]  Number  Procedure Type of Study:   Veins:Upper Extremities Veins, UE VENOUS UNILATERAL/FLU. Indications for Study:Arm swelling and Arm pain. Impression   Acute appearing deep vein thrombosis (DVT) is seen in the internal jugular  vein subclavian axillary brachial ulnar , left upper extremity(ies). Superficial venous thrombosis is visualized in the basilic and cephalic  vein of the left upper extremity. Signature   ----------------------------------------------------------------  Electronically signed by Rose Andrea(Interpreting  physician) on 09/24/2021 04:47 PM  ----------------------------------------------------------------  Velocities are measured in cm/s ; Diameters are measured in mm Right UE Vein Measurements 2D Measurements +---------------+-----------------+----------------------+-----------------+ ! Location       ! Visualized       ! Compressibility       ! Thrombosis       ! +---------------+-----------------+----------------------+-----------------+ ! IJV            ! Yes              ! Yes                   ! None             ! +---------------+-----------------+----------------------+-----------------+ ! SCV            ! Yes !Yes                   !None             ! +---------------+-----------------+----------------------+-----------------+ Left UE Vein Measurements 2D Measurements +---------------+-----------------+----------------------+-----------------+ ! Location       ! Visualized       ! Compressibility       ! Thrombosis       ! +---------------+-----------------+----------------------+-----------------+ ! IJV            ! Yes              ! No                    !                 ! +---------------+-----------------+----------------------+-----------------+ ! SCV            ! Yes              ! No                    !                 ! +---------------+-----------------+----------------------+-----------------+ ! Axillary       ! Yes              ! No                    !                 ! +---------------+-----------------+----------------------+-----------------+ ! Brachial       !Yes              ! No                    !                 ! +---------------+-----------------+----------------------+-----------------+ ! Radial         !Yes              ! Yes                   ! None             ! +---------------+-----------------+----------------------+-----------------+ ! Ulnar          ! Yes              ! No                    !                 ! +---------------+-----------------+----------------------+-----------------+ ! Cephalic       ! Yes              ! No                    !                 ! +---------------+-----------------+----------------------+-----------------+ ! Basilic        ! Yes              ! No                    !                 ! +---------------+-----------------+----------------------+-----------------+    XR CHEST PORTABLE    Result Date: 9/24/2021  XR CHEST PORTABLE 9/24/2021 8:00 PM HISTORY: Chest pain COMPARISON: 7/28/2019 CXR: A single frontal view of the chest is performed. Findings: Lungs are hypoventilated. Bilateral breast implants with associated soft tissue attenuation. No pulmonary consolidation or edema. Cardiac and mediastinal contours normal. No pleural effusion or pneumothorax. Left subclavian port in place with tip overlying the SVC. Right axillary surgical clips. 1. Bilateral breast implants. No acute pulmonary process identified. Left subclavian port. Signed by Dr Warren Mayorga      Objective:   Vitals:   /63   Pulse 93   Temp 96.6 °F (35.9 °C) (Temporal)   Resp 18   Ht 5' 6\" (1.676 m)   Wt 183 lb (83 kg)   SpO2 96%   BMI 29.54 kg/m²       Patient Vitals for the past 24 hrs:   BP Temp Temp src Pulse Resp SpO2 Height Weight   09/25/21 0607 111/63 96.6 °F (35.9 °C) Temporal 93 18 96 % -- --   09/25/21 0515 -- -- -- -- -- -- -- 183 lb (83 kg)   09/24/21 2322 (!) 163/82 97.3 °F (36.3 °C) Temporal 99 18 98 % -- 183 lb 9.6 oz (83.3 kg)   09/24/21 1934 (!) 150/91 98.2 °F (36.8 °C) -- 98 20 93 % 5' 6\" (1.676 m) 184 lb (83.5 kg)       24HR INTAKE/OUTPUT:  No intake or output data in the 24 hours ending 09/25/21 8271    Physical Exam  Vitals and nursing note reviewed. Constitutional:       General: She is not in acute distress. Appearance: Normal appearance. She is obese. She is not ill-appearing, toxic-appearing or diaphoretic. HENT:      Head: Normocephalic and atraumatic. Right Ear: External ear normal.      Left Ear: External ear normal.      Nose: Nose normal. No congestion or rhinorrhea. Mouth/Throat:      Mouth: Mucous membranes are moist.      Pharynx: Oropharynx is clear. No oropharyngeal exudate or posterior oropharyngeal erythema. Eyes:      General: No scleral icterus. Right eye: No discharge. Left eye: No discharge. Extraocular Movements: Extraocular movements intact. Conjunctiva/sclera: Conjunctivae normal.      Pupils: Pupils are equal, round, and reactive to light. Cardiovascular:      Rate and Rhythm: Normal rate and regular rhythm. Pulses: Normal pulses. Heart sounds: Normal heart sounds. No murmur heard. No friction rub.  No gallop. Pulmonary:      Effort: Pulmonary effort is normal. No respiratory distress. Breath sounds: Normal breath sounds. No stridor. No wheezing, rhonchi or rales. Chest:      Chest wall: No tenderness. Abdominal:      General: Bowel sounds are normal. There is no distension. Palpations: Abdomen is soft. Tenderness: There is no abdominal tenderness. There is no guarding or rebound. Musculoskeletal:         General: No swelling, tenderness, deformity or signs of injury. Normal range of motion. Cervical back: Normal range of motion and neck supple. No rigidity or tenderness. No muscular tenderness. Right lower leg: No edema. Left lower leg: No edema. Skin:     General: Skin is warm and dry. Capillary Refill: Capillary refill takes less than 2 seconds. Coloration: Skin is not jaundiced or pale. Findings: No bruising, erythema, lesion or rash. Neurological:      General: No focal deficit present. Mental Status: She is alert and oriented to person, place, and time. Cranial Nerves: No cranial nerve deficit. Sensory: No sensory deficit. Motor: No weakness. Coordination: Coordination normal.   Psychiatric:         Mood and Affect: Mood normal.         Behavior: Behavior normal.         Thought Content: Thought content normal.         Judgment: Judgment normal.         Assessment/plan:         Hospital Problems         Last Modified POA    Acute deep vein thrombosis (DVT) of left upper extremity ([address] 9/24/2021 Yes          Active Problems:    Acute deep vein thrombosis (DVT) of left upper extremity ([address]  Resolved Problems:    * No resolved hospital problems. *      Brief Summary  Ms. Campbell Barton, a 47-year-old female, history of grade 3 adenocarcinoma of right breast (dx 2006 -on chemo), S/p bilateral mastectomy and breast reconstruction (09/17/2021), admitted to [facility] (09/24/2021), on account of acute DVT left upper extremity.     Patient was transferred from his oncologist office, after with a 5-day history of significant swelling in the left upper extremity. Patient was sent to vascular lab. Patient was noted to have an extensive acute DVT involving the left upper Extremity. Patient admitted to [facility] (09/24/2021), further work-up and management, including initiation of heparin ggt. History of grade 3 adenocarcinoma of right breast -- S/p bilateral mastectomy and breast reconstruction (09/17/2021),  Acute DVT of left upper extremity  · Oncology and vascular surgery consulted on admission  · Heparin ggt  · Continue monitoring      Continue management of other chronic medical conditions - see above and orders. Advance Directive: No Order    No diet orders on file         Consults Made:   IP CONSULT TO ONCOLOGY  IP CONSULT TO VASCULAR SURGERY  IP CONSULT TO HEM/ONC  IP CONSULT TO VASCULAR SURGERY    DVT prophylaxis: Heparin       Discharge planning: tbd    Time Spent is 25 mins in the examination, evaluation, counseling and review of medications, assessment and plan.      Electronically signed by   Amanda Nguyen MD, MPH, MD,   Internal Medicine Hospitalist   9/25/2021 8:12 AM

## 2021-09-25 NOTE — H&P
Patient Information:  Patient: Radha Pacheco  MRN: [id number]   Acct: [de-identified]  YOB: 1965  Admit Date: 9/24/2021      Primary Care Physician: Lynnette Trevino MD  Advance Directive: Full Code  Health Care Proxy: her Father, Mr. Alexandre Roy, [phone]        SUBJECTIVE:    Chief Complaint   Patient presents with    Arm Pain     left arm pain, DVT     EP Sign Out:  Sent in by hem-onc, they have asked for hospitalist to please admit and consult them and vascular    HPI:  Mrs. Radha Pacheco is a pleasant 49year old  lady who discovered last week (ten days ago) that she has skin discoloration and swelling. She has not had any change since then, it is getting tighter and redder in color. She went in to see Dr. Landon Casillas today and was referred for [address]. She was then sent from the office to the hospital ED for treatment. Review of Systems:   Review of Systems   Constitutional: Positive for diaphoresis. Negative for chills, fatigue and fever. Respiratory: Positive for shortness of breath. Cardiovascular: Negative for chest pain, palpitations and leg swelling. Gastrointestinal: Positive for nausea. Negative for diarrhea and vomiting. Neurological: Positive for headaches. Negative for syncope, weakness and light-headedness. Psychiatric/Behavioral: Negative for confusion.      Past Medical History:   Diagnosis Date    Breast cancer [address] [address]     Breast cancer with lung mets (RIGHT)    Chronic back pain     GERD (gastroesophageal reflux disease)     Hypertension     Neuropathy     Post chemo treatment neuropathy     Past Psychiatric History:  Denies any    Past Surgical History:   Procedure Laterality Date    BREAST LUMPECTOMY Right     2006    BREAST RECONSTRUCTION Left 09/17/2021    Revision left reconstructed breast, implant performed by Derrick Rodriguez MD at [address]  2016    Bilateral breast implants    HYSTERECTOMY, TOTAL ABDOMINAL  2015    MG tablet Take 10 mg by mouth 3 times daily as needed for Muscle spasms   Yes Historical Provider, MD   Albuterol Sulfate (PROAIR HFA IN) Inhale 1 each into the lungs daily as needed    Yes Historical Provider, MD   Cholecalciferol (VITAMIN D3) 125 MCG (5000 UT) TABS Take 1 tablet by mouth daily    Yes Historical Provider, MD   lisinopril (PRINIVIL;ZESTRIL) 10 MG tablet Take 10 mg by mouth daily   Yes Historical Provider, MD   ondansetron (ZOFRAN) 4 MG tablet Take 4 mg by mouth every 8 hours as needed for Nausea or Vomiting   Yes Historical Provider, MD   meloxicam (MOBIC) 15 MG tablet Take 15 mg by mouth daily   Yes Historical Provider, MD   VENTOLIN  (90 Base) MCG/ACT inhaler Inhale 2 puffs into the lungs 4 times daily as needed for Wheezing 9/2/21   Ashley Morrison MD   potassium chloride (KLOR-CON M) 20 MEQ extended release tablet Take 1 tablet by mouth daily for 7 days 6/3/21 9/2/21  Mary Powers MD   guaiFENesin-codeine (GUAIFENESIN AC) 100-10 MG/5ML liquid Take 5 mLs by mouth 3 times daily as needed for Cough. Historical Provider, MD   HYDROcodone-acetaminophen (NORCO) 7.5-325 MG per tablet Take 1 tablet by mouth every 6 hours as needed for Pain. Historical Provider, MD   benzonatate (TESSALON) 100 MG capsule Take 100 mg by mouth 3 times daily as needed for Cough    Historical Provider, MD   acyclovir (ZOVIRAX) 800 MG tablet Take 800 mg by mouth 2 times daily as needed     Historical Provider, MD   alclomethasone (ACLOVATE) 0.05 % cream Apply topically 2 times daily Apply topically 2 times daily.     Historical Provider, MD   ciprofloxacin (CILOXAN) 0.3 % ophthalmic solution 1 drop every 2 hours    Historical Provider, MD   chlorhexidine (PERIDEX) 0.12 % solution Take 15 mLs by mouth 2 times daily    Historical Provider, MD         OBJECTIVE:    Vitals:    09/24/21 2322   BP: (!) 163/82   Pulse: 99   Resp: 18   Temp: 97.3 °F (36.3 °C)   SpO2: 98%   breathing on room air    BP (!) 163/82   Pulse 99   Temp 97.3 °F (36.3 °C) (Temporal)   Resp 18   Ht 5' 6\" (1.676 m)   Wt 183 lb (83 kg)   SpO2 98%   BMI 29.54 kg/m²     No intake or output data in the 24 hours ending 09/25/21 0517    Physical Exam  Vitals reviewed. Constitutional:       General: She is not in acute distress. Appearance: Normal appearance. She is normal weight. She is not ill-appearing or toxic-appearing. HENT:      Head: Normocephalic and atraumatic. Nose: No congestion or rhinorrhea. Eyes:      General:         Right eye: No discharge. Left eye: No discharge. Neck:      Comments: Supple, trachea appears midline  Cardiovascular:      Rate and Rhythm: Normal rate and regular rhythm. Heart sounds: No murmur heard. No friction rub. Pulmonary:      Effort: No respiratory distress. Breath sounds: No stridor. No wheezing, rhonchi or rales. Abdominal:      General: Bowel sounds are normal.      Palpations: Abdomen is soft. Tenderness: There is no abdominal tenderness. There is no guarding or rebound. Musculoskeletal:         General: No tenderness or signs of injury. Right lower leg: No edema. Left lower leg: No edema. Skin:     General: Skin is warm. Comments: nondiaphoretic   Neurological:      Mental Status: She is alert and oriented to person, place, and time.    Psychiatric:         Mood and Affect: Mood normal.         Behavior: Behavior normal.        LABORATORY DATA:    CBC:   Recent Labs     09/24/21  1517 09/24/21 2030 09/25/21  0341   WBC 4.71 4.4* 4.3*   HGB 11.2 10.9* 9.9*   HCT 33.6* 34.1* 30.7*    187 187     BMP:   Recent Labs     09/24/21 2030 09/25/21  0341    143   K 3.8 3.6    106   CO2 26 27   BUN 15 13   CREATININE 0.5 0.6   CALCIUM 9.0 8.6     Hepatic Profile:   Recent Labs     09/24/21 2030   AST 22   ALT 29   BILITOT 0.7   ALKPHOS 99     Coag Panel:   Recent Labs     09/24/21 2030 09/25/21  0341   INR 1.02  -- PROTIME 13.6  --    APTT 49.2* 160.6*     Cardiac Enzymes:   Recent Labs     09/24/21 2030   TROPONINI <0.01     Pro-BNP:   Recent Labs     09/24/21 2030   PROBNP 82       IMAGING:  XR CHEST PORTABLE  Result Date: 9/24/2021  1. Bilateral breast implants. No acute pulmonary process identified. Left subclavian port. Signed by Dr Jag Barlow:    Port associated DVT Left Upper Chest:  Hem/Onc consulting - they sent her in  Vasc Surg consulting  On Heparin GGT as per Hem/Onc notes - reccs  CBC with manual in AM, daily with auto diff to follow    Chronic Medical Problems:  Continue home regimen as indicated   Arformoterol Tartrate  15 mcg Nebulization BID    budesonide  0.5 mg Nebulization Q12H    ipratropium  0.5 mg Nebulization 4x daily    pantoprazole  40 mg Oral Daily    meloxicam  15 mg Oral Daily    lisinopril  10 mg Oral Daily    Vitamin D  2,000 Units Oral Daily    gabapentin  600 mg Oral TID    palbociclib  125 mg Oral Daily     Supoportive and Prophylactic Txx:  DVT Prophylaxis: Heparin GGT  GI (PUD) Ppx: not indicated acutely but covered as per home regimen  PT consult for evalutation and Txx as indicated: not indicated  NPO except meds  HYDROcodone-acetaminophen, HYDROcodone-acetaminophen, heparin (porcine), heparin (porcine), albuterol, benzonatate, naloxone, acyclovir, cyclobenzaprine, guaiFENesin-codeine      Care time of >50 minutes  Pt seen/examined and admitted to inpatient status. Inpatient status is used for patients with an expected LOS extending past two midnights due to medical therapy and or critical care needs, otherwise patients are placed to OBServation status. Signed:  Electronically signed by Sofia Euceda MD on 9/25/21 at 5:30 AM CDT.

## 2021-09-25 NOTE — ED PROVIDER NOTES
[facility]  eMERGENCY dEPARTMENT eNCOUnter      Pt Name: Clarisa Quinones  MRN: [id number]  [place] 1965  Date of evaluation: 9/24/2021  Provider: Gopal Simpson MD    CHIEF COMPLAINT       Chief Complaint   Patient presents with    Arm Pain     left arm pain, DVT         HISTORY OF PRESENT ILLNESS   (Location/Symptom, Timing/Onset,Context/Setting, Quality, Duration, Modifying Factors, Severity)  Note limiting factors. Clarisa Quinones is a 49 y.o. female who presents to the emergency department due to left arm swelling and pain. Noticed this about 1 or 2 weeks ago. Has history of bilateral mastectomy due to breast cancer in the past.  Still follows with oncology but breast cancer has been in remission. Had breast reconstruction done 1 week ago and has been followed by Dr. River Lan postoperatively. Most recent visit was 2 days ago. Told everything post operatively was doing well but she tells me she has had some swelling and discomfort in her left arm that started a little before surgery did. When she saw Dr. Ari Sanchez for her chemo today she notified him about the arm discomfort and swelling. Ultrasound was done that showed extensive DVT in left arm. Patient does have significant swelling left arm compared to the right. Pain involves the entire left arm goes up in the left axilla. It also radiates from the left side of her neck a little bit. Denies any chest pain currently but tells me she has had some occasional sharp chest pains in the left side of her chest at times. Nothing recently but has had some sharp chest pain a few weeks ago and again about 2 months ago. Has felt a little short of breath for the past week or 2. No history of DVT or PE previously. Dr. Ari Sanchez sent patient to ER with plan for admission. HPI    NursingNotes were reviewed.     REVIEW OF SYSTEMS    (2-9 systems for level 4, 10 or more for level 5)     Review of Systems   Constitutional: Negative for fever.   Eyes: Negative for pain. Respiratory: Positive for shortness of breath (none currently). Cardiovascular: Positive for chest pain (none currently). Negative for palpitations and leg swelling. Gastrointestinal: Negative for abdominal pain, diarrhea and vomiting. Genitourinary: Negative for dysuria. Skin: Negative for rash. Neurological: Negative for weakness and headaches. All other systems reviewed and are negative. A complete review of systems was performed and is negative except as noted above in the HPI. PAST MEDICAL HISTORY     Past Medical History:   Diagnosis Date    Breast cancer ([facility] [address]     Breast cancer with lung mets (RIGHT)    Chronic back pain     GERD (gastroesophageal reflux disease)     Hypertension     Neuropathy     Post chemo treatment neuropathy         SURGICAL HISTORY       Past Surgical History:   Procedure Laterality Date    BREAST LUMPECTOMY Right     2006    BREAST RECONSTRUCTION Left 09/17/2021    Revision left reconstructed breast, implant performed by Leia Galdamez MD at [address] [place] [address]  2016    Bilateral breast implants    EMBOLECTOMY Left 9/26/2021    LEFT UPPER EXTREMITY  MECHANICAL SUCTION THROMBECTOMY performed by Colby Mazariegos DO at [facility], TOTAL ABDOMINAL  2015   [facility], BILATERAL      Right 2013 & Left 2016         CURRENT MEDICATIONS       Discharge Medication List as of 9/27/2021  4:31 PM      CONTINUE these medications which have NOT CHANGED    Details   HYDROcodone-acetaminophen (NORCO)  MG per tablet Take 1 tablet by mouth every 6 hours as needed for Pain.  Intended supply: 30 days, Disp-12 tablet, R-0Normal      fluticasone-vilanterol (BREO ELLIPTA) 100-25 MCG/INH AEPB inhaler Inhale 1 puff into the lungs daily, Disp-1 each, R-5Normal      pantoprazole (PROTONIX) 40 MG tablet TAKE 1 TABLET BY MOUTH EVERY DAY, Disp-90 tablet, R-0Normal      IBRANCE 125 MG tablet Take 1 tablet (125mg) by mouth once daily. , Disp-21 tablet, R-3Normal      NARCAN 4 MG/0.1ML LIQD nasal spray DAWHistorical Med      gabapentin (NEURONTIN) 300 MG capsule TAKE 2 CAPSULES BY MOUTH 3 TIMES DAILY FOR 30 DAYS., Disp-180 capsule, R-0Normal      cyclobenzaprine (FLEXERIL) 10 MG tablet Take 10 mg by mouth 3 times daily as needed for Muscle spasmsHistorical Med      !! Albuterol Sulfate (PROAIR HFA IN) Inhale 1 each into the lungs daily as needed Historical Med      Cholecalciferol (VITAMIN D3) 125 MCG (5000 UT) TABS Take 1 tablet by mouth daily Historical Med      lisinopril (PRINIVIL;ZESTRIL) 10 MG tablet Take 10 mg by mouth dailyHistorical Med      ondansetron (ZOFRAN) 4 MG tablet Take 4 mg by mouth every 8 hours as needed for Nausea or VomitingHistorical Med      meloxicam (MOBIC) 15 MG tablet Take 15 mg by mouth dailyHistorical Med      famotidine (PEPCID) 20 MG tablet Take 1 tablet by mouth daily, Disp-30 tablet, R-5Normal      !! VENTOLIN  (90 Base) MCG/ACT inhaler Inhale 2 puffs into the lungs 4 times daily as needed for Wheezing, Disp-18 g, R-5, DAWMust be Ventolin (brand name only)Normal      guaiFENesin-codeine (GUAIFENESIN AC) 100-10 MG/5ML liquid Take 5 mLs by mouth 3 times daily as needed for Cough. Historical Med      benzonatate (TESSALON) 100 MG capsule Take 100 mg by mouth 3 times daily as needed for CoughHistorical Med      acyclovir (ZOVIRAX) 800 MG tablet Take 800 mg by mouth 2 times daily as needed Historical Med      alclomethasone (ACLOVATE) 0.05 % cream Apply topically 2 times daily Apply topically 2 times daily. , Topical, 2 TIMES DAILY, Historical Med      ciprofloxacin (CILOXAN) 0.3 % ophthalmic solution 1 drop every 2 hoursHistorical Med      chlorhexidine (PERIDEX) 0.12 % solution Take 15 mLs by mouth 2 times dailyHistorical Med       !! - Potential duplicate medications found. Please discuss with provider.           ALLERGIES     Clindamycin/lincomycin    FAMILY HISTORY       Family History Problem Relation Age of Onset    Hypertension Mother     Obesity Mother     Prostate Cancer Father     No Known Problems Sister     No Known Problems Daughter     No Known Problems Daughter           SOCIAL HISTORY       Social History     Socioeconomic History    Marital status:      Spouse name: None    Number of children: 2    Years of education: None    Highest education level: None   Occupational History    Occupation:      Comment: disabled   Tobacco Use    Smoking status: Never Smoker    Smokeless tobacco: Never Used   Vaping Use    Vaping Use: Never used   Substance and Sexual Activity    Alcohol use: Not Currently     Comment: used to drink socially \"a long time ago\"    Drug use: Not Currently     Types: Marijuana     Comment: \"that was a long time ago\"    Sexual activity: None     Comment: has 2 kids   Other Topics Concern    None   Social History Narrative    CODE STATUS: Full Code    HEALTH CARE PROXY: her Father, Mr. Aida Elizabeth, [phone]    AMBULATES: uses a cane sometimes, usually independent    DOMICILED: stays with friends at this time, has no stairs that she uses, no pets there     Social Determinants of Health     Financial Resource Strain:     Difficulty of Paying Living Expenses:    Food Insecurity:     Worried About Running Out of Food in the Last Year:     Ran Out of Food in the Last Year:    Transportation Needs:     Lack of Transportation (Medical):      Lack of Transportation (Non-Medical):    Physical Activity:     Days of Exercise per Week:     Minutes of Exercise per Session:    Stress:     Feeling of Stress :    Social Connections:     Frequency of Communication with Friends and Family:     Frequency of Social Gatherings with Friends and Family:     Attends Jew Services:     Active Member of Clubs or Organizations:     Attends Club or Organization Meetings:     Marital Status:    Intimate Partner Violence:     Fear of Current or Ex-Partner:     Emotionally Abused:     Physically Abused:     Sexually Abused:        SCREENINGS    Shari Coma Scale  Eye Opening: Spontaneous  Best Verbal Response: Oriented  Best Motor Response: Obeys commands  Shari Coma Scale Score: 15        PHYSICAL EXAM    (up to 7 for level 4, 8 or more for level 5)     ED Triage Vitals [09/24/21 1934]   BP Temp Temp src Pulse Resp SpO2 Height Weight   (!) 150/91 98.2 °F (36.8 °C) -- 98 20 93 % 5' 6\" (1.676 m) 184 lb (83.5 kg)       Physical Exam  Vitals reviewed. Constitutional:       General: She is not in acute distress. Appearance: She is well-developed. HENT:      Head: Normocephalic and atraumatic. Eyes:      General: No scleral icterus. Pupils: Pupils are equal, round, and reactive to light. Neck:      Vascular: No JVD. Cardiovascular:      Rate and Rhythm: Normal rate and regular rhythm. Pulses: Normal pulses. Heart sounds: Normal heart sounds. Pulmonary:      Effort: Pulmonary effort is normal. No respiratory distress. Breath sounds: Normal breath sounds. Chest:      Comments: Patient declined breast exam.  Norm Holt that it was just examined very recently by Dr. Fenton Aase during postop visit. Abdominal:      General: There is no distension. Palpations: Abdomen is soft. Tenderness: There is no abdominal tenderness. Musculoskeletal:         General: Swelling (LUE) present. No tenderness or deformity. Cervical back: Normal range of motion and neck supple. Right lower leg: No edema. Left lower leg: No edema. Skin:     General: Skin is warm and dry. Capillary Refill: Capillary refill takes less than 2 seconds. Neurological:      General: No focal deficit present. Mental Status: She is alert and oriented to person, place, and time.    Psychiatric:         Mood and Affect: Mood normal.         Behavior: Behavior normal.         DIAGNOSTIC RESULTS     EKG: All EKG's are interpreted by the Emergency Department Physician who either signs or Co-signs this chart in the absence of a cardiologist.    Normal sinus rhythm. Normal QT. No signs of acute ischemia. RADIOLOGY:   Non-plain film images such as CT, Ultrasound and MRI are read by the radiologist. Praneeth Minors images are visualized and preliminarily interpreted by the emergency physician with the below findings:      Interpretation per the Radiologist below, if available at the time of this note:    XR CHEST PORTABLE   Final Result   1. Bilateral breast implants. No acute pulmonary process identified. Left subclavian port.    Signed by Dr Alexsandra Johnson       [address] [place] [address]    (Results Pending)         ED BEDSIDE ULTRASOUND:   Performed by ED Physician - none    LABS:  Labs Reviewed   CBC - Abnormal; Notable for the following components:       Result Value    WBC 4.4 (*)     RBC 3.12 (*)     Hemoglobin 10.9 (*)     Hematocrit 34.1 (*)     .3 (*)     MCH 34.9 (*)     MCHC 32.0 (*)     RDW 14.9 (*)     MPV 9.0 (*)     All other components within normal limits   APTT - Abnormal; Notable for the following components:    aPTT 49.2 (*)     All other components within normal limits   APTT - Abnormal; Notable for the following components:    aPTT 160.6 (*)     All other components within normal limits    Narrative:     CALL  [employer] tel. ,  Coag results called to and read back by Shanta MI on 7th, 09/25/2021 04:18, by  [facility]   APTT - Abnormal; Notable for the following components:    aPTT 73.7 (*)     All other components within normal limits    Narrative:     CALL  [place]  [employer] tel. ,  Coag results called to and read back by Petra/HIWOT/NICK, 09/25/2021 09:56, by  [facility]   CBC WITH MANUAL DIFFERENTIAL - Abnormal; Notable for the following components:    WBC 4.3 (*)     RBC 2.83 (*)     Hemoglobin 9.9 (*)     Hematocrit 30.7 (*)     .5 (*)     MCH 35.0 (*)     MCHC 32.2 (*)     RDW 14.9 (*)     MPV 8.9 (*)     Neutrophils % 44.0 (*)     Lymphocytes % 51.0 (*)     Macrocytes 1+ (*)     Polychromasia 1+ (*)     Tear Drop Cells Occasional (*)     All other components within normal limits   BASIC METABOLIC PANEL W/ REFLEX TO MG FOR LOW K - Abnormal; Notable for the following components:    Glucose 158 (*)     All other components within normal limits   APTT - Abnormal; Notable for the following components:    aPTT 75.7 (*)     All other components within normal limits    Narrative:     CALL  [employer] tel. ,  Coag results called to and read back by Carmen/NICK, 09/25/2021 15:34, by  [facility]   APTT - Abnormal; Notable for the following components:    aPTT 71.1 (*)     All other components within normal limits    Narrative:     CALL  [employer] tel. ,  Coag results called to and read back by Elli Jenkins RN ON 7TH, 09/25/2021  23:22, by [facility]   APTT - Abnormal; Notable for the following components:    aPTT 78.1 (*)     All other components within normal limits    Narrative:     CALL  [employer] tel. ,  Coag results called to and read back by Elli Jenkins RN ON 7TH, 09/26/2021  05:09, by [facility]   CBC WITH AUTO DIFFERENTIAL - Abnormal; Notable for the following components:    WBC 4.1 (*)     RBC 2.73 (*)     Hemoglobin 9.6 (*)     Hematocrit 30.5 (*)     .7 (*)     MCH 35.2 (*)     MCHC 31.5 (*)     RDW 15.5 (*)     Neutrophils % 48.4 (*)     Monocytes % 11.1 (*)     All other components within normal limits   BASIC METABOLIC PANEL W/ REFLEX TO MG FOR LOW K - Abnormal; Notable for the following components:    Glucose 120 (*)     Calcium 8.3 (*)     All other components within normal limits   APTT - Abnormal; Notable for the following components:    aPTT 151.6 (*)     All other components within normal limits    Narrative:     CALL  [employer] tel. ,  Coag results called to and read back by Moises/NICK, 09/26/2021 16:14, by  Ran Nichols has been rescheduled by CARLOS at 09/26/2021 09:11 Reason: Pt   in procedure    APTT - Abnormal; Notable for the following components:    aPTT 25.6 (*)     All other components within normal limits    Narrative:     every 6 hr  Collection has been rescheduled by [facility] at 2021 02:18 Reason:   Collect 0600 per RN Anna. PT no longer on Heparin   CBC WITH AUTO DIFFERENTIAL - Abnormal; Notable for the following components:    RBC 2.66 (*)     Hemoglobin 9.6 (*)     Hematocrit 30.2 (*)     .5 (*)     MCH 36.1 (*)     MCHC 31.8 (*)     RDW 15.9 (*)     MPV 9.1 (*)     Neutrophils % 69.5 (*)     All other components within normal limits    Narrative:     Collection has been rescheduled by [facility] at 2021 02:18 Reason:   Collect 0600 per RN Anna. PT no longer on Heparin   BASIC METABOLIC PANEL W/ REFLEX TO MG FOR LOW K - Abnormal; Notable for the following components:    Glucose 148 (*)     Calcium 8.5 (*)     All other components within normal limits    Narrative:     Collection has been rescheduled by [facility] at 2021 02:18 Reason:   Collect 0600 per RN Anna.  PT no longer on Heparin   COVID-19, RAPID   COMPREHENSIVE METABOLIC PANEL W/ REFLEX TO MG FOR LOW K   PROTIME-INR   TROPONIN   BRAIN NATRIURETIC PEPTIDE   SURGICAL PATHOLOGY    Narrative:                                          [place] [address]  Department of Pathology  FINAL SURGICAL PATHOLOGY REPORT  Patient Name:  Kya Valdovinos            Accession No:  [id number]   Age Sex:   1965    49 Y F        Pt Type: I     DIS 02                                             Location:  Account #      [de-identified]                 Collected:     2021  Med Rec #      [id number]                    Received:      2021  Attend Phys:   Heidi Marvin MD          Completed:     2021  Perform Phys:  Agustin Cullen MD            FINAL DIAGNOSIS:    Arm, excision of left arm thrombus: Fragments of organizing and organized  thrombi with dystrophic calcification. CBG/CBG    PREOP DIAGNOSIS:  ISCHEMIA LEFT ARM    SPECIMEN(S):  THROMBUS, THROMBUS LEFT UPPER EXTREMITY    GROSS DESCRIPTION:   Received is a container labeled \"ischemic, left  arm. \" Received in fixative is a 3.3 x 2.6 x 1.4 cm aggregate of brown-tan  soft tissue fragments and skin. Representative sections are submitted in  a single cassette, numerous. TS/PJS        CPT: [id number] Lucinda Brantley MD  (Electronic Signature)  09/28/2021                                                                     Page 1 of 1   ANTIPHOSPHOLIPID ANTIBODY PANEL   SURGICAL PATHOLOGY       All other labs were within normal range or not returned as of this dictation. EMERGENCY DEPARTMENT COURSE and DIFFERENTIALDIAGNOSIS/MDM:   Vitals:    Vitals:    09/26/21 2000 09/27/21 0159 09/27/21 0623 09/27/21 1425   BP:  114/70 139/63    Pulse: 103 100 95    Resp:  12 18 20   Temp:  96.3 °F (35.7 °C) 97.9 °F (36.6 °C)    TempSrc:  Temporal Temporal    SpO2:  97% 98% 97%   Weight:       Height:           MDM  Dr. Collin Summers will see in consult. She actually already saw the patient today. She will follow in consult and recommended heparin which I have ordered. She said do not do CTA of chest as she does not feel that PE is likely and we will be anticoagulating patient for her DVT anyway. We will plan to admit to hospitalist.  Patient updated about plan. Case discussed with Dr. Fink Bare is agreeable plan of care and admission. I spoke with Dr. Kandi Dos Santos who said that Dr. Leontine Moritz will see in consult over the weekend. Patient resting comfortably and updated about plan. CONSULTS:  IP CONSULT TO ONCOLOGY  IP CONSULT TO VASCULAR SURGERY  IP CONSULT TO HEM/ONC  IP CONSULT TO VASCULAR SURGERY  IP CONSULT TO SOCIAL WORK    PROCEDURES:  Unless otherwise notedbelow, none     Procedures    FINAL IMPRESSION     1.  Arm DVT (deep venous thromboembolism), acute, left ([facility] [address]          DISPOSITION/PLAN   DISPOSITION Admitted 09/24/2021 10:08:14 PM      PATIENT REFERRED TO:  @FUP@    DISCHARGE MEDICATIONS:  Discharge Medication List as of 9/27/2021  4:31 PM      START taking these medications    Details   apixaban starter pack (ELIQUIS) 5 MG TBPK tablet Take 1 tablet by mouth See Admin Instructions, Disp-74 tablet, R-0Normal                (Please note that portions of this note were completed with a voice recognition program.  Efforts were made to edit the dictations butoccasionally words are mis-transcribed.)    Vinayak Harris MD (electronically signed)  AttendingEmergency Physician         Vinayak Harris MD  09/29/21 5500

## 2021-09-25 NOTE — ED NOTES
Port accessed via sterile technique. Blood return noted, labs collected and sent.       Deisy Demarco RN  09/24/21 203

## 2021-09-25 NOTE — CONSULTS
Vascular Surgery Consultation  Patient is known to the hematology oncology service. She has a breast cancer and recently underwent reconstruction. She was noticed to have left upper extremity extensive DVT sent from the hematology office to the vascular lab. Those were the findings. She was discussed to be admitted to the hospital and placed on heparin drip with subsequent vascular surgery consultation and mechanical thrombectomy as patient has contraindication to pharmacomechanical thrombolysis with TPA because of recent surgery. Patient has left subclavian port placed in [place] years ago. She is not on chemotherapy at this point. The port is just flushed. Jaimee Easley is a 49 y.o. female with the following history reviewed and recorded in [employer]:  Patient Active Problem List    Diagnosis Date Noted    Acute deep vein thrombosis (DVT) of left upper extremity ([facility] [address] 09/24/2021    S/P revision of left breast reconstruction 9/17/2021 09/23/2021    Status post bilateral breast reconstruction     Acute purulent bronchitis 09/02/2021    Lung nodule 09/02/2021    S/P bilateral mastectomy 08/06/2021    Other cyst of bone, left ankle and foot 04/10/2021     No trauma. Advised I believe this is most likely a cyst.  Will monitor closely, if change in size or becomes more painful, will consider x-rays.  Obesity (BMI 30-39.9) 04/09/2021     BMI 30.05      Dyspnea and respiratory abnormalities 04/09/2021     Patient has been prescribed Breo Ellipta and albuterol. She states she was never diagnosed with COPD or asthma, was told the cancer \"affected my lungs. \"  She denies any shortness of breath today. Continue current medications.  Chronic pain syndrome 04/09/2021     Refer to pain management as requested.  Right wrist pain 04/09/2021     Refer to orthopedics as requested.       Rheumatoid arthritis involving both knees with negative rheumatoid factor ([facility] [address] 04/09/2021     Refer to rheumatology as requested.       Menopausal symptom 04/08/2021    Benign neoplasm of body of uterus 04/08/2021    Iron deficiency anemia 01/22/2021    Carcinoma of female breast ([facility] [address] 01/21/2021    Poor venous access 01/21/2021     Current Facility-Administered Medications   Medication Dose Route Frequency Provider Last Rate Last Admin    HYDROcodone-acetaminophen ([address]) 7.5-325 MG per tablet 1 tablet  1 tablet Oral Q6H PRN Rakesh Roy MD        HYDROcodone-acetaminophen [facility])  MG per tablet 1 tablet  1 tablet Oral Q6H PRN Rakesh Roy MD   1 tablet at 09/25/21 0513    [START ON 9/26/2021] palbociclib (IBRANCE) tablet TABS 125 mg  125 mg Oral Nightly Elizabeth Carlos MD        heparin (porcine) injection 6,680 Units  80 Units/kg IntraVENous PRN Joel Mendoza MD        heparin (porcine) injection 3,340 Units  40 Units/kg IntraVENous PRN Joel Mendoza MD        heparin 25,000 units in dextrose 5% 250 mL (premix) infusion  5-30 Units/kg/hr IntraVENous Continuous Joel Mendoza MD 11.7 mL/hr at 09/25/21 0516 14 Units/kg/hr at 09/25/21 0516    Arformoterol Tartrate (BROVANA) nebulizer solution 15 mcg  15 mcg Nebulization BID Rakesh Roy MD   15 mcg at 09/25/21 0631    budesonide (PULMICORT) nebulizer suspension 500 mcg  0.5 mg Nebulization Q12H Rakesh Roy MD   500 mcg at 09/25/21 0631    albuterol (PROVENTIL) nebulizer solution 2.5 mg  2.5 mg Nebulization Q4H PRN Rakesh Roy MD        ipratropium (ATROVENT) 0.02 % nebulizer solution 0.5 mg  0.5 mg Nebulization 4x daily Rakesh Roy MD   0.5 mg at 09/25/21 0631    benzonatate (TESSALON) capsule 100 mg  100 mg Oral TID PRN Rakesh Roy MD        pantoprazole (PROTONIX) tablet 40 mg  40 mg Oral Daily Rakesh Roy MD   40 mg at 09/25/21 0901    meloxicam (MOBIC) tablet 15 mg  15 mg Oral Daily Rakesh Roy MD   15 mg at 09/25/21 0901    lisinopril (PRINIVIL;ZESTRIL) tablet 10 mg  10 mg Oral Daily Mariajose Stallworth Juan R Tovar MD   10 mg at 09/25/21 0901    naloxone [facility]) injection 0.4 mg  0.4 mg IntraVENous PRN Yolanda Zurita MD        Vitamin D (CHOLECALCIFEROL) tablet 2,000 Units  2,000 Units Oral Daily Yolanda Zurita MD   2,000 Units at 09/25/21 0901    acyclovir (ZOVIRAX) tablet 800 mg  800 mg Oral BID PRN Yolanda Zurita MD        cyclobenzaprine (FLEXERIL) tablet 10 mg  10 mg Oral TID PRN Yolanda Zurita MD   10 mg at 09/25/21 0155    guaiFENesin-codeine (GUAIFENESIN AC) 100-10 MG/5ML liquid 10 mL  10 mL Oral TID PRN Yolanda Zurita MD        gabapentin (NEURONTIN) capsule 600 mg  600 mg Oral TID Yolanda Zurita MD   600 mg at 09/25/21 0901     Allergies: Clindamycin/lincomycin  Past Medical History:   Diagnosis Date    Breast cancer ([facility] [address]     Breast cancer with lung mets (RIGHT)    Chronic back pain     GERD (gastroesophageal reflux disease)     Hypertension     Neuropathy     Post chemo treatment neuropathy     Past Surgical History:   Procedure Laterality Date    BREAST LUMPECTOMY Right     2006    BREAST RECONSTRUCTION Left 09/17/2021    Revision left reconstructed breast, implant performed by Lula Jaquez MD at [address] [place] [address]  2016    Bilateral breast implants    HYSTERECTOMY, TOTAL ABDOMINAL  2015    MASTECTOMY, BILATERAL      Right 2013 & Left 2016     Family History   Problem Relation Age of Onset    Hypertension Mother     Obesity Mother     Prostate Cancer Father     No Known Problems Sister     No Known Problems Daughter     No Known Problems Daughter      Social History     Tobacco Use    Smoking status: Never Smoker    Smokeless tobacco: Never Used   Substance Use Topics    Alcohol use: Not Currently     Comment: used to drink socially \"a long time ago\"         Review of Systems    Constitutional - no significant activity change, appetite change, or unexpected weight change. No fever or chills. No diaphoresis or significant fatigue.   HENT - no significant rhinorrhea or epistaxis. No tinnitus or significant hearing loss. Eyes - no sudden vision change or amaurosis. Respiratory - no significant shortness of breath, wheezing, or stridor. No apnea, cough, or chest tightness associated with shortness of breath. Cardiovascular - no chest pain, syncope, or significant dizziness. No palpitations or significant leg swelling. No claudication. Gastrointestinal - has not had abdominal swelling or pain. No blood in stool. No severe constipation, diarrhea, nausea, or vomiting. Genitourinary - No difficulty urinating, dysuria, frequency, or urgency. No flank pain or hematuria. Musculoskeletal - left upper extremity swelling. Skin - no color change, rash, pallor,  or new wound. Neurologic - no dizziness, facial asymmetry, or light headedness. No seizures. No speech difficulty or lateralizing weakness. Hematologic - no easy bruising or excessive bleeding. Psychiatric - no severe anxiety or nervousness. No confusion. All other review of systems are negative. Physical Exam    /63   Pulse 93   Temp 96.6 °F (35.9 °C) (Temporal)   Resp 18   Ht 5' 6\" (1.676 m)   Wt 183 lb (83 kg)   SpO2 96%   BMI 29.54 kg/m²     Constitutional - well developed, well nourished. No diaphoresis or acute distress. HENT - head normocephalic. Left port in place. Eyes - conjunctiva normal.  EOMS normal.  No exudate. No icterus. Neck- ROM appears normal, no tracheal deviation. Cardiovascular - Regular rate and rhythm. Extremities - Radial and brachial pulses are 2+ to palpation bilaterally. No cyanosis, clubbing,+ left upper extremity edema since breast reconstruction surgery. No signs atheroembolic event. Pulmonary - effort appears normal.    No respiratory distress. Genitourinary - deferred. Musculoskeletal - ROM appears normal.  No significant edema. Neurologic - alert and oriented X 3. Physiologic. Skin - warm, dry, and intact. No rash, erythema, or pallor. Psychiatric - mood, affect, and behavior appear normal.  Judgment and thought processes appear normal.              Assessment    DVT of left upper extremity    Plan    Discussed risks, benfits and alternatives of performing suction thrombectomy of extensive left upper extremity DVT. Patient agreed to the operative intervention.   NPO p MN for left upper extremity suction thrombectomy  Continue Heparin drip

## 2021-09-26 ENCOUNTER — APPOINTMENT (OUTPATIENT)
Dept: INTERVENTIONAL RADIOLOGY/VASCULAR | Age: 56
DRG: 271 | End: 2021-09-26
Payer: MEDICARE

## 2021-09-26 ENCOUNTER — ANESTHESIA (OUTPATIENT)
Dept: OPERATING ROOM | Age: 56
DRG: 271 | End: 2021-09-26
Payer: MEDICARE

## 2021-09-26 ENCOUNTER — ANESTHESIA EVENT (OUTPATIENT)
Dept: OPERATING ROOM | Age: 56
DRG: 271 | End: 2021-09-26
Payer: MEDICARE

## 2021-09-26 VITALS — TEMPERATURE: 97.7 F | OXYGEN SATURATION: 98 % | SYSTOLIC BLOOD PRESSURE: 118 MMHG | DIASTOLIC BLOOD PRESSURE: 57 MMHG

## 2021-09-26 LAB
ANION GAP SERPL CALCULATED.3IONS-SCNC: 12 MMOL/L (ref 7–19)
APTT: 151.6 SEC (ref 26–36.2)
APTT: 78.1 SEC (ref 26–36.2)
BASOPHILS ABSOLUTE: 0 K/UL (ref 0–0.2)
BASOPHILS RELATIVE PERCENT: 0.2 % (ref 0–1)
BUN BLDV-MCNC: 18 MG/DL (ref 6–20)
CALCIUM SERPL-MCNC: 8.3 MG/DL (ref 8.6–10)
CHLORIDE BLD-SCNC: 108 MMOL/L (ref 98–111)
CO2: 23 MMOL/L (ref 22–29)
CREAT SERPL-MCNC: 0.7 MG/DL (ref 0.5–0.9)
EOSINOPHILS ABSOLUTE: 0 K/UL (ref 0–0.6)
EOSINOPHILS RELATIVE PERCENT: 1 % (ref 0–5)
GFR AFRICAN AMERICAN: >59
GFR NON-AFRICAN AMERICAN: >60
GLUCOSE BLD-MCNC: 120 MG/DL (ref 74–109)
HCT VFR BLD CALC: 30.5 % (ref 37–47)
HEMOGLOBIN: 9.6 G/DL (ref 12–16)
IMMATURE GRANULOCYTES #: 0 K/UL
LYMPHOCYTES ABSOLUTE: 1.6 K/UL (ref 1.1–4.5)
LYMPHOCYTES RELATIVE PERCENT: 38.8 % (ref 20–40)
MCH RBC QN AUTO: 35.2 PG (ref 27–31)
MCHC RBC AUTO-ENTMCNC: 31.5 G/DL (ref 33–37)
MCV RBC AUTO: 111.7 FL (ref 81–99)
MONOCYTES ABSOLUTE: 0.5 K/UL (ref 0–0.9)
MONOCYTES RELATIVE PERCENT: 11.1 % (ref 0–10)
NEUTROPHILS ABSOLUTE: 2 K/UL (ref 1.5–7.5)
NEUTROPHILS RELATIVE PERCENT: 48.4 % (ref 50–65)
PDW BLD-RTO: 15.5 % (ref 11.5–14.5)
PLATELET # BLD: 206 K/UL (ref 130–400)
PMV BLD AUTO: 9.6 FL (ref 9.4–12.3)
POTASSIUM REFLEX MAGNESIUM: 4.4 MMOL/L (ref 3.5–5)
RBC # BLD: 2.73 M/UL (ref 4.2–5.4)
SODIUM BLD-SCNC: 143 MMOL/L (ref 136–145)
WBC # BLD: 4.1 K/UL (ref 4.8–10.8)

## 2021-09-26 PROCEDURE — 2500000003 HC RX 250 WO HCPCS: Performed by: ANESTHESIOLOGY

## 2021-09-26 PROCEDURE — 94640 AIRWAY INHALATION TREATMENT: CPT

## 2021-09-26 PROCEDURE — 3600000005 HC SURGERY LEVEL 5 BASE: Performed by: SURGERY

## 2021-09-26 PROCEDURE — 88304 TISSUE EXAM BY PATHOLOGIST: CPT

## 2021-09-26 PROCEDURE — 2580000003 HC RX 258: Performed by: SURGERY

## 2021-09-26 PROCEDURE — 6370000000 HC RX 637 (ALT 250 FOR IP): Performed by: SURGERY

## 2021-09-26 PROCEDURE — 37187 VENOUS MECH THROMBECTOMY: CPT | Performed by: SURGERY

## 2021-09-26 PROCEDURE — 6360000002 HC RX W HCPCS: Performed by: HOSPITALIST

## 2021-09-26 PROCEDURE — C1887 CATHETER, GUIDING: HCPCS | Performed by: SURGERY

## 2021-09-26 PROCEDURE — 75820 VEIN X-RAY ARM/LEG: CPT

## 2021-09-26 PROCEDURE — 3700000001 HC ADD 15 MINUTES (ANESTHESIA): Performed by: SURGERY

## 2021-09-26 PROCEDURE — 3600000015 HC SURGERY LEVEL 5 ADDTL 15MIN: Performed by: SURGERY

## 2021-09-26 PROCEDURE — 7100000001 HC PACU RECOVERY - ADDTL 15 MIN: Performed by: SURGERY

## 2021-09-26 PROCEDURE — C1894 INTRO/SHEATH, NON-LASER: HCPCS | Performed by: SURGERY

## 2021-09-26 PROCEDURE — 85025 COMPLETE CBC W/AUTO DIFF WBC: CPT

## 2021-09-26 PROCEDURE — 2580000003 HC RX 258: Performed by: ANESTHESIOLOGY

## 2021-09-26 PROCEDURE — 80048 BASIC METABOLIC PNL TOTAL CA: CPT

## 2021-09-26 PROCEDURE — 2700000000 HC OXYGEN THERAPY PER DAY

## 2021-09-26 PROCEDURE — 05C63ZZ EXTIRPATION OF MATTER FROM LEFT SUBCLAVIAN VEIN, PERCUTANEOUS APPROACH: ICD-10-PCS | Performed by: SURGERY

## 2021-09-26 PROCEDURE — 2709999900 HC NON-CHARGEABLE SUPPLY: Performed by: SURGERY

## 2021-09-26 PROCEDURE — C1893 INTRO/SHEATH, FIXED,NON-PEEL: HCPCS | Performed by: SURGERY

## 2021-09-26 PROCEDURE — 36415 COLL VENOUS BLD VENIPUNCTURE: CPT

## 2021-09-26 PROCEDURE — 6360000002 HC RX W HCPCS: Performed by: SURGERY

## 2021-09-26 PROCEDURE — C1769 GUIDE WIRE: HCPCS | Performed by: SURGERY

## 2021-09-26 PROCEDURE — 85730 THROMBOPLASTIN TIME PARTIAL: CPT

## 2021-09-26 PROCEDURE — 36005 INJECTION EXT VENOGRAPHY: CPT | Performed by: SURGERY

## 2021-09-26 PROCEDURE — 3700000000 HC ANESTHESIA ATTENDED CARE: Performed by: SURGERY

## 2021-09-26 PROCEDURE — 6360000002 HC RX W HCPCS: Performed by: ANESTHESIOLOGY

## 2021-09-26 PROCEDURE — 7100000000 HC PACU RECOVERY - FIRST 15 MIN: Performed by: SURGERY

## 2021-09-26 PROCEDURE — 75820 VEIN X-RAY ARM/LEG: CPT | Performed by: SURGERY

## 2021-09-26 PROCEDURE — 05C83ZZ EXTIRPATION OF MATTER FROM LEFT AXILLARY VEIN, PERCUTANEOUS APPROACH: ICD-10-PCS | Performed by: SURGERY

## 2021-09-26 PROCEDURE — 99232 SBSQ HOSP IP/OBS MODERATE 35: CPT | Performed by: INTERNAL MEDICINE

## 2021-09-26 PROCEDURE — 6370000000 HC RX 637 (ALT 250 FOR IP): Performed by: HOSPITALIST

## 2021-09-26 PROCEDURE — 2140000000 HC CCU INTERMEDIATE R&B

## 2021-09-26 RX ORDER — FENTANYL CITRATE 50 UG/ML
INJECTION, SOLUTION INTRAMUSCULAR; INTRAVENOUS PRN
Status: DISCONTINUED | OUTPATIENT
Start: 2021-09-26 | End: 2021-09-26 | Stop reason: SDUPTHER

## 2021-09-26 RX ORDER — HYDRALAZINE HYDROCHLORIDE 20 MG/ML
5 INJECTION INTRAMUSCULAR; INTRAVENOUS EVERY 10 MIN PRN
Status: DISCONTINUED | OUTPATIENT
Start: 2021-09-26 | End: 2021-09-26

## 2021-09-26 RX ORDER — SODIUM CHLORIDE 0.9 % (FLUSH) 0.9 %
5-40 SYRINGE (ML) INJECTION PRN
Status: DISCONTINUED | OUTPATIENT
Start: 2021-09-26 | End: 2021-09-27 | Stop reason: HOSPADM

## 2021-09-26 RX ORDER — LABETALOL HYDROCHLORIDE 5 MG/ML
5 INJECTION, SOLUTION INTRAVENOUS EVERY 10 MIN PRN
Status: DISCONTINUED | OUTPATIENT
Start: 2021-09-26 | End: 2021-09-26

## 2021-09-26 RX ORDER — SODIUM CHLORIDE 0.9 % (FLUSH) 0.9 %
5-40 SYRINGE (ML) INJECTION EVERY 12 HOURS SCHEDULED
Status: DISCONTINUED | OUTPATIENT
Start: 2021-09-26 | End: 2021-09-27 | Stop reason: HOSPADM

## 2021-09-26 RX ORDER — SODIUM CHLORIDE, SODIUM LACTATE, POTASSIUM CHLORIDE, CALCIUM CHLORIDE 600; 310; 30; 20 MG/100ML; MG/100ML; MG/100ML; MG/100ML
INJECTION, SOLUTION INTRAVENOUS CONTINUOUS PRN
Status: DISCONTINUED | OUTPATIENT
Start: 2021-09-26 | End: 2021-09-26 | Stop reason: SDUPTHER

## 2021-09-26 RX ORDER — MORPHINE SULFATE 4 MG/ML
4 INJECTION, SOLUTION INTRAMUSCULAR; INTRAVENOUS EVERY 5 MIN PRN
Status: DISCONTINUED | OUTPATIENT
Start: 2021-09-26 | End: 2021-09-26

## 2021-09-26 RX ORDER — HYDROMORPHONE HYDROCHLORIDE 1 MG/ML
0.25 INJECTION, SOLUTION INTRAMUSCULAR; INTRAVENOUS; SUBCUTANEOUS EVERY 5 MIN PRN
Status: DISCONTINUED | OUTPATIENT
Start: 2021-09-26 | End: 2021-09-26

## 2021-09-26 RX ORDER — PROPOFOL 10 MG/ML
INJECTION, EMULSION INTRAVENOUS PRN
Status: DISCONTINUED | OUTPATIENT
Start: 2021-09-26 | End: 2021-09-26 | Stop reason: SDUPTHER

## 2021-09-26 RX ORDER — ROCURONIUM BROMIDE 10 MG/ML
INJECTION, SOLUTION INTRAVENOUS PRN
Status: DISCONTINUED | OUTPATIENT
Start: 2021-09-26 | End: 2021-09-26 | Stop reason: SDUPTHER

## 2021-09-26 RX ORDER — ENALAPRILAT 2.5 MG/2ML
1.25 INJECTION INTRAVENOUS
Status: DISCONTINUED | OUTPATIENT
Start: 2021-09-26 | End: 2021-09-26

## 2021-09-26 RX ORDER — MIDAZOLAM HYDROCHLORIDE 1 MG/ML
INJECTION INTRAMUSCULAR; INTRAVENOUS PRN
Status: DISCONTINUED | OUTPATIENT
Start: 2021-09-26 | End: 2021-09-26 | Stop reason: SDUPTHER

## 2021-09-26 RX ORDER — MEPERIDINE HYDROCHLORIDE 25 MG/ML
12.5 INJECTION INTRAMUSCULAR; INTRAVENOUS; SUBCUTANEOUS EVERY 5 MIN PRN
Status: DISCONTINUED | OUTPATIENT
Start: 2021-09-26 | End: 2021-09-26

## 2021-09-26 RX ORDER — HYDROMORPHONE HYDROCHLORIDE 1 MG/ML
0.5 INJECTION, SOLUTION INTRAMUSCULAR; INTRAVENOUS; SUBCUTANEOUS EVERY 5 MIN PRN
Status: DISCONTINUED | OUTPATIENT
Start: 2021-09-26 | End: 2021-09-26

## 2021-09-26 RX ORDER — PROMETHAZINE HYDROCHLORIDE 25 MG/ML
6.25 INJECTION, SOLUTION INTRAMUSCULAR; INTRAVENOUS
Status: DISCONTINUED | OUTPATIENT
Start: 2021-09-26 | End: 2021-09-26

## 2021-09-26 RX ORDER — SODIUM CHLORIDE 9 MG/ML
INJECTION, SOLUTION INTRAVENOUS CONTINUOUS
Status: ACTIVE | OUTPATIENT
Start: 2021-09-26 | End: 2021-09-26

## 2021-09-26 RX ORDER — EPHEDRINE SULFATE 50 MG/ML
INJECTION, SOLUTION INTRAVENOUS PRN
Status: DISCONTINUED | OUTPATIENT
Start: 2021-09-26 | End: 2021-09-26 | Stop reason: SDUPTHER

## 2021-09-26 RX ORDER — MORPHINE SULFATE 2 MG/ML
2 INJECTION, SOLUTION INTRAMUSCULAR; INTRAVENOUS EVERY 5 MIN PRN
Status: DISCONTINUED | OUTPATIENT
Start: 2021-09-26 | End: 2021-09-26

## 2021-09-26 RX ORDER — LIDOCAINE HYDROCHLORIDE 10 MG/ML
INJECTION, SOLUTION EPIDURAL; INFILTRATION; INTRACAUDAL; PERINEURAL PRN
Status: DISCONTINUED | OUTPATIENT
Start: 2021-09-26 | End: 2021-09-26 | Stop reason: SDUPTHER

## 2021-09-26 RX ORDER — METOCLOPRAMIDE HYDROCHLORIDE 5 MG/ML
10 INJECTION INTRAMUSCULAR; INTRAVENOUS
Status: DISCONTINUED | OUTPATIENT
Start: 2021-09-26 | End: 2021-09-26

## 2021-09-26 RX ORDER — DEXAMETHASONE SODIUM PHOSPHATE 10 MG/ML
INJECTION, SOLUTION INTRAMUSCULAR; INTRAVENOUS PRN
Status: DISCONTINUED | OUTPATIENT
Start: 2021-09-26 | End: 2021-09-26 | Stop reason: SDUPTHER

## 2021-09-26 RX ORDER — CEFAZOLIN SODIUM 1 G/3ML
INJECTION, POWDER, FOR SOLUTION INTRAMUSCULAR; INTRAVENOUS PRN
Status: DISCONTINUED | OUTPATIENT
Start: 2021-09-26 | End: 2021-09-26 | Stop reason: SDUPTHER

## 2021-09-26 RX ORDER — SODIUM CHLORIDE 9 MG/ML
25 INJECTION, SOLUTION INTRAVENOUS PRN
Status: DISCONTINUED | OUTPATIENT
Start: 2021-09-26 | End: 2021-09-27 | Stop reason: HOSPADM

## 2021-09-26 RX ORDER — HEPARIN SODIUM 1000 [USP'U]/ML
INJECTION, SOLUTION INTRAVENOUS; SUBCUTANEOUS PRN
Status: DISCONTINUED | OUTPATIENT
Start: 2021-09-26 | End: 2021-09-26 | Stop reason: SDUPTHER

## 2021-09-26 RX ORDER — DIPHENHYDRAMINE HYDROCHLORIDE 50 MG/ML
12.5 INJECTION INTRAMUSCULAR; INTRAVENOUS
Status: DISCONTINUED | OUTPATIENT
Start: 2021-09-26 | End: 2021-09-26

## 2021-09-26 RX ADMIN — Medication 2000 UNITS: at 08:12

## 2021-09-26 RX ADMIN — SUGAMMADEX 200 MG: 100 INJECTION, SOLUTION INTRAVENOUS at 10:23

## 2021-09-26 RX ADMIN — APIXABAN 10 MG: 5 TABLET, FILM COATED ORAL at 20:55

## 2021-09-26 RX ADMIN — CEFAZOLIN SODIUM 2000 MG: 1 INJECTION, POWDER, FOR SOLUTION INTRAMUSCULAR; INTRAVENOUS at 09:30

## 2021-09-26 RX ADMIN — IPRATROPIUM BROMIDE 0.5 MG: 0.5 SOLUTION RESPIRATORY (INHALATION) at 07:09

## 2021-09-26 RX ADMIN — MIDAZOLAM 2 MG: 1 INJECTION INTRAMUSCULAR; INTRAVENOUS at 09:19

## 2021-09-26 RX ADMIN — ROCURONIUM BROMIDE 50 MG: 10 INJECTION, SOLUTION INTRAVENOUS at 09:32

## 2021-09-26 RX ADMIN — IPRATROPIUM BROMIDE 0.5 MG: 0.5 SOLUTION RESPIRATORY (INHALATION) at 19:24

## 2021-09-26 RX ADMIN — MORPHINE SULFATE 2 MG: 4 INJECTION INTRAVENOUS at 11:09

## 2021-09-26 RX ADMIN — ARFORMOTEROL TARTRATE 15 MCG: 15 SOLUTION RESPIRATORY (INHALATION) at 07:09

## 2021-09-26 RX ADMIN — IPRATROPIUM BROMIDE 0.5 MG: 0.5 SOLUTION RESPIRATORY (INHALATION) at 14:29

## 2021-09-26 RX ADMIN — EPHEDRINE SULFATE 10 MG: 50 INJECTION INTRAMUSCULAR; INTRAVENOUS; SUBCUTANEOUS at 10:08

## 2021-09-26 RX ADMIN — HEPARIN SODIUM 5000 UNITS: 1000 INJECTION, SOLUTION INTRAVENOUS; SUBCUTANEOUS at 10:11

## 2021-09-26 RX ADMIN — CYCLOBENZAPRINE 10 MG: 10 TABLET, FILM COATED ORAL at 21:01

## 2021-09-26 RX ADMIN — PHENYLEPHRINE HYDROCHLORIDE 200 MCG: 10 INJECTION INTRAVENOUS at 09:38

## 2021-09-26 RX ADMIN — PROPOFOL 150 MG: 10 INJECTION, EMULSION INTRAVENOUS at 09:25

## 2021-09-26 RX ADMIN — PHENYLEPHRINE HYDROCHLORIDE 100 MCG: 10 INJECTION INTRAVENOUS at 09:50

## 2021-09-26 RX ADMIN — PANTOPRAZOLE SODIUM 40 MG: 40 TABLET, DELAYED RELEASE ORAL at 08:12

## 2021-09-26 RX ADMIN — HYDROCODONE BITARTRATE AND ACETAMINOPHEN 1 TABLET: 10; 325 TABLET ORAL at 21:02

## 2021-09-26 RX ADMIN — LISINOPRIL 10 MG: 10 TABLET ORAL at 08:12

## 2021-09-26 RX ADMIN — PHENYLEPHRINE HYDROCHLORIDE 100 MCG: 10 INJECTION INTRAVENOUS at 09:56

## 2021-09-26 RX ADMIN — DEXAMETHASONE SODIUM PHOSPHATE 10 MG: 10 INJECTION, SOLUTION INTRAMUSCULAR; INTRAVENOUS at 09:40

## 2021-09-26 RX ADMIN — ROCURONIUM BROMIDE 50 MG: 10 INJECTION, SOLUTION INTRAVENOUS at 09:29

## 2021-09-26 RX ADMIN — PHENYLEPHRINE HYDROCHLORIDE 200 MCG: 10 INJECTION INTRAVENOUS at 09:46

## 2021-09-26 RX ADMIN — FENTANYL CITRATE 50 MCG: 50 INJECTION, SOLUTION INTRAMUSCULAR; INTRAVENOUS at 10:19

## 2021-09-26 RX ADMIN — GABAPENTIN 600 MG: 300 CAPSULE ORAL at 08:13

## 2021-09-26 RX ADMIN — MELOXICAM 15 MG: 7.5 TABLET ORAL at 08:12

## 2021-09-26 RX ADMIN — BUDESONIDE 500 MCG: 0.5 SUSPENSION RESPIRATORY (INHALATION) at 07:09

## 2021-09-26 RX ADMIN — BUDESONIDE 500 MCG: 0.5 SUSPENSION RESPIRATORY (INHALATION) at 19:24

## 2021-09-26 RX ADMIN — ARFORMOTEROL TARTRATE 15 MCG: 15 SOLUTION RESPIRATORY (INHALATION) at 19:24

## 2021-09-26 RX ADMIN — GABAPENTIN 600 MG: 300 CAPSULE ORAL at 13:51

## 2021-09-26 RX ADMIN — LIDOCAINE HYDROCHLORIDE 5 ML: 10 INJECTION, SOLUTION EPIDURAL; INFILTRATION; INTRACAUDAL; PERINEURAL at 09:26

## 2021-09-26 RX ADMIN — GABAPENTIN 600 MG: 300 CAPSULE ORAL at 20:55

## 2021-09-26 RX ADMIN — SODIUM CHLORIDE, SODIUM LACTATE, POTASSIUM CHLORIDE, AND CALCIUM CHLORIDE: 600; 310; 30; 20 INJECTION, SOLUTION INTRAVENOUS at 09:15

## 2021-09-26 RX ADMIN — SODIUM CHLORIDE: 9 INJECTION, SOLUTION INTRAVENOUS at 11:43

## 2021-09-26 RX ADMIN — HYDROCODONE BITARTRATE AND ACETAMINOPHEN 1 TABLET: 10; 325 TABLET ORAL at 07:32

## 2021-09-26 ASSESSMENT — PAIN SCALES - GENERAL
PAINLEVEL_OUTOF10: 7
PAINLEVEL_OUTOF10: 4
PAINLEVEL_OUTOF10: 5
PAINLEVEL_OUTOF10: 0
PAINLEVEL_OUTOF10: 2
PAINLEVEL_OUTOF10: 6
PAINLEVEL_OUTOF10: 0

## 2021-09-26 ASSESSMENT — LIFESTYLE VARIABLES: SMOKING_STATUS: 0

## 2021-09-26 NOTE — PLAN OF CARE
Problem: Venous Thromboembolism:  Goal: Will show no signs or symptoms of venous thromboembolism  Description: Will show no signs or symptoms of venous thromboembolism  9/25/2021 2247 by Kelton Smith RN  Outcome: Ongoing  9/25/2021 1052 by Josephus Kussmaul, RN  Outcome: Ongoing  Goal: Absence of signs or symptoms of impaired coagulation  Description: Absence of signs or symptoms of impaired coagulation  9/25/2021 2247 by Kelton Smith RN  Outcome: Ongoing  9/25/2021 1052 by Josephus Kussmaul, RN  Outcome: Ongoing     Problem: Falls - Risk of:  Goal: Will remain free from falls  Description: Will remain free from falls  Outcome: Ongoing  Goal: Absence of physical injury  Description: Absence of physical injury  Outcome: Ongoing

## 2021-09-26 NOTE — PROGRESS NOTES
[facility] Progress Note    Patient:  Dameon Gonzalez  YOB: 1965  Date of Service: 9/26/2021  MRN: [id number]   Acct: [de-identified]   Primary Care Physician: Rodo Oquendo MD  Advance Directive: No Order  Admit Date: 9/24/2021       Hospital Day: 2      CHIEF COMPLAINT:    Chief Complaint   Patient presents with    Arm Pain     left arm pain, DVT       9/26/2021 8:47 AM  Subjective / Interval History:   09/26/2021  Patient seen and examine. Doing well. No new complaints. Laying comfortably in bed in no acute distress. Doing well. Denies any acute complaints or distress at this time. No acute changes or acute overnight event reported. Left arm pain fairly controlled. 09/25/2021  Writer assumes care of patient this AM.  Left arm pain fairly controlled. Laying comfortably in bed in no acute distress. No acute changes or acute overnight event reported. Patient denies any acute complaints or distress at this time. Review of Systems:   Review of Systems  ROS: 14 point review of systems is negative except as specifically addressed above.     Diet NPO    Intake/Output Summary (Last 24 hours) at 9/26/2021 0847  Last data filed at 9/25/2021 1422  Gross per 24 hour   Intake 240 ml   Output --   Net 240 ml       Medications:   heparin (PORCINE) Infusion 14 Units/kg/hr (09/25/21 2007)     Current Facility-Administered Medications   Medication Dose Route Frequency Provider Last Rate Last Admin    HYDROcodone-acetaminophen ([address]) 7.5-325 MG per tablet 1 tablet  1 tablet Oral Q6H PRN Ramírez Tello MD        HYDROcodone-acetaminophen [facility])  MG per tablet 1 tablet  1 tablet Oral Q6H PRN Ramírez Tello MD   1 tablet at 09/26/21 0732    palbociclib (IBRANCE) tablet TABS 125 mg  125 mg Oral Nightly Dominic Doss MD        heparin (porcine) injection 6,680 Units  80 Units/kg IntraVENous PRN Krzysztof Stearns MD        heparin (porcine) injection 3,340 Units  40 Units/kg IntraVENous PRN Raymon Mena MD        heparin 25,000 units in dextrose 5% 250 mL (premix) infusion  5-30 Units/kg/hr IntraVENous Continuous Raymon Mena MD 11.7 mL/hr at 09/25/21 2007 14 Units/kg/hr at 09/25/21 2007    Arformoterol Tartrate (BROVANA) nebulizer solution 15 mcg  15 mcg Nebulization BID Quique Harris MD   15 mcg at 09/26/21 0709    budesonide (PULMICORT) nebulizer suspension 500 mcg  0.5 mg Nebulization Q12H Quique Harris MD   500 mcg at 09/26/21 0709    albuterol (PROVENTIL) nebulizer solution 2.5 mg  2.5 mg Nebulization Q4H PRN Quique Harris MD        ipratropium (ATROVENT) 0.02 % nebulizer solution 0.5 mg  0.5 mg Nebulization 4x daily Quique Harris MD   0.5 mg at 09/26/21 5850    benzonatate (TESSALON) capsule 100 mg  100 mg Oral TID PRN Quique Harris MD        pantoprazole (PROTONIX) tablet 40 mg  40 mg Oral Daily Quique Harris MD   40 mg at 09/26/21 7291    meloxicam (MOBIC) tablet 15 mg  15 mg Oral Daily Quique Harris MD   15 mg at 09/26/21 1233    lisinopril (PRINIVIL;ZESTRIL) tablet 10 mg  10 mg Oral Daily Quique Harris MD   10 mg at 09/26/21 5906    naloxone (NARCAN) injection 0.4 mg  0.4 mg IntraVENous PRN Quique Harris MD        Vitamin D (CHOLECALCIFEROL) tablet 2,000 Units  2,000 Units Oral Daily Quique Harris MD   2,000 Units at 09/26/21 5861    acyclovir (ZOVIRAX) tablet 800 mg  800 mg Oral BID PRN Quique Harris MD        cyclobenzaprine (FLEXERIL) tablet 10 mg  10 mg Oral TID PRN Quique Harris MD   10 mg at 09/25/21 2350    guaiFENesin-codeine (GUAIFENESIN AC) 100-10 MG/5ML liquid 10 mL  10 mL Oral TID PRN Quique Harris MD        gabapentin (NEURONTIN) capsule 600 mg  600 mg Oral TID Quique Harris MD   600 mg at 09/26/21 0813         heparin (PORCINE) Infusion 14 Units/kg/hr (09/25/21 2007)      palbociclib  125 mg Oral Nightly    Arformoterol Tartrate  15 mcg Nebulization BID    budesonide  0.5 mg Nebulization Q12H    ipratropium  0.5 mg Nebulization 4x daily    pantoprazole  40 mg Oral Daily    meloxicam  15 mg Oral Daily    lisinopril  10 mg Oral Daily    Vitamin D  2,000 Units Oral Daily    gabapentin  600 mg Oral TID     HYDROcodone-acetaminophen, HYDROcodone-acetaminophen, heparin (porcine), heparin (porcine), albuterol, benzonatate, naloxone, acyclovir, cyclobenzaprine, guaiFENesin-codeine  Diet NPO       Labs:   CBC with DIFF:  Recent Labs     09/24/21  1517 09/24/21  1517 09/24/21 2030 09/25/21 0341 09/26/21  0324   WBC 4.71   < > 4.4* 4.3* 4.1*   RBC 3.07*   < > 3.12* 2.83* 2.73*   HGB 11.2   < > 10.9* 9.9* 9.6*   HCT 33.6*   < > 34.1* 30.7* 30.5*   .4*   < > 109.3* 108.5* 111.7*   MCH 36.5*   < > 34.9* 35.0* 35.2*   MCHC 33.3   < > 32.0* 32.2* 31.5*   RDW 14.3   < > 14.9* 14.9* 15.5*      < > 187 187 206   MPV 9.3   < > 9.0* 8.9* 9.6   NEUTOPHILPCT 52.5  --   --  44.0* 48.4*   LYMPHOPCT 39.3  --   --  51.0* 38.8   MONOPCT 7.2  --   --  4.0 11.1*   EOSRELPCT 0.6*  --   --  1.0 1.0   BASOPCT 0.4  --   --  0.0 0.2   NEUTROABS 2.47  --   --  1.9 2.0   LYMPHSABS 1.85  --   --  2.2 1.6   MONOSABS 0.34  --   --  0.20 0.50   EOSABS 0.03*  --   --  0.04 0.00   BASOSABS 0.02  --   --  0.00 0.00    < > = values in this interval not displayed. CMP/BMP:  Recent Labs     09/24/21 2030 09/25/21 0341 09/26/21  0324    143 143   K 3.8 3.6 4.4    106 108   CO2 26 27 23   ANIONGAP 10 10 12   GLUCOSE 93 158* 120*   BUN 15 13 18   CREATININE 0.5 0.6 0.7   LABGLOM >60 >60 >60   CALCIUM 9.0 8.6 8.3*   PROT 7.1  --   --    LABALBU 4.1  --   --    BILITOT 0.7  --   --    ALKPHOS 99  --   --    ALT 29  --   --    AST 22  --   --          CRP:  No results for input(s): CRP in the last 72 hours. Sed Rate:  No results for input(s): SEDRATE in the last 72 hours.       HgBA1c:  No components found for: HGBA1C  FLP:  No results found for: TRIG, HDL, LDLCALC, LDLDIRECT, LABVLDL  TSH:    Lab Results   Component Value Date    TSH 1.820 08/27/2021     Troponin T:   Recent Labs     09/24/21 2030   TROPONINI <0.01     Pro-BNP: No results for input(s): BNP in the last 72 hours. INR:   Recent Labs     09/24/21 2030   INR 1.02     ABGs: No results found for: PHART, PO2ART, BBB2GXW  UA:No results for input(s): NITRITE, COLORU, PHUR, LABCAST, WBCUA, RBCUA, MUCUS, TRICHOMONAS, YEAST, BACTERIA, CLARITYU, SPECGRAV, LEUKOCYTESUR, UROBILINOGEN, BILIRUBINUR, BLOODU, GLUCOSEU, AMORPHOUS in the last 72 hours. Invalid input(s): Serena Boor      Culture Results:    No results for input(s): CXSURG in the last 720 hours. Blood Culture Recent: No results for input(s): BC in the last 720 hours. No results for input(s): BC, BLOODCULT2, ORG in the last 72 hours. Cultures:   No results for input(s): CULTURE in the last 72 hours. No results for input(s): BC, Satya Puff in the last 72 hours. No results for input(s): CXSURG in the last 72 hours. No results for input(s): MG, PHOS in the last 72 hours. Recent Labs     09/24/21 2030   AST 22   ALT 29   BILITOT 0.7   ALKPHOS 99         RAD  VL Extremity Venous Left    Result Date: 9/24/2021  Vascular Upper Extremities Veins Procedure  Demographics   Patient Name    Alicia Brandon Age                   49   Patient Number  [id number]          Gender                Female   Visit Number    [id number]       Interpreting          Alyssa Newsome                                  Physician   Date of Birth   1965      Referring Physician   Margaret Birch. [facility]   Accession       [id number]      [facility], [facility]  Number  Procedure Type of Study:   Veins:Upper Extremities Veins, UE VENOUS UNILATERAL/FLU. Indications for Study:Arm swelling and Arm pain. Impression   Acute appearing deep vein thrombosis (DVT) is seen in the internal jugular  vein subclavian axillary brachial ulnar , left upper extremity(ies).   Superficial venous thrombosis is visualized in the basilic and cephalic  vein of the left upper extremity. Signature   ----------------------------------------------------------------  Electronically signed by Zenobia Bernheim Victoria(Interpreting  physician) on 09/24/2021 04:47 PM  ----------------------------------------------------------------  Velocities are measured in cm/s ; Diameters are measured in mm Right UE Vein Measurements 2D Measurements +---------------+-----------------+----------------------+-----------------+ ! Location       ! Visualized       ! Compressibility       ! Thrombosis       ! +---------------+-----------------+----------------------+-----------------+ ! IJV            ! Yes              ! Yes                   ! None             ! +---------------+-----------------+----------------------+-----------------+ ! SCV            ! Yes              ! Yes                   ! None             ! +---------------+-----------------+----------------------+-----------------+ Left UE Vein Measurements 2D Measurements +---------------+-----------------+----------------------+-----------------+ ! Location       ! Visualized       ! Compressibility       ! Thrombosis       ! +---------------+-----------------+----------------------+-----------------+ ! IJV            ! Yes              ! No                    !                 ! +---------------+-----------------+----------------------+-----------------+ ! SCV            ! Yes              ! No                    !                 ! +---------------+-----------------+----------------------+-----------------+ ! Axillary       ! Yes              ! No                    !                 ! +---------------+-----------------+----------------------+-----------------+ ! Brachial       !Yes              ! No                    !                 ! +---------------+-----------------+----------------------+-----------------+ ! Radial         !Yes              ! Yes                   ! None             ! +---------------+-----------------+----------------------+-----------------+ ! Ulnar          ! Yes              ! No                    !                 ! +---------------+-----------------+----------------------+-----------------+ ! Cephalic       ! Yes              ! No                    !                 ! +---------------+-----------------+----------------------+-----------------+ ! Basilic        ! Yes              ! No                    !                 ! +---------------+-----------------+----------------------+-----------------+    XR CHEST PORTABLE    Result Date: 9/24/2021  XR CHEST PORTABLE 9/24/2021 8:00 PM HISTORY: Chest pain COMPARISON: 7/28/2019 CXR: A single frontal view of the chest is performed. Findings: Lungs are hypoventilated. Bilateral breast implants with associated soft tissue attenuation. No pulmonary consolidation or edema. Cardiac and mediastinal contours normal. No pleural effusion or pneumothorax. Left subclavian port in place with tip overlying the SVC. Right axillary surgical clips. 1. Bilateral breast implants. No acute pulmonary process identified. Left subclavian port.  Signed by Dr Marta Russell      Objective:   Vitals:   /65   Pulse 89   Temp 97.2 °F (36.2 °C) (Temporal)   Resp 16   Ht 5' 6\" (1.676 m)   Wt 183 lb (83 kg)   SpO2 100%   BMI 29.54 kg/m²       Patient Vitals for the past 24 hrs:   BP Temp Temp src Pulse Resp SpO2   09/26/21 0826 122/65 97.2 °F (36.2 °C) Temporal 89 16 100 %   09/26/21 0224 114/69 97.3 °F (36.3 °C) Temporal 85 16 97 %   09/25/21 2332 130/81 97.4 °F (36.3 °C) Temporal 92 18 99 %   09/25/21 2031 133/70 97.4 °F (36.3 °C) Temporal 87 18 93 %   09/25/21 1422 125/75 97.8 °F (36.6 °C) -- 96 20 95 %   09/25/21 1032 122/77 97.6 °F (36.4 °C) Temporal 76 20 98 %       24HR INTAKE/OUTPUT:      Intake/Output Summary (Last 24 hours) at 9/26/2021 0847  Last data filed at 9/25/2021 1422  Gross per 24 hour   Intake 240 ml   Output --   Net 240 ml Physical Exam  Vitals and nursing note reviewed. Constitutional:       General: She is not in acute distress. Appearance: Normal appearance. She is obese. She is not ill-appearing, toxic-appearing or diaphoretic. HENT:      Head: Normocephalic and atraumatic. Right Ear: External ear normal.      Left Ear: External ear normal.      Nose: Nose normal. No congestion or rhinorrhea. Mouth/Throat:      Mouth: Mucous membranes are moist.      Pharynx: Oropharynx is clear. No oropharyngeal exudate or posterior oropharyngeal erythema. Eyes:      General: No scleral icterus. Right eye: No discharge. Left eye: No discharge. Extraocular Movements: Extraocular movements intact. Conjunctiva/sclera: Conjunctivae normal.      Pupils: Pupils are equal, round, and reactive to light. Cardiovascular:      Rate and Rhythm: Normal rate and regular rhythm. Pulses: Normal pulses. Heart sounds: Normal heart sounds. No murmur heard. No friction rub. No gallop. Pulmonary:      Effort: Pulmonary effort is normal. No respiratory distress. Breath sounds: Normal breath sounds. No stridor. No wheezing, rhonchi or rales. Chest:      Chest wall: No tenderness. Abdominal:      General: Bowel sounds are normal. There is no distension. Palpations: Abdomen is soft. Tenderness: There is no abdominal tenderness. There is no guarding or rebound. Musculoskeletal:         General: No swelling, tenderness, deformity or signs of injury. Normal range of motion. Cervical back: Normal range of motion and neck supple. No rigidity or tenderness. No muscular tenderness. Right lower leg: No edema. Left lower leg: No edema. Skin:     General: Skin is warm and dry. Capillary Refill: Capillary refill takes less than 2 seconds. Coloration: Skin is not jaundiced or pale. Findings: No bruising, erythema, lesion or rash.    Neurological:      General: No focal deficit present. Mental Status: She is alert and oriented to person, place, and time. Cranial Nerves: No cranial nerve deficit. Sensory: No sensory deficit. Motor: No weakness. Coordination: Coordination normal.   Psychiatric:         Mood and Affect: Mood normal.         Behavior: Behavior normal.         Thought Content: Thought content normal.         Judgment: Judgment normal.         Assessment/plan:         Hospital Problems         Last Modified POA    Acute deep vein thrombosis (DVT) of left upper extremity ([address] 9/24/2021 Yes          Active Problems:    Acute deep vein thrombosis (DVT) of left upper extremity ([address]  Resolved Problems:    * No resolved hospital problems. *      Brief Summary  Ms. Jero Ryan, a 53-year-old female, history of grade 3 adenocarcinoma of right breast (dx 2006 -on chemo), S/p bilateral mastectomy and breast reconstruction (09/17/2021), admitted to [facility] (09/24/2021), on account of acute DVT left upper extremity. Patient was transferred from his oncologist office, after with a 5-day history of significant swelling in the left upper extremity. Patient was sent to vascular lab. Patient was noted to have an extensive acute DVT involving the left upper Extremity. Patient admitted to [facility] (09/24/2021), further work-up and management, including initiation of heparin ggt. History of grade 3 adenocarcinoma of right breast -- S/p bilateral mastectomy and breast reconstruction (09/17/2021),  Acute DVT of left upper extremity  · Left Upper Ext Venous US (09/24/2021): Impression  Acute appearing deep vein thrombosis (DVT) is seen in the internal jugular vein subclavian axillary brachial ulnar , left upper extremity(ies).  Superficial venous thrombosis is visualized in the basilic and cephalic vein of the left upper extremity.   · Heparin ggt  · Continue monitoring  · Oncology and Vascular surgery following-appreciate recommendations  · Left upper extremity venous mechanical thrombectomy (09/26/2021)      Continue management of other chronic medical conditions - see above and orders. Advance Directive: No Order    Diet NPO         Consults Made:   IP CONSULT TO ONCOLOGY  IP CONSULT TO VASCULAR SURGERY  IP CONSULT TO HEM/ONC  IP CONSULT TO VASCULAR SURGERY    DVT prophylaxis: Heparin       Discharge planning: tbd    Time Spent is 25 mins in the examination, evaluation, counseling and review of medications, assessment and plan.      Electronically signed by   Nini Aguila MD, MPH, MD,   Internal Medicine Hospitalist   9/26/2021 8:47 AM

## 2021-09-26 NOTE — ANESTHESIA POSTPROCEDURE EVALUATION
Department of Anesthesiology  Postprocedure Note    Patient: Phi Blair  MRN: [id number]  YOB: 1965  Date of evaluation: 9/26/2021  Time:  10:47 AM     Procedure Summary     Date: 09/26/21 Room / Location: [facility] [place] [address] / [facility]    Anesthesia Start: 0915 Anesthesia Stop: 3078    Procedure: LEFT UPPER EXTREMITY  MECHANICAL SUCTION THROMBECTOMY (Left ) Diagnosis: (ischemia left arm)    Surgeons: Luly Rico DO Responsible Provider:     Anesthesia Type: general ASA Status: 3          Anesthesia Type: general    Dena Phase I:      Dena Phase II:      Last vitals: Reviewed and per EMR flowsheets.        Anesthesia Post Evaluation    Patient location during evaluation: PACU  Patient participation: complete - patient participated  Level of consciousness: awake and alert  Pain score: 0  Airway patency: patent  Nausea & Vomiting: no nausea and no vomiting  Complications: no  Cardiovascular status: blood pressure returned to baseline  Respiratory status: acceptable  Hydration status: stable

## 2021-09-26 NOTE — BRIEF OP NOTE
Brief Postoperative Note      Patient: Dionisio Carbajal  YOB: 1965  MRN: [id number]    Date of Procedure: 9/26/2021    Pre-Op Diagnosis: left upper extremity DVT with sever symptoms    Post-Op Diagnosis: Same       Left upper extremity venous mechanical thrombectomy using 16Fr Flowtriever    Surgeon(s):  Tom De Dios DO    Assistant:  * No surgical staff found *    Anesthesia: General    Estimated Blood Loss (mL): less than 199     Complications: None    Specimens:   ID Type Source Tests Collected by Time Destination   A : thrombus left upper extremity [facility] 9[facility]26[facility]2021 1021        Implants:  * No implants in log *      Drains:   Urethral Catheter Straight-tip 16 fr (Active)       Findings: outflow occlusion of proximal subclavian  vein at the port tubing, thrombus distal to the tubing in the left subclavian and axillary vein. Thrombus content chronic with acute component.     Electronically signed by Tom De Dios DO on 9/26/2021 at 10:43 AM

## 2021-09-26 NOTE — PROGRESS NOTES
PROGRESS NOTE    Patient name: Nomi Stein  Patient : 1965  Room: 721      SUBJECTIVE:  She has a cough. Getting breathing tx. Arm less swollen    INTERVAL HISTORY  Deny Andrews is a very pleasant 82-year-old -American female. Deny Andrews is under treatment by Dr. Alyson Hardin with Faslodex/Ibrance for the diagnosis of stage IV metastatic breast cancer. Deny Andrews was referred yesterday, 2021, from Dr. Hamlet Bueno clinic to the ED for admission with an acute DVT of the left upper extremity for management.    She has a left anterior chest wall port.     Diagnosis  · Grade III Adenocarcinoma of right breast diagnosed   · Stage IIIA, [id number]  · ER/MD Positive, HER-2 bruce/ihc 1+  · 2013-RECURRENCE in the right axillary region  · Genetic testing- BRCA 1&2 Negative  ·  (?) RECURRENCE in the axillary skin  · 2017- METASTATIC DISEASE with lung, hilar, and axillary lymph node mets  · Osteopenia     Treatment Summary  · - Neoadjuvant chemotherapy with AC x 4 cycles followed by Taxol  · - Lumpectomy with axillary lymph node dissection  · - Adjuvant radiation therapy to whole breast and axillary region  · Did not take tamoxifen due to cost  · 2013- RECURRENCE  · Neoadjuvant chemotherapy with 2 cycles of Taxotere followed by Doxil  · 10/14/2013- Right Breast Mastectomy and Axillary Dissection  · 2014- Completion of adjuvant RT to chest wall and axillary lymph node with Dr. Aria Howard  · - Initiated adjuvant endocrine therapy with tamoxifen  ·  (?) RECURRENCE in the axillary skin  ·  (?) Surgical resection of the axillary skin  ·  (?) YVETTE/BSO  · 2016- Bilateral exchange, nipple reconstruction and fat grafting and removal of PAC   · - Switched to aromatase inhibitor  · 2017- METASTATIC DISEASE with lung, hilar, and axillary mets  · 2017-2018- Single agent Abraxane x 13 cycles  · 2018-2018- Gemzar  · 10/10/2018-Faslodex every 4 weeks/palbociclib     Cancer History:  Kavita King was first seen by me on 1/21/2021. Reid Diamond was referred to establish continued of care for history of recurrent metastatic breast cancer. Reid Diamond has been in remission as per the latest CT scan. Reid Diamond is currently on Faslodex and palbociclib.  She has received several lines therapy as described below.  She moved from [place] to [facility] to stay closer to her parents. Naz Rodriguez medical history is complex.  Further details as below. · 2006- Neoadjuvant chemotherapy with AC x 4 cycles followed by [facility] was complicated by viral meningitis; patient experienced neuropathy after 3 doses of Taxol and was switched to Taxotere for last dose  · 2007- Lumpectomy with axillary lymph node dissection 1.9cm, grade 2. No LVI. 1 of 14 lymph nodes positive for malignancy (1/14) [id number]  · 2007- Adjuvant Radiation Therapy to whole breast and axillary region  · April 2013- RECURRENCE presenting as mass in the axillary region  · Neoadjuvant chemotherapy with 2 cycles of Taxotere followed by Doxil- did not have good response to treatment  · 2013- Preop PET/CTshowed 2.8cm, right axillary lymph node with uptake. · 10/14/2013- Right Breast Mastectomy and Axillary Dissection Right breast had benign tissue with fibrous, negative for carcinoma. Axillary contents demonstrated invasive ductal carcinoma, involving fibroadipose tissue and tendinous tissue. 3 benign lymph nodes (0/3). Patterson grade 3. 3.0cm in greatest gross dimensions. Carcinoma permeates among soft tissues and in the right axilla with no apparent involvement of lymph nodes.    · 01/23/2014- Completion of adjuvant radiation therapy to chest wall and axillary lymph node with Dr. Barbara Silver  · 02/14- Initiated adjuvant endocrine therapy with tamoxifen  · 2014 (?) RECURRENCE in the axillary skin  · 2014 (?) Surgical resection of the axillary skin pathology demonstrating tumor at cauterized margin  · 2015 (?) YVETTE/BSO  · 09/19/2016- Bilateral exchange, nipple reconstruction and fat grafting and removal of PAC  · 2016- Switched to aromatase inhibitor patient was non compliant  · 01/05/2017- METASTATIC DISEASE Presented with respiratory failure. CTA Pulmonary showed numerous nodules and masses throughout both lung fields, compatible with metastatic disease. Bilateral hilar adenopathy seen. · 01/11/2017-06/17/2018- Single agent Abraxane x 13 cycles  · 03/15/2017- CT Chest W Contrast Interval decrease in maximal diameter of essentially all the multiple metastatic pulmonary nodules. The average difference is approximately 25%. Some of the much smaller ones have disappeared. Bilateral hilar and aortopulmonary window adenopathy is also similarly smaller. · 06/07/2017- CT Chest W Contrast Multiple lung nodules seen bilaterally. Most of the lung nodules show interval improvement with decreased size by 1 to 3mm. Mediastinal and bilateral hilar adenopathy seen with improvement. · 07/24/2017- PET/CT scan showed marked improvement, is minimal abnormal, has excellent response to therapy  · 02/01/2018- PET/CT scan Numerous bilateral lung masses and nodules, the majority of which do not have appreciable abnormal FDG uptake. The largest mass in the left infrahilar region of the left lower lobe demonstrates a maximum SUV of 3.3. Mediastinal lymphadenopathy. No evidence of metastatic disease in the abdomen or pelvis. · 06/21/2018- CT Chest/Abdomen/Pelvis Multiple lung mets, mild bilateral hilar and mediastinal lymph nodes. No masses or evidence of metastatic disease in the abdomen or pelvis  · 06/27/2018-09/17/2018- Gemzar  · 09/28/2018- CT Pulmonary Multiple lung mets, mild bilateral hilar and mediastinal lymph nodes  · 10/10/2018-12/21/2020- Faslodex every 4 weeks  · 12/14/2018- CT Chest showed definite decrease in the maximal diameter and volume of all left and right metastatic nodules.  Somewhat enlarged paratracheal and left axillary lymph nodes are relatively unchanged. · 04/22/2019- MRI Cervical Spine W WO Contrast Unremarkable  · 04/22/2019- MRI Brain W WO Contrast No enhancing lesions in the brain and no evidence of metastatic disease. · 07/27/2019- CTA Pulmonary showed essentially complete disappearance of the pulmonary metastatic disease. Several tiny flat peripheral nodules are the only sequela. The tiny ones on the right are unchanged, the tiny ones on the left are even smaller. There is no longer any active metastatic disease in either lung. · 12/19/2019- CT Chest/Abdomen/Pelvis Small, tiny subpleural nodules right upper and right midlung field as well as left lung base has remained unchanged. No new focal parenchymal abnormality seen. No adenopathy seen. There is no abdominal or pelvic mass, adenopathy or fluid collection. No lytic or sclerotic bony lesions, but there is a possibility of osteopenia and/or osteoporosis. · 02/17/2020- DEXA Bone Density showed osteopenia of lumbar spine, T-score -1.8, and left femoral neck, T-score -2.0  · 2/25/21 Ct Abdomen Pelvis W Iv Contrast  No evidence of metastatic disease in the abdomen or pelvis. · 2/26/21 Ct Chest W Contrast Tiny nodules in the right lung described above probably represent small foci of pleural thickening due to chronic inflammatory process or small noncalcified granulomas. No features to suggest malignancy or metastatic disease. Bilateral breast implants without complication. · 3/1/2021-discussed the results CT chest abdomen pelvis.  Essentially no clear evidence of metastatic disease.  This is consistent with excellent response to treatment.  Continue current treatment. · 4/1/2021 = 31.6 CA 27-29= 33.5 CEA= 1.6   · 6/3/21 CA 15-3= 23.3 CA 27-29= 25.6  CEA= 1.8   · 7/29/2021 CEA=1.7 CA 15-3=21.50 CA 27.29= 26.0  · 8/19/2021 CT Chest  Left lower lobe pleural-based nodular 1.7 x 0.9 cm is indeterminate. PET/CT recommended.  Feeding defect in the left lower lobe bronchus versus Recent: No results for input(s): BC in the last 720 hours. Gram Stain Recent: No results for input(s): LABGRAM in the last 720 hours. Resp Culture Recent: No results for input(s): CULTRESP in the last 720 hours. Body Fluid Recent : No results for input(s): BFCX in the last 720 hours. MRSA Recent : No results for input(s): [facility] in the last 720 hours. Urine Culture Recent : No results for input(s): LABURIN in the last 720 hours. Organism Recent : No results for input(s): ORG in the last 720 hours. ASSESSMENT/PLAN:  #1  Acute left upper extremity DVT/left upper chest wall port     Catina Porter is a very pleasant 53-year-old -American female. Catina Porter is under treatment by Dr. Chelsea Lopez with Faslodex/Ibrance for the diagnosis of stage IV metastatic breast cancer. Catina Porter was referred yesterday, 9/24/2021, from Dr. Christian Foley clinic to the ED for admission with an acute DVT of the left upper extremity for management. She has a left anterior chest wall port.     She was admitted and placed on IV heparin.      A noninvasive venous study of the left upper extremity documented the following:  · Acute appearing deep vein thrombosis (DVT) is seen in the internal jugular vein subclavian axillary brachial ulnar , left upper extremity(ies). · Superficial venous thrombosis is visualized in the basilic and cephalic vein of the left upper extremity.     A preliminary prothrombotic work-up with antiphospholipid syndrome testing will be initiated, further serological work-up will be done in outpatient setting under the direction of Dr. Chelsea Lopez.     Heparin gtt continues  Vascular surgery to see     #2  Metastatic breast cancer, hormone sensitive     2/25/2021-discussed the results of CT chest abdomen pelvis.  No evidence of disease progression.  In fact, excellent response with no measurable metastatic disease at this time. 1/21/2021-CA 15-3 = 23, CA 27-29 = 20.3. Currently Faslodex/Ibrance.    6/3/2021-CA 15-3-23, CA 27-29-25, CEA 1.8  Regimen:  Faslodex to 500 mg subcutaneous every 4 weeks. Continue Ibrance days 1-21 q. 28 days     Clinical/laboratory assessment of toxicity for Ibrance       8/19/2021-CT chest/abdomen/pelvis-no evidence of metastatic disease.  Findings of a pulmonary lung nodule in the left lower lobe     Essentially, good response to Faslodex/Ibrance. Last cancer marker 7/29/2001 were normal.        #3    B12 deficiency-      B12 levels 189.  Recommended B12 2000 mcg p.o. daily.  Will start B12 injections today 1000mcg with Faslodex. 3/1/2021- Methylmalonic Acid 171, Vitamin B12 >2000. Hold B12 at this time     #4  Chemotherapy-induced neuropathy-     continue gabapentin.     #5  Chemotherapy-induced anemia     -likely related to treatment.           #6   COVID-19 vaccination response-     Patient has not been vaccinated. Encouraged to get vaccinated. SARS-COV-2   NOT detected on 9/24/2021     #7  Left lower lobe lung nodule-     She is currently being seen by pulmonary at [facility]. They believe that this could be inflammatory. She received a trial of antibiotics and will have a repeat CT scan with them.         Carlos Callahan PA-C    09/26/21  8:39 AM    Physician's attestation and contribution:  I, Dr Eliza Manriquez, personally and independently performed an evaluation on Judy Desai        I have reviewed relevant medical information/data to include but not limited to the medication list, relevant appropriate lab work and imaging when applicable. I reviewed other physician's notes, ancillary services and nurses assessments. I have reviewed the above documentation completed by Palak Cedillo PA-C   Please see my additional addended and/or modified contributions to the history of present illness, physical examination, and assessment/medical decision-making and plan that reflects my findings and impressions.   I discussed essential elements of the care plan with Palak Cedillo PA-C and the patient. I have encouraged and answered all the questions raised to the patient's understanding and satisfaction. I concur with the above stated. Subjective-symptomatically stable this morning. Left upper extremity still a bit swollen but less so than yesterday. She received a breathing treatment and is coughing up secretions. Objective- good clear breath sounds bilaterally in upper and lower lung fields anteriorly and posteriorly. Heart regular rate rhythm normal S1-S2 no S3  Abdominal exam is benign without acute changes. Mild edema of the left upper extremity compared to the right    Assessment/plan:  Anticipating suction thrombectomy of extensive left upper extremity DVT by Dr. Briseida Carlton today.     Electronically signed by Yakelin Messer MD on 9/26/21 at 9:36 AM CDT

## 2021-09-26 NOTE — OP NOTE
heparin. Patient was continued on heparin drip. Of note I also discussed with Dr. Gasper Rabago about findings and possible further plans. She had port placed a long time ago in [place]. Then I dilated the entrance with 10 [place] sheath and placed 16 [place]. I perform mechanical thrombectomy of the area with good resolution of the thrombus distal to the giving. Was noticed to be slight extravasation around the basilic vein. This could be due to the access site. The tubing and the wire was removed and the exit site was sutured using 3-0 nylon U stitch. Manual pressure held until hemostasis. Sterile dressing was placed and the arm was wrapped with Ace wrap. Patient tolerated procedure well and was transferred to PACU in stable condition. We will discuss with patient plan of removal of Chemo-Port as this has not been used for years. Then will try to get through her subclavian stenosis after port removal and perform balloon angioplasty with possible stenting of the site to remove the outflow stenosis.     Electronically signed by Dimitri Rodriguez DO on 9/26/2021 at 10:46 AM

## 2021-09-26 NOTE — ANESTHESIA PRE PROCEDURE
Department of Anesthesiology  Preprocedure Note       Name:  Avery Gaxiola   Age:  49 y.o.  :  1965                                          MRN:  [id number]         Date:  2021      Surgeon: Venecia Reddy):  Donald Osorio DO    Procedure: Procedure(s):  LEFT UPPER EXTREMITY SUCTION THROMBECTOMY    Medications prior to admission:   Prior to Admission medications    Medication Sig Start Date End Date Taking? Authorizing Provider   HYDROcodone-acetaminophen (NORCO)  MG per tablet Take 1 tablet by mouth every 6 hours as needed for Pain. Intended supply: 30 days 21  Lupis Medina MD   fluticasone-vilanterol (BREO ELLIPTA) 100-25 MCG/INH AEPB inhaler Inhale 1 puff into the lungs daily 21   Ashley Morrison MD   VENTOLIN  (90 Base) MCG/ACT inhaler Inhale 2 puffs into the lungs 4 times daily as needed for Wheezing 21   Ashley Morrison MD   pantoprazole (PROTONIX) 40 MG tablet TAKE 1 TABLET BY MOUTH EVERY DAY  Patient taking differently: Take 40 mg by mouth daily  21   Pavel Moctezuma MD   IBRANCE 125 MG tablet Take 1 tablet (125mg) by mouth once daily. 21   Pavel Moctezuma MD   NARCAN 4 MG/0.1ML LIQD nasal spray  21   Historical Provider, MD   potassium chloride (KLOR-CON M) 20 MEQ extended release tablet Take 1 tablet by mouth daily for 7 days 6/3/21 9/2/21  Pavel Moctezuma MD   gabapentin (NEURONTIN) 300 MG capsule TAKE 2 CAPSULES BY MOUTH 3 TIMES DAILY FOR 30 DAYS. 21  Pavel Moctezuma MD   cyclobenzaprine (FLEXERIL) 10 MG tablet Take 10 mg by mouth 3 times daily as needed for Muscle spasms    Historical Provider, MD   guaiFENesin-codeine (GUAIFENESIN AC) 100-10 MG/5ML liquid Take 5 mLs by mouth 3 times daily as needed for Cough. Historical Provider, MD   HYDROcodone-acetaminophen (NORCO) 7.5-325 MG per tablet Take 1 tablet by mouth every 6 hours as needed for Pain.     Historical Provider, MD   Albuterol Sulfate (PROAIR HFA IN) Inhale 1 each into the lungs daily as needed     Historical Provider, MD   benzonatate (TESSALON) 100 MG capsule Take 100 mg by mouth 3 times daily as needed for Cough    Historical Provider, MD   Cholecalciferol (VITAMIN D3) 125 MCG (5000 UT) TABS Take 1 tablet by mouth daily     Historical Provider, MD   lisinopril (PRINIVIL;ZESTRIL) 10 MG tablet Take 10 mg by mouth daily    Historical Provider, MD   acyclovir (ZOVIRAX) 800 MG tablet Take 800 mg by mouth 2 times daily as needed     Historical Provider, MD   ondansetron (ZOFRAN) 4 MG tablet Take 4 mg by mouth every 8 hours as needed for Nausea or Vomiting    Historical Provider, MD   alclomethasone (ACLOVATE) 0.05 % cream Apply topically 2 times daily Apply topically 2 times daily. Historical Provider, MD   meloxicam (MOBIC) 15 MG tablet Take 15 mg by mouth daily    Historical Provider, MD   ciprofloxacin (CILOXAN) 0.3 % ophthalmic solution 1 drop every 2 hours    Historical Provider, MD   chlorhexidine (PERIDEX) 0.12 % solution Take 15 mLs by mouth 2 times daily    Historical Provider, MD       Current medications:    No current facility-administered medications for this visit. No current outpatient medications on file.      Facility-Administered Medications Ordered in Other Visits   Medication Dose Route Frequency Provider Last Rate Last Admin    HYDROcodone-acetaminophen ([address]) 7.5-325 MG per tablet 1 tablet  1 tablet Oral Q6H PRN Barbara Naqvi MD        HYDROcodone-acetaminophen [facility])  MG per tablet 1 tablet  1 tablet Oral Q6H PRN Barbara Naqvi MD   1 tablet at 09/26/21 0732    palbociclib (IBRANCE) tablet TABS 125 mg  125 mg Oral Nightly Mauricio Hernandez MD        heparin (porcine) injection 6,680 Units  80 Units/kg IntraVENous PRN Tuan Lees MD        heparin (porcine) injection 3,340 Units  40 Units/kg IntraVENous PRN Tuan Lees MD        heparin 25,000 units in dextrose 5% 250 mL (premix) infusion  5-30 Units/kg/hr IntraVENous Continuous Teressa Garcia MD 11.7 mL/hr at 09/25/21 2007 14 Units/kg/hr at 09/25/21 2007    Arformoterol Tartrate (BROVANA) nebulizer solution 15 mcg  15 mcg Nebulization BID Chinyere Fontaine MD   15 mcg at 09/26/21 0709    budesonide (PULMICORT) nebulizer suspension 500 mcg  0.5 mg Nebulization Q12H Chinyere Fontaine MD   500 mcg at 09/26/21 0709    albuterol (PROVENTIL) nebulizer solution 2.5 mg  2.5 mg Nebulization Q4H PRN Chinyere Fontaine MD        ipratropium (ATROVENT) 0.02 % nebulizer solution 0.5 mg  0.5 mg Nebulization 4x daily Chinyere Fontaine MD   0.5 mg at 09/26/21 3923    benzonatate (TESSALON) capsule 100 mg  100 mg Oral TID PRN Chinyere Fontaine MD        pantoprazole (PROTONIX) tablet 40 mg  40 mg Oral Daily Chinyere Fontaine MD   40 mg at 09/26/21 8097    meloxicam (MOBIC) tablet 15 mg  15 mg Oral Daily Chinyere Fontaine MD   15 mg at 09/26/21 8698    lisinopril (PRINIVIL;ZESTRIL) tablet 10 mg  10 mg Oral Daily Chinyere Fontaine MD   10 mg at 09/26/21 3037    naloxone (NARCAN) injection 0.4 mg  0.4 mg IntraVENous PRN Chinyere Fontaine MD        Vitamin D (CHOLECALCIFEROL) tablet 2,000 Units  2,000 Units Oral Daily Chinyere Fontaine MD   2,000 Units at 09/26/21 1364    acyclovir (ZOVIRAX) tablet 800 mg  800 mg Oral BID PRN Chinyere Fontaine MD        cyclobenzaprine (FLEXERIL) tablet 10 mg  10 mg Oral TID PRN Chinyere Fontaine MD   10 mg at 09/25/21 6230    guaiFENesin-codeine (GUAIFENESIN AC) 100-10 MG/5ML liquid 10 mL  10 mL Oral TID PRN Chinyere Fontaine MD        gabapentin (NEURONTIN) capsule 600 mg  600 mg Oral TID Chinyere Fontaine MD   600 mg at 09/26/21 0813       Allergies:     Allergies   Allergen Reactions    Clindamycin/Lincomycin Hives, Itching and Rash       Problem List:    Patient Active Problem List   Diagnosis Code    [facility] C50.919    Poor venous access I87.8    [place] deficiency anemia D50.9    Menopausal symptom N95.1    Benign neoplasm of body of uterus D26.1    Obesity (BMI 30-39. 9) E66.9    Dyspnea and respiratory abnormalities R06.00, R06.89    Chronic pain syndrome G89.4    Right wrist pain M25.531    Rheumatoid arthritis involving both knees with negative rheumatoid factor ([facility] M06.061, M06.062    Other cyst of bone, left ankle and foot M85.672    S/P bilateral mastectomy Z90.13    Acute purulent bronchitis J20.8    Lung nodule R91.1    Status post bilateral breast reconstruction Z98.890    S/P revision of left breast reconstruction 9/17/2021 Z98.890    Acute deep vein thrombosis (DVT) of left upper extremity (HCC) M31.447       Past Medical History:        Diagnosis Date    Breast cancer ([facility]     Breast cancer with lung mets (RIGHT)    Chronic back pain     GERD (gastroesophageal reflux disease)     Hypertension     Neuropathy     Post chemo treatment neuropathy       Past Surgical History:        Procedure Laterality Date    BREAST LUMPECTOMY Right     2006    BREAST RECONSTRUCTION Left 09/17/2021    Revision left reconstructed breast, implant performed by Desiree Carver MD at [address] [place] [address]  2016    Bilateral breast implants    HYSTERECTOMY, TOTAL ABDOMINAL  2015    MASTECTOMY, BILATERAL      Right 2013 & Left 2016       Social History:    Social History     Tobacco Use    Smoking status: Never Smoker    Smokeless tobacco: Never Used   Substance Use Topics    Alcohol use: Not Currently     Comment: used to drink socially \"a long time ago\"                                Counseling given: Not Answered      Vital Signs (Current): There were no vitals filed for this visit.                                            BP Readings from Last 3 Encounters:   09/26/21 122/65   09/24/21 130/60   09/22/21 124/80       NPO Status:                                                                                 BMI:   Wt Readings from Last 3 Encounters:   09/25/21 183 lb (83 kg)   09/24/21 185 lb 8 oz (84.1 kg)   09/14/21 179 lb (81.2 kg)     There is no height or weight on file to calculate BMI.    CBC:   Lab Results   Component Value Date    WBC 4.1 09/26/2021    RBC 2.73 09/26/2021    HGB 9.6 09/26/2021    HCT 30.5 09/26/2021    .7 09/26/2021    RDW 15.5 09/26/2021     09/26/2021       CMP:   Lab Results   Component Value Date     09/26/2021    K 4.4 09/26/2021     09/26/2021    CO2 23 09/26/2021    BUN 18 09/26/2021    CREATININE 0.7 09/26/2021    GFRAA >59 09/26/2021    AGRATIO 1.6 04/01/2021    LABGLOM >60 09/26/2021    GLUCOSE 120 09/26/2021    PROT 7.1 09/24/2021    CALCIUM 8.3 09/26/2021    BILITOT 0.7 09/24/2021    ALKPHOS 99 09/24/2021    AST 22 09/24/2021    ALT 29 09/24/2021       POC Tests: No results for input(s): POCGLU, POCNA, POCK, POCCL, POCBUN, POCHEMO, POCHCT in the last 72 hours.     Coags:   Lab Results   Component Value Date    PROTIME 13.6 09/24/2021    INR 1.02 09/24/2021    APTT 78.1 09/26/2021       HCG (If Applicable): No results found for: PREGTESTUR, PREGSERUM, HCG, HCGQUANT     ABGs: No results found for: PHART, PO2ART, [id number], XNC6KVB, BEART, [id number]     Type & Screen (If Applicable):  No results found for: LABABO, LABRH    Drug/Infectious Status (If Applicable):  No results found for: HIV, HEPCAB    COVID-19 Screening (If Applicable):   Lab Results   Component Value Date    COVID19 Not Detected 09/24/2021    COVID19 Not Detected 09/13/2021           Anesthesia Evaluation  Patient summary reviewed and Nursing notes reviewed no history of anesthetic complications:   Airway: Mallampati: II  TM distance: >3 FB   Neck ROM: full  Mouth opening: > = 3 FB Dental: normal exam         Pulmonary:normal exam    (+) asthma:     (-) not a current smoker                          ROS comment: Breast ca lung mets   Cardiovascular:    (+) hypertension:,          Beta Blocker:  Not on Beta Blocker         Neuro/Psych:      (-) seizures and CVA           GI/Hepatic/Renal:   (+)

## 2021-09-27 VITALS
WEIGHT: 183 LBS | SYSTOLIC BLOOD PRESSURE: 139 MMHG | DIASTOLIC BLOOD PRESSURE: 63 MMHG | OXYGEN SATURATION: 97 % | HEART RATE: 95 BPM | BODY MASS INDEX: 29.41 KG/M2 | HEIGHT: 66 IN | TEMPERATURE: 97.9 F | RESPIRATION RATE: 20 BRPM

## 2021-09-27 LAB
ANION GAP SERPL CALCULATED.3IONS-SCNC: 12 MMOL/L (ref 7–19)
APTT: 25.6 SEC (ref 26–36.2)
BASOPHILS ABSOLUTE: 0 K/UL (ref 0–0.2)
BASOPHILS RELATIVE PERCENT: 0.1 % (ref 0–1)
BUN BLDV-MCNC: 10 MG/DL (ref 6–20)
CALCIUM SERPL-MCNC: 8.5 MG/DL (ref 8.6–10)
CHLORIDE BLD-SCNC: 108 MMOL/L (ref 98–111)
CO2: 23 MMOL/L (ref 22–29)
CREAT SERPL-MCNC: 0.6 MG/DL (ref 0.5–0.9)
EKG P AXIS: 51 DEGREES
EKG P-R INTERVAL: 144 MS
EKG Q-T INTERVAL: 350 MS
EKG QRS DURATION: 70 MS
EKG QTC CALCULATION (BAZETT): 392 MS
EKG T AXIS: 29 DEGREES
EOSINOPHILS ABSOLUTE: 0 K/UL (ref 0–0.6)
EOSINOPHILS RELATIVE PERCENT: 0 % (ref 0–5)
GFR AFRICAN AMERICAN: >59
GFR NON-AFRICAN AMERICAN: >60
GLUCOSE BLD-MCNC: 148 MG/DL (ref 74–109)
HCT VFR BLD CALC: 30.2 % (ref 37–47)
HEMOGLOBIN: 9.6 G/DL (ref 12–16)
IMMATURE GRANULOCYTES #: 0.1 K/UL
LYMPHOCYTES ABSOLUTE: 1.7 K/UL (ref 1.1–4.5)
LYMPHOCYTES RELATIVE PERCENT: 22 % (ref 20–40)
MCH RBC QN AUTO: 36.1 PG (ref 27–31)
MCHC RBC AUTO-ENTMCNC: 31.8 G/DL (ref 33–37)
MCV RBC AUTO: 113.5 FL (ref 81–99)
MONOCYTES ABSOLUTE: 0.6 K/UL (ref 0–0.9)
MONOCYTES RELATIVE PERCENT: 7.7 % (ref 0–10)
NEUTROPHILS ABSOLUTE: 5.2 K/UL (ref 1.5–7.5)
NEUTROPHILS RELATIVE PERCENT: 69.5 % (ref 50–65)
PDW BLD-RTO: 15.9 % (ref 11.5–14.5)
PLATELET # BLD: 224 K/UL (ref 130–400)
PMV BLD AUTO: 9.1 FL (ref 9.4–12.3)
POTASSIUM REFLEX MAGNESIUM: 4.7 MMOL/L (ref 3.5–5)
RBC # BLD: 2.66 M/UL (ref 4.2–5.4)
SODIUM BLD-SCNC: 143 MMOL/L (ref 136–145)
WBC # BLD: 7.5 K/UL (ref 4.8–10.8)

## 2021-09-27 PROCEDURE — 99221 1ST HOSP IP/OBS SF/LOW 40: CPT | Performed by: SURGERY

## 2021-09-27 PROCEDURE — 85730 THROMBOPLASTIN TIME PARTIAL: CPT

## 2021-09-27 PROCEDURE — 6360000002 HC RX W HCPCS: Performed by: INTERNAL MEDICINE

## 2021-09-27 PROCEDURE — 6370000000 HC RX 637 (ALT 250 FOR IP): Performed by: SURGERY

## 2021-09-27 PROCEDURE — 36415 COLL VENOUS BLD VENIPUNCTURE: CPT

## 2021-09-27 PROCEDURE — 99232 SBSQ HOSP IP/OBS MODERATE 35: CPT | Performed by: INTERNAL MEDICINE

## 2021-09-27 PROCEDURE — 6360000002 HC RX W HCPCS: Performed by: SURGERY

## 2021-09-27 PROCEDURE — 2700000000 HC OXYGEN THERAPY PER DAY

## 2021-09-27 PROCEDURE — 2580000003 HC RX 258: Performed by: SURGERY

## 2021-09-27 PROCEDURE — 80048 BASIC METABOLIC PNL TOTAL CA: CPT

## 2021-09-27 PROCEDURE — 99024 POSTOP FOLLOW-UP VISIT: CPT | Performed by: NURSE PRACTITIONER

## 2021-09-27 PROCEDURE — 94640 AIRWAY INHALATION TREATMENT: CPT

## 2021-09-27 PROCEDURE — 85025 COMPLETE CBC W/AUTO DIFF WBC: CPT

## 2021-09-27 RX ORDER — FAMOTIDINE 20 MG/1
20 TABLET, FILM COATED ORAL DAILY
Qty: 30 TABLET | Refills: 5 | Status: ON HOLD | OUTPATIENT
Start: 2021-09-27 | End: 2021-10-12 | Stop reason: HOSPADM

## 2021-09-27 RX ORDER — HEPARIN SODIUM (PORCINE) LOCK FLUSH IV SOLN 100 UNIT/ML 100 UNIT/ML
300 SOLUTION INTRAVENOUS
Status: COMPLETED | OUTPATIENT
Start: 2021-09-27 | End: 2021-09-27

## 2021-09-27 RX ADMIN — GABAPENTIN 600 MG: 300 CAPSULE ORAL at 09:51

## 2021-09-27 RX ADMIN — APIXABAN 10 MG: 5 TABLET, FILM COATED ORAL at 09:52

## 2021-09-27 RX ADMIN — IPRATROPIUM BROMIDE 0.5 MG: 0.5 SOLUTION RESPIRATORY (INHALATION) at 14:25

## 2021-09-27 RX ADMIN — HEPARIN 300 UNITS: 100 SYRINGE at 16:15

## 2021-09-27 RX ADMIN — IPRATROPIUM BROMIDE 0.5 MG: 0.5 SOLUTION RESPIRATORY (INHALATION) at 10:22

## 2021-09-27 RX ADMIN — SODIUM CHLORIDE, PRESERVATIVE FREE 10 ML: 5 INJECTION INTRAVENOUS at 09:54

## 2021-09-27 RX ADMIN — LISINOPRIL 10 MG: 10 TABLET ORAL at 09:51

## 2021-09-27 RX ADMIN — GABAPENTIN 600 MG: 300 CAPSULE ORAL at 14:59

## 2021-09-27 RX ADMIN — IPRATROPIUM BROMIDE 0.5 MG: 0.5 SOLUTION RESPIRATORY (INHALATION) at 18:48

## 2021-09-27 RX ADMIN — Medication 2000 UNITS: at 09:51

## 2021-09-27 RX ADMIN — PANTOPRAZOLE SODIUM 40 MG: 40 TABLET, DELAYED RELEASE ORAL at 09:50

## 2021-09-27 RX ADMIN — MELOXICAM 15 MG: 7.5 TABLET ORAL at 09:56

## 2021-09-27 RX ADMIN — BUDESONIDE 500 MCG: 0.5 SUSPENSION RESPIRATORY (INHALATION) at 18:49

## 2021-09-27 RX ADMIN — ARFORMOTEROL TARTRATE 15 MCG: 15 SOLUTION RESPIRATORY (INHALATION) at 18:48

## 2021-09-27 ASSESSMENT — PAIN SCALES - GENERAL: PAINLEVEL_OUTOF10: 0

## 2021-09-27 NOTE — DISCHARGE SUMMARY
[place], [place], [address]  DEPARTMENT OF HOSPITALIST MEDICINE    DISCHARGE SUMMARY:        Lynne Sánchez  :  1965  MRN:  [id number]    Admit date:  2021  Discharge date: 2021      Admitting Physician:  Blanca Olivera MD    Advance Directive: Full Code    Consults Made:   IP CONSULT TO ONCOLOGY  IP CONSULT TO VASCULAR SURGERY  IP CONSULT TO HEM/ONC  IP CONSULT TO VASCULAR SURGERY  IP CONSULT TO SOCIAL WORK      Primary Care Physician:  Frida Segovia MD    Discharge Diagnoses: Active Problems:    Acute deep vein thrombosis (DVT) of left upper extremity (HCC)  Resolved Problems:    * No resolved hospital problems. *          Pertinent Labs:  CBC with DIFF:  Recent Labs     21  0601   WBC 4.3* 4.1* 7.5   RBC 2.83* 2.73* 2.66*   HGB 9.9* 9.6* 9.6*   HCT 30.7* 30.5* 30.2*   .5* 111.7* 113.5*   MCH 35.0* 35.2* 36.1*   MCHC 32.2* 31.5* 31.8*   RDW 14.9* 15.5* 15.9*    206 224   MPV 8.9* 9.6 9.1*   NEUTOPHILPCT 44.0* 48.4* 69.5*   LYMPHOPCT 51.0* 38.8 22.0   MONOPCT 4.0 11.1* 7.7   EOSRELPCT 1.0 1.0 0.0   BASOPCT 0.0 0.2 0.1   NEUTROABS 1.9 2.0 5.2   LYMPHSABS 2.2 1.6 1.7   MONOSABS 0.20 0.50 0.60   EOSABS 0.04 0.00 0.00   BASOSABS 0.00 0.00 0.00       CMP/BMP:  Recent Labs     21  03221  0601      < > 143 143 143   K 3.8   < > 3.6 4.4 4.7      < > 106 108 108   CO2 26   < > 27 23 23   ANIONGAP 10   < > 10 12 12   GLUCOSE 93   < > 158* 120* 148*   BUN 15   < > 13 18 10   CREATININE 0.5   < > 0.6 0.7 0.6   LABGLOM >60   < > >60 >60 >60   CALCIUM 9.0   < > 8.6 8.3* 8.5*   PROT 7.1  --   --   --   --    LABALBU 4.1  --   --   --   --    BILITOT 0.7  --   --   --   --    ALKPHOS 99  --   --   --   --    ALT 29  --   --   --   --    AST 22  --   --   --   --     < > = values in this interval not displayed.          CRP:  No results for input(s): CRP in the last 72 hours. Sed Rate:  No results for input(s): SEDRATE in the last 72 hours. HgBA1c:  No components found for: HGBA1C  FLP:  No results found for: TRIG, HDL, LDLCALC, LDLDIRECT, LABVLDL  TSH:    Lab Results   Component Value Date    TSH 1.820 08/27/2021     Troponin T:   Recent Labs     09/24/21 2030   TROPONINI <0.01     Pro-BNP: No results for input(s): BNP in the last 72 hours. INR:   Recent Labs     09/24/21 2030   INR 1.02     ABGs: No results found for: PHART, PO2ART, JMM1AWC  UA:No results for input(s): NITRITE, COLORU, PHUR, LABCAST, WBCUA, RBCUA, MUCUS, TRICHOMONAS, YEAST, BACTERIA, CLARITYU, SPECGRAV, LEUKOCYTESUR, UROBILINOGEN, BILIRUBINUR, BLOODU, GLUCOSEU, AMORPHOUS in the last 72 hours. Invalid input(s): Marigene Safe      Culture Results:    No results for input(s): CXSURG in the last 720 hours. Blood Culture Recent: No results for input(s): BC in the last 720 hours. Cultures:   No results for input(s): CULTURE in the last 72 hours. No results for input(s): BC, Marilyn Coats in the last 72 hours. No results for input(s): CXSURG in the last 72 hours. No results for input(s): MG, PHOS in the last 72 hours. Recent Labs     09/24/21 2030   AST 22   ALT 29   BILITOT 0.7   ALKPHOS 99           Significant Diagnostic Studies:   VL Extremity Venous Left    Result Date: 9/24/2021  Vascular Upper Extremities Veins Procedure  Demographics   Patient Name    Donn Martinez Age                   49   Patient Number  [id number]          Gender                Female   Visit Number    [id number]       Interpreting          [facility]                                  Physician   Date of Birth   1965      Referring Physician   Ricco Collazo. [facility]   Accession       [id number]      [id number] [facility]  Number  Procedure Type of Study:   Veins:Upper Extremities Veins, UE VENOUS UNILATERAL/FLU. Indications for Study:Arm swelling and Arm pain.   Impression   Acute appearing deep vein thrombosis (DVT) is seen in the internal jugular  vein subclavian axillary brachial ulnar , left upper extremity(ies). Superficial venous thrombosis is visualized in the basilic and cephalic  vein of the left upper extremity. Signature   ----------------------------------------------------------------  Electronically signed by Dina GustafsonInterpreting  physician) on 09/24/2021 04:47 PM  ----------------------------------------------------------------  Velocities are measured in cm/s ; Diameters are measured in mm Right UE Vein Measurements 2D Measurements +---------------+-----------------+----------------------+-----------------+ ! Location       ! Visualized       ! Compressibility       ! Thrombosis       ! +---------------+-----------------+----------------------+-----------------+ ! IJV            ! Yes              ! Yes                   ! None             ! +---------------+-----------------+----------------------+-----------------+ ! SCV            ! Yes              ! Yes                   ! None             ! +---------------+-----------------+----------------------+-----------------+ Left UE Vein Measurements 2D Measurements +---------------+-----------------+----------------------+-----------------+ ! Location       ! Visualized       ! Compressibility       ! Thrombosis       ! +---------------+-----------------+----------------------+-----------------+ ! IJV            ! Yes              ! No                    !                 ! +---------------+-----------------+----------------------+-----------------+ ! SCV            ! Yes              ! No                    !                 ! +---------------+-----------------+----------------------+-----------------+ ! Axillary       ! Yes              ! No                    !                 ! +---------------+-----------------+----------------------+-----------------+ ! Brachial       !Yes              ! No                    !                 ! +---------------+-----------------+----------------------+-----------------+ ! Radial         !Yes              ! Yes                   ! None             ! +---------------+-----------------+----------------------+-----------------+ ! Ulnar          ! Yes              ! No                    !                 ! +---------------+-----------------+----------------------+-----------------+ ! Cephalic       ! Yes              ! No                    !                 ! +---------------+-----------------+----------------------+-----------------+ ! Basilic        ! Yes              ! No                    !                 ! +---------------+-----------------+----------------------+-----------------+    XR CHEST PORTABLE    Result Date: 9/24/2021  XR CHEST PORTABLE 9/24/2021 8:00 PM HISTORY: Chest pain COMPARISON: 7/28/2019 CXR: A single frontal view of the chest is performed. Findings: Lungs are hypoventilated. Bilateral breast implants with associated soft tissue attenuation. No pulmonary consolidation or edema. Cardiac and mediastinal contours normal. No pleural effusion or pneumothorax. Left subclavian port in place with tip overlying the SVC. Right axillary surgical clips. 1. Bilateral breast implants. No acute pulmonary process identified. Left subclavian port. Signed by Dr Ricco Collazo Course:   Ms. Jena Woodson, a 57-year-old female, history of grade 3 adenocarcinoma of right breast (dx 2006 -on chemo), S/p bilateral mastectomy and breast reconstruction (09/17/2021), admitted to [facility] (09/24/2021), on account of acute DVT left upper extremity.     Patient was transferred from his oncologist office, after with a 5-day history of significant swelling in the left upper extremity. Patient was sent to vascular lab.   Patient was noted to have an extensive acute DVT involving the left upper Extremity.     Patient admitted to [facility] (09/24/2021), further work-up and management, including initiation of heparin ggt.        History of grade 3 adenocarcinoma of right breast -- S/p bilateral mastectomy and breast reconstruction (09/17/2021),  Acute DVT of left upper extremity  · Left Upper Ext Venous US (09/24/2021): Impression  Acute appearing deep vein thrombosis (DVT) is seen in the internal jugular vein subclavian axillary brachial ulnar , left upper extremity(ies).  Superficial venous thrombosis is visualized in the basilic and cephalic vein of the left upper extremity. · Heparin ggt  · Switched to Apixaban  · Continue monitoring  · Oncology and Vascular surgery following-appreciate recommendations  · S/p Left upper extremity venous mechanical thrombectomy (09/26/2021)  · Heparin drip switched to apixaban (09/26/2021)  · Initiation: Apixaban 10 mg p.o. twice daily x7 days: (09/26/2021-10/03/2021)  · Maintenance: Apixaban 5 mg p.o. twice daily: Start date: 10/03/2021  · Okay for discharge from vascular standpoint        Continued management of other chronic medical conditions             Physical Exam:  Vital Signs: /63   Pulse 95   Temp 97.9 °F (36.6 °C) (Temporal)   Resp 20   Ht 5' 6\" (1.676 m)   Wt 183 lb (83 kg)   SpO2 97%   BMI 29.54 kg/m²   Physical Exam  Vitals and nursing note reviewed. Constitutional:       General: She is not in acute distress. Appearance: Normal appearance. She is not ill-appearing, toxic-appearing or diaphoretic. HENT:      Head: Normocephalic and atraumatic. Right Ear: External ear normal.      Left Ear: External ear normal.      Nose: Nose normal. No congestion or rhinorrhea. Mouth/Throat:      Mouth: Mucous membranes are moist.      Pharynx: Oropharynx is clear. No oropharyngeal exudate or posterior oropharyngeal erythema. Eyes:      General: No scleral icterus. Right eye: No discharge. Left eye: No discharge. Extraocular Movements: Extraocular movements intact.       Conjunctiva/sclera: Conjunctivae normal.      Pupils: Pupils are equal, round, and reactive to light. Cardiovascular:      Rate and Rhythm: Normal rate and regular rhythm. Pulses: Normal pulses. Heart sounds: Normal heart sounds. No murmur heard. No friction rub. No gallop. Pulmonary:      Effort: Pulmonary effort is normal. No respiratory distress. Breath sounds: Normal breath sounds. No stridor. No wheezing, rhonchi or rales. Chest:      Chest wall: No tenderness. Abdominal:      General: Bowel sounds are normal. There is no distension. Palpations: Abdomen is soft. Tenderness: There is no abdominal tenderness. There is no guarding or rebound. Musculoskeletal:         General: No swelling, tenderness, deformity or signs of injury. Normal range of motion. Cervical back: Normal range of motion and neck supple. No rigidity or tenderness. No muscular tenderness. Right lower leg: No edema. Left lower leg: No edema. Skin:     General: Skin is warm and dry. Capillary Refill: Capillary refill takes less than 2 seconds. Coloration: Skin is not jaundiced or pale. Findings: No bruising, erythema, lesion or rash. Neurological:      General: No focal deficit present. Mental Status: She is alert and oriented to person, place, and time. Cranial Nerves: No cranial nerve deficit. Sensory: No sensory deficit. Motor: No weakness. Coordination: Coordination normal.   Psychiatric:         Mood and Affect: Mood normal.         Behavior: Behavior normal.         Thought Content:  Thought content normal.         Judgment: Judgment normal.       Discharge Medications:       Macarena Murdock   [place] Medication Instructions SGV:[id number]    Printed on:09/27/21 0725   Medication Information                      acyclovir (ZOVIRAX) 800 MG tablet  Take 800 mg by mouth 2 times daily as needed              Albuterol Sulfate (PROAIR HFA IN)  Inhale 1 each into the lungs daily as needed              alclomethasone (ACLOVATE) 0.05 % cream  Apply topically 2 times daily Apply topically 2 times daily. apixaban starter pack (ELIQUIS) 5 MG TBPK tablet  Take 1 tablet by mouth See Admin Instructions             benzonatate (TESSALON) 100 MG capsule  Take 100 mg by mouth 3 times daily as needed for Cough             chlorhexidine (PERIDEX) 0.12 % solution  Take 15 mLs by mouth 2 times daily             Cholecalciferol (VITAMIN D3) 125 MCG (5000 UT) TABS  Take 1 tablet by mouth daily              ciprofloxacin (CILOXAN) 0.3 % ophthalmic solution  1 drop every 2 hours             cyclobenzaprine (FLEXERIL) 10 MG tablet  Take 10 mg by mouth 3 times daily as needed for Muscle spasms             famotidine (PEPCID) 20 MG tablet  Take 1 tablet by mouth daily             fluticasone-vilanterol (BREO ELLIPTA) 100-25 MCG/INH AEPB inhaler  Inhale 1 puff into the lungs daily             gabapentin (NEURONTIN) 300 MG capsule  TAKE 2 CAPSULES BY MOUTH 3 TIMES DAILY FOR 30 DAYS.             guaiFENesin-codeine (GUAIFENESIN AC) 100-10 MG/5ML liquid  Take 5 mLs by mouth 3 times daily as needed for Cough. HYDROcodone-acetaminophen (NORCO)  MG per tablet  Take 1 tablet by mouth every 6 hours as needed for Pain. Intended supply: 30 days             IBRANCE 125 MG tablet  Take 1 tablet (125mg) by mouth once daily. lisinopril (PRINIVIL;ZESTRIL) 10 MG tablet  Take 10 mg by mouth daily             meloxicam (MOBIC) 15 MG tablet  Take 15 mg by mouth daily             NARCAN 4 MG/0.1ML LIQD nasal spray               ondansetron (ZOFRAN) 4 MG tablet  Take 4 mg by mouth every 8 hours as needed for Nausea or Vomiting             pantoprazole (PROTONIX) 40 MG tablet  TAKE 1 TABLET BY MOUTH EVERY DAY             VENTOLIN  (90 Base) MCG/ACT inhaler  Inhale 2 puffs into the lungs 4 times daily as needed for Wheezing                 Discharge Instructions: Follow up with Jono Chand MD in 7 days.   Take medications as directed. Resume activity as tolerated. Diet: ADULT DIET; Regular        DISCHARGE STATUS:    Condition: Fair  Disposition: Patient is medically stable and will be discharged Home    Extended Emergency Contact Information  Primary Emergency Contact: Kavita De Luna, [address] [facility] Phone: [phone]  Mobile Phone: [phone]  Relation: Parent  Secondary Emergency Contact: Kavita De Luna, [address] [place] Phone: [phone]  Relation: Parent       Time Spent on discharge is  34 mins in the examination, evaluation, counseling and review of medications and discharge plan. Electronically signed by   Teajl Del Rio MD, MPH, MD,   Internal Medicine Hospitalist   9/27/2021 3:06 PM      Thank you Christian Ayala MD for the opportunity to be involved in this patient's care. If you have any questions or concerns please feel free to contact me at [phone]        EMR Dragon/Transcription disclaimer:   Much of this encounter note is an electronic transcription/translation of spoken language to printed text.  The electronic translation of spoken language may permit erroneous, or at times, nonsensical words or phrases to be inadvertently transcribed; although attempts have made to review the note for such errors, some may still exist.

## 2021-09-27 NOTE — PROGRESS NOTES
PROGRESS NOTE    Patient name: Rhonda Meléndez  Patient : 1965  Room: 721      SUBJECTIVE: Arm less swollen. Awaiting final decisions regarding Mediport removal.  Complains of acid reflux    INTERVAL HISTORY  Jose Luis Kuo is a very pleasant 16-year-old -American female under treatment by Dr. Janette Aguilar with Faslodex/Ibrance for the diagnosis of stage IV metastatic breast cancer. Jose Luis Kuo was referred yesterday, 2021, from Dr. Tommy Carnes clinic to the ED for admission with an acute DVT of the left upper extremity for management. She has a left anterior chest wall port.      Diagnosis  · Grade III Adenocarcinoma of right breast diagnosed   · Stage IIIA, [id number]  · ER/WA Positive, HER-2 bruce/ihc 1+  · 2013-RECURRENCE in the right axillary region  · Genetic testing- BRCA 1&2 Negative  ·  (?) RECURRENCE in the axillary skin  · 2017- METASTATIC DISEASE with lung, hilar, and axillary lymph node mets  · Osteopenia     Treatment Summary  · - Neoadjuvant chemotherapy with AC x 4 cycles followed by Taxol  · - Lumpectomy with axillary lymph node dissection  · - Adjuvant radiation therapy to whole breast and axillary region  · Did not take tamoxifen due to cost  · 2013- RECURRENCE  · Neoadjuvant chemotherapy with 2 cycles of Taxotere followed by Doxil  · 10/14/2013- Right Breast Mastectomy and Axillary Dissection  · 2014- Completion of adjuvant RT to chest wall and axillary lymph node with Dr. Bereket Romero  · - Initiated adjuvant endocrine therapy with tamoxifen  ·  (?) RECURRENCE in the axillary skin  ·  (?) Surgical resection of the axillary skin  ·  (?) YVETTE/BSO  · 2016- Bilateral exchange, nipple reconstruction and fat grafting and removal of PAC   · - Switched to aromatase inhibitor  · 2017- METASTATIC DISEASE with lung, hilar, and axillary mets  · 2017-2018- Single agent Abraxane x 13 cycles  · 2018-2018- Gemzar  · 10/10/2018-Faslodex every 4 weeks/palbociclib     Cancer History:  Yoselin Lange was first seen by me on 1/21/2021. She was referred to [facility] continued of care for history of recurrent metastatic breast cancer. She has been in remission as per the latest CT scan. She is currently on Faslodex and palbociclib. She has received several lines therapy as described below. She moved from [place] to [facility] to stay closer to her parents. Her medical history is complex. Further details as below. · 2006- Neoadjuvant chemotherapy with AC x 4 cycles followed by Taxol AC was complicated by viral meningitis; patient experienced neuropathy after 3 doses of Taxol and was switched to Taxotere for last dose  · 2007- Lumpectomy with axillary lymph node dissection 1.9cm, grade 2. No LVI. 1 of 14 lymph nodes positive for malignancy (1/14) [id number]  · 2007- Adjuvant Radiation Therapy to whole breast and axillary region  · April 2013- RECURRENCE presenting as mass in the axillary region  · Neoadjuvant chemotherapy with 2 cycles of Taxotere followed by Doxil- did not have good response to treatment  · 2013- Preop PET/CTshowed 2.8cm, right axillary lymph node with uptake. · 10/14/2013- Right Breast Mastectomy and Axillary Dissection Right breast had benign tissue with fibrous, negative for carcinoma. Axillary contents demonstrated invasive ductal carcinoma, involving fibroadipose tissue and tendinous tissue. 3 benign lymph nodes (0/3). Yusra grade 3. 3.0cm in greatest gross dimensions. Carcinoma permeates among soft tissues and in the right axilla with no apparent involvement of lymph nodes.    · 01/23/2014- Completion of adjuvant radiation therapy to chest wall and axillary lymph node with Dr. Gabriela Tubbs  · 02/14- Initiated adjuvant endocrine therapy with tamoxifen  · 2014 (?) RECURRENCE in the axillary skin  · 2014 (?) Surgical resection of the axillary skin pathology demonstrating tumor at cauterized paratracheal and left axillary lymph nodes are relatively unchanged. · 04/22/2019- MRI Cervical Spine W WO Contrast Unremarkable  · 04/22/2019- MRI Brain W WO Contrast No enhancing lesions in the brain and no evidence of metastatic disease. · 07/27/2019- CTA Pulmonary showed essentially complete disappearance of the pulmonary metastatic disease. Several tiny flat peripheral nodules are the only sequela. The tiny ones on the right are unchanged, the tiny ones on the left are even smaller. There is no longer any active metastatic disease in either lung. · 12/19/2019- CT Chest/Abdomen/Pelvis Small, tiny subpleural nodules right upper and right midlung field as well as left lung base has remained unchanged. No new focal parenchymal abnormality seen. No adenopathy seen. There is no abdominal or pelvic mass, adenopathy or fluid collection. No lytic or sclerotic bony lesions, but there is a possibility of osteopenia and/or osteoporosis. · 02/17/2020- DEXA Bone Density showed osteopenia of lumbar spine, T-score -1.8, and left femoral neck, T-score -2.0  · 2/25/21 Ct Abdomen Pelvis W Iv Contrast  No evidence of metastatic disease in the abdomen or pelvis. · 2/26/21 Ct Chest W Contrast Tiny nodules in the right lung described above probably represent small foci of pleural thickening due to chronic inflammatory process or small noncalcified granulomas. No features to suggest malignancy or metastatic disease. Bilateral breast implants without complication. · 3/1/2021-discussed the results CT chest abdomen pelvis. Essentially no clear evidence of metastatic disease. This is consistent with excellent response to treatment. Continue current treatment. · 4/1/2021 = 31.6 CA 27-29= 33.5 CEA= 1.6   · 6/3/21 CA 15-3= 23.3 CA 27-29= 25.6  CEA= 1.8   · 7/29/2021 CEA=1.7 CA 15-3=21.50 CA 27.29= 26.0  · 8/19/2021 CT Chest  Left lower lobe pleural-based nodular 1.7 x 0.9 cm is indeterminate. PET/CT recommended.  Feeding defect in the left lower lobe bronchus versus a postobstructive airspace disease. This too may be further characterized with PET CT versus direct visualization. · 8/19/2021 CT Abd/Pelvis A stable CT scan of the abdomen and pelvis. No finding to suggest neoplastic/metastatic disease. CA 27.29 Dynamics  01/21/2021- 20.3  04/01/2021 33.5  06/03/2021 25.6  07/29/2021 26.0     Objective   /63   Pulse 100   Temp 96.3 °F (35.7 °C) (Temporal)   Resp 12   Ht 5' 6\" (1.676 m)   Wt 183 lb (83 kg)   SpO2 97%   BMI 29.54 kg/m²     PHYSICAL EXAM:  Physical Exam  Constitutional:       General: She is not in acute distress. Eyes:      General: No scleral icterus. Cardiovascular:      Rate and Rhythm: Normal rate and regular rhythm. Comments: Trace - 1+ LUE edema  Pulmonary:      Effort: Pulmonary effort is normal.      Breath sounds: Normal breath sounds. Abdominal:      General: Abdomen is flat. Bowel sounds are normal.      Palpations: Abdomen is soft. Neurological:      General: No focal deficit present. Mental Status: She is alert and oriented to person, place, and time.    Psychiatric:         Mood and Affect: Mood normal.         Behavior: Behavior normal.       Recent Labs     09/27/21  0601 09/26/21  0324 09/25/21  0341   WBC 7.5 4.1* 4.3*   HGB 9.6* 9.6* 9.9*   HCT 30.2* 30.5* 30.7*   .5* 111.7* 108.5*    206 187     Lab Results   Component Value Date     09/27/2021    K 4.7 09/27/2021     09/27/2021    CO2 23 09/27/2021    BUN 10 09/27/2021    CREATININE 0.6 09/27/2021    GLUCOSE 148 (H) 09/27/2021    CALCIUM 8.5 (L) 09/27/2021    PROT 7.1 09/24/2021    LABALBU 4.1 09/24/2021    BILITOT 0.7 09/24/2021    ALKPHOS 99 09/24/2021    AST 22 09/24/2021    ALT 29 09/24/2021    LABGLOM >60 09/27/2021    GFRAA >59 09/27/2021    AGRATIO 1.6 04/01/2021    GLOB 3.3 08/27/2021     Lab Results   Component Value Date    INR 1.02 09/24/2021    PROTIME 13.6 09/24/2021     30 Day Clinical/laboratory assessment of toxicity for Ibrance       8/19/2021-CT chest/abdomen/pelvis-no evidence of metastatic disease. Findings of a pulmonary lung nodule in the left lower lobe     Essentially, good response to Faslodex/Ibrance. Last cancer marker 7/29/2021 were normal.        #3    B12 deficiency   B12 levels 189. B12 injections were initiated 9/26/2021 (1000 mcg) with Faslodex. 3/1/2021- Methylmalonic Acid 171, Vitamin B12 >2000. Defer to outpatient     #4  Chemotherapy-induced neuropathy  continue gabapentin     #5  Chemotherapy-induced anemia  likely related to treatment    Recent Labs     09/27/21  0601 09/26/21  0324 09/25/21  0341   WBC 7.5 4.1* 4.3*   HGB 9.6* 9.6* 9.9*   HCT 30.2* 30.5* 30.7*   .5* 111.7* 108.5*    206 187     #6   COVID-19 vaccination response     Patient has not been vaccinated. Encouraged to get vaccinated per Dr. Dionisio Parrish. SARS-COV-2   NOT detected on 9/24/2021     #7  Left lower lobe lung nodule  She is currently being seen by pulmonary at [facility]. They believe that this could be inflammatory. She received a trial of antibiotics and will have a repeat CT scan with them. #8  GERD  Possible decreased efficacy of Ibrance when taken with Protonix  Patient states GERD is unrelieved with Protonix  Rx Pepcid sent to [address], APRN    09/27/21  6:39 AM  Physician's attestation/substantial contribution:  I, Dr Jose Geller, independently performed an evaluation on Alexandr Terry. I have reviewed relevant medical information/data to include but not limited to medication list, relevant appropriate labs and imaging when applicable. I reviewed other physician's notes, ancillary services and nurses assessment. I have reviewed the above documentation completed by the Nurse Practitioner or Physician Assistant.  Please see my additional contributions to the history of present illness, physical examination, and assessment/medical decision-making that reflect my findings and impressions. I discussed essential elements of the care plan with the NP or PA and the patient as well. I answered all the questions to the patient's satisfaction. I concur with above stated. Subjective-no new complaints. Objective-no change  Assessment/plan:  Port related DVT-attempted thrombectomy not successful due to significant narrowing. Vascular planning outpatient procedure. Okay to discharge on Eliquis. Breast cancer stage IV-in remission. Currently on Faslodex/Ibrance. GERD-PPI as alternated with Pepcid. Avoid PPI as much as she can. We'll send prescription biopsy to her pharmacy [facility]  Disposition-okay to discharge from my standpoint when okay without this. Discussed with nursing staff, vascular inpatient this morning.   Latanya Maciel MD

## 2021-09-27 NOTE — CARE COORDINATION
Contacted pt's pharmacy [facility] to ensure affordability for eilquis which is covered by the pt's secondary medicaid with $0 copay.

## 2021-09-27 NOTE — PROGRESS NOTES
Vascular Surgery  Dr.Scott Shea [employer]   Daily Progress Note    Pt Name: Lynne Sánchez  Medical Record Number: [id number]  Date of Birth 1965   Today's Date: 9/27/2021        SUBJECTIVE:     Patient was seen and examined. Resting. Stated her left arm was feeling better. OBJECTIVE:     Patient Vitals for the past 24 hrs:   BP Temp Temp src Pulse Resp SpO2   09/27/21 0623 139/63 97.9 °F (36.6 °C) Temporal 95 (!) 1 98 %   09/27/21 0159 114/70 96.3 °F (35.7 °C) Temporal 100 12 97 %   09/26/21 2000 -- -- -- 103 -- --   09/26/21 1823 114/69 97.3 °F (36.3 °C) Temporal 103 16 96 %   09/26/21 1730 110/68 98.8 °F (37.1 °C) Temporal 99 12 96 %   09/26/21 1115 126/74 97.6 °F (36.4 °C) Temporal 91 13 95 %   09/26/21 1100 124/71 -- -- 94 16 99 %   09/26/21 1055 123/69 -- -- 93 14 99 %   09/26/21 1054 -- -- -- 93 -- --   09/26/21 1050 123/64 -- -- 96 12 96 %   09/26/21 1047 -- 97.7 °F (36.5 °C) -- -- -- --   09/26/21 1045 130/72 -- -- 98 13 99 %   09/26/21 1039 123/72 97.7 °F (36.5 °C) Temporal 105 16 90 %         Intake/Output Summary (Last 24 hours) at 9/27/2021 0833  Last data filed at 9/26/2021 2200  Gross per 24 hour   Intake 480 ml   Output 1100 ml   Net -620 ml       In: 240 [P.O.:240]  Out: 1100 [Urine:1100]    I/O last 3 completed shifts: In: 480 [P.O.:480]  Out: 1100 [Urine:1100]         Wt Readings from Last 3 Encounters:   09/25/21 183 lb (83 kg)   09/24/21 185 lb 8 oz (84.1 kg)   09/14/21 179 lb (81.2 kg)        Body mass index is 29.54 kg/m². Diet: ADULT DIET;  Regular        MEDS:     Scheduled Meds:   sodium chloride flush  5-40 mL IntraVENous 2 times per day    apixaban  10 mg Oral BID    Followed by   Ha Fan ON 10/3/2021] apixaban  5 mg Oral BID    palbociclib  125 mg Oral Nightly    Arformoterol Tartrate  15 mcg Nebulization BID    budesonide  0.5 mg Nebulization Q12H    ipratropium  0.5 mg Nebulization 4x daily    pantoprazole  40 mg Oral Daily    meloxicam  15 mg Oral Daily    lisinopril 10 mg Oral Daily    Vitamin D  2,000 Units Oral Daily    gabapentin  600 mg Oral TID     Continuous Infusions:   sodium chloride       PRN Meds:sodium chloride flush, 5-40 mL, PRN  sodium chloride, 25 mL, PRN  HYDROcodone-acetaminophen, 1 tablet, Q6H PRN  HYDROcodone-acetaminophen, 1 tablet, Q6H PRN  heparin (porcine), 80 Units/kg, PRN  heparin (porcine), 40 Units/kg, PRN  albuterol, 2.5 mg, Q4H PRN  benzonatate, 100 mg, TID PRN  naloxone, 0.4 mg, PRN  acyclovir, 800 mg, BID PRN  cyclobenzaprine, 10 mg, TID PRN  guaiFENesin-codeine, 10 mL, TID PRN          PHYSICAL EXAM:     CONSTITUTIONAL: Alert and oriented times 3, no acute distress and cooperative to examination. LUNGS: Clear bilateral breath sounds without wheezes or rhonchi. CARDIOVASCULAR: Normal HT with RRR. No murmurs, gallops or rubs. ABDOMEN: Soft, non-tender with active bowel sounds x 4. NEUROLOGIC: Grossly intact. WOUND/INCISION: Left upper extremity brachial puncture site with suture intact. No erythema or drainage. Mild swelling of left arm. EXTREMITY: No clubbing or cyanosis. No edema of BLE. Feet warm to touch. Palpable pedal pulses.      LABS:     CBC:   Recent Labs     09/25/21 0341 09/26/21  0324 09/27/21  0601   WBC 4.3* 4.1* 7.5   RBC 2.83* 2.73* 2.66*   HGB 9.9* 9.6* 9.6*   HCT 30.7* 30.5* 30.2*   .5* 111.7* 113.5*   MCH 35.0* 35.2* 36.1*   MCHC 32.2* 31.5* 31.8*   RDW 14.9* 15.5* 15.9*    206 224   MPV 8.9* 9.6 9.1*      Last 3 CMP:   Recent Labs     09/24/21 2030 09/24/21 2030 09/25/21  0341 09/26/21  0324 09/27/21  0601      < > 143 143 143   K 3.8   < > 3.6 4.4 4.7      < > 106 108 108   CO2 26   < > 27 23 23   BUN 15   < > 13 18 10   CREATININE 0.5   < > 0.6 0.7 0.6   GLUCOSE 93   < > 158* 120* 148*   CALCIUM 9.0   < > 8.6 8.3* 8.5*   PROT 7.1  --   --   --   --    LABALBU 4.1  --   --   --   --    BILITOT 0.7  --   --   --   --    ALKPHOS 99  --   --   --   --    AST 22  --   --   --   --    ALT 29  --   --   --   --     < > = values in this interval not displayed. Troponin:   Recent Labs     09/24/21 2030   TROPONINI <0.01     Calcium:   Lab Results   Component Value Date    CALCIUM 8.5 09/27/2021    CALCIUM 8.3 09/26/2021    CALCIUM 8.6 09/25/2021      INR:   Recent Labs     09/24/21 2030   INR 1.02         DVT prophylaxis: Eliquis          ASSESSMENT:     1. Acute DVT, LUE 2' to Left Subclavian Stenosis  2. Hx Breast Cancer - S/P Right Mastectomy and Axillary Dissection 2013, Adjuvant Radiation Therapy  3. Iron Deficiency Anemia  4. Chronic Pain Syndrome      PLAN:     1. Remove Suture, Left Brachial Puncture Site  2. F/U 2 weeks for recheck of LUE. If continued swelling, may need removal of port with possible angioplasty. 3. Recommend Elevation and Compression Sleeve of LUE. Ok to D/C Home from vascular surgery standpoint. Patient seen in conjunction with Dr. Jessie Mcgrath. Plan discussed with patient and nursing.      Meryle Jefferson, APRN-BC

## 2021-09-28 ENCOUNTER — TELEPHONE (OUTPATIENT)
Dept: INTERNAL MEDICINE | Age: 56
End: 2021-09-28

## 2021-09-28 NOTE — TELEPHONE ENCOUNTER
[facility] Initial Follow Up Call    Outreach made within 2 business days of discharge: Yes    Patient: Heather Murcia   Patient : 1965  MRN: [id number]   Reason for Admission: Admitted 21 for arm pain  Discharge Date: 21    Discharge Diagnoses: Active Problems:    Acute deep vein thrombosis (DVT) of left upper extremity ([address]     Spoke with: patient     Discharge department/facility: [place]     TCM Interactive Patient Contact:  Was patient able to fill all prescriptions: No, see note  Was patient instructed to bring all medications to the follow-up visit: Yes  Is patient taking all medications as directed in the discharge summary? No, see note  Does patient understand their discharge instructions: Yes  Does patient have questions or concerns that need addressed prior to 7-14 day follow up office visit: no    I spoke with Mrs. Irasema Hung. She states her arm is still very swollen. She states her pain level has been manageable. She does report back pain. She states her pain level is an 8 from her back. She denies any pain in her arm from surgery. She reports her bowels finally moved today. Her appetite is improving. She states she still does not have her blood thinner. It was not ready yesterday when she went to the pharmacy. It was supposed to be ready today by 1pm. She has someone to go get it for her. I have advised her she needs these started asap and asked her to call us directly if for some reason she is unable to get this picked up today. She denies any other needs at this time and will follow up as directed.      Scheduled appointment with PCP within 7-14 days    Follow Up  Future Appointments   Date Time Provider Lary Marquez   2021  1:00 PM KRISTEN Bustillo LPS Gen [facility]   2021  9:00 AM [facility] CT RM 2 [facility] CT [facility]   2021  4:40 PM MD ANH Bell [facility]   10/5/2021 10:00 AM Ashley Morrison MD Kallie Pulm [facility]   10/12/2021 11:20 AM Rosita Del Toro MD LPS [facility]   10/13/2021  9:15 AM Sampson Omalley, APRN N Vasc Spec [facility]   10/22/2021 12:00 PM MD REINA Moore H[facility]   10/22/2021  1:00 PM SCHEDULE, [facility] MED ONC L MED ONC [facility]   10/28/2021  1:00 PM Hans Lynn MD N LPS Gen SG [facility]       Fuentes Soda, [place]

## 2021-09-29 ENCOUNTER — OFFICE VISIT (OUTPATIENT)
Dept: SURGERY | Age: 56
End: 2021-09-29

## 2021-09-29 VITALS
BODY MASS INDEX: 30.37 KG/M2 | TEMPERATURE: 98.6 F | HEART RATE: 94 BPM | SYSTOLIC BLOOD PRESSURE: 140 MMHG | DIASTOLIC BLOOD PRESSURE: 87 MMHG | HEIGHT: 66 IN | WEIGHT: 189 LBS

## 2021-09-29 DIAGNOSIS — Z98.890 S/P BREAST RECONSTRUCTION, LEFT: Primary | ICD-10-CM

## 2021-09-29 PROBLEM — M89.8X7 EXOSTOSIS OF LEFT FOOT: Status: ACTIVE | Noted: 2021-09-29

## 2021-09-29 PROCEDURE — 99024 POSTOP FOLLOW-UP VISIT: CPT | Performed by: PHYSICIAN ASSISTANT

## 2021-09-29 ASSESSMENT — ENCOUNTER SYMPTOMS
DIARRHEA: 0
EYE PAIN: 0
VOMITING: 0
ABDOMINAL PAIN: 0
SHORTNESS OF BREATH: 1

## 2021-09-30 ENCOUNTER — TELEMEDICINE (OUTPATIENT)
Dept: FAMILY MEDICINE CLINIC | Age: 56
End: 2021-09-30
Payer: MEDICARE

## 2021-09-30 ENCOUNTER — HOSPITAL ENCOUNTER (OUTPATIENT)
Dept: CT IMAGING | Age: 56
Discharge: HOME | End: 2021-09-30
Payer: MEDICARE

## 2021-09-30 DIAGNOSIS — R91.1 LUNG NODULE: ICD-10-CM

## 2021-09-30 DIAGNOSIS — I82.622 ACUTE DEEP VEIN THROMBOSIS (DVT) OF BRACHIAL VEIN OF LEFT UPPER EXTREMITY (HCC): ICD-10-CM

## 2021-09-30 DIAGNOSIS — Z09 HOSPITAL DISCHARGE FOLLOW-UP: Primary | ICD-10-CM

## 2021-09-30 PROCEDURE — 99495 TRANSJ CARE MGMT MOD F2F 14D: CPT | Performed by: FAMILY MEDICINE

## 2021-09-30 PROCEDURE — 6360000004 HC RX CONTRAST MEDICATION: Performed by: INTERNAL MEDICINE

## 2021-09-30 PROCEDURE — 1111F DSCHRG MED/CURRENT MED MERGE: CPT | Performed by: FAMILY MEDICINE

## 2021-09-30 PROCEDURE — 71260 CT THORAX DX C+: CPT

## 2021-09-30 RX ADMIN — IOPAMIDOL 90 ML: 755 INJECTION, SOLUTION INTRAVENOUS at 09:17

## 2021-09-30 ASSESSMENT — ENCOUNTER SYMPTOMS
EYE DISCHARGE: 0
COLOR CHANGE: 0
BACK PAIN: 0
APNEA: 0
STRIDOR: 0
NAUSEA: 0
EYE ITCHING: 0
FACIAL SWELLING: 0
VOMITING: 0

## 2021-09-30 NOTE — PROGRESS NOTES
Post-Discharge Transitional Care Management Services or Hospital Follow Up    No results found for: CHOL  No results found for: TRIG  No results found for: HDL  No results found for: LDLCHOLESTEROL, LDLCALC  No results found for: LABVLDL, VLDL  No results found for: [address] [address]   YOB: 1965    Date of Office Visit:  9/30/2021  Date of Hospital Admission: 9/24/21  Date of Hospital Discharge: 9/27/21  Readmission Risk Score(high >=14%. Medium >=10%):Readmission Risk Score: 15      Care management risk score Rising risk (score 2-5) and Complex Care (Scores >=6): 0     Non face to face  following discharge, date last encounter closed (first attempt may have been earlier): 9/28/2021  2:07 PM 9/28/2021  2:07 PM    Call initiated 2 business days of discharge: Yes     Patient Active Problem List   Diagnosis    Carcinoma of female breast ([address]    Poor venous access    Iron deficiency anemia    Menopausal symptom    Benign neoplasm of body of uterus    Obesity (BMI 30-39. 9)    Dyspnea and respiratory abnormalities    Chronic pain syndrome    Right wrist pain    Rheumatoid arthritis involving both knees with negative rheumatoid factor (HCC)    Other cyst of bone, left ankle and foot    S/P bilateral mastectomy    Acute purulent bronchitis    Lung nodule    Status post bilateral breast reconstruction    S/P revision of left breast reconstruction 9/17/2021    Acute deep vein thrombosis (DVT) of left upper extremity (HCC)    Exostosis of left foot       Allergies   Allergen Reactions    Clindamycin/Lincomycin Hives, Itching and Rash       Medications listed as ordered at the time of discharge from hospital   [facility]   Home Medication Instructions EB:    Printed on:09/30/21 2033   Medication Information                      acyclovir (ZOVIRAX) 800 MG tablet  Take 800 mg by mouth 2 times daily as needed              Albuterol Sulfate (PROAIR HFA IN)  Inhale 1 each into the lungs daily as needed              alclomethasone (ACLOVATE) 0.05 % cream  Apply topically 2 times daily Apply topically 2 times daily. apixaban starter pack (ELIQUIS) 5 MG TBPK tablet  Take 1 tablet by mouth See Admin Instructions             benzonatate (TESSALON) 100 MG capsule  Take 100 mg by mouth 3 times daily as needed for Cough             chlorhexidine (PERIDEX) 0.12 % solution  Take 15 mLs by mouth 2 times daily             Cholecalciferol (VITAMIN D3) 125 MCG (5000 UT) TABS  Take 1 tablet by mouth daily              ciprofloxacin (CILOXAN) 0.3 % ophthalmic solution  1 drop every 2 hours             cyclobenzaprine (FLEXERIL) 10 MG tablet  Take 10 mg by mouth 3 times daily as needed for Muscle spasms             famotidine (PEPCID) 20 MG tablet  Take 1 tablet by mouth daily             fluticasone-vilanterol (BREO ELLIPTA) 100-25 MCG/INH AEPB inhaler  Inhale 1 puff into the lungs daily             gabapentin (NEURONTIN) 300 MG capsule  TAKE 2 CAPSULES BY MOUTH 3 TIMES DAILY FOR 30 DAYS.             guaiFENesin-codeine (GUAIFENESIN AC) 100-10 MG/5ML liquid  Take 5 mLs by mouth 3 times daily as needed for Cough. HYDROcodone-acetaminophen (NORCO)  MG per tablet  Take 1 tablet by mouth every 6 hours as needed for Pain. Intended supply: 30 days             IBRANCE 125 MG tablet  Take 1 tablet (125mg) by mouth once daily.              lisinopril (PRINIVIL;ZESTRIL) 10 MG tablet  Take 10 mg by mouth daily             meloxicam (MOBIC) 15 MG tablet  Take 15 mg by mouth daily             NARCAN 4 MG/0.1ML LIQD nasal spray               ondansetron (ZOFRAN) 4 MG tablet  Take 4 mg by mouth every 8 hours as needed for Nausea or Vomiting             pantoprazole (PROTONIX) 40 MG tablet  TAKE 1 TABLET BY MOUTH EVERY DAY             VENTOLIN  (90 Base) MCG/ACT inhaler  Inhale 2 puffs into the lungs 4 times daily as needed for Wheezing                   Medications marked \"taking\" at this time  Outpatient Medications Marked as Taking for the 9/30/21 encounter (Telemedicine) with Mar Miller MD   Medication Sig Dispense Refill    apixaban starter pack (ELIQUIS) 5 MG TBPK tablet Take 1 tablet by mouth See Admin Instructions 74 tablet 0    famotidine (PEPCID) 20 MG tablet Take 1 tablet by mouth daily 30 tablet 5    HYDROcodone-acetaminophen (NORCO)  MG per tablet Take 1 tablet by mouth every 6 hours as needed for Pain. Intended supply: 30 days 12 tablet 0    fluticasone-vilanterol (BREO ELLIPTA) 100-25 MCG/INH AEPB inhaler Inhale 1 puff into the lungs daily 1 each 5    VENTOLIN  (90 Base) MCG/ACT inhaler Inhale 2 puffs into the lungs 4 times daily as needed for Wheezing 18 g 5    pantoprazole (PROTONIX) 40 MG tablet TAKE 1 TABLET BY MOUTH EVERY DAY (Patient taking differently: Take 40 mg by mouth daily ) 90 tablet 0    IBRANCE 125 MG tablet Take 1 tablet (125mg) by mouth once daily. 21 tablet 3    NARCAN 4 MG/0.1ML LIQD nasal spray       cyclobenzaprine (FLEXERIL) 10 MG tablet Take 10 mg by mouth 3 times daily as needed for Muscle spasms      guaiFENesin-codeine (GUAIFENESIN AC) 100-10 MG/5ML liquid Take 5 mLs by mouth 3 times daily as needed for Cough.  Albuterol Sulfate (PROAIR HFA IN) Inhale 1 each into the lungs daily as needed       benzonatate (TESSALON) 100 MG capsule Take 100 mg by mouth 3 times daily as needed for Cough      Cholecalciferol (VITAMIN D3) 125 MCG (5000 UT) TABS Take 1 tablet by mouth daily       lisinopril (PRINIVIL;ZESTRIL) 10 MG tablet Take 10 mg by mouth daily      acyclovir (ZOVIRAX) 800 MG tablet Take 800 mg by mouth 2 times daily as needed       ondansetron (ZOFRAN) 4 MG tablet Take 4 mg by mouth every 8 hours as needed for Nausea or Vomiting      alclomethasone (ACLOVATE) 0.05 % cream Apply topically 2 times daily Apply topically 2 times daily.       meloxicam (MOBIC) 15 MG tablet Take 15 mg by mouth daily      ciprofloxacin (CILOXAN) 0.3 % ophthalmic solution 1 drop every 2 hours      chlorhexidine (PERIDEX) 0.12 % solution Take 15 mLs by mouth 2 times daily          Medications patient taking as of now reconciled against medications ordered at time of hospital discharge: Yes    Chief Complaint   Patient presents with   [address] from Hospital       [facility]    Inpatient course: Discharge summary reviewed- see chart. Interval history/Current status:     Patient presents for TCM follow-up. She had a regular follow-up with appointment Dr. Maura De La Rosa on 9/24, presented with left upper extremity swelling and pain; venous duplex showed DVT as well as superficial thromboses and she was subsequently direct admitted to the hospital.  She underwent thrombectomy on the 26th, and was discharged the following day. She states she is doing okay overall. Still has some swelling of her left arm. Due to pharmacy issue, she was not able to fill his Eliquis until today but she has started taking the medication. She also did a CAT scan recently for Dr. Gerardo Reagan and has an upcoming appoint with him, as well as vascular, heme-onc, and general surgery. She read the report of the CAT scan, which I reviewed also. It showed coronary artery calcifications, which she inquired about. Lipid panel previously ordered, advised to do when possible. Review of Systems   Constitutional: Negative for chills and fever. HENT: Negative for facial swelling and mouth sores. Eyes: Negative for discharge and itching. Respiratory: Negative for apnea and stridor. Cardiovascular: Negative for chest pain and palpitations. Gastrointestinal: Negative for nausea and vomiting. Endocrine: Negative for cold intolerance and heat intolerance. Genitourinary: Negative for frequency and urgency. Musculoskeletal: Negative for arthralgias and back pain. Skin: Negative for color change and rash.          There were no vitals filed for this visit.  There is no height or weight on file to calculate BMI. Wt Readings from Last 3 Encounters:   09/29/21 189 lb (85.7 kg)   09/25/21 183 lb (83 kg)   09/24/21 185 lb 8 oz (84.1 kg)     BP Readings from Last 3 Encounters:   09/29/21 (!) 140/87   09/27/21 139/63   09/26/21 (!) 118/57       Physical Exam    PHYSICAL EXAMINATION:  [ INSTRUCTIONS:  \"[x]\" Indicates a positive item  \"[]\" Indicates a negative item  -- DELETE ALL ITEMS NOT EXAMINED]  Vital Signs: (As obtained by patient/caregiver or practitioner observation)    Blood pressure-  Heart rate-    Respiratory rate-    Temperature-  Pulse oximetry-     There were no vitals filed for this visit. Constitutional: [x] Appears well-developed and well-nourished [x] No apparent distress      [] Abnormal-   Mental status  [x] Alert and awake  [x] Oriented to person/place/time []Able to follow commands      Eyes:  EOM    [x]  Normal  [] Abnormal-  Sclera  [x]  Normal  [] Abnormal -         Discharge [x]  None visible  [] Abnormal -    HENT:   [x] Normocephalic, atraumatic.   [] Abnormal   [x] Mouth/Throat: Mucous membranes are moist.     External Ears [x] Normal  [] Abnormal-     Neck: [x] No visualized mass     Pulmonary/Chest: [x] Respiratory effort normal.  [x] No visualized signs of difficulty breathing or respiratory distress        [] Abnormal-      Musculoskeletal:   [] Normal gait with no signs of ataxia         [x] Normal range of motion of neck        [] Abnormal-       Neurological:        [x] No Facial Asymmetry (Cranial nerve 7 motor function) (limited exam to video visit)          [x] No gaze palsy        [] Abnormal-         Skin:        [x] No significant exanthematous lesions or discoloration noted on facial skin         [] Abnormal-            Psychiatric:       [x] Normal Affect [] No Hallucinations        [] Abnormal-     Other pertinent observable physical exam findings-       Assessment/Plan:  Nikole Crandall was seen today for follow-up from [facility].    Diagnoses and all orders for this visit:    Hospital discharge follow-up  -     DC DISCHARGE MEDS RECONCILED W/ CURRENT OUTPATIENT MED LIST    Acute deep vein thrombosis (DVT) of brachial vein of left upper extremity [facility])        Medical Decision Making: moderate complexity     Rodrigo Padilla, was evaluated through a synchronous (real-time) audio-video encounter. The patient (or guardian if applicable) is aware that this is a billable service. Verbal consent to proceed has been obtained within the past 12 months. The visit was conducted pursuant to the emergency declaration under the [address] [place] [address], [address] [place] [address] authority and the [employer] and [employer] Act. Patient identification was verified, and a caregiver was present when appropriate. The patient was located in a state where the provider was credentialed to provide care. Total time spent for this encounter: 30 minutes    --Mike Woods MD on 9/30/2021 at 8:34 PM    An electronic signature was used to authenticate this note.

## 2021-10-01 LAB
ANTICARDIOLIPIN IGA ANTIBODY: 4.4 APL (ref 0–14)
ANTICARDIOLIPIN IGG ANTIBODY: 1.1 GPL (ref 0–10)
ANTICARDIOLIPIN IGM ANTIBODY: <0.8 MPL (ref 0–10)

## 2021-10-05 ENCOUNTER — OFFICE VISIT (OUTPATIENT)
Dept: PULMONOLOGY | Age: 56
End: 2021-10-05
Payer: MEDICARE

## 2021-10-05 ENCOUNTER — OFFICE VISIT (OUTPATIENT)
Dept: VASCULAR SURGERY | Age: 56
End: 2021-10-05
Payer: MEDICARE

## 2021-10-05 ENCOUNTER — HOSPITAL ENCOUNTER (OUTPATIENT)
Dept: NON INVASIVE DIAGNOSTICS | Age: 56
Discharge: HOME | DRG: 315 | End: 2021-10-05
Payer: MEDICARE

## 2021-10-05 ENCOUNTER — HOSPITAL ENCOUNTER (OUTPATIENT)
Dept: GENERAL RADIOLOGY | Age: 56
Discharge: HOME | DRG: 315 | End: 2021-10-05
Payer: MEDICARE

## 2021-10-05 ENCOUNTER — PREP FOR PROCEDURE (OUTPATIENT)
Dept: VASCULAR SURGERY | Age: 56
End: 2021-10-05

## 2021-10-05 ENCOUNTER — HOSPITAL ENCOUNTER (OUTPATIENT)
Dept: PREADMISSION TESTING | Age: 56
Setting detail: OUTPATIENT SURGERY
Discharge: HOME | DRG: 315 | End: 2021-10-09
Payer: MEDICARE

## 2021-10-05 VITALS
HEART RATE: 108 BPM | OXYGEN SATURATION: 96 % | DIASTOLIC BLOOD PRESSURE: 64 MMHG | BODY MASS INDEX: 29.12 KG/M2 | SYSTOLIC BLOOD PRESSURE: 112 MMHG | TEMPERATURE: 99.3 F | WEIGHT: 180.4 LBS

## 2021-10-05 VITALS — WEIGHT: 178 LBS | HEIGHT: 66 IN | BODY MASS INDEX: 28.61 KG/M2

## 2021-10-05 VITALS
TEMPERATURE: 96.1 F | WEIGHT: 175 LBS | HEART RATE: 103 BPM | DIASTOLIC BLOOD PRESSURE: 87 MMHG | OXYGEN SATURATION: 99 % | BODY MASS INDEX: 28.12 KG/M2 | HEIGHT: 66 IN | RESPIRATION RATE: 16 BRPM | SYSTOLIC BLOOD PRESSURE: 144 MMHG

## 2021-10-05 DIAGNOSIS — T80.219A INFECTION OF VENOUS ACCESS PORT, INITIAL ENCOUNTER: ICD-10-CM

## 2021-10-05 DIAGNOSIS — C50.911: ICD-10-CM

## 2021-10-05 DIAGNOSIS — Z01.818 PRE-OP TESTING: ICD-10-CM

## 2021-10-05 DIAGNOSIS — Z90.13 STATUS POST BILATERAL MASTECTOMY: ICD-10-CM

## 2021-10-05 DIAGNOSIS — I82.622 ACUTE DEEP VEIN THROMBOSIS (DVT) OF OTHER VEIN OF LEFT UPPER EXTREMITY (HCC): Primary | ICD-10-CM

## 2021-10-05 DIAGNOSIS — R91.1 LUNG NODULE: ICD-10-CM

## 2021-10-05 DIAGNOSIS — T80.219A CENTRAL VENOUS LINE INFECTION, INITIAL ENCOUNTER: ICD-10-CM

## 2021-10-05 DIAGNOSIS — I87.8 POOR VENOUS ACCESS: ICD-10-CM

## 2021-10-05 DIAGNOSIS — Z98.890 STATUS POST BILATERAL BREAST RECONSTRUCTION: ICD-10-CM

## 2021-10-05 DIAGNOSIS — C50.912 BREAST CARCINOMA, FEMALE, LEFT (HCC): ICD-10-CM

## 2021-10-05 DIAGNOSIS — Z01.818 PRE-OP TESTING: Primary | ICD-10-CM

## 2021-10-05 DIAGNOSIS — I82.622 ACUTE DEEP VEIN THROMBOSIS (DVT) OF OTHER VEIN OF LEFT UPPER EXTREMITY (HCC): ICD-10-CM

## 2021-10-05 DIAGNOSIS — C50.919 CARCINOMA OF FEMALE BREAST, UNSPECIFIED ESTROGEN RECEPTOR STATUS, UNSPECIFIED LATERALITY, UNSPECIFIED SITE OF BREAST (HCC): ICD-10-CM

## 2021-10-05 DIAGNOSIS — R91.1 LUNG NODULE: Primary | ICD-10-CM

## 2021-10-05 LAB
ANION GAP SERPL CALCULATED.3IONS-SCNC: 16 MMOL/L (ref 7–19)
APTT: 39 SEC (ref 26–36.2)
BUN BLDV-MCNC: 13 MG/DL (ref 6–20)
CALCIUM SERPL-MCNC: 9.3 MG/DL (ref 8.6–10)
CHLORIDE BLD-SCNC: 102 MMOL/L (ref 98–111)
CO2: 20 MMOL/L (ref 22–29)
CREAT SERPL-MCNC: 0.7 MG/DL (ref 0.5–0.9)
GFR AFRICAN AMERICAN: >59
GFR NON-AFRICAN AMERICAN: >60
GLUCOSE BLD-MCNC: 84 MG/DL (ref 74–109)
HCT VFR BLD CALC: 34.5 % (ref 37–47)
HEMOGLOBIN: 11.2 G/DL (ref 12–16)
INR BLD: 1.23 (ref 0.88–1.18)
MCH RBC QN AUTO: 35.7 PG (ref 27–31)
MCHC RBC AUTO-ENTMCNC: 32.5 G/DL (ref 33–37)
MCV RBC AUTO: 109.9 FL (ref 81–99)
MRSA SCREEN RT-PCR: NOT DETECTED
PDW BLD-RTO: 13.9 % (ref 11.5–14.5)
PLATELET # BLD: 551 K/UL (ref 130–400)
PMV BLD AUTO: 8.9 FL (ref 9.4–12.3)
POTASSIUM SERPL-SCNC: 3.9 MMOL/L (ref 3.5–5)
PROTHROMBIN TIME: 15.7 SEC (ref 12–14.6)
RBC # BLD: 3.14 M/UL (ref 4.2–5.4)
SARS-COV-2, NAAT: NOT DETECTED
SODIUM BLD-SCNC: 138 MMOL/L (ref 136–145)
WBC # BLD: 6.5 K/UL (ref 4.8–10.8)

## 2021-10-05 PROCEDURE — G8417 CALC BMI ABV UP PARAM F/U: HCPCS | Performed by: INTERNAL MEDICINE

## 2021-10-05 PROCEDURE — 3017F COLORECTAL CA SCREEN DOC REV: CPT | Performed by: INTERNAL MEDICINE

## 2021-10-05 PROCEDURE — 93971 EXTREMITY STUDY: CPT

## 2021-10-05 PROCEDURE — 71046 X-RAY EXAM CHEST 2 VIEWS: CPT

## 2021-10-05 PROCEDURE — 85610 PROTHROMBIN TIME: CPT

## 2021-10-05 PROCEDURE — 1111F DSCHRG MED/CURRENT MED MERGE: CPT | Performed by: PHYSICIAN ASSISTANT

## 2021-10-05 PROCEDURE — 85730 THROMBOPLASTIN TIME PARTIAL: CPT

## 2021-10-05 PROCEDURE — 3017F COLORECTAL CA SCREEN DOC REV: CPT | Performed by: PHYSICIAN ASSISTANT

## 2021-10-05 PROCEDURE — 80048 BASIC METABOLIC PNL TOTAL CA: CPT

## 2021-10-05 PROCEDURE — G8417 CALC BMI ABV UP PARAM F/U: HCPCS | Performed by: PHYSICIAN ASSISTANT

## 2021-10-05 PROCEDURE — G8484 FLU IMMUNIZE NO ADMIN: HCPCS | Performed by: INTERNAL MEDICINE

## 2021-10-05 PROCEDURE — G8427 DOCREV CUR MEDS BY ELIG CLIN: HCPCS | Performed by: INTERNAL MEDICINE

## 2021-10-05 PROCEDURE — G8484 FLU IMMUNIZE NO ADMIN: HCPCS | Performed by: PHYSICIAN ASSISTANT

## 2021-10-05 PROCEDURE — 1111F DSCHRG MED/CURRENT MED MERGE: CPT | Performed by: INTERNAL MEDICINE

## 2021-10-05 PROCEDURE — 87635 SARS-COV-2 COVID-19 AMP PRB: CPT

## 2021-10-05 PROCEDURE — 85027 COMPLETE CBC AUTOMATED: CPT

## 2021-10-05 PROCEDURE — G8427 DOCREV CUR MEDS BY ELIG CLIN: HCPCS | Performed by: PHYSICIAN ASSISTANT

## 2021-10-05 PROCEDURE — 99214 OFFICE O/P EST MOD 30 MIN: CPT | Performed by: INTERNAL MEDICINE

## 2021-10-05 PROCEDURE — 99204 OFFICE O/P NEW MOD 45 MIN: CPT | Performed by: PHYSICIAN ASSISTANT

## 2021-10-05 PROCEDURE — 1036F TOBACCO NON-USER: CPT | Performed by: PHYSICIAN ASSISTANT

## 2021-10-05 PROCEDURE — 87641 MR-STAPH DNA AMP PROBE: CPT

## 2021-10-05 PROCEDURE — 1036F TOBACCO NON-USER: CPT | Performed by: INTERNAL MEDICINE

## 2021-10-05 PROCEDURE — 93005 ELECTROCARDIOGRAM TRACING: CPT

## 2021-10-05 RX ORDER — SODIUM CHLORIDE 0.9 % (FLUSH) 0.9 %
5-40 SYRINGE (ML) INJECTION EVERY 12 HOURS SCHEDULED
Status: CANCELLED | OUTPATIENT
Start: 2021-10-05

## 2021-10-05 RX ORDER — SODIUM CHLORIDE 0.9 % (FLUSH) 0.9 %
5-40 SYRINGE (ML) INJECTION PRN
Status: CANCELLED | OUTPATIENT
Start: 2021-10-05

## 2021-10-05 RX ORDER — DOXYCYCLINE HYCLATE 100 MG
100 TABLET ORAL 2 TIMES DAILY
Qty: 20 TABLET | Refills: 0 | Status: SHIPPED | OUTPATIENT
Start: 2021-10-05 | End: 2021-10-05

## 2021-10-05 RX ORDER — SODIUM CHLORIDE 9 MG/ML
25 INJECTION, SOLUTION INTRAVENOUS PRN
Status: CANCELLED | OUTPATIENT
Start: 2021-10-05

## 2021-10-05 NOTE — H&P (VIEW-ONLY)
Patient Care Team:  Stefany Lau MD as PCP - General (Family Medicine)  Stefany Lau MD as PCP - [facility] Provider      History and Physical:    Jaimee Easley is a 49 y.o. female 80-year-old female who has a history of stage IV breast cancer with lung mets. She is status post bilateral mastectomies with reconstructive surgery. She recently developed left arm pain and swelling. She had a venous scan which revealed she had extensive left upper extremity DVT. She was started on Eliquis and underwent a left upper extremity venous mechanical thrombectomy on 9/26/2021 by Dr. Yee Fuentes. She has a left upper chest wall port that she has had for many years. She is not using this. Dr. Yee Fuentes had mentioned in her previous operative note that the patient had a left subclavian stenosis and that she may need a port removal with possible balloon angioplasty and stenting in the future. She presents today for evaluation of her port site. She reports that on 9/30/2021 she noticed redness and pain over the port site. She reports that she has had an intermittent low-grade temp with a max spike of 101. Jaimee Easley is a 49 y.o. female with the following history reviewed and recorded in [facility]:  Patient Active Problem List    Diagnosis Date Noted    Central line infection 10/05/2021    Exostosis of left foot 09/29/2021    Acute deep vein thrombosis (DVT) of left upper extremity ([address] 09/24/2021    S/P revision of left breast reconstruction 9/17/2021 09/23/2021    Status post bilateral breast reconstruction     Acute purulent bronchitis 09/02/2021    Lung nodule 09/02/2021    Status post bilateral mastectomy 08/06/2021    Other cyst of bone, left ankle and foot 04/10/2021     No trauma. Advised I believe this is most likely a cyst.  Will monitor closely, if change in size or becomes more painful, will consider x-rays.       Obesity (BMI 30-39.9) 04/09/2021     BMI 30.05      Dyspnea and respiratory abnormalities 04/09/2021     Patient has been prescribed Breo Ellipta and albuterol. She states she was never diagnosed with COPD or asthma, was told the cancer \"affected my lungs. \"  She denies any shortness of breath today. Continue current medications.  Chronic pain syndrome 04/09/2021     Refer to pain management as requested.  Right wrist pain 04/09/2021     Refer to orthopedics as requested.  Rheumatoid arthritis involving both knees with negative rheumatoid factor ([facility] 04/09/2021     Refer to rheumatology as requested.  Menopausal symptom 04/08/2021    Benign neoplasm of body of uterus 04/08/2021    Iron deficiency anemia 01/22/2021    Carcinoma of female breast ([facility] 01/21/2021    Poor venous access 01/21/2021     Current Outpatient Medications   Medication Sig Dispense Refill    mupirocin (BACTROBAN) 2 % ointment Apply the Bactroban Ointment to the inside of your nose on both sides twice a day for 5 days starting today. 1 g 0    apixaban starter pack (ELIQUIS) 5 MG TBPK tablet Take 1 tablet by mouth See Admin Instructions 74 tablet 0    famotidine (PEPCID) 20 MG tablet Take 1 tablet by mouth daily 30 tablet 5    HYDROcodone-acetaminophen (NORCO)  MG per tablet Take 1 tablet by mouth every 6 hours as needed for Pain. Intended supply: 30 days 12 tablet 0    fluticasone-vilanterol (BREO ELLIPTA) 100-25 MCG/INH AEPB inhaler Inhale 1 puff into the lungs daily 1 each 5    VENTOLIN  (90 Base) MCG/ACT inhaler Inhale 2 puffs into the lungs 4 times daily as needed for Wheezing 18 g 5    pantoprazole (PROTONIX) 40 MG tablet TAKE 1 TABLET BY MOUTH EVERY DAY (Patient taking differently: Take 40 mg by mouth daily ) 90 tablet 0    IBRANCE 125 MG tablet Take 1 tablet (125mg) by mouth once daily. 21 tablet 3    NARCAN 4 MG/0.1ML LIQD nasal spray       gabapentin (NEURONTIN) 300 MG capsule TAKE 2 CAPSULES BY MOUTH 3 TIMES DAILY FOR 30 DAYS.  180 capsule 0    cyclobenzaprine (FLEXERIL) 10 MG tablet Take 10 mg by mouth 3 times daily as needed for Muscle spasms      guaiFENesin-codeine (GUAIFENESIN AC) 100-10 MG/5ML liquid Take 5 mLs by mouth 3 times daily as needed for Cough.  Albuterol Sulfate (PROAIR HFA IN) Inhale 1 each into the lungs daily as needed       benzonatate (TESSALON) 100 MG capsule Take 100 mg by mouth 3 times daily as needed for Cough      Cholecalciferol (VITAMIN D3) 125 MCG (5000 UT) TABS Take 1 tablet by mouth daily       lisinopril (PRINIVIL;ZESTRIL) 10 MG tablet Take 10 mg by mouth daily      acyclovir (ZOVIRAX) 800 MG tablet Take 800 mg by mouth 2 times daily as needed       ondansetron (ZOFRAN) 4 MG tablet Take 4 mg by mouth every 8 hours as needed for Nausea or Vomiting      alclomethasone (ACLOVATE) 0.05 % cream Apply topically 2 times daily as needed Apply topically 2 times daily.  meloxicam (MOBIC) 15 MG tablet Take 15 mg by mouth daily      chlorhexidine (PERIDEX) 0.12 % solution Take 15 mLs by mouth 2 times daily       No current facility-administered medications for this visit.      Allergies: Clindamycin/lincomycin  Past Medical History:   Diagnosis Date    Breast cancer ([facility]     Breast cancer with lung mets (RIGHT)    Chronic back pain     GERD (gastroesophageal reflux disease)     Hx of blood clots     Hypertension     Neuropathy     Post chemo treatment neuropathy     Past Surgical History:   Procedure Laterality Date    BREAST LUMPECTOMY Right     2006    BREAST RECONSTRUCTION Left 09/17/2021    Revision left reconstructed breast, implant performed by Lana Reyes MD at [address] [place] [address]  2016    Bilateral breast implants    EMBOLECTOMY Left 9/26/2021    LEFT UPPER EXTREMITY  MECHANICAL SUCTION THROMBECTOMY performed by Amisha Patton DO at [facility], TOTAL ABDOMINAL  2015    MASTECTOMY, BILATERAL      Right 2013 & Left 2016     Family History   Problem Relation Age of Onset    Hypertension Mother     Obesity Mother     Prostate Cancer Father     No Known Problems Sister     No Known Problems Daughter     No Known Problems Daughter      Social History     Tobacco Use    Smoking status: Never Smoker    Smokeless tobacco: Never Used   Substance Use Topics    Alcohol use: Not Currently     Comment: used to drink socially \"a long time ago\"       Review of Systems    Constitutional   She has had fever  HENT  no HA's, tinnitus, rhinorrhea, sore throat  Eyes  no sudden vision change or amaurosis. Respiratory  SOB or chest pain  Cardiovascular  no chest pain, syncope, or significant dizziness. No palpitations. Left UE swelling and pain. Gastrointestinal  no significant abdominal pain. No blood in stool. No diarrhea, nausea, or vomiting. Genitourinary  No difficulty urinating, dysuria, frequency, or urgency. No flank pain or hematuria. Musculoskeletal  no back pain or myalgia. Skin  no rashes or wounds. Redness and pain left upper chest wall over port. Neurologic  no dizziness, facial asymmetry, or light headedness. No seizures. No new onset of partial or complete loss of vision affecting only one eye, speech difficulty or lateralizing weakness, numbness/tingling   Hematologic  no excessive bleeding. Psychiatric  no severe anxiety or nervousness. No confusion. All other review of systems are negative. Physical Exam    Constitutional  No acute distress. HENT  head normocephalic. Hearing is intact   Eyes  conjunctiva normal.  EOMS normal.  No exudate. No icterus. Neck- ROM appears normal, no tracheal deviation. Cardiovascular  Regular rate and rhythm. Heart sounds are normal.  No murmur, rub, or gallop. Carotid pulses bilaterally without bruit. Extremities - Radial and ulnar pulses are 2+ to palpation bilaterally. No cyanosis, clubbing. LUE swelling noted. No signs atheroembolic event. Pulmonary  effort appears normal.  No respiratory distress.     Lungs - errors in transcription may have occurred.

## 2021-10-05 NOTE — PROGRESS NOTES
Pulmonary and Sleep Medicine    Ron Latham (:  1965) is a 49 y.o. female,Established patient, here for evaluation of the following chief complaint(s):  Follow-up (Pt states that she is having left arm pain, believes it is a port infection. States that she has had two clots removed from left arm.)      ASSESSMENT/PLAN:  1. Lung nodule  -     CT CHEST WO CONTRAST; Future  2. Carcinoma of female breast, unspecified estrogen receptor status, unspecified laterality, unspecified site of breast ([address]  3. Status post bilateral mastectomy and reconstruction   4. Central venous line infection, initial encounter        We will repeat CT of the chest in 3 months to reevaluate the left lower lobe nodule. She does have clinical findings consistent with possible Mediport infection. Refer to vascular surgery today if possible. Jacklyn Alcala MD, [facility], [facility]    Return in about 3 months (around 2022). SUBJECTIVE/OBJECTIVE:  She is here for follow-up on lung nodule. She had a CT of the chest for follow-up that to my eye shows more of an infiltrate at this time. The lesion is not as rounded agree with radiology interpretation. Likely inflammatory etiology. She complains of low-grade fever and tenderness at the site of her Mediport. Her skin in the area is red she is very tender at the site. Prior to Visit Medications    Medication Sig Taking? Authorizing Provider   apixaban starter pack (ELIQUIS) 5 MG TBPK tablet Take 1 tablet by mouth See Admin Instructions Yes Qi Arzola MD   famotidine (PEPCID) 20 MG tablet Take 1 tablet by mouth daily Yes BRITANY Archer   HYDROcodone-acetaminophen (NORCO)  MG per tablet Take 1 tablet by mouth every 6 hours as needed for Pain.  Intended supply: 30 days Yes Dayton Mcfarlane MD   fluticasone-vilanterol (BREO ELLIPTA) 100-25 MCG/INH AEPB inhaler Inhale 1 puff into the lungs daily Yes Jacklyn Alcala MD   VENTOLIN A 108 (90 Base) MCG/ACT inhaler Inhale 2 puffs into the lungs 4 times daily as needed for Wheezing Yes Ashley Morrison MD   pantoprazole (PROTONIX) 40 MG tablet TAKE 1 TABLET BY MOUTH EVERY DAY  Patient taking differently: Take 40 mg by mouth daily  Yes Tesfaye Puga MD   IBRANCE 125 MG tablet Take 1 tablet (125mg) by mouth once daily. Yes Tesfaye Puga MD   NARCAN 4 MG/0.1ML LIQD nasal spray  Yes Historical Provider, MD   cyclobenzaprine (FLEXERIL) 10 MG tablet Take 10 mg by mouth 3 times daily as needed for Muscle spasms Yes Historical Provider, MD   guaiFENesin-codeine (GUAIFENESIN AC) 100-10 MG/5ML liquid Take 5 mLs by mouth 3 times daily as needed for Cough. Yes Historical Provider, MD   Albuterol Sulfate (PROAIR HFA IN) Inhale 1 each into the lungs daily as needed  Yes Historical Provider, MD   benzonatate (TESSALON) 100 MG capsule Take 100 mg by mouth 3 times daily as needed for Cough Yes Historical Provider, MD   Cholecalciferol (VITAMIN D3) 125 MCG (5000 UT) TABS Take 1 tablet by mouth daily  Yes Historical Provider, MD   lisinopril (PRINIVIL;ZESTRIL) 10 MG tablet Take 10 mg by mouth daily Yes Historical Provider, MD   acyclovir (ZOVIRAX) 800 MG tablet Take 800 mg by mouth 2 times daily as needed  Yes Historical Provider, MD   ondansetron (ZOFRAN) 4 MG tablet Take 4 mg by mouth every 8 hours as needed for Nausea or Vomiting Yes Historical Provider, MD   alclomethasone (ACLOVATE) 0.05 % cream Apply topically 2 times daily Apply topically 2 times daily. Yes Historical Provider, MD   meloxicam (MOBIC) 15 MG tablet Take 15 mg by mouth daily Yes Historical Provider, MD   ciprofloxacin (CILOXAN) 0.3 % ophthalmic solution 1 drop every 2 hours Yes Historical Provider, MD   chlorhexidine (PERIDEX) 0.12 % solution Take 15 mLs by mouth 2 times daily Yes Historical Provider, MD   gabapentin (NEURONTIN) 300 MG capsule TAKE 2 CAPSULES BY MOUTH 3 TIMES DAILY FOR 30 DAYS.   Tesfaye Puga MD Review of Systems    Vitals:    10/05/21 1007   BP: 112/64   Pulse: 108   Temp: 99.3 °F (37.4 °C)   SpO2: 96%     Physical Exam  Vitals reviewed. Constitutional:       Appearance: Normal appearance. HENT:      Head: Normocephalic and atraumatic. Nose: Nose normal.   Eyes:      Extraocular Movements: Extraocular movements intact. Conjunctiva/sclera: Conjunctivae normal.   Cardiovascular:      Rate and Rhythm: Normal rate and regular rhythm. Heart sounds: No murmur heard. No friction rub. Pulmonary:      Effort: Pulmonary effort is normal. No respiratory distress. Breath sounds: Normal breath sounds. No stridor. No wheezing, rhonchi or rales. Abdominal:      General: There is no distension. Palpations: There is no mass. Tenderness: There is no abdominal tenderness. There is no guarding or rebound. Musculoskeletal:      Cervical back: Normal range of motion and neck supple. Skin:     Comments: Tenderness and erythema around the mediport site on the left. Neurological:      Mental Status: She is alert and oriented to person, place, and time. This note was generated used a voice recognition software. Errors in voice recognition may have occurred. An electronic signature was used to authenticate this note.     --James Garner MD

## 2021-10-05 NOTE — PROGRESS NOTES
Patient Care Team:  Cyndi Alvarez MD as PCP - General (Family Medicine)  Cyndi Alvarez MD as PCP - [facility]      History and Physical:    Jodi Watts is a 49 y.o. female 82-year-old female who has a history of stage IV breast cancer with lung mets. She is status post bilateral mastectomies with reconstructive surgery. She recently developed left arm pain and swelling. She had a venous scan which revealed she had extensive left upper extremity DVT. She was started on Eliquis and underwent a left upper extremity venous mechanical thrombectomy on 9/26/2021 by Dr. Ino Holly. She has a left upper chest wall port that she has had for many years. She is not using this. Dr. Ino Holly had mentioned in her previous operative note that the patient had a left subclavian stenosis and that she may need a port removal with possible balloon angioplasty and stenting in the future. She presents today for evaluation of her port site. She reports that on 9/30/2021 she noticed redness and pain over the port site. She reports that she has had an intermittent low-grade temp with a max spike of 101. Jodi Watts is a 49 y.o. female with the following history reviewed and recorded in [employer]:  Patient Active Problem List    Diagnosis Date Noted    Central line infection 10/05/2021    Exostosis of left foot 09/29/2021    Acute deep vein thrombosis (DVT) of left upper extremity ([address] 09/24/2021    S/P revision of left breast reconstruction 9/17/2021 09/23/2021    Status post bilateral breast reconstruction     Acute purulent bronchitis 09/02/2021    Lung nodule 09/02/2021    Status post bilateral mastectomy 08/06/2021    Other cyst of bone, left ankle and foot 04/10/2021     No trauma. Advised I believe this is most likely a cyst.  Will monitor closely, if change in size or becomes more painful, will consider x-rays.       Obesity (BMI 30-39.9) 04/09/2021     BMI 30.05      Dyspnea and respiratory abnormalities 04/09/2021     Patient has been prescribed Breo Ellipta and albuterol. She states she was never diagnosed with COPD or asthma, was told the cancer \"affected my lungs. \"  She denies any shortness of breath today. Continue current medications.  Chronic pain syndrome 04/09/2021     Refer to pain management as requested.  Right wrist pain 04/09/2021     Refer to orthopedics as requested.  Rheumatoid arthritis involving both knees with negative rheumatoid factor ([facility] [address] [address] 04/09/2021     Refer to rheumatology as requested.  Menopausal symptom 04/08/2021    Benign neoplasm of body of uterus 04/08/2021    Iron deficiency anemia 01/22/2021    Carcinoma of female breast ([facility] [address] 01/21/2021    Poor venous access 01/21/2021     Current Outpatient Medications   Medication Sig Dispense Refill    apixaban starter pack (ELIQUIS) 5 MG TBPK tablet Take 1 tablet by mouth See Admin Instructions 74 tablet 0    famotidine (PEPCID) 20 MG tablet Take 1 tablet by mouth daily 30 tablet 5    HYDROcodone-acetaminophen (NORCO)  MG per tablet Take 1 tablet by mouth every 6 hours as needed for Pain. Intended supply: 30 days 12 tablet 0    fluticasone-vilanterol (BREO ELLIPTA) 100-25 MCG/INH AEPB inhaler Inhale 1 puff into the lungs daily 1 each 5    VENTOLIN  (90 Base) MCG/ACT inhaler Inhale 2 puffs into the lungs 4 times daily as needed for Wheezing 18 g 5    pantoprazole (PROTONIX) 40 MG tablet TAKE 1 TABLET BY MOUTH EVERY DAY (Patient taking differently: Take 40 mg by mouth daily ) 90 tablet 0    IBRANCE 125 MG tablet Take 1 tablet (125mg) by mouth once daily. 21 tablet 3    NARCAN 4 MG/0.1ML LIQD nasal spray       gabapentin (NEURONTIN) 300 MG capsule TAKE 2 CAPSULES BY MOUTH 3 TIMES DAILY FOR 30 DAYS.  180 capsule 0    cyclobenzaprine (FLEXERIL) 10 MG tablet Take 10 mg by mouth 3 times daily as needed for Muscle spasms      guaiFENesin-codeine (GUAIFENESIN AC) 100-10 MG/5ML liquid Take 5 mLs by mouth 3 times daily as needed for Cough.  Albuterol Sulfate (PROAIR HFA IN) Inhale 1 each into the lungs daily as needed       benzonatate (TESSALON) 100 MG capsule Take 100 mg by mouth 3 times daily as needed for Cough      Cholecalciferol (VITAMIN D3) 125 MCG (5000 UT) TABS Take 1 tablet by mouth daily       lisinopril (PRINIVIL;ZESTRIL) 10 MG tablet Take 10 mg by mouth daily      acyclovir (ZOVIRAX) 800 MG tablet Take 800 mg by mouth 2 times daily as needed       ondansetron (ZOFRAN) 4 MG tablet Take 4 mg by mouth every 8 hours as needed for Nausea or Vomiting      alclomethasone (ACLOVATE) 0.05 % cream Apply topically 2 times daily Apply topically 2 times daily.  meloxicam (MOBIC) 15 MG tablet Take 15 mg by mouth daily      ciprofloxacin (CILOXAN) 0.3 % ophthalmic solution 1 drop every 2 hours      chlorhexidine (PERIDEX) 0.12 % solution Take 15 mLs by mouth 2 times daily       No current facility-administered medications for this visit.      Allergies: Clindamycin/lincomycin  Past Medical History:   Diagnosis Date    Breast cancer ([facility] [address]     Breast cancer with lung mets (RIGHT)    Chronic back pain     GERD (gastroesophageal reflux disease)     Hypertension     Neuropathy     Post chemo treatment neuropathy     Past Surgical History:   Procedure Laterality Date    BREAST LUMPECTOMY Right     2006    BREAST RECONSTRUCTION Left 09/17/2021    Revision left reconstructed breast, implant performed by Saritha Yates MD at [address] [place] [address]  2016    Bilateral breast implants    EMBOLECTOMY Left 9/26/2021    LEFT UPPER EXTREMITY  MECHANICAL SUCTION THROMBECTOMY performed by Dolly Stratton DO at [facility], TOTAL ABDOMINAL  2015    MASTECTOMY, BILATERAL      Right 2013 & Left 2016     Family History   Problem Relation Age of Onset    Hypertension Mother     Obesity Mother     Prostate Cancer Father     No Known Problems Sister     No Known Problems Daughter     No Known Problems Daughter      Social History     Tobacco Use    Smoking status: Never Smoker    Smokeless tobacco: Never Used   Substance Use Topics    Alcohol use: Not Currently     Comment: used to drink socially \"a long time ago\"       Review of Systems    Constitutional   She has had fever  HENT  no HA's, tinnitus, rhinorrhea, sore throat  Eyes  no sudden vision change or amaurosis. Respiratory  SOB or chest pain  Cardiovascular  no chest pain, syncope, or significant dizziness. No palpitations. Left UE swelling and pain. Gastrointestinal  no significant abdominal pain. No blood in stool. No diarrhea, nausea, or vomiting. Genitourinary  No difficulty urinating, dysuria, frequency, or urgency. No flank pain or hematuria. Musculoskeletal  no back pain or myalgia. Skin  no rashes or wounds. Redness and pain left upper chest wall over port. Neurologic  no dizziness, facial asymmetry, or light headedness. No seizures. No new onset of partial or complete loss of vision affecting only one eye, speech difficulty or lateralizing weakness, numbness/tingling   Hematologic  no excessive bleeding. Psychiatric  no severe anxiety or nervousness. No confusion. All other review of systems are negative. Physical Exam    BP (!) 144/87 (Site: Right Upper Arm, Position: Sitting)   Pulse 103   Temp 96.1 °F (35.6 °C)   Resp 16   Ht 5' 6\" (1.676 m)   Wt 175 lb (79.4 kg)   SpO2 99%   BMI 28.25 kg/m²     Constitutional  No acute distress. HENT  head normocephalic. Hearing is intact   Eyes  conjunctiva normal.  EOMS normal.  No exudate. No icterus. Neck- ROM appears normal, no tracheal deviation. Cardiovascular  Regular rate and rhythm. Heart sounds are normal.  No murmur, rub, or gallop. Carotid pulses bilaterally without bruit. Extremities - Radial and ulnar pulses are 2+ to palpation bilaterally. No cyanosis, clubbing. LUE swelling noted.  No signs atheroembolic event.  Pulmonary  effort appears normal.  No respiratory distress. Lungs - Breath sounds normal.   GI - Abdomen  No distension or palpable mass. Genitourinary  deferred. Musculoskeletal  ROM appears normal.  No significant edema. Neurologic  alert and oriented X 3. Face symmetric. No lateralizing weakness noted. Skin  warm, dry, and intact. No rash or pallor. She has erythema noted over port left upper chest wall. Incision is intact. Psychiatric  mood, affect, and behavior appear normal.  Judgment and thought processes appear normal.      Options have been discussed with the patient including continued medical management vs. proceeding with Removal of Mediport; left upper extremity venogram possible balloon angioplasty/stent subclavian vein in hybrid. Patient has opted to proceed with Removal of Mediport; left upper extremity venogram possible balloon angioplasty/stent subclavian vein in hybrid. Risks have been discussed with the patient including but not limited to MI, death, CVA, bleed, nerve injury, and infection. Assessment      1. Acute deep vein thrombosis (DVT) of other vein of left upper extremity (HCC)    2. Poor venous access    3. Breast carcinoma, female, left ([address]    4. Carcinoma of female breast, right ([address]    5. Status post bilateral mastectomy    6. Status post bilateral breast reconstruction    7. Infection of venous access port, initial encounter          Plan    I discussed the case with Dr. Samira Silver. Start the patient on doxycycline 100 mg twice daily for 10 days. She recommends we proceed with removal of Mediport; left upper extremity venogram possible balloon angioplasty/stent subclavian vein in hybrid      Please note that parts of the chart were generated using Dragon dictation software. Although every effort was made to ensure the accuracy of this automated transcription, some errors in transcription may have occurred.

## 2021-10-05 NOTE — LETTER
[facility]    Surgery Directions    1. Report to the outpatient registration at [facility] on Wed 10/6/2021, at 9:00 am.  2. Nothing to eat or drink after midnight the night before the procedure. 3. Please take all medications as normally scheduled to take, including heart and blood pressure medicines with a sip of water. Except Lisinopril and Eliquis, do not take these medications the morning of surgery. 4. Do not take Lasix, insulin, or any diabetic medicine the morning of the procedure. If you take insulin, you may only take 1/2 of any scheduled nightly dose, and none the morning of the procedure. You may take all regularly scheduled heart, cholesterol, and blood pressure medicines with a sip of water. 5. If you take Glucophage, Metformin, Actos Plus Met, or Glucovance,you can not take this the day of your procedure and two days after the procedure. 6. Hold Eliquis for the night prior to surgery. 7. If you have sleep apnea and require C-PAP, please bring this with you to the hospital.  8. Bring a list of all of your allergies and medications with you to the hospital.  9. Please let our nurse know if you have had an allergy to iodine, shellfish, or x-ray dye. 10. Let the nurse know if you take any of the followin. Over the counter herbal supplements  2. Diclofenec, indomethacin, ketoprofen, Caridopa/levadopa, naproxen, sulindac, piroxicam, glucosamine, Chondrotin, cocchine, or methotrexate. 11. Plan to stay at the hospital for 4 - 6 hours before being released  by the physician. You will need someone to drive you home after the procedure. 12. Medications instructed to hold: See Above  13. Please stop at your local [facility] or pharmacy and buy a bottle of Hibiclens. Wash thoroughly with this the night before and the morning of the procedure, paying special attention to the area that will be operated on. Make sure you rinse very well.  The Hibiclens should only be used prior to surgery. 14. You will need to use Bactroban Ointment prior to your procedure. You will need to apply the Bactroban Ointment to the inside of your nose on both sides twice a day for 5 days starting on 10/5/2021. The Bactroban Ointment has been sent to [facility].  15. Please register at the [facility] Patient Registration today for pre-op and covid-19 testing. 16. You will need to register at the [facility] at 3:15 pm in [address] for a left arm venous scan. 16. New policy requires that anyone who comes into the hospital will be required to wear a face mask. A cloth mask is acceptable. 18. To ensure the health and safety of our patients and staff, [facility] has implemented visitor restrictions. Only one person will be allowed to accompany you for your procedure. If you or your visitor are exhibiting signs & symptoms of illness such as fever, cough, sore throat or body aches, we ask that you reschedule your procedure to a later date after your symptoms have been resolved. 19. Other Directions: If you have any questions or concerns please contact our office at [phone]    Unless instructed otherwise by your physician, cleanse incision/puncture site twice daily with soap and water. Apply dry gauze. Do not get in tub. Okay to shower. Do not apply any salve, cream, peroxide or alcohol to the incision. Call with any increasing redness or drainage. PLEASE NOTE:  If the patient is unable to sign his/her own paperwork, the appointed caregiver (POA, child, sibling, etc) must be present at the time of registration for all testing and procedures. Transportation to and from all testing and procedure appointments is the sole responsibility of the patient, caregiver, and/or nursing facility in which they reside. Please remember you will not be able to drive after you are discharged. Please call the office at [phone] with any questions or concerns.  Please allow 20-25 hours notice for cancellations or rescheduling. We will attempt to accommodate your needs to the best of our capabilities, however, strict policies with procedure room availability does not allow much flexibility.

## 2021-10-05 NOTE — H&P
Patient Care Team:  [place] MD as PCP - General (Family Medicine)  [place] MD as PCP - [address] [place] [address] Dr Anthony Provider      History and Physical:    Felisha Short is a 49 y.o. female 53-year-old female who has a history of stage IV breast cancer with lung mets. She is status post bilateral mastectomies with reconstructive surgery. She recently developed left arm pain and swelling. She had a venous scan which revealed she had extensive left upper extremity DVT. She was started on Eliquis and underwent a left upper extremity venous mechanical thrombectomy on 9/26/2021 by Dr. Sally Matias. She has a left upper chest wall port that she has had for many years. She is not using this. Dr. Sally Matias had mentioned in her previous operative note that the patient had a left subclavian stenosis and that she may need a port removal with possible balloon angioplasty and stenting in the future. She presents today for evaluation of her port site. She reports that on 9/30/2021 she noticed redness and pain over the port site. She reports that she has had an intermittent low-grade temp with a max spike of 101. Felisha Short is a 49 y.o. female with the following [facility]:  Patient Active Problem List    Diagnosis Date Noted    Central line infection 10/05/2021    Exostosis of left foot 09/29/2021    Acute deep vein thrombosis (DVT) of left upper extremity ([facility] [address] 09/24/2021    S/P revision of left breast reconstruction 9/17/2021 09/23/2021    Status post bilateral breast reconstruction     Acute purulent bronchitis 09/02/2021    Lung nodule 09/02/2021    Status post bilateral mastectomy 08/06/2021    Other cyst of bone, left ankle and foot 04/10/2021     No trauma. Advised I believe this is most likely a cyst.  Will monitor closely, if change in size or becomes more painful, will consider x-rays.       Obesity (BMI 30-39.9) 04/09/2021     BMI 30.05      Dyspnea and respiratory abnormalities 04/09/2021     Patient has been prescribed Breo Ellipta and albuterol. She states she was never diagnosed with COPD or asthma, was told the cancer \"affected my lungs. \"  She denies any shortness of breath today. Continue current medications.  Chronic pain syndrome 04/09/2021     Refer to pain management as requested.  Right wrist pain 04/09/2021     Refer to orthopedics as requested.  Rheumatoid arthritis involving both knees with negative rheumatoid factor ([facility] [address] 04/09/2021     Refer to rheumatology as requested.  Menopausal symptom 04/08/2021    Benign neoplasm of body of uterus 04/08/2021    Iron deficiency anemia 01/22/2021    Carcinoma of female breast ([facility] [address] 01/21/2021    Poor venous access 01/21/2021     Current Outpatient Medications   Medication Sig Dispense Refill    mupirocin (BACTROBAN) 2 % ointment Apply the Bactroban Ointment to the inside of your nose on both sides twice a day for 5 days starting today. 1 g 0    apixaban starter pack (ELIQUIS) 5 MG TBPK tablet Take 1 tablet by mouth See Admin Instructions 74 tablet 0    famotidine (PEPCID) 20 MG tablet Take 1 tablet by mouth daily 30 tablet 5    HYDROcodone-acetaminophen (NORCO)  MG per tablet Take 1 tablet by mouth every 6 hours as needed for Pain. Intended supply: 30 days 12 tablet 0    fluticasone-vilanterol (BREO ELLIPTA) 100-25 MCG/INH AEPB inhaler Inhale 1 puff into the lungs daily 1 each 5    VENTOLIN  (90 Base) MCG/ACT inhaler Inhale 2 puffs into the lungs 4 times daily as needed for Wheezing 18 g 5    pantoprazole (PROTONIX) 40 MG tablet TAKE 1 TABLET BY MOUTH EVERY DAY (Patient taking differently: Take 40 mg by mouth daily ) 90 tablet 0    IBRANCE 125 MG tablet Take 1 tablet (125mg) by mouth once daily. 21 tablet 3    NARCAN 4 MG/0.1ML LIQD nasal spray       gabapentin (NEURONTIN) 300 MG capsule TAKE 2 CAPSULES BY MOUTH 3 TIMES DAILY FOR 30 DAYS.  180 capsule 0    cyclobenzaprine (FLEXERIL) 10 MG tablet Take 10 mg by mouth 3 times daily as needed for Muscle spasms      guaiFENesin-codeine (GUAIFENESIN AC) 100-10 MG/5ML liquid Take 5 mLs by mouth 3 times daily as needed for Cough.  Albuterol Sulfate (PROAIR HFA IN) Inhale 1 each into the lungs daily as needed       benzonatate (TESSALON) 100 MG capsule Take 100 mg by mouth 3 times daily as needed for Cough      Cholecalciferol (VITAMIN D3) 125 MCG (5000 UT) TABS Take 1 tablet by mouth daily       lisinopril (PRINIVIL;ZESTRIL) 10 MG tablet Take 10 mg by mouth daily      acyclovir (ZOVIRAX) 800 MG tablet Take 800 mg by mouth 2 times daily as needed       ondansetron (ZOFRAN) 4 MG tablet Take 4 mg by mouth every 8 hours as needed for Nausea or Vomiting      alclomethasone (ACLOVATE) 0.05 % cream Apply topically 2 times daily as needed Apply topically 2 times daily.  meloxicam (MOBIC) 15 MG tablet Take 15 mg by mouth daily      chlorhexidine (PERIDEX) 0.12 % solution Take 15 mLs by mouth 2 times daily       No current facility-administered medications for this visit.      Allergies: Clindamycin/lincomycin  Past Medical History:   Diagnosis Date    Breast cancer ([facility] [address]     Breast cancer with lung mets (RIGHT)    Chronic back pain     GERD (gastroesophageal reflux disease)     Hx of blood clots     Hypertension     Neuropathy     Post chemo treatment neuropathy     Past Surgical History:   Procedure Laterality Date    BREAST LUMPECTOMY Right     2006    BREAST RECONSTRUCTION Left 09/17/2021    Revision left reconstructed breast, implant performed by Dayton Mcfarlane MD at [place] [address]  2016    Bilateral breast implants    EMBOLECTOMY Left 9/26/2021    LEFT UPPER EXTREMITY  MECHANICAL SUCTION THROMBECTOMY performed by Galina Tovar DO at [facility], TOTAL ABDOMINAL  2015    MASTECTOMY, BILATERAL      Right 2013 & Left 2016     Family History   Problem Relation Age of Onset    Hypertension Mother     Obesity Mother     Prostate Cancer Father     No Known Problems Sister     No Known Problems Daughter     No Known Problems Daughter      Social History     Tobacco Use    Smoking status: Never Smoker    Smokeless tobacco: Never Used   Substance Use Topics    Alcohol use: Not Currently     Comment: used to drink socially \"a long time ago\"       Review of Systems    Constitutional -  She has had fever  HENT - no HA's, tinnitus, rhinorrhea, sore throat  Eyes - no sudden vision change or amaurosis. Respiratory - SOB or chest pain  Cardiovascular - no chest pain, syncope, or significant dizziness. No palpitations. Left UE swelling and pain. Gastrointestinal - no significant abdominal pain. No blood in stool. No diarrhea, nausea, or vomiting. Genitourinary - No difficulty urinating, dysuria, frequency, or urgency. No flank pain or hematuria. Musculoskeletal - no back pain or myalgia. Skin - no rashes or wounds. Redness and pain left upper chest wall over port. Neurologic - no dizziness, facial asymmetry, or light headedness. No seizures. No new onset of partial or complete loss of vision affecting only one eye, speech difficulty or lateralizing weakness, numbness/tingling   Hematologic - no excessive bleeding. Psychiatric - no severe anxiety or nervousness. No confusion. All other review of systems are negative. Physical Exam    Constitutional - No acute distress. HENT - head normocephalic. Hearing is intact   Eyes - conjunctiva normal.  EOMS normal.  No exudate. No icterus. Neck- ROM appears normal, no tracheal deviation. Cardiovascular - Regular rate and rhythm. Heart sounds are normal.  No murmur, rub, or gallop. Carotid pulses bilaterally without bruit. Extremities - Radial and ulnar pulses are 2+ to palpation bilaterally. No cyanosis, clubbing. LUE swelling noted. No signs atheroembolic event. Pulmonary - effort appears normal.  No respiratory distress.     Lungs - Breath sounds normal.   GI - Abdomen - No distension or palpable mass. Genitourinary - deferred. Musculoskeletal - ROM appears normal.  No significant edema. Neurologic - alert and oriented X 3. Face symmetric. No lateralizing weakness noted. Skin - warm, dry, and intact. No rash or pallor. She has erythema noted over port left upper chest wall. Incision is intact. Psychiatric - mood, affect, and behavior appear normal.  Judgment and thought processes appear normal.      Options have been discussed with the patient including continued medical management vs. proceeding with Removal of infected Mediport; left upper extremity venogram possible balloon angioplasty/stent subclavian vein with femoral and arm access in hybrid. Patient has opted to proceed with Removal of infected  Mediport; left upper extremity venogram possible balloon angioplasty/stent subclavian vein with femoral and arm access in hybrid. Risks have been discussed with the patient including but not limited to MI, death, CVA, bleed, nerve injury, and infection. Assessment      1. Acute deep vein thrombosis (DVT) of other vein of left upper extremity (HCC)    2. Poor venous access    3. Breast carcinoma, female, left ([address]    4. Carcinoma of female breast, right ([address]    5. Status post bilateral mastectomy    6. Status post bilateral breast reconstruction    7. Infection of venous access port, initial encounter          Plan        I discussed the case with Dr. Collin Summers. Start the patient on doxycycline 100 mg twice daily for 10 days. We will send her for pre op and LUE venous scan. She recommends we proceed with removal of infected Mediport; left upper extremity venogram possible balloon angioplasty/stent subclavian vein sima femoral and arm access in hybrid      Please note that parts of the chart were generated using Dragon dictation software.  Although every effort was made to ensure the accuracy of this automated transcription, some errors in transcription may have occurred.

## 2021-10-06 ENCOUNTER — APPOINTMENT (OUTPATIENT)
Dept: INTERVENTIONAL RADIOLOGY/VASCULAR | Age: 56
DRG: 315 | End: 2021-10-06
Attending: SURGERY
Payer: MEDICARE

## 2021-10-06 ENCOUNTER — ANESTHESIA EVENT (OUTPATIENT)
Dept: OPERATING ROOM | Age: 56
DRG: 315 | End: 2021-10-06
Payer: MEDICARE

## 2021-10-06 ENCOUNTER — TELEPHONE (OUTPATIENT)
Dept: INFUSION THERAPY | Age: 56
End: 2021-10-06

## 2021-10-06 ENCOUNTER — HOSPITAL ENCOUNTER (INPATIENT)
Age: 56
LOS: 6 days | Discharge: HOME | DRG: 315 | End: 2021-10-12
Attending: SURGERY | Admitting: SURGERY
Payer: MEDICARE

## 2021-10-06 ENCOUNTER — ANESTHESIA (OUTPATIENT)
Dept: OPERATING ROOM | Age: 56
DRG: 315 | End: 2021-10-06
Payer: MEDICARE

## 2021-10-06 VITALS — TEMPERATURE: 98.6 F | OXYGEN SATURATION: 84 % | DIASTOLIC BLOOD PRESSURE: 50 MMHG | SYSTOLIC BLOOD PRESSURE: 85 MMHG

## 2021-10-06 DIAGNOSIS — T80.212S PORT OR RESERVOIR INFECTION, SEQUELA: Primary | ICD-10-CM

## 2021-10-06 PROBLEM — T80.212A PORT OR RESERVOIR INFECTION: Status: ACTIVE | Noted: 2021-10-06

## 2021-10-06 LAB
ABO/RH: NORMAL
ANTIBODY SCREEN: NORMAL
APTT: 31.8 SEC (ref 26–36.2)
EKG P AXIS: 45 DEGREES
EKG P AXIS: 53 DEGREES
EKG P-R INTERVAL: 134 MS
EKG P-R INTERVAL: 136 MS
EKG Q-T INTERVAL: 318 MS
EKG Q-T INTERVAL: 348 MS
EKG QRS DURATION: 74 MS
EKG QRS DURATION: 74 MS
EKG QTC CALCULATION (BAZETT): 384 MS
EKG QTC CALCULATION (BAZETT): 398 MS
EKG T AXIS: -5 DEGREES
EKG T AXIS: 22 DEGREES
INR BLD: 1.2 (ref 0.88–1.18)
PROTHROMBIN TIME: 15.4 SEC (ref 12–14.6)

## 2021-10-06 PROCEDURE — 3700000000 HC ANESTHESIA ATTENDED CARE: Performed by: SURGERY

## 2021-10-06 PROCEDURE — 6360000002 HC RX W HCPCS: Performed by: PHYSICIAN ASSISTANT

## 2021-10-06 PROCEDURE — 77001 FLUOROGUIDE FOR VEIN DEVICE: CPT

## 2021-10-06 PROCEDURE — 86850 RBC ANTIBODY SCREEN: CPT

## 2021-10-06 PROCEDURE — C1894 INTRO/SHEATH, NON-LASER: HCPCS | Performed by: SURGERY

## 2021-10-06 PROCEDURE — 2580000003 HC RX 258

## 2021-10-06 PROCEDURE — 6360000002 HC RX W HCPCS: Performed by: NURSE PRACTITIONER

## 2021-10-06 PROCEDURE — 93010 ELECTROCARDIOGRAM REPORT: CPT | Performed by: INTERNAL MEDICINE

## 2021-10-06 PROCEDURE — 99222 1ST HOSP IP/OBS MODERATE 55: CPT | Performed by: INTERNAL MEDICINE

## 2021-10-06 PROCEDURE — 1210000000 HC MED SURG R&B

## 2021-10-06 PROCEDURE — 2580000003 HC RX 258: Performed by: NURSE PRACTITIONER

## 2021-10-06 PROCEDURE — 6370000000 HC RX 637 (ALT 250 FOR IP): Performed by: ANESTHESIOLOGY

## 2021-10-06 PROCEDURE — 0JPT0WZ REMOVAL OF TOTALLY IMPLANTABLE VASCULAR ACCESS DEVICE FROM TRUNK SUBCUTANEOUS TISSUE AND FASCIA, OPEN APPROACH: ICD-10-PCS | Performed by: SURGERY

## 2021-10-06 PROCEDURE — 93005 ELECTROCARDIOGRAM TRACING: CPT | Performed by: ANESTHESIOLOGY

## 2021-10-06 PROCEDURE — 86900 BLOOD TYPING SEROLOGIC ABO: CPT

## 2021-10-06 PROCEDURE — 6360000002 HC RX W HCPCS: Performed by: SURGERY

## 2021-10-06 PROCEDURE — 87186 SC STD MICRODIL/AGAR DIL: CPT

## 2021-10-06 PROCEDURE — 87205 SMEAR GRAM STAIN: CPT

## 2021-10-06 PROCEDURE — C1769 GUIDE WIRE: HCPCS | Performed by: SURGERY

## 2021-10-06 PROCEDURE — 86901 BLOOD TYPING SEROLOGIC RH(D): CPT

## 2021-10-06 PROCEDURE — 77001 FLUOROGUIDE FOR VEIN DEVICE: CPT | Performed by: SURGERY

## 2021-10-06 PROCEDURE — 87075 CULTR BACTERIA EXCEPT BLOOD: CPT

## 2021-10-06 PROCEDURE — 2500000003 HC RX 250 WO HCPCS: Performed by: NURSE ANESTHETIST, CERTIFIED REGISTERED

## 2021-10-06 PROCEDURE — 3700000001 HC ADD 15 MINUTES (ANESTHESIA): Performed by: SURGERY

## 2021-10-06 PROCEDURE — 2500000003 HC RX 250 WO HCPCS

## 2021-10-06 PROCEDURE — 3600000002 HC SURGERY LEVEL 2 BASE: Performed by: SURGERY

## 2021-10-06 PROCEDURE — 87070 CULTURE OTHR SPECIMN AEROBIC: CPT

## 2021-10-06 PROCEDURE — 7100000001 HC PACU RECOVERY - ADDTL 15 MIN: Performed by: SURGERY

## 2021-10-06 PROCEDURE — 87077 CULTURE AEROBIC IDENTIFY: CPT

## 2021-10-06 PROCEDURE — 36415 COLL VENOUS BLD VENIPUNCTURE: CPT

## 2021-10-06 PROCEDURE — 85610 PROTHROMBIN TIME: CPT

## 2021-10-06 PROCEDURE — 85730 THROMBOPLASTIN TIME PARTIAL: CPT

## 2021-10-06 PROCEDURE — 2580000003 HC RX 258: Performed by: SURGERY

## 2021-10-06 PROCEDURE — 6360000002 HC RX W HCPCS: Performed by: NURSE ANESTHETIST, CERTIFIED REGISTERED

## 2021-10-06 PROCEDURE — 2709999900 HC NON-CHARGEABLE SUPPLY: Performed by: SURGERY

## 2021-10-06 PROCEDURE — C1887 CATHETER, GUIDING: HCPCS | Performed by: SURGERY

## 2021-10-06 PROCEDURE — 6370000000 HC RX 637 (ALT 250 FOR IP): Performed by: SURGERY

## 2021-10-06 PROCEDURE — 7100000000 HC PACU RECOVERY - FIRST 15 MIN: Performed by: SURGERY

## 2021-10-06 PROCEDURE — 36590 REMOVAL TUNNELED CV CATH: CPT | Performed by: SURGERY

## 2021-10-06 PROCEDURE — 3600000012 HC SURGERY LEVEL 2 ADDTL 15MIN: Performed by: SURGERY

## 2021-10-06 RX ORDER — HYDROMORPHONE HYDROCHLORIDE 1 MG/ML
0.5 INJECTION, SOLUTION INTRAMUSCULAR; INTRAVENOUS; SUBCUTANEOUS EVERY 5 MIN PRN
Status: DISCONTINUED | OUTPATIENT
Start: 2021-10-06 | End: 2021-10-06

## 2021-10-06 RX ORDER — MORPHINE SULFATE 2 MG/ML
2 INJECTION, SOLUTION INTRAMUSCULAR; INTRAVENOUS EVERY 5 MIN PRN
Status: DISCONTINUED | OUTPATIENT
Start: 2021-10-06 | End: 2021-10-06

## 2021-10-06 RX ORDER — MEPERIDINE HYDROCHLORIDE 25 MG/ML
12.5 INJECTION INTRAMUSCULAR; INTRAVENOUS; SUBCUTANEOUS EVERY 5 MIN PRN
Status: DISCONTINUED | OUTPATIENT
Start: 2021-10-06 | End: 2021-10-06

## 2021-10-06 RX ORDER — FAMOTIDINE 20 MG/1
20 TABLET, FILM COATED ORAL DAILY
Status: DISCONTINUED | OUTPATIENT
Start: 2021-10-06 | End: 2021-10-13 | Stop reason: HOSPADM

## 2021-10-06 RX ORDER — SODIUM CHLORIDE 0.9 % (FLUSH) 0.9 %
5-40 SYRINGE (ML) INJECTION PRN
Status: DISCONTINUED | OUTPATIENT
Start: 2021-10-06 | End: 2021-10-06

## 2021-10-06 RX ORDER — SODIUM CHLORIDE 0.9 % (FLUSH) 0.9 %
5-40 SYRINGE (ML) INJECTION EVERY 12 HOURS SCHEDULED
Status: DISCONTINUED | OUTPATIENT
Start: 2021-10-06 | End: 2021-10-06

## 2021-10-06 RX ORDER — LIDOCAINE HYDROCHLORIDE 10 MG/ML
INJECTION, SOLUTION INFILTRATION; PERINEURAL PRN
Status: DISCONTINUED | OUTPATIENT
Start: 2021-10-06 | End: 2021-10-06 | Stop reason: SDUPTHER

## 2021-10-06 RX ORDER — SODIUM CHLORIDE, SODIUM LACTATE, POTASSIUM CHLORIDE, CALCIUM CHLORIDE 600; 310; 30; 20 MG/100ML; MG/100ML; MG/100ML; MG/100ML
INJECTION, SOLUTION INTRAVENOUS CONTINUOUS
Status: DISCONTINUED | OUTPATIENT
Start: 2021-10-06 | End: 2021-10-06

## 2021-10-06 RX ORDER — FENTANYL CITRATE 50 UG/ML
50 INJECTION, SOLUTION INTRAMUSCULAR; INTRAVENOUS
Status: DISCONTINUED | OUTPATIENT
Start: 2021-10-06 | End: 2021-10-06

## 2021-10-06 RX ORDER — LIDOCAINE HYDROCHLORIDE 10 MG/ML
1 INJECTION, SOLUTION EPIDURAL; INFILTRATION; INTRACAUDAL; PERINEURAL
Status: DISCONTINUED | OUTPATIENT
Start: 2021-10-06 | End: 2021-10-06

## 2021-10-06 RX ORDER — MELOXICAM 7.5 MG/1
15 TABLET ORAL DAILY
Status: DISCONTINUED | OUTPATIENT
Start: 2021-10-06 | End: 2021-10-13 | Stop reason: HOSPADM

## 2021-10-06 RX ORDER — HYDROCODONE BITARTRATE AND ACETAMINOPHEN 10; 325 MG/1; MG/1
1 TABLET ORAL EVERY 6 HOURS PRN
Status: DISCONTINUED | OUTPATIENT
Start: 2021-10-06 | End: 2021-10-13 | Stop reason: HOSPADM

## 2021-10-06 RX ORDER — MORPHINE SULFATE 4 MG/ML
4 INJECTION, SOLUTION INTRAMUSCULAR; INTRAVENOUS EVERY 5 MIN PRN
Status: DISCONTINUED | OUTPATIENT
Start: 2021-10-06 | End: 2021-10-06

## 2021-10-06 RX ORDER — HYDROMORPHONE HYDROCHLORIDE 1 MG/ML
0.25 INJECTION, SOLUTION INTRAMUSCULAR; INTRAVENOUS; SUBCUTANEOUS EVERY 5 MIN PRN
Status: DISCONTINUED | OUTPATIENT
Start: 2021-10-06 | End: 2021-10-06

## 2021-10-06 RX ORDER — PANTOPRAZOLE SODIUM 40 MG/1
40 TABLET, DELAYED RELEASE ORAL DAILY
Status: DISCONTINUED | OUTPATIENT
Start: 2021-10-06 | End: 2021-10-13 | Stop reason: HOSPADM

## 2021-10-06 RX ORDER — PROPOFOL 10 MG/ML
INJECTION, EMULSION INTRAVENOUS PRN
Status: DISCONTINUED | OUTPATIENT
Start: 2021-10-06 | End: 2021-10-06 | Stop reason: SDUPTHER

## 2021-10-06 RX ORDER — ROCURONIUM BROMIDE 10 MG/ML
INJECTION, SOLUTION INTRAVENOUS PRN
Status: DISCONTINUED | OUTPATIENT
Start: 2021-10-06 | End: 2021-10-06 | Stop reason: SDUPTHER

## 2021-10-06 RX ORDER — PROMETHAZINE HYDROCHLORIDE 25 MG/ML
6.25 INJECTION, SOLUTION INTRAMUSCULAR; INTRAVENOUS
Status: DISCONTINUED | OUTPATIENT
Start: 2021-10-06 | End: 2021-10-06

## 2021-10-06 RX ORDER — MORPHINE SULFATE 4 MG/ML
4 INJECTION, SOLUTION INTRAMUSCULAR; INTRAVENOUS
Status: DISCONTINUED | OUTPATIENT
Start: 2021-10-06 | End: 2021-10-06

## 2021-10-06 RX ORDER — LISINOPRIL 10 MG/1
10 TABLET ORAL DAILY
Status: DISCONTINUED | OUTPATIENT
Start: 2021-10-06 | End: 2021-10-13 | Stop reason: HOSPADM

## 2021-10-06 RX ORDER — DIPHENHYDRAMINE HYDROCHLORIDE 50 MG/ML
12.5 INJECTION INTRAMUSCULAR; INTRAVENOUS
Status: DISCONTINUED | OUTPATIENT
Start: 2021-10-06 | End: 2021-10-06

## 2021-10-06 RX ORDER — SODIUM CHLORIDE 9 MG/ML
25 INJECTION, SOLUTION INTRAVENOUS PRN
Status: DISCONTINUED | OUTPATIENT
Start: 2021-10-06 | End: 2021-10-06

## 2021-10-06 RX ORDER — SODIUM HYPOCHLORITE 1.25 MG/ML
SOLUTION TOPICAL 2 TIMES DAILY
Status: DISCONTINUED | OUTPATIENT
Start: 2021-10-07 | End: 2021-10-12

## 2021-10-06 RX ORDER — SUCCINYLCHOLINE CHLORIDE 20 MG/ML
INJECTION INTRAMUSCULAR; INTRAVENOUS PRN
Status: DISCONTINUED | OUTPATIENT
Start: 2021-10-06 | End: 2021-10-06 | Stop reason: SDUPTHER

## 2021-10-06 RX ORDER — HYDRALAZINE HYDROCHLORIDE 20 MG/ML
5 INJECTION INTRAMUSCULAR; INTRAVENOUS EVERY 10 MIN PRN
Status: DISCONTINUED | OUTPATIENT
Start: 2021-10-06 | End: 2021-10-06

## 2021-10-06 RX ORDER — SCOLOPAMINE TRANSDERMAL SYSTEM 1 MG/1
1 PATCH, EXTENDED RELEASE TRANSDERMAL ONCE
Status: DISCONTINUED | OUTPATIENT
Start: 2021-10-06 | End: 2021-10-06

## 2021-10-06 RX ORDER — LABETALOL HYDROCHLORIDE 5 MG/ML
5 INJECTION, SOLUTION INTRAVENOUS EVERY 10 MIN PRN
Status: DISCONTINUED | OUTPATIENT
Start: 2021-10-06 | End: 2021-10-06

## 2021-10-06 RX ORDER — ONDANSETRON 2 MG/ML
INJECTION INTRAMUSCULAR; INTRAVENOUS PRN
Status: DISCONTINUED | OUTPATIENT
Start: 2021-10-06 | End: 2021-10-06 | Stop reason: SDUPTHER

## 2021-10-06 RX ORDER — ENALAPRILAT 2.5 MG/2ML
1.25 INJECTION INTRAVENOUS
Status: DISCONTINUED | OUTPATIENT
Start: 2021-10-06 | End: 2021-10-06

## 2021-10-06 RX ORDER — SODIUM CHLORIDE, SODIUM LACTATE, POTASSIUM CHLORIDE, CALCIUM CHLORIDE 600; 310; 30; 20 MG/100ML; MG/100ML; MG/100ML; MG/100ML
INJECTION, SOLUTION INTRAVENOUS CONTINUOUS PRN
Status: DISCONTINUED | OUTPATIENT
Start: 2021-10-06 | End: 2021-10-06 | Stop reason: SDUPTHER

## 2021-10-06 RX ORDER — METOCLOPRAMIDE HYDROCHLORIDE 5 MG/ML
10 INJECTION INTRAMUSCULAR; INTRAVENOUS
Status: DISCONTINUED | OUTPATIENT
Start: 2021-10-06 | End: 2021-10-06

## 2021-10-06 RX ORDER — FENTANYL CITRATE 50 UG/ML
INJECTION, SOLUTION INTRAMUSCULAR; INTRAVENOUS PRN
Status: DISCONTINUED | OUTPATIENT
Start: 2021-10-06 | End: 2021-10-06 | Stop reason: SDUPTHER

## 2021-10-06 RX ORDER — VANCOMYCIN HYDROCHLORIDE 1 G/20ML
INJECTION, POWDER, LYOPHILIZED, FOR SOLUTION INTRAVENOUS PRN
Status: DISCONTINUED | OUTPATIENT
Start: 2021-10-06 | End: 2021-10-06 | Stop reason: SDUPTHER

## 2021-10-06 RX ORDER — MIDAZOLAM HYDROCHLORIDE 1 MG/ML
2 INJECTION INTRAMUSCULAR; INTRAVENOUS
Status: DISCONTINUED | OUTPATIENT
Start: 2021-10-06 | End: 2021-10-06

## 2021-10-06 RX ADMIN — PANTOPRAZOLE SODIUM 40 MG: 40 TABLET, DELAYED RELEASE ORAL at 17:09

## 2021-10-06 RX ADMIN — SUGAMMADEX 300 MG: 100 INJECTION, SOLUTION INTRAVENOUS at 12:40

## 2021-10-06 RX ADMIN — FENTANYL CITRATE 50 MCG: 50 INJECTION, SOLUTION INTRAMUSCULAR; INTRAVENOUS at 12:16

## 2021-10-06 RX ADMIN — ROCURONIUM BROMIDE 30 MG: 10 INJECTION, SOLUTION INTRAVENOUS at 11:45

## 2021-10-06 RX ADMIN — SUCCINYLCHOLINE CHLORIDE 160 MG: 20 INJECTION, SOLUTION INTRAMUSCULAR; INTRAVENOUS at 11:32

## 2021-10-06 RX ADMIN — MEROPENEM 1000 MG: 1 INJECTION, POWDER, FOR SOLUTION INTRAVENOUS at 17:10

## 2021-10-06 RX ADMIN — FAMOTIDINE 20 MG: 20 TABLET ORAL at 17:08

## 2021-10-06 RX ADMIN — FENTANYL CITRATE 50 MCG: 50 INJECTION, SOLUTION INTRAMUSCULAR; INTRAVENOUS at 11:25

## 2021-10-06 RX ADMIN — LIDOCAINE HYDROCHLORIDE 50 MG: 10 INJECTION, SOLUTION INFILTRATION; PERINEURAL at 11:32

## 2021-10-06 RX ADMIN — Medication 2000 MG: at 11:40

## 2021-10-06 RX ADMIN — APIXABAN 5 MG: 5 TABLET, FILM COATED ORAL at 21:20

## 2021-10-06 RX ADMIN — HYDROCODONE BITARTRATE AND ACETAMINOPHEN 1 TABLET: 10; 325 TABLET ORAL at 17:15

## 2021-10-06 RX ADMIN — LISINOPRIL 10 MG: 10 TABLET ORAL at 17:09

## 2021-10-06 RX ADMIN — FENTANYL CITRATE 50 MCG: 50 INJECTION, SOLUTION INTRAMUSCULAR; INTRAVENOUS at 11:32

## 2021-10-06 RX ADMIN — PROPOFOL 150 MG: 10 INJECTION, EMULSION INTRAVENOUS at 11:32

## 2021-10-06 RX ADMIN — FENTANYL CITRATE 50 MCG: 50 INJECTION, SOLUTION INTRAMUSCULAR; INTRAVENOUS at 12:40

## 2021-10-06 RX ADMIN — HYDROMORPHONE HYDROCHLORIDE 0.5 MG: 1 INJECTION, SOLUTION INTRAMUSCULAR; INTRAVENOUS; SUBCUTANEOUS at 13:05

## 2021-10-06 RX ADMIN — ONDANSETRON HYDROCHLORIDE 4 MG: 2 INJECTION, SOLUTION INTRAMUSCULAR; INTRAVENOUS at 12:40

## 2021-10-06 RX ADMIN — HYDROMORPHONE HYDROCHLORIDE 0.5 MG: 1 INJECTION, SOLUTION INTRAMUSCULAR; INTRAVENOUS; SUBCUTANEOUS at 13:28

## 2021-10-06 RX ADMIN — SODIUM CHLORIDE, SODIUM LACTATE, POTASSIUM CHLORIDE, AND CALCIUM CHLORIDE: 600; 310; 30; 20 INJECTION, SOLUTION INTRAVENOUS at 11:25

## 2021-10-06 RX ADMIN — VANCOMYCIN HYDROCHLORIDE 1000 MG: 1 INJECTION, POWDER, LYOPHILIZED, FOR SOLUTION INTRAVENOUS at 12:20

## 2021-10-06 ASSESSMENT — PAIN SCALES - GENERAL
PAINLEVEL_OUTOF10: 8

## 2021-10-06 ASSESSMENT — ENCOUNTER SYMPTOMS: SHORTNESS OF BREATH: 1

## 2021-10-06 ASSESSMENT — PAIN DESCRIPTION - PAIN TYPE: TYPE: SURGICAL PAIN

## 2021-10-06 ASSESSMENT — PAIN DESCRIPTION - LOCATION
LOCATION: CHEST
LOCATION: CHEST

## 2021-10-06 ASSESSMENT — PAIN DESCRIPTION - ORIENTATION
ORIENTATION: LEFT
ORIENTATION: LEFT

## 2021-10-06 ASSESSMENT — LIFESTYLE VARIABLES: SMOKING_STATUS: 0

## 2021-10-06 NOTE — TELEPHONE ENCOUNTER
INPT CONSULT [facility] (EST PT OF DR NERGON)  ROOM 334  PER DR TERRY  ADMITTED FOR PORT REMOVAL (INFECTED PORT) KNOWN BREAST CANCER PATIENT. Deacon Cam.  Bharati Reed

## 2021-10-06 NOTE — OP NOTE
Operative Note      Patient: Felisha Short  YOB: 1965  MRN: [id number]    Date of Procedure: 10/6/2021    Pre-Op Diagnosis: I82.62, I87.8, C50.911, Z90.13, T80.219A    Post-Op Diagnosis: Same       Procedure(s):  REMOVAL OF MEDIPORT    Surgeon(s):  [facility],     Assistant:   * No surgical staff found *    Anesthesia: General    Estimated Blood Loss (mL): less than 50     Complications: None    Specimens:   ID Type Source Tests Collected by Time Destination   1 : SWAB FROM LEFT MEDIPORT SITE Swab Chest CULTURE, SURGICAL [facility],  10/6/2021 1208    2 : [address] Chest CULTURE, [facility],  10/6/2021 1233        Implants:  * No implants in log *      Drains:   [REMOVED] Urethral Catheter Straight-tip 16 fr (Removed)   Catheter Indications Perioperative use for selected surgical procedures 09/26/21 1122   Urine Color Yellow 09/26/21 1122   Urine Appearance Clear 09/26/21 1122   Output (mL) 1100 mL 09/26/21 1615       Findings:  infected left subclavian port with large amount of purulent drainage. Swab cultures sent, port with tubing sent for culture. Detailed Description of Procedure:   Patient was brought to the hybrid operating room and placed in supine position. Her left upper chest wall and the neck were prepped and draped in sterile fashion for port removal.  There was a plan if the port is not grossly infected with purulence we will possibly perform angioplasty of the left subclavian vein with possible stenting. Timeout was performed. Patient was given preoperative antibiotics. The incision was made in the previous scar with 15 blade and carried down with electrocautery. The crossing vein and the port site was placed a hemostat ligated and divided. Then using Metzenbaum scissors the port capsule was entered and right away copious amounts of pus was seen. The swab cultures were sent.   It appeared to be the whole capsule was full

## 2021-10-06 NOTE — CONSULTS
MEDICAL ONCOLOGY CONSULTATION    Pt Name: Rhonda Meléndez  MRN: [id number]  YOB: 1965  Date of evaluation: 10/6/2021    REASON FOR CONSULTATION:    REQUESTING PHYSICIAN:    History Obtained From:    patient, electronic medical record    HISTORY OF PRESENT ILLNESS:  Jose Luis Kuo is a very pleasant 49years old female who has been treated for a diagnosed stage IV metastatic breast cancer. She developed acute DVT of the left upper extremity related to prior port placement. 9/26/2021-she underwent a venous mechanical thrombectomy by Dr. Kelvin Davey. She was found to have significant left subclavian vein stenosis. The patient presented to the hospital with complaints of redness/tenderness of her port site and also fever 101.  10/6/2021-she underwent a left upper extremity venous mechanical thrombectomy. Mechanical thrombectomy of the area with good resolution of the thrombus. Plan is to proceed with port removal and subsequent balloon angioplasty with possible stenting.         Diagnosis  · Grade III Adenocarcinoma of right breast diagnosed 2006  · Stage IIIA, [id number]  · ER/AK Positive, HER-2 bruce/ihc 1+  · April 2013-RECURRENCE in the right axillary region  · Genetic testing- BRCA 1&2 Negative  · 2014 (?) RECURRENCE in the axillary skin  · 01/05/2017- METASTATIC DISEASE with lung, hilar, and axillary lymph node mets  · Osteopenia     Treatment Summary  · 2006- Neoadjuvant chemotherapy with AC x 4 cycles followed by Taxol  · 2007- Lumpectomy with axillary lymph node dissection  · 2007- Adjuvant radiation therapy to whole breast and axillary region  · Did not take tamoxifen due to cost  · April 2013- RECURRENCE  · Neoadjuvant chemotherapy with 2 cycles of Taxotere followed by Doxil  · 10/14/2013- Right Breast Mastectomy and Axillary Dissection  · 01/23/2014- Completion of adjuvant RT to chest wall and axillary lymph node with Dr. Bereket Romero  · 02/14- Initiated adjuvant endocrine therapy with tamoxifen  · 2014 (?) RECURRENCE in the axillary skin  · 2014 (?) Surgical resection of the axillary skin  · 2015 (?) YVETTE/BSO  · 09/19/2016- Bilateral exchange, nipple reconstruction and fat grafting and removal of PAC   · 2016- Switched to aromatase inhibitor  · 01/05/2017- METASTATIC DISEASE with lung, hilar, and axillary mets  · 01/11/2017-06/17/2018- Single agent Abraxane x 13 cycles  · 06/27/2018-09/17/2018- Gemzar  · 10/10/2018-Faslodex every 4 weeks/palbociclib     Cancer History:  Demian Cesar was first seen by me on 1/21/2021. Brandon Angeles was referred to [facility] continued of care for history of recurrent metastatic breast cancer. Brandon Angeles has been in remission as per the latest CT scan. Brandon Angeles is currently on Faslodex and palbociclib.  She has received several lines therapy as described below.  She moved from [place] to [address] [place] [address] to stay closer to her parents. Ray Holley medical history is complex.  Further details as below. · 2006- Neoadjuvant chemotherapy with AC x 4 cycles followed by [facility] was complicated by viral meningitis; patient experienced neuropathy after 3 doses of Taxol and was switched to Taxotere for last dose  · 2007- Lumpectomy with axillary lymph node dissection 1.9cm, grade 2. No LVI. 1 of 14 lymph nodes positive for malignancy (1/14) [id number]  · 2007- Adjuvant Radiation Therapy to whole breast and axillary region  · April 2013- RECURRENCE presenting as mass in the axillary region  · Neoadjuvant chemotherapy with 2 cycles of Taxotere followed by Doxil- did not have good response to treatment  · 2013- Preop PET/CTshowed 2.8cm, right axillary lymph node with uptake. · 10/14/2013- Right Breast Mastectomy and Axillary Dissection Right breast had benign tissue with fibrous, negative for carcinoma. Axillary contents demonstrated invasive ductal carcinoma, involving fibroadipose tissue and tendinous tissue. 3 benign lymph nodes (0/3). Yusra grade 3. 3.0cm in greatest gross dimensions.  Carcinoma permeates among soft tissues and in the right axilla with no apparent involvement of lymph nodes. · 01/23/2014- Completion of adjuvant radiation therapy to chest wall and axillary lymph node with Dr. Jen Francis  · 02/14- Initiated adjuvant endocrine therapy with tamoxifen  · 2014 (?) RECURRENCE in the axillary skin  · 2014 (?) Surgical resection of the axillary skin pathology demonstrating tumor at cauterized margin  · 2015 (?) YVETTE/BSO  · 09/19/2016- Bilateral exchange, nipple reconstruction and fat grafting and removal of PAC  · 2016- Switched to aromatase inhibitor patient was non compliant  · 01/05/2017- METASTATIC DISEASE Presented with respiratory failure. CTA Pulmonary showed numerous nodules and masses throughout both lung fields, compatible with metastatic disease. Bilateral hilar adenopathy seen. · 01/11/2017-06/17/2018- Single agent Abraxane x 13 cycles  · 03/15/2017- CT Chest W Contrast Interval decrease in maximal diameter of essentially all the multiple metastatic pulmonary nodules. The average difference is approximately 25%. Some of the much smaller ones have disappeared. Bilateral hilar and aortopulmonary window adenopathy is also similarly smaller. · 06/07/2017- CT Chest W Contrast Multiple lung nodules seen bilaterally. Most of the lung nodules show interval improvement with decreased size by 1 to 3mm. Mediastinal and bilateral hilar adenopathy seen with improvement. · 07/24/2017- PET/CT scan showed marked improvement, is minimal abnormal, has excellent response to therapy  · 02/01/2018- PET/CT scan Numerous bilateral lung masses and nodules, the majority of which do not have appreciable abnormal FDG uptake. The largest mass in the left infrahilar region of the left lower lobe demonstrates a maximum SUV of 3.3. Mediastinal lymphadenopathy. No evidence of metastatic disease in the abdomen or pelvis. · 06/21/2018- CT Chest/Abdomen/Pelvis Multiple lung mets, mild bilateral hilar and mediastinal lymph nodes.  No masses or evidence of metastatic disease in the abdomen or pelvis  · 06/27/2018-09/17/2018- Gemzar  · 09/28/2018- CT Pulmonary Multiple lung mets, mild bilateral hilar and mediastinal lymph nodes  · 10/10/2018-12/21/2020- Faslodex every 4 weeks  · 12/14/2018- CT Chest showed definite decrease in the maximal diameter and volume of all left and right metastatic nodules. Somewhat enlarged paratracheal and left axillary lymph nodes are relatively unchanged. · 04/22/2019- MRI Cervical Spine W WO Contrast Unremarkable  · 04/22/2019- MRI Brain W WO Contrast No enhancing lesions in the brain and no evidence of metastatic disease. · 07/27/2019- CTA Pulmonary showed essentially complete disappearance of the pulmonary metastatic disease. Several tiny flat peripheral nodules are the only sequela. The tiny ones on the right are unchanged, the tiny ones on the left are even smaller. There is no longer any active metastatic disease in either lung. · 12/19/2019- CT Chest/Abdomen/Pelvis Small, tiny subpleural nodules right upper and right midlung field as well as left lung base has remained unchanged. No new focal parenchymal abnormality seen. No adenopathy seen. There is no abdominal or pelvic mass, adenopathy or fluid collection. No lytic or sclerotic bony lesions, but there is a possibility of osteopenia and/or osteoporosis. · 02/17/2020- DEXA Bone Density showed osteopenia of lumbar spine, T-score -1.8, and left femoral neck, T-score -2.0  · 2/25/21 Ct Abdomen Pelvis W Iv Contrast  No evidence of metastatic disease in the abdomen or pelvis. · 2/26/21 Ct Chest W Contrast Tiny nodules in the right lung described above probably represent small foci of pleural thickening due to chronic inflammatory process or small noncalcified granulomas. No features to suggest malignancy or metastatic disease. Bilateral breast implants without complication.    · 3/1/2021-discussed the results CT chest abdomen pelvis.  Essentially no clear evidence of metastatic disease.  This is consistent with excellent response to treatment.  Continue current treatment. · 4/1/2021 = 31.6 CA 27-29= 33.5 CEA= 1.6   · 6/3/21 CA 15-3= 23.3 CA 27-29= 25.6  CEA= 1.8   · 7/29/2021 CEA=1.7 CA 15-3=21.50 CA 27.29= 26.0  · 8/19/2021 CT Chest  Left lower lobe pleural-based nodular 1.7 x 0.9 cm is indeterminate. PET/CT recommended. Feeding defect in the left lower lobe bronchus versus a postobstructive airspace disease. This too may be further characterized with PET CT versus direct visualization. · 8/19/2021 CT Abd/Pelvis A stable CT scan of the abdomen and pelvis.  No finding to suggest neoplastic/metastatic disease.     CA 27.29 Dynamics  01/21/2021- 20.3  04/01/2021 33.5  06/03/2021 25.6  07/29/2021 26.0       Past Medical History:    Past Medical History:   Diagnosis Date    Breast cancer ([facility] [address]     Breast cancer with lung mets (RIGHT)    Chronic back pain     GERD (gastroesophageal reflux disease)     Hx of blood clots     Hypertension     Neuropathy     Post chemo treatment neuropathy       Past Surgical History:    Past Surgical History:   Procedure Laterality Date    BREAST LUMPECTOMY Right     2006    BREAST RECONSTRUCTION Left 09/17/2021    Revision left reconstructed breast, implant performed by Julián Gallo MD at [facility][note 3][address] [facility] [address]  2016    Bilateral breast implants    EMBOLECTOMY Left 9/26/2021    LEFT UPPER EXTREMITY  MECHANICAL SUCTION THROMBECTOMY performed by Pauly Prince DO at [facility], TOTAL ABDOMINAL  2015    MASTECTOMY, BILATERAL      Right 2013 & Left 2016    TUNNELED VENOUS PORT PLACEMENT      VASCULAR SURGERY N/A 10/6/2021    REMOVAL OF MEDIPORT performed by Pauly Prince DO at [address] [facility] OR       Social History:    Marital status[de-identified]  Smoking status:  ETOH status:  Resides:    Family History:   Family History   Problem Relation Age of Onset    Hypertension Mother     Obesity Mother     Prostate Cancer Father     No Known Problems Sister     No Known Problems Daughter     No Known Problems Daughter        Current Hospital Medications:    Current Facility-Administered Medications   Medication Dose Route Frequency Provider Last Rate Last Admin    apixaban starter pack (ELIQUIS) tablet 5 mg  5 mg Oral See Admin Instructions Amisha Patton DO        famotidine (PEPCID) tablet 20 mg  20 mg Oral Daily [facility] Ivanukoff, DO   20 mg at 10/06/21 1708    HYDROcodone-acetaminophen (NORCO)  MG per tablet 1 tablet  1 tablet Oral Q6H PRN Amisha Patton DO        lisinopril (PRINIVIL;ZESTRIL) tablet 10 mg  10 mg Oral Daily Amisha Patton DO   10 mg at 10/06/21 1709    meloxicam (MOBIC) tablet 15 mg  15 mg Oral Daily Tunia Ivanukoff, DO        pantoprazole (PROTONIX) tablet 40 mg  40 mg Oral Daily [facility], DO   40 mg at 10/06/21 1709    [START ON 10/7/2021] sodium hypochlorite (DAKINS) 0.125 % external solution   Irrigation BID Shahid Sink, APRN        [START ON 10/7/2021] vancomycin (VANCOCIN) 1,250 mg in dextrose 5 % 250 mL IVPB  1,250 mg IntraVENous Q12H Shahid Noyola, APRN        meropenem (MERREM) 1,000 mg in sterile water 20 mL IV syringe  1,000 mg IntraVENous Q8H Shahid Sink, APRN        vancomycin [facility]) intermittent dosing (placeholder)   Other RX Placeholder Shahid Sink, APRN           Allergies:    Allergies   Allergen Reactions    Clindamycin/Lincomycin Hives, Itching and Rash         Subjective   REVIEW OF SYSTEMS:   CONSTITUTIONAL: fever, no night sweats, fatigue;  HEENT: no blurring of vision, no double vision, no hearing difficulty, no tinnitus, no ulceration, no epistaxis;  LUNGS: no cough, no hemoptysis, no wheeze,  no shortness of breath;  CARDIOVASCULAR: no palpitation, no chest pain, no shortness of breath;  GI: no abdominal pain, no nausea, no vomiting, no diarrhea, no constipation;  PATRICK: no dysuria, no hematuria, no frequency or urgency, no nephrolithiasis;  MUSCULOSKELETAL: no joint pain, no swelling, no stiffness;  ENDOCRINE: no polyuria, no polydipsia, no cold or heat intolerance;  HEMATOLOGY: no easy bruising or bleeding, no history of clotting disorder;  PSYCHIATRY: no depression, no anxiety, no panic attacks, no suicidal ideation, no homicidal ideation;  NEUROLOGY: no syncope, no seizures, no numbness or tingling of hands, no numbness or tingling of feet, no paresis;    Objective   BP (!) 153/88   Pulse 77   Temp 97.9 °F (36.6 °C)   Resp 16   Ht 5' 6\" (1.676 m)   Wt 175 lb (79.4 kg)   SpO2 96%   BMI 28.25 kg/m²     PHYSICAL EXAM:  CONSTITUTIONAL: Alert, appropriate, no acute distress  EYES: Non icteric, EOM intact, pupils equal round   ENT: Mucus membranes moist, no oral pharyngeal lesions, external inspection of ears and nose are normal  NECK: Supple, no masses. No palpable thyroid mass  CHEST/LUNGS: CTA bilaterally, normal respiratory effort   CARDIOVASCULAR: RRR, no murmurs. No lower extremity edema  ABDOMEN: soft non-tender, active bowel sounds, no HSM. No palpable masses  EXTREMITIES: warm, full ROM in all 4 extremities, no focal weakness. LYMPH: No cervical, clavicular, axillary, or inguinal lymphadenopathy  NEUROLOGIC: follows commands, non focal   PSYCH: mood and affect appropriate.  Alert and oriented to time, place, person        LABORATORY RESULTS REVIEWED/ANALYZED BY ME:  Recent Labs     10/05/21  1554 09/27/21  0601 09/26/21  0324   WBC 6.5 7.5 4.1*   HGB 11.2* 9.6* 9.6*   HCT 34.5* 30.2* 30.5*   .9* 113.5* 111.7*   * 224 206       Lab Results   Component Value Date     10/05/2021    K 3.9 10/05/2021     10/05/2021    CO2 20 (L) 10/05/2021    BUN 13 10/05/2021    CREATININE 0.7 10/05/2021    GLUCOSE 84 10/05/2021    CALCIUM 9.3 10/05/2021    PROT 7.1 09/24/2021    LABALBU 4.1 09/24/2021    BILITOT 0.7 09/24/2021    ALKPHOS 99 09/24/2021    AST 22 09/24/2021    ALT 29 09/24/2021    LABGLOM >60 10/05/2021    GFRAA >59 10/05/2021    AGRATIO 1.6 04/01/2021    GLOB 3.3 08/27/2021       Lab Results   Component Value Date    INR 1.20 (H) 10/06/2021    INR 1.23 (H) 10/05/2021    INR 1.02 09/24/2021    PROTIME 15.4 (H) 10/06/2021    PROTIME 15.7 (H) 10/05/2021    PROTIME 13.6 09/24/2021       RADIOLOGY STUDIES REPORT/REVIEWED AND INTERPRETED BY ME:  XR CHEST (2 VW)    Result Date: 10/5/2021  XR CHEST (2 VW) 10/5/2021 4:04 PM History: Preop vascular surgery procedure. Two-view chest x-ray. Comparison is made with September 24, 2021. Heart size is within normal limits. The mediastinum has a normal appearance. The lungs are adequately expanded with no pneumonia or pneumothorax. There is mild chronic interstitial disease with scattered calcified granulomas. There is no significant pleural fluid. No congestive failure changes. Unchanged left chest wall port. 1. No acute disease. Signed by Dr Darby Ramos    Result Date: 9/30/2021  EXAM: CT CHEST W CONTRAST -- 9/30/2021 8:52 AM HISTORY: 49 years, Female, lung nodule, breast cancer COMPARISON: 8/19/2021 DLP: 543 mGy cm. Automated exposure control was utilized to minimize patient radiation dose. TECHNIQUE: Unenhanced CT images obtained with multiplanar reformats. FINDINGS: AIRWAYS/PULMONARY PARENCHYMA: The central airways are midline and patent. No convincing small airway filling defect on today's exam at the left lower lobe. Redemonstration 16 x 7 mm pulmonary nodule at the medial aspect left lower lobe on axial series 3, image 69, previously reported as 9 x 17. On today's exam, this appears less rounded. No new suspicious pulmonary finding. No pleural effusion. No pneumothorax. VASCULATURE: Limited assessment of vasculature without intravenous contrast. Thoracic aorta is normal in course and caliber. Normal pulmonary artery caliber.  Left jugular vein stent. CARDIAC:  Cardiac size is normal.  No pericardial effusion. Scattered coronary artery calcifications. MEDIASTINUM: There is no mediastinal or hilar lymphadenopathy by CT size criteria. Esophagus has normal coarse, caliber and wall thickness. EXTRATHORACIC: The visualized portions of the thyroid gland are unremarkable. No thoracic inlet or axillary adenopathy. Port at the left chest wall, catheter tip in the lower SVC. Bilateral breast implants. Left chest wall collaterals suggests chronic left subclavian vein stenosis. INCLUDED UPPER ABDOMEN: Visualized portion of the liver, gallbladder, pancreas, spleen, adrenal glands and upper kidneys appear within normal limits. OSSEOUS: Normal     1. Similar left lower lobe pleural-based nodule or opacity. This appears less rounded than prior. 2. Left chest wall collaterals suggest chronic left subclavian vein stenosis. 3. Coronary artery calcifications. Signed by Dr Kiah Mcgrath     EXTREMITY VENOUS LEFT    Result Date: 10/6/2021  Vascular Upper Extremities Veins Procedure  Demographics   Patient Name    Frantz Morales Age                  49   Patient Number  [id number]          Gender               Female   Visit Number    [id number]       Interpreting         Wiley Shall                                  Physician   Date of Birth   1965      Referring Physician  King Carrillo   Accession       [id number]      [address] RT, RVT  Number  Procedure Type of Study:   Veins:Upper Extremities Veins, US Doppler Venous Upper Extremity  Unilateral.  Indications for Study:F/U DVT left upper extremity. Impression   Chronic appearing deep vein thrombosis (DVT) is seen in the internal  jugular vein brachial , left upper extremity(ies). Subacute appearing superficial thrombophlebitis (SVT) is seen in the  basilic vein, left upper extremity(ies).         Extremity Venous Left    Result Date: 9/24/2021  Vascular Upper Extremities Veins Procedure  Demographics   Patient Name    Zeyad Mccarthy Age                   49   Patient Number  [id number]          Gender                Female   Visit Number    [id number]       Interpreting          José Freire                                  Physician   Date of Birth   1965      Referring Physician   Kaushal Hylton. [facility]   Accession       [id number]      [facility] PIERRE  Number  Procedure Type of Study:   Veins:Upper Extremities Veins, UE VENOUS UNILATERAL/FLU. Indications for Study:Arm swelling and Arm pain. Impression   Acute appearing deep vein thrombosis (DVT) is seen in the internal jugular  vein subclavian axillary brachial ulnar , left upper extremity(ies). Superficial venous thrombosis is visualized in the basilic and cephalic  vein of the left upper extremity. XR CHEST PORTABLE    Result Date: 9/24/2021  XR CHEST PORTABLE 9/24/2021 8:00 PM HISTORY: Chest pain COMPARISON: 7/28/2019 CXR: A single frontal view of the chest is performed. Findings: Lungs are hypoventilated. Bilateral breast implants with associated soft tissue attenuation. No pulmonary consolidation or edema. Cardiac and mediastinal contours normal. No pleural effusion or pneumothorax. Left subclavian port in place with tip overlying the SVC. Right axillary surgical clips. 1. Bilateral breast implants. No acute pulmonary process identified. Left subclavian port. Signed by Dr Mohsen Chavarria      ASSESSMENT:    #Port related infection  S/p Mediport removal per Dr. Ino Holly 10/06/21. Continue Vancomycin    #Acute left upper extremity DVT/left upper chest wall port      A noninvasive venous study of the left upper extremity documented the following:  · Acute appearing deep vein thrombosis (DVT) is seen in the internal jugular vein subclavian axillary brachial ulnar , left upper extremity(ies).   · Superficial venous thrombosis is visualized in the basilic and cephalic vein of the left upper extremity.     She was admitted and placed on IV heparin drip, currently receiving Eliquis     A preliminary prothrombotic work-up with antiphospholipid syndrome testing will be initiated, further serological work-up will be done in outpatient setting under the direction of Dr. Milla Chung underwent partial left upper extremity mechanical suction thrombectomy 9/26/2021 by Dr. Hernesto Stokes     She is currently treated with Eliquis 10 mg po BID.  10/6/2021-she underwent a left upper extremity venous mechanical thrombectomy. Mechanical thrombectomy of the area with good resolution of the thrombus. S/p Mediport removal per Dr. Krystian Torres 10/06/21. Stage IV Metastatic breast cancer, hormone sensitive  2/25/2021-discussed the results of CT chest abdomen pelvis.  No evidence of disease progression.  In fact, excellent response with no measurable metastatic disease at this time. 1/21/2021-CA 15-3 = 23, CA 27-29 = 20.3. Currently Faslodex/Ibrance. 6/3/2021-CA 15-3-23, CA 27-29-25, CEA 1.8  Regimen:  Faslodex to 500 mg subcutaneous every 4 weeks. Continue Ibrance days 1-21 q. 28 days  8/19/2021-CT chest/abdomen/pelvis-no evidence of metastatic disease.  Findings of a pulmonary lung nodule in the left lower lobe   Essentially, good response to Faslodex/Ibrance. Last cancer marker 7/29/2021 were normal.     B12 deficiency   B12 levels 189.    B12 injections were initiated 9/26/2021 (1000 mcg) with Faslodex. 3/1/2021- Methylmalonic Acid 171, Vitamin B12 >2000.   Defer to outpatient      Left lower lobe lung nodule  She is currently being seen by pulmonary at [facility]. They believe that this could be inflammatory. She received a trial of antibiotics and will have a repeat CT scan with them.   She is a scheduled for a repeat CT scan January 2022    GERD  Possible decreased efficacy of Ibrance when taken with Protonix  Patient states GERD is unrelieved with Protonix  Rx Pepcid sent to [facility] [place]    PLAN:  Continue vancomycin and cefepime  I agree with Mediport removal  Resume Ibrance and Eliquis    I have seen, examined and reviewed this patient medication list, appropriate labs and imaging studies. I reviewed relevant medical records and others physicians notes. I discussed the plans of care with the patient. I answered all the questions to the patients satisfaction. I have also reviewed the chief complaint (CC) and part of the history (History of Present Illness (HPI), Past Family Social History [facility]), or Review of Systems (ROS) and made changes when appropriated.        (Please note that portions of this note were completed with a voice recognition program. Efforts were made to edit the dictations but occasionally words are mis-transcribed.)        Katherine Pascual MD    10/06/21  5:13 PM

## 2021-10-06 NOTE — BRIEF OP NOTE
Brief Postoperative Note      Patient: Lynne Sánchez  YOB: 1965  MRN: [id number]    Date of Procedure: 10/6/2021    Pre-Op Diagnosis: I82.62, I87.8, C50.911, Z90.13, T80.219A    Post-Op Diagnosis: Same       Procedure(s):  REMOVAL OF MEDIPORT    Surgeon(s):  Balbir House DO    Assistant:  * No surgical staff found *    Anesthesia: General    Estimated Blood Loss (mL): less than 50     Complications: None    Specimens:   ID Type Source Tests Collected by Time Destination   1 : SWAB FROM LEFT MEDIPORT SITE Swab Chest CULTURE, SAPPHIRE House DO 10/6/2021 1208    2 : [address] [address] Chest CULTURE, SAPPHIRE House DO 10/6/2021 1233        Implants:  * No implants in log *      Drains:   [REMOVED] Urethral Catheter Straight-tip 16 fr (Removed)   Catheter Indications Perioperative use for selected surgical procedures 09/26/21 1122   Urine Color Yellow 09/26/21 1122   Urine Appearance Clear 09/26/21 1122   Output (mL) 1100 mL 09/26/21 1615       Findings: infected left subclavian port with large amount of purulent drainage. Swab cultures sent, port with tubing sent for culture.     Electronically signed by Balbir House DO on 10/6/2021 at 12:55 PM

## 2021-10-06 NOTE — PROGRESS NOTES
4 Eyes Skin Assessment    Pool Keith is being assessed upon: Admission    I agree that I, Cristiano Steward RN, along with Jayda Souza (either 2 RN's or 1 LPN and 1 RN) have performed a thorough Head to Toe Skin Assessment on the patient. ALL assessment sites listed below have been assessed. Areas assessed by both nurses:     [x]   Head, Face, and Ears   [x]   Shoulders, Back, and Chest  [x]   Arms, Elbows, and Hands   [x]   Coccyx, Sacrum, and Ischium  [x]   Legs, Feet, and Heels    Does the Patient have Skin Breakdown? No    Jose Prevention initiated: No  Wound Care Orders initiated: No    WOC nurse consulted for Pressure Injury (Stage 3,4, Unstageable, DTI, NWPT, and Complex wounds) and New or Established Ostomies: Vascular following for infected port removal site wound.         Primary Nurse eSignature: Cristiano Steward RN on 10/6/2021 at 6:17 PM      Co-Signer eSignature: Electronically signed by Nina Mi RN on 10/6/21 at 6:40 PM CDT

## 2021-10-06 NOTE — ANESTHESIA POSTPROCEDURE EVALUATION
Department of Anesthesiology  Postprocedure Note    Patient: Hayder George  MRN: [id number]  YOB: 1965  Date of evaluation: 10/6/2021  Time:  1:02 PM     Procedure Summary     Date: 10/06/21 Room / Location: [facility] OR [address]    Anesthesia Start: 9326 Anesthesia Stop: 5927    Procedure: REMOVAL OF MEDIPORT (N/A ) Diagnosis: (I82.62, I87.8, C50.911, Z90.13, T80.219A)    Surgeons: Rd Connor DO Responsible Provider: BRITANY Edouard CRNA    Anesthesia Type: general ASA Status: 3          Anesthesia Type: general    Dena Phase I: Dena Score: 10    Dena Phase II:      Last vitals: Reviewed and per EMR flowsheets.        Anesthesia Post Evaluation    Patient location during evaluation: bedside  Level of consciousness: awake and alert  Pain score: 0  Airway patency: patent  Nausea & Vomiting: no nausea and no vomiting  Complications: no  Cardiovascular status: blood pressure returned to baseline  Respiratory status: acceptable  Hydration status: euvolemic

## 2021-10-06 NOTE — ANESTHESIA PRE PROCEDURE
Department of Anesthesiology  Preprocedure Note       Name:  Parveen Nova   Age:  49 y.o.  :  1965                                          MRN:  [id number]         Date:  10/6/2021      Surgeon: Supriya Penaloza):  Emil Elena DO    Procedure: Procedure(s):  REMOVAL OF MEDIPORT POSSIBLE SUBCLAVIAN ANGIOPLASTY, STENT WITH ARM & GROIN ACCESS    Medications prior to admission:   Prior to Admission medications    Medication Sig Start Date End Date Taking? Authorizing Provider   HYDROcodone-acetaminophen (NORCO)  MG per tablet Take 1 tablet by mouth every 6 hours as needed for Pain. Intended supply: 30 days 21 Yes Fabian Ch MD   mupirocin [facility]) 2 % ointment Apply the Bactroban Ointment to the inside of your nose on both sides twice a day for 5 days starting today. 10/5/21 10/12/21  Lalita Ott PA-C   apixaban starter pack (ELIQUIS) 5 MG TBPK tablet Take 1 tablet by mouth See Admin Instructions 21   Maricruz Carty MD   famotidine (PEPCID) 20 MG tablet Take 1 tablet by mouth daily 21   BRITANY Beckett   fluticasone-vilanterol (BREO ELLIPTA) 100-25 MCG/INH AEPB inhaler Inhale 1 puff into the lungs daily 21   Ashley Morrison MD   VENTOLIN  (90 Base) MCG/ACT inhaler Inhale 2 puffs into the lungs 4 times daily as needed for Wheezing 21   Ashley Morrison MD   pantoprazole (PROTONIX) 40 MG tablet TAKE 1 TABLET BY MOUTH EVERY DAY  Patient taking differently: Take 40 mg by mouth daily  21   Jayne Pickett MD   IBRANCE 125 MG tablet Take 1 tablet (125mg) by mouth once daily. 21   Jayne Pickett MD   NARCAN 4 MG/0.1ML LIQD nasal spray  21   Historical Provider, MD   gabapentin (NEURONTIN) 300 MG capsule TAKE 2 CAPSULES BY MOUTH 3 TIMES DAILY FOR 30 DAYS.  5/19/21 10/5/21  Jayne Pickett MD   cyclobenzaprine (FLEXERIL) 10 MG tablet Take 10 mg by mouth 3 times daily as needed for Muscle spasms Historical Provider, MD   guaiFENesin-codeine (GUAIFENESIN AC) 100-10 MG/5ML liquid Take 5 mLs by mouth 3 times daily as needed for Cough. Historical Provider, MD   Albuterol Sulfate (PROAIR HFA IN) Inhale 1 each into the lungs daily as needed     Historical Provider, MD   benzonatate (TESSALON) 100 MG capsule Take 100 mg by mouth 3 times daily as needed for Cough    Historical Provider, MD   Cholecalciferol (VITAMIN D3) 125 MCG (5000 UT) TABS Take 1 tablet by mouth daily     Historical Provider, MD   lisinopril (PRINIVIL;ZESTRIL) 10 MG tablet Take 10 mg by mouth daily    Historical Provider, MD   acyclovir (ZOVIRAX) 800 MG tablet Take 800 mg by mouth 2 times daily as needed     Historical Provider, MD   ondansetron (ZOFRAN) 4 MG tablet Take 4 mg by mouth every 8 hours as needed for Nausea or Vomiting    Historical Provider, MD   alclomethasone (ACLOVATE) 0.05 % cream Apply topically 2 times daily as needed Apply topically 2 times daily. Historical Provider, MD   meloxicam (MOBIC) 15 MG tablet Take 15 mg by mouth daily    Historical Provider, MD   chlorhexidine (PERIDEX) 0.12 % solution Take 15 mLs by mouth 2 times daily    Historical Provider, MD       Current medications:    Current Facility-Administered Medications   Medication Dose Route Frequency Provider Last Rate Last Admin    0.9 % sodium chloride infusion  25 mL IntraVENous PRN Lalita Ott PA-C        ceFAZolin (ANCEF) 2000 mg in 0.9% sodium chloride 50 mL IVPB  2,000 mg [facility]VENous [facility]        sodium chloride flush 0.9 % injection 5-40 mL  5-40 mL IntraVENous 2 times per day Cardinal Alyce PA-C        sodium chloride flush 0.9 % injection 5-40 mL  5-40 mL IntraVENous PRN Lalita Ott PA-C           Allergies:     Allergies   Allergen Reactions    Clindamycin/Lincomycin Hives, Itching and Rash       Problem List:    Patient Active Problem List   Diagnosis Code    Carcinoma of female breast ([facility] [address] C50.919    Poor venous access I87.8    Iron deficiency anemia D50.9    Menopausal symptom N95.1    Benign neoplasm of body of uterus D26.1    Obesity (BMI 30-39. 9) E66.9    Dyspnea and respiratory abnormalities R06.00, R06.89    Chronic pain syndrome G89.4    Right wrist pain M25.531    Rheumatoid arthritis involving both knees with negative rheumatoid factor ([facility] [address] M06.061, M06.062    Other cyst of bone, left ankle and foot M85.672    Status post bilateral mastectomy Z90.13    Acute purulent bronchitis J20.9    Lung nodule R91.1    Status post bilateral breast reconstruction Z98.890    S/P revision of left breast reconstruction 9/17/2021 Z98.890    Acute deep vein thrombosis (DVT) of left upper extremity (HCC) I82.622    Exostosis of left foot M89.8X7    Central line infection T80.219A       Past Medical History:        Diagnosis Date    Breast cancer ([facility] [address]     Breast cancer with lung mets (RIGHT)    Chronic back pain     GERD (gastroesophageal reflux disease)     Hx of blood clots     Hypertension     Neuropathy     Post chemo treatment neuropathy       Past Surgical History:        Procedure Laterality Date    BREAST LUMPECTOMY Right     2006    BREAST RECONSTRUCTION Left 09/17/2021    Revision left reconstructed breast, implant performed by Jose Sierra MD at [address] [place] [address]  2016    Bilateral breast implants    EMBOLECTOMY Left 9/26/2021    LEFT UPPER EXTREMITY  MECHANICAL SUCTION THROMBECTOMY performed by Dafne Moreno DO at [facility], TOTAL ABDOMINAL  2015    MASTECTOMY, BILATERAL      Right 2013 & Left 2016       Social History:    Social History     Tobacco Use    Smoking status: Never Smoker    Smokeless tobacco: Never Used   Substance Use Topics    Alcohol use: Not Currently     Comment: used to drink socially \"a long time ago\"                                Counseling given: Not Answered      Vital Signs (Current):  There were no vitals filed for this visit. BP Readings from Last 3 Encounters:   10/05/21 (!) 144/87   10/05/21 112/64   09/29/21 (!) 140/87       NPO Status:                                                                                 BMI:   Wt Readings from Last 3 Encounters:   10/05/21 178 lb (80.7 kg)   10/05/21 175 lb (79.4 kg)   10/05/21 180 lb 6.4 oz (81.8 kg)     There is no height or weight on file to calculate BMI.    CBC:   Lab Results   Component Value Date    WBC 6.5 10/05/2021    RBC 3.14 10/05/2021    HGB 11.2 10/05/2021    HCT 34.5 10/05/2021    .9 10/05/2021    RDW 13.9 10/05/2021     10/05/2021       CMP:   Lab Results   Component Value Date     10/05/2021    K 3.9 10/05/2021    K 4.7 09/27/2021     10/05/2021    CO2 20 10/05/2021    BUN 13 10/05/2021    CREATININE 0.7 10/05/2021    GFRAA >59 10/05/2021    AGRATIO 1.6 04/01/2021    LABGLOM >60 10/05/2021    GLUCOSE 84 10/05/2021    PROT 7.1 09/24/2021    CALCIUM 9.3 10/05/2021    BILITOT 0.7 09/24/2021    ALKPHOS 99 09/24/2021    AST 22 09/24/2021    ALT 29 09/24/2021       POC Tests: No results for input(s): POCGLU, POCNA, POCK, POCCL, POCBUN, POCHEMO, POCHCT in the last 72 hours.     Coags:   Lab Results   Component Value Date    PROTIME 15.7 10/05/2021    INR 1.23 10/05/2021    APTT 39.0 10/05/2021       HCG (If Applicable): No results found for: PREGTESTUR, PREGSERUM, HCG, HCGQUANT     ABGs: No results found for: PHART, PO2ART, MOY5FJK, EGD2RAA, BEART, A1FPOQMY     Type & Screen (If Applicable):  No results found for: LABABO, LABRH    Drug/Infectious Status (If Applicable):  No results found for: HIV, HEPCAB    COVID-19 Screening (If Applicable):   Lab Results   Component Value Date    COVID19 Not Detected 10/05/2021    COVID19 Not Detected 09/13/2021           Anesthesia Evaluation  Patient summary reviewed and Nursing notes reviewed no history of anesthetic complications:   Airway: Mallampati: II  TM distance: >3 FB   Neck ROM: full  Mouth opening: > = 3 FB Dental: normal exam         Pulmonary:Negative Pulmonary ROS and normal exam  breath sounds clear to auscultation  (+) shortness of breath:      (-) not a current smoker          Patient did not smoke on day of surgery. Cardiovascular:    (+) hypertension:,     (-) CAD,  angina and  CHF    NYHA Classification: I  ECG reviewed  Rhythm: regular  Rate: normal           Beta Blocker:  Not on Beta Blocker         Neuro/Psych:   Negative Neuro/Psych ROS     (-) seizures, CVA and depression/anxiety            GI/Hepatic/Renal: Neg GI/Hepatic/Renal ROS       (-) hiatal hernia and GERD       Endo/Other: Negative Endo/Other ROS   (+) blood dyscrasia: anemia, arthritis: rheumatoid. , malignancy/cancer. Pt had PAT visit. Abdominal:       Abdomen: soft. Vascular:   + DVT, . Other Findings:             Anesthesia Plan      general     ASA 3     (Iv zofran within 30 min of closing )  Induction: intravenous. BIS  MIPS: Postoperative opioids intended and Prophylactic antiemetics administered. Anesthetic plan and risks discussed with patient. Use of blood products discussed with patient whom. Plan discussed with CRNA.     Attending anesthesiologist reviewed and agrees with Pre Eval content              Wil Mcduffie MD   10/6/2021

## 2021-10-06 NOTE — PROGRESS NOTES
Comprehensive Nutrition Assessment    Type and Reason for Visit:  Initial, Positive Nutrition Screen    Nutrition Recommendations/Plan: continue current POC    Nutrition Assessment:  Pt appears adequately nourished. At present returned from surgery--diet has not be receiving. Received +NS for weight loss--has lost 2.2% in weight in approx 1 week. Malnutrition Assessment:  Malnutrition Status:  No malnutrition    Context:  Acute Illness     Findings of the 6 clinical characteristics of malnutrition:  Energy Intake:  Unable to assess  Weight Loss:  7 - Greater than 2% over 1 week     Body Fat Loss:  No significant body fat loss     Muscle Mass Loss:  No significant muscle mass loss    Fluid Accumulation:  No significant fluid accumulation Extremities   Strength:  Not Performed    Estimated Daily Nutrient Needs:  Energy (kcal):   ; Weight Used for Energy Requirements:        Protein (g):   ; Weight Used for Protein Requirements:           Fluid (ml/day):   ; Method Used for Fluid Requirements:         Nutrition Related Findings:         Wounds:  Surgical Incision       Current Nutrition Therapies:    ADULT DIET; Regular    Anthropometric Measures:  · Height: 5' 6\" (167.6 cm)  · Current Body Weight: 175 lb (79.4 kg)   · Admission Body Weight: 179 lb (81.2 kg)    · Usual Body Weight: 183 lb (83 kg) (9/24/2021)     · Ideal Body Weight: 130 lbs; % Ideal Body Weight 134.6 %   · BMI: 28.3  · Adjusted Body Weight:  ; No Adjustment   · BMI Categories: Overweight (BMI 25.0-29. 9)       Nutrition Diagnosis:   · Increased nutrient needs related to increase demand for energy/nutrients as evidenced by wounds, weight loss greater than or equal to 2% in 1 week      Nutrition Interventions:   Food and/or Nutrient Delivery:  Continue Current Diet  Nutrition Education/Counseling:  No recommendation at this time   Coordination of Nutrition Care:  Continue to monitor while inpatient    Goals:  po intake 50% or greater.   Weight stable       Nutrition Monitoring and Evaluation:   Behavioral-Environmental Outcomes:  None Identified   Food/Nutrient Intake Outcomes:  Food and Nutrient Intake  Physical Signs/Symptoms Outcomes:  Biochemical Data, Weight, Skin, Nutrition Focused Physical Findings, Fluid Status or Edema     Discharge Planning:    Continue current diet     Electronically signed by Karel Berry MS, RD, LD on 10/6/21 at 3:00 PM CDT    Contact: [phone]

## 2021-10-06 NOTE — INTERVAL H&P NOTE
Update History & Physical    The patient's History and Physical of October 5, 2021 was reviewed with the patient and I examined the patient. There was no change. The surgical site was confirmed by the patient and me. Plan: The risks, benefits, expected outcome, and alternative to the recommended procedure have been discussed with the patient. Patient understands and wants to proceed with the procedure.      Electronically signed by Hernesto Stokes DO on 10/6/2021 at 10:20 AM

## 2021-10-06 NOTE — PROGRESS NOTES
Rhonda Meléndez arrived to room #334. Presented with: Infected port removal  Mental Status: Patient is alert. Vitals:    10/06/21 1630   BP: (!) 153/88   Pulse: 77   Resp: 16   Temp: 97.9 °F (36.6 °C)   SpO2: 96%     Patient safety contract and falls prevention contract reviewed with patient Yes. Oriented Patient to room. Call light within reach. Yes.   Needs, issues or concerns expressed at this time: no.      Electronically signed by Mamadou Aleman RN on 10/6/2021 at 6:16 PM

## 2021-10-07 PROCEDURE — 2580000003 HC RX 258: Performed by: NURSE PRACTITIONER

## 2021-10-07 PROCEDURE — 1210000000 HC MED SURG R&B

## 2021-10-07 PROCEDURE — 6370000000 HC RX 637 (ALT 250 FOR IP): Performed by: SURGERY

## 2021-10-07 PROCEDURE — 6370000000 HC RX 637 (ALT 250 FOR IP): Performed by: NURSE PRACTITIONER

## 2021-10-07 PROCEDURE — 6360000002 HC RX W HCPCS: Performed by: NURSE PRACTITIONER

## 2021-10-07 PROCEDURE — 99231 SBSQ HOSP IP/OBS SF/LOW 25: CPT | Performed by: INTERNAL MEDICINE

## 2021-10-07 RX ADMIN — PANTOPRAZOLE SODIUM 40 MG: 40 TABLET, DELAYED RELEASE ORAL at 09:27

## 2021-10-07 RX ADMIN — APIXABAN 5 MG: 5 TABLET, FILM COATED ORAL at 19:58

## 2021-10-07 RX ADMIN — MEROPENEM 1000 MG: 1 INJECTION, POWDER, FOR SOLUTION INTRAVENOUS at 02:10

## 2021-10-07 RX ADMIN — SODIUM HYPOCHLORITE: 1.25 SOLUTION TOPICAL at 10:44

## 2021-10-07 RX ADMIN — MEROPENEM 1000 MG: 1 INJECTION, POWDER, FOR SOLUTION INTRAVENOUS at 09:27

## 2021-10-07 RX ADMIN — MELOXICAM 15 MG: 7.5 TABLET ORAL at 09:26

## 2021-10-07 RX ADMIN — APIXABAN 5 MG: 5 TABLET, FILM COATED ORAL at 09:27

## 2021-10-07 RX ADMIN — FAMOTIDINE 20 MG: 20 TABLET ORAL at 09:27

## 2021-10-07 RX ADMIN — VANCOMYCIN HYDROCHLORIDE 1250 MG: 10 INJECTION, POWDER, LYOPHILIZED, FOR SOLUTION INTRAVENOUS at 02:10

## 2021-10-07 RX ADMIN — MEROPENEM 1000 MG: 1 INJECTION, POWDER, FOR SOLUTION INTRAVENOUS at 16:32

## 2021-10-07 RX ADMIN — SODIUM HYPOCHLORITE: 1.25 SOLUTION TOPICAL at 19:59

## 2021-10-07 RX ADMIN — VANCOMYCIN HYDROCHLORIDE 1250 MG: 10 INJECTION, POWDER, LYOPHILIZED, FOR SOLUTION INTRAVENOUS at 16:21

## 2021-10-07 RX ADMIN — HYDROCODONE BITARTRATE AND ACETAMINOPHEN 1 TABLET: 10; 325 TABLET ORAL at 09:26

## 2021-10-07 ASSESSMENT — PAIN DESCRIPTION - FREQUENCY: FREQUENCY: INTERMITTENT

## 2021-10-07 ASSESSMENT — PAIN SCALES - GENERAL
PAINLEVEL_OUTOF10: 6
PAINLEVEL_OUTOF10: 7

## 2021-10-07 ASSESSMENT — PAIN DESCRIPTION - ONSET: ONSET: ON-GOING

## 2021-10-07 ASSESSMENT — PAIN DESCRIPTION - PAIN TYPE: TYPE: SURGICAL PAIN

## 2021-10-07 ASSESSMENT — PAIN DESCRIPTION - LOCATION: LOCATION: CHEST

## 2021-10-07 ASSESSMENT — PAIN DESCRIPTION - ORIENTATION: ORIENTATION: LEFT

## 2021-10-07 ASSESSMENT — PAIN DESCRIPTION - PROGRESSION: CLINICAL_PROGRESSION: GRADUALLY IMPROVING

## 2021-10-07 ASSESSMENT — PAIN DESCRIPTION - DESCRIPTORS: DESCRIPTORS: ACHING

## 2021-10-07 ASSESSMENT — PAIN DESCRIPTION - DIRECTION: RADIATING_TOWARDS: GENERALIZED

## 2021-10-07 NOTE — PROGRESS NOTES
MEDICAL ONCOLOGY PROGRESS NOTE    Pt Name: Geo Gray  MRN: [id number]  YOB: 1965  Date of evaluation: 10/7/2021      Subjective: No new complaints morning. Afebrile. Denies any bleeding. HISTORY OF PRESENT ILLNESS:  Noel is a very pleasant 49years old female who has been treated for a diagnosed stage IV metastatic breast cancer. She developed acute DVT of the left upper extremity related to prior port placement. 9/26/2021-she underwent a venous mechanical thrombectomy by Dr. Kenna Miller. She was found to have significant left subclavian vein stenosis. The patient presented to the hospital with complaints of redness/tenderness of her port site and also fever 101.  10/6/2021-she underwent a left upper extremity venous mechanical thrombectomy. Mechanical thrombectomy of the area with good resolution of the thrombus. Plan is to proceed with port removal and subsequent balloon angioplasty with possible stenting.         Diagnosis  · Grade III Adenocarcinoma of right breast diagnosed 2006  · Stage IIIA, [id number]  · ER/WA Positive, HER-2 bruce/ihc 1+  · April 2013-RECURRENCE in the right axillary region  · Genetic testing- BRCA 1&2 Negative  · 2014 (?) RECURRENCE in the axillary skin  · 01/05/2017- METASTATIC DISEASE with lung, hilar, and axillary lymph node mets  · Osteopenia     Treatment Summary  · 2006- Neoadjuvant chemotherapy with AC x 4 cycles followed by Taxol  · 2007- Lumpectomy with axillary lymph node dissection  · 2007- Adjuvant radiation therapy to whole breast and axillary region  · Did not take tamoxifen due to cost  · April 2013- RECURRENCE  · Neoadjuvant chemotherapy with 2 cycles of Taxotere followed by Doxil  · 10/14/2013- Right Breast Mastectomy and Axillary Dissection  · 01/23/2014- Completion of adjuvant RT to chest wall and axillary lymph node with Dr. Dewey Gonsales  · 02/14- Initiated adjuvant endocrine therapy with tamoxifen  · 2014 (?) RECURRENCE in the axillary skin  · 2014 (?) Surgical resection of the axillary skin  · 2015 (?) YVETTE/BSO  · 09/19/2016- Bilateral exchange, nipple reconstruction and fat grafting and removal of PAC   · 2016- Switched to aromatase inhibitor  · 01/05/2017- METASTATIC DISEASE with lung, hilar, and axillary mets  · 01/11/2017-06/17/2018- Single agent Abraxane x 13 cycles  · 06/27/2018-09/17/2018- Gemzar  · 10/10/2018-Faslodex every 4 weeks/palbociclib     Cancer History:  Dom Crum was first seen by me on 1/21/2021. Sterling Mccabe was referred to establish continued of care for history of recurrent metastatic breast cancer. Sterling Mccabe has been in remission as per the latest CT scan. Sterling Mccabe is currently on Faslodex and palbociclib.  She has received several lines therapy as described below.  She moved from [place] to [place] to stay closer to her parents. RHEA [facility] medical history is complex.  Further details as below. · 2006- Neoadjuvant chemotherapy with AC x 4 cycles followed by [facility] was complicated by viral meningitis; patient experienced neuropathy after 3 doses of Taxol and was switched to Taxotere for last dose  · 2007- Lumpectomy with axillary lymph node dissection 1.9cm, grade 2. No LVI. 1 of 14 lymph nodes positive for malignancy (1/14) [id number]  · 2007- Adjuvant Radiation Therapy to whole breast and axillary region  · April 2013- RECURRENCE presenting as mass in the axillary region  · Neoadjuvant chemotherapy with 2 cycles of Taxotere followed by Doxil- did not have good response to treatment  · 2013- Preop PET/CTshowed 2.8cm, right axillary lymph node with uptake. · 10/14/2013- Right Breast Mastectomy and Axillary Dissection Right breast had benign tissue with fibrous, negative for carcinoma. Axillary contents demonstrated invasive ductal carcinoma, involving fibroadipose tissue and tendinous tissue. 3 benign lymph nodes (0/3). [place] grade 3. 3.0cm in greatest gross dimensions.  Carcinoma permeates among soft tissues and in the right axilla with no apparent involvement of lymph nodes. · 01/23/2014- Completion of adjuvant radiation therapy to chest wall and axillary lymph node with Dr. Keysha Boucher  · 02/14- Initiated adjuvant endocrine therapy with tamoxifen  · 2014 (?) RECURRENCE in the axillary skin  · 2014 (?) Surgical resection of the axillary skin pathology demonstrating tumor at cauterized margin  · 2015 (?) YVETTE/BSO  · 09/19/2016- Bilateral exchange, nipple reconstruction and fat grafting and removal of PAC  · 2016- Switched to aromatase inhibitor patient was non compliant  · 01/05/2017- METASTATIC DISEASE Presented with respiratory failure. CTA Pulmonary showed numerous nodules and masses throughout both lung fields, compatible with metastatic disease. Bilateral hilar adenopathy seen. · 01/11/2017-06/17/2018- Single agent Abraxane x 13 cycles  · 03/15/2017- CT Chest W Contrast Interval decrease in maximal diameter of essentially all the multiple metastatic pulmonary nodules. The average difference is approximately 25%. Some of the much smaller ones have disappeared. Bilateral hilar and aortopulmonary window adenopathy is also similarly smaller. · 06/07/2017- CT Chest W Contrast Multiple lung nodules seen bilaterally. Most of the lung nodules show interval improvement with decreased size by 1 to 3mm. Mediastinal and bilateral hilar adenopathy seen with improvement. · 07/24/2017- PET/CT scan showed marked improvement, is minimal abnormal, has excellent response to therapy  · 02/01/2018- PET/CT scan Numerous bilateral lung masses and nodules, the majority of which do not have appreciable abnormal FDG uptake. The largest mass in the left infrahilar region of the left lower lobe demonstrates a maximum SUV of 3.3. Mediastinal lymphadenopathy. No evidence of metastatic disease in the abdomen or pelvis. · 06/21/2018- CT Chest/Abdomen/Pelvis Multiple lung mets, mild bilateral hilar and mediastinal lymph nodes.  No masses or evidence of metastatic disease in the abdomen or pelvis  · 06/27/2018-09/17/2018- Gemzar  · 09/28/2018- CT Pulmonary Multiple lung mets, mild bilateral hilar and mediastinal lymph nodes  · 10/10/2018-12/21/2020- Faslodex every 4 weeks  · 12/14/2018- CT Chest showed definite decrease in the maximal diameter and volume of all left and right metastatic nodules. Somewhat enlarged paratracheal and left axillary lymph nodes are relatively unchanged. · 04/22/2019- MRI Cervical Spine W WO Contrast Unremarkable  · 04/22/2019- MRI Brain W WO Contrast No enhancing lesions in the brain and no evidence of metastatic disease. · 07/27/2019- CTA Pulmonary showed essentially complete disappearance of the pulmonary metastatic disease. Several tiny flat peripheral nodules are the only sequela. The tiny ones on the right are unchanged, the tiny ones on the left are even smaller. There is no longer any active metastatic disease in either lung. · 12/19/2019- CT Chest/Abdomen/Pelvis Small, tiny subpleural nodules right upper and right midlung field as well as left lung base has remained unchanged. No new focal parenchymal abnormality seen. No adenopathy seen. There is no abdominal or pelvic mass, adenopathy or fluid collection. No lytic or sclerotic bony lesions, but there is a possibility of osteopenia and/or osteoporosis. · 02/17/2020- DEXA Bone Density showed osteopenia of lumbar spine, T-score -1.8, and left femoral neck, T-score -2.0  · 2/25/21 Ct Abdomen Pelvis W Iv Contrast  No evidence of metastatic disease in the abdomen or pelvis. · 2/26/21 Ct Chest W Contrast Tiny nodules in the right lung described above probably represent small foci of pleural thickening due to chronic inflammatory process or small noncalcified granulomas. No features to suggest malignancy or metastatic disease. Bilateral breast implants without complication.    · 3/1/2021-discussed the results CT chest abdomen pelvis.  Essentially no clear evidence of metastatic disease.  This is consistent with excellent response to treatment.  Continue current treatment. · 4/1/2021 = 31.6 CA 27-29= 33.5 CEA= 1.6   · 6/3/21 CA 15-3= 23.3 CA 27-29= 25.6  CEA= 1.8   · 7/29/2021 CEA=1.7 CA 15-3=21.50 CA 27.29= 26.0  · 8/19/2021 CT Chest  Left lower lobe pleural-based nodular 1.7 x 0.9 cm is indeterminate. PET/CT recommended. Feeding defect in the left lower lobe bronchus versus a postobstructive airspace disease. This too may be further characterized with PET CT versus direct visualization. · 8/19/2021 CT Abd/Pelvis A stable CT scan of the abdomen and pelvis.  No finding to suggest neoplastic/metastatic disease.     CA 27.29 Dynamics  01/21/2021- 20.3  04/01/2021 33.5  06/03/2021 25.6  07/29/2021 26.0       Past Medical History:    Past Medical History:   Diagnosis Date    Breast cancer ([facility] [address]     Breast cancer with lung mets (RIGHT)    Chronic back pain     GERD (gastroesophageal reflux disease)     Hx of blood clots     Hypertension     Neuropathy     Post chemo treatment neuropathy       Past Surgical History:    Past Surgical History:   Procedure Laterality Date    BREAST LUMPECTOMY Right     2006    BREAST RECONSTRUCTION Left 09/17/2021    Revision left reconstructed breast, implant performed by Jalen Dodd MD at [address] [place] [address]  2016    Bilateral breast implants    EMBOLECTOMY Left 9/26/2021    LEFT UPPER EXTREMITY  MECHANICAL SUCTION THROMBECTOMY performed by Mar Gutierrez DO at [place] [address], TOTAL ABDOMINAL  2015    MASTECTOMY, BILATERAL      Right 2013 & Left 2016    TUNNELED VENOUS PORT PLACEMENT      VASCULAR SURGERY N/A 10/6/2021    REMOVAL OF MEDIPORT performed by Mar [facility]rez DO [facility] OR       Social History:    Smoking status: No  ETOH status: No  Resides: [place], [place]    Family History:   Family History   Problem Relation Age of Onset    Hypertension Mother     Obesity Mother     Prostate Cancer Father     No Known Problems Sister     No Known Problems Daughter     No Known Problems Daughter        Current Hospital Medications:    Current Facility-Administered Medications   Medication Dose Route Frequency Provider Last Rate Last Admin    apixaban (ELIQUIS) tablet 5 mg  5 mg Oral BID Gabby Fruit, DO   5 mg at 10/06/21 2120    famotidine (PEPCID) tablet 20 mg  20 mg Oral Daily Gabby Fruit, DO   20 mg at 10/06/21 1708    HYDROcodone-acetaminophen (NORCO)  MG per tablet 1 tablet  1 tablet Oral Q6H PRN Gabby Fruit, DO   1 tablet at 10/06/21 1715    lisinopril (PRINIVIL;ZESTRIL) tablet 10 mg  10 mg Oral Daily Gabby Fruit, DO   10 mg at 10/06/21 1709    meloxicam (MOBIC) tablet 15 mg  15 mg Oral Daily [facility], DO        pantoprazole (PROTONIX) tablet 40 mg  40 mg Oral Daily [facility], DO   40 mg at 10/06/21 1709    sodium hypochlorite (DAKINS) 0.125 % external solution   Irrigation BID Gelacio Lather, APRN        vancomycin (VANCOCIN) 1,250 mg in dextrose 5 % 250 mL IVPB  1,250 mg IntraVENous Q12H Gelacio Lather, APRN   Stopped at 10/07/21 0340    meropenem (MERREM) 1,000 mg in sterile water 20 mL IV syringe  1,000 mg IntraVENous Q8H Gelacio Lather, APRN   1,000 mg at 10/07/21 0210    vancomycin (VANCOCIN) intermittent dosing (placeholder)   Other RX Placeholder Gelacio Lather, APRN        palbociclib [place]) tablet TABS 125 mg  125 mg Oral Daily Jalen Blackmon MD   125 mg at 10/06/21 2120       Allergies:    Allergies   Allergen Reactions    Clindamycin/Lincomycin Hives, Itching and Rash         Objective   BP (!) 146/84   Pulse 82   Temp 97.5 °F (36.4 °C) (Temporal)   Resp 18   Ht 5' 6\" (1.676 m)   Wt 175 lb (79.4 kg)   SpO2 96%   BMI 28.25 kg/m²     PHYSICAL EXAM:  CONSTITUTIONAL: Alert, appropriate, no acute distress  EYES: Non icteric, EOM intact, pupils equal round   ENT: Mucus membranes moist, no oral pharyngeal lesions, external inspection of ears and nose are normal  NECK: Supple, no masses. No palpable thyroid mass  CHEST/LUNGS: CTA bilaterally, normal respiratory effort   CARDIOVASCULAR: RRR, no murmurs. No lower extremity edema  ABDOMEN: soft non-tender, active bowel sounds, no HSM. No palpable masses  EXTREMITIES: warm, full ROM in all 4 extremities, no focal weakness. LYMPH: No cervical, clavicular, axillary, or inguinal lymphadenopathy  NEUROLOGIC: follows commands, non focal   PSYCH: mood and affect appropriate. Alert and oriented to time, place, person        LABORATORY RESULTS REVIEWED/ANALYZED BY ME:  Recent Labs     10/05/21  1554 09/27/21  0601 09/26/21  0324   WBC 6.5 7.5 4.1*   HGB 11.2* 9.6* 9.6*   HCT 34.5* 30.2* 30.5*   .9* 113.5* 111.7*   * 224 206       Lab Results   Component Value Date     10/05/2021    K 3.9 10/05/2021     10/05/2021    CO2 20 (L) 10/05/2021    BUN 13 10/05/2021    CREATININE 0.7 10/05/2021    GLUCOSE 84 10/05/2021    CALCIUM 9.3 10/05/2021    PROT 7.1 09/24/2021    LABALBU 4.1 09/24/2021    BILITOT 0.7 09/24/2021    ALKPHOS 99 09/24/2021    AST 22 09/24/2021    ALT 29 09/24/2021    LABGLOM >60 10/05/2021    GFRAA >59 10/05/2021    AGRATIO 1.6 04/01/2021    GLOB 3.3 08/27/2021       Lab Results   Component Value Date    INR 1.20 (H) 10/06/2021    INR 1.23 (H) 10/05/2021    INR 1.02 09/24/2021    PROTIME 15.4 (H) 10/06/2021    PROTIME 15.7 (H) 10/05/2021    PROTIME 13.6 09/24/2021       RADIOLOGY STUDIES REPORT/REVIEWED AND INTERPRETED BY ME:  XR CHEST (2 VW)    Result Date: 10/5/2021  XR CHEST (2 VW) 10/5/2021 4:04 PM History: Preop vascular surgery procedure. Two-view chest x-ray. Comparison is made with September 24, 2021. Heart size is within normal limits. The mediastinum has a normal appearance. The lungs are adequately expanded with no pneumonia or pneumothorax. There is mild chronic interstitial disease with scattered calcified granulomas.  There is no significant pleural fluid. No congestive failure changes. Unchanged left chest wall port. 1. No acute disease. Signed by Dr Beba Crooks    Result Date: 9/30/2021  EXAM: CT CHEST W CONTRAST -- 9/30/2021 8:52 AM HISTORY: 49 years, Female, lung nodule, breast cancer COMPARISON: 8/19/2021 DLP: 543 mGy cm. Automated exposure control was utilized to minimize patient radiation dose. TECHNIQUE: Unenhanced CT images obtained with multiplanar reformats. FINDINGS: AIRWAYS/PULMONARY PARENCHYMA: The central airways are midline and patent. No convincing small airway filling defect on today's exam at the left lower lobe. Redemonstration 16 x 7 mm pulmonary nodule at the medial aspect left lower lobe on axial series 3, image 69, previously reported as 9 x 17. On today's exam, this appears less rounded. No new suspicious pulmonary finding. No pleural effusion. No pneumothorax. VASCULATURE: Limited assessment of vasculature without intravenous contrast. Thoracic aorta is normal in course and caliber. Normal pulmonary artery caliber. Left jugular vein stent. CARDIAC:  Cardiac size is normal.  No pericardial effusion. Scattered coronary artery calcifications. MEDIASTINUM: There is no mediastinal or hilar lymphadenopathy by CT size criteria. Esophagus has normal coarse, caliber and wall thickness. EXTRATHORACIC: The visualized portions of the thyroid gland are unremarkable. No thoracic inlet or axillary adenopathy. Port at the left chest wall, catheter tip in the lower SVC. Bilateral breast implants. Left chest wall collaterals suggests chronic left subclavian vein stenosis. INCLUDED UPPER ABDOMEN: Visualized portion of the liver, gallbladder, pancreas, spleen, adrenal glands and upper kidneys appear within normal limits. OSSEOUS: Normal     1. Similar left lower lobe pleural-based nodule or opacity. This appears less rounded than prior.  2. Left chest wall collaterals suggest chronic left subclavian vein stenosis. 3. Coronary artery calcifications. Signed by Dr Modesto Barraza    VL EXTREMITY VENOUS LEFT    Result Date: 10/6/2021  Vascular Upper Extremities Veins Procedure  Demographics   Patient Name    Evelyne Curiel Age                  49   Patient Number  [id number]          Gender               Female   Visit Number    [id number]       Interpreting         Barbra Pacheco                                  Physician   Date of Birth   1965      Referring Physician  Majo Solo   Accession       [id number]      [address], RVT  Number  Procedure Type of Study:   Veins:Upper Extremities Veins, US Doppler Venous Upper Extremity  Unilateral.  Indications for Study:F/U DVT left upper extremity. Impression   Chronic appearing deep vein thrombosis (DVT) is seen in the internal  jugular vein brachial , left upper extremity(ies). Subacute appearing superficial thrombophlebitis (SVT) is seen in the  basilic vein, left upper extremity(ies). VL Extremity Venous Left    Result Date: 9/24/2021  Vascular Upper Extremities Veins Procedure  Demographics   Patient Name    Evelyne Curiel Age                   49   Patient Number  [id number]          Gender                Female   Visit Number    [id number]       Interpreting          Barbra Pacheco                                  Physician   Date of Birth   1965      Referring Physician   Gurdeep Jimenez   Accession       [id number]      [address], RVT  Number  Procedure Type of Study:   Veins:Upper Extremities Veins, UE VENOUS UNILATERAL/FLU. Indications for Study:Arm swelling and Arm pain. Impression   Acute appearing deep vein thrombosis (DVT) is seen in the internal jugular  vein subclavian axillary brachial ulnar , left upper extremity(ies).   Superficial venous thrombosis is visualized in the basilic and cephalic  vein of the left upper extremity. XR CHEST PORTABLE    Result Date: 9/24/2021  XR CHEST PORTABLE 9/24/2021 8:00 PM HISTORY: Chest pain COMPARISON: 7/28/2019 CXR: A single frontal view of the chest is performed. Findings: Lungs are hypoventilated. Bilateral breast implants with associated soft tissue attenuation. No pulmonary consolidation or edema. Cardiac and mediastinal contours normal. No pleural effusion or pneumothorax. Left subclavian port in place with tip overlying the SVC. Right axillary surgical clips. 1. Bilateral breast implants. No acute pulmonary process identified. Left subclavian port. Signed by Dr Burks Pulse      ASSESSMENT:    #Port related infection  S/p Mediport removal per Dr. Dewayne Garcia 10/06/21. Continue Vancomycin  Port tip culture-in process    #Acute left upper extremity DVT/left upper chest wall port      A noninvasive venous study of the left upper extremity documented the following:  · Acute appearing deep vein thrombosis (DVT) is seen in the internal jugular vein subclavian axillary brachial ulnar , left upper extremity(ies). · Superficial venous thrombosis is visualized in the basilic and cephalic vein of the left upper extremity.     She was admitted and placed on IV heparin drip, currently receiving Eliquis     A preliminary prothrombotic work-up with antiphospholipid syndrome testing will be initiated, further serological work-up will be done in outpatient setting under the direction of Dr. Demian Schwartznis underwent partial left upper extremity mechanical suction thrombectomy 9/26/2021 by Dr. Suyapa Stoll     She is currently treated with Eliquis 10 mg po BID.  10/6/2021-she underwent a left upper extremity venous mechanical thrombectomy. Mechanical thrombectomy of the area with good resolution of the thrombus. S/p Mediport removal per Dr. Dewayne Garica 10/06/21.    Resume Eliquis    Stage IV Metastatic breast cancer, hormone sensitive  2/25/2021-discussed the results of CT chest abdomen pelvis.  No evidence of disease progression.  In fact, excellent response with no measurable metastatic disease at this time. 1/21/2021-CA 15-3 = 23, CA 27-29 = 20.3. Currently Faslodex/Ibrance. 6/3/2021-CA 15-3-23, CA 27-29-25, CEA 1.8  Regimen:  Faslodex to 500 mg subcutaneous every 4 weeks. Continue Ibrance days 1-21 q. 28 days  8/19/2021-CT chest/abdomen/pelvis-no evidence of metastatic disease.  Findings of a pulmonary lung nodule in the left lower lobe   Essentially, good response to Faslodex/Ibrance. Last cancer marker 7/29/2021 were normal.     B12 deficiency   B12 levels 189.    B12 injections were initiated 9/26/2021 (1000 mcg) with Faslodex. 3/1/2021- Methylmalonic Acid 171, Vitamin B12 >2000.   Defer to outpatient      Left lower lobe lung nodule  She is currently being [facility] by pulmonary [facility]. They believe that this could be inflammatory. She received a trial of antibiotics and will have a repeat CT scan with them. She is a scheduled for a repeat CT scan January 2022    GERD  Possible decreased efficacy of Ibrance when taken with Protonix  Patient states GERD is unrelieved with Protonix  Rx Pepcid sent to [facility] [address]:  Continue vancomycin  Resume Ibrance and Eliquis    I have seen, examined and reviewed this patient medication list, appropriate labs and imaging studies. I reviewed relevant medical records and others physicians notes. I discussed the plans of care with the patient. I answered all the questions to the patients satisfaction. I have also reviewed the chief complaint (CC) and part of the history (History of Present Illness (HPI), Past Family Social History [facility]), or Review of Systems (ROS) and made changes when appropriated.        (Please note that portions of this note were completed with a voice recognition program. Efforts were made to edit the dictations but occasionally words are mis-transcribed.)        Lisandra Mckenna, MD    10/07/21  6:59 AM

## 2021-10-07 NOTE — CONSULTS
CONSULTATION NOTE :ID       Patient - Collins Tello,  Age - 49 y.o.    - 1965      Room Number - 3892/531-43   N -  [id number]   Acct # - [de-identified]  Date of Admission -  10/6/2021  9:06 AM  Patient's PCP: Gustavo Finley MD     Requesting Physician: Mohsen Lama DO    REASON FOR CONSULTATION     Infected port  HISTORY OF PRESENT ILLNESS       This is a very pleasant 49 y.o. female who was admitted to the hospital with a chief complaints of Patient reports she was recently hospitalized here at [facility]. She was discharged on the . She notes when her port was deaccessed she had quite a bit of white drainage. She said there were 2 nurses in the room it was unclear the origin of the drainage. Patient said she was concerned there was possibility of rupture of one of her implants. She reports after discharge she followed up with someone as an outpatient who evaluated the area and did not think that there was any concern for her implant. She then saw Tracy Rubio PA-C from vascular and there were concerns for infection of the port and she was scheduled for removal which occurred .       PAST MEDICAL AND SURGICAL HISTORY       Past Medical History:   Diagnosis Date    Breast cancer ([address]     Breast cancer with lung mets (RIGHT)    Chronic back pain     GERD (gastroesophageal reflux disease)     Hx of blood clots     Hypertension     Neuropathy     Post chemo treatment neuropathy       Past Surgical History:   Procedure Laterality Date    BREAST LUMPECTOMY Right     2006    BREAST RECONSTRUCTION Left 2021    Revision left reconstructed breast, implant performed by Jay Pina MD at [address] [place] [address]  2016    Bilateral breast implants    EMBOLECTOMY Left 2021    LEFT UPPER EXTREMITY  MECHANICAL SUCTION THROMBECTOMY performed by Mohsen Lama DO at [place] [address], TOTAL ABDOMINAL  2015  MASTECTOMY, BILATERAL      Right  & Left 2016    TUNNELED VENOUS PORT PLACEMENT      VASCULAR SURGERY N/A 10/6/2021    REMOVAL OF MEDIP[facility] [address] [facility] :       Scheduled Meds:   apixaban  5 mg Oral BID    famotidine  20 mg Oral Daily    lisinopril  10 mg Oral Daily    meloxicam  15 mg Oral Daily    pantoprazole  40 mg Oral Daily    sodium hypochlorite   Irrigation BID    vancomycin  1,250 mg IntraVENous Q12H    meropenem (MERREM) 1000 mg in SWFI 20 mL IV syringe  1,000 mg IntraVENous Q8H    vancomycin (VANCOCIN) intermittent dosing (placeholder)   Other RX Placeholder    palbociclib  125 mg Oral Daily     Continuous Infusions:  PRN Meds:HYDROcodone-acetaminophen    ALLERGIES:      Clindamycin/lincomycin      SOCIAL HISTORY:     TOBACCO:   reports that she has never smoked. She has never used smokeless tobacco.     ETOH:   reports previous alcohol use. FAMILY HISTORY:         Problem Relation Age of Onset   Romario Gallegos Hypertension Mother     Obesity Mother     Prostate Cancer Father     No Known Problems Sister     No Known Problems Daughter     No Known Problems Daughter        REVIEW OF SYSTEMS:     Constitutional: Fever  Ears: no hearing difficulty  Mouth/throat: no dysphagia  Lungs: Dry cough    CVS: Musculoskeletal postoperative chest pain  GI: no abdominal pain  PATRICK: no dysuria  Musculoskeletal: Bilateral upper arm and neck tightness when elevating her arms. She thinks this began before her port was removed.   Neuro: no headache  Endocrine: no polyuria, polydipsia  Hematology: anemia, clot left upper arm-DVT  Dermatology: Erythema and drainage from port site    PHYSICAL EXAM:     BP (!) 146/84   Pulse 82   Temp 97.5 °F (36.4 °C) (Temporal)   Resp 18   Ht 5' 6\" (1.676 m)   Wt 175 lb (79.4 kg)   SpO2 96%   BMI 28.25 kg/m²  Temp (24hrs), Av.8 °F (37.1 °C), Min:96.1 °F (35.6 °C), Max:99.7 °F (37.6 °C)    General: Patient is nontoxic-appearing lying in bed in no acute distress  HEENT: Sclera anicteric noninjected. She located her mask in place over her nose and mouth at my request  Neck: Some bilateral trapezius spasm bilaterally  Chest: Large dressing in place over the left subclavian region  Extremities: Left upper extremity edema when compared to the right  Heart: Only able to assess right side of her chest due to dressing but right was normal in the 80s and rhythm was regular  Respiratory: Effort even and unlabored. She is not conversationally dyspneic  Neuro: Alert, oriented, speech clear, gave history for difficulty  Psych: Pleasant and very cooperative           LABS:     CBC with DIFF:   Recent Labs     10/05/21  1554   WBC 6.5   RBC 3.14*   HGB 11.2*   HCT 34.5*   .9*   MCH 35.7*   MCHC 32.5*   RDW 13.9   *   MPV 8.9*       CMP/BMP:  Recent Labs     10/05/21  1554      K 3.9      CO2 20*   ANIONGAP 16   GLUCOSE 84   BUN 13   CREATININE 0.7   LABGLOM >60   CALCIUM 9.3          Culture: No results for input(s): BC, BLOODCULT2, ORG in the last 72 hours. IMAGING:   XR CHEST (2 VW)    Result Date: 10/5/2021  XR CHEST (2 VW) 10/5/2021 4:04 PM History: Preop vascular surgery procedure. Two-view chest x-ray. Comparison is made with September 24, 2021. Heart size is within normal limits. The mediastinum has a normal appearance. The lungs are adequately expanded with no pneumonia or pneumothorax. There is mild chronic interstitial disease with scattered calcified granulomas. There is no significant pleural fluid. No congestive failure changes. Unchanged left chest wall port. 1. No acute disease.  Signed by Dr Natasha Glass    VL EXTREMITY VENOUS LEFT    Result Date: 10/6/2021  Vascular Upper Extremities Veins Procedure  Demographics   Patient Name    Margaret Dickens Age                  49   Patient Number  [id number] Gender               Female   Visit Number    [id number]       Interpreting         Quentin Gomez                                  Physician   Date of Birth   1965      Referring Physician  Judge Hardins   Accession       [id number]      Mateo 876 RT, RVT  Number  Procedure Type of Study:   Veins:Upper Extremities Veins, US Doppler Venous Upper Extremity  Unilateral.  Indications for Study:F/U DVT left upper extremity. Impression   Chronic appearing deep vein thrombosis (DVT) is seen in the internal  jugular vein brachial , left upper extremity(ies). Subacute appearing superficial thrombophlebitis (SVT) is seen in the  basilic vein, left upper extremity(ies). Signature   ----------------------------------------------------------------  Electronically signed by Marcela Andrea(Interpreting  physician) on 10/06/2021 08:14 AM  ----------------------------------------------------------------  Velocities are measured in cm/s ;     ASSESSMENT AND PLAN     Patient Active Problem List   Diagnosis    Carcinoma of female breast ([address]    Poor venous access    Iron deficiency anemia    Menopausal symptom    Benign neoplasm of body of uterus    Obesity (BMI 30-39. 9)    Dyspnea and respiratory abnormalities    Chronic pain syndrome    Right wrist pain    Rheumatoid arthritis involving both knees with negative rheumatoid factor (HCC)    Other cyst of bone, left ankle and foot    Status post bilateral mastectomy    Acute purulent bronchitis    Lung nodule    Status post bilateral breast reconstruction    S/P revision of left breast reconstruction 9/17/2021    Acute deep vein thrombosis (DVT) of left upper extremity (HCC)    Exostosis of left foot    Central line infection    Port or reservoir infection     Left subclavian port infection and patient with left upper extremity DVT. Patient is status post removal of port October 6. She is on vancomycin and meropenem. Gram stain unfortunately without any organisms. Unclear if patient had some issue prior to discharge last time she reports drainage of white fluid prior to discharge when she is describing perhaps deaccessing of her port. We will follow with you.   Thank you Hernesto Stokes DO for allowing me to participate in this patient's care  Electronically signed by Nilson Kendall MD on 10/7/2021 at 7:31 AM

## 2021-10-07 NOTE — PLAN OF CARE
Problem: Pain:  Goal: Pain level will decrease  Description: Pain level will decrease  10/7/2021 1021 by Gisel wKong RN  Outcome: Ongoing  10/7/2021 0445 by Saud Gabriel RN  Outcome: Ongoing  Goal: Control of acute pain  Description: Control of acute pain  10/7/2021 1021 by Gisel Kwong RN  Outcome: Ongoing  10/7/2021 0445 by Saud Gabriel RN  Outcome: Ongoing  Goal: Control of chronic pain  Description: Control of chronic pain  10/7/2021 1021 by Gisel Kwong RN  Outcome: Ongoing  10/7/2021 0445 by Saud Gabriel RN  Outcome: Ongoing  Goal: Patient's pain/discomfort is manageable  Description: Patient's pain/discomfort is manageable  10/7/2021 1021 by Gisel Kwong RN  Outcome: Ongoing  10/7/2021 0445 by Saud Gabriel RN  Outcome: Ongoing     Problem: Falls - Risk of:  Goal: Will remain free from falls  Description: Will remain free from falls  10/7/2021 1021 by Gisel Kwong RN  Outcome: Ongoing  10/7/2021 0445 by Saud Gabriel RN  Outcome: Ongoing  Goal: Absence of physical injury  Description: Absence of physical injury  10/7/2021 1021 by Gisel Kwong RN  Outcome: Ongoing  10/7/2021 0445 by Saud Gabriel RN  Outcome: Ongoing     Problem: Infection:  Goal: Will remain free from infection  Description: Will remain free from infection  10/7/2021 1021 by Gisel Kwong RN  Outcome: Ongoing  10/7/2021 0445 by Saud Gabriel RN  Outcome: Ongoing     Problem: Safety:  Goal: Free from accidental physical injury  Description: Free from accidental physical injury  10/7/2021 1021 by Gisel Kwong RN  Outcome: Ongoing  10/7/2021 0445 by Saud Gabriel RN  Outcome: Ongoing  Goal: Free from intentional harm  Description: Free from intentional harm  10/7/2021 1021 by Gisel Kwong RN  Outcome: Ongoing  10/7/2021 0445 by Saud Gabriel RN  Outcome: Ongoing     Problem: Daily Care:  Goal: Daily care needs are met  Description: Daily care needs are met  10/7/2021 1021 by Gisel Kwong RN  Outcome: Ongoing  10/7/2021 0099 by Arthur Burns RN  Outcome: Ongoing     Problem: Skin Integrity:  Goal: Skin integrity will stabilize  Description: Skin integrity will stabilize  10/7/2021 1021 by Fermin Sutton RN  Outcome: Ongoing  10/7/2021 0445 by Arthur Burns RN  Outcome: Ongoing     Problem: Discharge Planning:  Goal: Patients continuum of care needs are met  Description: Patients continuum of care needs are met  10/7/2021 1021 by Fermin Sutton RN  Outcome: Ongoing  10/7/2021 0445 by Arthur Burns RN  Outcome: Ongoing     Problem: ABCDS Injury Assessment  Goal: Absence of physical injury  Outcome: Ongoing

## 2021-10-07 NOTE — PROGRESS NOTES
Comprehensive Nutrition Assessment    Type and Reason for Visit:  Reassess    Nutrition Recommendations/Plan: continue current POC    Nutrition Assessment:  Remains adequately nourisihed. PO intake is good with intake at breakfast this a.m. at 50-75%. No preferences voiced  \"I like most everything. \"    Malnutrition Assessment:  Malnutrition Status:  No malnutrition    Context:  Acute Illness     Findings of the 6 clinical characteristics of malnutrition:  Energy Intake:  Mild decrease in energy intake (Comment)  Weight Loss:  7 - Greater than 2% over 1 week     Body Fat Loss:  No significant body fat loss     Muscle Mass Loss:  No significant muscle mass loss    Fluid Accumulation:  No significant fluid accumulation Extremities   Strength:  Not Performed    Nutrition Related Findings:  well nourished      Wounds:  Surgical Incision       Current Nutrition Therapies:    ADULT DIET; Regular    Anthropometric Measures:  · Height: 5' 6\" (167.6 cm)  · Current Body Weight: 175 lb (79.4 kg)   · Admission Body Weight: 179 lb (81.2 kg)    · Usual Body Weight: 183 lb (83 kg) (9/24/2021)     · Ideal Body Weight: 130 lbs; % Ideal Body Weight 134.6 %   · BMI: 28.3  · Adjusted Body Weight:  ; No Adjustment   · BMI Categories: Overweight (BMI 25.0-29. 9)       Nutrition Diagnosis:   · Increased nutrient needs related to increase demand for energy/nutrients as evidenced by wounds, weight loss greater than or equal to 2% in 1 week      Nutrition Interventions:   Food and/or Nutrient Delivery:  Continue Current Diet  Nutrition Education/Counseling:  No recommendation at this time   Coordination of Nutrition Care:  Continue to monitor while inpatient    Goals:  po intake 50% or greater.   Weight stable       Nutrition Monitoring and Evaluation:   Behavioral-Environmental Outcomes:  None Identified   Food/Nutrient Intake Outcomes:  Food and Nutrient Intake  Physical Signs/Symptoms Outcomes:  Biochemical Data, Weight, Skin, Nutrition Focused Physical Findings, Fluid Status or Edema     Discharge Planning:    Continue current diet     Electronically signed by Carletta Cowden, MS, RD, LD on 10/7/21 at 12:51 PM CDT    Contact: [phone]

## 2021-10-07 NOTE — PROGRESS NOTES
Vascular Surgery  Dr. Tatyana Tejeda   Daily Progress Note    Pt Name: Mohit Valente Record Number: [id number]  Date of Birth 1965   Today's Date: 10/7/2021    SUBJECTIVE:     Patient was seen and examined, stating she feels okay today, left arm feels better today, not as tight. Would like a pain pill prior to dressing change this am    OBJECTIVE:     Patient Vitals for the past 24 hrs:   BP Temp Temp src Pulse Resp SpO2 Height Weight   10/07/21 0301 (!) 146/84 97.5 °F (36.4 °C) Temporal 82 18 96 % -- --   10/06/21 1846 134/82 97.7 °F (36.5 °C) Temporal 88 18 97 % -- --   10/06/21 1630 (!) 153/88 97.9 °F (36.6 °C) -- 77 16 96 % -- --   10/06/21 1456 -- -- -- -- -- -- 5' 6\" (1.676 m) --   10/06/21 1430 (!) 150/78 97.7 °F (36.5 °C) -- 84 18 -- -- --   10/06/21 1400 (!) 154/80 96.6 °F (35.9 °C) -- 78 16 96 % -- --   10/06/21 1345 (!) 142/86 98.9 °F (37.2 °C) Temporal 84 12 93 % -- --   10/06/21 1340 (!) 126/98 -- -- 84 19 95 % -- --   10/06/21 1335 (!) 119/97 -- -- 83 15 97 % -- --   10/06/21 1330 (!) 141/90 -- -- 88 20 97 % -- --   10/06/21 1325 (!) 141/90 -- -- 86 16 95 % -- --   10/06/21 1320 130/89 -- -- 87 16 97 % -- --   10/06/21 1315 137/77 -- -- 91 24 94 % -- --   10/06/21 1310 134/84 -- -- 91 14 (!) 88 % -- --   10/06/21 1305 133/77 -- -- 89 13 93 % -- --   10/06/21 1302 -- 98.4 °F (36.9 °C) -- -- -- -- -- --   10/06/21 1301 (!) 145/85 98.4 °F (36.9 °C) Temporal 94 17 92 % -- --   10/06/21 0943 115/78 98.3 °F (36.8 °C) Tympanic 96 16 100 % 5' 6\" (1.676 m) 175 lb (79.4 kg)       Intake/Output Summary (Last 24 hours) at 10/7/2021 0730  Last data filed at 10/7/2021 0444  Gross per 24 hour   Intake 600 ml   Output 815 ml   Net -215 ml     In: -   Out: 600 [Urine:600]    I/O last 3 completed shifts:   In: 600 [I.V.:600]  Out: 815 [Urine:800; Blood:15]     Date 10/07/21 0000 - 10/07/21 2359   Shift 6546-8330 3217-5708 3496-5720 24 Hour Total   INTAKE   Shift Total(mL/kg)       OUTPUT Urine(mL/kg/hr) 300   300   Shift Total(mL/kg) 300(3.8)   300(3.8)   Weight (kg) 79.4 79.4 79.4 79.4     Wt Readings from Last 3 Encounters:   10/06/21 175 lb (79.4 kg)   10/05/21 178 lb (80.7 kg)   10/05/21 175 lb (79.4 kg)      Body mass index is 28.25 kg/m². Diet: ADULT DIET; Regular    MEDS:     Scheduled Meds:   apixaban  5 mg Oral BID    famotidine  20 mg Oral Daily    lisinopril  10 mg Oral Daily    meloxicam  15 mg Oral Daily    pantoprazole  40 mg Oral Daily    sodium hypochlorite   Irrigation BID    vancomycin  1,250 mg IntraVENous Q12H    meropenem (MERREM) 1000 mg in SWFI 20 mL IV syringe  1,000 mg IntraVENous Q8H    vancomycin (VANCOCIN) intermittent dosing (placeholder)   Other RX Placeholder    palbociclib  125 mg Oral Daily     Continuous Infusions:   PRN Meds:HYDROcodone-acetaminophen, 1 tablet, Q6H PRN      PHYSICAL EXAM:     CONSTITUTIONAL: Alert and oriented times 3, no acute distress and cooperative to examination. HEENT: Normal  NECK: Soft, trachea midline and straight  LUNGS: Clear bilaterally anteriorly, slightly diminished lower lobe breath sounds  CARDIOVASCULAR: Heart regular rate and rhythm  ABDOMEN: soft, nontender, nondistended  NEUROLOGIC: Awake, alert, oriented to name, place and time. WOUND/INCISION:  Left chest dressing removed, large amount of brownish to bloody drainage on old dressings. Wound bed clean and pink color. EXTREMITY: LUE with mild edema, +radial pulse palp. Patient reports left arm does not feel as numb or tight    Left Chest Wound: L=1.0cmXW=6.0cmXD=4.5cm     Document Information    Wound   Left chest photo   10/07/2021 10:18   Attached To:    Hospital Encounter on 10/6/21   [address], RN       LABS:     CBC:   Recent Labs     10/05/21  1554   WBC 6.5   RBC 3.14*   HGB 11.2*   HCT 34.5*   .9*   MCH 35.7*   MCHC 32.5*   RDW 13.9   *   MPV 8.9*      Last 3 CMP:   Recent Labs     10/05/21  1554      K 3.9   CL 102   CO2 20*   BUN 13   CREATININE 0.7   GLUCOSE 84   CALCIUM 9.3      Calcium:   Lab Results   Component Value Date    CALCIUM 9.3 10/05/2021    CALCIUM 8.5 09/27/2021    CALCIUM 8.3 09/26/2021      INR:   Recent Labs     10/05/21  1554 10/06/21  0915   INR 1.23* 1.20*         DVT prophylaxis:              [x] Already on Anticoagulation      ASSESSMENT:     Procedure 10/6/21 (s):  REMOVAL OF MEDIPORT     HD # [address] Problems    Diagnosis Date Noted    Port or reservoir infection [T80.212A] 10/06/2021    Central line infection [T80.219A] 10/05/2021       Chief Complaint:  No chief complaint on file. PLAN:     BID dressing changes with Dakins left chest  ID for antibiotics  Hematology consulted  Pain meds prn  Cultures sent from OR  6.    Eliquis resumed

## 2021-10-08 ENCOUNTER — APPOINTMENT (OUTPATIENT)
Dept: INTERVENTIONAL RADIOLOGY/VASCULAR | Age: 56
DRG: 315 | End: 2021-10-08
Attending: SURGERY
Payer: MEDICARE

## 2021-10-08 LAB
ANION GAP SERPL CALCULATED.3IONS-SCNC: 11 MMOL/L (ref 7–19)
BUN BLDV-MCNC: 15 MG/DL (ref 6–20)
CALCIUM SERPL-MCNC: 8.5 MG/DL (ref 8.6–10)
CHLORIDE BLD-SCNC: 107 MMOL/L (ref 98–111)
CO2: 24 MMOL/L (ref 22–29)
CREAT SERPL-MCNC: 0.8 MG/DL (ref 0.5–0.9)
GFR AFRICAN AMERICAN: >59
GFR NON-AFRICAN AMERICAN: >60
GLUCOSE BLD-MCNC: 115 MG/DL (ref 74–109)
POTASSIUM REFLEX MAGNESIUM: 4.1 MMOL/L (ref 3.5–5)
REASON FOR REJECTION: NORMAL
REJECTED TEST: NORMAL
SODIUM BLD-SCNC: 142 MMOL/L (ref 136–145)
VANCOMYCIN TROUGH: 11.4 UG/ML (ref 10–20)

## 2021-10-08 PROCEDURE — 36573 INSJ PICC RS&I 5 YR+: CPT

## 2021-10-08 PROCEDURE — 2580000003 HC RX 258: Performed by: NURSE PRACTITIONER

## 2021-10-08 PROCEDURE — 99024 POSTOP FOLLOW-UP VISIT: CPT | Performed by: SURGERY

## 2021-10-08 PROCEDURE — 1210000000 HC MED SURG R&B

## 2021-10-08 PROCEDURE — 99232 SBSQ HOSP IP/OBS MODERATE 35: CPT | Performed by: INTERNAL MEDICINE

## 2021-10-08 PROCEDURE — 6360000002 HC RX W HCPCS: Performed by: SURGERY

## 2021-10-08 PROCEDURE — 2500000003 HC RX 250 WO HCPCS: Performed by: SURGERY

## 2021-10-08 PROCEDURE — 87075 CULTR BACTERIA EXCEPT BLOOD: CPT

## 2021-10-08 PROCEDURE — 87205 SMEAR GRAM STAIN: CPT

## 2021-10-08 PROCEDURE — C1769 GUIDE WIRE: HCPCS

## 2021-10-08 PROCEDURE — 6360000002 HC RX W HCPCS: Performed by: NURSE PRACTITIONER

## 2021-10-08 PROCEDURE — 02HV33Z INSERTION OF INFUSION DEVICE INTO SUPERIOR VENA CAVA, PERCUTANEOUS APPROACH: ICD-10-PCS | Performed by: SURGERY

## 2021-10-08 PROCEDURE — 2580000003 HC RX 258: Performed by: INTERNAL MEDICINE

## 2021-10-08 PROCEDURE — 80202 ASSAY OF VANCOMYCIN: CPT

## 2021-10-08 PROCEDURE — 87070 CULTURE OTHR SPECIMN AEROBIC: CPT

## 2021-10-08 PROCEDURE — 6370000000 HC RX 637 (ALT 250 FOR IP): Performed by: SURGERY

## 2021-10-08 PROCEDURE — 80048 BASIC METABOLIC PNL TOTAL CA: CPT

## 2021-10-08 RX ORDER — NITROGLYCERIN 20 MG/100ML
INJECTION INTRAVENOUS CONTINUOUS PRN
Status: COMPLETED | OUTPATIENT
Start: 2021-10-08 | End: 2021-10-08

## 2021-10-08 RX ORDER — SODIUM CHLORIDE 9 MG/ML
25 INJECTION, SOLUTION INTRAVENOUS PRN
Status: DISCONTINUED | OUTPATIENT
Start: 2021-10-08 | End: 2021-10-13 | Stop reason: HOSPADM

## 2021-10-08 RX ORDER — KETOROLAC TROMETHAMINE 30 MG/ML
30 INJECTION, SOLUTION INTRAMUSCULAR; INTRAVENOUS EVERY 8 HOURS PRN
Status: COMPLETED | OUTPATIENT
Start: 2021-10-08 | End: 2021-10-09

## 2021-10-08 RX ORDER — SODIUM CHLORIDE 0.9 % (FLUSH) 0.9 %
5-40 SYRINGE (ML) INJECTION EVERY 12 HOURS SCHEDULED
Status: DISCONTINUED | OUTPATIENT
Start: 2021-10-08 | End: 2021-10-13 | Stop reason: HOSPADM

## 2021-10-08 RX ORDER — LIDOCAINE HYDROCHLORIDE 10 MG/ML
5 INJECTION, SOLUTION EPIDURAL; INFILTRATION; INTRACAUDAL; PERINEURAL ONCE
Status: DISCONTINUED | OUTPATIENT
Start: 2021-10-08 | End: 2021-10-13 | Stop reason: HOSPADM

## 2021-10-08 RX ORDER — SODIUM CHLORIDE 0.9 % (FLUSH) 0.9 %
5-40 SYRINGE (ML) INJECTION PRN
Status: DISCONTINUED | OUTPATIENT
Start: 2021-10-08 | End: 2021-10-13 | Stop reason: HOSPADM

## 2021-10-08 RX ADMIN — APIXABAN 5 MG: 5 TABLET, FILM COATED ORAL at 20:59

## 2021-10-08 RX ADMIN — LISINOPRIL 10 MG: 10 TABLET ORAL at 12:05

## 2021-10-08 RX ADMIN — Medication 125 MCG: at 11:06

## 2021-10-08 RX ADMIN — MEROPENEM 1000 MG: 1 INJECTION, POWDER, FOR SOLUTION INTRAVENOUS at 00:51

## 2021-10-08 RX ADMIN — MEROPENEM 1000 MG: 1 INJECTION, POWDER, FOR SOLUTION INTRAVENOUS at 12:05

## 2021-10-08 RX ADMIN — KETOROLAC TROMETHAMINE 30 MG: 30 INJECTION, SOLUTION INTRAMUSCULAR; INTRAVENOUS at 20:59

## 2021-10-08 RX ADMIN — SODIUM HYPOCHLORITE: 1.25 SOLUTION TOPICAL at 12:12

## 2021-10-08 RX ADMIN — SODIUM HYPOCHLORITE: 1.25 SOLUTION TOPICAL at 21:10

## 2021-10-08 RX ADMIN — MELOXICAM 15 MG: 7.5 TABLET ORAL at 12:05

## 2021-10-08 RX ADMIN — HYDROCODONE BITARTRATE AND ACETAMINOPHEN 1 TABLET: 10; 325 TABLET ORAL at 05:20

## 2021-10-08 RX ADMIN — HYDROCODONE BITARTRATE AND ACETAMINOPHEN 1 TABLET: 10; 325 TABLET ORAL at 12:10

## 2021-10-08 RX ADMIN — VANCOMYCIN HYDROCHLORIDE 1250 MG: 10 INJECTION, POWDER, LYOPHILIZED, FOR SOLUTION INTRAVENOUS at 02:27

## 2021-10-08 RX ADMIN — FAMOTIDINE 20 MG: 20 TABLET ORAL at 12:05

## 2021-10-08 RX ADMIN — SODIUM CHLORIDE, PRESERVATIVE FREE 10 ML: 5 INJECTION INTRAVENOUS at 12:11

## 2021-10-08 RX ADMIN — PANTOPRAZOLE SODIUM 40 MG: 40 TABLET, DELAYED RELEASE ORAL at 12:05

## 2021-10-08 RX ADMIN — VANCOMYCIN HYDROCHLORIDE 1250 MG: 10 INJECTION, POWDER, LYOPHILIZED, FOR SOLUTION INTRAVENOUS at 15:52

## 2021-10-08 RX ADMIN — APIXABAN 5 MG: 5 TABLET, FILM COATED ORAL at 12:05

## 2021-10-08 ASSESSMENT — PAIN SCALES - GENERAL
PAINLEVEL_OUTOF10: 8
PAINLEVEL_OUTOF10: 7
PAINLEVEL_OUTOF10: 8
PAINLEVEL_OUTOF10: 8

## 2021-10-08 NOTE — PROGRESS NOTES
INFECTIOUS DISEASES PROGRESS NOTE    Patient:  Theodore Mcmahan  YOB: 1965  MRN: [id number]   Admit date: 10/6/2021   Admitting Physician: Alex So DO  Primary Care Physician: Romain Berumen MD    CHIEF COMPLAINT: sore left foot - previous iv site    Interval History: Patient underwent midline/PICC? placement in specials today per vascular. Apparently she had a IV in her foot that infiltrated    Cultures remain negative. Contacted microbiology. The port device was actually sent to micro therefore it had to be placed in broth initially. Maggy Schwartz RN collected another culture today. Allergies:    Allergies   Allergen Reactions    Clindamycin/Lincomycin Hives, Itching and Rash       Current Meds: lidocaine PF 1 % injection 5 mL, Once  sodium chloride flush 0.9 % injection 5-40 mL, 2 times per day  sodium chloride flush 0.9 % injection 5-40 mL, PRN  0.9 % sodium chloride infusion, PRN  apixaban (ELIQUIS) tablet 5 mg, BID  famotidine (PEPCID) tablet 20 mg, Daily  HYDROcodone-acetaminophen (NORCO)  MG per tablet 1 tablet, Q6H PRN  lisinopril (PRINIVIL;ZESTRIL) tablet 10 mg, Daily  meloxicam (MOBIC) tablet 15 mg, Daily  pantoprazole (PROTONIX) tablet 40 mg, Daily  sodium hypochlorite (DAKINS) 0.125 % external solution, BID  vancomycin (VANCOCIN) 1,250 mg in dextrose 5 % 250 mL IVPB, Q12H  meropenem (MERREM) 1,000 mg in sterile water 20 mL IV syringe, Q8H  vancomycin (VANCOCIN) intermittent dosing (placeholder), RX Placeholder  palbociclib (IBRANCE) tablet TABS 125 mg, Daily        Review of Systems  General: Low-grade fever  Cutaneous: Tenderness at wound site from port removal and left foot    Vital Signs:  /64 Comment: LOC 0  Pulse 76 Comment: LOC 0  Temp 98.8 °F (37.1 °C) (Oral)   Resp 23 Comment: LOC 0  Ht 5' 6\" (1.676 m)   Wt 172 lb 12.8 oz (78.4 kg)   SpO2 98% Comment: LOC 0  BMI 27.89 kg/m²   Temp (24hrs), Av.1 °F (37.3 °C), Min:98.7 °F (37.1 °C), Max:100 °F (37.8 °C)      Physical Exam   General: Patient is nontoxic-appearing lying in bed in no acute distress  HEENT: Sclerae anicteric and noninjected. Face mask placed over   Respiratory: Effort even and unlabored. She is not conversationally dyspneic  Chest: Dressing clean dry and intact over left subclavian region port site  Left foot. Elevated on pillow. Dressing in place over previous IV site. Obvious edema. Neuro: Alert and oriented, speech clear  Psych: Pleasant cooperative        LAB RESULTS:    CBC with DIFF:  Recent Labs     10/05/21  1554   WBC 6.5   RBC 3.14*   HGB 11.2*   HCT 34.5*   .9*   MCH 35.7*   MCHC 32.5*   RDW 13.9   *   MPV 8.9*       CMP/BMP:  Recent Labs     10/05/21  1554      K 3.9      CO2 20*   ANIONGAP 16   GLUCOSE 84   BUN 13   CREATININE 0.7   LABGLOM >60   CALCIUM 9.3         Culture Results:    Culture, Anaerobic and Aerobic [[id number]] Collected: 10/08/21 0915   Order Status: No result Updated: 10/08/21 1142   Culture, Surgical [[id number]] Collected: 10/06/21 1108   Order Status: Completed Specimen: Swab from Chest Updated: 10/08/21 1012    Gram Stain Result CANCELED    Comment: Result canceled by the ancillary. Anaerobic Culture No anaerobes to date,will hold 4 days   Narrative:     ORDER#: [id number]                          ORDERED BY: Samia Branch   SOURCE: Chest Mediport - Hardware #2       COLLECTED:  10/06/21 11:08   ANTIBIOTICS AT [facility]. :                      RECEIVED :  10/06/21 12:57   Culture, Wound [[id number]] Collected: 10/08/21 0915   Order Status: Canceled Specimen: Chest    Culture, Surgical [[id number]] Collected: 10/06/21 1133   Order Status: Completed Specimen: Hardware from Chest Updated: 10/08/21 0901    Gram Stain Result Many WBC's (Polymorphonuclear)   No organisms seen     Culture Surgical No growth to date    Anaerobic Culture No anaerobes to date,will hold 4 days   Narrative:     ORDER#: [id number]                          ORDERED BY: SAMMIE TERRY   SOURCE: Chest Left Mediport Site - SWAB #1 COLLECTED:  10/06/21 11:33   ANTIBIOTICS AT [facility]. :                      RECEIVED :  10/06/21 12:56           RADIOLOGY      XR CHEST (2 VW)    Result Date: 10/5/2021  XR CHEST (2 VW) 10/5/2021 4:04 PM History: Preop vascular surgery procedure. Two-view chest x-ray. Comparison is made with September 24, 2021. Heart size is within normal limits. The mediastinum has a normal appearance. The lungs are adequately expanded with no pneumonia or pneumothorax. There is mild chronic interstitial disease with scattered calcified granulomas. There is no significant pleural fluid. No congestive failure changes. Unchanged left chest wall port. 1. No acute disease. Signed by Dr Lennie Romero    VL EXTREMITY VENOUS LEFT    Result Date: 10/6/2021  Vascular Upper Extremities Veins Procedure  Demographics   Patient Name    Adebayo Chacon Age                  49   Patient Number  [id number]          Gender               Female   Visit Number    [id number]       Interpreting         Almaz Casiano                                  Physician   Date of Birth   1965      Referring Physician  Papito Hutchins   Accession       [id number]      Mateo 876 RT, RVT  Number  Procedure Type of Study:   Veins:Upper Extremities Veins, US Doppler Venous Upper Extremity  Unilateral.  Indications for Study:F/U DVT left upper extremity. Impression   Chronic appearing deep vein thrombosis (DVT) is seen in the internal  jugular vein brachial , left upper extremity(ies). Subacute appearing superficial thrombophlebitis (SVT) is seen in the  basilic vein, left upper extremity(ies).    Signature   ----------------------------------------------------------------  Electronically signed by Zenobia Bernheim Victoria(Interpreting  physician) on 10/06/2021 08:14 AM

## 2021-10-08 NOTE — PROGRESS NOTES
Vascular Surgery  Dr. Chilango Bee   Daily Progress Note    Pt Name: Ricki Francois  Medical Record Number: [id number]  Date of Birth 1965   Today's Date: 10/8/2021    SUBJECTIVE:     Patient was seen and examined, stating her left foot IV got really painful and tight during the night, they had to stop her antibiotics  Stating her left chest is still very tender    OBJECTIVE:     Patient Vitals for the past 24 hrs:   BP Temp Temp src Pulse Resp SpO2 Weight   10/08/21 0800 131/67 98.8 °F (37.1 °C) Oral 78 16 95 % --   10/08/21 0515 118/64 98.7 °F (37.1 °C) Oral 82 16 96 % --   10/08/21 0430 -- -- -- -- -- -- 172 lb 12.8 oz (78.4 kg)   10/07/21 2157 115/67 98.8 °F (37.1 °C) Oral 84 16 97 % --   10/07/21 1915 -- 99.5 °F (37.5 °C) Oral -- -- -- --   10/07/21 1911 129/73 100 °F (37.8 °C) Temporal 91 18 97 % --   10/07/21 1258 114/72 98.7 °F (37.1 °C) Temporal 91 18 100 % --       Intake/Output Summary (Last 24 hours) at 10/8/2021 4394  Last data filed at 10/7/2021 2032  Gross per 24 hour   Intake 240 ml   Output 300 ml   Net -60 ml     In: 240 [P.O.:240]  Out: -     I/O last 3 completed shifts: In: 240 [P.O.:240]  Out: 300 [Urine:300]        Wt Readings from Last 3 Encounters:   10/08/21 172 lb 12.8 oz (78.4 kg)   10/05/21 178 lb (80.7 kg)   10/05/21 175 lb (79.4 kg)      Body mass index is 27.89 kg/m². Diet: ADULT DIET;  Regular    MEDS:     Scheduled Meds:   lidocaine 1 % injection  5 mL IntraDERmal Once    sodium chloride flush  5-40 mL IntraVENous 2 times per day    apixaban  5 mg Oral BID    famotidine  20 mg Oral Daily    lisinopril  10 mg Oral Daily    meloxicam  15 mg Oral Daily    pantoprazole  40 mg Oral Daily    sodium hypochlorite   Irrigation BID    vancomycin  1,250 mg IntraVENous Q12H    meropenem (MERREM) 1000 mg in SWFI 20 mL IV syringe  1,000 mg IntraVENous Q8H    vancomycin (VANCOCIN) intermittent dosing (placeholder)   Other RX Placeholder    palbociclib  125 mg Oral Daily     Continuous Infusions:   sodium chloride       PRN Meds:sodium chloride flush, 5-40 mL, PRN  sodium chloride, 25 mL, PRN  HYDROcodone-acetaminophen, 1 tablet, Q6H PRN      PHYSICAL EXAM:     CONSTITUTIONAL: Alert and oriented times 3, no acute distress and cooperative to examination. HEENT: Normal  NECK: Soft, trachea midline and straight  LUNGS: Clear bilaterally anteriorly  CARDIOVASCULAR: Heart regular rate and rhythm  ABDOMEN: soft, nontender, nondistended  NEUROLOGIC: Awake, alert, oriented to name, place and time. WOUND/INCISION:  Left chest dressing removed, old dressings with serous to light tan color drainage. Mild erythema around open wound. EXTREMITY: LUE with minimal edema, +radial pulse palp. Left Chest Wound: L=1.0cmXW=6.0cmXD=4.5cm     Document Information    Wound   Left chest wound photo   10/08/2021 08:47   Attached To: Hospital Encounter on 10/6/21   Source Port [facility] [place]      LABS:     CBC:   Recent Labs     10/05/21  1554   WBC 6.5   RBC 3.14*   HGB 11.2*   HCT 34.5*   .9*   MCH 35.7*   MCHC 32.5*   RDW 13.9   *   MPV 8.9*      Last 3 CMP:   Recent Labs     10/05/21  1554      K 3.9      CO2 20*   BUN 13   CREATININE 0.7   GLUCOSE 84   CALCIUM 9.3      Calcium:   Lab Results   Component Value Date    CALCIUM 9.3 10/05/2021    CALCIUM 8.5 09/27/2021    CALCIUM 8.3 09/26/2021      INR:   Recent Labs     10/05/21  1554 10/06/21  0915   INR 1.23* 1.20*         DVT prophylaxis:              [x] Already on Anticoagulation (Eliquis)      ASSESSMENT:     Procedure 10/6/21 (s):  REMOVAL OF MEDIPORT     HD # 2  Active Hospital Problems    Diagnosis Date Noted    Port or reservoir infection [T80.212A] 10/06/2021    Central line infection [T80.219A] 10/05/2021       Chief Complaint:  No chief complaint on file. PLAN:     Left foot IV infiltrated, IV team not available today. Vascular will place RIGHT arm midline today.    BID dressing changes with Dakins left chest  ID for antibiotics (Vancomycin and Merrem)  Hematology following  Pain meds prn  Cultures sent from OR, new culture sent today (deep wound)

## 2021-10-08 NOTE — PROGRESS NOTES
MEDICAL ONCOLOGY PROGRESS NOTE    Pt Name: Srinivas Espinoza  MRN: [id number]  YOB: 1965  Date of evaluation: 10/8/2021    Subjective: Denies any bleeding. Afebrile. Discussed with vascular and ID. Patient has a very poor venous access. Recommended PICC line right upper extremity     HISTORY OF PRESENT ILLNESS:  Deny Andrews is a very pleasant 49years old female who has been treated for a diagnosed stage IV metastatic breast cancer. She developed acute DVT of the left upper extremity related to prior port placement. 9/26/2021-she underwent a venous mechanical thrombectomy by Dr. Cherelle Chapman. She was found to have significant left subclavian vein stenosis. The patient presented to the hospital with complaints of redness/tenderness of her port site and also fever 101.  10/6/2021-she underwent a left upper extremity venous mechanical thrombectomy. Mechanical thrombectomy of the area with good resolution of the thrombus. Plan is to proceed with port removal and subsequent balloon angioplasty with possible stenting.         Diagnosis  · Grade III Adenocarcinoma of right breast diagnosed 2006  · Stage IIIA, [id number]  · ER/AR Positive, HER-2 bruce/ihc 1+  · April 2013-RECURRENCE in the right axillary region  · Genetic testing- BRCA 1&2 Negative  · 2014 (?) RECURRENCE in the axillary skin  · 01/05/2017- METASTATIC DISEASE with lung, hilar, and axillary lymph node mets  · Osteopenia     Treatment Summary  · 2006- Neoadjuvant chemotherapy with AC x 4 cycles followed by Taxol  · 2007- Lumpectomy with axillary lymph node dissection  · 2007- Adjuvant radiation therapy to whole breast and axillary region  · Did not take tamoxifen due to cost  · April 2013- RECURRENCE  · Neoadjuvant chemotherapy with 2 cycles of Taxotere followed by Doxil  · 10/14/2013- Right Breast Mastectomy and Axillary Dissection  · 01/23/2014- Completion of adjuvant RT to chest wall and axillary lymph node with Dr. Aria Howard  · 02/14- Initiated adjuvant endocrine therapy with tamoxifen  · 2014 (?) RECURRENCE in the axillary skin  · 2014 (?) Surgical resection of the axillary skin  · 2015 (?) YVETTE/BSO  · 09/19/2016- Bilateral exchange, nipple reconstruction and fat grafting and removal of PAC   · 2016- Switched to aromatase inhibitor  · 01/05/2017- METASTATIC DISEASE with lung, hilar, and axillary mets  · 01/11/2017-06/17/2018- Single agent Abraxane x 13 cycles  · 06/27/2018-09/17/2018- Gemzar  · 10/10/2018-Faslodex every 4 weeks/palbociclib     Cancer History:  Roxie Jones was first seen by me on 1/21/2021. Claudia Maxwell was referred to establish continued of care for history of recurrent metastatic breast cancer. Claudia Maxwell has been in remission as per the latest CT scan. Claudia Maxwell is currently on Faslodex and palbociclib.  She has received several lines therapy as described below.  She moved from [place] to [place] to stay closer to her parents. RHEA MCCLURE [facility] medical history is complex.  Further details as below. · 2006- Neoadjuvant chemotherapy with AC x 4 cycles followed by [facility] was complicated by viral meningitis; patient experienced neuropathy after 3 doses of Taxol and was switched to Taxotere for last dose  · 2007- Lumpectomy with axillary lymph node dissection 1.9cm, grade 2. No LVI. 1 of 14 lymph nodes positive for malignancy (1/14) [id number]  · 2007- Adjuvant Radiation Therapy to whole breast and axillary region  · April 2013- RECURRENCE presenting as mass in the axillary region  · Neoadjuvant chemotherapy with 2 cycles of Taxotere followed by Doxil- did not have good response to treatment  · 2013- Preop PET/CTshowed 2.8cm, right axillary lymph node with uptake. · 10/14/2013- Right Breast Mastectomy and Axillary Dissection Right breast had benign tissue with fibrous, negative for carcinoma. Axillary contents demonstrated invasive ductal carcinoma, involving fibroadipose tissue and tendinous tissue. 3 benign lymph nodes (0/3).  Yusra grade 3. 3.0cm in greatest bilateral hilar and mediastinal lymph nodes. No masses or evidence of metastatic disease in the abdomen or pelvis  · 06/27/2018-09/17/2018- Gemzar  · 09/28/2018- CT Pulmonary Multiple lung mets, mild bilateral hilar and mediastinal lymph nodes  · 10/10/2018-12/21/2020- Faslodex every 4 weeks  · 12/14/2018- CT Chest showed definite decrease in the maximal diameter and volume of all left and right metastatic nodules. Somewhat enlarged paratracheal and left axillary lymph nodes are relatively unchanged. · 04/22/2019- MRI Cervical Spine W WO Contrast Unremarkable  · 04/22/2019- MRI Brain W WO Contrast No enhancing lesions in the brain and no evidence of metastatic disease. · 07/27/2019- CTA Pulmonary showed essentially complete disappearance of the pulmonary metastatic disease. Several tiny flat peripheral nodules are the only sequela. The tiny ones on the right are unchanged, the tiny ones on the left are even smaller. There is no longer any active metastatic disease in either lung. · 12/19/2019- CT Chest/Abdomen/Pelvis Small, tiny subpleural nodules right upper and right midlung field as well as left lung base has remained unchanged. No new focal parenchymal abnormality seen. No adenopathy seen. There is no abdominal or pelvic mass, adenopathy or fluid collection. No lytic or sclerotic bony lesions, but there is a possibility of osteopenia and/or osteoporosis. · 02/17/2020- DEXA Bone Density showed osteopenia of lumbar spine, T-score -1.8, and left femoral neck, T-score -2.0  · 2/25/21 Ct Abdomen Pelvis W Iv Contrast  No evidence of metastatic disease in the abdomen or pelvis. · 2/26/21 Ct Chest W Contrast Tiny nodules in the right lung described above probably represent small foci of pleural thickening due to chronic inflammatory process or small noncalcified granulomas. No features to suggest malignancy or metastatic disease. Bilateral breast implants without complication.    · 3/1/2021-discussed the results Age of Onset    Hypertension Mother     Obesity Mother     Prostate Cancer Father     No Known Problems Sister     No Known Problems Daughter     No Known Problems Daughter        Current Hospital Medications:    Current Facility-Administered Medications   Medication Dose Route Frequency Provider Last Rate Last Admin    apixaban (ELIQUIS) tablet 5 mg  5 mg Oral BID Lisa Face, DO   5 mg at 10/07/21 1958    famotidine (PEPCID) tablet 20 mg  20 mg Oral Daily Lisa Face, DO   20 mg at 10/07/21 0927    HYDROcodone-acetaminophen (NORCO)  MG per tablet 1 tablet  1 tablet Oral Q6H PRN Lisa Face, DO   1 tablet at 10/08/21 0520    lisinopril (PRINIVIL;ZESTRIL) tablet 10 mg  10 mg Oral Daily [facility]   10 mg at 10/06/21 1709    meloxicam (MOBIC) tablet 15 mg  15 mg Oral Daily [facility] Ivanukoff, DO   15 mg at 10/07/21 0926    pantoprazole (PROTONIX) tablet 40 mg  40 mg Oral Daily [facility] Ivanukoff, DO   40 mg at 10/07/21 2196    sodium hypochlorite (DAKINS) 0.125 % external solution   Irrigation BID BRITANY Trevizo   Given at 10/07/21 1959    vancomycin (VANCOCIN) 1,250 mg in dextrose 5 % 250 mL IVPB  1,250 mg IntraVENous Q12H BRITANY Trevizo   Stopped at 10/08/21 0449    meropenem (MERREM) 1,000 mg in sterile water 20 mL IV syringe  1,000 mg IntraVENous Q8H BRITANY Trevizo   1,000 mg at 10/08/21 0051    vancomycin (VANCOCIN) intermittent dosing (placeholder)   Other RX Placeholder BRITANY Trevizo        palbociclib Jerral Mellissa) tablet TABS 125 mg  125 mg Oral Daily Yelitza Gage MD   125 mg at 10/07/21 0844       Allergies:    Allergies   Allergen Reactions    Clindamycin/Lincomycin Hives, Itching and Rash         Objective   /64   Pulse 82   Temp 98.7 °F (37.1 °C) (Oral)   Resp 16   Ht 5' 6\" (1.676 m)   Wt 172 lb 12.8 oz (78.4 kg)   SpO2 96%   BMI 27.89 kg/m²     PHYSICAL EXAM:  CONSTITUTIONAL: Alert, appropriate, no acute distress  EYES: Non icteric, EOM intact, pupils equal round   ENT: Mucus membranes moist, no oral pharyngeal lesions, external inspection of ears and nose are normal  NECK: Supple, no masses. No palpable thyroid mass  CHEST/LUNGS: CTA bilaterally, normal respiratory effort   CARDIOVASCULAR: RRR, no murmurs. No lower extremity edema  ABDOMEN: soft non-tender, active bowel sounds, no HSM. No palpable masses  EXTREMITIES: warm, full ROM in all 4 extremities, no focal weakness. LYMPH: No cervical, clavicular, axillary, or inguinal lymphadenopathy  NEUROLOGIC: follows commands, non focal   PSYCH: mood and affect appropriate. Alert and oriented to time, place, person        LABORATORY RESULTS REVIEWED/ANALYZED BY ME:  Recent Labs     10/05/21  1554 09/27/21  0601 09/26/21  0324   WBC 6.5 7.5 4.1*   HGB 11.2* 9.6* 9.6*   HCT 34.5* 30.2* 30.5*   .9* 113.5* 111.7*   * 224 206       Lab Results   Component Value Date     10/05/2021    K 3.9 10/05/2021     10/05/2021    CO2 20 (L) 10/05/2021    BUN 13 10/05/2021    CREATININE 0.7 10/05/2021    GLUCOSE 84 10/05/2021    CALCIUM 9.3 10/05/2021    PROT 7.1 09/24/2021    LABALBU 4.1 09/24/2021    BILITOT 0.7 09/24/2021    ALKPHOS 99 09/24/2021    AST 22 09/24/2021    ALT 29 09/24/2021    LABGLOM >60 10/05/2021    GFRAA >59 10/05/2021    AGRATIO 1.6 04/01/2021    GLOB 3.3 08/27/2021       Lab Results   Component Value Date    INR 1.20 (H) 10/06/2021    INR 1.23 (H) 10/05/2021    INR 1.02 09/24/2021    PROTIME 15.4 (H) 10/06/2021    PROTIME 15.7 (H) 10/05/2021    PROTIME 13.6 09/24/2021       RADIOLOGY STUDIES REPORT/REVIEWED AND INTERPRETED BY ME:  XR CHEST (2 VW)    Result Date: 10/5/2021  XR CHEST (2 VW) 10/5/2021 4:04 PM History: Preop vascular surgery procedure. Two-view chest x-ray. Comparison is made with September 24, 2021. Heart size is within normal limits. The mediastinum has a normal appearance.  The lungs are adequately expanded with no pneumonia or pneumothorax. There is mild chronic interstitial disease with scattered calcified granulomas. There is no significant pleural fluid. No congestive failure changes. Unchanged left chest wall port. 1. No acute disease. Signed by Dr Yvonne Everett    Result Date: 9/30/2021  EXAM: CT CHEST W CONTRAST -- 9/30/2021 8:52 AM HISTORY: 49 years, Female, lung nodule, breast cancer COMPARISON: 8/19/2021 DLP: 543 mGy cm. Automated exposure control was utilized to minimize patient radiation dose. TECHNIQUE: Unenhanced CT images obtained with multiplanar reformats. FINDINGS: AIRWAYS/PULMONARY PARENCHYMA: The central airways are midline and patent. No convincing small airway filling defect on today's exam at the left lower lobe. Redemonstration 16 x 7 mm pulmonary nodule at the medial aspect left lower lobe on axial series 3, image 69, previously reported as 9 x 17. On today's exam, this appears less rounded. No new suspicious pulmonary finding. No pleural effusion. No pneumothorax. VASCULATURE: Limited assessment of vasculature without intravenous contrast. Thoracic aorta is normal in course and caliber. Normal pulmonary artery caliber. Left jugular vein stent. CARDIAC:  Cardiac size is normal.  No pericardial effusion. Scattered coronary artery calcifications. MEDIASTINUM: There is no mediastinal or hilar lymphadenopathy by CT size criteria. Esophagus has normal coarse, caliber and wall thickness. EXTRATHORACIC: The visualized portions of the thyroid gland are unremarkable. No thoracic inlet or axillary adenopathy. Port at the left chest wall, catheter tip in the lower SVC. Bilateral breast implants. Left chest wall collaterals suggests chronic left subclavian vein stenosis.  INCLUDED UPPER ABDOMEN: Visualized portion of the liver, gallbladder, pancreas, spleen, adrenal glands and upper kidneys appear within normal brachial ulnar , left upper extremity(ies). Superficial venous thrombosis is visualized in the basilic and cephalic  vein of the left upper extremity. XR CHEST PORTABLE    Result Date: 9/24/2021  XR CHEST PORTABLE 9/24/2021 8:00 PM HISTORY: Chest pain COMPARISON: 7/28/2019 CXR: A single frontal view of the chest is performed. Findings: Lungs are hypoventilated. Bilateral breast implants with associated soft tissue attenuation. No pulmonary consolidation or edema. Cardiac and mediastinal contours normal. No pleural effusion or pneumothorax. Left subclavian port in place with tip overlying the SVC. Right axillary surgical clips. 1. Bilateral breast implants. No acute pulmonary process identified. Left subclavian port. Signed by Dr Madeline Hancock      ASSESSMENT:    #Port related infection  S/p Mediport removal per Dr. Cervantes Factor 10/06/21. Continue Vancomycin  Port tip culture-in process    #Acute left upper extremity DVT/left upper chest wall port      A noninvasive venous study of the left upper extremity documented the following:  · Acute appearing deep vein thrombosis (DVT) is seen in the internal jugular vein subclavian axillary brachial ulnar , left upper extremity(ies). · Superficial venous thrombosis is visualized in the basilic and cephalic vein of the left upper extremity.     She was admitted and placed on IV heparin drip, currently receiving Eliquis     A preliminary prothrombotic work-up with antiphospholipid syndrome testing will be initiated, further serological work-up will be done in outpatient setting under the direction of Dr. Ha Superior underwent partial left upper extremity mechanical suction thrombectomy 9/26/2021 by Dr. Elijah Hermosillo     She is currently treated with Eliquis 10 mg po BID.  10/6/2021-she underwent a left upper extremity venous mechanical thrombectomy. Mechanical thrombectomy of the area with good resolution of the thrombus.   S/p Mediport removal per  Guy 10/06/21. Resume Eliquis    Stage IV Metastatic breast cancer, hormone sensitive  2/25/2021-discussed the results of CT chest abdomen pelvis.  No evidence of disease progression.  In fact, excellent response with no measurable metastatic disease at this time. 1/21/2021-CA 15-3 = 23, CA 27-29 = 20.3. Currently Faslodex/Ibrance. 6/3/2021-CA 15-3-23, CA 27-29-25, CEA 1.8  Regimen:  Faslodex to 500 mg subcutaneous every 4 weeks. Continue Ibrance days 1-21 q. 28 days  8/19/2021-CT chest/abdomen/pelvis-no evidence of metastatic disease.  Findings of a pulmonary lung nodule in the left lower lobe   Essentially, good response to Faslodex/Ibrance. Last cancer marker 7/29/2021 were normal.     B12 deficiency   B12 levels 189.    B12 injections were initiated 9/26/2021 (1000 mcg) with Faslodex. 3/1/2021- Methylmalonic Acid 171, Vitamin B12 >2000.   Defer to outpatient      Left lower lobe lung nodule  She is currently being seen by pulmonary at [facility]. They believe that this could be inflammatory. She received a trial of antibiotics and will have a repeat CT scan with them. She is a scheduled for a repeat CT scan January 2022    GERD  Possible decreased efficacy of Ibrance when taken with Protonix  Patient states GERD is unrelieved with Protonix  Rx Pepcid [facility] to [facility]    Poor IV access-portion, the only available site for a PICC insertion is the right upper extremity, same site of prior mastectomy. Okay to use this for IV midline placement. Discussed with the patient.     PLAN:  Antibiotic as per hospitalist/ID  Continue  Ibrance and Eliquis  Okay to access with midline right upper extremity    (Please note that portions of this note were completed with a voice recognition program. Efforts were made to edit the dictations but occasionally words are mis-transcribed.)        Tonny Wheat MD    10/08/21  6:50 AM

## 2021-10-08 NOTE — PROCEDURES
immediately at its outflow to the SVC, with brisk flow in the SVC. The sheath was removed and exchanged for the peel-away sheath. The wire and dilator were removed, and after the catheter had been cut to midline length, it was attempted to be advanced through the sheath but was unable to advance more than 2 cm out of the sheath. The wire was placed through the catheter but the catheter still had difficulty advancing despite maneuvers. The ports did not draw but did flush. Venogram showed evidence of spasm as well as some intimal irregularities. The tourniquet was reapplied and 100 mcg of nitroglycerin was instilled in the catheter to attempt to resolve the spasm, and after appropriate dwell time, venogram performed showed no improvement. The decision was made to regain access at a different site, due to concern for intimal damage preventing advancement of the catheter. The sheath, catheter and wire were removed and pressure was held for 5 minutes, after which there was no bleeding. The brachial veins were reassessed and were found to be persistently small throughout their length and not appropriate for access with a large bore catheter. The basilic vein remained compressible and patent form the elbow to the mid-upper arm, but had irregularities and decreased luminal diameter indicating intimal damage. A segment of widely patent vein without irregularities was identified. After local anesthestic was injected into the area and confirmed by patient, the basilic vein was easily accessed under ultrasound guidance past the area of concern. The vein was dilated sequentially and each sheath. dilator passed easily over the wire. The peel-away sheath was inserted easily. The accessible segment of basilic vein was in the upper-mid arm, and the area did not allow sufficient length for short midline catheter placement pre-axillary.  The decision was made to place a normal length PICC as the patient otherwise would have no IV access, with possibility of replacing it with a midline when the inflow of the basilic vein had time to heal. The PICC was cut at 35 cm and was advanced over the wire easily, with the tip parked in the distal SVC. The sheath was removed and no bleeding was noted. The area was cleaned. The catheter was locked into place with available dressing in kit. The area was cleaned and sterile dressings were placed. The patient tolerated the procedure well. Findings: There were no changes to vital signs. Catheter was flushed with 20 cc NS. Patient tolerated the procedure well. EBL: 20 mL    Complications: Intimal damage to basilic vein at the distal upper arm but still compressible and patent on ultrasound. Recommendations:  OK to use line.

## 2021-10-08 NOTE — PROGRESS NOTES
Pharmacy Vancomycin Consult     Vancomycin Day: 3  Current Dosin mg every 12 hours    Temp max:  98.2    Recent Labs     10/08/21  1450   BUN 15       Recent Labs     10/08/21  1450   CREATININE 0.8       No results for input(s): WBC in the last 72 hours. Intake/Output Summary (Last 24 hours) at 10/8/2021 1555  Last data filed at 10/8/2021 0700  Gross per 24 hour   Intake 360 ml   Output --   Net 360 ml       Culture Date Source Results   10/06/21 Surgical chest mediport Alpha hemolytic StreptococcusAbnormal                  Ht Readings from Last 1 Encounters:   10/06/21 5' 6\" (1.676 m)        Wt Readings from Last 1 Encounters:   10/08/21 172 lb 12.8 oz (78.4 kg)         Body mass index is 27.89 kg/m². Estimated Creatinine Clearance: 85 mL/min (based on SCr of 0.8 mg/dL).     Trough: 11.4 (Steady State trough = 15.9)    Assessment/Plan: Continue same Vancomycin 1250 mg IV every 12 hours    Electronically signed by CARLOS Dinero [facility] on 10/8/2021 at 3:55 PM

## 2021-10-09 LAB
ANION GAP SERPL CALCULATED.3IONS-SCNC: 8 MMOL/L (ref 7–19)
ATYPICAL LYMPHOCYTE RELATIVE PERCENT: 1 % (ref 0–8)
BASOPHILS ABSOLUTE: 0 K/UL (ref 0–0.2)
BASOPHILS RELATIVE PERCENT: 0 % (ref 0–1)
BUN BLDV-MCNC: 14 MG/DL (ref 6–20)
CALCIUM SERPL-MCNC: 8.3 MG/DL (ref 8.6–10)
CHLORIDE BLD-SCNC: 106 MMOL/L (ref 98–111)
CO2: 26 MMOL/L (ref 22–29)
CREAT SERPL-MCNC: 0.6 MG/DL (ref 0.5–0.9)
EOSINOPHILS ABSOLUTE: 0.04 K/UL (ref 0–0.6)
EOSINOPHILS RELATIVE PERCENT: 1 % (ref 0–5)
GFR AFRICAN AMERICAN: >59
GFR NON-AFRICAN AMERICAN: >60
GLUCOSE BLD-MCNC: 103 MG/DL (ref 74–109)
HCT VFR BLD CALC: 27.4 % (ref 37–47)
HEMOGLOBIN: 8.7 G/DL (ref 12–16)
HYPOCHROMIA: ABNORMAL
IMMATURE GRANULOCYTES #: 0.1 K/UL
LYMPHOCYTES ABSOLUTE: 1.3 K/UL (ref 1.1–4.5)
LYMPHOCYTES RELATIVE PERCENT: 32 % (ref 20–40)
MACROCYTES: ABNORMAL
MCH RBC QN AUTO: 35.5 PG (ref 27–31)
MCHC RBC AUTO-ENTMCNC: 31.8 G/DL (ref 33–37)
MCV RBC AUTO: 111.8 FL (ref 81–99)
MONOCYTES ABSOLUTE: 0 K/UL (ref 0–0.9)
MONOCYTES RELATIVE PERCENT: 0 % (ref 0–10)
NEUTROPHILS ABSOLUTE: 2.6 K/UL (ref 1.5–7.5)
NEUTROPHILS RELATIVE PERCENT: 66 % (ref 50–65)
PDW BLD-RTO: 13.6 % (ref 11.5–14.5)
PLATELET # BLD: 395 K/UL (ref 130–400)
PLATELET SLIDE REVIEW: ADEQUATE
PMV BLD AUTO: 8.9 FL (ref 9.4–12.3)
POTASSIUM SERPL-SCNC: 4.4 MMOL/L (ref 3.5–5)
RBC # BLD: 2.45 M/UL (ref 4.2–5.4)
SODIUM BLD-SCNC: 140 MMOL/L (ref 136–145)
WBC # BLD: 4 K/UL (ref 4.8–10.8)

## 2021-10-09 PROCEDURE — 80048 BASIC METABOLIC PNL TOTAL CA: CPT

## 2021-10-09 PROCEDURE — 99231 SBSQ HOSP IP/OBS SF/LOW 25: CPT | Performed by: INTERNAL MEDICINE

## 2021-10-09 PROCEDURE — 6360000002 HC RX W HCPCS: Performed by: INTERNAL MEDICINE

## 2021-10-09 PROCEDURE — 6360000002 HC RX W HCPCS: Performed by: NURSE PRACTITIONER

## 2021-10-09 PROCEDURE — 1210000000 HC MED SURG R&B

## 2021-10-09 PROCEDURE — 2580000003 HC RX 258: Performed by: INTERNAL MEDICINE

## 2021-10-09 PROCEDURE — 85025 COMPLETE CBC W/AUTO DIFF WBC: CPT

## 2021-10-09 PROCEDURE — 99024 POSTOP FOLLOW-UP VISIT: CPT | Performed by: SURGERY

## 2021-10-09 PROCEDURE — 6360000002 HC RX W HCPCS: Performed by: SURGERY

## 2021-10-09 PROCEDURE — 6370000000 HC RX 637 (ALT 250 FOR IP): Performed by: SURGERY

## 2021-10-09 PROCEDURE — 2580000003 HC RX 258: Performed by: NURSE PRACTITIONER

## 2021-10-09 RX ADMIN — MELOXICAM 15 MG: 7.5 TABLET ORAL at 08:18

## 2021-10-09 RX ADMIN — LISINOPRIL 10 MG: 10 TABLET ORAL at 08:19

## 2021-10-09 RX ADMIN — APIXABAN 5 MG: 5 TABLET, FILM COATED ORAL at 08:18

## 2021-10-09 RX ADMIN — SODIUM CHLORIDE, PRESERVATIVE FREE 10 ML: 5 INJECTION INTRAVENOUS at 08:19

## 2021-10-09 RX ADMIN — APIXABAN 5 MG: 5 TABLET, FILM COATED ORAL at 19:45

## 2021-10-09 RX ADMIN — VANCOMYCIN HYDROCHLORIDE 1250 MG: 10 INJECTION, POWDER, LYOPHILIZED, FOR SOLUTION INTRAVENOUS at 03:54

## 2021-10-09 RX ADMIN — PANTOPRAZOLE SODIUM 40 MG: 40 TABLET, DELAYED RELEASE ORAL at 08:18

## 2021-10-09 RX ADMIN — WATER 1000 MG: 1 INJECTION INTRAMUSCULAR; INTRAVENOUS; SUBCUTANEOUS at 15:56

## 2021-10-09 RX ADMIN — KETOROLAC TROMETHAMINE 30 MG: 30 INJECTION, SOLUTION INTRAMUSCULAR; INTRAVENOUS at 14:16

## 2021-10-09 RX ADMIN — HYDROCODONE BITARTRATE AND ACETAMINOPHEN 1 TABLET: 10; 325 TABLET ORAL at 15:56

## 2021-10-09 RX ADMIN — FAMOTIDINE 20 MG: 20 TABLET ORAL at 08:19

## 2021-10-09 RX ADMIN — SODIUM HYPOCHLORITE: 1.25 SOLUTION TOPICAL at 19:47

## 2021-10-09 RX ADMIN — KETOROLAC TROMETHAMINE 30 MG: 30 INJECTION, SOLUTION INTRAMUSCULAR; INTRAVENOUS at 05:28

## 2021-10-09 ASSESSMENT — PAIN SCALES - GENERAL
PAINLEVEL_OUTOF10: 6
PAINLEVEL_OUTOF10: 7
PAINLEVEL_OUTOF10: 8
PAINLEVEL_OUTOF10: 5

## 2021-10-09 NOTE — PROGRESS NOTES
INFECTIOUS DISEASES PROGRESS NOTE    Patient:  Liana Garcia  YOB: 1965  MRN: [id number]   Admit date: 10/6/2021   Admitting Physician: Elijah Hermosillo DO  Primary Care Physician: Glenn Mahoney MD    CHIEF COMPLAINT: Irritation from PICC line dressing      Interval History: Patient reports some burning type sensation around the edge of her PICC line dressing. She reports she has pulled it back some and has let the staff know. Allergies: Allergies   Allergen Reactions    Clindamycin/Lincomycin Hives, Itching and Rash       Current Meds: lidocaine PF 1 % injection 5 mL, Once  sodium chloride flush 0.9 % injection 5-40 mL, 2 times per day  sodium chloride flush 0.9 % injection 5-40 mL, PRN  0.9 % sodium chloride infusion, PRN  ketorolac (TORADOL) injection 30 mg, Q8H PRN  apixaban (ELIQUIS) tablet 5 mg, BID  famotidine (PEPCID) tablet 20 mg, Daily  HYDROcodone-acetaminophen (NORCO)  MG per tablet 1 tablet, Q6H PRN  lisinopril (PRINIVIL;ZESTRIL) tablet 10 mg, Daily  meloxicam (MOBIC) tablet 15 mg, Daily  pantoprazole (PROTONIX) tablet 40 mg, Daily  sodium hypochlorite (DAKINS) 0.125 % external solution, BID  vancomycin (VANCOCIN) 1,250 mg in dextrose 5 % 250 mL IVPB, Q12H  vancomycin (VANCOCIN) intermittent dosing (placeholder), RX Placeholder  palbociclib (IBRANCE) tablet TABS 125 mg, Daily        Review of Systems  General: no fever  Cutaneous: Irritation at PICC line dressing site    Vital Signs:  /60   Pulse 78   Temp 98.3 °F (36.8 °C) (Oral)   Resp 16   Ht 5' 6\" (1.676 m)   Wt 172 lb 12.8 oz (78.4 kg)   SpO2 100%   BMI 27.89 kg/m²   Temp (24hrs), Av.8 °F (36.6 °C), Min:96.7 °F (35.9 °C), Max:98.3 °F (36.8 °C)      Physical Exam   General: Patient is nontoxic-appearing lying in bed in no acute distress. Her parents are at bedside  HEENT: Sclerae anicteric and noninjected. Respiratory: Effort even and unlabored.   She is not conversationally dyspneic  Chest: Dressing clean dry and intact over left subclavian region previous port site  Neuro: Alert and oriented, speech clear  Psych: Pleasant cooperative    Right upper extremity PICC line. The clear dressing has been peeled back some and there is a subtle but clear area of irritation that follows the line of the clear dressing. The insertion site remains well covered. LAB RESULTS:    CBC with DIFF:  Recent Labs     10/09/21  0346   WBC 4.0*   RBC 2.45*   HGB 8.7*   HCT 27.4*   .8*   MCH 35.5*   MCHC 31.8*   RDW 13.6      MPV 8.9*   NEUTOPHILPCT 66.0*   LYMPHOPCT 32.0   MONOPCT 0.0   EOSRELPCT 1.0   BASOPCT 0.0   NEUTROABS 2.6   LYMPHSABS 1.3   MONOSABS 0.00   EOSABS 0.04   BASOSABS 0.00       CMP/BMP:  Recent Labs     10/08/21  1450 10/09/21  0346    140   K 4.1 4.4    106   CO2 24 26   ANIONGAP 11 8   GLUCOSE 115* 103   BUN 15 14   CREATININE 0.8 0.6   LABGLOM >60 >60   CALCIUM 8.5* 8.3*         Culture Results:    No anaerobes to date,will hold 4 days    Organism Abnormal  10/06/2021 11:08 AM [facility]   Streptococcus mitis/oralis    Culture Surgical 10/06/2021 11:08 AM [facility]   Isolated from [facility] Performed By    [facility] Corea Name Director Address Valid Date Range   [id number] - [address] LAB Keny Mac M.D. [address] [address] 09/14/21 1056-Present   Narrative  Performed by: [facility]  ORDER#: [id number]                          ORDERED BY: Samanta Rasheed   SOURCE: Chest Mediport - Hardware #2       COLLECTED:  10/06/21 11:08   ANTIBIOTICS AT [facility]ARC[facility]. :                      RECEIVED :  10/06/21 12:57   Susceptibility    Streptococcus mitis / Streptococcus oralis (1)    Antibiotic Interpretation BUCK Status    ampicillin Sensitive <=0.25 mcg/mL     benzylpenicillin Intermediate 0.25 mcg/mL     cefotaxime Sensitive 0.5 mcg/mL     cefTRIAXone Sensitive 1 mcg/mL clindamycin Intermediate 0.5 mcg/mL     levofloxacin Resistant >=16 mcg/mL     linezolid Sensitive <=2 mcg/mL     tetracycline Resistant >=16 mcg/mL     vancomycin Sensitive 0.25 mcg/mL               RADIOLOGY      XR CHEST (2 VW)    Result Date: 10/5/2021  XR CHEST (2 VW) 10/5/2021 4:04 PM History: Preop vascular surgery procedure. Two-view chest x-ray. Comparison is made with September 24, 2021. Heart size is within normal limits. The mediastinum has a normal appearance. The lungs are adequately expanded with no pneumonia or pneumothorax. There is mild chronic interstitial disease with scattered calcified granulomas. There is no significant pleural fluid. No congestive failure changes. Unchanged left chest wall port. 1. No acute disease. Signed by Dr Moraima Paz    VL EXTREMITY VENOUS LEFT    Result Date: 10/6/2021  Vascular Upper Extremities Veins Procedure  Demographics   Patient Name    Evelyne Curiel Age                  49   Patient Number  [id number]          Gender               Female   Visit Number    [id number]       Interpreting         Barbra Pacheco                                  Physician   Date of Birth   1965      Referring Physician  Majo Solo   Accession       [id number]      [address] RT, RVT  Number  Procedure Type of Study:   Veins:Upper Extremities Veins, US Doppler Venous Upper Extremity  Unilateral.  Indications for Study:F/U DVT left upper extremity. Impression   Chronic appearing deep vein thrombosis (DVT) is seen in the internal  jugular vein brachial , left upper extremity(ies). Subacute appearing superficial thrombophlebitis (SVT) is seen in the  basilic vein, left upper extremity(ies).    Signature   ----------------------------------------------------------------  Electronically signed by Marta Andrea(Interpreting  physician) on 10/06/2021 08:14 AM --------------------------------------------------------------              Patient Active Problem List   Diagnosis    Carcinoma of female breast ([facility] [address]    Poor venous access    Iron deficiency anemia    Menopausal symptom    Benign neoplasm of body of uterus    Obesity (BMI 30-39. 9)    Dyspnea and respiratory abnormalities    Chronic pain syndrome    Right wrist pain    Rheumatoid arthritis involving both knees with negative rheumatoid factor (HCC)    Other cyst of bone, left ankle and foot    Status post bilateral mastectomy    Acute purulent bronchitis    Lung nodule    Status post bilateral breast reconstruction    S/P revision of left breast reconstruction 9/17/2021    Acute deep vein thrombosis (DVT) of left upper extremity (HCC)    Exostosis of left foot    Central line infection    Port or reservoir infection       IMPRESSION:    Left subclavian port infection in patient with left upper extremity DVT. Patient is status post removal October 6. Appears first cultures obtained of this where the time of surgery and the device was sent to microbiology. Additional culture obtained at bedside today. Still trying to sort out patient's report of drainage at the time of deaccessing of the port prior to her discharge at the end of September. RECOMMENDATIONS :  · Discontinue vancomycin (meropenem was discontinued yesterday with updated culture)  · Start ceftriaxone  · Continue to monitor cultures to completion    Discussed with Earnest Tang RN patient's concern regarding PICC line dressing irritation and my plans for antibiotic change.       Jair Lao MD

## 2021-10-09 NOTE — PROGRESS NOTES
Vascular Note  10/09/21    S: No acute events. Right arm PICC line in place without tenderness. States that numbness in median nerve distribution started to quan immediately following the PICC placement yesterday and resolved completely shortly thereafter. Complains of bilateral shoulder easy fatiguability that has been ongoing for several years but significantly worsened following surgery 9/17/2021. Complains of some itching beneath the tegaderm dressing around PICC. O:  Blood pressure (!) 146/94, pulse 85, temperature 96.7 °F (35.9 °C), temperature source Temporal, resp. rate 16, height 5' 6\" (1.676 m), weight 172 lb 12.8 oz (78.4 kg), SpO2 100 %. Alert, no acute distress, pleasant  Normal work of breathing  Normal heart rate  No abdominal distension  Right arm with PICC in place, no rash at insertion site, no arm edema; normal sensation and  strength over right hand.   Left chest port removal site with dressing in place; minimally tender over surrounding tissue with no     A/P: 56F with recent left subclavian port infection s/p port removal and washout 10/6/2021, and difficult IV access s/p right basilic PICC placement [id number].  - May remove dressing in sterile fashion and cleanse area with non-chlorhexidine soap or cleansing cloth, then replace dressing in sterile fashion.  - Port removal site dressing to be changed with dakins-gauze once daily  - Agree with antibiotics per ID  - Will continue to follow along for wound care and IV access issues    Aria Pressley MD

## 2021-10-09 NOTE — PLAN OF CARE
Problem: Pain:  Description: Pain management should include both nonpharmacologic and pharmacologic interventions.   Goal: Pain level will decrease  Description: Pain level will decrease  Outcome: Ongoing  Goal: Control of acute pain  Description: Control of acute pain  Outcome: Ongoing  Goal: Control of chronic pain  Description: Control of chronic pain  Outcome: Ongoing  Goal: Patient's pain/discomfort is manageable  Description: Patient's pain/discomfort is manageable  Outcome: Ongoing     Problem: Falls - Risk of:  Goal: Will remain free from falls  Description: Will remain free from falls  Outcome: Ongoing  Goal: Absence of physical injury  Description: Absence of physical injury  Outcome: Ongoing     Problem: Infection:  Goal: Will remain free from infection  Description: Will remain free from infection  Outcome: Ongoing     Problem: Safety:  Goal: Free from accidental physical injury  Description: Free from accidental physical injury  Outcome: Ongoing  Goal: Free from intentional harm  Description: Free from intentional harm  Outcome: Ongoing     Problem: Daily Care:  Goal: Daily care needs are met  Description: Daily care needs are met  Outcome: Ongoing     Problem: Skin Integrity:  Goal: Skin integrity will stabilize  Description: Skin integrity will stabilize  Outcome: Ongoing     Problem: Discharge Planning:  Goal: Patients continuum of care needs are met  Description: Patients continuum of care needs are met  Outcome: Ongoing     Problem: ABCDS Injury Assessment  Goal: Absence of physical injury  Outcome: Ongoing

## 2021-10-09 NOTE — PROGRESS NOTES
MEDICAL ONCOLOGY PROGRESS NOTE    Pt Name: Parveen Nova  MRN: [id number]  YOB: 1965  Date of evaluation: 10/9/2021    Subjective: PICC line right upper extremity placed yesterday. Complains of tape irritating site. Feeling a little bit better although significantly fatigued. HISTORY OF PRESENT ILLNESS:  Kristin Prince is a very pleasant 49years old female who has been treated for a diagnosed stage IV metastatic breast cancer. She developed acute DVT of the left upper extremity related to prior port placement. 9/26/2021-she underwent a venous mechanical thrombectomy by Dr. Judith Sterling. She was found to have significant left subclavian vein stenosis. The patient presented to the hospital with complaints of redness/tenderness of her port site and also fever 101.  10/6/2021-she underwent a left upper extremity venous mechanical thrombectomy. Mechanical thrombectomy of the area with good resolution of the thrombus. Plan is to proceed with port removal and subsequent balloon angioplasty with possible stenting.         Diagnosis  · Grade III Adenocarcinoma of right breast diagnosed 2006  · Stage IIIA, [id number]  · ER/PA Positive, HER-2 bruce/ihc 1+  · April 2013-RECURRENCE in the right axillary region  · Genetic testing- BRCA 1&2 Negative  · 2014 (?) RECURRENCE in the axillary skin  · 01/05/2017- METASTATIC DISEASE with lung, hilar, and axillary lymph node mets  · Osteopenia     Treatment Summary  · 2006- Neoadjuvant chemotherapy with AC x 4 cycles followed by Taxol  · 2007- Lumpectomy with axillary lymph node dissection  · 2007- Adjuvant radiation therapy to whole breast and axillary region  · Did not take tamoxifen due to cost  · April 2013- RECURRENCE  · Neoadjuvant chemotherapy with 2 cycles of Taxotere followed by Doxil  · 10/14/2013- Right Breast Mastectomy and Axillary Dissection  · 01/23/2014- Completion of adjuvant RT to chest wall and axillary lymph node with Dr. Tracie Castelan  · 02/14- Initiated adjuvant endocrine therapy with tamoxifen  · 2014 (?) RECURRENCE in the axillary skin  · 2014 (?) Surgical resection of the axillary skin  · 2015 (?) YVETTE/BSO  · 09/19/2016- Bilateral exchange, nipple reconstruction and fat grafting and removal of PAC   · 2016- Switched to aromatase inhibitor  · 01/05/2017- METASTATIC DISEASE with lung, hilar, and axillary mets  · 01/11/2017-06/17/2018- Single agent Abraxane x 13 cycles  · 06/27/2018-09/17/2018- Gemzar  · 10/10/2018-Faslodex every 4 weeks/palbociclib     Cancer History:  Marques Webb was first seen by me on 1/21/2021. Chelo Saldaña was referred to establish continued of care for history of recurrent metastatic breast cancer. Chelo Saldaña has been in remission as per the latest CT scan. Chelo Saldaña is currently on Faslodex and palbociclib.  She has received several lines therapy as described below.  She moved from [place] to [place] to stay closer to her parents. RHEA MCCLURE [facility] medical history is complex.  Further details as below. · 2006- Neoadjuvant chemotherapy with AC x 4 cycles followed by [facility] - [facility]JE[facility] AMAYA was complicated by viral meningitis; patient experienced neuropathy after 3 doses of Taxol and was switched to Taxotere for last dose  · 2007- Lumpectomy with axillary lymph node dissection 1.9cm, grade 2. No LVI. 1 of 14 lymph nodes positive for malignancy (1/14) [id number]  · 2007- Adjuvant Radiation Therapy to whole breast and axillary region  · April 2013- RECURRENCE presenting as mass in the axillary region  · Neoadjuvant chemotherapy with 2 cycles of Taxotere followed by Doxil- did not have good response to treatment  · 2013- Preop PET/CTshowed 2.8cm, right axillary lymph node with uptake. · 10/14/2013- Right Breast Mastectomy and Axillary Dissection Right breast had benign tissue with fibrous, negative for carcinoma. Axillary contents demonstrated invasive ductal carcinoma, involving fibroadipose tissue and tendinous tissue. 3 benign lymph nodes (0/3).  Yusra grade 3. 3.0cm in greatest gross dimensions. Carcinoma permeates among soft tissues and in the right axilla with no apparent involvement of lymph nodes. · 01/23/2014- Completion of adjuvant radiation therapy to chest wall and axillary lymph node with Dr. Fuentes Schulte  · 02/14- Initiated adjuvant endocrine therapy with tamoxifen  · 2014 (?) RECURRENCE in the axillary skin  · 2014 (?) Surgical resection of the axillary skin pathology demonstrating tumor at cauterized margin  · 2015 (?) YVETTE/BSO  · 09/19/2016- Bilateral exchange, nipple reconstruction and fat grafting and removal of PAC  · 2016- Switched to aromatase inhibitor patient was non compliant  · 01/05/2017- METASTATIC DISEASE Presented with respiratory failure. CTA Pulmonary showed numerous nodules and masses throughout both lung fields, compatible with metastatic disease. Bilateral hilar adenopathy seen. · 01/11/2017-06/17/2018- Single agent Abraxane x 13 cycles  · 03/15/2017- CT Chest W Contrast Interval decrease in maximal diameter of essentially all the multiple metastatic pulmonary nodules. The average difference is approximately 25%. Some of the much smaller ones have disappeared. Bilateral hilar and aortopulmonary window adenopathy is also similarly smaller. · 06/07/2017- CT Chest W Contrast Multiple lung nodules seen bilaterally. Most of the lung nodules show interval improvement with decreased size by 1 to 3mm. Mediastinal and bilateral hilar adenopathy seen with improvement. · 07/24/2017- PET/CT scan showed marked improvement, is minimal abnormal, has excellent response to therapy  · 02/01/2018- PET/CT scan Numerous bilateral lung masses and nodules, the majority of which do not have appreciable abnormal FDG uptake. The largest mass in the left infrahilar region of the left lower lobe demonstrates a maximum SUV of 3.3. Mediastinal lymphadenopathy. No evidence of metastatic disease in the abdomen or pelvis.    · 06/21/2018- CT Chest/Abdomen/Pelvis Multiple lung mets, mild bilateral hilar and mediastinal lymph nodes. No masses or evidence of metastatic disease in the abdomen or pelvis  · 06/27/2018-09/17/2018- Gemzar  · 09/28/2018- CT Pulmonary Multiple lung mets, mild bilateral hilar and mediastinal lymph nodes  · 10/10/2018-12/21/2020- Faslodex every 4 weeks  · 12/14/2018- CT Chest showed definite decrease in the maximal diameter and volume of all left and right metastatic nodules. Somewhat enlarged paratracheal and left axillary lymph nodes are relatively unchanged. · 04/22/2019- MRI Cervical Spine W WO Contrast Unremarkable  · 04/22/2019- MRI Brain W WO Contrast No enhancing lesions in the brain and no evidence of metastatic disease. · 07/27/2019- CTA Pulmonary showed essentially complete disappearance of the pulmonary metastatic disease. Several tiny flat peripheral nodules are the only sequela. The tiny ones on the right are unchanged, the tiny ones on the left are even smaller. There is no longer any active metastatic disease in either lung. · 12/19/2019- CT Chest/Abdomen/Pelvis Small, tiny subpleural nodules right upper and right midlung field as well as left lung base has remained unchanged. No new focal parenchymal abnormality seen. No adenopathy seen. There is no abdominal or pelvic mass, adenopathy or fluid collection. No lytic or sclerotic bony lesions, but there is a possibility of osteopenia and/or osteoporosis. · 02/17/2020- DEXA Bone Density showed osteopenia of lumbar spine, T-score -1.8, and left femoral neck, T-score -2.0  · 2/25/21 Ct Abdomen Pelvis W Iv Contrast  No evidence of metastatic disease in the abdomen or pelvis. · 2/26/21 Ct Chest W Contrast Tiny nodules in the right lung described above probably represent small foci of pleural thickening due to chronic inflammatory process or small noncalcified granulomas. No features to suggest malignancy or metastatic disease. Bilateral breast implants without complication.    · 3/1/2021-discussed the results CT chest abdomen pelvis.  Essentially no clear evidence of metastatic disease.  This is consistent with excellent response to treatment.  Continue current treatment. · 4/1/2021 = 31.6 CA 27-29= 33.5 CEA= 1.6   · 6/3/21 CA 15-3= 23.3 CA 27-29= 25.6  CEA= 1.8   · 7/29/2021 CEA=1.7 CA 15-3=21.50 CA 27.29= 26.0  · 8/19/2021 CT Chest  Left lower lobe pleural-based nodular 1.7 x 0.9 cm is indeterminate. PET/CT recommended. Feeding defect in the left lower lobe bronchus versus a postobstructive airspace disease. This too may be further characterized with PET CT versus direct visualization. · 8/19/2021 CT Abd/Pelvis A stable CT scan of the abdomen and pelvis.  No finding to suggest neoplastic/metastatic disease.     CA 27.29 Dynamics  01/21/2021- 20.3  04/01/2021 33.5  06/03/2021 25.6  07/29/2021 26.0       Past Medical History:    Past Medical History:   Diagnosis Date    Breast cancer ([facility] [address]     Breast cancer with lung mets (RIGHT)    Chronic back pain     GERD (gastroesophageal reflux disease)     Hx of blood clots     Hypertension     Neuropathy     Post chemo treatment neuropathy       Past Surgical History:    Past Surgical History:   Procedure Laterality Date    BREAST LUMPECTOMY Right     2006    BREAST RECONSTRUCTION Left 09/17/2021    Revision left reconstructed breast, implant performed by Juan Manuel Fermin MD at [address] [place] [address]  2016    Bilateral breast implants    EMBOLECTOMY Left 9/26/2021    LEFT UPPER EXTREMITY  MECHANICAL SUCTION THROMBECTOMY performed by Suyapa Stoll DO at [facility], TOTAL ABDOMINAL  2015    MASTECTOMY, BILATERAL      Right 2013 & Left 2016    TUNNELED VENOUS PORT PLACEMENT      VASCULAR SURGERY N/A 10/6/2021    REMOVAL OF MEDIPORT performed by Suyapa Stoll DO at [facility] OR       Social History:    Smoking status: No  ETOH status: No  Resides: [place]    Family History:   Family History   Problem Relation Age of Onset    Hypertension Mother     Obesity Mother     Prostate Cancer Father     No Known Problems Sister     No Known Problems Daughter     No Known Problems Daughter        Current Hospital Medications:    Current Facility-Administered Medications   Medication Dose Route Frequency Provider Last Rate Last Admin    lidocaine PF 1 % injection 5 mL  5 mL IntraDERmal Once Michael Sy MD        sodium chloride flush 0.9 % injection 5-40 mL  5-40 mL IntraVENous 2 times per day Michael Sy MD   10 mL at 10/08/21 1211    sodium chloride flush 0.9 % injection 5-40 mL  5-40 mL IntraVENous PRN Michael Sy MD        0.9 % sodium chloride infusion  25 mL IntraVENous PRN Michael Sy MD        ketorolac (TORADOL) injection 30 mg  30 mg IntraVENous Q8H PRN Barbie Walker, DO   30 mg at 10/09/21 0528    apixaban (ELIQUIS) tablet 5 mg  5 mg Oral BID Barbie Walker, DO   5 mg at 10/08/21 2059    famotidine (PEPCID) tablet 20 mg  20 mg Oral Daily Barbie Walker, DO   20 mg at 10/08/21 1205    HYDROcodone-acetaminophen (Carissa Churn)  MG per tablet 1 tablet  1 tablet Oral Q6H PRN Barbie Walker, DO   1 tablet at 10/08/21 1210    lisinopril (PRINIVIL;ZESTRIL) tablet 10 mg  10 mg Oral Daily [facility] Ivanukoff, DO   10 mg at 10/08/21 1205    meloxicam (MOBIC) tablet 15 mg  15 mg Oral Daily [facility] Ivanukoff, DO   15 mg at 10/08/21 1205    pantoprazole (PROTONIX) tablet 40 mg  40 mg Oral Daily [facility] Ivanukoff, DO   40 mg at 10/08/21 1205    sodium hypochlorite (DAKINS) 0.125 % external solution   Irrigation BID BRITANY Turner   Given at 10/08/21 2110    vancomycin (VANCOCIN) 1,250 mg in dextrose 5 % 250 mL IVPB  1,250 mg IntraVENous Q12H BRITANY Turner   Stopped at 10/09/21 0524    vancomycin (VANCOCIN) intermittent dosing (placeholder)   Other RX Placeholder BRITANY Turner        palbociclib [place]) tablet TABS 125 mg  125 mg Oral Daily [place] Vivien   125 mg at 10/08/21 1205       Allergies: Allergies   Allergen Reactions    Clindamycin/Lincomycin Hives, Itching and Rash         Objective   BP (!) 146/94   Pulse 85   Temp 96.7 °F (35.9 °C) (Temporal)   Resp 16   Ht 5' 6\" (1.676 m)   Wt 172 lb 12.8 oz (78.4 kg)   SpO2 100%   BMI 27.89 kg/m²     PHYSICAL EXAM:  CONSTITUTIONAL: Alert, appropriate, no acute distress  EYES: Non icteric, EOM intact, pupils equal round   ENT: Mucus membranes moist, no oral pharyngeal lesions, external inspection of ears and nose are normal  NECK: Supple, no masses. No palpable thyroid mass  CHEST/LUNGS: CTA bilaterally, normal respiratory effort. Dressing left upper chest is clean. CARDIOVASCULAR: RRR, no murmurs. No lower extremity edema  ABDOMEN: soft non-tender, active bowel sounds, no HSM. No palpable masses  EXTREMITIES: PICC line right upper extremity, warm, full ROM in all 4 extremities, no focal weakness. LYMPH: No cervical, clavicular, axillary, or inguinal lymphadenopathy  NEUROLOGIC: follows commands, non focal   PSYCH: mood and affect appropriate.  Alert and oriented to time, place, person        LABORATORY RESULTS REVIEWED/ANALYZED BY ME:  Recent Labs     10/09/21  0346 10/05/21  1554 09/27/21  0601   WBC 4.0* 6.5 7.5   HGB 8.7* 11.2* 9.6*   HCT 27.4* 34.5* 30.2*   .8* 109.9* 113.5*    551* 224       Lab Results   Component Value Date     10/09/2021    K 4.4 10/09/2021     10/09/2021    CO2 26 10/09/2021    BUN 14 10/09/2021    CREATININE 0.6 10/09/2021    GLUCOSE 103 10/09/2021    CALCIUM 8.3 (L) 10/09/2021    PROT 7.1 09/24/2021    LABALBU 4.1 09/24/2021    BILITOT 0.7 09/24/2021    ALKPHOS 99 09/24/2021    AST 22 09/24/2021    ALT 29 09/24/2021    LABGLOM >60 10/09/2021    GFRAA >59 10/09/2021    AGRATIO 1.6 04/01/2021    GLOB 3.3 08/27/2021       Lab Results   Component Value Date    INR 1.20 (H) 10/06/2021    INR 1.23 (H) 10/05/2021    INR 1.02 09/24/2021    PROTIME 15.4 (H) 10/06/2021    PROTIME 15.7 (H) 10/05/2021    PROTIME 13.6 09/24/2021       RADIOLOGY STUDIES REPORT/REVIEWED AND INTERPRETED BY ME:  XR CHEST (2 VW)    Result Date: 10/5/2021  XR CHEST (2 VW) 10/5/2021 4:04 PM History: Preop vascular surgery procedure. Two-view chest x-ray. Comparison is made with September 24, 2021. Heart size is within normal limits. The mediastinum has a normal appearance. The lungs are adequately expanded with no pneumonia or pneumothorax. There is mild chronic interstitial disease with scattered calcified granulomas. There is no significant pleural fluid. No congestive failure changes. Unchanged left chest wall port. 1. No acute disease. Signed by Dr Baldomero Herrera    Result Date: 9/30/2021  EXAM: CT CHEST W CONTRAST -- 9/30/2021 8:52 AM HISTORY: 49 years, Female, lung nodule, breast cancer COMPARISON: 8/19/2021 DLP: 543 mGy cm. Automated exposure control was utilized to minimize patient radiation dose. TECHNIQUE: Unenhanced CT images obtained with multiplanar reformats. FINDINGS: AIRWAYS/PULMONARY PARENCHYMA: The central airways are midline and patent. No convincing small airway filling defect on today's exam at the left lower lobe. Redemonstration 16 x 7 mm pulmonary nodule at the medial aspect left lower lobe on axial series 3, image 69, previously reported as 9 x 17. On today's exam, this appears less rounded. No new suspicious pulmonary finding. No pleural effusion. No pneumothorax. VASCULATURE: Limited assessment of vasculature without intravenous contrast. Thoracic aorta is normal in course and caliber. Normal pulmonary artery caliber. Left jugular vein stent. CARDIAC:  Cardiac size is normal.  No pericardial effusion. Scattered coronary artery calcifications. MEDIASTINUM: There is no mediastinal or hilar lymphadenopathy by CT size criteria.  Esophagus has normal coarse, caliber and wall thickness. EXTRATHORACIC: The visualized portions of the thyroid gland are unremarkable. No thoracic inlet or axillary adenopathy. Port at the left chest wall, catheter tip in the lower SVC. Bilateral breast implants. Left chest wall collaterals suggests chronic left subclavian vein stenosis. INCLUDED UPPER ABDOMEN: Visualized portion of the liver, gallbladder, pancreas, spleen, adrenal glands and upper kidneys appear within normal limits. OSSEOUS: Normal     1. Similar left lower lobe pleural-based nodule or opacity. This appears less rounded than prior. 2. Left chest wall collaterals suggest chronic left subclavian vein stenosis. 3. Coronary artery calcifications. Signed by Dr Liv Gage    VL EXTREMITY VENOUS LEFT    Result Date: 10/6/2021  Vascular Upper Extremities Veins Procedure  Demographics   Patient Name    Martha Villareal Age                  49   Patient Number  [id number]          Gender               Female   Visit Number    [id number]       Interpreting         Lavern Jefferson                                  Physician   Date of Birth   1965      Referring Physician  Aryan Car   Accession       [id number]      [address], RVT  Number  Procedure Type of Study:   Veins:Upper Extremities Veins, US Doppler Venous Upper Extremity  Unilateral.  Indications for Study:F/U DVT left upper extremity. Impression   Chronic appearing deep vein thrombosis (DVT) is seen in the internal  jugular vein brachial , left upper extremity(ies). Subacute appearing superficial thrombophlebitis (SVT) is seen in the  basilic vein, left upper extremity(ies).        VL Extremity Venous Left    Result Date: 9/24/2021  Vascular Upper Extremities Veins Procedure  Demographics   Patient Name    Martha Villareal Age                   49   Patient Number  [id number]          Gender                Female   Visit Number    [id number]       Interpreting          Kira Osorio [facility]                                  Physician   Date of Birth   1965      Referring Physician   Juan Luis Vann. [facility]   Accession       [id number]      [address] [place], RVT  Number  Procedure Type of Study:   Veins:Upper Extremities Veins, UE VENOUS UNILATERAL/FLU. Indications for Study:Arm swelling and Arm pain. Impression   Acute appearing deep vein thrombosis (DVT) is seen in the internal jugular  vein subclavian axillary brachial ulnar , left upper extremity(ies). Superficial venous thrombosis is visualized in the basilic and cephalic  vein of the left upper extremity. XR CHEST PORTABLE    Result Date: 9/24/2021  XR CHEST PORTABLE 9/24/2021 8:00 PM HISTORY: Chest pain COMPARISON: 7/28/2019 CXR: A single frontal view of the chest is performed. Findings: Lungs are hypoventilated. Bilateral breast implants with associated soft tissue attenuation. No pulmonary consolidation or edema. Cardiac and mediastinal contours normal. No pleural effusion or pneumothorax. Left subclavian port in place with tip overlying the SVC. Right axillary surgical clips. 1. Bilateral breast implants. No acute pulmonary process identified. Left subclavian port. Signed by Dr Reddy Bolus      ASSESSMENT:    #Port related infection  S/p Mediport removal per Dr. Jessie Mcgrath 10/06/21. Continue Vancomycin  Port tip culture-in process  Left chest wall wound cultures on 10/6/2021-many WBCs with no organisms seen, culture no growth to date. Left chest wall wound recultured on 10/8/2021-Gram stain result no organisms seen with few WBCs and few RBCs.     Left Chest Wound: L=1.0cmXW=6.0cmXD=4.5cm      Photo taken by Prem Sims RN on 10/8/2020 at 9102      #Acute left upper extremity DVT/left upper chest wall port      A noninvasive venous study of the left upper extremity documented the following:  · Acute appearing deep vein thrombosis (DVT) is seen in the internal jugular vein subclavian axillary brachial ulnar , left upper extremity(ies). · Superficial venous thrombosis is visualized in the basilic and cephalic vein of the left upper extremity.     She was admitted and placed on IV heparin drip, currently receiving Eliquis     A preliminary prothrombotic work-up with antiphospholipid syndrome testing will be initiated, further serological work-up will be done in outpatient setting under the direction of Dr. Tompkins [place] underwent partial left upper extremity mechanical suction thrombectomy 9/26/2021 by Dr. Colby Mazariegos     She is currently treated with Eliquis 10 mg po BID.  10/6/2021-she underwent a left upper extremity venous mechanical thrombectomy. Mechanical thrombectomy of the area with good resolution of the thrombus. S/p Mediport removal per Dr. Xu Toro 10/06/21. Resume Eliquis    Stage IV Metastatic breast cancer, hormone sensitive  2/25/2021-discussed the results of CT chest abdomen pelvis.  No evidence of disease progression.  In fact, excellent response with no measurable metastatic disease at this time. 1/21/2021-CA 15-3 = 23, CA 27-29 = 20.3. Currently Faslodex/Ibrance. 6/3/2021-CA 15-3-23, CA 27-29-25, CEA 1.8  Regimen:  Faslodex to 500 mg subcutaneous every 4 weeks. Continue Ibrance days 1-21 q. 28 days  8/19/2021-CT chest/abdomen/pelvis-no evidence of metastatic disease.  Findings of a pulmonary lung nodule in the left lower lobe   Essentially, good response to Faslodex/Ibrance. Last cancer marker 7/29/2021 were normal.     Macrocytic anemia with B12 deficiency   B12 levels 189.    B12 injections were initiated 9/26/2021 (1000 mcg) with Faslodex. 3/1/2021- Methylmalonic Acid 171, Vitamin B12 >2000.   Defer to outpatient    Requested iron substrates on 10/9/2021:  In process  · Ferritin  · Iron  · Iron saturation  · TIBC  · Folate  · Absolute reticulocyte count  · Vitamin B12    Requested occult stool on 10/9/2021    Left lower lobe lung nodule  She is currently being seen by pulmonary at [facility]. They believe that this could be inflammatory. She received a trial of antibiotics and will have a repeat CT scan with them. She is a scheduled for a repeat CT scan January 2022    GERD  Possible decreased efficacy of Ibrance when taken with Protonix  Patient states GERD is unrelieved with Protonix  Rx Pepcid [facility]    Poor IV access-portion, the only available site for a PICC insertion is the right upper extremity, same site of prior mastectomy. Okay to use this for IV midline placement. Discussed with the patient. PICC line placed in right basilic vein via vascular services on 10/8/2021    PLAN:  Antibiotic as per hospitalist/ID, currently vancomycin and Merrem  Continue  Ibrance and Eliquis  Continue supportive care  Requested iron substrates and occult stools      (Please note that portions of this note were completed with a voice recognition program. Efforts were made to edit the dictations but occasionally words are mis-transcribed.)        Samuel Bell, BRITANY    10/09/21  8:02 AM   Physician's attestation/substantial contribution:  I, Dr Brandyn Johnson, independently performed an evaluation on Danielle Hero. I have reviewed relevant medical information/data to include but not limited to medication list, relevant appropriate labs and imaging when applicable. I reviewed other physician's notes, ancillary services and nurses assessment. I have reviewed the above documentation completed by the Nurse Practitioner or Physician Assistant. Please see my additional contributions to the history of present illness, physical examination, and assessment/medical decision-making that reflect my findings and impressions. I discussed essential elements of the care plan with the NP or PA and the patient as well. I answered all the questions to the patient's satisfaction. I concur with above stated. Subjective-she has no new complaints today.   She is satisfied

## 2021-10-10 ENCOUNTER — CLINICAL DOCUMENTATION (OUTPATIENT)
Dept: ONCOLOGY | Age: 56
End: 2021-10-10

## 2021-10-10 LAB
ANAEROBIC CULTURE: NORMAL
CULTURE SURGICAL: NORMAL
FERRITIN: 92.6 NG/ML (ref 13–150)
FOLATE: 4.6 NG/ML (ref 4.8–37.3)
GRAM STAIN RESULT: NORMAL
IRON SATURATION: 17 % (ref 14–50)
IRON: 41 UG/DL (ref 37–145)
LACTATE DEHYDROGENASE: 139 U/L (ref 91–215)
TOTAL IRON BINDING CAPACITY: 242 UG/DL (ref 250–400)
VITAMIN B-12: 154 PG/ML (ref 211–946)

## 2021-10-10 PROCEDURE — 2580000003 HC RX 258: Performed by: INTERNAL MEDICINE

## 2021-10-10 PROCEDURE — 6360000002 HC RX W HCPCS: Performed by: INTERNAL MEDICINE

## 2021-10-10 PROCEDURE — 6370000000 HC RX 637 (ALT 250 FOR IP): Performed by: SURGERY

## 2021-10-10 PROCEDURE — 82746 ASSAY OF FOLIC ACID SERUM: CPT

## 2021-10-10 PROCEDURE — 83550 IRON BINDING TEST: CPT

## 2021-10-10 PROCEDURE — 99024 POSTOP FOLLOW-UP VISIT: CPT | Performed by: SURGERY

## 2021-10-10 PROCEDURE — 83540 ASSAY OF IRON: CPT

## 2021-10-10 PROCEDURE — 6360000002 HC RX W HCPCS: Performed by: SURGERY

## 2021-10-10 PROCEDURE — 82607 VITAMIN B-12: CPT

## 2021-10-10 PROCEDURE — 1210000000 HC MED SURG R&B

## 2021-10-10 PROCEDURE — 82728 ASSAY OF FERRITIN: CPT

## 2021-10-10 PROCEDURE — 99232 SBSQ HOSP IP/OBS MODERATE 35: CPT | Performed by: INTERNAL MEDICINE

## 2021-10-10 PROCEDURE — 83615 LACTATE (LD) (LDH) ENZYME: CPT

## 2021-10-10 PROCEDURE — 36415 COLL VENOUS BLD VENIPUNCTURE: CPT

## 2021-10-10 RX ORDER — ONDANSETRON 2 MG/ML
4 INJECTION INTRAMUSCULAR; INTRAVENOUS EVERY 6 HOURS PRN
Status: DISCONTINUED | OUTPATIENT
Start: 2021-10-10 | End: 2021-10-13 | Stop reason: HOSPADM

## 2021-10-10 RX ORDER — CYANOCOBALAMIN 1000 UG/ML
1000 INJECTION INTRAMUSCULAR; SUBCUTANEOUS DAILY
Status: DISCONTINUED | OUTPATIENT
Start: 2021-10-11 | End: 2021-10-13 | Stop reason: HOSPADM

## 2021-10-10 RX ORDER — CYANOCOBALAMIN 1000 UG/ML
1000 INJECTION INTRAMUSCULAR; SUBCUTANEOUS DAILY
Status: DISCONTINUED | OUTPATIENT
Start: 2021-10-11 | End: 2021-10-10

## 2021-10-10 RX ORDER — CYANOCOBALAMIN 1000 UG/ML
2000 INJECTION INTRAMUSCULAR; SUBCUTANEOUS ONCE
Status: COMPLETED | OUTPATIENT
Start: 2021-10-10 | End: 2021-10-11

## 2021-10-10 RX ORDER — HYDRALAZINE HYDROCHLORIDE 20 MG/ML
5 INJECTION INTRAMUSCULAR; INTRAVENOUS EVERY 6 HOURS PRN
Status: DISCONTINUED | OUTPATIENT
Start: 2021-10-10 | End: 2021-10-13 | Stop reason: HOSPADM

## 2021-10-10 RX ORDER — CYANOCOBALAMIN 1000 UG/ML
1000 INJECTION INTRAMUSCULAR; SUBCUTANEOUS WEEKLY
Status: DISCONTINUED | OUTPATIENT
Start: 2021-10-10 | End: 2021-10-10

## 2021-10-10 RX ORDER — CYANOCOBALAMIN 1000 UG/ML
1000 INJECTION INTRAMUSCULAR; SUBCUTANEOUS
Status: DISCONTINUED | OUTPATIENT
Start: 2021-10-10 | End: 2021-10-10

## 2021-10-10 RX ADMIN — PANTOPRAZOLE SODIUM 40 MG: 40 TABLET, DELAYED RELEASE ORAL at 09:32

## 2021-10-10 RX ADMIN — HYDROCODONE BITARTRATE AND ACETAMINOPHEN 1 TABLET: 10; 325 TABLET ORAL at 09:30

## 2021-10-10 RX ADMIN — FAMOTIDINE 20 MG: 20 TABLET ORAL at 09:32

## 2021-10-10 RX ADMIN — ONDANSETRON HYDROCHLORIDE 4 MG: 2 SOLUTION INTRAMUSCULAR; INTRAVENOUS at 19:54

## 2021-10-10 RX ADMIN — APIXABAN 5 MG: 5 TABLET, FILM COATED ORAL at 22:11

## 2021-10-10 RX ADMIN — SODIUM CHLORIDE, PRESERVATIVE FREE 10 ML: 5 INJECTION INTRAVENOUS at 11:45

## 2021-10-10 RX ADMIN — APIXABAN 5 MG: 5 TABLET, FILM COATED ORAL at 09:31

## 2021-10-10 RX ADMIN — SODIUM HYPOCHLORITE: 1.25 SOLUTION TOPICAL at 11:46

## 2021-10-10 RX ADMIN — SODIUM HYPOCHLORITE: 1.25 SOLUTION TOPICAL at 22:15

## 2021-10-10 RX ADMIN — MELOXICAM 15 MG: 7.5 TABLET ORAL at 09:31

## 2021-10-10 RX ADMIN — WATER 1000 MG: 1 INJECTION INTRAMUSCULAR; INTRAVENOUS; SUBCUTANEOUS at 15:27

## 2021-10-10 RX ADMIN — HYDRALAZINE HYDROCHLORIDE 5 MG: 20 INJECTION INTRAMUSCULAR; INTRAVENOUS at 19:55

## 2021-10-10 RX ADMIN — HYDROCODONE BITARTRATE AND ACETAMINOPHEN 1 TABLET: 10; 325 TABLET ORAL at 15:27

## 2021-10-10 RX ADMIN — LISINOPRIL 10 MG: 10 TABLET ORAL at 09:31

## 2021-10-10 ASSESSMENT — PAIN SCALES - GENERAL
PAINLEVEL_OUTOF10: 6
PAINLEVEL_OUTOF10: 6

## 2021-10-10 NOTE — PROGRESS NOTES
MEDICAL ONCOLOGY PROGRESS NOTE    Pt Name: Wilmer Everett  MRN: [id number]  YOB: 1965  Date of evaluation: 10/10/2021    Subjective: Reports feeling somewhat better. Continues to have some discomfort to tape to right upper extremity PICC site. HISTORY OF PRESENT ILLNESS:  Raeann Garibay is a very pleasant 49years old female who has been treated for a diagnosed stage IV metastatic breast cancer. She developed acute DVT of the left upper extremity related to prior port placement. 9/26/2021-she underwent a venous mechanical thrombectomy by Dr. Laureen Angeles. She was found to have significant left subclavian vein stenosis. The patient presented to the hospital with complaints of redness/tenderness of her port site and also fever 101.  10/6/2021-she underwent a left upper extremity venous mechanical thrombectomy. Mechanical thrombectomy of the area with good resolution of the thrombus. Plan is to proceed with port removal and subsequent balloon angioplasty with possible stenting.         Diagnosis  · Grade III Adenocarcinoma of right breast diagnosed 2006  · Stage IIIA, [id number]  · ER/LA Positive, HER-2 bruce/ihc 1+  · April 2013-RECURRENCE in the right axillary region  · Genetic testing- BRCA 1&2 Negative  · 2014 (?) RECURRENCE in the axillary skin  · 01/05/2017- METASTATIC DISEASE with lung, hilar, and axillary lymph node mets  · Osteopenia     Treatment Summary  · 2006- Neoadjuvant chemotherapy with AC x 4 cycles followed by Taxol  · 2007- Lumpectomy with axillary lymph node dissection  · 2007- Adjuvant radiation therapy to whole breast and axillary region  · Did not take tamoxifen due to cost  · April 2013- RECURRENCE  · Neoadjuvant chemotherapy with 2 cycles of Taxotere followed by Doxil  · 10/14/2013- Right Breast Mastectomy and Axillary Dissection  · 01/23/2014- Completion of adjuvant RT to chest wall and axillary lymph node with Dr. Charletta Castleman  · 02/14- Initiated adjuvant endocrine therapy with tamoxifen  · 2014 (?) RECURRENCE in the axillary skin  · 2014 (?) Surgical resection of the axillary skin  · 2015 (?) YVETTE/BSO  · 09/19/2016- Bilateral exchange, nipple reconstruction and fat grafting and removal of PAC   · 2016- Switched to aromatase inhibitor  · 01/05/2017- METASTATIC DISEASE with lung, hilar, and axillary mets  · 01/11/2017-06/17/2018- Single agent Abraxane x 13 cycles  · 06/27/2018-09/17/2018- Gemzar  · 10/10/2018-Faslodex every 4 weeks/palbociclib     Cancer History:  Sergio Magallanes was first seen by me on 1/21/2021. Tan Perez was referred to [facility] of care for history of recurrent metastatic breast cancer. Tan Perez has been in remission as per the latest CT scan. Tan Perez is currently on Faslodex and palbociclib.  She has received several lines therapy as described below.  She moved from [place] to [place] to stay closer to her parents. Hugh Caldwell medical history is complex.  Further details as below. · 2006- Neoadjuvant chemotherapy with AC x 4 cycles followed by [facility] was complicated by viral meningitis; patient experienced neuropathy after 3 doses of Taxol and was switched to Taxotere for last dose  · 2007- Lumpectomy with axillary lymph node dissection 1.9cm, grade 2. No LVI. 1 of 14 lymph nodes positive for malignancy (1/14) [id number]  · 2007- Adjuvant Radiation Therapy to whole breast and axillary region  · April 2013- RECURRENCE presenting as mass in the axillary region  · Neoadjuvant chemotherapy with 2 cycles of Taxotere followed by Doxil- did not have good response to treatment  · 2013- Preop PET/CTshowed 2.8cm, right axillary lymph node with uptake. · 10/14/2013- Right Breast Mastectomy and Axillary Dissection Right breast had benign tissue with fibrous, negative for carcinoma. Axillary contents demonstrated invasive ductal carcinoma, involving fibroadipose tissue and tendinous tissue. 3 benign lymph nodes (0/3). Yusra grade 3. 3.0cm in greatest gross dimensions.  Carcinoma permeates among soft tissues and in the right axilla with no apparent involvement of lymph nodes. · 01/23/2014- Completion of adjuvant radiation therapy to chest wall and axillary lymph node with Dr. Crow Cedeño  · 02/14- Initiated adjuvant endocrine therapy with tamoxifen  · 2014 (?) RECURRENCE in the axillary skin  · 2014 (?) Surgical resection of the axillary skin pathology demonstrating tumor at cauterized margin  · 2015 (?) YVETTE/BSO  · 09/19/2016- Bilateral exchange, nipple reconstruction and fat grafting and removal of PAC  · 2016- Switched to aromatase inhibitor patient was non compliant  · 01/05/2017- METASTATIC DISEASE Presented with respiratory failure. CTA Pulmonary showed numerous nodules and masses throughout both lung fields, compatible with metastatic disease. Bilateral hilar adenopathy seen. · 01/11/2017-06/17/2018- Single agent Abraxane x 13 cycles  · 03/15/2017- CT Chest W Contrast Interval decrease in maximal diameter of essentially all the multiple metastatic pulmonary nodules. The average difference is approximately 25%. Some of the much smaller ones have disappeared. Bilateral hilar and aortopulmonary window adenopathy is also similarly smaller. · 06/07/2017- CT Chest W Contrast Multiple lung nodules seen bilaterally. Most of the lung nodules show interval improvement with decreased size by 1 to 3mm. Mediastinal and bilateral hilar adenopathy seen with improvement. · 07/24/2017- PET/CT scan showed marked improvement, is minimal abnormal, has excellent response to therapy  · 02/01/2018- PET/CT scan Numerous bilateral lung masses and nodules, the majority of which do not have appreciable abnormal FDG uptake. The largest mass in the left infrahilar region of the left lower lobe demonstrates a maximum SUV of 3.3. Mediastinal lymphadenopathy. No evidence of metastatic disease in the abdomen or pelvis.    · 06/21/2018- CT Chest/Abdomen/Pelvis Multiple lung mets, mild bilateral hilar and mediastinal lymph nodes. No masses or evidence of metastatic disease in the abdomen or pelvis  · 06/27/2018-09/17/2018- Gemzar  · 09/28/2018- CT Pulmonary Multiple lung mets, mild bilateral hilar and mediastinal lymph nodes  · 10/10/2018-12/21/2020- Faslodex every 4 weeks  · 12/14/2018- CT Chest showed definite decrease in the maximal diameter and volume of all left and right metastatic nodules. Somewhat enlarged paratracheal and left axillary lymph nodes are relatively unchanged. · 04/22/2019- MRI Cervical Spine W WO Contrast Unremarkable  · 04/22/2019- MRI Brain W WO Contrast No enhancing lesions in the brain and no evidence of metastatic disease. · 07/27/2019- CTA Pulmonary showed essentially complete disappearance of the pulmonary metastatic disease. Several tiny flat peripheral nodules are the only sequela. The tiny ones on the right are unchanged, the tiny ones on the left are even smaller. There is no longer any active metastatic disease in either lung. · 12/19/2019- CT Chest/Abdomen/Pelvis Small, tiny subpleural nodules right upper and right midlung field as well as left lung base has remained unchanged. No new focal parenchymal abnormality seen. No adenopathy seen. There is no abdominal or pelvic mass, adenopathy or fluid collection. No lytic or sclerotic bony lesions, but there is a possibility of osteopenia and/or osteoporosis. · 02/17/2020- DEXA Bone Density showed osteopenia of lumbar spine, T-score -1.8, and left femoral neck, T-score -2.0  · 2/25/21 Ct Abdomen Pelvis W Iv Contrast  No evidence of metastatic disease in the abdomen or pelvis. · 2/26/21 Ct Chest W Contrast Tiny nodules in the right lung described above probably represent small foci of pleural thickening due to chronic inflammatory process or small noncalcified granulomas. No features to suggest malignancy or metastatic disease. Bilateral breast implants without complication.    · 3/1/2021-discussed the results CT chest abdomen pelvis.  Essentially no clear evidence of metastatic disease.  This is consistent with excellent response to treatment.  Continue current treatment. · 4/1/2021 = 31.6 CA 27-29= 33.5 CEA= 1.6   · 6/3/21 CA 15-3= 23.3 CA 27-29= 25.6  CEA= 1.8   · 7/29/2021 CEA=1.7 CA 15-3=21.50 CA 27.29= 26.0  · 8/19/2021 CT Chest  Left lower lobe pleural-based nodular 1.7 x 0.9 cm is indeterminate. PET/CT recommended. Feeding defect in the left lower lobe bronchus versus a postobstructive airspace disease. This too may be further characterized with PET CT versus direct visualization. · 8/19/2021 CT Abd/Pelvis A stable CT scan of the abdomen and pelvis.  No finding to suggest neoplastic/metastatic disease.     CA 27.29 Dynamics  01/21/2021- 20.3  04/01/2021 33.5  06/03/2021 25.6  07/29/2021 26.0       Past Medical History:    Past Medical History:   Diagnosis Date    Breast cancer ([facility] [address]     Breast cancer with lung mets (RIGHT)    Chronic back pain     GERD (gastroesophageal reflux disease)     Hx of blood clots     Hypertension     Neuropathy     Post chemo treatment neuropathy       Past Surgical History:    Past Surgical History:   Procedure Laterality Date    BREAST LUMPECTOMY Right     2006    BREAST RECONSTRUCTION Left 09/17/2021    Revision left reconstructed breast, implant performed by Hernandez Stevens MD at [address] [place] [address]  2016    Bilateral breast implants    EMBOLECTOMY Left 9/26/2021    LEFT UPPER EXTREMITY  MECHANICAL SUCTION THROMBECTOMY performed by Dimitri Rodriguez DO at [facility], TOTAL ABDOMINAL  2015    MASTECTOMY, BILATERAL      Right 2013 & Left 2016    TUNNELED VENOUS PORT PLACEMENT      VASCULAR SURGERY N/A 10/6/2021    REMOVAL OF MEDIPORT performed by Dimitri Rodriguez DO at [facility] OR       Social History:    Smoking status: No  ETOH status: No  Resides: [place], [place]    Family History:   Family History   Problem Relation Age of Onset    Hypertension Mother     Obesity Mother     Prostate Cancer Father     No Known Problems Sister     No Known Problems Daughter     No Known Problems Daughter        Current Hospital Medications:    Current Facility-Administered Medications   Medication Dose Route Frequency Provider Last Rate Last Admin    cefTRIAXone (ROCEPHIN) 1,000 mg in sterile water 10 mL IV syringe  1,000 mg IntraVENous Q24H Byron Joel MD   1,000 mg at 10/09/21 1556    lidocaine PF 1 % injection 5 mL  5 mL IntraDERmal Once Nick Richardson MD        sodium chloride flush 0.9 % injection 5-40 mL  5-40 mL IntraVENous 2 times per day Nick Richardson MD   10 mL at 10/09/21 0819    sodium chloride flush 0.9 % injection 5-40 mL  5-40 mL IntraVENous PRN Nick Richardson MD        0.9 % sodium chloride infusion  25 mL IntraVENous PRN Nick Richardson MD        apixaban Monty Cali) tablet 5 mg  5 mg Oral BID Shen Crafts, DO   5 mg at 10/09/21 1945    famotidine (PEPCID) tablet 20 mg  20 mg Oral Daily Bo Crafts, DO   20 mg at 10/09/21 0819    HYDROcodone-acetaminophen ([facility])  MG per tablet 1 tablet  1 tablet Oral Q6H PRN Bo Crafts, DO   1 tablet at 10/09/21 1556    lisinopril (PRINIVIL;ZESTRIL) tablet 10 mg  10 mg Oral Daily Bo Crafts, DO   10 mg at 10/09/21 0819    meloxicam (MOBIC) tablet 15 mg  15 mg Oral Daily [facility], DO   15 mg at 10/09/21 0818    pantoprazole (PROTONIX) tablet 40 mg  40 mg Oral Daily [facility], DO   40 mg at 10/09/21 0818    sodium hypochlorite (DAKINS) 0.125 % external solution   Irrigation BID BRITANY Ponce   Given at 10/09/21 1947    palbociclib (IBRANCE) tablet TABS 125 mg  125 mg Oral Daily Nick Richardson MD   125 mg at 10/09/21 0819       Allergies:    Allergies   Allergen Reactions    Clindamycin/Lincomycin Hives, Itching and Rash         Objective   /65   Pulse 67   Temp 97.1 °F (36.2 °C) (Temporal) Resp 16   Ht 5' 6\" (1.676 m)   Wt 179 lb (81.2 kg)   SpO2 100%   BMI 28.89 kg/m²     PHYSICAL EXAM:  CONSTITUTIONAL: Alert, appropriate, no acute distress  EYES: Non icteric, EOM intact, pupils equal round   ENT: Mucus membranes moist, no oral pharyngeal lesions, external inspection of ears and nose are normal  NECK: Supple, no masses. No palpable thyroid mass  CHEST/LUNGS: CTA bilaterally, normal respiratory effort. Dressing left upper chest is clean. CARDIOVASCULAR: RRR, no murmurs. No lower extremity edema  ABDOMEN: soft non-tender, active bowel sounds, no HSM. No palpable masses  EXTREMITIES: PICC line right upper extremity, warm, full ROM in all 4 extremities, no focal weakness. LYMPH: No cervical, clavicular, axillary, or inguinal lymphadenopathy  NEUROLOGIC: follows commands, non focal   PSYCH: mood and affect appropriate.  Alert and oriented to time, place, person        LABORATORY RESULTS REVIEWED/ANALYZED BY ME:  Recent Labs     10/09/21  0346 10/05/21  1554 09/27/21  0601   WBC 4.0* 6.5 7.5   HGB 8.7* 11.2* 9.6*   HCT 27.4* 34.5* 30.2*   .8* 109.9* 113.5*    551* 224       Lab Results   Component Value Date     10/09/2021    K 4.4 10/09/2021     10/09/2021    CO2 26 10/09/2021    BUN 14 10/09/2021    CREATININE 0.6 10/09/2021    GLUCOSE 103 10/09/2021    CALCIUM 8.3 (L) 10/09/2021    PROT 7.1 09/24/2021    LABALBU 4.1 09/24/2021    BILITOT 0.7 09/24/2021    ALKPHOS 99 09/24/2021    AST 22 09/24/2021    ALT 29 09/24/2021    LABGLOM >60 10/09/2021    GFRAA >59 10/09/2021    AGRATIO 1.6 04/01/2021    GLOB 3.3 08/27/2021       Lab Results   Component Value Date    INR 1.20 (H) 10/06/2021    INR 1.23 (H) 10/05/2021    INR 1.02 09/24/2021    PROTIME 15.4 (H) 10/06/2021    PROTIME 15.7 (H) 10/05/2021    PROTIME 13.6 09/24/2021       RADIOLOGY STUDIES REPORT/REVIEWED AND INTERPRETED BY ME:  XR CHEST (2 VW)    Result Date: 10/5/2021  XR CHEST (2 VW) 10/5/2021 4:04 PM History: Preop vascular surgery procedure. Two-view chest x-ray. Comparison is made with September 24, 2021. Heart size is within normal limits. The mediastinum has a normal appearance. The lungs are adequately expanded with no pneumonia or pneumothorax. There is mild chronic interstitial disease with scattered calcified granulomas. There is no significant pleural fluid. No congestive failure changes. Unchanged left chest wall port. 1. No acute disease. Signed by Dr Zulma Alves    Result Date: 9/30/2021  EXAM: CT CHEST W CONTRAST -- 9/30/2021 8:52 AM HISTORY: 49 years, Female, lung nodule, breast cancer COMPARISON: 8/19/2021 DLP: 543 mGy cm. Automated exposure control was utilized to minimize patient radiation dose. TECHNIQUE: Unenhanced CT images obtained with multiplanar reformats. FINDINGS: AIRWAYS/PULMONARY PARENCHYMA: The central airways are midline and patent. No convincing small airway filling defect on today's exam at the left lower lobe. Redemonstration 16 x 7 mm pulmonary nodule at the medial aspect left lower lobe on axial series 3, image 69, previously reported as 9 x 17. On today's exam, this appears less rounded. No new suspicious pulmonary finding. No pleural effusion. No pneumothorax. VASCULATURE: Limited assessment of vasculature without intravenous contrast. Thoracic aorta is normal in course and caliber. Normal pulmonary artery caliber. Left jugular vein stent. CARDIAC:  Cardiac size is normal.  No pericardial effusion. Scattered coronary artery calcifications. MEDIASTINUM: There is no mediastinal or hilar lymphadenopathy by CT size criteria. Esophagus has normal coarse, caliber and wall thickness. EXTRATHORACIC: The visualized portions of the thyroid gland are unremarkable. No thoracic inlet or axillary adenopathy. Port at the left chest wall, catheter tip in the lower SVC. Bilateral breast implants.  Left Extremities Veins, UE VENOUS UNILATERAL/FLU. Indications for Study:Arm swelling and Arm pain. Impression   Acute appearing deep vein thrombosis (DVT) is seen in the internal jugular  vein subclavian axillary brachial ulnar , left upper extremity(ies). Superficial venous thrombosis is visualized in the basilic and cephalic  vein of the left upper extremity. XR CHEST PORTABLE    Result Date: 9/24/2021  XR CHEST PORTABLE 9/24/2021 8:00 PM HISTORY: Chest pain COMPARISON: 7/28/2019 CXR: A single frontal view of the chest is performed. Findings: Lungs are hypoventilated. Bilateral breast implants with associated soft tissue attenuation. No pulmonary consolidation or edema. Cardiac and mediastinal contours normal. No pleural effusion or pneumothorax. Left subclavian port in place with tip overlying the SVC. Right axillary surgical clips. 1. Bilateral breast implants. No acute pulmonary process identified. Left subclavian port. Signed by Dr Warren Mayorga      ASSESSMENT:    #Port related infection- Streptococcus mitis/oralis   S/p Mediport removal per Dr. Samira Silver 10/06/21. Continue Vancomycin  Port tip culture on 10/6/2021 -Streptococcus mitis/oralis   Left chest wall wound cultures on 10/6/2021-many WBCs with no organisms seen, culture no growth to date. Left chest wall wound recultured on 10/8/2021-Gram stain result no organisms seen with few WBCs and few RBCs. Left Chest Wound: L=1.0cmXW=6.0cmXD=4.5cm      Photo taken by Kelly Hartley RN on 10/8/2020 at 2047      #Acute left upper extremity DVT/left upper chest wall port      A noninvasive venous study of the left upper extremity documented the following:  · Acute appearing deep vein thrombosis (DVT) is seen in the internal jugular vein subclavian axillary brachial ulnar , left upper extremity(ies).   · Superficial venous thrombosis is visualized in the basilic and cephalic vein of the left upper extremity.     She was admitted and placed on IV heparin drip, currently receiving Eliquis     A preliminary prothrombotic work-up with antiphospholipid syndrome testing will be initiated, further serological work-up will be done in outpatient setting under the direction of Dr. Marla Bobby underwent partial left upper extremity mechanical suction thrombectomy 9/26/2021 by Dr. Galina Tovar     She is currently treated with Eliquis 10 mg po BID.  10/6/2021-she underwent a left upper extremity venous mechanical thrombectomy. Mechanical thrombectomy of the area with good resolution of the thrombus. S/p Mediport removal per Dr. Andrei Pena 10/06/21. Resume Eliquis    Stage IV Metastatic breast cancer, hormone sensitive  2/25/2021-discussed the results of CT chest abdomen pelvis.  No evidence of disease progression.  In fact, excellent response with no measurable metastatic disease at this time. 1/21/2021-CA 15-3 = 23, CA 27-29 = 20.3. Currently Faslodex/Ibrance. 6/3/2021-CA 15-3-23, CA 27-29-25, CEA 1.8  Regimen:  Faslodex to 500 mg subcutaneous every 4 weeks. Continue Ibrance days 1-21 q. 28 days  8/19/2021-CT chest/abdomen/pelvis-no evidence of metastatic disease.  Findings of a pulmonary lung nodule in the left lower lobe   Essentially, good response to Faslodex/Ibrance. Last cancer marker 7/29/2021 were normal.     Macrocytic anemia with B12 deficiency   B12 levels 189.    B12 1000 mcg injections 9/26/2021- 3/1/2021  3/1/2021- Methylmalonic Acid 171, Vitamin B12 >2000.        Requested iron substrates on 10/9/2021: In process  · Ferritin 92.6  · Iron 41  · Iron saturation 17% . · TIBC 242  · Folate 4.6  · Absolute reticulocyte count  · Vitamin B12 154    Resume Paraenteral B12 replacement with 2000 mcg subcu today followed by B12 1000 mcg daily during admission and then will resume monthly treatment with next dose to be given on 10/22/2021.     Requested occult stool on 10/9/2021    Left lower lobe lung nodule  She is currently being seen by pulmonary [facility]. They believe that this could be inflammatory. She received a trial of antibiotics and will have a repeat CT scan with them. She is a scheduled for a repeat CT scan January 2022    GERD  Possible decreased efficacy of Ibrance when taken with Protonix  Patient states GERD is unrelieved with Protonix  Rx Pepcid sent to [facility]    Poor IV access-portion, the only available site for a PICC insertion is the right upper extremity, same site of prior mastectomy. Okay to use this for IV midline placement. Discussed with the patient. PICC line placed in right basilic vein via vascular services on 10/8/2021    PLAN:  Antibiotic as per hospitalist/ID, currently receiving ceftriaxone  Continue  Ibrance and Eliquis  Continue supportive care    Resume B12 placement: 2000 mcg subcu today, 10/10/2021, followed by B12 1000 mcg daily during admission and then will resume monthly treatment with next dose to be given on 10/22/2021 in clinic. (Please note that portions of this note were completed with a voice recognition program. Efforts were made to edit the dictations but occasionally words are mis-transcribed.)        Samuel Bell, APRN    10/10/21  7:55 AM     Physician's attestation/substantial contribution:  I, Dr Brandyn Johnson, independently performed an evaluation on Danielle Hero. I have reviewed relevant medical information/data to include but not limited to medication list, relevant appropriate labs and imaging when applicable. I reviewed other physician's notes, ancillary services and nurses assessment. I have reviewed the above documentation completed by the Nurse Practitioner or Physician Assistant. Please see my additional contributions to the history of present illness, physical examination, and assessment/medical decision-making that reflect my findings and impressions. I discussed essential elements of the care plan with the NP or PA and the patient as well.  I answered all the questions to the patient's satisfaction. I concur with above stated. Subjective-no new complaints this morning. Denies any bleeding. Afebrile. Objective-chronically appearing  Assessment/plan:  Vitamin B12 deficiency-vitamin B12 subcu x2000 mcg today to be followed by 1000 mcg daily. Normocytic anemia-multifactorial  DVT left upper extremity related to Mediport site-Mediport removed. Continue Eliquis 5 mg p.o. twice daily  Poor IV access-patient has a PICC line right upper extremity. Breast cancer stage IV-outpatient treatment. Continue Ibrance. Port related infection-on ceftriaxone. Cultures as above.   Benson Machuca MD

## 2021-10-10 NOTE — PROGRESS NOTES
Vascular Note  10/10/21    S: No acute events. Complains of some skin reaction around PICC. O:  Blood pressure (!) 140/74, pulse 77, temperature 97.5 °F (36.4 °C), temperature source Temporal, resp. rate 18, height 5' 6\" (1.676 m), weight 179 lb (81.2 kg), SpO2 100 %.   Alert, no acute distress, pleasant  Normal work of breathing  Normal heart rate  No abdominal distension  Right arm with PICC in place, some skin irritation around dressing; no arm edema  Left chest port removal site with dressing in place; removed to reveal wound clean with no drainage, odor or surrounding erythema    A/P: 56F with recent left subclavian port infection s/p port removal and washout 10/6/2021, and difficult IV access s/p right basilic PICC placement [id number].  - Port removal site dressing to be changed with dakins-gauze once daily  - Agree with antibiotics per ID  - Will continue to follow along for wound care and IV access issues    Manual MD [place]

## 2021-10-11 LAB
ANAEROBIC CULTURE: ABNORMAL
BASOPHILS ABSOLUTE: 0 K/UL (ref 0–0.2)
BASOPHILS RELATIVE PERCENT: 0 % (ref 0–1)
CULTURE SURGICAL: ABNORMAL
EOSINOPHILS ABSOLUTE: 0 K/UL (ref 0–0.6)
EOSINOPHILS RELATIVE PERCENT: 0 % (ref 0–5)
GRAM STAIN RESULT: ABNORMAL
HCT VFR BLD CALC: 26.4 % (ref 37–47)
HEMOGLOBIN: 8.3 G/DL (ref 12–16)
IMMATURE GRANULOCYTES #: 0 K/UL
LYMPHOCYTES ABSOLUTE: 1.5 K/UL (ref 1.1–4.5)
LYMPHOCYTES RELATIVE PERCENT: 36 % (ref 20–40)
MACROCYTES: ABNORMAL
MCH RBC QN AUTO: 35.5 PG (ref 27–31)
MCHC RBC AUTO-ENTMCNC: 31.4 G/DL (ref 33–37)
MCV RBC AUTO: 112.8 FL (ref 81–99)
MONOCYTES ABSOLUTE: 0 K/UL (ref 0–0.9)
MONOCYTES RELATIVE PERCENT: 0 % (ref 0–10)
NEUTROPHILS ABSOLUTE: 2.8 K/UL (ref 1.5–7.5)
NEUTROPHILS RELATIVE PERCENT: 64 % (ref 50–65)
ORGANISM: ABNORMAL
PDW BLD-RTO: 13.6 % (ref 11.5–14.5)
PLATELET # BLD: 354 K/UL (ref 130–400)
PMV BLD AUTO: 8.7 FL (ref 9.4–12.3)
RBC # BLD: 2.34 M/UL (ref 4.2–5.4)
RETICULOCYTE ABSOLUTE COUNT: 0.03 M/UL (ref 0.03–0.12)
RETICULOCYTE COUNT PCT: 1.35 % (ref 0.5–1.5)
WBC # BLD: 4.3 K/UL (ref 4.8–10.8)

## 2021-10-11 PROCEDURE — 1210000000 HC MED SURG R&B

## 2021-10-11 PROCEDURE — 6360000002 HC RX W HCPCS: Performed by: NURSE PRACTITIONER

## 2021-10-11 PROCEDURE — 85045 AUTOMATED RETICULOCYTE COUNT: CPT

## 2021-10-11 PROCEDURE — 85025 COMPLETE CBC W/AUTO DIFF WBC: CPT

## 2021-10-11 PROCEDURE — 99231 SBSQ HOSP IP/OBS SF/LOW 25: CPT | Performed by: INTERNAL MEDICINE

## 2021-10-11 PROCEDURE — 2580000003 HC RX 258: Performed by: INTERNAL MEDICINE

## 2021-10-11 PROCEDURE — 6360000002 HC RX W HCPCS: Performed by: INTERNAL MEDICINE

## 2021-10-11 PROCEDURE — 6370000000 HC RX 637 (ALT 250 FOR IP): Performed by: SURGERY

## 2021-10-11 RX ADMIN — LISINOPRIL 10 MG: 10 TABLET ORAL at 08:13

## 2021-10-11 RX ADMIN — SODIUM HYPOCHLORITE: 1.25 SOLUTION TOPICAL at 09:07

## 2021-10-11 RX ADMIN — MELOXICAM 15 MG: 7.5 TABLET ORAL at 08:13

## 2021-10-11 RX ADMIN — SODIUM HYPOCHLORITE: 1.25 SOLUTION TOPICAL at 21:12

## 2021-10-11 RX ADMIN — FAMOTIDINE 20 MG: 20 TABLET ORAL at 08:13

## 2021-10-11 RX ADMIN — CYANOCOBALAMIN 2000 MCG: 1000 INJECTION, SOLUTION INTRAMUSCULAR; SUBCUTANEOUS at 08:13

## 2021-10-11 RX ADMIN — WATER 1000 MG: 1 INJECTION INTRAMUSCULAR; INTRAVENOUS; SUBCUTANEOUS at 15:53

## 2021-10-11 RX ADMIN — SODIUM CHLORIDE, PRESERVATIVE FREE 10 ML: 5 INJECTION INTRAVENOUS at 08:14

## 2021-10-11 RX ADMIN — APIXABAN 5 MG: 5 TABLET, FILM COATED ORAL at 08:13

## 2021-10-11 RX ADMIN — PANTOPRAZOLE SODIUM 40 MG: 40 TABLET, DELAYED RELEASE ORAL at 08:13

## 2021-10-11 RX ADMIN — SODIUM CHLORIDE, PRESERVATIVE FREE 10 ML: 5 INJECTION INTRAVENOUS at 21:12

## 2021-10-11 RX ADMIN — HYDROCODONE BITARTRATE AND ACETAMINOPHEN 1 TABLET: 10; 325 TABLET ORAL at 09:09

## 2021-10-11 RX ADMIN — APIXABAN 5 MG: 5 TABLET, FILM COATED ORAL at 21:12

## 2021-10-11 ASSESSMENT — PAIN SCALES - GENERAL
PAINLEVEL_OUTOF10: 0
PAINLEVEL_OUTOF10: 3
PAINLEVEL_OUTOF10: 8
PAINLEVEL_OUTOF10: 7
PAINLEVEL_OUTOF10: 7

## 2021-10-11 NOTE — PROGRESS NOTES
Vascular Surgery  Dr. Gabby Peacock   Daily Progress Note    Pt Name: Ki Will Record Number: [id number]  Date of Birth 1965   Today's Date: 10/11/2021    SUBJECTIVE:     Patient was seen and examined, stating she did not have a good weekend, had some nausea and could not rest.  Complaining that her right arm PICC dressing has been changed several times and feels like it is blistering under the tape    OBJECTIVE:     Patient Vitals for the past 24 hrs:   BP Temp Temp src Pulse Resp SpO2   10/11/21 0021 123/63 97.5 °F (36.4 °C) Temporal 81 14 97 %   10/10/21 2215 123/66 -- -- -- -- --   10/10/21 1930 (!) 168/76 -- -- -- -- --   10/10/21 1848 (!) 178/88 98.8 °F (37.1 °C) Temporal 89 18 96 %   10/10/21 1434 136/70 97.3 °F (36.3 °C) Temporal 79 16 100 %   10/10/21 1113 (!) 140/74 97.5 °F (36.4 °C) Temporal 77 18 100 %       Intake/Output Summary (Last 24 hours) at 10/11/2021 1040  Last data filed at 10/11/2021 0809  Gross per 24 hour   Intake 240 ml   Output 4400 ml   Net -4160 ml     In: 0   Out: 4400 [Urine:400]    I/O last 3 completed shifts: In: 480 [P.O.:480]  Out: 4600 [Urine:600; Emesis/NG output:4000]     Date 10/11/21 0000 - 10/11/21 2359   Shift 0804-7310 4548-8122 0954-0084 24 Hour Total   INTAKE   I.V.(mL/kg)  0(0)  0(0)   Shift Total(mL/kg)  0(0)  0(0)   OUTPUT   Urine(mL/kg/hr)  400  400   Shift Total(mL/kg)  400(4.9)  400(4.9)   Weight (kg) 81.2 81.2 81.2 81.2     Wt Readings from Last 3 Encounters:   10/10/21 179 lb (81.2 kg)   10/05/21 178 lb (80.7 kg)   10/05/21 175 lb (79.4 kg)      Body mass index is 28.89 kg/m². Diet: ADULT DIET;  Regular    MEDS:     Scheduled Meds:   cyanocobalamin  1,000 mcg IntraMUSCular Daily    cefTRIAXone (ROCEPHIN) IV  1,000 mg IntraVENous Q24H    lidocaine 1 % injection  5 mL IntraDERmal Once    sodium chloride flush  5-40 mL IntraVENous 2 times per day    apixaban  5 mg Oral BID    famotidine  20 mg Oral Daily    lisinopril  10 mg Oral Daily    meloxicam  15 mg Oral Daily    pantoprazole  40 mg Oral Daily    sodium hypochlorite   Irrigation BID    palbociclib  125 mg Oral Daily     Continuous Infusions:   sodium chloride       PRN Meds:ondansetron, 4 mg, Q6H PRN  hydrALAZINE, 5 mg, Q6H PRN  sodium chloride flush, 5-40 mL, PRN  sodium chloride, 25 mL, PRN  HYDROcodone-acetaminophen, 1 tablet, Q6H PRN      PHYSICAL EXAM:     CONSTITUTIONAL: Alert and oriented times 3, anxious,  no acute distress and cooperative to examination. HEENT: Normal  NECK: Soft, trachea midline and straight  LUNGS: Clear bilaterally anteriorly  CARDIOVASCULAR: Heart regular rate and rhythm  ABDOMEN: soft, nontender, nondistended  NEUROLOGIC: Awake, alert, oriented to name, place and time. WOUND/INCISION:  Left chest dressing recently changed, is CDI and per her nurse wound is clean and pink   EXTREMITY: LUE with minimal edema, +radial pulse palp. LABS:     CBC:   Recent Labs     10/09/21  0346 10/11/21  0018   WBC 4.0* 4.3*   RBC 2.45* 2.34*   HGB 8.7* 8.3*   HCT 27.4* 26.4*   .8* 112.8*   MCH 35.5* 35.5*   MCHC 31.8* 31.4*   RDW 13.6 13.6    354   MPV 8.9* 8.7*      Last 3 CMP:   Recent Labs     10/08/21  1450 10/09/21  0346    140   K 4.1 4.4    106   CO2 24 26   BUN 15 14   CREATININE 0.8 0.6   GLUCOSE 115* 103   CALCIUM 8.5* 8.3*      Calcium:   Lab Results   Component Value Date    CALCIUM 8.3 10/09/2021    CALCIUM 8.5 10/08/2021    CALCIUM 9.3 10/05/2021      INR:   No results for input(s): INR in the last 72 hours. DVT prophylaxis:              [x] Already on Anticoagulation (Eliquis)      ASSESSMENT:     Procedure 10/6/21 (s):  REMOVAL OF MEDIPORT  Procedure 10/8/21:   Placement of PICC Line     1. HD # 5  Active Hospital Problems    Diagnosis Date Noted   [facility] or reservoir infection [T80.212A] 10/06/2021    Central line infection [T80.219A] 10/05/2021       Chief Complaint:  No chief complaint on file. PLAN:     1. BID dressing changes with Dakins left chest  2. ID for antibiotic therapy (Ceftriaxone)  3. Hematology following  4. Pain meds prn  5.    Message sent to [facility] nurse Ajay, asking for assistance in dressing patient can tolerate

## 2021-10-11 NOTE — PLAN OF CARE
Problem: Pain:  Goal: Pain level will decrease  Description: Pain level will decrease  Outcome: Ongoing  Goal: Control of acute pain  Description: Control of acute pain  Outcome: Ongoing  Goal: Control of chronic pain  Description: Control of chronic pain  Outcome: Ongoing  Goal: Patient's pain/discomfort is manageable  Description: Patient's pain/discomfort is manageable  Outcome: Ongoing     Problem: Falls - Risk of:  Goal: Will remain free from falls  Description: Will remain free from falls  Outcome: Ongoing  Goal: Absence of physical injury  Description: Absence of physical injury  Outcome: Ongoing     Problem: Infection:  Goal: Will remain free from infection  Description: Will remain free from infection  Outcome: Ongoing     Problem: Safety:  Goal: Free from accidental physical injury  Description: Free from accidental physical injury  Outcome: Ongoing  Goal: Free from intentional harm  Description: Free from intentional harm  Outcome: Ongoing     Problem: Daily Care:  Goal: Daily care needs are met  Description: Daily care needs are met  Outcome: Ongoing     Problem: Skin Integrity:  Goal: Skin integrity will stabilize  Description: Skin integrity will stabilize  Outcome: Ongoing     Problem: Discharge Planning:  Goal: Patients continuum of care needs are met  Description: Patients continuum of care needs are met  Outcome: Ongoing     Problem: ABCDS Injury Assessment  Goal: Absence of physical injury  Outcome: Ongoing

## 2021-10-11 NOTE — PROGRESS NOTES
INFECTIOUS DISEASES PROGRESS NOTE    Patient:  Sony Hawley  YOB: 1965  MRN: [id number]   Admit date: 10/6/2021   Admitting Physician: Briseida Carlton DO  Primary Care Physician: Cong Talavera MD    CHIEF COMPLAINT: Continued irritation from PICC line dressing      Interval History: Patient notes that she has another dressing on and is still having some pruritus at the edge of her previous PICC dressings. She noted that they actually tried a second 1 over the weekend and this is \"the third one\". She had some nausea and emesis yesterday. She said it was somewhat \"out of the blue\". She reports improvement with Zofran. Allergies:    Allergies   Allergen Reactions    Clindamycin/Lincomycin Hives, Itching and Rash       Current Meds: cyanocobalamin injection 1,000 mcg, Daily  ondansetron (ZOFRAN) injection 4 mg, Q6H PRN  hydrALAZINE (APRESOLINE) injection 5 mg, Q6H PRN  cefTRIAXone (ROCEPHIN) 1,000 mg in sterile water 10 mL IV syringe, Q24H  lidocaine PF 1 % injection 5 mL, Once  sodium chloride flush 0.9 % injection 5-40 mL, 2 times per day  sodium chloride flush 0.9 % injection 5-40 mL, PRN  0.9 % sodium chloride infusion, PRN  apixaban (ELIQUIS) tablet 5 mg, BID  famotidine (PEPCID) tablet 20 mg, Daily  HYDROcodone-acetaminophen (NORCO)  MG per tablet 1 tablet, Q6H PRN  lisinopril (PRINIVIL;ZESTRIL) tablet 10 mg, Daily  meloxicam (MOBIC) tablet 15 mg, Daily  pantoprazole (PROTONIX) tablet 40 mg, Daily  sodium hypochlorite (DAKINS) 0.125 % external solution, BID  palbociclib (IBRANCE) tablet TABS 125 mg, Daily        Review of Systems  General: no fever  Cutaneous: Irritation at PICC line dressing site    Vital Signs:  /63   Pulse 81   Temp 97.5 °F (36.4 °C) (Temporal)   Resp 14   Ht 5' 6\" (1.676 m)   Wt 179 lb (81.2 kg)   SpO2 97%   BMI 28.89 kg/m²   Temp (24hrs), Av.8 °F (36.6 °C), Min:97.3 °F (36.3 °C), Max:98.8 °F (37.1 °C)      Physical Exam   General: Patient is SAMMIE TERRY   SOURCE: Chest Mediport - Hardware #2       COLLECTED:  10/06/21 11:08   ANTIBIOTICS AT GARCIA. :                      RECEIVED :  10/06/21 12:57   Susceptibility    Streptococcus mitis / Streptococcus oralis (1)    Antibiotic Interpretation BUCK Status    ampicillin Sensitive <=0.25 mcg/mL     benzylpenicillin Intermediate 0.25 mcg/mL     cefotaxime Sensitive 0.5 mcg/mL     cefTRIAXone Sensitive 1 mcg/mL     clindamycin Intermediate 0.5 mcg/mL     levofloxacin Resistant >=16 mcg/mL     linezolid Sensitive <=2 mcg/mL     tetracycline Resistant >=16 mcg/mL     vancomycin Sensitive 0.25 mcg/mL               RADIOLOGY      XR CHEST (2 VW)    Result Date: 10/5/2021  XR CHEST (2 VW) 10/5/2021 4:04 PM History: Preop vascular surgery procedure. Two-view chest x-ray. Comparison is made with September 24, 2021. Heart size is within normal limits. The mediastinum has a normal appearance. The lungs are adequately expanded with no pneumonia or pneumothorax. There is mild chronic interstitial disease with scattered calcified granulomas. There is no significant pleural fluid. No congestive failure changes. Unchanged left chest wall port. 1. No acute disease. Signed by Dr Sienna Evans    VL EXTREMITY VENOUS LEFT    Result Date: 10/6/2021  Vascular Upper Extremities Veins Procedure  Demographics   Patient Name    Griselda Senegal Age                  49   Patient Number  [id number]          Gender               Female   Visit Number    [id number]       Interpreting         Denae Bran                                  Physician   Date of Birth   1965      Referring Physician  Jaya Greco   Accession       [id number]      [address] RT, RVT  Number  Procedure Type of Study:   Veins:Upper Extremities Veins, US Doppler Venous Upper Extremity  Unilateral.  Indications for Study:F/U DVT left upper extremity. Impression   Chronic appearing deep vein thrombosis (DVT) is seen in the internal  jugular vein brachial , left upper extremity(ies). Subacute appearing superficial thrombophlebitis (SVT) is seen in the  basilic vein, left upper extremity(ies). Signature   ----------------------------------------------------------------  Electronically signed by Zenobia Bernheim Victoria(Interpreting  physician) on 10/06/2021 08:14 AM  --------------------------------------------------------------              Patient Active Problem List   Diagnosis    Carcinoma of female breast ([facility] [address]    Poor venous access    Iron deficiency anemia    Menopausal symptom    Benign neoplasm of body of uterus    Obesity (BMI 30-39. 9)    Dyspnea and respiratory abnormalities    Chronic pain syndrome    Right wrist pain    Rheumatoid arthritis involving both knees with negative rheumatoid factor (HCC)    Other cyst of bone, left ankle and foot    Status post bilateral mastectomy    Acute purulent bronchitis    Lung nodule    Status post bilateral breast reconstruction    S/P revision of left breast reconstruction 9/17/2021    Acute deep vein thrombosis (DVT) of left upper extremity (HCC)    Exostosis of left foot    Central line infection    Port or reservoir infection       IMPRESSION:    Left subclavian port infection in patient with left upper extremity DVT. Patient is status post removal October 6. Surgical cultures from device positive for Streptococcus mitis/oralis    Contact dermatitis from PICC line dressing      RECOMMENDATIONS :  · Continue ceftriaxone  · Monitor with current dressing. Patient may need Coflex type dressing if has problem with Medipore tape. She may need topical steroid treatment to help control pruritus  · Given report of appearance of port at the time of surgery, the patient's report of fever 4 days prior to surgery would plan on treating for 2 weeks starting October 7.   Have checked with our outpatient infusion clinic given that the patient is Medicare primary. Ceftriaxone can be administered in a pump if she is able to come to the clinic 3 days a week to have the pump changed. Await input from primary service regarding potential discharge date.       Elie Hobson MD

## 2021-10-12 VITALS
TEMPERATURE: 98.4 F | RESPIRATION RATE: 16 BRPM | HEIGHT: 66 IN | HEART RATE: 76 BPM | DIASTOLIC BLOOD PRESSURE: 62 MMHG | WEIGHT: 179 LBS | BODY MASS INDEX: 28.77 KG/M2 | OXYGEN SATURATION: 97 % | SYSTOLIC BLOOD PRESSURE: 142 MMHG

## 2021-10-12 LAB
ANAEROBIC CULTURE: NORMAL
GRAM STAIN RESULT: NORMAL
OCCULT BLOOD DIAGNOSTIC: NORMAL
OCCULT BLOOD QC: NORMAL
WOUND/ABSCESS: NORMAL

## 2021-10-12 PROCEDURE — 6370000000 HC RX 637 (ALT 250 FOR IP): Performed by: SURGERY

## 2021-10-12 PROCEDURE — 82272 OCCULT BLD FECES 1-3 TESTS: CPT

## 2021-10-12 PROCEDURE — 99231 SBSQ HOSP IP/OBS SF/LOW 25: CPT | Performed by: INTERNAL MEDICINE

## 2021-10-12 PROCEDURE — 99024 POSTOP FOLLOW-UP VISIT: CPT | Performed by: NURSE PRACTITIONER

## 2021-10-12 PROCEDURE — 2580000003 HC RX 258: Performed by: INTERNAL MEDICINE

## 2021-10-12 PROCEDURE — 6360000002 HC RX W HCPCS: Performed by: INTERNAL MEDICINE

## 2021-10-12 PROCEDURE — 6360000002 HC RX W HCPCS: Performed by: NURSE PRACTITIONER

## 2021-10-12 PROCEDURE — 6360000002 HC RX W HCPCS: Performed by: SURGERY

## 2021-10-12 PROCEDURE — 1210000000 HC MED SURG R&B

## 2021-10-12 RX ORDER — SODIUM HYPOCHLORITE 1.25 MG/ML
SOLUTION TOPICAL DAILY
Status: DISCONTINUED | OUTPATIENT
Start: 2021-10-13 | End: 2021-10-12

## 2021-10-12 RX ORDER — HYDROCODONE BITARTRATE AND ACETAMINOPHEN 10; 325 MG/1; MG/1
1 TABLET ORAL EVERY 6 HOURS PRN
Qty: 10 TABLET | Refills: 0 | Status: SHIPPED | OUTPATIENT
Start: 2021-10-12 | End: 2021-10-15

## 2021-10-12 RX ORDER — SODIUM HYPOCHLORITE 1.25 MG/ML
SOLUTION TOPICAL 2 TIMES DAILY
Status: DISCONTINUED | OUTPATIENT
Start: 2021-10-12 | End: 2021-10-13 | Stop reason: HOSPADM

## 2021-10-12 RX ORDER — SODIUM HYPOCHLORITE 1.25 MG/ML
SOLUTION TOPICAL 2 TIMES DAILY
Qty: 1 EACH | Refills: 0 | Status: SHIPPED | OUTPATIENT
Start: 2021-10-12

## 2021-10-12 RX ADMIN — HYDROCODONE BITARTRATE AND ACETAMINOPHEN 1 TABLET: 10; 325 TABLET ORAL at 11:42

## 2021-10-12 RX ADMIN — CYANOCOBALAMIN 1000 MCG: 1000 INJECTION, SOLUTION INTRAMUSCULAR; SUBCUTANEOUS at 08:10

## 2021-10-12 RX ADMIN — SODIUM CHLORIDE, PRESERVATIVE FREE 10 ML: 5 INJECTION INTRAVENOUS at 08:13

## 2021-10-12 RX ADMIN — DAKIN'S SOLUTION 0.125% (QUARTER STRENGTH): 0.12 SOLUTION at 13:53

## 2021-10-12 RX ADMIN — PANTOPRAZOLE SODIUM 40 MG: 40 TABLET, DELAYED RELEASE ORAL at 08:10

## 2021-10-12 RX ADMIN — LISINOPRIL 10 MG: 10 TABLET ORAL at 08:10

## 2021-10-12 RX ADMIN — ONDANSETRON HYDROCHLORIDE 4 MG: 2 SOLUTION INTRAMUSCULAR; INTRAVENOUS at 18:06

## 2021-10-12 RX ADMIN — WATER 1000 MG: 1 INJECTION INTRAMUSCULAR; INTRAVENOUS; SUBCUTANEOUS at 13:53

## 2021-10-12 RX ADMIN — FAMOTIDINE 20 MG: 20 TABLET ORAL at 08:10

## 2021-10-12 RX ADMIN — SODIUM HYPOCHLORITE: 1.25 SOLUTION TOPICAL at 10:17

## 2021-10-12 RX ADMIN — APIXABAN 5 MG: 5 TABLET, FILM COATED ORAL at 08:10

## 2021-10-12 RX ADMIN — MELOXICAM 15 MG: 7.5 TABLET ORAL at 08:10

## 2021-10-12 ASSESSMENT — PAIN SCALES - GENERAL
PAINLEVEL_OUTOF10: 7
PAINLEVEL_OUTOF10: 6
PAINLEVEL_OUTOF10: 6

## 2021-10-12 NOTE — CARE COORDINATION
Call placed to [facility], no one answered, was answered by the on-call nurse Kiya Sauceda. Not sure if the clinic is closed today or if they were on lunch. However, Per Kiya Sauceda, pt is on her schedule for Ceftriaxone infusion start date pending dc date from hospital. Will attempt to call infusion center back later today or Kiya Sauceda will have Yen call me back in the morning. Will follow to make sure pt is set up and aware of the outpt IV infusion plans.    Electronically signed by Jaz Costello RN on 10/12/2021 at 1:03 PM

## 2021-10-12 NOTE — PROGRESS NOTES
Reviewed discharge instructions, follow-up appointments, and medications with patient. Patient verbalized understanding and is agreeable with discharge. Patient's home prescription of Barbara Clements was given back to patient. Patient was provided with bandage/wound care supplies for home dressing changes. Patient discharged home via private vehicle.

## 2021-10-12 NOTE — PROGRESS NOTES
Vascular Surgery  Dr. Dimitri Rodriguez   Daily Progress Note    Pt Name: Elie List Record Number: [id number]  Date of Birth 1965   Today's Date: 10/12/2021    SUBJECTIVE:     Patient was seen and examined, eating breakfast. Discussed with patient we would do dressing using a mirror this am so she can do dressing, she is agreeable    OBJECTIVE:     Patient Vitals for the past 24 hrs:   BP Temp Temp src Pulse Resp SpO2   10/12/21 0821 -- -- -- 74 -- --   10/12/21 0745 (!) 143/103 98.5 °F (36.9 °C) Temporal 75 14 97 %   10/12/21 0555 135/66 98.1 °F (36.7 °C) Temporal 84 14 97 %   10/12/21 0045 (!) 147/85 97.9 °F (36.6 °C) Temporal 85 18 96 %   10/11/21 1734 136/70 96.4 °F (35.8 °C) -- 81 17 99 %   10/11/21 1129 (!) 130/53 97.5 °F (36.4 °C) -- 79 16 94 %       Intake/Output Summary (Last 24 hours) at 10/12/2021 1123  Last data filed at 10/12/2021 0942  Gross per 24 hour   Intake 540 ml   Output 1250 ml   Net -710 ml     In: 300 [P.O.:300]  Out: 1250 [Urine:1250]    I/O last 3 completed shifts: In: 300 [P.O.:300]  Out: 0302 [Urine:1650]     Date 10/12/21 0000 - 10/12/21 2359   Shift 8436-7228 2620-6481 7505-4072 24 Hour Total   INTAKE   P.O.(mL/kg/hr)  240  240   I. V.(mL/kg) 0(0)   0(0)   Shift Total(mL/kg) 0(0) 240(3)  240(3)   OUTPUT   Urine(mL/kg/hr) 1250(1.9)   1250   Shift Total(mL/kg) 1528(51.3)   1250(15.4)   Weight (kg) 81.2 81.2 81.2 81.2     Wt Readings from Last 3 Encounters:   10/10/21 179 lb (81.2 kg)   10/05/21 178 lb (80.7 kg)   10/05/21 175 lb (79.4 kg)      Body mass index is 28.89 kg/m². Diet: ADULT DIET;  Regular    MEDS:     Scheduled Meds:   cyanocobalamin  1,000 mcg IntraMUSCular Daily    cefTRIAXone (ROCEPHIN) IV  1,000 mg IntraVENous Q24H    lidocaine 1 % injection  5 mL IntraDERmal Once    sodium chloride flush  5-40 mL IntraVENous 2 times per day    apixaban  5 mg Oral BID    famotidine  20 mg Oral Daily    lisinopril  10 mg Oral Daily    meloxicam  15 mg Oral Daily pantoprazole  40 mg Oral Daily    sodium hypochlorite   Irrigation BID    palbociclib  125 mg Oral Daily     Continuous Infusions:   sodium chloride       PRN Meds:ondansetron, 4 mg, Q6H PRN  hydrALAZINE, 5 mg, Q6H PRN  sodium chloride flush, 5-40 mL, PRN  sodium chloride, 25 mL, PRN  HYDROcodone-acetaminophen, 1 tablet, Q6H PRN      PHYSICAL EXAM:     CONSTITUTIONAL: Alert and oriented times 3, anxious,  no acute distress and cooperative to examination. HEENT: Normal  NECK: Soft, trachea midline and straight  LUNGS: Clear bilaterally anteriorly  CARDIOVASCULAR: Heart regular rate and rhythm  ABDOMEN: soft, nontender, nondistended  NEUROLOGIC: Awake, alert, oriented to name, place and time. WOUND/INCISION: wound bed clean and pink color , some fat tissue noted. Mild erythema noted, mild bruising noted to posterior and lateral edge. Old dressing removed with serosang drainage, no odor. EXTREMITY: LUE with minimal edema, +radial pulse palp and hand warm to touch. Document Information    Wound   Left chest wound photo   10/12/2021 11:55   Attached To: Hospital Encounter on 10/6/21   [facility], RN  [facility]       LABS:     CBC:   Recent Labs     10/11/21  0018   WBC 4.3*   RBC 2.34*   HGB 8.3*   HCT 26.4*   .8*   MCH 35.5*   MCHC 31.4*   RDW 13.6      MPV 8.7*      Calcium:   Lab Results   Component Value Date    CALCIUM 8.3 10/09/2021    CALCIUM 8.5 10/08/2021    CALCIUM 9.3 10/05/2021         DVT prophylaxis:              [x] Already on Anticoagulation (Eliquis)      ASSESSMENT:     Procedure 10/6/21 (s):  REMOVAL OF MEDIPORT  Procedure 10/8/21:   Placement of PICC Line     HD # [address] [place] Problems    Diagnosis Date Noted    Port or reservoir infection [T80.212A] 10/06/2021    Central line infection [T80.219A] 10/05/2021       Chief Complaint:  No chief complaint on file. PLAN:     1.   BID dressing changes with Dakins left chest, taught patient today to do own dressing changes  2. ID for antibiotic therapy (Ceftriaxone), arrangements made for patient to receive antibiotics from ID outpatient clinic  3. Hematology followi up 10/22/21 at 12:00  4. Pain meds prn  5. Right PICC dressing changed yesterday per PICC nurse, patient reports this is comfortable.

## 2021-10-12 NOTE — PLAN OF CARE
Problem: Pain:  Goal: Pain level will decrease  Description: Pain level will decrease  10/11/2021 2359 by Nick English RN  Outcome: Ongoing  10/11/2021 1820 by Man Mathew RN  Outcome: Ongoing  Goal: Control of acute pain  Description: Control of acute pain  10/11/2021 2359 by Nick English RN  Outcome: Ongoing  10/11/2021 1820 by Man Mathew RN  Outcome: Ongoing  Goal: Control of chronic pain  Description: Control of chronic pain  10/11/2021 2359 by Nick English RN  Outcome: Ongoing  10/11/2021 1820 by Man Mathew RN  Outcome: Ongoing  Goal: Patient's pain/discomfort is manageable  Description: Patient's pain/discomfort is manageable  10/11/2021 2359 by Nick English RN  Outcome: Ongoing  10/11/2021 1820 by Man Mathew RN  Outcome: Ongoing     Problem: Falls - Risk of:  Goal: Will remain free from falls  Description: Will remain free from falls  10/11/2021 2359 by Nick English RN  Outcome: Ongoing  10/11/2021 1820 by Man Mathew RN  Outcome: Ongoing  Goal: Absence of physical injury  Description: Absence of physical injury  10/11/2021 2359 by Nick English RN  Outcome: Ongoing  10/11/2021 1820 by Man Mathew RN  Outcome: Ongoing     Problem: Infection:  Goal: Will remain free from infection  Description: Will remain free from infection  10/11/2021 2359 by Nick English RN  Outcome: Ongoing  10/11/2021 1820 by Man Mathew RN  Outcome: Ongoing     Problem: Safety:  Goal: Free from accidental physical injury  Description: Free from accidental physical injury  10/11/2021 2359 by Nick English RN  Outcome: Ongoing  10/11/2021 1820 by Man Mathew RN  Outcome: Ongoing  Goal: Free from intentional harm  Description: Free from intentional harm  10/11/2021 2359 by Nick English RN  Outcome: Ongoing  10/11/2021 1820 by Man Mathew RN  Outcome: Ongoing     Problem: Daily Care:  Goal: Daily care needs are met  Description: Daily care needs are met  10/11/2021 2359 by Gary Newsome RN  Outcome: Ongoing  10/11/2021 1820 by Josue Castano RN  Outcome: Ongoing     Problem: Skin Integrity:  Goal: Skin integrity will stabilize  Description: Skin integrity will stabilize  10/11/2021 2359 by Gary Newsome RN  Outcome: Ongoing  10/11/2021 1820 by Josue Castano RN  Outcome: Ongoing     Problem: Discharge Planning:  Goal: Patients continuum of care needs are met  Description: Patients continuum of care needs are met  10/11/2021 2359 by Gary Newsome RN  Outcome: Ongoing  10/11/2021 1820 by Josue Castano RN  Outcome: Ongoing     Problem: ABCDS Injury Assessment  Goal: Absence of physical injury  10/11/2021 2359 by Gary Newsome RN  Outcome: Ongoing  10/11/2021 1820 by Josue Castano RN  Outcome: Ongoing

## 2021-10-12 NOTE — DISCHARGE SUMMARY
[address] [facility]  Discharge Summary    Patient ID: Srinivas Espinoza      Patient's PCP: Armando Bishop MD    Admit Date: 10/6/2021     Discharge Date:  10/12/2021    Admitting Physician: Dolly Stratton DO     Discharge Physician: Dr. Dolly Stratton     Discharge Diagnoses:     Primary:   1. Infected Mediport, Left Subclavian     Secondary:   2. Acute DVT, LUE 2' to Left Subclavian Stenosis - S/P Left Upper Extremity Venous Mechanical Thrombectomy on 09/26/2021  3. Hx Breast Cancer - S/P Right Mastectomy and Axillary Dissection 2013, Adjuvant Radiation Therapy  4. Iron Deficiency Anemia  5. Chronic Pain Syndrome    Procedures This Admit:   1. On 10/06/2021, Removal of Mediport, Left Subclavian by Dr. Dolly Stratton. 2. On 10/08/2021, Insertion of PICC Line, Right Basilic by Dr. Patricia Cook. Consults:   1. Dr. Rocoí Alvarez, Infectious Disease  2. Dr. Poornima Velasquez, Oncology      Complications:   None    The patient was seen and examined on day of discharge and this discharge summary is in conjunction with any daily progress note from day of discharge. History of Present Illness and Hospital Course:   Ms. Steve Brandon is a 49year old female, with stage IV metastatic breast cancer, followed by Dr. Alyson Hardin, who recently underwent mechanical thrombectomy for treatment of LUE DVT on 09/26/2021, who was seen in follow-up at the vascular office on 10/05/2021. She reported redness and increased pain, along with low grade fevers. She was set up for port removal the following day. Patient underwent port removal on 10/06/2021. There was noted copious amount of purulent drainage. Mediport was sent for culture. Due to findings, she was admitted for dressing changes BID and consultation by ID. Sh was started on Vancomycin and Merrem. Patient underwent PICC line placement on 10/08/2021 by Dr. Patricia Cook, [facility] vascular surgeon.  Antibiotics were chagned to ceftriaxone on 10/09/2021. She underwent teaching regarding dressing changes. IV antibiotics were set up by Infectious disease, with patient having infusion pump with follow-up and management at outpatient infusion clinic. She was felt stable for discharge home. Disposition:  Home    Follow-up:    1. Appointment with Outpatient Infusion Clinic on 10/13/2021 at 1 pm to Start Outpatient Ceftriaxone. 2. Wound Care Follow-up with Mindy Molina on 10/15/2021 at 830 am.       Discharge Medications:     acyclovir (ZOVIRAX) 800 MG tablet  Take 800 mg by mouth 2 times daily as needed      Albuterol Sulfate (PROAIR HFA IN)  Inhale 1 each into the lungs daily as needed      alclomethasone (ACLOVATE) 0.05 % cream  Apply topically 2 times daily as needed Apply topically 2 times daily. apixaban starter pack (ELIQUIS) 5 MG TBPK tablet  Take 1 tablet by mouth See Admin Instructions     benzonatate (TESSALON) 100 MG capsule  Take 100 mg by mouth 3 times daily as needed for Cough     Cholecalciferol (VITAMIN D3) 125 MCG (5000 UT) TABS  Take 1 tablet by mouth daily      cyclobenzaprine (FLEXERIL) 10 MG tablet  Take 10 mg by mouth 3 times daily as needed for Muscle spasms     fluticasone-vilanterol (BREO ELLIPTA) 100-25 MCG/INH AEPB inhaler  Inhale 1 puff into the lungs daily     gabapentin (NEURONTIN) 300 MG capsule  TAKE 2 CAPSULES BY MOUTH 3 TIMES DAILY FOR 30 DAYS.     guaiFENesin-codeine (GUAIFENESIN AC) 100-10 MG/5ML liquid  Take 5 mLs by mouth 3 times daily as needed for Cough. HYDROcodone-acetaminophen (NORCO)  MG per tablet  Take 1 tablet by mouth every 6 hours as needed for Pain for up to 3 days. IBRANCE 125 MG tablet  Take 1 tablet (125mg) by mouth once daily.      lisinopril (PRINIVIL;ZESTRIL) 10 MG tablet  Take 10 mg by mouth daily     meloxicam (MOBIC) 15 MG tablet  Take 15 mg by mouth daily     NARCAN 4 MG/0.1ML LIQD nasal spray       ondansetron (ZOFRAN) 4 MG tablet  Take 4 mg by mouth every 8 hours as needed for Nausea or Vomiting     pantoprazole (PROTONIX) 40 MG tablet  TAKE 1 TABLET BY MOUTH EVERY DAY     sodium hypochlorite (DAKINS) 0.125 % SOLN external solution  Apply topically 2 times daily     VENTOLIN  (90 Base) MCG/ACT inhaler  Inhale 2 puffs into the lungs 4 times daily as needed for Wheezing         · Ceftriaxone can be administered in a pump if she is able to come to the clinic 3 days a week to have the pump changed.   · Plan on treating for 2 weeks starting October 7

## 2021-10-12 NOTE — DISCHARGE INSTR - DIET

## 2021-10-13 ENCOUNTER — TELEPHONE (OUTPATIENT)
Dept: INTERNAL MEDICINE | Age: 56
End: 2021-10-13

## 2021-10-13 NOTE — TELEPHONE ENCOUNTER
[facility]    Outreach made within 2 business days of discharge: Yes    Patient: Felisha Short Patient : 1965   MRN: [id number]  Reason for Admission: Admitted 10/06/21 for infected mediport  Discharge Date: 10/12/21    Discharge Diagnoses:      Primary:   1. Infected Mediport, Left Subclavian      Secondary:   2.         Acute DVT, LUE 2' to Left Subclavian Stenosis - S/P Left Upper Extremity Venous Mechanical Thrombectomy on 2021  3.         Hx Breast Cancer - S/P Right Mastectomy and Axillary Dissection , Adjuvant Radiation Therapy  4.         Iron Deficiency Anemia  5.         Chronic Pain Syndrome     Spoke with: Attempted to make contact with patient/caregiver for an initial transitions of care follow up call post discharge without success. I will reach out again at a later time. Any previously scheduled hospital follow up appointments noted below. Discharge department/facility: [facility]     Follow-up:    1. Appointment with Outpatient Infusion Clinic on 10/13/2021 at 1 pm to Start Outpatient Ceftriaxone.    2.         Wound Care Follow-up with Bakari Castillo on 10/15/2021 at 830 am.     Scheduled appointment with PCP within 7-14 days    Follow Up  Future Appointments   Date Time Provider Lary Marquez   10/15/2021  8:30 AM BRITANY Camarillo - CNP [facility] LMP [address]   10/21/2021  1:30 PM BRITANY Tatum N Vasc Spec [facility]   10/22/2021 12:00 PM Nick Richardson MD N PAD HEMONC [facility]   10/22/2021  1:00 PM SCHEDULE, [facility] MED ONC [facility] MED ONC Silvia HOD   10/28/2021  1:00 PM Christel Streeter MD N LPS Gen SG [facility]   2022  9:40 AM MHL CT RM 2 M[facility] CT [facility] Hos Rad   2022  9:45 AM [facility]       Ashvin Blake MA

## 2021-10-14 ENCOUNTER — TELEPHONE (OUTPATIENT)
Dept: INTERNAL MEDICINE | Age: 56
End: 2021-10-14

## 2021-10-14 NOTE — TELEPHONE ENCOUNTER
[facility]    Outreach made within 2 business days of discharge: Yes    Patient: Judy Desai        Patient : 1965   MRN: [id number]  Reason for Admission: Admitted 10/06/21 for infected mediport  Discharge Date: 10/12/21        Discharge Diagnoses:      Primary:   1.         Infected Mediport, Left Subclavian      Secondary:   2.         Acute DVT, LUE 2' to Left Subclavian Stenosis - S/P Left Upper Extremity Venous Mechanical Thrombectomy on 2021  3.         Hx Breast Cancer - S/P Right Mastectomy and Axillary Dissection , Adjuvant Radiation Therapy  4.         Iron Deficiency Anemia  5.         Chronic Pain Syndrome                Spoke with: 2nd attempt to reach patient, no answer. Message left with intent of my call and for patient to call the office to schedule a hospital follow up visit. I am unable to make contact to schedule at this time.      Discharge department/facility: [facility]      Follow-up:    1.         Appointment with Outpatient Infusion Clinic on 10/13/2021 at 1 pm to Start Outpatient Ceftriaxone.    2.         Wound Care Follow-up with Theo Taylor on 10/15/2021 at 1 am.           Follow Up  Future Appointments   Date Time Provider Lary Marquez   10/15/2021  8:30 AM BRITANY Martin - CNP [facility] LMP [address]   10/21/2021  1:30 PM BRITANY Hadley N Vasc Spec [facility]   10/22/2021 12:00 PM Wilfred Evans MD N PAD HEMONC [facility]   10/22/2021  1:00 PM SCHEDULE, MHL MED ONC [facility] MED ONC Silvia HOD   10/28/2021  1:00 PM Star Andrews MD N LPS Gen SG [facility]   2022  9:40 AM MHL CT RM 2 MHL CT [facility] Hos Rad   2022  9:45 AM MD Kallie Dominguez [facility]       Shannon Fajardo MA

## 2021-10-15 ENCOUNTER — HOSPITAL ENCOUNTER (OUTPATIENT)
Dept: WOUND CARE | Age: 56
Discharge: HOME | End: 2021-10-15
Payer: MEDICARE

## 2021-10-15 VITALS
WEIGHT: 179 LBS | BODY MASS INDEX: 28.77 KG/M2 | HEART RATE: 90 BPM | SYSTOLIC BLOOD PRESSURE: 172 MMHG | TEMPERATURE: 96.7 F | RESPIRATION RATE: 20 BRPM | DIASTOLIC BLOOD PRESSURE: 110 MMHG | HEIGHT: 66 IN

## 2021-10-15 DIAGNOSIS — T80.212D PORT OR RESERVOIR INFECTION, SUBSEQUENT ENCOUNTER: Primary | ICD-10-CM

## 2021-10-15 DIAGNOSIS — L98.492 CHEST WALL ULCER, WITH FAT LAYER EXPOSED (HCC): ICD-10-CM

## 2021-10-15 PROBLEM — E66.9 OBESITY (BMI 30-39.9): Status: RESOLVED | Noted: 2021-04-09 | Resolved: 2021-10-15

## 2021-10-15 PROCEDURE — 99024 POSTOP FOLLOW-UP VISIT: CPT | Performed by: NURSE PRACTITIONER

## 2021-10-15 PROCEDURE — 99214 OFFICE O/P EST MOD 30 MIN: CPT

## 2021-10-15 PROCEDURE — 11042 DBRDMT SUBQ TIS 1ST 20SQCM/<: CPT

## 2021-10-15 PROCEDURE — 11042 DBRDMT SUBQ TIS 1ST 20SQCM/<: CPT | Performed by: NURSE PRACTITIONER

## 2021-10-15 RX ORDER — LIDOCAINE HYDROCHLORIDE 20 MG/ML
JELLY TOPICAL PRN
Status: DISCONTINUED | OUTPATIENT
Start: 2021-10-15 | End: 2021-10-17 | Stop reason: HOSPADM

## 2021-10-15 RX ORDER — CEFTRIAXONE 1 G/1
1000 INJECTION, POWDER, FOR SOLUTION INTRAMUSCULAR; INTRAVENOUS EVERY 24 HOURS
COMMUNITY
End: 2021-11-04 | Stop reason: ALTCHOICE

## 2021-10-15 ASSESSMENT — VISUAL ACUITY: OU: 1

## 2021-10-15 NOTE — PROGRESS NOTES
Patient Care Team:  Quique Guerin MD as PCP - General (Family Medicine)  Quique Guerin MD as PCP [facility]    TODAY'S DATE:  10/15/2021     HISTORY of PRESENTILLNESS HPI   Dameon Gonzalez is a 49 y.o. female who presents today for wound evaluation. She reports she developed a wound on left chest after surgical mediport removal 2 weeks ago. She believes this is  healing. She has been applying dakins moistened gauze into the wound bed. She has not had  fever or chills. She has a history of breast cancer with mediport infection. She is being treated with IV antibiotics per Dr. Nicky Wright through PICC access in her right arm. She developed DVTs in her left arm and is taking eliquis for that.   Wound Type:surgical  Wound Location:left chest wall  Modifying factors:immunosuppression and anticoagulation therapy    Patient Active Problem List   Diagnosis Code    Carcinoma of female breast ([facility] [address] C50.919    Poor venous access I87.8    Iron deficiency anemia D50.9    Menopausal symptom N95.1    Benign neoplasm of body of uterus D26.1    Dyspnea and respiratory abnormalities R06.00, R06.89    Chronic pain syndrome G89.4    Right wrist pain M25.531    Rheumatoid arthritis involving both knees with negative rheumatoid factor ([facility] [address] M06.061, M06.062    Other cyst of bone, left ankle and foot M85.672    Status post bilateral mastectomy Z90.13    Acute purulent bronchitis J20.9    Lung nodule R91.1    Status post bilateral breast reconstruction Z98.890    S/P revision of left breast reconstruction 9/17/2021 Z98.890    Arm DVT (deep venous thromboembolism), acute, left (HCC) I82.622    Exostosis of left foot M89.8X7    Central line infection T80.219A   [facility] or reservoir infection T80.212A    Chest wall ulcer, with fat layer exposed [facility]) L98.492       Dameon Gonzalez is a 49 y.o. female with the following history reviewed and recorded in [facility]:    Current Outpatient Medications Medication Sig Dispense Refill    cefTRIAXone (ROCEPHIN) 1 g injection Inject 1,000 mg into the muscle every 24 hours      HYDROcodone-acetaminophen (NORCO)  MG per tablet Take 1 tablet by mouth every 6 hours as needed for Pain for up to 3 days. 10 tablet 0    sodium hypochlorite (DAKINS) 0.125 % SOLN external solution Apply topically 2 times daily 1 each 0    apixaban starter pack (ELIQUIS) 5 MG TBPK tablet Take 1 tablet by mouth See Admin Instructions (Patient taking differently: Take 5 mg by mouth 2 times daily ) 74 tablet 0    fluticasone-vilanterol (BREO ELLIPTA) 100-25 MCG/INH AEPB inhaler Inhale 1 puff into the lungs daily 1 each 5    VENTOLIN  (90 Base) MCG/ACT inhaler Inhale 2 puffs into the lungs 4 times daily as needed for Wheezing 18 g 5    pantoprazole (PROTONIX) 40 MG tablet TAKE 1 TABLET BY MOUTH EVERY DAY (Patient taking differently: Take 40 mg by mouth daily ) 90 tablet 0    IBRANCE 125 MG tablet Take 1 tablet (125mg) by mouth once daily. 21 tablet 3    NARCAN 4 MG/0.1ML LIQD nasal spray       gabapentin (NEURONTIN) 300 MG capsule TAKE 2 CAPSULES BY MOUTH 3 TIMES DAILY FOR 30 DAYS. 180 capsule 0    cyclobenzaprine (FLEXERIL) 10 MG tablet Take 10 mg by mouth 3 times daily as needed for Muscle spasms      guaiFENesin-codeine (GUAIFENESIN AC) 100-10 MG/5ML liquid Take 5 mLs by mouth 3 times daily as needed for Cough.       Albuterol Sulfate (PROAIR HFA IN) Inhale 1 each into the lungs daily as needed       benzonatate (TESSALON) 100 MG capsule Take 100 mg by mouth 3 times daily as needed for Cough      Cholecalciferol (VITAMIN D3) 125 MCG (5000 UT) TABS Take 1 tablet by mouth daily       lisinopril (PRINIVIL;ZESTRIL) 10 MG tablet Take 10 mg by mouth daily      acyclovir (ZOVIRAX) 800 MG tablet Take 800 mg by mouth 2 times daily as needed       ondansetron (ZOFRAN) 4 MG tablet Take 4 mg by mouth every 8 hours as needed for Nausea or Vomiting      alclomethasone (Temporal)   Resp 20   Ht 5' 6\" (1.676 m)   Wt 179 lb (81.2 kg)   BMI 28.89 kg/m²     Physical Exam  Vitals reviewed. Constitutional:       Appearance: Normal appearance. She is normal weight. HENT:      Head: Normocephalic and atraumatic. Right Ear: External ear normal.      Left Ear: External ear normal.   Eyes:      General: Lids are normal. Lids are everted, no foreign bodies appreciated. Vision grossly intact. Gaze aligned appropriately. Cardiovascular:      Rate and Rhythm: Normal rate and regular rhythm. Pulses: Normal pulses. Heart sounds: Normal heart sounds. Pulmonary:      Effort: Pulmonary effort is normal.      Breath sounds: Normal breath sounds. Abdominal:      General: Bowel sounds are normal.   Musculoskeletal:         General: Normal range of motion. Skin:     General: Skin is warm and dry. Findings: Wound present. Neurological:      Mental Status: She is alert and oriented to person, place, and time. Psychiatric:         Mood and Affect: Mood normal.         Behavior: Behavior normal.         Thought Content: Thought content normal.         Judgment: Judgment normal.             Post Debridement Measurements and Assessment:    The patientspain isPain Level: 7  . Wound is has improved. Please refer to nursing measurements and assessment regarding wound pre and postdebridement.     Wound 10/15/21 Chest Lateral;Left;Upper wound 1- left upper chest post-op wound (Active)   Wound Image    10/15/21 0904   Wound Etiology Surgical 10/15/21 0904   Dressing Status Old drainage noted 10/15/21 0904   Wound Cleansed Irrigated with saline 10/15/21 0904   Dressing/Treatment Moist to moist 10/15/21 0958   Wound Length (cm) 1.4 cm 10/15/21 0904   Wound Width (cm) 4.9 cm 10/15/21 0904   Wound Depth (cm) 1.2 cm 10/15/21 0904   Wound Surface Area (cm^2) 6.86 cm^2 10/15/21 0904   Wound Volume (cm^3) 8.232 cm^3 10/15/21 0904   Post-Procedure Length (cm) 1.4 cm 10/15/21 0131   Post-Procedure Width (cm) 4.9 cm 10/15/21 0958   Post-Procedure Depth (cm) 1.2 cm 10/15/21 0958   Post-Procedure Surface Area (cm^2) 6.86 cm^2 10/15/21 0958   Post-Procedure Volume (cm^3) 8.232 cm^3 10/15/21 0958   Distance Tunneling (cm) 2.5 cm 10/15/21 0904   Tunneling Position ___ O'Clock 6 10/15/21 0904   Wound Assessment Slough;Pink/red 10/15/21 0904   Drainage Amount Moderate 10/15/21 0904   Drainage Description Serosanguinous 10/15/21 0904   Odor None 10/15/21 0904   Delma-wound Assessment Intact 10/15/21 0904   Margins Defined edges 10/15/21 0904   Wound Thickness Description not for Pressure Injury Full thickness 10/15/21 0904   Number of days: 0            Debridement: Excisional Debridement    Using curette the wound(s)/ulcer(s) was/were sharply debrided down through and including the removal of epidermis, dermis and subcutaneous tissue. Devitalized Tissue Debrided:  fibrin, biofilm, slough and exudate    Pre Debridement Measurements:  Are located in the Wound/Ulcer Documentation Flow Sheet    Wound/Ulcer #: 1    Post Debridement Measurements:  Wound/Ulcer Descriptions are Pre Debridement except measurements:          Percent of Wound(s)/Ulcer(s) Debrided: 100%    Total Surface Area Debrided:  6.86 sq cm     Diabetic/Pressure/Non Pressure Ulcers only:  Ulcer: N/A     Estimated Blood Loss:  Minimal    Hemostasis Achieved:  by pressure    Procedural Pain:  4  / 10     Post Procedural Pain:  0 / 10     Response to treatment:  Well tolerated by patient., With complaints of pain. Assessment    1. Port or reservoir infection, subsequent encounter    2. Chest wall ulcer, with fat layer exposed ([address]          Plan    1. Continue care and follow up in 2 weeks    Plan for wound - Dress per physician order  Treatment:     Compression : No   Offloading : No   Dressing : 1/4 dakins moistened gauze into wound bed    Discussed importance of nutrition, wound care, and plan of care.   Patient understanding

## 2021-10-15 NOTE — HOME CARE
6.86 cm^2 10/15/21 0958   Post-Procedure Volume (cm^3) 8.232 cm^3 10/15/21 0958   Distance Tunneling (cm) 2.5 cm 10/15/21 0904   Tunneling Position ___ O'Clock 6 10/15/21 0904   Wound Assessment Slough;Pink/red 10/15/21 0904   Drainage Amount Moderate 10/15/21 0904   Drainage Description Serosanguinous 10/15/21 0904   Odor None 10/15/21 0904   Delma-wound Assessment Intact 10/15/21 0904   Margins Defined edges 10/15/21 0904   Wound Thickness Description not for Pressure Injury Full thickness 10/15/21 0904   Number of days: 0          Supplies Requested :      WOUND #: 1   PRIMARY DRESSING:  Other: 2x2 gauze, vaseline gauze, mepilex silicone border   Cover and Secure with: None     FREQUENCY OF DRESSING CHANGES:  Other twice daily       ADDITIONAL ITEMS:  [] Gloves Small  [x] Gloves Medium [] Gloves Large [] Gloves XLarge  [] Tape 1\" [] Tape 2\" [] Tape 3\"  [] Medipore Tape  [x] Saline  [x] Skin Prep   [x] Adhesive Remover   [x] Cotton Tip Applicators   [] Other:    Patient Wound(s) Debrided: [x] Yes if yes please add date 10/15/21   [] No    Debribement Type: Excisional/Sharp and Mechanical     Patient currently being seen by Home Health: [] Yes   [x] No    Duration for needed supplies:  []15  [x]30  []60  []90 Days    Electronically signed by BRITANY Gonsales CNP on 10/15/2021 at 10:23 AM     Provider Information:      PROVIDER'S NAME: Julius Yajaira GARG    NPI: [id number]

## 2021-10-18 NOTE — PROGRESS NOTES
MEDICAL ONCOLOGY PROGRESS NOTE    Pt Name: Megan Ring  MRN: [id number]  YOB: 1965  Date of evaluation: 10/24/2021    HISTORY OF PRESENT ILLNESS:    Reason for MD visit: Disease/toxicity management  Noa Price is here today for monitoring for breast cancer and toxicity assessment. She is currently on treatment with Faslodex/Ibrance. She has been tolerating treatment quite well. She has developed left upper extremity DVT associated with a left upper chest port. Port was discontinued. She also developed infection of the port site. She is a status IV antibiotics. She has been started on Eliquis for anticoagulation. She denies any bleeding. She is compliant with her medications.   There is a future plan to insert another Mediport in the right upper chest.      Diagnosis  · Grade III Adenocarcinoma of right breast diagnosed 2006  · Stage IIIA, [id number]  · ER/FL Positive, HER-2 bruce/ihc 1+  · April 2013-RECURRENCE in the right axillary region  · Genetic testing- BRCA 1&2 Negative  · 2014 (?) RECURRENCE in the axillary skin  · 01/05/2017- METASTATIC DISEASE with lung, hilar, and axillary lymph node mets  · Osteopenia  · Mediport associated DVT, October 2021    Treatment Summary  · 2006- Neoadjuvant chemotherapy with AC x 4 cycles followed by Taxol  · 2007- Lumpectomy with axillary lymph node dissection  · 2007- Adjuvant radiation therapy to whole breast and axillary region  · Did not take tamoxifen due to cost  · April 2013- RECURRENCE  · Neoadjuvant chemotherapy with 2 cycles of Taxotere followed by Doxil  · 10/14/2013- Right Breast Mastectomy and Axillary Dissection  · 01/23/2014- Completion of adjuvant RT to chest wall and axillary lymph node with Dr. Nena Dash  · 02/14- Initiated adjuvant endocrine therapy with tamoxifen  · 2014 (?) RECURRENCE in the axillary skin  · 2014 (?) Surgical resection of the axillary skin  · 2015 (?) YVETTE/BSO  · 09/19/2016- Bilateral exchange, nipple reconstruction and fat grafting and removal of PAC   · 2016- Switched to aromatase inhibitor  · 01/05/2017- METASTATIC DISEASE with lung, hilar, and axillary mets  · 01/11/2017-06/17/2018- Single agent Abraxane x 13 cycles  · 06/27/2018-09/17/2018- Gemzar  · 10/10/2018-Faslodex every 4 weeks/palbociclib    Cancer History:  Anand Triplett was first seen by me on 1/21/2021. She was referred to [facility] of care for history of recurrent metastatic breast cancer. She has been in remission as per the latest CT scan. She is currently on Faslodex and palbociclib. She has received several lines therapy as described below. She moved from [place] to [place] to stay closer to her parents. Her medical history is complex. Further details as below. · 2006- Neoadjuvant chemotherapy with AC x 4 cycles followed by Taxol AC was complicated by viral meningitis; patient experienced neuropathy after 3 doses of Taxol and was switched to Taxotere for last dose  · 2007- Lumpectomy with axillary lymph node dissection 1.9cm, grade 2. No LVI. 1 of 14 lymph nodes positive for malignancy (1/14) [id number]  · 2007- Adjuvant Radiation Therapy to whole breast and axillary region  · April 2013- RECURRENCE presenting as mass in the axillary region  · Neoadjuvant chemotherapy with 2 cycles of Taxotere followed by Doxil- did not have good response to treatment  · 2013- Preop PET/CTshowed 2.8cm, right axillary lymph node with uptake. · 10/14/2013- Right Breast Mastectomy and Axillary Dissection Right breast had benign tissue with fibrous, negative for carcinoma. Axillary contents demonstrated invasive ductal carcinoma, involving fibroadipose tissue and tendinous tissue. 3 benign lymph nodes (0/3). Yusra grade 3. 3.0cm in greatest gross dimensions. Carcinoma permeates among soft tissues and in the right axilla with no apparent involvement of lymph nodes.    · 01/23/2014- Completion of adjuvant radiation therapy to chest wall and axillary lymph node with Dr. Pollack Pair  · 02/14- Initiated adjuvant endocrine therapy with tamoxifen  · 2014 (?) RECURRENCE in the axillary skin  · 2014 (?) Surgical resection of the axillary skin pathology demonstrating tumor at cauterized margin  · 2015 (?) YVETTE/BSO  · 09/19/2016- Bilateral exchange, nipple reconstruction and fat grafting and removal of PAC  · 2016- Switched to aromatase inhibitor patient was non compliant  · 01/05/2017- METASTATIC DISEASE Presented with respiratory failure. CTA Pulmonary showed numerous nodules and masses throughout both lung fields, compatible with metastatic disease. Bilateral hilar adenopathy seen. · 01/11/2017-06/17/2018- Single agent Abraxane x 13 cycles  · 03/15/2017- CT Chest W Contrast Interval decrease in maximal diameter of essentially all the multiple metastatic pulmonary nodules. The average difference is approximately 25%. Some of the much smaller ones have disappeared. Bilateral hilar and aortopulmonary window adenopathy is also similarly smaller. · 06/07/2017- CT Chest W Contrast Multiple lung nodules seen bilaterally. Most of the lung nodules show interval improvement with decreased size by 1 to 3mm. Mediastinal and bilateral hilar adenopathy seen with improvement. · 07/24/2017- PET/CT scan showed marked improvement, is minimal abnormal, has excellent response to therapy  · 02/01/2018- PET/CT scan Numerous bilateral lung masses and nodules, the majority of which do not have appreciable abnormal FDG uptake. The largest mass in the left infrahilar region of the left lower lobe demonstrates a maximum SUV of 3.3. Mediastinal lymphadenopathy. No evidence of metastatic disease in the abdomen or pelvis. · 06/21/2018- CT Chest/Abdomen/Pelvis Multiple lung mets, mild bilateral hilar and mediastinal lymph nodes.  No masses or evidence of metastatic disease in the abdomen or pelvis  · 06/27/2018-09/17/2018- Gemzar  · 09/28/2018- CT Pulmonary Multiple lung mets, mild bilateral hilar and mediastinal lymph nodes  · 10/10/2018-12/21/2020- Faslodex every 4 weeks  · 12/14/2018- CT Chest showed definite decrease in the maximal diameter and volume of all left and right metastatic nodules. Somewhat enlarged paratracheal and left axillary lymph nodes are relatively unchanged. · 04/22/2019- MRI Cervical Spine W WO Contrast Unremarkable  · 04/22/2019- MRI Brain W WO Contrast No enhancing lesions in the brain and no evidence of metastatic disease. · 07/27/2019- CTA Pulmonary showed essentially complete disappearance of the pulmonary metastatic disease. Several tiny flat peripheral nodules are the only sequela. The tiny ones on the right are unchanged, the tiny ones on the left are even smaller. There is no longer any active metastatic disease in either lung. · 12/19/2019- CT Chest/Abdomen/Pelvis Small, tiny subpleural nodules right upper and right midlung field as well as left lung base has remained unchanged. No new focal parenchymal abnormality seen. No adenopathy seen. There is no abdominal or pelvic mass, adenopathy or fluid collection. No lytic or sclerotic bony lesions, but there is a possibility of osteopenia and/or osteoporosis. · 02/17/2020- DEXA Bone Density showed osteopenia of lumbar spine, T-score -1.8, and left femoral neck, T-score -2.0  · 2/25/21 Ct Abdomen Pelvis W Iv Contrast  No evidence of metastatic disease in the abdomen or pelvis. · 2/26/21 Ct Chest W Contrast Tiny nodules in the right lung described above probably represent small foci of pleural thickening due to chronic inflammatory process or small noncalcified granulomas. No features to suggest malignancy or metastatic disease. Bilateral breast implants without complication. · 3/1/2021discussed the results CT chest abdomen pelvis. Essentially no clear evidence of metastatic disease. This is consistent with excellent response to treatment. Continue current treatment.    · 4/1/2021 = 31.6 CA 27-29= 33.5 CEA= 1.6 · 6/3/21 CA 15-3= 23.3 CA 27-29= 25.6  CEA= 1.8   · 7/29/2021 CEA=1.7 CA 15-3=21.50 CA 27.29= 26.0  · 8/19/2021 CT Chest  Left lower lobe pleural-based nodular 1.7 x 0.9 cm is indeterminate. PET/CT recommended. Feeding defect in the left lower lobe bronchus versus a postobstructive airspace disease. This too may be further characterized with PET CT versus direct visualization. · 8/19/2021 CT Abd/Pelvis A stable CT scan of the abdomen and pelvis. No finding to suggest neoplastic/metastatic disease. · 9/28/2021 Final Diagnosis: Arm, excision of left arm thrombus:Fragments of organizing and organized thrombi with dystrophic calcification. Received is a container labeled \"ischemic, left arm\" Received in a fixative is a 3.6 x 2.6 x 1.4 cm aggregate of brown-tan soft tissue fragments and skin. Representative sections are submitted in a single cassette, numerous. · October 2021she developed left upper extremity DVT associated support. Port removed. She also had infection of the port site status post completion to biotics.      CA 27.29 [employer]  01/21/2021- 20.3  04/01/2021 33.5  06/03/2021 25.6  07/29/2021 26.0    Past Medical History:    Past Medical History:   Diagnosis Date    Breast cancer ([facility] [address]     Breast cancer with lung mets (RIGHT)    Chronic back pain     GERD (gastroesophageal reflux disease)     Hx of blood clots     Hypertension     Neuropathy     Post chemo treatment neuropathy       Past Surgical History:    Past Surgical History:   Procedure Laterality Date    BREAST LUMPECTOMY Right     2006    BREAST RECONSTRUCTION Left 09/17/2021    Revision left reconstructed breast, implant performed by Christel Streeter MD at [address] [place] [address]  2016    Bilateral breast implants    EMBOLECTOMY Left 9/26/2021    LEFT UPPER EXTREMITY  MECHANICAL SUCTION THROMBECTOMY performed by Shen Larry DO at [place] [address], TOTAL ABDOMINAL  2015    MASTECTOMY, BILATERAL Right 2013 & Left 2016    TUNNELED VENOUS PORT PLACEMENT      VASCULAR SURGERY N/A 10/6/2021    REMOVAL OF MEDIPORT performed by Dafne Moreno DO at [facility] History:    Marital status, children: Single, 2 children-female  Smoking status: no  ETOH status: no  Profession: Disabled  Resides: [place], [place]  Recent trips: Moved from [address] [place] [address] [place]: no    Family History:   Family History   Problem Relation Age of Onset    Hypertension Mother     Obesity Mother     Prostate Cancer Father     No Known Problems Sister     No Known Problems Daughter     No Known Problems Daughter        Current Hospital Medications:    Current Outpatient Medications   Medication Sig Dispense Refill    apixaban (ELIQUIS) 5 MG TABS tablet Take 1 tablet by mouth 2 times daily 60 tablet 0    cefTRIAXone (ROCEPHIN) 1 g injection Inject 1,000 mg into the muscle every 24 hours      sodium hypochlorite (DAKINS) 0.125 % SOLN external solution Apply topically 2 times daily 1 each 0    apixaban starter pack (ELIQUIS) 5 MG TBPK tablet Take 1 tablet by mouth See Admin Instructions (Patient taking differently: Take 5 mg by mouth 2 times daily ) 74 tablet 0    fluticasone-vilanterol (BREO ELLIPTA) 100-25 MCG/INH AEPB inhaler Inhale 1 puff into the lungs daily 1 each 5    VENTOLIN  (90 Base) MCG/ACT inhaler Inhale 2 puffs into the lungs 4 times daily as needed for Wheezing 18 g 5    pantoprazole (PROTONIX) 40 MG tablet TAKE 1 TABLET BY MOUTH EVERY DAY (Patient taking differently: Take 40 mg by mouth daily ) 90 tablet 0    IBRANCE 125 MG tablet Take 1 tablet (125mg) by mouth once daily. 21 tablet 3    NARCAN 4 MG/0.1ML LIQD nasal spray       cyclobenzaprine (FLEXERIL) 10 MG tablet Take 10 mg by mouth 3 times daily as needed for Muscle spasms      guaiFENesin-codeine (GUAIFENESIN AC) 100-10 MG/5ML liquid Take 5 mLs by mouth 3 times daily as needed for Cough.       Albuterol Sulfate (PROAIR HFA IN) Inhale 1 each into the lungs daily as needed       benzonatate (TESSALON) 100 MG capsule Take 100 mg by mouth 3 times daily as needed for Cough      Cholecalciferol (VITAMIN D3) 125 MCG (5000 UT) TABS Take 1 tablet by mouth daily       lisinopril (PRINIVIL;ZESTRIL) 10 MG tablet Take 10 mg by mouth daily      acyclovir (ZOVIRAX) 800 MG tablet Take 800 mg by mouth 2 times daily as needed       ondansetron (ZOFRAN) 4 MG tablet Take 4 mg by mouth every 8 hours as needed for Nausea or Vomiting      alclomethasone (ACLOVATE) 0.05 % cream Apply topically 2 times daily as needed Apply topically 2 times daily.  gabapentin (NEURONTIN) 300 MG capsule TAKE 2 CAPSULES BY MOUTH 3 TIMES DAILY FOR 30 DAYS. 180 capsule 0     No current facility-administered medications for this visit. Allergies:    Allergies   Allergen Reactions    Clindamycin/Lincomycin Hives, Itching and Rash         Subjective   REVIEW OF SYSTEMS:   CONSTITUTIONAL: no fever, no night sweats, fatigue;  HEENT: no blurring of vision, no double vision, no hearing difficulty, no tinnitus, no ulceration, no dysplasia, no epistaxis;   LUNGS: Productive cough, no hemoptysis, no wheeze,   shortness of breath;  CARDIOVASCULAR: no palpitation, no chest pain, no shortness of breath;  GI: no abdominal pain, no nausea, no vomiting, no diarrhea, no constipation;  PATRICK: no dysuria, no hematuria, no frequency or urgency, no nephrolithiasis;  MUSCULOSKELETAL: no joint pain, no swelling, no stiffness;  ENDOCRINE: no polyuria, no polydipsia, no cold or heat intolerance;  HEMATOLOGY: no easy bruising or bleeding, no history of clotting disorder;  DERMATOLOGY: no skin rash, no eczema, no pruritus;  PSYCHIATRY: no depression, no anxiety, no panic attacks, no suicidal ideation, no homicidal ideation;  NEUROLOGY: no syncope, no seizures, no numbness or tingling of hands, no numbness or tingling of feet, no paresis;       BP (!) 146/82   Pulse 94   Ht 5' 6\" (1.676 m)   Wt 187 lb 14.4 oz (85.2 kg)   SpO2 95%   BMI 30.33 kg/m²     PHYSICAL EXAM:  CONSTITUTIONAL: Alert, appropriate, no acute distress  EYES: Non icteric, EOM intact, pupils equal round   ENT: Mucus membranes moist, no oral pharyngeal lesions, external inspection of ears and nose are normal.  NECK: Supple, no masses. No palpable thyroid mass  CHEST/LUNGS: CTA bilaterally, normal respiratory effort   CARDIOVASCULAR: RRR, no murmurs. No lower extremity edema  ABDOMEN: soft non-tender, active bowel sounds, no HSM. No palpable masses  EXTREMITIES: warm, full ROM in all 4 extremities, no focal weakness. SKIN: warm, dry with no rashes or lesions  LYMPH: No cervical, clavicular, axillary, or inguinal lymphadenopathy  NEUROLOGIC: follows commands, non focal   PSYCH: mood and affect appropriate. Alert and oriented to time, place, person    LABORATORY RESULTS REVIEWED/ANALYZED BY ME:  9/28/2021 Final Diagnosis: Arm, excision of left arm thrombus:Fragments of organizing and organized thrombi with dystrophic calcification. Received is a container labeled \"ischemic, left arm\" Received in a fixative is a 3.6 x 2.6 x 1.4 cm aggregate of brown-tan soft tissue fragments and skin. Representative sections are submitted in a single cassette, numerous. Lab Results   Component Value Date    WBC 4.10 10/22/2021    HGB 10.8 (L) 10/22/2021    HCT 31.9 (L) 10/22/2021    .6 (H) 10/22/2021     10/22/2021     Lab Results   Component Value Date    NEUTROABS 2.8 10/11/2021     RADIOLOGY STUDIES REVIEWED BY ME:  XR CHEST (2 VW)    Result Date: 10/5/2021  XR CHEST (2 VW) 10/5/2021 4:04 PM History: Preop vascular surgery procedure. Two-view chest x-ray. Comparison is made with September 24, 2021. Heart size is within normal limits. The mediastinum has a normal appearance. The lungs are adequately expanded with no pneumonia or pneumothorax. There is mild chronic interstitial disease with scattered calcified granulomas.  There is no significant chest wall collaterals suggest chronic left subclavian vein stenosis. 3. Coronary artery calcifications. Signed by Dr Mag Dickerson    VL EXTREMITY VENOUS LEFT    Result Date: 10/6/2021  Vascular Upper Extremities Veins Procedure  Demographics   Patient Name    Alec Deng Age                  49   Patient Number  [id number]          Gender               Female   Visit Number    [id number]       Interpreting         Danni Hopson                                  Physician   Date of Birth   1965      Referring Physician  [place]   Accession       [id number]      [address] RT, RVT  Number  Procedure Type of Study:   Veins:Upper Extremities Veins, US Doppler Venous Upper Extremity  Unilateral.  Indications for Study:F/U DVT left upper extremity. Impression   Chronic appearing deep vein thrombosis (DVT) is seen in the internal  jugular vein brachial , left upper extremity(ies). Subacute appearing superficial thrombophlebitis (SVT) is seen in the  basilic vein, left upper extremity(ies). ASSESSMENT:    No orders of the defined types were placed in this encounter. Charolet Hamman was seen today for follow-up. Diagnoses and all orders for this visit:    Antineoplastic drugs causing adverse effect, sequela    Metastatic breast cancer ([address]  -     Cancel: CBC    Carcinoma of female breast, unspecified estrogen receptor status, unspecified laterality, unspecified site of breast ([address]  -     Cancel: CBC    Port or reservoir infection, subsequent encounter    DVT (deep vein thrombosis) in pregnancy    Acute deep vein thrombosis (DVT) of left upper extremity, unspecified vein (HCC)      Metastatic breast cancer, hormone sensitive  2/25/2021discussed the results of CT chest abdomen pelvis. No evidence of disease progression. In fact, excellent response with no measurable metastatic disease at this time. 1/21/2021CA 15-3 = 23, CA 27-29 = 20.3. Currently Faslodex/Ibrance. 6/3/2021CA 15-323, CA 27-2925, CEA 1.8  Regimen:  Faslodex to 500 mg subcutaneous every 4 weeks. Continue Ibrance days 121 q. 28 days    Clinical/laboratory assessment of toxicity for Ibrance      8/19/2021CT chest/abdomen/pelvisno evidence of metastatic disease. Findings of a pulmonary lung nodule in the left lower lobe    Essentially, good response to Faslodex/Ibrance. Last cancer marker 7/29/2001 were normal.    B12 deficiencyB12 levels 189. Recommended B12 2000 mcg p.o. daily. Will start B12 injections today 1000mcg with Faslodex. 3/1/2021- Methylmalonic Acid 171, Vitamin B12 >2000. B12 replaced monthly. Chemotherapy-induced neuropathycontinue gabapentin. COVID-19 vaccination responsepatient has not been vaccinated. Encouraged to get vaccinated. Left lower lobe lung noduleshe is currently being seen by pulmonary at [facility]. They believe that this could be inflammatory. She received a trial of antibiotics and will have a repeat CT scan with them on 1/6/2021. Acute left upper extremity DVT/left upper chest port, October 2021US upper extremity showed acute DVT likely related to port Oct 2021. Currently on Eliquis. S/p Mediport removal.    Normocytic anemiahemoglobin 10.8. Improving. Continue to monitor BC.  10/10/2021ferritin 92, iron saturation 17%, TIBC 242, folate 4.6, vitamin B12 154. Continue B12 replacement    PLAN:  · Faslodex today and every 4 weeks  · Continue follow-up with wound care  · Follow up with Dr. Dov Ayala for PCP care  · CBC CMP CEA CA 15-3 CA 27-29 next visit  · Follow-up with pulmonary for lung nodule January 2022  · RTC with MD 4 weeks  · Continue Ibrance  · Follow-up CT chest 1/6/2022  · Continue B12 replacement monthly    ICarmita am scribing for Anant Billingsley MD. Electronically signed by Carmita Torres RN on 10/24/2021 at 2:17 PM CDT.     I, Dr Wilfred Chowdhury, personally performed the services described in this documentation as scribed by Natasha Peace RN in my presence and is both accurate and complete. I have seen, examined and reviewed this patient medication list, appropriate labs and imaging studies. I reviewed relevant medical records and others physicians notes. I discussed the plans of care with the patient. I answered all the questions to the patients satisfaction. I have also reviewed the chief complaint (CC) and part of the history (History of Present Illness (HPI), Past Family Social History [facility]), or Review of Systems (ROS) and made changes when appropriated.        (Please note that portions of this note were completed with a voice recognition program. Efforts were made to edit the dictations but occasionally words are mis-transcribed.)      Junior Kam MD    10/24/21  10:31 PM

## 2021-10-21 ENCOUNTER — OFFICE VISIT (OUTPATIENT)
Dept: VASCULAR SURGERY | Age: 56
End: 2021-10-21

## 2021-10-21 VITALS
OXYGEN SATURATION: 91 % | BODY MASS INDEX: 28.77 KG/M2 | HEIGHT: 66 IN | SYSTOLIC BLOOD PRESSURE: 141 MMHG | WEIGHT: 179 LBS | TEMPERATURE: 96.1 F | HEART RATE: 102 BPM | RESPIRATION RATE: 20 BRPM | DIASTOLIC BLOOD PRESSURE: 94 MMHG

## 2021-10-21 DIAGNOSIS — I87.8 POOR VENOUS ACCESS: Primary | ICD-10-CM

## 2021-10-21 DIAGNOSIS — M79.89 LEFT ARM SWELLING: ICD-10-CM

## 2021-10-21 DIAGNOSIS — M79.602 LEFT ARM PAIN: ICD-10-CM

## 2021-10-21 PROCEDURE — 99024 POSTOP FOLLOW-UP VISIT: CPT | Performed by: NURSE PRACTITIONER

## 2021-10-21 NOTE — PROGRESS NOTES
Subjective  Srinivas Espinoza is a 27-year-old female status post:  1. Left pectoral block. 2.  Removal of left 800 mL MENTOR implant. 3.  Surgery/capsulorrhaphy, left breast capsule. 4.  Placement of new left 700 mL implant. 5.  Suction-assisted lipectomy of lateral breast fat pad. 6.  [place] mastopexy. She unfortunately represented after surgery with a left upper extremity DVT. He was seen by vascular surgery for management and subsequent thrombectomy. She states when a port was deaccessed, there appeared to be some white material that was expressed. She comes here today to evaluate her implant and also discussion of this white material that was expressed.       Objective  Patient Active Problem List    Diagnosis Date Noted    Chest wall ulcer, with fat layer exposed ([address] 10/15/2021    Port or reservoir infection 10/06/2021    Central line infection 10/05/2021    Exostosis of left foot 09/29/2021    Arm DVT (deep venous thromboembolism), acute, left ([address] 09/24/2021    S/P revision of left breast reconstruction 9/17/2021 09/23/2021    Status post bilateral breast reconstruction     Acute purulent bronchitis 09/02/2021    Lung nodule 09/02/2021    Status post bilateral mastectomy 08/06/2021    Other cyst of bone, left ankle and foot 04/10/2021    Dyspnea and respiratory abnormalities 04/09/2021    Chronic pain syndrome 04/09/2021    Right wrist pain 04/09/2021    Rheumatoid arthritis involving both knees with negative rheumatoid factor ([address] 04/09/2021    Menopausal symptom 04/08/2021    Benign neoplasm of body of uterus 04/08/2021    Iron deficiency anemia 01/22/2021    Carcinoma of female breast ([facility] 01/21/2021    Poor venous access 01/21/2021       Current Outpatient Medications   Medication Sig Dispense Refill    apixaban starter pack (ELIQUIS) 5 MG TBPK tablet Take 1 tablet by mouth See Admin Instructions (Patient taking differently: Take 5 mg by mouth 2 times daily ) 74 tablet 0    fluticasone-vilanterol (BREO ELLIPTA) 100-25 MCG/INH AEPB inhaler Inhale 1 puff into the lungs daily 1 each 5    VENTOLIN  (90 Base) MCG/ACT inhaler Inhale 2 puffs into the lungs 4 times daily as needed for Wheezing 18 g 5    pantoprazole (PROTONIX) 40 MG tablet TAKE 1 TABLET BY MOUTH EVERY DAY (Patient taking differently: Take 40 mg by mouth daily ) 90 tablet 0    IBRANCE 125 MG tablet Take 1 tablet (125mg) by mouth once daily. 21 tablet 3    NARCAN 4 MG/0.1ML LIQD nasal spray       cyclobenzaprine (FLEXERIL) 10 MG tablet Take 10 mg by mouth 3 times daily as needed for Muscle spasms      guaiFENesin-codeine (GUAIFENESIN AC) 100-10 MG/5ML liquid Take 5 mLs by mouth 3 times daily as needed for Cough.  Albuterol Sulfate (PROAIR HFA IN) Inhale 1 each into the lungs daily as needed       benzonatate (TESSALON) 100 MG capsule Take 100 mg by mouth 3 times daily as needed for Cough      Cholecalciferol (VITAMIN D3) 125 MCG (5000 UT) TABS Take 1 tablet by mouth daily       lisinopril (PRINIVIL;ZESTRIL) 10 MG tablet Take 10 mg by mouth daily      acyclovir (ZOVIRAX) 800 MG tablet Take 800 mg by mouth 2 times daily as needed       ondansetron (ZOFRAN) 4 MG tablet Take 4 mg by mouth every 8 hours as needed for Nausea or Vomiting      alclomethasone (ACLOVATE) 0.05 % cream Apply topically 2 times daily as needed Apply topically 2 times daily.  meloxicam (MOBIC) 15 MG tablet Take 15 mg by mouth daily      cefTRIAXone (ROCEPHIN) 1 g injection Inject 1,000 mg into the muscle every 24 hours      sodium hypochlorite (DAKINS) 0.125 % SOLN external solution Apply topically 2 times daily 1 each 0    gabapentin (NEURONTIN) 300 MG capsule TAKE 2 CAPSULES BY MOUTH 3 TIMES DAILY FOR 30 DAYS. 180 capsule 0     No current facility-administered medications for this visit.        Allergies: Clindamycin/lincomycin    Past Medical History:   Diagnosis Date    Breast cancer ([address]     Breast cancer with lung mets (RIGHT)    Chronic back pain     GERD (gastroesophageal reflux disease)     Hx of blood clots     Hypertension     Neuropathy     Post chemo treatment neuropathy       Past Surgical History:   Procedure Laterality Date    BREAST LUMPECTOMY Right     2006    BREAST RECONSTRUCTION Left 09/17/2021    Revision left reconstructed breast, implant performed by Abraham Aparicio MD at [address] [place] [address]  2016    Bilateral breast implants    EMBOLECTOMY Left 9/26/2021    LEFT UPPER EXTREMITY  MECHANICAL SUCTION THROMBECTOMY performed by Alcides Ortega DO at [place] [address], TOTAL ABDOMINAL  2015    MASTECTOMY, BILATERAL      Right 2013 & Left 2016    TUNNELED VENOUS PORT PLACEMENT      VASCULAR SURGERY N/A 10/6/2021    REMOVAL OF MEDIPORT performed by Alcides Ortega DO at [facility] OR       Family History   Problem Relation Age of Onset    Hypertension Mother     Obesity Mother     Prostate Cancer Father     No Known Problems Sister     No Known Problems Daughter     No Known Problems Daughter        Social History     Tobacco Use    Smoking status: Never Smoker    Smokeless tobacco: Never Used   Substance Use Topics    Alcohol use: Not Currently        Review of systems  Reviewed and positive for the above although system noted to be negative    Exam  Blood pressure (!) 140/87, pulse 94, temperature 98.6 °F (37 °C), temperature source Axillary, height 5' 6\" (1.676 m), weight 189 lb (85.7 kg). On examination to her implant exchange site, incision is healing well. The implant looks well. I appreciate no excessive seroma. She has some vein engorgement probable related to her DVT. The port site shows no appreciable evidence of abnormality at this time. Assessment  Port discharge I suspect that this was chyle drainage noted to her left subclavian thrombosis    Plan  At this present time, like to see her back in a month with Dr. Opal Acosta.   She will continue DVT treatment and further management of upper extremity thrombosis by vascular surgery.

## 2021-10-22 ENCOUNTER — OFFICE VISIT (OUTPATIENT)
Dept: HEMATOLOGY | Age: 56
End: 2021-10-22
Payer: MEDICARE

## 2021-10-22 ENCOUNTER — HOSPITAL ENCOUNTER (OUTPATIENT)
Dept: INFUSION THERAPY | Age: 56
Discharge: HOME | End: 2021-10-22
Payer: MEDICARE

## 2021-10-22 VITALS
HEIGHT: 66 IN | WEIGHT: 187.9 LBS | DIASTOLIC BLOOD PRESSURE: 82 MMHG | BODY MASS INDEX: 30.2 KG/M2 | OXYGEN SATURATION: 95 % | SYSTOLIC BLOOD PRESSURE: 146 MMHG | HEART RATE: 94 BPM

## 2021-10-22 DIAGNOSIS — C50.919 METASTATIC BREAST CANCER (HCC): ICD-10-CM

## 2021-10-22 DIAGNOSIS — C50.919 CARCINOMA OF FEMALE BREAST, UNSPECIFIED ESTROGEN RECEPTOR STATUS, UNSPECIFIED LATERALITY, UNSPECIFIED SITE OF BREAST (HCC): ICD-10-CM

## 2021-10-22 DIAGNOSIS — T80.212D PORT OR RESERVOIR INFECTION, SUBSEQUENT ENCOUNTER: ICD-10-CM

## 2021-10-22 DIAGNOSIS — O22.30 DVT (DEEP VEIN THROMBOSIS) IN PREGNANCY: ICD-10-CM

## 2021-10-22 DIAGNOSIS — T45.1X5S ANTINEOPLASTIC DRUGS CAUSING ADVERSE EFFECT, SEQUELA: Primary | ICD-10-CM

## 2021-10-22 DIAGNOSIS — D50.8 OTHER IRON DEFICIENCY ANEMIA: ICD-10-CM

## 2021-10-22 DIAGNOSIS — I82.622 ACUTE DEEP VEIN THROMBOSIS (DVT) OF LEFT UPPER EXTREMITY, UNSPECIFIED VEIN (HCC): ICD-10-CM

## 2021-10-22 DIAGNOSIS — C50.919 METASTATIC BREAST CANCER (HCC): Primary | ICD-10-CM

## 2021-10-22 DIAGNOSIS — C50.919 CARCINOMA OF FEMALE BREAST, UNSPECIFIED ESTROGEN RECEPTOR STATUS, UNSPECIFIED LATERALITY, UNSPECIFIED SITE OF BREAST (HCC): Primary | ICD-10-CM

## 2021-10-22 DIAGNOSIS — D64.9 NORMOCYTIC ANEMIA: ICD-10-CM

## 2021-10-22 LAB
HCT VFR BLD CALC: 31.9 % (ref 34.1–44.9)
HEMOGLOBIN: 10.8 G/DL (ref 11.2–15.7)
MCH RBC QN AUTO: 35.4 PG (ref 25.6–32.2)
MCHC RBC AUTO-ENTMCNC: 33.9 G/DL (ref 32.3–35.5)
MCV RBC AUTO: 104.6 FL (ref 79.4–94.8)
PDW BLD-RTO: 14.4 % (ref 11.7–14.4)
PLATELET # BLD: 238 K/UL (ref 182–369)
PMV BLD AUTO: 8.6 FL (ref 7.4–10.4)
RBC # BLD: 3.05 M/UL (ref 3.93–5.22)
WBC # BLD: 4.1 K/UL (ref 3.98–10.04)

## 2021-10-22 PROCEDURE — G8417 CALC BMI ABV UP PARAM F/U: HCPCS | Performed by: INTERNAL MEDICINE

## 2021-10-22 PROCEDURE — 96372 THER/PROPH/DIAG INJ SC/IM: CPT

## 2021-10-22 PROCEDURE — 6360000002 HC RX W HCPCS: Performed by: INTERNAL MEDICINE

## 2021-10-22 PROCEDURE — 96402 CHEMO HORMON ANTINEOPL SQ/IM: CPT

## 2021-10-22 PROCEDURE — G8484 FLU IMMUNIZE NO ADMIN: HCPCS | Performed by: INTERNAL MEDICINE

## 2021-10-22 PROCEDURE — 99212 OFFICE O/P EST SF 10 MIN: CPT

## 2021-10-22 PROCEDURE — 1036F TOBACCO NON-USER: CPT | Performed by: INTERNAL MEDICINE

## 2021-10-22 PROCEDURE — 96401 CHEMO ANTI-NEOPL SQ/IM: CPT

## 2021-10-22 PROCEDURE — 85027 COMPLETE CBC AUTOMATED: CPT

## 2021-10-22 PROCEDURE — 99214 OFFICE O/P EST MOD 30 MIN: CPT | Performed by: INTERNAL MEDICINE

## 2021-10-22 PROCEDURE — 3017F COLORECTAL CA SCREEN DOC REV: CPT | Performed by: INTERNAL MEDICINE

## 2021-10-22 PROCEDURE — G8427 DOCREV CUR MEDS BY ELIG CLIN: HCPCS | Performed by: INTERNAL MEDICINE

## 2021-10-22 PROCEDURE — 1111F DSCHRG MED/CURRENT MED MERGE: CPT | Performed by: INTERNAL MEDICINE

## 2021-10-22 RX ORDER — LAMOTRIGINE 25 MG/1
250 TABLET ORAL ONCE
Status: COMPLETED | OUTPATIENT
Start: 2021-10-22 | End: 2021-10-22

## 2021-10-22 RX ORDER — CYANOCOBALAMIN 1000 UG/ML
1000 INJECTION INTRAMUSCULAR; SUBCUTANEOUS ONCE
Status: CANCELLED
Start: 2021-11-08

## 2021-10-22 RX ORDER — METHYLPREDNISOLONE SODIUM SUCCINATE 125 MG/2ML
125 INJECTION, POWDER, LYOPHILIZED, FOR SOLUTION INTRAMUSCULAR; INTRAVENOUS ONCE
Status: CANCELLED | OUTPATIENT
Start: 2021-10-22 | End: 2021-10-22

## 2021-10-22 RX ORDER — EPINEPHRINE 1 MG/ML
0.3 INJECTION, SOLUTION, CONCENTRATE INTRAVENOUS PRN
Status: CANCELLED | OUTPATIENT
Start: 2021-10-22

## 2021-10-22 RX ORDER — DIPHENHYDRAMINE HYDROCHLORIDE 50 MG/ML
50 INJECTION INTRAMUSCULAR; INTRAVENOUS ONCE
Status: CANCELLED | OUTPATIENT
Start: 2021-10-22 | End: 2021-10-22

## 2021-10-22 RX ORDER — SODIUM CHLORIDE 9 MG/ML
INJECTION, SOLUTION INTRAVENOUS CONTINUOUS
Status: CANCELLED | OUTPATIENT
Start: 2021-10-22

## 2021-10-22 RX ORDER — CYANOCOBALAMIN 1000 UG/ML
1000 INJECTION INTRAMUSCULAR; SUBCUTANEOUS ONCE
Status: COMPLETED
Start: 2021-10-22 | End: 2021-10-22

## 2021-10-22 RX ADMIN — FULVESTRANT 250 MG: 50 INJECTION, SOLUTION INTRAMUSCULAR at 14:00

## 2021-10-22 RX ADMIN — CYANOCOBALAMIN 1000 MCG: 1000 INJECTION, SOLUTION INTRAMUSCULAR; SUBCUTANEOUS at 14:00

## 2021-10-22 NOTE — PROGRESS NOTES
Megan Ring is a 49 y.o. female who presents for post op evaluation. Patient had a  Removal of infected mediport and left UE mechanical thrombectomy 3 weeks ago. This was performed by Dr. Jessie Mcgrath. She is going to the [address] [place] [address] for wound management. Post op symptoms reported none. Post op pain is minimal.  Swelling is minimal    On evaluation today, incision is clean and granulating. Arm is without significant edema. No signs of infection are present. Today we have recommended she continue packing as per [address] [place] [address] directions. .  We have recommended continued eliquis daily. We will follow up with her in 3 months. This should bring you up to date on Megan Ring  As always we want to thank you for allowing us to participate in the care of your patients.     Sincerely,    Dorsie Goldberg, APRN

## 2021-10-28 ENCOUNTER — HOSPITAL ENCOUNTER (OUTPATIENT)
Dept: WOUND CARE | Age: 56
Discharge: HOME | End: 2021-10-28
Payer: MEDICARE

## 2021-10-28 ENCOUNTER — OFFICE VISIT (OUTPATIENT)
Dept: SURGERY | Age: 56
End: 2021-10-28

## 2021-10-28 VITALS
RESPIRATION RATE: 18 BRPM | DIASTOLIC BLOOD PRESSURE: 99 MMHG | BODY MASS INDEX: 30.05 KG/M2 | WEIGHT: 187 LBS | HEART RATE: 99 BPM | TEMPERATURE: 97.1 F | HEIGHT: 66 IN | SYSTOLIC BLOOD PRESSURE: 138 MMHG

## 2021-10-28 VITALS
SYSTOLIC BLOOD PRESSURE: 130 MMHG | BODY MASS INDEX: 30.05 KG/M2 | DIASTOLIC BLOOD PRESSURE: 85 MMHG | HEIGHT: 66 IN | WEIGHT: 187 LBS | HEART RATE: 89 BPM | TEMPERATURE: 98.2 F

## 2021-10-28 DIAGNOSIS — L98.492 CHEST WALL ULCER, WITH FAT LAYER EXPOSED (HCC): ICD-10-CM

## 2021-10-28 DIAGNOSIS — Z48.89 ENCOUNTER FOR POSTOPERATIVE CARE: Primary | ICD-10-CM

## 2021-10-28 PROCEDURE — 97597 DBRDMT OPN WND 1ST 20 CM/<: CPT | Performed by: SURGERY

## 2021-10-28 PROCEDURE — 99024 POSTOP FOLLOW-UP VISIT: CPT | Performed by: PHYSICIAN ASSISTANT

## 2021-10-28 PROCEDURE — 97597 DBRDMT OPN WND 1ST 20 CM/<: CPT

## 2021-10-28 ASSESSMENT — PAIN DESCRIPTION - PROGRESSION: CLINICAL_PROGRESSION: NOT CHANGED

## 2021-10-28 ASSESSMENT — PAIN SCALES - GENERAL: PAINLEVEL_OUTOF10: 0

## 2021-10-29 NOTE — PROGRESS NOTES
[address]   Progress Note and Procedure Note      Megan Ring  AGE: 49 y.o. GENDER: female  : 1965  TODAY'S DATE:  10/28/2021    Subjective:        HISTORY of PRESENT ILLNESS HPI   Megan Ring is a 49 y.o. female who presents today for wound evaluation.     Wound Type:non-healing surgical  Wound Location:chest  Modifying factors: immunosuppressive therapy    Patient Active Problem List   Diagnosis Code    Carcinoma of female breast ([facility] C50.919    Poor venous access I87.8    Iron deficiency anemia D50.9    Menopausal symptom N95.1    Benign neoplasm of body of uterus D26.1    Dyspnea and respiratory abnormalities R06.00, R06.89    Chronic pain syndrome G89.4    Right wrist pain M25.531    Rheumatoid arthritis involving both knees with negative rheumatoid factor [facility] M06.061, M06.062    Other cyst of bone, left ankle and foot M85.672    Status post bilateral mastectomy Z90.13    Acute purulent bronchitis J20.9    Lung nodule R91.1    Status post bilateral breast reconstruction Z98.890    S/P revision of left breast reconstruction 2021 Z98.890    Arm DVT (deep venous thromboembolism), acute, left (HCC) I82.622    Exostosis of left foot M89.8X7    Central line infection T80.219A   [facility] or reservoir infection T80.212A    Chest wall ulcer, with fat layer exposed (HCC) L98.492       ALLERGIES    Allergies   Allergen Reactions    Clindamycin/Lincomycin Hives, Itching and Rash            Objective:      BP (!) 138/99   Pulse 99   Temp 97.1 °F (36.2 °C) (Temporal)   Resp 18   Ht 5' 6\" (1.676 m)   Wt 187 lb (84.8 kg)   BMI 30.18 kg/m²     Post Debridement Measurements and Assessment:    Wound 10/15/21 Chest Lateral;Left;Upper wound 1- left upper chest post-op wound (Active)   Wound Image   10/28/21 1510   Wound Etiology Surgical 10/28/21 1510   Dressing Status Old drainage noted 10/28/21 1510   Wound Cleansed Irrigated with saline 10/28/21 1510 Dressing/Treatment Moist to moist 10/15/21 0958   Wound Length (cm) 1.4 cm 10/28/21 1510   Wound Width (cm) 3 cm 10/28/21 1510   Wound Depth (cm) 1.9 cm 10/28/21 1510   Wound Surface Area (cm^2) 4.2 cm^2 10/28/21 1510   Change in Wound Size % (l*w) 38.78 10/28/21 1510   Wound Volume (cm^3) 7.98 cm^3 10/28/21 1510   Wound Healing % 3 10/28/21 1510   Post-Procedure Length (cm) 1.4 cm 10/28/21 1546   Post-Procedure Width (cm) 3 cm 10/28/21 1546   Post-Procedure Depth (cm) 1.9 cm 10/28/21 1546   Post-Procedure Surface Area (cm^2) 4.2 cm^2 10/28/21 1546   Post-Procedure Volume (cm^3) 7.98 cm^3 10/28/21 1546   Distance Tunneling (cm) 1.7 cm 10/28/21 1510   Tunneling Position ___ O'Clock 5.9 10/28/21 1510   Wound Assessment Slough;Pink/red 10/28/21 1510   Drainage Amount Moderate 10/28/21 1510   Drainage Description Serosanguinous 10/28/21 1510   Odor None 10/28/21 1510   Delma-wound Assessment Intact 10/28/21 1510   Margins Defined edges 10/28/21 1510   Wound Thickness Description not for Pressure Injury Full thickness 10/28/21 1510   Number of days: 14           The patients pain isPain Level: 0  . Wound(s) has moderately improved. Please refer to nursing measurements and assessment regarding wound pre and post debridement. Procedure Note:    Wound #: 1     Debridement: Non-excisional Debridement    Anesthetic: Anesthetic: 2% Lidocaine Gel Topical  Using curette the wound was sharply debrided    down through and including the removal of epidermis. Total Surface Area Debrided:  4.2 sq cm   Devitalized Tissue Debrided:  biofilm and slough    Percent of Wound Debrided: 100%      Bleeding: Minimal    Hemostasis:   not needed      Response to treatment:  Well tolerated by patient.       Assessment:      Patient Active Problem List   Diagnosis    Carcinoma of female breast ([address]    Poor venous access    Iron deficiency anemia    Menopausal symptom    Benign neoplasm of body of uterus    Dyspnea and respiratory abnormalities    Chronic pain syndrome    Right wrist pain    Rheumatoid arthritis involving both knees with negative rheumatoid factor (HCC)    Other cyst of bone, left ankle and foot    Status post bilateral mastectomy    Acute purulent bronchitis    Lung nodule    Status post bilateral breast reconstruction    S/P revision of left breast reconstruction 9/17/2021    Arm DVT (deep venous thromboembolism), acute, left (HCC)    Exostosis of left foot    Central line infection    Port or reservoir infection    Chest wall ulcer, with fat layer exposed ([facility]          Plan:          Plan for wound - Dress per physician order  Treatment:     Compression : No   Offloading : No   Dressing : Dakins   Additional Therapy :      1. Discussed appropriate home care of this wound. Wound redressed. 2. Patient instructions were given. 3. Follow up: 1 week(s). Wound is progressing slowly. Patient is doing dressing by herself. We will see patient every week to make sure her wound progress is adequate.                Electronically signed by Armando Rausch DO on 10/29/2021 at 1:14 PM

## 2021-11-02 NOTE — PROGRESS NOTES
Ms. Renetta Archer presents for follow up after breast reconstruction revision. She is doing well with no complaints. Her postoperative course has been complicated due to an infected port that had to be removed by vascular surgery. She has an open wound she is seeing wound care for. Her incisions are healing well. There is no infection, skin necrosis, or fluid accumulation. She will keep her appt with Dr. Melvin Letters already scheduled. She will call with any questions or concerns.

## 2021-11-04 ENCOUNTER — HOSPITAL ENCOUNTER (OUTPATIENT)
Dept: WOUND CARE | Age: 56
Discharge: HOME | End: 2021-11-04
Payer: MEDICARE

## 2021-11-04 VITALS
HEIGHT: 66 IN | TEMPERATURE: 97.6 F | WEIGHT: 183 LBS | DIASTOLIC BLOOD PRESSURE: 96 MMHG | RESPIRATION RATE: 20 BRPM | SYSTOLIC BLOOD PRESSURE: 162 MMHG | BODY MASS INDEX: 29.41 KG/M2 | HEART RATE: 95 BPM

## 2021-11-04 DIAGNOSIS — L98.492 CHEST WALL ULCER, WITH FAT LAYER EXPOSED (HCC): Primary | ICD-10-CM

## 2021-11-04 PROCEDURE — 97597 DBRDMT OPN WND 1ST 20 CM/<: CPT

## 2021-11-04 PROCEDURE — 97597 DBRDMT OPN WND 1ST 20 CM/<: CPT | Performed by: NURSE PRACTITIONER

## 2021-11-04 PROCEDURE — 6370000000 HC RX 637 (ALT 250 FOR IP): Performed by: NURSE PRACTITIONER

## 2021-11-04 RX ORDER — LIDOCAINE HYDROCHLORIDE 20 MG/ML
JELLY TOPICAL ONCE
Status: CANCELLED | OUTPATIENT
Start: 2021-11-04 | End: 2021-11-04

## 2021-11-04 RX ORDER — BETAMETHASONE DIPROPIONATE 0.05 %
OINTMENT (GRAM) TOPICAL ONCE
Status: CANCELLED | OUTPATIENT
Start: 2021-11-04 | End: 2021-11-04

## 2021-11-04 RX ORDER — LIDOCAINE HYDROCHLORIDE 20 MG/ML
JELLY TOPICAL ONCE
Status: COMPLETED | OUTPATIENT
Start: 2021-11-04 | End: 2021-11-04

## 2021-11-04 RX ADMIN — LIDOCAINE HYDROCHLORIDE: 20 JELLY TOPICAL at 08:39

## 2021-11-04 ASSESSMENT — PAIN DESCRIPTION - PAIN TYPE: TYPE: ACUTE PAIN

## 2021-11-04 ASSESSMENT — PAIN SCALES - GENERAL: PAINLEVEL_OUTOF10: 7

## 2021-11-04 ASSESSMENT — PAIN DESCRIPTION - LOCATION: LOCATION: BACK

## 2021-11-04 ASSESSMENT — PAIN DESCRIPTION - DESCRIPTORS: DESCRIPTORS: ACHING

## 2021-11-04 NOTE — PROGRESS NOTES
[address]   Progress Note and Procedure Note      [facility]  [id number]  AGE: 49 y.o. GENDER: female  : 1965  EPISODE DATE:  2021    Subjective:     Chief Complaint   Patient presents with    Wound Check     Pt says she has a new odor       10/6/21- Removal of mediport    HISTORY of PRESENT ILLNESS HPI     Pool Keith is a 49 y.o. female who presents today for wound/ulcer evaluation.    History of Wound Context:left chest wall wound follow up eval/treat  Ulcer Identification:  Ulcer Type: surgical  Contributing Factors: immunosuppression    Wound: N/A        PAST MEDICAL HISTORY        Diagnosis Date    Breast cancer ([address]     Breast cancer with lung mets (RIGHT)    Chronic back pain     GERD (gastroesophageal reflux disease)     Hx of blood clots     Hypertension     Neuropathy     Post chemo treatment neuropathy       PAST SURGICAL HISTORY    Past Surgical History:   Procedure Laterality Date    BREAST LUMPECTOMY Right     2006    BREAST RECONSTRUCTION Left 2021    Revision left reconstructed breast, implant performed by Leia Galdamez MD at [address] [place] [address]      Bilateral breast implants    EMBOLECTOMY Left 2021    LEFT UPPER EXTREMITY  MECHANICAL SUCTION THROMBECTOMY performed by Colby Mazariegos DO at [facility], TOTAL ABDOMINAL  2015    MASTECTOMY, BILATERAL      Right  & Left 2016    TUNNELED VENOUS PORT PLACEMENT      VASCULAR SURGERY N/A 10/6/2021    REMOVAL OF MEDIPORT performed by Colby Mazariegos DO at [address] [place] [address] HISTORY    Family History   Problem Relation Age of Onset    Hypertension Mother     Obesity Mother     Prostate Cancer Father     No Known Problems Sister     No Known Problems Daughter     No Known Problems Daughter        SOCIAL HISTORY    Social History     Tobacco Use    Smoking status: Never Smoker    Smokeless tobacco: Never Used   Vaping Use    Vaping Use: Never used   Substance Use Topics    Alcohol use: Not Currently    Drug use: Not Currently       ALLERGIES    Allergies   Allergen Reactions    Clindamycin/Lincomycin Hives, Itching and Rash       MEDICATIONS    Current Outpatient Medications on File Prior to Encounter   Medication Sig Dispense Refill    apixaban (ELIQUIS) 5 MG TABS tablet Take 1 tablet by mouth 2 times daily 60 tablet 0    pantoprazole (PROTONIX) 40 MG tablet TAKE 1 TABLET BY MOUTH EVERY DAY (Patient taking differently: Take 40 mg by mouth daily ) 90 tablet 0    IBRANCE 125 MG tablet Take 1 tablet (125mg) by mouth once daily. 21 tablet 3    sodium hypochlorite (DAKINS) 0.125 % SOLN external solution Apply topically 2 times daily 1 each 0    apixaban starter pack (ELIQUIS) 5 MG TBPK tablet Take 1 tablet by mouth See Admin Instructions (Patient taking differently: Take 5 mg by mouth 2 times daily ) 74 tablet 0    fluticasone-vilanterol (BREO ELLIPTA) 100-25 MCG/INH AEPB inhaler Inhale 1 puff into the lungs daily 1 each 5    VENTOLIN  (90 Base) MCG/ACT inhaler Inhale 2 puffs into the lungs 4 times daily as needed for Wheezing 18 g 5    NARCAN 4 MG/0.1ML LIQD nasal spray       gabapentin (NEURONTIN) 300 MG capsule TAKE 2 CAPSULES BY MOUTH 3 TIMES DAILY FOR 30 DAYS. 180 capsule 0    cyclobenzaprine (FLEXERIL) 10 MG tablet Take 10 mg by mouth 3 times daily as needed for Muscle spasms      guaiFENesin-codeine (GUAIFENESIN AC) 100-10 MG/5ML liquid Take 5 mLs by mouth 3 times daily as needed for Cough.       Albuterol Sulfate (PROAIR HFA IN) Inhale 1 each into the lungs daily as needed       benzonatate (TESSALON) 100 MG capsule Take 100 mg by mouth 3 times daily as needed for Cough      Cholecalciferol (VITAMIN D3) 125 MCG (5000 UT) TABS Take 1 tablet by mouth daily       lisinopril (PRINIVIL;ZESTRIL) 10 MG tablet Take 10 mg by mouth daily      acyclovir (ZOVIRAX) 800 MG tablet Take 800 mg by mouth 2 times daily as needed       ondansetron (ZOFRAN) 4 MG tablet Take 4 mg by mouth every 8 hours as needed for Nausea or Vomiting      alclomethasone (ACLOVATE) 0.05 % cream Apply topically 2 times daily as needed Apply topically 2 times daily. No current facility-administered medications on file prior to encounter. REVIEW OF SYSTEMS    Pertinent items are noted in HPI.     Objective:      BP (!) 162/96 Comment: no b/p meds  Pulse 95   Temp 97.6 °F (36.4 °C) (Temporal)   Resp 20   Ht 5' 6\" (1.676 m)   Wt 183 lb (83 kg)   BMI 29.54 kg/m²     Wt Readings from Last 3 Encounters:   11/04/21 183 lb (83 kg)   10/28/21 187 lb (84.8 kg)   10/28/21 187 lb (84.8 kg)       PHYSICAL EXAM    General Appearance: alert and oriented to person, place and time, well developed and well- nourished, in no acute distress  Skin: warm and dry, no rash or erythema  Head: normocephalic and atraumatic  Eyes: pupils equal, round, and reactive to light, extraocular eye movements intact, conjunctivae normal  ENT: tympanic membrane, external ear and ear canal normal bilaterally, nose without deformity, nasal mucosa and turbinates normal without polyps  Neck: supple and non-tender without mass, no thyromegaly or thyroid nodules, no cervical lymphadenopathy  Pulmonary/Chest: clear to auscultation bilaterally- no wheezes, rales or rhonchi, normal air movement, no respiratory distress  Extremities: no cyanosis, clubbing or edema  Musculoskeletal: normal range of motion, no joint swelling, deformity or tenderness  Neurologic: reflexes normal and symmetric, no cranial nerve deficit, gait, coordination and speech normal      Assessment:      Patient Active Problem List   Diagnosis Code    [facility] C50.919    Poor venous access I87.8    Iron deficiency anemia D50.9    Menopausal symptom N95.1    Benign neoplasm of body of uterus D26.1    Dyspnea and respiratory abnormalities R06.00, R06.89    Chronic pain syndrome G89.4    Right wrist pain M25.531    Rheumatoid arthritis involving both knees with negative rheumatoid factor ([facility] M06.061, M06.062    Other cyst of bone, left ankle and foot M85.672    Status post bilateral mastectomy Z90.13    Acute purulent bronchitis J20.9    Lung nodule R91.1    Status post bilateral breast reconstruction Z98.890    S/P revision of left breast reconstruction 9/17/2021 Z98.890    Arm DVT (deep venous thromboembolism), acute, left (HCC) I82.622    Exostosis of left foot M89.8X7    Central line infection T80.219A   [facility] or reservoir infection T80.212A    Chest wall ulcer, with fat layer exposed [facility]) L98.492        Procedure Note  Indications:  Based on my examination of this patient's wound(s)/ulcer(s) today, debridement is required to promote healing and evaluate the wound base. Performed by: BRITANY Lamar CNP    Consent obtained:  Yes    Time out taken:  Yes    Pain Control: Anesthetic  Anesthetic: 2% Lidocaine Gel Topical       Debridement:Non-excisional Debridement    Using curette the wound(s)/ulcer(s) was/were sharply debrided down through and including the removal of epidermis and dermis.         Devitalized Tissue Debrided:  fibrin, biofilm, slough and exudate    Pre Debridement Measurements:  Are located in the [place]  Documentation Flow Sheet    Wound/Ulcer #: 1    Post Debridement Measurements:  Wound/Ulcer Descriptions are Pre Debridement except measurements:      Percent of Wound/Ulcer Debrided: 100%    Total Surface Area Debrided:  2 sq cm     Wound 10/15/21 Chest Lateral;Left;Upper wound 1- left upper chest post-op wound (Active)   Wound Image     11/04/21 0839   Wound Etiology Surgical 11/04/21 0839   Dressing Status Old drainage noted 11/04/21 0839   Wound Cleansed Irrigated with saline 11/04/21 0839   Dressing/Treatment Moist to moist 11/04/21 0909   Wound Length (cm) 0.8 cm 11/04/21 0839   Wound Width (cm) 2.5 cm 11/04/21 0839   Wound Depth (cm) 1.4 cm 11/04/21 0839   Wound Surface Area (cm^2) 2 cm^2 11/04/21 0839   Change in Wound Size % (l*w) 70.85 11/04/21 0839   Wound Volume (cm^3) 2.8 cm^3 11/04/21 0839   Wound Healing % 66 11/04/21 0839   Post-Procedure Length (cm) 0.8 cm 11/04/21 0909   Post-Procedure Width (cm) 2.5 cm 11/04/21 0909   Post-Procedure Depth (cm) 1.4 cm 11/04/21 0909   Post-Procedure Surface Area (cm^2) 2 cm^2 11/04/21 0909   Post-Procedure Volume (cm^3) 2.8 cm^3 11/04/21 0909   Distance Tunneling (cm) 0 cm 11/04/21 0839   Tunneling Position ___ O'Clock 0 11/04/21 0839   Undermining Starts ___ O'Clock 0 11/04/21 0839   Undermining Ends___ O'Clock 0 11/04/21 0839   Undermining Maxium Distance (cm) 0 11/04/21 0839   Wound Assessment Pink/red;Slough 11/04/21 0839   Drainage Amount Moderate 11/04/21 0839   Drainage Description Serosanguinous 11/04/21 0839   Odor None 11/04/21 0839   Delma-wound Assessment Intact 11/04/21 0839   Margins Attached edges 11/04/21 0839   Wound Thickness Description not for Pressure Injury Full thickness 11/04/21 0839   Number of days: 19            Diabetic/Pressure/Non Pressure Ulcers only:  Ulcer: N/A      Estimated Blood Loss:  Minimal    Hemostasis Achieved:  by pressure    Procedural Pain:  2  / 10     Post Procedural Pain:  0 / 10     Response to treatment:  Well tolerated by patient., With complaints of pain. Plan:     Problem List Items Addressed This Visit     * (Principal) Chest wall ulcer, with fat layer exposed ([address] - Primary    Relevant Orders    Initiate Outpatient Wound Care Protocol          Treatment Note please see attached Discharge Instructions    In my professional opinion this patient would benefit from HBO Therapy: Undecided    Written patient dismissal instructions given to patient and signed by patient or POA. Ms. Janna Cohen' is healing well.   Electronically signed by BRITANY Hayes CNP on 11/4/2021 at 9:10 AM

## 2021-11-18 ENCOUNTER — HOSPITAL ENCOUNTER (OUTPATIENT)
Dept: WOUND CARE | Age: 56
Discharge: HOME | End: 2021-11-18
Payer: MEDICARE

## 2021-11-18 VITALS
SYSTOLIC BLOOD PRESSURE: 123 MMHG | RESPIRATION RATE: 18 BRPM | HEART RATE: 84 BPM | TEMPERATURE: 96.5 F | WEIGHT: 183 LBS | BODY MASS INDEX: 29.41 KG/M2 | DIASTOLIC BLOOD PRESSURE: 69 MMHG | HEIGHT: 66 IN

## 2021-11-18 DIAGNOSIS — L98.492 CHEST WALL ULCER, WITH FAT LAYER EXPOSED (HCC): Primary | ICD-10-CM

## 2021-11-18 PROCEDURE — 99213 OFFICE O/P EST LOW 20 MIN: CPT

## 2021-11-18 PROCEDURE — 99213 OFFICE O/P EST LOW 20 MIN: CPT | Performed by: SURGERY

## 2021-11-18 PROCEDURE — 6370000000 HC RX 637 (ALT 250 FOR IP): Performed by: NURSE PRACTITIONER

## 2021-11-18 RX ORDER — LIDOCAINE HYDROCHLORIDE 20 MG/ML
JELLY TOPICAL ONCE
Status: COMPLETED | OUTPATIENT
Start: 2021-11-18 | End: 2021-11-18

## 2021-11-18 RX ORDER — LIDOCAINE HYDROCHLORIDE 20 MG/ML
JELLY TOPICAL ONCE
Status: CANCELLED | OUTPATIENT
Start: 2021-11-18 | End: 2021-11-18

## 2021-11-18 RX ORDER — BETAMETHASONE DIPROPIONATE 0.05 %
OINTMENT (GRAM) TOPICAL ONCE
Status: CANCELLED | OUTPATIENT
Start: 2021-11-18 | End: 2021-11-18

## 2021-11-18 RX ADMIN — LIDOCAINE HYDROCHLORIDE: 20 JELLY TOPICAL at 13:53

## 2021-11-18 ASSESSMENT — PAIN SCALES - GENERAL: PAINLEVEL_OUTOF10: 0

## 2021-11-19 ENCOUNTER — CLINICAL DOCUMENTATION (OUTPATIENT)
Dept: HEMATOLOGY | Age: 56
End: 2021-11-19

## 2021-11-19 ENCOUNTER — APPOINTMENT (OUTPATIENT)
Dept: GENERAL RADIOLOGY | Age: 56
End: 2021-11-19
Payer: MEDICARE

## 2021-11-19 ENCOUNTER — OFFICE VISIT (OUTPATIENT)
Dept: HEMATOLOGY | Age: 56
End: 2021-11-19

## 2021-11-19 ENCOUNTER — APPOINTMENT (OUTPATIENT)
Dept: CT IMAGING | Age: 56
End: 2021-11-19
Payer: MEDICARE

## 2021-11-19 ENCOUNTER — HOSPITAL ENCOUNTER (OUTPATIENT)
Dept: INFUSION THERAPY | Age: 56
Discharge: HOME | End: 2021-11-19
Payer: MEDICARE

## 2021-11-19 ENCOUNTER — HOSPITAL ENCOUNTER (OUTPATIENT)
Age: 56
Setting detail: OBSERVATION
Discharge: HOME | End: 2021-11-20
Attending: EMERGENCY MEDICINE | Admitting: STUDENT IN AN ORGANIZED HEALTH CARE EDUCATION/TRAINING PROGRAM
Payer: MEDICARE

## 2021-11-19 VITALS
HEART RATE: 88 BPM | DIASTOLIC BLOOD PRESSURE: 88 MMHG | BODY MASS INDEX: 29.54 KG/M2 | OXYGEN SATURATION: 97 % | SYSTOLIC BLOOD PRESSURE: 144 MMHG | HEIGHT: 66 IN

## 2021-11-19 DIAGNOSIS — C50.919 CARCINOMA OF FEMALE BREAST, UNSPECIFIED ESTROGEN RECEPTOR STATUS, UNSPECIFIED LATERALITY, UNSPECIFIED SITE OF BREAST (HCC): ICD-10-CM

## 2021-11-19 DIAGNOSIS — C50.919 METASTATIC BREAST CANCER (HCC): Primary | ICD-10-CM

## 2021-11-19 DIAGNOSIS — R07.9 CHEST PAIN, UNSPECIFIED TYPE: Primary | ICD-10-CM

## 2021-11-19 LAB
ALBUMIN SERPL-MCNC: 4.5 G/DL (ref 3.5–5.2)
ALP BLD-CCNC: 94 U/L (ref 35–104)
ALT SERPL-CCNC: <5 U/L (ref 5–33)
ANION GAP SERPL CALCULATED.3IONS-SCNC: 16 MMOL/L (ref 7–19)
APTT: 34.3 SEC (ref 26–36.2)
AST SERPL-CCNC: 10 U/L (ref 5–32)
ATYPICAL LYMPHOCYTE RELATIVE PERCENT: 7 % (ref 0–8)
BASOPHILS ABSOLUTE: 0 K/UL (ref 0–0.2)
BASOPHILS ABSOLUTE: 0.02 K/UL (ref 0.01–0.08)
BASOPHILS RELATIVE PERCENT: 0 % (ref 0–1)
BASOPHILS RELATIVE PERCENT: 0.5 % (ref 0.1–1.2)
BILIRUB SERPL-MCNC: 0.8 MG/DL (ref 0.2–1.2)
BUN BLDV-MCNC: 7 MG/DL (ref 6–20)
CALCIUM SERPL-MCNC: 9.3 MG/DL (ref 8.6–10)
CHLORIDE BLD-SCNC: 103 MMOL/L (ref 98–111)
CO2: 21 MMOL/L (ref 22–29)
CREAT SERPL-MCNC: 0.6 MG/DL (ref 0.5–0.9)
D DIMER: 0.36 UG/ML FEU (ref 0–0.48)
EOSINOPHILS ABSOLUTE: 0 K/UL (ref 0–0.6)
EOSINOPHILS ABSOLUTE: 0.02 K/UL (ref 0.04–0.54)
EOSINOPHILS RELATIVE PERCENT: 0 % (ref 0–5)
EOSINOPHILS RELATIVE PERCENT: 0.5 % (ref 0.7–7)
GFR AFRICAN AMERICAN: >59
GFR NON-AFRICAN AMERICAN: >60
GLUCOSE BLD-MCNC: 70 MG/DL (ref 74–109)
HCT VFR BLD CALC: 35.6 % (ref 37–47)
HCT VFR BLD CALC: 37.4 % (ref 34.1–44.9)
HEMOGLOBIN: 11.6 G/DL (ref 12–16)
HEMOGLOBIN: 12 G/DL (ref 11.2–15.7)
HYPOCHROMIA: ABNORMAL
IMMATURE GRANULOCYTES #: 0 K/UL
INR BLD: 1.22 (ref 0.88–1.18)
LYMPHOCYTES ABSOLUTE: 1.3 K/UL (ref 1.1–4.5)
LYMPHOCYTES ABSOLUTE: 1.64 K/UL (ref 1.18–3.74)
LYMPHOCYTES RELATIVE PERCENT: 26 % (ref 20–40)
LYMPHOCYTES RELATIVE PERCENT: 39.9 % (ref 19.3–53.1)
MACROCYTES: ABNORMAL
MCH RBC QN AUTO: 33.7 PG (ref 27–31)
MCH RBC QN AUTO: 34 PG (ref 25.6–32.2)
MCHC RBC AUTO-ENTMCNC: 32.1 G/DL (ref 32.3–35.5)
MCHC RBC AUTO-ENTMCNC: 32.6 G/DL (ref 33–37)
MCV RBC AUTO: 103.5 FL (ref 81–99)
MCV RBC AUTO: 105.9 FL (ref 79.4–94.8)
MONOCYTES ABSOLUTE: 0 K/UL (ref 0–0.9)
MONOCYTES ABSOLUTE: 0.38 K/UL (ref 0.24–0.82)
MONOCYTES RELATIVE PERCENT: 1 % (ref 0–10)
MONOCYTES RELATIVE PERCENT: 9.2 % (ref 4.7–12.5)
NEUTROPHILS ABSOLUTE: 2.05 K/UL (ref 1.56–6.13)
NEUTROPHILS ABSOLUTE: 2.6 K/UL (ref 1.5–7.5)
NEUTROPHILS RELATIVE PERCENT: 49.9 % (ref 34–71.1)
NEUTROPHILS RELATIVE PERCENT: 66 % (ref 50–65)
PDW BLD-RTO: 13.6 % (ref 11.7–14.4)
PDW BLD-RTO: 14 % (ref 11.5–14.5)
PLATELET # BLD: 146 K/UL (ref 182–369)
PLATELET # BLD: 158 K/UL (ref 130–400)
PLATELET SLIDE REVIEW: ADEQUATE
PMV BLD AUTO: 8.7 FL (ref 9.4–12.3)
PMV BLD AUTO: 8.9 FL (ref 7.4–10.4)
POTASSIUM SERPL-SCNC: 4.2 MMOL/L (ref 3.5–5)
PRO-BNP: 33 PG/ML (ref 0–900)
PROTHROMBIN TIME: 15.5 SEC (ref 12–14.6)
RBC # BLD: 3.44 M/UL (ref 4.2–5.4)
RBC # BLD: 3.53 M/UL (ref 3.93–5.22)
SARS-COV-2, NAAT: NOT DETECTED
SODIUM BLD-SCNC: 140 MMOL/L (ref 136–145)
TOTAL PROTEIN: 7.5 G/DL (ref 6.6–8.7)
TROPONIN: <0.01 NG/ML (ref 0–0.03)
WBC # BLD: 4 K/UL (ref 4.8–10.8)
WBC # BLD: 4.11 K/UL (ref 3.98–10.04)

## 2021-11-19 PROCEDURE — G0378 HOSPITAL OBSERVATION PER HR: HCPCS

## 2021-11-19 PROCEDURE — 85025 COMPLETE CBC W/AUTO DIFF WBC: CPT

## 2021-11-19 PROCEDURE — 6360000002 HC RX W HCPCS: Performed by: STUDENT IN AN ORGANIZED HEALTH CARE EDUCATION/TRAINING PROGRAM

## 2021-11-19 PROCEDURE — 6370000000 HC RX 637 (ALT 250 FOR IP): Performed by: STUDENT IN AN ORGANIZED HEALTH CARE EDUCATION/TRAINING PROGRAM

## 2021-11-19 PROCEDURE — 6360000004 HC RX CONTRAST MEDICATION: Performed by: EMERGENCY MEDICINE

## 2021-11-19 PROCEDURE — 99283 EMERGENCY DEPT VISIT LOW MDM: CPT

## 2021-11-19 PROCEDURE — 96374 THER/PROPH/DIAG INJ IV PUSH: CPT

## 2021-11-19 PROCEDURE — 80053 COMPREHEN METABOLIC PANEL: CPT

## 2021-11-19 PROCEDURE — 83880 ASSAY OF NATRIURETIC PEPTIDE: CPT

## 2021-11-19 PROCEDURE — 99999 PR OFFICE/OUTPT VISIT,PROCEDURE ONLY: CPT | Performed by: INTERNAL MEDICINE

## 2021-11-19 PROCEDURE — 6360000002 HC RX W HCPCS: Performed by: EMERGENCY MEDICINE

## 2021-11-19 PROCEDURE — 85610 PROTHROMBIN TIME: CPT

## 2021-11-19 PROCEDURE — 6370000000 HC RX 637 (ALT 250 FOR IP): Performed by: EMERGENCY MEDICINE

## 2021-11-19 PROCEDURE — 71275 CT ANGIOGRAPHY CHEST: CPT

## 2021-11-19 PROCEDURE — 85379 FIBRIN DEGRADATION QUANT: CPT

## 2021-11-19 PROCEDURE — 87635 SARS-COV-2 COVID-19 AMP PRB: CPT

## 2021-11-19 PROCEDURE — 36415 COLL VENOUS BLD VENIPUNCTURE: CPT

## 2021-11-19 PROCEDURE — 96375 TX/PRO/DX INJ NEW DRUG ADDON: CPT

## 2021-11-19 PROCEDURE — 94640 AIRWAY INHALATION TREATMENT: CPT

## 2021-11-19 PROCEDURE — 85730 THROMBOPLASTIN TIME PARTIAL: CPT

## 2021-11-19 PROCEDURE — 71045 X-RAY EXAM CHEST 1 VIEW: CPT

## 2021-11-19 PROCEDURE — 84484 ASSAY OF TROPONIN QUANT: CPT

## 2021-11-19 PROCEDURE — 93005 ELECTROCARDIOGRAM TRACING: CPT | Performed by: EMERGENCY MEDICINE

## 2021-11-19 RX ORDER — ARFORMOTEROL TARTRATE 15 UG/2ML
15 SOLUTION RESPIRATORY (INHALATION) 2 TIMES DAILY
Status: DISCONTINUED | OUTPATIENT
Start: 2021-11-19 | End: 2021-11-20 | Stop reason: HOSPADM

## 2021-11-19 RX ORDER — ASPIRIN 325 MG
325 TABLET ORAL ONCE
Status: COMPLETED | OUTPATIENT
Start: 2021-11-19 | End: 2021-11-19

## 2021-11-19 RX ORDER — ONDANSETRON HYDROCHLORIDE 8 MG/1
4 TABLET, FILM COATED ORAL EVERY 8 HOURS PRN
Status: DISCONTINUED | OUTPATIENT
Start: 2021-11-19 | End: 2021-11-20 | Stop reason: HOSPADM

## 2021-11-19 RX ORDER — ONDANSETRON 2 MG/ML
4 INJECTION INTRAMUSCULAR; INTRAVENOUS ONCE
Status: COMPLETED | OUTPATIENT
Start: 2021-11-19 | End: 2021-11-19

## 2021-11-19 RX ORDER — BUDESONIDE 0.5 MG/2ML
0.5 INHALANT ORAL 2 TIMES DAILY
Status: DISCONTINUED | OUTPATIENT
Start: 2021-11-19 | End: 2021-11-20 | Stop reason: HOSPADM

## 2021-11-19 RX ORDER — HYDROMORPHONE HYDROCHLORIDE 1 MG/ML
1 INJECTION, SOLUTION INTRAMUSCULAR; INTRAVENOUS; SUBCUTANEOUS ONCE
Status: COMPLETED | OUTPATIENT
Start: 2021-11-19 | End: 2021-11-19

## 2021-11-19 RX ORDER — MORPHINE SULFATE 4 MG/ML
4 INJECTION, SOLUTION INTRAMUSCULAR; INTRAVENOUS ONCE
Status: COMPLETED | OUTPATIENT
Start: 2021-11-19 | End: 2021-11-19

## 2021-11-19 RX ORDER — ALBUTEROL SULFATE 90 UG/1
2 AEROSOL, METERED RESPIRATORY (INHALATION) EVERY 4 HOURS PRN
Status: DISCONTINUED | OUTPATIENT
Start: 2021-11-19 | End: 2021-11-19 | Stop reason: RX

## 2021-11-19 RX ORDER — NITROGLYCERIN 0.4 MG/1
0.4 TABLET SUBLINGUAL EVERY 5 MIN PRN
Status: DISCONTINUED | OUTPATIENT
Start: 2021-11-19 | End: 2021-11-20 | Stop reason: HOSPADM

## 2021-11-19 RX ORDER — ALBUTEROL SULFATE 2.5 MG/3ML
2.5 SOLUTION RESPIRATORY (INHALATION) EVERY 4 HOURS PRN
Status: DISCONTINUED | OUTPATIENT
Start: 2021-11-19 | End: 2021-11-20 | Stop reason: HOSPADM

## 2021-11-19 RX ORDER — MORPHINE SULFATE 2 MG/ML
1 INJECTION, SOLUTION INTRAMUSCULAR; INTRAVENOUS
Status: DISCONTINUED | OUTPATIENT
Start: 2021-11-19 | End: 2021-11-20 | Stop reason: HOSPADM

## 2021-11-19 RX ORDER — ACYCLOVIR 800 MG/1
800 TABLET ORAL 2 TIMES DAILY
Status: DISCONTINUED | OUTPATIENT
Start: 2021-11-19 | End: 2021-11-20 | Stop reason: HOSPADM

## 2021-11-19 RX ORDER — BENZONATATE 100 MG/1
100 CAPSULE ORAL 3 TIMES DAILY PRN
Status: DISCONTINUED | OUTPATIENT
Start: 2021-11-19 | End: 2021-11-20 | Stop reason: HOSPADM

## 2021-11-19 RX ORDER — CYCLOBENZAPRINE HCL 10 MG
10 TABLET ORAL 3 TIMES DAILY PRN
Status: DISCONTINUED | OUTPATIENT
Start: 2021-11-19 | End: 2021-11-20 | Stop reason: HOSPADM

## 2021-11-19 RX ORDER — BUDESONIDE AND FORMOTEROL FUMARATE DIHYDRATE 160; 4.5 UG/1; UG/1
2 AEROSOL RESPIRATORY (INHALATION) 2 TIMES DAILY
Status: DISCONTINUED | OUTPATIENT
Start: 2021-11-19 | End: 2021-11-19 | Stop reason: CLARIF

## 2021-11-19 RX ORDER — PANTOPRAZOLE SODIUM 40 MG/1
40 TABLET, DELAYED RELEASE ORAL DAILY
Status: DISCONTINUED | OUTPATIENT
Start: 2021-11-19 | End: 2021-11-20 | Stop reason: HOSPADM

## 2021-11-19 RX ADMIN — ONDANSETRON 4 MG: 2 INJECTION INTRAMUSCULAR; INTRAVENOUS at 12:22

## 2021-11-19 RX ADMIN — PANTOPRAZOLE SODIUM 40 MG: 40 TABLET, DELAYED RELEASE ORAL at 18:20

## 2021-11-19 RX ADMIN — ONDANSETRON HYDROCHLORIDE 4 MG: 8 TABLET, FILM COATED ORAL at 18:20

## 2021-11-19 RX ADMIN — IOPAMIDOL 90 ML: 755 INJECTION, SOLUTION INTRAVENOUS at 16:06

## 2021-11-19 RX ADMIN — ACYCLOVIR 800 MG: 800 TABLET ORAL at 21:30

## 2021-11-19 RX ADMIN — MORPHINE SULFATE 4 MG: 4 INJECTION, SOLUTION INTRAMUSCULAR; INTRAVENOUS at 12:22

## 2021-11-19 RX ADMIN — ARFORMOTEROL TARTRATE 15 MCG: 15 SOLUTION RESPIRATORY (INHALATION) at 19:20

## 2021-11-19 RX ADMIN — HYDROMORPHONE HYDROCHLORIDE 1 MG: 1 INJECTION, SOLUTION INTRAMUSCULAR; INTRAVENOUS; SUBCUTANEOUS at 14:12

## 2021-11-19 RX ADMIN — ASPIRIN 325 MG ORAL TABLET 325 MG: 325 PILL ORAL at 12:21

## 2021-11-19 RX ADMIN — APIXABAN 5 MG: 5 TABLET, FILM COATED ORAL at 21:30

## 2021-11-19 RX ADMIN — NITROGLYCERIN 0.4 MG: 0.4 TABLET SUBLINGUAL at 14:13

## 2021-11-19 RX ADMIN — BUDESONIDE 500 MCG: 0.5 SUSPENSION RESPIRATORY (INHALATION) at 19:20

## 2021-11-19 RX ADMIN — Medication 5000 UNITS: at 18:20

## 2021-11-19 ASSESSMENT — PAIN DESCRIPTION - LOCATION: LOCATION: CHEST

## 2021-11-19 ASSESSMENT — ENCOUNTER SYMPTOMS
SHORTNESS OF BREATH: 0
ABDOMINAL PAIN: 0
SHORTNESS OF BREATH: 1
CHEST TIGHTNESS: 1
DIARRHEA: 0
GASTROINTESTINAL NEGATIVE: 1
VOMITING: 0
RHINORRHEA: 0
NAUSEA: 1
BACK PAIN: 0
COUGH: 0
SORE THROAT: 0

## 2021-11-19 ASSESSMENT — PAIN DESCRIPTION - PAIN TYPE: TYPE: ACUTE PAIN

## 2021-11-19 ASSESSMENT — PAIN SCALES - GENERAL
PAINLEVEL_OUTOF10: 8
PAINLEVEL_OUTOF10: 8
PAINLEVEL_OUTOF10: 9

## 2021-11-19 NOTE — ED NOTES
Bed: 08  Expected date:   Expected time:   Means of arrival:   Comments:  Resp.  Distress     Yair Zepeda RN  11/19/21 5062

## 2021-11-19 NOTE — ED TRIAGE NOTES
Pt here with chest pain started this morning about 0500  Pt has hx of blood clots and removed clots to the left arm.  Pt has clotted off subclavian after a port removal due to infection

## 2021-11-19 NOTE — H&P
[facility] - History & Physical      PCP: Chidi Vee MD    Date of Admission: 11/19/2021    Date of Service: 11/19/2021    Chief Complaint:  Chest pain     History Of Present Illness: The patient is a 49 y.o. female who presented to [facility] ER with PMH cancer, GERD, HTN, blood clots complaining of chest pain. Patient states that chest pain started this morning at rest describing as sharp, stabbing, squeezing sensation. She tried to move around her home and continued to experience pain. She states radiation to left arm and between shoulder blades. She is currently undergoing treatment for breast cancer, was at Dr. Naima Hernandez office to receive treatment when informing them of chest pain and shortness of breath, they instructed her to go to ER. She has also had an infected port with a blockage in the left subclavian that she sees Dr. Ramiro Gibbs. She has an open wound that she has been managing herself. Currently, she denies fever, chills, shortness of breath, abdominal pain. rates pain 2 out of 10 on pain scale. Work-up in ER CXR no active cardiopulmonary disease. CTA chest no evidence of PE. Troponin negative. -Given Dilaudid 1 mg IV, morphine 4 mg IV, Zofran 4 mg IV, sublingual nitro. Patient is to be admitted to hospitalist service for chest pain rule out.     Past Medical History:        Diagnosis Date    Breast cancer ([address]     Breast cancer with lung mets (RIGHT)    Chronic back pain     GERD (gastroesophageal reflux disease)     Hx of blood clots     Hypertension     Neuropathy     Post chemo treatment neuropathy       Past Surgical History:        Procedure Laterality Date    BREAST LUMPECTOMY Right     2006    BREAST RECONSTRUCTION Left 09/17/2021    Revision left reconstructed breast, implant performed by Manny Feldman MD at [address] [place] [address] Street  2016    Bilateral breast implants    EMBOLECTOMY Left 9/26/2021    LEFT UPPER EXTREMITY  MECHANICAL SUCTION THROMBECTOMY performed by Khadra Ramírez DO at [facility], TOTAL ABDOMINAL  2015    MASTECTOMY, BILATERAL      Right 2013 & Left 2016    TUNNELED VENOUS PORT PLACEMENT      VASCULAR SURGERY N/A 10/6/2021    REMOVAL OF MEDIPORT performed by Khadra Ramírez DO at [address] [place] [address] Medications:  Prior to Admission medications    Medication Sig Start Date End Date Taking? Authorizing Provider   apixaban (ELIQUIS) 5 MG TABS tablet Take 1 tablet by mouth 2 times daily 10/21/21 11/20/21  Asya Blocker, APRN   sodium hypochlorite (DAKINS) 0.125 % SOLN external solution Apply topically 2 times daily 10/12/21   Ellen Floor, APRN   apixaban starter pack (ELIQUIS) 5 MG TBPK tablet Take 1 tablet by mouth See Admin Instructions  Patient taking differently: Take 5 mg by mouth 2 times daily  9/27/21   Martina Grossman MD   fluticasone-vilanterol (BREO ELLIPTA) 100-25 MCG/INH AEPB inhaler Inhale 1 puff into the lungs daily 9/2/21   Ashley Morrison MD   VENTOLIN  (90 Base) MCG/ACT inhaler Inhale 2 puffs into the lungs 4 times daily as needed for Wheezing 9/2/21   Ashley Morrison MD   pantoprazole (PROTONIX) 40 MG tablet TAKE 1 TABLET BY MOUTH EVERY DAY  Patient taking differently: Take 40 mg by mouth daily  8/12/21   Heather Islas MD   IBRANCE 125 MG tablet Take 1 tablet (125mg) by mouth once daily. 7/23/21   Heather Islas MD   NARCAN 4 MG/0.1ML LIQD nasal spray  5/20/21   Historical Provider, MD   gabapentin (NEURONTIN) 300 MG capsule TAKE 2 CAPSULES BY MOUTH 3 TIMES DAILY FOR 30 DAYS. 5/19/21 11/18/21  Heather Islas MD   cyclobenzaprine (FLEXERIL) 10 MG tablet Take 10 mg by mouth 3 times daily as needed for Muscle spasms    Historical Provider, MD   guaiFENesin-codeine (GUAIFENESIN AC) 100-10 MG/5ML liquid Take 5 mLs by mouth 3 times daily as needed for Cough.     Historical Provider, MD   Albuterol Sulfate (PROAIR HFA IN) Inhale 1 each into the lungs daily as needed     Historical Provider, MD   benzonatate (TESSALON) 100 MG capsule Take 100 mg by mouth 3 times daily as needed for Cough    Historical Provider, MD   Cholecalciferol (VITAMIN D3) 125 MCG (5000 UT) TABS Take 1 tablet by mouth daily     Historical Provider, MD   lisinopril (PRINIVIL;ZESTRIL) 10 MG tablet Take 10 mg by mouth daily    Historical Provider, MD   acyclovir (ZOVIRAX) 800 MG tablet Take 800 mg by mouth 2 times daily as needed     Historical Provider, MD   ondansetron (ZOFRAN) 4 MG tablet Take 4 mg by mouth every 8 hours as needed for Nausea or Vomiting    Historical Provider, MD   alclomethasone (ACLOVATE) 0.05 % cream Apply topically 2 times daily as needed Apply topically 2 times daily. Historical Provider, MD       Allergies:    Clindamycin/lincomycin    Social History:    The patient currently lives home  Tobacco:   reports that she has never smoked. She has never used smokeless tobacco.  Alcohol:   reports previous alcohol use. Illicit Drugs: denies    Family History:      Problem Relation Age of Onset    Hypertension Mother     Obesity Mother     Prostate Cancer Father     No Known Problems Sister     No Known Problems Daughter     No Known Problems Daughter        Review of Systems:   Review of Systems   Constitutional: Positive for activity change. Negative for chills, fatigue and fever. HENT: Negative. Respiratory: Positive for chest tightness and shortness of breath. Negative for cough. Cardiovascular: Positive for chest pain. Negative for palpitations and leg swelling. Gastrointestinal: Negative. Genitourinary: Negative. Musculoskeletal: Negative. Skin: Positive for wound. Neurological: Positive for light-headedness. Psychiatric/Behavioral: Negative. 14 point review of systems is negative except as specifically addressed above.     Physical Examination:  /78   Pulse 89   Temp 97.5 °F (36.4 °C) (Temporal)   Resp 20   SpO2 99%   Physical Exam  Constitutional:       Appearance: Normal appearance. HENT:      Mouth/Throat:      Mouth: Mucous membranes are moist.      Pharynx: Oropharynx is clear. Eyes:      Extraocular Movements: Extraocular movements intact. Conjunctiva/sclera: Conjunctivae normal.      Pupils: Pupils are equal, round, and reactive to light. Cardiovascular:      Rate and Rhythm: Normal rate and regular rhythm. Pulses: Normal pulses. Heart sounds: Normal heart sounds. No murmur heard. Pulmonary:      Effort: Pulmonary effort is normal. No respiratory distress. Breath sounds: Normal breath sounds. Chest:      Chest wall: No tenderness. Abdominal:      General: Bowel sounds are normal. There is no distension. Palpations: Abdomen is soft. Tenderness: There is no abdominal tenderness. There is no guarding or rebound. Musculoskeletal:         General: No swelling. Normal range of motion. Cervical back: Normal range of motion and neck supple. No rigidity or tenderness. Right lower leg: No edema. Left lower leg: No edema. Skin:     General: Skin is warm and dry. Capillary Refill: Capillary refill takes less than 2 seconds. Comments: Wound to left upper chest- no erythema, no swelling noted   Neurological:      General: No focal deficit present. Mental Status: She is alert and oriented to person, place, and time. Cranial Nerves: No cranial nerve deficit. Motor: No weakness.    Psychiatric:         Mood and Affect: Mood normal.         Behavior: Behavior normal.          Diagnostic Data:  CBC:  Recent Labs     11/19/21  1158 11/19/21  1206   WBC 4.11 4.0*   HGB 12.0 11.6*   HCT 37.4 35.6*   * 158     BMP:  Recent Labs     11/19/21  1206      K 4.2      CO2 21*   BUN 7   CREATININE 0.6   CALCIUM 9.3     Recent Labs     11/19/21  1206   AST 10   ALT <5*   BILITOT 0.8   ALKPHOS 94     Coag Panel: Recent Labs     11/19/21  1206   INR 1.22*   PROTIME 15.5*   APTT 34.3     Cardiac Enzymes:   Recent Labs     11/19/21  1206 11/19/21  1411   TROPONINI <0.01 <0.01     ABGs:No results found for: PHART, PO2ART, IXY5MNL  Urinalysis:No results found for: NITRU, WBCUA, BACTERIA, RBCUA, BLOODU, SPECGRAV, GLUCOSEU  A1C: No results for input(s): LABA1C in the last 72 hours. ABG:No results for input(s): PHART, RGE7NXQ, PO2ART, TOP6UVK, BEART, HGBAE, T2KFRHRV, CARBOXHGBART in the last 72 hours. XR CHEST PORTABLE    Result Date: 11/19/2021  Examination. XR CHEST PORTABLE 11/19/2021 12:44 PM History: Chest pain. A single, frontal, portable, upright view of the chest is compared with the previous study dated 10/5/2021. The lungs are poorly expanded. There is diffuse haziness over both chests due to superimposed soft tissues. There is no evidence of recent infiltrate, pleural effusion, pulmonary congestion or pneumothorax. The heart size in the normal range. There is moderate diffuse osteopenia. No acute bony abnormality. There are surgical staples are projected over the right chest which may represent a previous right breast surgery. No active cardiopulmonary disease. Signed by Dr Marge Cano    Result Date: 11/19/2021  Examination. CTA PULMONARY W CONTRAST 11/19/2021 3:56 PM History: Chest pain. History of breast cancer. DLP: 601 mGycm. The CT angiography of the chest is performed after intravenous contrast enhancement. The images are acquired in axial plane with subsequent 2-D reconstruction in coronal and sagittal planes and 3-D maximum intensity projection reconstruction. The comparison is made with the previous study dated 9/30/2021. There is normal opacification of a moderately distended main pulmonary artery. There is normal opacification of the right and left coronary arteries and branches. No filling defects in the visualized opacified pulmonary arterial bed.  Atheromatous changes thoracic aorta are noted. No aneurysmal dilatation or dissection. Atheromatous changes of coronary arteries are seen. No findings to suggest right heart strain. . There is no evidence of mediastinal or hilar mass or lymphadenopathy. Limited visualized soft tissues of the neck are unremarkable. No mass or lymphadenopathy. The limited visualized thyroid gland is unremarkable. There are changes in the skin and subcutaneous soft tissues in the left upper chest with some calcification extending into the area of the left subclavian vein. This may be due to a prior cardiac pacer placement? . Bilateral breast implants are seen in place. No complication. No significant axillary lymphadenopathy. The lungs are poorly expanded with areas of discoid atelectasis and scarring more severely the left lower lung. Small pleural-based density in the right upper lobe laterally, image #49 in axial plane measures 3 mm. It probably represent focal pleural thickening due to inflammatory process/scarring. No infiltrate or consolidation. The tracheobronchial structures appear normal and patent. The visualized liver and spleen appear normal. No radiopaque gallstones. The adrenal glands are normal. The pancreas is normal. The kidneys bilaterally appear normal. The images reviewed in bone window show chronic degenerative changes of the thoracic spine. No focal bony lesion. No evidence of pulmonary embolism. The lungs are poorly expanded but unremarkable.  Signed by Dr Sherryle Duffel      Assessment/Plan:    Chest pain   - Aspirin and Lipitor  - SL Nitro PRN  -as needed pain medication   - ECHO   - Trend troponin  - FLP in a.m./ HgbA1c  - Serial and PRN EKGs  - Monitor on telemetry  - NPO after midnight   -Bismark Garcia in AM   Breast Cancer   -noted   -resume home medication      DVT prophylactic: Eliqualicia     Signed:  BRITANY Scanlon - CNP, 11/19/2021 5:28 PM

## 2021-11-19 NOTE — ED PROVIDER NOTES
[facility] EMERGENCY DEPT  eMERGENCY dEPARTMENT eNCOUnter      Pt Name: Sony Hawley  MRN: [id number]  [place] 1965  Date of evaluation: 11/19/2021  Provider: Luane Angelucci, MD    [address] [place] [address]       Chief Complaint   Patient presents with    Chest Pain     started this am at 0500 hx of blood clots and infected port thats been removed          HISTORY OF PRESENT ILLNESS   (Location/Symptom, Timing/Onset,Context/Setting, Quality, Duration, Modifying Factors, Severity)  Note limiting factors. Sony Hawley is a 49 y.o. female who presents to the emergency department with chest pain. It central sharp stabbing squeezing sensation. Started about 5:00 and has been constant in nature. Is worse with movement and improved with lying flat. Patient has no prior history of MI. She is undergoing treatment for breast cancer. She had an infected port with a blockage in her left subclavian that is being managed by Dr. Marvin Walker. She has a healing wound at the site of the infection. She is had no fevers or drainage. HPI    NursingNotes were reviewed. REVIEW OF SYSTEMS    (2-9 systems for level 4, 10 or more for level 5)     Review of Systems   Constitutional: Negative for chills and fever. HENT: Negative for rhinorrhea and sore throat. Respiratory: Negative for shortness of breath. Cardiovascular: Positive for chest pain. Negative for leg swelling. Gastrointestinal: Positive for nausea. Negative for abdominal pain, diarrhea and vomiting. Genitourinary: Negative for difficulty urinating. Musculoskeletal: Negative for back pain and neck pain. Skin: Positive for wound. Negative for rash. Neurological: Negative for weakness and headaches. Psychiatric/Behavioral: Negative for confusion. A complete review of systems was performed and is negative except as noted above in the HPI.        PAST MEDICAL HISTORY     Past Medical History:   Diagnosis Date    Breast cancer [facility])     Breast cancer with lung mets (RIGHT)    Chronic back pain     GERD (gastroesophageal reflux disease)     Hx of blood clots     Hypertension     Neuropathy     Post chemo treatment neuropathy         SURGICAL HISTORY       Past Surgical History:   Procedure Laterality Date    BREAST LUMPECTOMY Right     2006    BREAST RECONSTRUCTION Left 09/17/2021    Revision left reconstructed breast, implant performed by Lula Jaquez MD at [address] [place] [address]  2016    Bilateral breast implants    EMBOLECTOMY Left 9/26/2021    LEFT UPPER EXTREMITY  MECHANICAL SUCTION THROMBECTOMY performed by Hernesto Stokes DO at [facility], TOTAL ABDOMINAL  2015    MASTECTOMY, BILATERAL      Right 2013 & Left 2016    TUNNELED VENOUS PORT PLACEMENT      VASCULAR SURGERY N/A 10/6/2021    REMOVAL OF MEDIPORT performed by Hernesto Stokes DO at [address]       Previous Medications    ACYCLOVIR (ZOVIRAX) 800 MG TABLET    Take 800 mg by mouth 2 times daily as needed     ALBUTEROL SULFATE (PROAIR HFA IN)    Inhale 1 each into the lungs daily as needed     ALCLOMETHASONE (ACLOVATE) 0.05 % CREAM    Apply topically 2 times daily as needed Apply topically 2 times daily. APIXABAN (ELIQUIS) 5 MG TABS TABLET    Take 1 tablet by mouth 2 times daily    APIXABAN STARTER PACK (ELIQUIS) 5 MG TBPK TABLET    Take 1 tablet by mouth See Admin Instructions    BENZONATATE (TESSALON) 100 MG CAPSULE    Take 100 mg by mouth 3 times daily as needed for Cough    CHOLECALCIFEROL (VITAMIN D3) 125 MCG (5000 UT) TABS    Take 1 tablet by mouth daily     CYCLOBENZAPRINE (FLEXERIL) 10 MG TABLET    Take 10 mg by mouth 3 times daily as needed for Muscle spasms    FLUTICASONE-VILANTEROL (BREO ELLIPTA) 100-25 MCG/INH AEPB INHALER    Inhale 1 puff into the lungs daily    GABAPENTIN (NEURONTIN) 300 MG CAPSULE    TAKE 2 CAPSULES BY MOUTH 3 TIMES DAILY FOR 30 DAYS.     GUAIFENESIN-CODEINE (GUAIFENESIN AC) 100-10 MG/5ML LIQUID    Take 5 mLs by mouth 3 times daily as needed for Cough. IBRANCE 125 MG TABLET    Take 1 tablet (125mg) by mouth once daily.     LISINOPRIL (PRINIVIL;ZESTRIL) 10 MG TABLET    Take 10 mg by mouth daily    NARCAN 4 MG/0.1ML LIQD NASAL SPRAY        ONDANSETRON (ZOFRAN) 4 MG TABLET    Take 4 mg by mouth every 8 hours as needed for Nausea or Vomiting    PANTOPRAZOLE (PROTONIX) 40 MG TABLET    TAKE 1 TABLET BY MOUTH EVERY DAY    SODIUM HYPOCHLORITE (DAKINS) 0.125 % SOLN EXTERNAL SOLUTION    Apply topically 2 times daily    VENTOLIN  (90 BASE) MCG/ACT INHALER    Inhale 2 puffs into the lungs 4 times daily as needed for Wheezing       ALLERGIES     Clindamycin/lincomycin    FAMILY HISTORY       Family History   Problem Relation Age of Onset    Hypertension Mother     Obesity Mother     Prostate Cancer Father     No Known Problems Sister     No Known Problems Daughter     No Known Problems Daughter           SOCIAL HISTORY       Social History     Socioeconomic History    Marital status: Single     Spouse name: None    Number of children: 2    Years of education: None    Highest education level: None   Occupational History    Occupation:      Comment: disabled   Tobacco Use    Smoking status: Never Smoker    Smokeless tobacco: Never Used   Vaping Use    Vaping Use: Never used   Substance and Sexual Activity    Alcohol use: Not Currently    Drug use: Not Currently    Sexual activity: None     Comment: has 2 kids   Other Topics Concern    None   Social History Narrative    CODE STATUS: Full Code    HEALTH CARE PROXY: her Father, Mr. Nikolai Armstrong, [phone]    AMBULATES: uses a cane sometimes, usually independent    DOMICILED: stays with friends at this time, has no stairs that she uses, no pets there     Social Determinants of Health     Financial Resource Strain:     Difficulty of Paying Living Expenses: Not on file   Food Insecurity:     Worried About Running Out of Food in the Last Year: Not on file    Ran Out of Food in the Last Year: Not on file   Transportation Needs:     Lack of Transportation (Medical): Not on file    Lack of Transportation (Non-Medical): Not on file   Physical Activity:     Days of Exercise per Week: Not on file    Minutes of Exercise per Session: Not on file   Stress:     Feeling of Stress : Not on file   Social Connections:     Frequency of Communication with Friends and Family: Not on file    Frequency of Social Gatherings with Friends and Family: Not on file    Attends Anabaptist Services: Not on file    Active Member of [address] [place] [address] [employer] or Organizations: Not on file    Attends Club or Organization Meetings: Not on file    Marital Status: Not on file   Intimate Partner Violence:     Fear of Current or Ex-Partner: Not on file    Emotionally Abused: Not on file    Physically Abused: Not on file    Sexually Abused: Not on file   Housing Stability:     Unable to Pay for Housing in the Last Year: Not on file    Number of [place] in the Last Year: Not on file    Unstable Housing in the Last Year: Not on file       SCREENINGS             PHYSICAL EXAM    (up to 7 for level 4, 8 or more for level 5)     ED Triage Vitals [11/19/21 1201]   BP Temp Temp src Pulse Resp SpO2 Height Weight   (!) 143/94 97.8 °F (36.6 °C) -- 92 18 97 % -- --       Physical Exam  Vitals and nursing note reviewed. Constitutional:       General: She is not in acute distress. Appearance: She is well-developed. She is not diaphoretic. HENT:      Head: Normocephalic and atraumatic. Eyes:      Pupils: Pupils are equal, round, and reactive to light. Cardiovascular:      Rate and Rhythm: Normal rate and regular rhythm. Heart sounds: Normal heart sounds. Pulmonary:      Effort: Pulmonary effort is normal. No respiratory distress. Breath sounds: Normal breath sounds.       Comments: healing wound to left chest wall with no active drainage and no swelling or redness  Abdominal:      General: Bowel sounds are normal. There is no distension. Palpations: Abdomen is soft. Tenderness: There is no abdominal tenderness. Musculoskeletal:         General: Normal range of motion. Cervical back: Normal range of motion and neck supple. Skin:     General: Skin is warm and dry. Findings: No rash. Neurological:      Mental Status: She is alert and oriented to person, place, and time. Cranial Nerves: No cranial nerve deficit. Motor: No abnormal muscle tone. Coordination: Coordination normal.   Psychiatric:         Behavior: Behavior normal.         DIAGNOSTIC RESULTS     EKG: All EKG's are interpreted by the Emergency Department Physician who either signs or Co-signs this chart in the absence of a cardiologist.    Normal sinus rhythm rate 73 no acute ST wave abnormality  Sinus rhythm rate 86 nonspecific ST waves    RADIOLOGY:   Non-plain film images such as CT, Ultrasound and MRI are read by the radiologist. Lakhwinder Mcmillan images are visualized and preliminarily interpreted by the emergency physician with the below findings:        Interpretation per the Radiologist below, if available at the time of this note:    XR CHEST PORTABLE   Final Result   No active cardiopulmonary disease.    Signed by Dr Morena Albert    (Results Pending)         ED BEDSIDE ULTRASOUND:   Performed by ED Physician - none    LABS:  Labs Reviewed   CBC WITH AUTO DIFFERENTIAL - Abnormal; Notable for the following components:       Result Value    WBC 4.0 (*)     RBC 3.44 (*)     Hemoglobin 11.6 (*)     Hematocrit 35.6 (*)     .5 (*)     MCH 33.7 (*)     MCHC 32.6 (*)     MPV 8.7 (*)     Neutrophils % 66.0 (*)     Macrocytes 1+ (*)     Hypochromia 1+ (*)     All other components within normal limits   COMPREHENSIVE METABOLIC PANEL - Abnormal; Notable for the following components:    CO2 21 (*)     Glucose 70 (*) ALT <5 (*)     All other components within normal limits   PROTIME-INR - Abnormal; Notable for the following components:    Protime 15.5 (*)     INR 1.22 (*)     All other components within normal limits   APTT   BRAIN NATRIURETIC PEPTIDE   D-DIMER, QUANTITATIVE   TROPONIN   TROPONIN       All other labs were within normal range or not returned as of this dictation. EMERGENCY DEPARTMENT COURSE and DIFFERENTIALDIAGNOSIS/MDM:   Vitals:    Vitals:    11/19/21 1201   BP: (!) 143/94   Pulse: 92   Resp: 18   Temp: 97.8 °F (36.6 °C)   SpO2: 97%       MDM   Pt had negative ddimer but now saying it hurts to breathe. Given ddimer can be false negative on anticoagulation, and pt has known DVT in subclavian, will go ahead and CT to r/o PE. Cp improved with nitro. Ho htn, ca. Pt had no prior stress test.  D/w Dr Felix Ryder for admission    CONSULTS:  None    PROCEDURES:  Unless otherwise notedbelow, none     Procedures    FINAL IMPRESSION     1.  Chest pain, unspecified type          DISPOSITION/PLAN   DISPOSITION        PATIENT REFERRED TO:  @FUP@    DISCHARGE MEDICATIONS:  New Prescriptions    No medications on file          (Please note that portions of this note were completed with a voice recognition program.  Efforts were made to edit the dictations butoccasionally words are mis-transcribed.)    Christopher Olson MD (electronically signed)  AttendingEmergency Physician         Christopher Olson MD  11/19/21 2123

## 2021-11-19 NOTE — PROGRESS NOTES
Pt arrived to office today for an appt with Dr. Carolina Bhat with c/o chest pain worse on inspiration that radiates down left arm and around to back of shoulder. She stated it started after she got this AM around 5. Discussed with Dr Micah Bhat who recommended she go to ER for evaluation due to chest pain and recent past history of extensive DVT in left arm to jugular. Pt was hesitant, but agrees to go.

## 2021-11-20 ENCOUNTER — APPOINTMENT (OUTPATIENT)
Dept: NUCLEAR MEDICINE | Age: 56
End: 2021-11-20
Payer: MEDICARE

## 2021-11-20 VITALS
RESPIRATION RATE: 18 BRPM | HEART RATE: 82 BPM | TEMPERATURE: 98.8 F | OXYGEN SATURATION: 98 % | SYSTOLIC BLOOD PRESSURE: 111 MMHG | DIASTOLIC BLOOD PRESSURE: 55 MMHG

## 2021-11-20 PROBLEM — R07.89 NON-CARDIAC CHEST PAIN: Status: ACTIVE | Noted: 2021-11-19

## 2021-11-20 LAB
EKG P AXIS: 42 DEGREES
EKG P AXIS: 51 DEGREES
EKG P-R INTERVAL: 140 MS
EKG P-R INTERVAL: 146 MS
EKG Q-T INTERVAL: 378 MS
EKG Q-T INTERVAL: 392 MS
EKG QRS DURATION: 80 MS
EKG QRS DURATION: 84 MS
EKG QTC CALCULATION (BAZETT): 413 MS
EKG QTC CALCULATION (BAZETT): 423 MS
EKG T AXIS: 26 DEGREES
EKG T AXIS: 7 DEGREES
LV EF: 58 %
LV EF: 84 %
LVEF MODALITY: NORMAL
LVEF MODALITY: NORMAL

## 2021-11-20 PROCEDURE — 6360000002 HC RX W HCPCS: Performed by: STUDENT IN AN ORGANIZED HEALTH CARE EDUCATION/TRAINING PROGRAM

## 2021-11-20 PROCEDURE — 6370000000 HC RX 637 (ALT 250 FOR IP): Performed by: INTERNAL MEDICINE

## 2021-11-20 PROCEDURE — 93010 ELECTROCARDIOGRAM REPORT: CPT | Performed by: INTERNAL MEDICINE

## 2021-11-20 PROCEDURE — G0378 HOSPITAL OBSERVATION PER HR: HCPCS

## 2021-11-20 PROCEDURE — 94640 AIRWAY INHALATION TREATMENT: CPT

## 2021-11-20 PROCEDURE — A9500 TC99M SESTAMIBI: HCPCS | Performed by: STUDENT IN AN ORGANIZED HEALTH CARE EDUCATION/TRAINING PROGRAM

## 2021-11-20 PROCEDURE — 93356 MYOCRD STRAIN IMG SPCKL TRCK: CPT

## 2021-11-20 PROCEDURE — 93306 TTE W/DOPPLER COMPLETE: CPT

## 2021-11-20 PROCEDURE — 3430000000 HC RX DIAGNOSTIC RADIOPHARMACEUTICAL: Performed by: STUDENT IN AN ORGANIZED HEALTH CARE EDUCATION/TRAINING PROGRAM

## 2021-11-20 PROCEDURE — 93017 CV STRESS TEST TRACING ONLY: CPT

## 2021-11-20 RX ORDER — ASPIRIN 81 MG/1
81 TABLET ORAL DAILY
Qty: 100 TABLET | Refills: 0 | Status: SHIPPED | OUTPATIENT
Start: 2021-11-20 | End: 2021-11-20 | Stop reason: HOSPADM

## 2021-11-20 RX ORDER — SODIUM HYPOCHLORITE 1.25 MG/ML
SOLUTION TOPICAL 2 TIMES DAILY
Status: DISCONTINUED | OUTPATIENT
Start: 2021-11-20 | End: 2021-11-20 | Stop reason: HOSPADM

## 2021-11-20 RX ADMIN — TETRAKIS(2-METHOXYISOBUTYLISOCYANIDE)COPPER(I) TETRAFLUOROBORATE 30 MILLICURIE: 1 INJECTION, POWDER, LYOPHILIZED, FOR SOLUTION INTRAVENOUS at 11:04

## 2021-11-20 RX ADMIN — REGADENOSON 0.4 MG: 0.08 INJECTION, SOLUTION INTRAVENOUS at 10:00

## 2021-11-20 RX ADMIN — BUDESONIDE 500 MCG: 0.5 SUSPENSION RESPIRATORY (INHALATION) at 07:40

## 2021-11-20 RX ADMIN — SODIUM HYPOCHLORITE: 1.25 SOLUTION TOPICAL at 13:53

## 2021-11-20 RX ADMIN — ARFORMOTEROL TARTRATE 15 MCG: 15 SOLUTION RESPIRATORY (INHALATION) at 07:40

## 2021-11-20 RX ADMIN — TETRAKIS(2-METHOXYISOBUTYLISOCYANIDE)COPPER(I) TETRAFLUOROBORATE 10 MILLICURIE: 1 INJECTION, POWDER, LYOPHILIZED, FOR SOLUTION INTRAVENOUS at 11:04

## 2021-11-20 ASSESSMENT — PAIN SCALES - GENERAL: PAINLEVEL_OUTOF10: 0

## 2021-11-20 NOTE — DISCHARGE SUMMARY
[facility] [address] [place] [address] MEDICINE      DISCHARGE SUMMARY:      PATIENT NAME:  Opal Bledsoe  :  1965  MRN:  [id number]    Admission Date:   2021 12:01 PM Attending: Star Hammond MD   Discharge Date:   2021              PCP: Fabio Gitelman, MD  Length of Stay: 0 days     Chief Complaint on Admission:   Chief Complaint   Patient presents with    Chest Pain     started this am at 0500 hx of blood clots and infected port thats been removed        Consultants:     None       Discharge Problem List:   Active Problems:    Carcinoma of female breast ([facility] [address]    Chronic pain syndrome    Rheumatoid arthritis involving both knees with negative rheumatoid factor ([facility] [address]    Status post bilateral breast reconstruction    Non-cardiac chest pain  Resolved Problems:    * No resolved hospital problems. [facility]  COURSE  AND  TREATMENT:    2021  Patient is feeling better today. Chest pain has resolved. Cardiac enzymes x3 were done which have been negative. Keenan Rosetta cardiac stress test was done which was negative for any myocardial ischemia or infarction as below:  Patient is stable from medical standpoint to be discharged. She is advised to follow-up closely with hematology oncology as an outpatient as per the recommendations for treatment of her breast cancer. 2021  History Of Present Illness: The patient is a 49 y.o. female who presented to [facility] ER with PMH cancer, GERD, HTN, blood clots complaining of chest pain. Patient states that chest pain started this morning at rest describing as sharp, stabbing, squeezing sensation. She tried to move around her home and continued to experience pain. She states radiation to left arm and between shoulder blades.   She is currently undergoing treatment for breast cancer, was at Dr. Isiah Baez office to receive treatment when informing them of chest pain and shortness of breath, they instructed her to go to ER. She has also had an infected port with a blockage in the left subclavian that she sees Dr. Ramiro Gibbs. She has an open wound that she has been managing herself. Currently, she denies fever, chills, shortness of breath, abdominal pain. rates pain 2 out of 10 on pain scale. Work-up in ER CXR no active cardiopulmonary disease. CTA chest no evidence of PE. Troponin negative. -Given Dilaudid 1 mg IV, morphine 4 mg IV, Zofran 4 mg IV, sublingual nitro. Patient is to be admitted to hospitalist service for chest pain rule out. OBJECTIVE:  BP (!) 111/55   Pulse 82   Temp 98.8 °F (37.1 °C) (Temporal)   Resp 18   SpO2 98%       Heart: RRR   Lungs: Bilateral fair air entry   Abdomen: Soft, non-tender   Extremities: No edema   Neurologic: Alert and oriented   Skin: Warm and dry          Laboratory Data:  Recent Labs     11/19/21  1158 11/19/21  1206   WBC 4.11 4.0*   HGB 12.0 11.6*   * 158     Recent Labs     11/19/21  1206      K 4.2      CO2 21*   BUN 7   CREATININE 0.6   GLUCOSE 70*     Recent Labs     11/19/21  1206   AST 10   ALT <5*   BILITOT 0.8   ALKPHOS 94     Troponin T:   Recent Labs     11/19/21  1206 11/19/21  1411 11/19/21  2039   TROPONINI <0.01 <0.01 <0.01     Pro-BNP: No results for input(s): BNP in the last 72 hours. INR:   Recent Labs     11/19/21  1206   INR 1.22*     UA:No results for input(s): NITRITE, COLORU, PHUR, LABCAST, WBCUA, RBCUA, MUCUS, TRICHOMONAS, YEAST, BACTERIA, CLARITYU, SPECGRAV, LEUKOCYTESUR, UROBILINOGEN, BILIRUBINUR, BLOODU, GLUCOSEU, AMORPHOUS in the last 72 hours. Invalid input(s): Mau Masters  A1C: No results for input(s): LABA1C in the last 72 hours. ABG:No results for input(s): PHART, TPA0TFD, PO2ART, JQJ5DRJ, BEART, HGBAE, L9LJVWQY, CARBOXHGBART in the last 72 hours. Impressions of imaging performed in 48 hours before discharge:    XR CHEST PORTABLE    Result Date: 11/19/2021  No active cardiopulmonary disease. Signed by Dr Tam Hylton    Result Date: 11/19/2021  No evidence of pulmonary embolism. The lungs are poorly expanded but unremarkable. Signed by Dr Ana Parker       Current Discharge Medication List           Details   apixaban (ELIQUIS) 5 MG TABS tablet Take 1 tablet by mouth 2 times daily  Qty: 60 tablet, Refills: 0      sodium hypochlorite (DAKINS) 0.125 % SOLN external solution Apply topically 2 times daily  Qty: 1 each, Refills: 0      fluticasone-vilanterol (BREO ELLIPTA) 100-25 MCG/INH AEPB inhaler Inhale 1 puff into the lungs daily  Qty: 1 each, Refills: 5    Associated Diagnoses: Wheezing      !! VENTOLIN  (90 Base) MCG/ACT inhaler Inhale 2 puffs into the lungs 4 times daily as needed for Wheezing  Qty: 18 g, Refills: 5    Comments: Must be Ventolin (brand name only)  Associated Diagnoses: Wheezing      pantoprazole (PROTONIX) 40 MG tablet TAKE 1 TABLET BY MOUTH EVERY DAY  Qty: 90 tablet, Refills: 0    Associated Diagnoses: Chemotherapy-induced neuropathy (HCC)      IBRANCE 125 MG tablet Take 1 tablet (125mg) by mouth once daily. Qty: 21 tablet, Refills: 3      NARCAN 4 MG/0.1ML LIQD nasal spray       gabapentin (NEURONTIN) 300 MG capsule TAKE 2 CAPSULES BY MOUTH 3 TIMES DAILY FOR 30 DAYS. Qty: 180 capsule, Refills: 0    Comments: Not to exceed 5 additional fills before 09/28/2021  Associated Diagnoses: Care plan discussed with patient; Chemotherapy-induced neuropathy (HCC)      cyclobenzaprine (FLEXERIL) 10 MG tablet Take 10 mg by mouth 3 times daily as needed for Muscle spasms      guaiFENesin-codeine (GUAIFENESIN AC) 100-10 MG/5ML liquid Take 5 mLs by mouth 3 times daily as needed for Cough.       !! Albuterol Sulfate (PROAIR HFA IN) Inhale 1 each into the lungs daily as needed       benzonatate (TESSALON) 100 MG capsule Take 100 mg by mouth 3 times daily as needed for Cough      Cholecalciferol (VITAMIN D3) 125 MCG (5000 UT) TABS Take 1 tablet by mouth daily lisinopril (PRINIVIL;ZESTRIL) 10 MG tablet Take 10 mg by mouth daily      acyclovir (ZOVIRAX) 800 MG tablet Take 800 mg by mouth 2 times daily as needed       ondansetron (ZOFRAN) 4 MG tablet Take 4 mg by mouth every 8 hours as needed for Nausea or Vomiting      alclomethasone (ACLOVATE) 0.05 % cream Apply topically 2 times daily as needed Apply topically 2 times daily. !! - Potential duplicate medications found. Please discuss with provider. ISSUES TO BE ADDRESSED AT HOSPITAL FOLLOW-UP VISIT:    Advised patient to follow-up closely with PCP upon discharge for management of chronic medical issues  Please see the detailed discharge directions delivered from earlier today! Condition on Discharge: gradually improving  Discharge Disposition: Home    Recommended Follow Up:  Stacey Crawford MD  [address] [address]          Alex Orlando MD  [facility] [address] [place] [id number]      As needed    Followup Appointments Scheduled at Time of Discharge:  Future Appointments   Date Time Provider Lary Marquez   12/9/2021  1:15 PM [facility] [address] [facility] [address]   1/6/2022  9:40 AM L CT RM 2 [facility] CT L Hos Rad   1/11/2022  9:45 AM MD Kallie Dominguez [facility]   1/28/2022 10:30 AM [facility] VASC LAB ROOM 1 [facility] VASC LAB [facility] Lrds   1/31/2022  1:30 PM Manny Feldman MD N LPS Gen SG [facility]   2/1/2022 10:30 AM BRITANY Mclaughlin [facility]        Discharge Instructions:   Please see the discharge paperwork. Patient was seen at bedside today, and the examination shows improvement since yesterday. Detailed discharge directions delivered to the patient by myself and our nursing staff, who verbalizes understanding and is very happy and satisfied with the plan. Patient has been advised to continue all medications as prescribed and advised, and f/u with PCP within 1 week.  Patient is stable from medical standpoint to be discharged. Total time spent during patient evaluation and assessment, discussion with the nurse/family, addressing discharge medications/scripts and coordination of care for safe discharge was in excess of 35 minutes.       Signed Electronically:    Zachery Alva MD  2:13 PM 11/20/2021

## 2021-11-20 NOTE — PLAN OF CARE
Problem: Falls - Risk of:  Goal: Will remain free from falls  Description: Will remain free from falls  Outcome: Ongoing  Goal: Absence of physical injury  Description: Absence of physical injury  Outcome: Ongoing     Problem: Wound:  Goal: Will show signs of wound healing; wound closure and no evidence of infection  Description: Will show signs of wound healing; wound closure and no evidence of infection  Outcome: Ongoing     Problem: Pressure Ulcer:  Goal: Signs of wound healing will improve  Description: Signs of wound healing will improve  Outcome: Ongoing  Goal: Absence of new pressure ulcer  Description: Absence of new pressure ulcer  Outcome: Ongoing  Goal: Will show no infection signs and symptoms  Description: Will show no infection signs and symptoms  Outcome: Ongoing     Problem: Arterial:  Goal: Optimize blood flow for wound healing  Description: Optimize blood flow for wound healing  Outcome: Ongoing     Problem: Venous:  Goal: Signs of wound healing will improve  Description: Signs of wound healing will improve  Outcome: Ongoing     Problem: Smoking cessation:  Goal: Ability to formulate a plan to maintain a tobacco-free life will be supported  Description: Ability to formulate a plan to maintain a tobacco-free life will be supported  Outcome: Ongoing     Problem: Compression therapy:  Goal: Will be free from complications associated with compression therapy  Description: Will be free from complications associated with compression therapy  Outcome: Ongoing     Problem: Weight control:  Goal: Ability to maintain an optimal weight for height and age will be supported  Description: Ability to maintain an optimal weight for height and age will be supported  Outcome: Ongoing     Problem: Falls - Risk of:  Goal: Will remain free from falls  Description: Will remain free from falls  Outcome: Ongoing     Problem: Blood Glucose:  Goal: Ability to maintain appropriate glucose levels will improve  Description: Ability to maintain appropriate glucose levels will improve  Outcome: Ongoing

## 2021-11-20 NOTE — PROGRESS NOTES
Patient presented to ER with sharp, squeezing chest pain to left side, radiating down arm and shoulder blade. Pt stated that pain began at 0545, worsening around 1100. Patient reports improvement since admission. Does state she has some weakness to lower extremities and experiences neuropathy in her legs. Ambulated to bathroom independently and tolerated well with no dizziness. Lexiscan consent signed and in paper chart.

## 2021-11-22 ENCOUNTER — TELEPHONE (OUTPATIENT)
Dept: INTERNAL MEDICINE | Age: 56
End: 2021-11-22

## 2021-11-22 NOTE — TELEPHONE ENCOUNTER
[facility]    Outreach made within 2 business days of discharge: Yes    Patient: Dameon Gonzalez   Patient : 1965  MRN: [id number]   Reason for Admission: Admitted 21 for chest pain   Discharge Date: 21    Discharge Problem List:   Active Problems:    Carcinoma of female breast [facility])    Chronic pain syndrome    Rheumatoid arthritis involving both knees with negative rheumatoid factor ([facility] [address]    Status post bilateral breast reconstruction    Non-cardiac chest pain     Spoke with: Attempted to make contact with patient/caregiver for an initial transitions of care follow up call post discharge without success. I will reach out again at a later time. Any previously scheduled hospital follow up appointments noted below.       Discharge department/facility: [facility]     Scheduled appointment with PCP within 7-14 days    Follow Up  Future Appointments   Date Time Provider Lary Marquez   2021  2:45 PM SCHEDULE, [facility] MED ONC [facility] MED ONC [facility] HOD   2021  1:15 PM Kojo Mcgovern DO [facility] LMP [facility] Lrds   2022  9:40 AM MHL CT RM 2 MHL CT MHL Hos Rad   2022  9:45 AM MD Kallie Dominguez [facility]   2022 10:30 AM [facility] VASC LAB ROOM 1 L VASC LAB [facility] Lrds   2022  1:30 PM Chuy Almaraz MD N LPS Gen SG [facility]   2022 10:30 AM BRITANY Spencer N Vasc Spec [facility]       Zannie Efra, [place]

## 2021-11-22 NOTE — PROGRESS NOTES
[address]   Progress Note and Procedure Note      Radha Pacheco  AGE: 49 y.o. GENDER: female  : 1965  TODAY'S DATE:  2021    Subjective:        HISTORY of PRESENT ILLNESS HPI   Radha Pacheco is a 49 y.o. female who presents today for wound evaluation.     Wound Type:non-healing surgical  Wound Location:chest  Modifying factors:none    Patient Active Problem List   Diagnosis Code    Carcinoma of female breast ([facility] [address] C50.919    Poor venous access I87.8    Iron deficiency anemia D50.9    Menopausal symptom N95.1    Benign neoplasm of body of uterus D26.1    Dyspnea and respiratory abnormalities R06.00, R06.89    Chronic pain syndrome G89.4    Right wrist pain M25.531    Rheumatoid arthritis involving both knees with negative rheumatoid factor [facility] M06.061, M06.062    Other cyst of bone, left ankle and foot M85.672    Status post bilateral mastectomy Z90.13    Acute purulent bronchitis J20.9    Lung nodule R91.1    Status post bilateral breast reconstruction Z98.890    S/P revision of left breast reconstruction 2021 Z98.890    Arm DVT (deep venous thromboembolism), acute, left (HCC) I82.622    Exostosis of left foot M89.8X7    Central line infection T80.219A   [facility] or reservoir infection T80.212A    Chest wall ulcer, with fat layer exposed (HCC) L98.492    Non-cardiac chest pain R07.89       ALLERGIES    Allergies   Allergen Reactions    Clindamycin/Lincomycin Hives, Itching and Rash            Objective:      /69   Pulse 84   Temp 96.5 °F (35.8 °C) (Temporal)   Resp 18   Ht 5' 6\" (1.676 m)   Wt 183 lb (83 kg)   BMI 29.54 kg/m²     Post Debridement Measurements and Assessment:    Wound 10/15/21 Chest Lateral;Left;Upper wound 1- left upper chest post-op wound (Active)   Wound Image   21 1354   Wound Etiology Surgical 21   Dressing Status Old drainage noted 21   Wound Cleansed Not Cleansed 21 1352 Dressing/Treatment Other (comment) 11/19/21 2208   Wound Length (cm) 0.6 cm 11/18/21 1354   Wound Width (cm) 1.4 cm 11/18/21 1354   Wound Depth (cm) 1.4 cm 11/18/21 1354   Wound Surface Area (cm^2) 0.84 cm^2 11/18/21 1354   Change in Wound Size % (l*w) 87.76 11/18/21 1354   Wound Volume (cm^3) 1.176 cm^3 11/18/21 1354   Wound Healing % 86 11/18/21 1354   Post-Procedure Length (cm) 0.6 cm 11/18/21 1420   Post-Procedure Width (cm) 1.4 cm 11/18/21 1420   Post-Procedure Depth (cm) 1.4 cm 11/18/21 1420   Post-Procedure Surface Area (cm^2) 0.84 cm^2 11/18/21 1420   Post-Procedure Volume (cm^3) 1.176 cm^3 11/18/21 1420   Distance Tunneling (cm) 0 cm 11/18/21 1354   Tunneling Position ___ O'Clock 0 11/18/21 1354   Undermining Starts ___ O'Clock 0 11/18/21 1354   Undermining Ends___ O'Clock 0 11/18/21 1354   Undermining Maxium Distance (cm) 0 11/18/21 1354   Wound Assessment [place]/red; [facility] 11/18/21 1354   Drainage Amount Moderate 11/18/21 1354   Drainage Description Serosanguinous 11/18/21 1354   Odor None 11/18/21 1354   Delma-wound Assessment Excoriated 11/18/21 1354   Margins Attached edges 11/18/21 1354   Wound Thickness Description not for Pressure Injury Full thickness 11/18/21 1354   Number of days: 38           The patients pain isPain Level: 0  . Wound(s) has improved. Please refer to nursing measurements and assessment regarding wound pre and post debridement.           Assessment:      Patient Active Problem List   Diagnosis    Carcinoma of female breast ([address]    Poor venous access    Iron deficiency anemia    Menopausal symptom    Benign neoplasm of body of uterus    Dyspnea and respiratory abnormalities    Chronic pain syndrome    Right wrist pain    Rheumatoid arthritis involving both knees with negative rheumatoid factor (HCC)    Other cyst of bone, left ankle and foot    Status post bilateral mastectomy    Acute purulent bronchitis    Lung nodule    Status post bilateral breast reconstruction    S/P revision of left breast reconstruction 9/17/2021    Arm DVT (deep venous thromboembolism), acute, left (HCC)    Exostosis of left foot    Central line infection    Port or reservoir infection    Chest wall ulcer, with fat layer exposed ([address]    Non-cardiac chest pain          Plan:          Plan for wound - Dress per physician order  Treatment:     Compression : No   Offloading : No   Dressing : Dakins   Additional Therapy :      1. Discussed appropriate home care of this wound. Wound redressed. 2. Patient instructions were given. 3. Follow up: 2 week(s). Wound post port removal looks good. Excellent granulation tissue and improving in size. We will continue same care with Dakin soaked sponge. We will see patient in 2 weeks.            Electronically signed by Kojo Mcgovern DO on 11/22/2021 at 3:06 PM

## 2021-11-23 ENCOUNTER — HOSPITAL ENCOUNTER (OUTPATIENT)
Dept: INFUSION THERAPY | Age: 56
Discharge: HOME | End: 2021-11-23
Payer: MEDICARE

## 2021-11-23 ENCOUNTER — TELEPHONE (OUTPATIENT)
Dept: INTERNAL MEDICINE | Age: 56
End: 2021-11-23

## 2021-11-23 DIAGNOSIS — C50.919 CARCINOMA OF FEMALE BREAST, UNSPECIFIED ESTROGEN RECEPTOR STATUS, UNSPECIFIED LATERALITY, UNSPECIFIED SITE OF BREAST (HCC): ICD-10-CM

## 2021-11-23 DIAGNOSIS — D50.8 OTHER IRON DEFICIENCY ANEMIA: Primary | ICD-10-CM

## 2021-11-23 PROCEDURE — 96372 THER/PROPH/DIAG INJ SC/IM: CPT

## 2021-11-23 PROCEDURE — 96402 CHEMO HORMON ANTINEOPL SQ/IM: CPT

## 2021-11-23 PROCEDURE — 6360000002 HC RX W HCPCS: Performed by: INTERNAL MEDICINE

## 2021-11-23 RX ORDER — LAMOTRIGINE 25 MG/1
250 TABLET ORAL ONCE
Status: CANCELLED | OUTPATIENT
Start: 2021-11-23 | End: 2021-11-23

## 2021-11-23 RX ORDER — LAMOTRIGINE 25 MG/1
250 TABLET ORAL ONCE
Status: COMPLETED | OUTPATIENT
Start: 2021-11-23 | End: 2021-11-23

## 2021-11-23 RX ORDER — DIPHENHYDRAMINE HYDROCHLORIDE 50 MG/ML
50 INJECTION INTRAMUSCULAR; INTRAVENOUS ONCE
Status: CANCELLED | OUTPATIENT
Start: 2021-11-23 | End: 2021-12-16

## 2021-11-23 RX ORDER — LAMOTRIGINE 25 MG/1
250 TABLET ORAL ONCE
Status: CANCELLED | OUTPATIENT
Start: 2021-12-16 | End: 2021-12-16

## 2021-11-23 RX ORDER — EPINEPHRINE 1 MG/ML
0.3 INJECTION, SOLUTION, CONCENTRATE INTRAVENOUS PRN
Status: CANCELLED | OUTPATIENT
Start: 2021-11-23

## 2021-11-23 RX ORDER — CYANOCOBALAMIN 1000 UG/ML
1000 INJECTION INTRAMUSCULAR; SUBCUTANEOUS ONCE
Status: CANCELLED
Start: 2021-12-17

## 2021-11-23 RX ORDER — METHYLPREDNISOLONE SODIUM SUCCINATE 125 MG/2ML
125 INJECTION, POWDER, LYOPHILIZED, FOR SOLUTION INTRAMUSCULAR; INTRAVENOUS ONCE
Status: CANCELLED | OUTPATIENT
Start: 2021-11-23 | End: 2021-12-16

## 2021-11-23 RX ORDER — CYANOCOBALAMIN 1000 UG/ML
1000 INJECTION INTRAMUSCULAR; SUBCUTANEOUS ONCE
Status: COMPLETED
Start: 2021-11-23 | End: 2021-11-23

## 2021-11-23 RX ORDER — SODIUM CHLORIDE 9 MG/ML
INJECTION, SOLUTION INTRAVENOUS CONTINUOUS
Status: CANCELLED | OUTPATIENT
Start: 2021-11-23

## 2021-11-23 RX ADMIN — FULVESTRANT 250 MG: 50 INJECTION, SOLUTION INTRAMUSCULAR at 17:03

## 2021-11-23 RX ADMIN — CYANOCOBALAMIN 1000 MCG: 1000 INJECTION, SOLUTION INTRAMUSCULAR at 15:41

## 2021-11-23 RX ADMIN — FULVESTRANT 250 MG: 50 INJECTION, SOLUTION INTRAMUSCULAR at 17:04

## 2021-11-23 NOTE — TELEPHONE ENCOUNTER
[facility]    Outreach made within 2 business days of discharge: Yes    Patient: Sony Hawley          Patient : 1965        MRN: [id number]   Reason for Admission: Admitted 21 for chest pain   Discharge Date: 21        Discharge Problem List:   Active Problems:    Carcinoma of female breast ([facility] [address]    Chronic pain syndrome    Rheumatoid arthritis involving both knees with negative rheumatoid factor ([facility] [address]    Status post bilateral breast reconstruction    Non-cardiac chest pain                Spoke with: 2nd attempt to reach patient, no answer.  Message left with intent of my call and for patient to call the office to schedule      Discharge [facility]facility[facility]     Scheduled appointment with PCP within 7-14 days    Follow Up  Future Appointments   Date Time Provider Lary Marquez   2021  2:45 PM SCHEDULE, [facility] MED ONC [facility] MED ONC [facility] HOD   2021  1:15 PM Sana, DO [facility] LMP [address] [facility] [address] L[address]   2022  9:40 AM MHL CT RM 2 MHL CT MHL Hos Rad   2022  9:45 AM MD Kallie Dominguez Pulm [facility]   2022 10:30 AM [facility] VASC LAB ROOM 1 [facility] VASC LAB [facility] Lrds   2022  1:30 PM Nandini Holman MD N LPS Gen SG [facility]   2022 10:30 AM Tanya Meade, APRN N Vasc Spec [facility]       Peg Chaudhry MA

## 2021-12-08 ENCOUNTER — HOSPITAL ENCOUNTER (OUTPATIENT)
Dept: MRI IMAGING | Age: 56
Discharge: HOME | End: 2021-12-08
Payer: MEDICARE

## 2021-12-08 DIAGNOSIS — M54.16 LUMBAR RADICULOPATHY: ICD-10-CM

## 2021-12-08 PROCEDURE — 72148 MRI LUMBAR SPINE W/O DYE: CPT

## 2021-12-09 ENCOUNTER — HOSPITAL ENCOUNTER (OUTPATIENT)
Dept: WOUND CARE | Age: 56
Discharge: HOME | End: 2021-12-09
Payer: MEDICARE

## 2021-12-09 VITALS
HEART RATE: 82 BPM | TEMPERATURE: 96.7 F | HEIGHT: 66 IN | DIASTOLIC BLOOD PRESSURE: 86 MMHG | SYSTOLIC BLOOD PRESSURE: 127 MMHG | WEIGHT: 183 LBS | BODY MASS INDEX: 29.41 KG/M2 | RESPIRATION RATE: 18 BRPM

## 2021-12-09 DIAGNOSIS — L98.492 CHEST WALL ULCER, WITH FAT LAYER EXPOSED (HCC): Primary | ICD-10-CM

## 2021-12-09 PROCEDURE — 97597 DBRDMT OPN WND 1ST 20 CM/<: CPT

## 2021-12-09 PROCEDURE — 97597 DBRDMT OPN WND 1ST 20 CM/<: CPT | Performed by: SURGERY

## 2021-12-09 PROCEDURE — 6370000000 HC RX 637 (ALT 250 FOR IP): Performed by: NURSE PRACTITIONER

## 2021-12-09 RX ORDER — BETAMETHASONE DIPROPIONATE 0.05 %
OINTMENT (GRAM) TOPICAL ONCE
Status: CANCELLED | OUTPATIENT
Start: 2021-12-09 | End: 2021-12-09

## 2021-12-09 RX ORDER — LIDOCAINE HYDROCHLORIDE 20 MG/ML
JELLY TOPICAL ONCE
Status: COMPLETED | OUTPATIENT
Start: 2021-12-09 | End: 2021-12-09

## 2021-12-09 RX ORDER — LIDOCAINE HYDROCHLORIDE 20 MG/ML
JELLY TOPICAL ONCE
Status: CANCELLED | OUTPATIENT
Start: 2021-12-09 | End: 2021-12-09

## 2021-12-09 RX ADMIN — LIDOCAINE HYDROCHLORIDE: 20 JELLY TOPICAL at 13:41

## 2021-12-09 ASSESSMENT — PAIN DESCRIPTION - ONSET: ONSET: ON-GOING

## 2021-12-09 ASSESSMENT — PAIN DESCRIPTION - LOCATION: LOCATION: BACK

## 2021-12-09 ASSESSMENT — PAIN SCALES - GENERAL: PAINLEVEL_OUTOF10: 7

## 2021-12-09 ASSESSMENT — PAIN DESCRIPTION - DESCRIPTORS: DESCRIPTORS: SHOOTING

## 2021-12-09 ASSESSMENT — PAIN DESCRIPTION - PAIN TYPE: TYPE: ACUTE PAIN

## 2021-12-09 ASSESSMENT — PAIN DESCRIPTION - PROGRESSION: CLINICAL_PROGRESSION: NOT CHANGED

## 2021-12-09 ASSESSMENT — PAIN DESCRIPTION - FREQUENCY: FREQUENCY: CONTINUOUS

## 2021-12-09 NOTE — PROGRESS NOTES
[address] [address]   Progress Note and Procedure Note      Ludivina Gaspar  AGE: 49 y.o. GENDER: female  : 1965  TODAY'S DATE:  2021    Subjective:        HISTORY of PRESENT ILLNESS HPI   Ludivina Gaspar is a 49 y.o. female who presents today for wound evaluation.     Wound Type:non-healing surgical  Wound Location:chest  Modifying factors:none    Patient Active Problem List   Diagnosis Code    Carcinoma of female breast ([facility] [address] C50.919    Poor venous access I87.8    Iron deficiency anemia D50.9    Menopausal symptom N95.1    Benign neoplasm of body of uterus D26.1    Dyspnea and respiratory abnormalities R06.00, R06.89    Chronic pain syndrome G89.4    Right wrist pain M25.531    Rheumatoid arthritis involving both knees with negative rheumatoid factor (HCC) M06.061, M06.062    Other cyst of bone, left ankle and foot M85.672    Status post bilateral mastectomy Z90.13    Acute purulent bronchitis J20.9    Lung nodule R91.1    Status post bilateral breast reconstruction Z98.890    S/P revision of left breast reconstruction 2021 Z98.890    Arm DVT (deep venous thromboembolism), acute, left ([facility]) I82.622    Exostosis of left foot M89.8X7    Central line infection T80.219A   [facility] or reservoir infection T80.212A    Chest wall ulcer, with fat layer exposed ([facility] [address] [facility] L98.492    Non-cardiac chest pain R07.89       ALLERGIES    Allergies   Allergen Reactions    Clindamycin/Lincomycin Hives, Itching and Rash            Objective:      /86   Pulse 82   Temp 96.7 °F (35.9 °C) (Temporal)   Resp 18   Ht 5' 6\" (1.676 m)   Wt 183 lb (83 kg)   BMI 29.54 kg/m²     Post Debridement Measurements and Assessment:    Wound 10/15/21 Chest Lateral;Left;Upper wound 1- left upper chest post-op wound (Active)   Wound Image   21 1342   Wound Etiology Surgical 21 1342   Dressing Status Old drainage noted 21 1342   Wound Cleansed Soap and water 21 1342 patient. Assessment:      Patient Active Problem List   Diagnosis    Carcinoma of female breast ([facility] [address]    Poor venous access    Iron deficiency anemia    Menopausal symptom    Benign neoplasm of body of uterus    Dyspnea and respiratory abnormalities    Chronic pain syndrome    Right wrist pain    Rheumatoid arthritis involving both knees with negative rheumatoid factor (HCC)    Other cyst of bone, left ankle and foot    Status post bilateral mastectomy    Acute purulent bronchitis    Lung nodule    Status post bilateral breast reconstruction    S/P revision of left breast reconstruction 9/17/2021    Arm DVT (deep venous thromboembolism), acute, left (HCC)    Exostosis of left foot    Central line infection    Port or reservoir infection    Chest wall ulcer, with fat layer exposed ([address]    Non-cardiac chest pain          Plan:          Plan for wound - Dress per physician order  Treatment:     Compression : No   Offloading : No   Dressing : Aquacel Ag   Additional Therapy :      1. Discussed appropriate home care of this wound. Wound redressed. 2. Patient instructions were given. 3. Follow up: 2 week(s). Lower edge of the skin strength evolving to the wound. It was cleaned with curette. We will see patient in couple weeks. Dressings are changed from Dakin's to Aquacel Ag.                  Electronically signed by Cory Ca DO on 12/9/2021 at 2:14 PM

## 2021-12-20 RX ORDER — PALBOCICLIB 75 MG/1
75 TABLET, FILM COATED ORAL DAILY
Qty: 21 TABLET | Refills: 1 | Status: SHIPPED | OUTPATIENT
Start: 2021-12-20 | End: 2022-06-28

## 2021-12-21 ENCOUNTER — HOSPITAL ENCOUNTER (OUTPATIENT)
Dept: INFUSION THERAPY | Age: 56
Discharge: HOME | End: 2021-12-21
Payer: MEDICARE

## 2021-12-21 DIAGNOSIS — D50.8 OTHER IRON DEFICIENCY ANEMIA: ICD-10-CM

## 2021-12-21 DIAGNOSIS — R79.89 ABNORMAL THYROID STIMULATING HORMONE LEVEL: ICD-10-CM

## 2021-12-21 DIAGNOSIS — C50.919 CARCINOMA OF FEMALE BREAST, UNSPECIFIED ESTROGEN RECEPTOR STATUS, UNSPECIFIED LATERALITY, UNSPECIFIED SITE OF BREAST (HCC): Primary | ICD-10-CM

## 2021-12-21 DIAGNOSIS — R79.89 TSH ELEVATION: Primary | ICD-10-CM

## 2021-12-21 LAB
BASOPHILS ABSOLUTE: 0.02 K/UL (ref 0.01–0.08)
BASOPHILS RELATIVE PERCENT: 0.6 % (ref 0.1–1.2)
EOSINOPHILS ABSOLUTE: 0.01 K/UL (ref 0.04–0.54)
EOSINOPHILS RELATIVE PERCENT: 0.3 % (ref 0.7–7)
HCT VFR BLD CALC: 34.4 % (ref 34.1–44.9)
HEMOGLOBIN: 11.9 G/DL (ref 11.2–15.7)
LYMPHOCYTES ABSOLUTE: 1.56 K/UL (ref 1.18–3.74)
LYMPHOCYTES RELATIVE PERCENT: 46.3 % (ref 19.3–53.1)
MCH RBC QN AUTO: 34.5 PG (ref 25.6–32.2)
MCHC RBC AUTO-ENTMCNC: 34.6 G/DL (ref 32.3–35.5)
MCV RBC AUTO: 99.7 FL (ref 79.4–94.8)
MONOCYTES ABSOLUTE: 0.43 K/UL (ref 0.24–0.82)
MONOCYTES RELATIVE PERCENT: 12.8 % (ref 4.7–12.5)
NEUTROPHILS ABSOLUTE: 1.35 K/UL (ref 1.56–6.13)
NEUTROPHILS RELATIVE PERCENT: 40 % (ref 34–71.1)
PDW BLD-RTO: 13.5 % (ref 11.7–14.4)
PLATELET # BLD: 160 K/UL (ref 182–369)
PMV BLD AUTO: 9.3 FL (ref 7.4–10.4)
RBC # BLD: 3.45 M/UL (ref 3.93–5.22)
TSH SERPL DL<=0.05 MIU/L-ACNC: 1.2 UIU/ML (ref 0.47–4.68)
WBC # BLD: 3.37 K/UL (ref 3.98–10.04)

## 2021-12-21 PROCEDURE — 6360000002 HC RX W HCPCS: Performed by: INTERNAL MEDICINE

## 2021-12-21 PROCEDURE — 36415 COLL VENOUS BLD VENIPUNCTURE: CPT

## 2021-12-21 PROCEDURE — 84443 ASSAY THYROID STIM HORMONE: CPT

## 2021-12-21 PROCEDURE — 96402 CHEMO HORMON ANTINEOPL SQ/IM: CPT

## 2021-12-21 PROCEDURE — 85025 COMPLETE CBC W/AUTO DIFF WBC: CPT

## 2021-12-21 PROCEDURE — 96372 THER/PROPH/DIAG INJ SC/IM: CPT

## 2021-12-21 RX ORDER — LAMOTRIGINE 25 MG/1
250 TABLET ORAL ONCE
Status: COMPLETED | OUTPATIENT
Start: 2021-12-21 | End: 2021-12-21

## 2021-12-21 RX ORDER — CYANOCOBALAMIN 1000 UG/ML
1000 INJECTION INTRAMUSCULAR; SUBCUTANEOUS ONCE
Status: CANCELLED
Start: 2022-01-18

## 2021-12-21 RX ORDER — CYANOCOBALAMIN 1000 UG/ML
1000 INJECTION INTRAMUSCULAR; SUBCUTANEOUS ONCE
Status: COMPLETED
Start: 2021-12-21 | End: 2021-12-21

## 2021-12-21 RX ADMIN — FULVESTRANT 250 MG: 50 INJECTION, SOLUTION INTRAMUSCULAR at 09:50

## 2021-12-21 RX ADMIN — FULVESTRANT 250 MG: 50 INJECTION, SOLUTION INTRAMUSCULAR at 09:51

## 2021-12-21 RX ADMIN — CYANOCOBALAMIN 1000 MCG: 1000 INJECTION, SOLUTION INTRAMUSCULAR; SUBCUTANEOUS at 09:50

## 2021-12-23 ENCOUNTER — HOSPITAL ENCOUNTER (OUTPATIENT)
Dept: WOUND CARE | Age: 56
Discharge: HOME | End: 2021-12-23
Payer: MEDICARE

## 2021-12-23 VITALS
SYSTOLIC BLOOD PRESSURE: 131 MMHG | DIASTOLIC BLOOD PRESSURE: 82 MMHG | HEIGHT: 66 IN | RESPIRATION RATE: 20 BRPM | WEIGHT: 170 LBS | TEMPERATURE: 98.7 F | BODY MASS INDEX: 27.32 KG/M2 | HEART RATE: 100 BPM

## 2021-12-23 DIAGNOSIS — L98.492 CHEST WALL ULCER, WITH FAT LAYER EXPOSED (HCC): Primary | ICD-10-CM

## 2021-12-23 PROCEDURE — 6370000000 HC RX 637 (ALT 250 FOR IP): Performed by: NURSE PRACTITIONER

## 2021-12-23 PROCEDURE — 97598 DBRDMT OPN WND ADDL 20CM/<: CPT

## 2021-12-23 PROCEDURE — 97597 DBRDMT OPN WND 1ST 20 CM/<: CPT

## 2021-12-23 PROCEDURE — 97597 DBRDMT OPN WND 1ST 20 CM/<: CPT | Performed by: NURSE PRACTITIONER

## 2021-12-23 RX ORDER — LIDOCAINE HYDROCHLORIDE 20 MG/ML
JELLY TOPICAL ONCE
Status: COMPLETED | OUTPATIENT
Start: 2021-12-23 | End: 2021-12-23

## 2021-12-23 RX ORDER — HYDROCODONE BITARTRATE AND ACETAMINOPHEN 7.5; 325 MG/1; MG/1
1 TABLET ORAL EVERY 6 HOURS PRN
COMMUNITY
Start: 2021-11-04 | End: 2022-03-23

## 2021-12-23 RX ORDER — LIDOCAINE HYDROCHLORIDE 20 MG/ML
JELLY TOPICAL ONCE
Status: CANCELLED | OUTPATIENT
Start: 2021-12-23 | End: 2021-12-23

## 2021-12-23 RX ORDER — BETAMETHASONE DIPROPIONATE 0.05 %
OINTMENT (GRAM) TOPICAL ONCE
Status: CANCELLED | OUTPATIENT
Start: 2021-12-23 | End: 2021-12-23

## 2021-12-23 RX ADMIN — LIDOCAINE HYDROCHLORIDE: 20 JELLY TOPICAL at 13:47

## 2021-12-23 ASSESSMENT — PAIN DESCRIPTION - FREQUENCY: FREQUENCY: CONTINUOUS

## 2021-12-23 ASSESSMENT — PAIN DESCRIPTION - DESCRIPTORS: DESCRIPTORS: CONSTANT;DULL;ACHING

## 2021-12-23 ASSESSMENT — PAIN DESCRIPTION - LOCATION: LOCATION: BACK

## 2021-12-23 ASSESSMENT — PAIN DESCRIPTION - PAIN TYPE: TYPE: CHRONIC PAIN

## 2021-12-23 ASSESSMENT — PAIN SCALES - GENERAL: PAINLEVEL_OUTOF10: 8

## 2021-12-23 ASSESSMENT — PAIN DESCRIPTION - ONSET: ONSET: ON-GOING

## 2021-12-23 NOTE — PROGRESS NOTES
[address]   Progress Note and Procedure Note      [address] [facility] RECORD NUMBER:  [id number]  AGE: 49 y.o. GENDER: female  : 1965  EPISODE DATE:  2021    Subjective:     Chief Complaint   Patient presents with    Wound Check         HISTORY of PRESENT ILLNESS HPI     Nenita Melendez is a 49 y.o. female who presents today for wound/ulcer evaluation.    History of Wound Context: left chest wound follow up eval/treat    Ulcer Identification:  Ulcer Type: surgical healing  Contributing Factors: immunosuppression    Wound: Surgical incision        PAST MEDICAL HISTORY        Diagnosis Date    Breast cancer ([address]     Breast cancer with lung mets (RIGHT)    Chronic back pain     GERD (gastroesophageal reflux disease)     Hx of blood clots     Hypertension     Neuropathy     Post chemo treatment neuropathy       PAST SURGICAL HISTORY    Past Surgical History:   Procedure Laterality Date    BREAST LUMPECTOMY Right     2006    BREAST RECONSTRUCTION Left 2021    Revision left reconstructed breast, implant performed by Benigno Romero MD at [address] [place] [address]      Bilateral breast implants    EMBOLECTOMY Left 2021    LEFT UPPER EXTREMITY  MECHANICAL SUCTION THROMBECTOMY performed by Barbie Walker DO at [facility], TOTAL ABDOMINAL  2015    MASTECTOMY, BILATERAL      Right  & Left 2016    TUNNELED VENOUS PORT PLACEMENT      VASCULAR SURGERY N/A 10/6/2021    REMOVAL OF MEDIPORT performed by Barbie Walker DO at [address] [facility] HISTORY    Family History   Problem Relation Age of Onset    Hypertension Mother     Obesity Mother     Prostate Cancer Father     No Known Problems Sister     No Known Problems Daughter     No Known Problems Daughter        SOCIAL HISTORY    Social History     Tobacco Use    Smoking status: Never Smoker    Smokeless tobacco: Never Used   Vaping Use    Vaping Use: Never used   Substance Use Topics    Alcohol use: Not Currently    Drug use: Not Currently       ALLERGIES    Allergies   Allergen Reactions    Clindamycin/Lincomycin Hives, Itching and Rash       MEDICATIONS    Current Outpatient Medications on File Prior to Encounter   Medication Sig Dispense Refill    HYDROcodone-acetaminophen (NORCO) 7.5-325 MG per tablet Take 1 tablet by mouth every 6 hours as needed.  palbociclib (IBRANCE) 75 MG tablet Take 75 mg by mouth daily 21 tablet 1    ELIQUIS 5 MG TABS tablet TAKE 1 TABLET BY MOUTH TWICE A DAY 60 tablet 3    VENTOLIN  (90 Base) MCG/ACT inhaler Inhale 2 puffs into the lungs 4 times daily as needed for Wheezing 18 g 5    pantoprazole (PROTONIX) 40 MG tablet TAKE 1 TABLET BY MOUTH EVERY DAY (Patient taking differently: Take 40 mg by mouth daily ) 90 tablet 0    IBRANCE 125 MG tablet Take 1 tablet (125mg) by mouth once daily. 21 tablet 3    gabapentin (NEURONTIN) 300 MG capsule TAKE 2 CAPSULES BY MOUTH 3 TIMES DAILY FOR 30 DAYS. 180 capsule 0    cyclobenzaprine (FLEXERIL) 10 MG tablet Take 10 mg by mouth 3 times daily as needed for Muscle spasms      lisinopril (PRINIVIL;ZESTRIL) 10 MG tablet Take 10 mg by mouth daily      sodium hypochlorite (DAKINS) 0.125 % SOLN external solution Apply topically 2 times daily 1 each 0    NARCAN 4 MG/0.1ML LIQD nasal spray       guaiFENesin-codeine (GUAIFENESIN AC) 100-10 MG/5ML liquid Take 5 mLs by mouth 3 times daily as needed for Cough.       Albuterol Sulfate (PROAIR HFA IN) Inhale 1 each into the lungs daily as needed       benzonatate (TESSALON) 100 MG capsule Take 100 mg by mouth 3 times daily as needed for Cough      Cholecalciferol (VITAMIN D3) 125 MCG (5000 UT) TABS Take 1 tablet by mouth daily       acyclovir (ZOVIRAX) 800 MG tablet Take 800 mg by mouth 2 times daily as needed       ondansetron (ZOFRAN) 4 MG tablet Take 4 mg by mouth every 8 hours as needed for Nausea or Vomiting      alclomethasone (ACLOVATE) 0.05 % cream Apply topically 2 times daily as needed Apply topically 2 times daily. No current facility-administered medications on file prior to encounter. REVIEW OF SYSTEMS    Pertinent items are noted in HPI.     Objective:      /82   Pulse 100   Temp 98.7 °F (37.1 °C) (Temporal)   Resp 20   Ht 5' 6\" (1.676 m)   Wt 170 lb (77.1 kg)   BMI 27.44 kg/m²     Wt Readings from Last 3 Encounters:   12/23/21 170 lb (77.1 kg)   12/09/21 183 lb (83 kg)   11/18/21 183 lb (83 kg)       PHYSICAL EXAM    General Appearance: alert and oriented to person, place and time, well developed and well- nourished, in no acute distress  Skin: warm and dry, no rash or erythema  Head: normocephalic and atraumatic  Eyes: pupils equal, round, and reactive to light, extraocular eye movements intact, conjunctivae normal  ENT: tympanic membrane, external ear and ear canal normal bilaterally, nose without deformity, nasal mucosa and turbinates normal without polyps  Neck: supple and non-tender without mass, no thyromegaly or thyroid nodules, no cervical lymphadenopathy  Pulmonary/Chest: clear to auscultation bilaterally- no wheezes, rales or rhonchi, normal air movement, no respiratory distress  Extremities: no cyanosis, clubbing or edema  Musculoskeletal: normal range of motion, no joint swelling, deformity or tenderness  Neurologic: reflexes normal and symmetric, no cranial nerve deficit, gait, coordination and speech normal      Assessment:      Patient Active Problem List   Diagnosis Code    Carcinoma of female breast ([facility] C50.919    Poor venous access I87.8    Iron deficiency anemia D50.9    Menopausal symptom N95.1    Benign neoplasm of body of uterus D26.1    Dyspnea and respiratory abnormalities R06.00, R06.89    Chronic pain syndrome G89.4    Right wrist pain M25.531    Rheumatoid arthritis involving both knees with negative rheumatoid factor ([facility] M06.061, L72.654    Other cyst of bone, left ankle and foot M85.672    Status post bilateral mastectomy Z90.13    Acute purulent bronchitis J20.9    Lung nodule R91.1    Status post bilateral breast reconstruction Z98.890    S/P revision of left breast reconstruction 9/17/2021 Z98.890    Arm DVT (deep venous thromboembolism), acute, left (HCC) I82.622    Exostosis of left foot M89.8X7    Central line infection T80.219A   [facility] or reservoir infection T80.212A    Chest wall ulcer, with fat layer exposed ([address] L98.492    Non-cardiac chest pain R07.89        Procedure Note  Indications:  Based on my examination of this patient's wound(s)/ulcer(s) today, debridement is required to promote healing and evaluate the wound base. Performed by: BRITANY Griffiths CNP    Consent obtained:  Yes    Time out taken:  Yes    Pain Control: Anesthetic  Anesthetic: 2% Lidocaine Gel Topical       Debridement:Non-excisional Debridement    Using #15 blade scalpel and forceps the wound(s)/ulcer(s) was/were sharply debrided down through and including the removal of epidermis and dermis.         Devitalized Tissue Debrided:  fibrin, biofilm, slough and exudate    Pre Debridement Measurements:  Are located in the Neon  Documentation Flow Sheet    Wound/Ulcer #: 1    Post Debridement Measurements:  Wound/Ulcer Descriptions are Pre Debridement except measurements:      Percent of Wound/Ulcer Debrided: 100%    Total Surface Area Debrided:  0.3 sq cm     Wound 10/15/21 Chest Lateral;Left;Upper wound 1- left upper chest post-op wound (Active)   Wound Image   12/23/21 1347   Wound Etiology Surgical 12/23/21 1347   Dressing Status Old drainage noted 12/23/21 1347   Wound Cleansed Not Cleansed 12/23/21 1347   Dressing/Treatment Xeroform;Dry dressing 12/23/21 1400   Offloading for Diabetic Foot Ulcers No offloading required 12/09/21 1342   Wound Length (cm) 0.2 cm 12/23/21 1347   Wound Width (cm) 1.1 cm 12/23/21 1347   Wound Depth (cm) 0.1 cm 12/23/21 1347   Wound Surface Area (cm^2) 0.22 cm^2 12/23/21 1347   Change in Wound Size % (l*w) 96.79 12/23/21 1347   Wound Volume (cm^3) 0.022 cm^3 12/23/21 1347   Wound Healing % 100 12/23/21 1347   Post-Procedure Length (cm) 0.3 cm 12/23/21 1400   Post-Procedure Width (cm) 1 cm 12/23/21 1400   Post-Procedure Depth (cm) 0.1 cm 12/23/21 1400   Post-Procedure Surface Area (cm^2) 0.3 cm^2 12/23/21 1400   Post-Procedure Volume (cm^3) 0.03 cm^3 12/23/21 1400   Distance Tunneling (cm) 0 cm 12/23/21 1347   Tunneling Position ___ O'Clock 0 12/23/21 1347   Undermining Starts ___ O'Clock 0 12/23/21 1347   Undermining Ends___ O'Clock 0 12/23/21 1347   Undermining Maxium Distance (cm) 0 12/23/21 1347   Wound Assessment Purple/maroon;Slough 12/23/21 1347   Drainage Amount Scant 12/23/21 1347   Drainage Description Serosanguinous 12/23/21 1347   Odor None 12/23/21 1347   Delma-wound Assessment Intact 12/23/21 1347   Margins Attached edges 12/23/21 1347   Wound Thickness Description not for Pressure Injury Full thickness 12/23/21 1347   Number of days: 69            Diabetic/Pressure/Non Pressure Ulcers only:  Ulcer: N/A      Estimated Blood Loss:  Minimal    Hemostasis Achieved:  by pressure    Procedural Pain:  0  / 10     Post Procedural Pain:  0 / 10     Response to treatment:  Well tolerated by patient. Plan:     Problem List Items Addressed This Visit     Chest wall ulcer, with fat layer exposed ([address] - Primary    Relevant Orders    Initiate Outpatient Wound Care Protocol          Treatment Note please see attached Discharge Instructions    In my professional opinion this patient would benefit from HBO Therapy: No    Written patient dismissal instructions given to patient and signed by patient or POA. Ms. Campbell Barton developed a little blood blister I was able to debride today, follow up in 2 weeks.   Electronically signed by BRITANY Whitney CNP on 12/23/2021 at 2:03 PM

## 2022-01-01 NOTE — PROGRESS NOTES
Pharmacy Note  Vancomycin Consult    Pool Keith is a 49 y.o. female started on Vancomycin for surgical site infection; consult received from [address] [place] [address] to manage therapy. Also receiving the following antibiotics: Merrem. Active Problems:    Central line infection    Port or reservoir infection  Resolved Problems:    * No resolved hospital problems. *      Allergies:  Clindamycin/lincomycin     Temp max: 98.9    Recent Labs     10/05/21  1554   BUN 13       Recent Labs     10/05/21  1554   CREATININE 0.7       Recent Labs     10/05/21  1554   WBC 6.5         Intake/Output Summary (Last 24 hours) at 10/6/2021 1637  Last data filed at 10/6/2021 1632  Gross per 24 hour   Intake 600 ml   Output 515 ml   Net 85 ml       Culture Date Source Results   10/06/21 Surgical ordered                 Ht Readings from Last 1 Encounters:   10/06/21 5' 6\" (1.676 m)        Wt Readings from Last 1 Encounters:   10/06/21 175 lb (79.4 kg)         Body mass index is 28.25 kg/m². Estimated Creatinine Clearance: 98 mL/min (based on SCr of 0.7 mg/dL). Assessment/Plan:  Will initiate vancomycin 1250 mg IV every 12 hours. Timing of trough level will be determined based on culture results, renal function, and clinical response. Thank you for the consult. Will continue to follow.     Electronically signed by Kathya Stephenson [facility] on 10/6/2021 at 4:37 PM Brief Hospital Summary:         Circumcision:  Hip  rec:    Neurodevelop eval?	  CPR class done?  	  PVS at DC?  Vit D at DC?	  FE at DC?    G6PD screen sent on  ____ . Result ______ . 	    PMD:          Name:  ______________ _             Contact information:  ______________ _  Pharmacy: Name:  ______________ _              Contact information:  ______________ _    Follow-up appointments (list):      [ _ ] Discharge time spent >30 min    [ _ ] Car Seat Challenge lasting 90 min was performed. Today I have reviewed and interpreted the nurses’ records of pulse oximetry, heart rate and respiratory rate and observations during testing period. Car Seat Challenge  passed. The patient is cleared to begin using rear-facing car seat upon discharge. Parents were counseled on rear-facing car seat use.

## 2022-01-02 DIAGNOSIS — T45.1X5A CHEMOTHERAPY-INDUCED NEUROPATHY (HCC): ICD-10-CM

## 2022-01-02 DIAGNOSIS — Z71.89 CARE PLAN DISCUSSED WITH PATIENT: ICD-10-CM

## 2022-01-02 DIAGNOSIS — G62.0 CHEMOTHERAPY-INDUCED NEUROPATHY (HCC): ICD-10-CM

## 2022-01-05 RX ORDER — GABAPENTIN 300 MG/1
600 CAPSULE ORAL 3 TIMES DAILY
Qty: 180 CAPSULE | Refills: 0 | Status: SHIPPED | OUTPATIENT
Start: 2022-01-05 | End: 2022-05-26 | Stop reason: SDUPTHER

## 2022-01-06 ENCOUNTER — HOSPITAL ENCOUNTER (OUTPATIENT)
Dept: CT IMAGING | Age: 57
Discharge: HOME | End: 2022-01-06
Payer: MEDICARE

## 2022-01-06 PROCEDURE — 71250 CT THORAX DX C-: CPT

## 2022-01-11 ENCOUNTER — OFFICE VISIT (OUTPATIENT)
Dept: PULMONOLOGY | Age: 57
End: 2022-01-11
Payer: MEDICARE

## 2022-01-11 VITALS
DIASTOLIC BLOOD PRESSURE: 78 MMHG | HEIGHT: 66 IN | OXYGEN SATURATION: 97 % | TEMPERATURE: 98.1 F | SYSTOLIC BLOOD PRESSURE: 122 MMHG | BODY MASS INDEX: 28.42 KG/M2 | WEIGHT: 176.8 LBS | HEART RATE: 70 BPM

## 2022-01-11 DIAGNOSIS — R91.1 LUNG NODULE: Primary | ICD-10-CM

## 2022-01-11 DIAGNOSIS — C50.919 CARCINOMA OF FEMALE BREAST, UNSPECIFIED ESTROGEN RECEPTOR STATUS, UNSPECIFIED LATERALITY, UNSPECIFIED SITE OF BREAST (HCC): ICD-10-CM

## 2022-01-11 DIAGNOSIS — G47.33 OSA (OBSTRUCTIVE SLEEP APNEA): ICD-10-CM

## 2022-01-11 DIAGNOSIS — R06.83 SNORING: ICD-10-CM

## 2022-01-11 DIAGNOSIS — T80.219A CENTRAL VENOUS LINE INFECTION, INITIAL ENCOUNTER: ICD-10-CM

## 2022-01-11 DIAGNOSIS — Z90.13 STATUS POST BILATERAL MASTECTOMY: ICD-10-CM

## 2022-01-11 DIAGNOSIS — R06.2 WHEEZING: ICD-10-CM

## 2022-01-11 DIAGNOSIS — G47.8 NON-RESTORATIVE SLEEP: ICD-10-CM

## 2022-01-11 PROCEDURE — 99214 OFFICE O/P EST MOD 30 MIN: CPT | Performed by: INTERNAL MEDICINE

## 2022-01-11 PROCEDURE — G8417 CALC BMI ABV UP PARAM F/U: HCPCS | Performed by: INTERNAL MEDICINE

## 2022-01-11 PROCEDURE — 3017F COLORECTAL CA SCREEN DOC REV: CPT | Performed by: INTERNAL MEDICINE

## 2022-01-11 PROCEDURE — G8484 FLU IMMUNIZE NO ADMIN: HCPCS | Performed by: INTERNAL MEDICINE

## 2022-01-11 PROCEDURE — 1036F TOBACCO NON-USER: CPT | Performed by: INTERNAL MEDICINE

## 2022-01-11 PROCEDURE — G8427 DOCREV CUR MEDS BY ELIG CLIN: HCPCS | Performed by: INTERNAL MEDICINE

## 2022-01-11 ASSESSMENT — ENCOUNTER SYMPTOMS
RHINORRHEA: 0
SHORTNESS OF BREATH: 1
BACK PAIN: 0
CHEST TIGHTNESS: 0
APNEA: 0
ABDOMINAL PAIN: 0
COUGH: 1
ABDOMINAL DISTENTION: 0
ANAL BLEEDING: 0
WHEEZING: 0

## 2022-01-11 NOTE — PROGRESS NOTES
Pulmonary and Sleep Medicine    Baldemar Cummings (:  1965) is a 49 y.o. female,Established patient, here for evaluation of the following chief complaint(s):  Follow-up (Sleep study. Pt states she's snoring alot.)      Referring physician:  No referring provider defined for this encounter. ASSESSMENT/PLAN:  1. Lung nodule  -     CT CHEST WO CONTRAST; Future  2. Carcinoma of female breast, unspecified estrogen receptor status, unspecified laterality, unspecified site of breast ([facility]  3. Status post bilateral mastectomy and reconstruction   4. Central venous line infection, initial encounter  5. Wheezing  6. Snoring  -     Ambulatory referral to Sleep Medicine  -     Home Sleep Study; Future  7. RUDY (obstructive sleep apnea)  -     Ambulatory referral to Sleep Medicine  -     Home Sleep Study; Future  8. Non-restorative sleep  -     Ambulatory referral to Sleep Medicine  -     Home Sleep Study; Future    Lung nodule is stable on follow up CT of the chest. Will get a CT of the chest in 6 months. Doubt malignancy. Doing well. She likely has RUDY. Will get a home sleep study. Malissa Garcia [facility]    Return in about 3 months (around 2022). SUBJECTIVE/OBJECTIVE:  She is here for follow up on lung nodule. She had a CT of the chest and did show stable lung nodule in RUL. She denies smoking. She complains of severe snoring and witnessed apneas. She wants to be evaluated for sleep apnea. Prior to Visit Medications    Medication Sig Taking? Authorizing Provider   gabapentin (NEURONTIN) 300 MG capsule TAKE 2 CAPSULES BY MOUTH 3 TIMES DAILY FOR 30 DAYS. Yes Charlene Lees MD   HYDROcodone-acetaminophen (NORCO) 7.5-325 MG per tablet Take 1 tablet by mouth every 6 hours as needed.   Yes Historical Provider, MD   palbociclib (IBRANCE) 75 MG tablet Take 75 mg by mouth daily Yes Charlene Lees MD   ELIQUIS 5 MG TABS tablet TAKE 1 TABLET BY MOUTH TWICE A DAY Yes Leda Herrera BRITANY Bell   sodium hypochlorite (DAKINS) 0.125 % SOLN external solution Apply topically 2 times daily Yes BRITANY Flannery   VENTOLIN  (90 Base) MCG/ACT inhaler Inhale 2 puffs into the lungs 4 times daily as needed for Wheezing Yes Ashley Morrison MD   pantoprazole (PROTONIX) 40 MG tablet TAKE 1 TABLET BY MOUTH EVERY DAY  Patient taking differently: Take 40 mg by mouth daily  Yes Farideh Viramontes MD   IBRANCE 125 MG tablet Take 1 tablet (125mg) by mouth once daily. Yes Farideh Viramontes MD   NARCAN 4 MG/0.1ML LIQD nasal spray  Yes Historical Provider, MD   cyclobenzaprine (FLEXERIL) 10 MG tablet Take 10 mg by mouth 3 times daily as needed for Muscle spasms Yes Historical Provider, MD   guaiFENesin-codeine (GUAIFENESIN AC) 100-10 MG/5ML liquid Take 5 mLs by mouth 3 times daily as needed for Cough. Yes Historical Provider, MD   Albuterol Sulfate (PROAIR HFA IN) Inhale 1 each into the lungs daily as needed  Yes Historical Provider, MD   benzonatate (TESSALON) 100 MG capsule Take 100 mg by mouth 3 times daily as needed for Cough Yes Historical Provider, MD   Cholecalciferol (VITAMIN D3) 125 MCG (5000 UT) TABS Take 1 tablet by mouth daily  Yes Historical Provider, MD   lisinopril (PRINIVIL;ZESTRIL) 10 MG tablet Take 10 mg by mouth daily Yes Historical Provider, MD   acyclovir (ZOVIRAX) 800 MG tablet Take 800 mg by mouth 2 times daily as needed  Yes Historical Provider, MD   ondansetron (ZOFRAN) 4 MG tablet Take 4 mg by mouth every 8 hours as needed for Nausea or Vomiting Yes Historical Provider, MD   alclomethasone (ACLOVATE) 0.05 % cream Apply topically 2 times daily as needed Apply topically 2 times daily. Yes Historical Provider, MD        Review of Systems   Constitutional: Negative for activity change, appetite change, chills, diaphoresis and fatigue. HENT: Negative for congestion, dental problem, drooling, ear discharge, postnasal drip and rhinorrhea.     Eyes: Negative for visual disturbance. Respiratory: Positive for cough and shortness of breath. Negative for apnea, chest tightness and wheezing. Gastrointestinal: Negative for abdominal distention, abdominal pain and anal bleeding. Endocrine: Negative for cold intolerance, heat intolerance and polydipsia. Genitourinary: Negative for difficulty urinating, dysuria, enuresis and flank pain. Musculoskeletal: Negative for arthralgias, back pain and gait problem. Allergic/Immunologic: Negative for environmental allergies. Neurological: Negative for dizziness, facial asymmetry, light-headedness and headaches. Vitals:    01/11/22 1011   BP: 122/78   Pulse: 70   Temp: 98.1 °F (36.7 °C)   SpO2: 97%     Physical Exam  Vitals reviewed. Constitutional:       Appearance: Normal appearance. HENT:      Head: Normocephalic and atraumatic. Nose: Nose normal.   Eyes:      Extraocular Movements: Extraocular movements intact. Conjunctiva/sclera: Conjunctivae normal.   Cardiovascular:      Rate and Rhythm: Normal rate and regular rhythm. Heart sounds: No murmur heard. No friction rub. Pulmonary:      Effort: Pulmonary effort is normal. No respiratory distress. Breath sounds: Normal breath sounds. No stridor. No wheezing, rhonchi or rales. Abdominal:      General: There is no distension. Palpations: There is no mass. Tenderness: There is no abdominal tenderness. There is no guarding or rebound. Musculoskeletal:      Cervical back: Normal range of motion and neck supple. Neurological:      Mental Status: She is alert and oriented to person, place, and time. This note was generated used a voice recognition software. Errors in voice recognition may have occurred. An electronic signature was used to authenticate this note.     --Haseeb Gutierrez MD

## 2022-01-16 NOTE — PROGRESS NOTES
MEDICAL ONCOLOGY PROGRESS NOTE    Pt Name: Lamar Brewer  MRN: [id number]  YOB: 1965  Date of evaluation: 1/19/2022    HISTORY OF PRESENT ILLNESS:    Reason for MD visit: Disease/toxicity management  Jalil Roach is here today for monitoring for breast cancer and toxicity assessment. She is currently on palliative treatment with Faslodex/Ibrance. She has been tolerating treatment quite well. She has developed left upper extremity DVT associated with a left upper chest port. Port was discontinued. She also developed infection of the port site. She is a status IV antibiotics. She has been started on Eliquis for anticoagulation. She denies any bleeding. She is compliant with her medications. She complains today of significant hair loss. She believes this is related to [employer] falls.   She requests a referral to a dermatologist      Diagnosis  · Grade III Adenocarcinoma of right breast diagnosed 2006  · Stage IIIA, [id number]  · ER/IL Positive, HER-2 bruce/ihc 1+  · April 2013-RECURRENCE in the right axillary region  · Genetic testing- BRCA 1&2 Negative  · 2014 (?) RECURRENCE in the axillary skin  · 01/05/2017- METASTATIC DISEASE with lung, hilar, and axillary lymph node mets  · Osteopenia  · Mediport associated DVT, October 2021    Treatment Summary  · 2006- Neoadjuvant chemotherapy with AC x 4 cycles followed by Taxol  · 2007- Lumpectomy with axillary lymph node dissection  · 2007- Adjuvant radiation therapy to whole breast and axillary region  · Did not take tamoxifen due to cost  · April 2013- RECURRENCE  · Neoadjuvant chemotherapy with 2 cycles of Taxotere followed by Doxil  · 10/14/2013- Right Breast Mastectomy and Axillary Dissection  · 01/23/2014- Completion of adjuvant RT to chest wall and axillary lymph node with Dr. Celso Hubbard  · 02/14- Initiated adjuvant endocrine therapy with tamoxifen  · 2014 (?) RECURRENCE in the axillary skin  · 2014 (?) Surgical resection of the axillary skin  · 2015 (?) YVETTE/BSO  · 09/19/2016- Bilateral exchange, nipple reconstruction and fat grafting and removal of PAC   · 2016- Switched to aromatase inhibitor  · 01/05/2017- METASTATIC DISEASE with lung, hilar, and axillary mets  · 01/11/2017-06/17/2018- Single agent Abraxane x 13 cycles  · 06/27/2018-09/17/2018- Gemzar  · 10/10/2018-Faslodex every 4 weeks/palbociclib    Cancer History:  James Valdes was first seen by me on 1/21/2021. She was referred to [facility] of care for history of recurrent metastatic breast cancer. She has been in remission as per the latest CT scan. She is currently on Faslodex and palbociclib. She has received several lines therapy as described below. She moved from [place] to [place] to stay closer to her parents. Her medical history is complex. Further details as below. · 2006- Neoadjuvant chemotherapy with AC x 4 cycles followed by Taxol AC was complicated by viral meningitis; patient experienced neuropathy after 3 doses of Taxol and was switched to Taxotere for last dose  · 2007- Lumpectomy with axillary lymph node dissection 1.9cm, grade 2. No LVI. 1 of 14 lymph nodes positive for malignancy (1/14) [id number]  · 2007- Adjuvant Radiation Therapy to whole breast and axillary region  · April 2013- RECURRENCE presenting as mass in the axillary region  · Neoadjuvant chemotherapy with 2 cycles of Taxotere followed by Doxil- did not have good response to treatment  · 2013- Preop PET/CTshowed 2.8cm, right axillary lymph node with uptake. · 10/14/2013- Right Breast Mastectomy and Axillary Dissection Right breast had benign tissue with fibrous, negative for carcinoma. Axillary contents demonstrated invasive ductal carcinoma, involving fibroadipose tissue and tendinous tissue. 3 benign lymph nodes (0/3). [place] grade 3. 3.0cm in greatest gross dimensions. Carcinoma permeates among soft tissues and in the right axilla with no apparent involvement of lymph nodes.    · 01/23/2014- Completion of adjuvant radiation therapy to chest wall and axillary lymph node with Dr. Cailin Welch  · 02/14- Initiated adjuvant endocrine therapy with tamoxifen  · 2014 (?) RECURRENCE in the axillary skin  · 2014 (?) Surgical resection of the axillary skin pathology demonstrating tumor at cauterized margin  · 2015 (?) YVETTE/BSO  · 09/19/2016- Bilateral exchange, nipple reconstruction and fat grafting and removal of PAC  · 2016- Switched to aromatase inhibitor patient was non compliant  · 01/05/2017- METASTATIC DISEASE Presented with respiratory failure. CTA Pulmonary showed numerous nodules and masses throughout both lung fields, compatible with metastatic disease. Bilateral hilar adenopathy seen. · 01/11/2017-06/17/2018- Single agent Abraxane x 13 cycles  · 03/15/2017- CT Chest W Contrast Interval decrease in maximal diameter of essentially all the multiple metastatic pulmonary nodules. The average difference is approximately 25%. Some of the much smaller ones have disappeared. Bilateral hilar and aortopulmonary window adenopathy is also similarly smaller. · 06/07/2017- CT Chest W Contrast Multiple lung nodules seen bilaterally. Most of the lung nodules show interval improvement with decreased size by 1 to 3mm. Mediastinal and bilateral hilar adenopathy seen with improvement. · 07/24/2017- PET/CT scan showed marked improvement, is minimal abnormal, has excellent response to therapy  · 02/01/2018- PET/CT scan Numerous bilateral lung masses and nodules, the majority of which do not have appreciable abnormal FDG uptake. The largest mass in the left infrahilar region of the left lower lobe demonstrates a maximum SUV of 3.3. Mediastinal lymphadenopathy. No evidence of metastatic disease in the abdomen or pelvis. · 06/21/2018- CT Chest/Abdomen/Pelvis Multiple lung mets, mild bilateral hilar and mediastinal lymph nodes.  No masses or evidence of metastatic disease in the abdomen or pelvis  · 06/27/2018-09/17/2018- Gemzar  · 09/28/2018- CT Pulmonary Multiple lung mets, mild bilateral hilar and mediastinal lymph nodes  · 10/10/2018-12/21/2020- Faslodex every 4 weeks  · 12/14/2018- CT Chest showed definite decrease in the maximal diameter and volume of all left and right metastatic nodules. Somewhat enlarged paratracheal and left axillary lymph nodes are relatively unchanged. · 04/22/2019- MRI Cervical Spine W WO Contrast Unremarkable  · 04/22/2019- MRI Brain W WO Contrast No enhancing lesions in the brain and no evidence of metastatic disease. · 07/27/2019- CTA Pulmonary showed essentially complete disappearance of the pulmonary metastatic disease. Several tiny flat peripheral nodules are the only sequela. The tiny ones on the right are unchanged, the tiny ones on the left are even smaller. There is no longer any active metastatic disease in either lung. · 12/19/2019- CT Chest/Abdomen/Pelvis Small, tiny subpleural nodules right upper and right midlung field as well as left lung base has remained unchanged. No new focal parenchymal abnormality seen. No adenopathy seen. There is no abdominal or pelvic mass, adenopathy or fluid collection. No lytic or sclerotic bony lesions, but there is a possibility of osteopenia and/or osteoporosis. · 02/17/2020- DEXA Bone Density showed osteopenia of lumbar spine, T-score -1.8, and left femoral neck, T-score -2.0  · 2/25/21 Ct Abdomen Pelvis W Iv Contrast  No evidence of metastatic disease in the abdomen or pelvis. · 2/26/21 Ct Chest W Contrast Tiny nodules in the right lung described above probably represent small foci of pleural thickening due to chronic inflammatory process or small noncalcified granulomas. No features to suggest malignancy or metastatic disease. Bilateral breast implants without complication. · 3/1/2021discussed the results CT chest abdomen pelvis. Essentially no clear evidence of metastatic disease. This is consistent with excellent response to treatment. Continue current treatment. · 4/1/2021 = 31.6 CA 27-29= 33.5 CEA= 1.6   · 6/3/21 CA 15-3= 23.3 CA 27-29= 25.6  CEA= 1.8   · 7/29/2021 CEA=1.7 CA 15-3=21.50 CA 27.29= 26.0  · 8/19/2021 CT Chest  Left lower lobe pleural-based nodular 1.7 x 0.9 cm is indeterminate. PET/CT recommended. Feeding defect in the left lower lobe bronchus versus a postobstructive airspace disease. This too may be further characterized with PET CT versus direct visualization. · 8/19/2021 CT Abd/Pelvis A stable CT scan of the abdomen and pelvis. No finding to suggest neoplastic/metastatic disease. · 9/28/2021 Final Diagnosis: Arm, excision of left arm thrombus:Fragments of organizing and organized thrombi with dystrophic calcification. Received is a container labeled \"ischemic, left arm\" Received in a fixative is a 3.6 x 2.6 x 1.4 cm aggregate of brown-tan soft tissue fragments and skin. Representative sections are submitted in a single cassette, numerous. · October 2021she developed left upper extremity DVT associated support. Port removed. She also had infection of the port site status post completion to biotics. · 11/19/21 CTA pulmonary: No evidence of pulmonary embolism. · The lungs are poorly expanded but unremarkable. · 12/8/21 MRI lumbar spine: No significant change since the previous study. Chronic degenerative changes. A persistent mild degenerative retrolisthesis is of L5 over S1. Prominent disc osteophyte complexes and facetal arthropathy and resultant mild spinal stenosis at L4-5 and neural foraminal stenosis at L4-5 and L5-S1 as detailed above. · 1/6/22 CT CHEST WO CONTRAST Stable chest CT with an unchanged tiny subpleural nodule within the right upper lobe.        CA 27.29 Dynamics  01/21/2021- 20.3  04/01/2021 33.5  06/03/2021 25.6  07/29/2021 26.0      Past Medical History:    Past Medical History:   Diagnosis Date    Breast cancer ([facility] [address]     Breast cancer with lung mets (RIGHT)    Chronic back pain     GERD (gastroesophageal reflux disease)     Hx of blood clots     Hypertension     Neuropathy     Post chemo treatment neuropathy       Past Surgical History:    Past Surgical History:   Procedure Laterality Date    BREAST LUMPECTOMY Right     2006    BREAST RECONSTRUCTION Left 09/17/2021    Revision left reconstructed breast, implant performed by Alva Yuan MD at [address] [place] [address] Street  2016    Bilateral breast implants    EMBOLECTOMY Left 9/26/2021    LEFT UPPER EXTREMITY  MECHANICAL SUCTION THROMBECTOMY performed by Renetta Sheppard DO at [facility], TOTAL ABDOMINAL  2015    MASTECTOMY, BILATERAL      Right 2013 & Left 2016    TUNNELED VENOUS PORT PLACEMENT      VASCULAR SURGERY N/A 10/6/2021    REMOVAL OF MEDIPORT performed by Renetta Sheppard DO at [facility] History:    Marital status, children: Single, 2 children-female  Smoking status: no  ETOH status: no  Profession: Disabled  Resides: [place], [place]  Recent trips: Moved from [address] [place] [address] [place]: no    Family History:   Family History   Problem Relation Age of Onset    Hypertension Mother     Obesity Mother     Prostate Cancer Father     No Known Problems Sister     No Known Problems Daughter     No Known Problems Daughter        Current Hospital Medications:    Current Outpatient Medications   Medication Sig Dispense Refill    gabapentin (NEURONTIN) 300 MG capsule TAKE 2 CAPSULES BY MOUTH 3 TIMES DAILY FOR 30 DAYS. 180 capsule 0    HYDROcodone-acetaminophen (NORCO) 7.5-325 MG per tablet Take 1 tablet by mouth every 6 hours as needed.        palbociclib (IBRANCE) 75 MG tablet Take 75 mg by mouth daily 21 tablet 1    ELIQUIS 5 MG TABS tablet TAKE 1 TABLET BY MOUTH TWICE A DAY 60 tablet 3    sodium hypochlorite (DAKINS) 0.125 % SOLN external solution Apply topically 2 times daily 1 each 0    VENTOLIN  (90 Base) MCG/ACT inhaler Inhale 2 puffs into the lungs 4 times daily as needed for Wheezing 18 g 5    pantoprazole (PROTONIX) 40 MG tablet TAKE 1 TABLET BY MOUTH EVERY DAY (Patient taking differently: Take 40 mg by mouth daily ) 90 tablet 0    IBRANCE 125 MG tablet Take 1 tablet (125mg) by mouth once daily. 21 tablet 3    NARCAN 4 MG/0.1ML LIQD nasal spray       cyclobenzaprine (FLEXERIL) 10 MG tablet Take 10 mg by mouth 3 times daily as needed for Muscle spasms      guaiFENesin-codeine (GUAIFENESIN AC) 100-10 MG/5ML liquid Take 5 mLs by mouth 3 times daily as needed for Cough.  Albuterol Sulfate (PROAIR HFA IN) Inhale 1 each into the lungs daily as needed       benzonatate (TESSALON) 100 MG capsule Take 100 mg by mouth 3 times daily as needed for Cough      Cholecalciferol (VITAMIN D3) 125 MCG (5000 UT) TABS Take 1 tablet by mouth daily       lisinopril (PRINIVIL;ZESTRIL) 10 MG tablet Take 10 mg by mouth daily      acyclovir (ZOVIRAX) 800 MG tablet Take 800 mg by mouth 2 times daily as needed       ondansetron (ZOFRAN) 4 MG tablet Take 4 mg by mouth every 8 hours as needed for Nausea or Vomiting      alclomethasone (ACLOVATE) 0.05 % cream Apply topically 2 times daily as needed Apply topically 2 times daily. No current facility-administered medications for this visit. Facility-Administered Medications Ordered in Other Visits   Medication Dose Route Frequency Provider Last Rate Last Admin    fulvestrant (FASLODEX) IM injection 250 mg  250 mg IntraMUSCular Once Raven Silva MD        fulvestrant (FASLODEX) IM injection 250 mg  250 mg IntraMUSCular Once Raven Silva MD        cyanocobalamin injection 1,000 mcg  1,000 mcg IntraMUSCular Once Raven Silva MD           Allergies:    Allergies   Allergen Reactions    Clindamycin/Lincomycin Hives, Itching and Rash         Subjective   REVIEW OF SYSTEMS:   CONSTITUTIONAL: no fever, no night sweats, fatigue;  HEENT: no blurring of vision, no double vision, no hearing difficulty, no tinnitus, no ulceration, no dysplasia, no epistaxis;   LUNGS: no cough, no hemoptysis, no wheeze,   shortness of breath;  CARDIOVASCULAR: no palpitation, no chest pain, no shortness of breath;  GI: no abdominal pain, no nausea, no vomiting, no diarrhea, no constipation;  PATRICK: no dysuria, no hematuria, no frequency or urgency, no nephrolithiasis;  MUSCULOSKELETAL: no joint pain, no swelling, no stiffness;  ENDOCRINE: no polyuria, no polydipsia, no cold or heat intolerance;  HEMATOLOGY: no easy bruising or bleeding, no history of clotting disorder;  DERMATOLOGY: no skin rash, no eczema, no pruritus, alopecia;  PSYCHIATRY: no depression, no anxiety, no panic attacks, no suicidal ideation, no homicidal ideation;  NEUROLOGY: no syncope, no seizures, no numbness or tingling of hands, no numbness or tingling of feet, no paresis;       /80   Pulse 90   Ht 5' 6\" (1.676 m)   Wt 173 lb 9.6 oz (78.7 kg)   SpO2 96%   BMI 28.02 kg/m²     PHYSICAL EXAM:  CONSTITUTIONAL: Alert, appropriate, no acute distress  EYES: Non icteric, EOM intact, pupils equal round   ENT: Mucus membranes moist, no oral pharyngeal lesions, external inspection of ears and nose are normal.  NECK: Supple, no masses. No palpable thyroid mass  CHEST/LUNGS: CTA bilaterally, normal respiratory effort   CARDIOVASCULAR: RRR, no murmurs. No lower extremity edema  ABDOMEN: soft non-tender, active bowel sounds, no HSM. No palpable masses  EXTREMITIES: warm, full ROM in all 4 extremities, no focal weakness. SKIN: warm, dry with no rashes or lesions  LYMPH: No cervical, clavicular, axillary, or inguinal lymphadenopathy  NEUROLOGIC: follows commands, non focal   PSYCH: mood and affect appropriate.   Alert and oriented to time, place, person    LABORATORY RESULTS REVIEWED/ANALYZED BY ME:  Lab Results   Component Value Date    WBC 4.45 01/19/2022    HGB 11.4 01/19/2022    HCT 34.4 01/19/2022    .8 (H) 01/19/2022     01/19/2022     Lab Results   Component Value Date    NEUTROABS 2.63 01/19/2022       RADIOLOGY STUDIES REVIEWED BY ME:  11/19/21 CTA pulmonary: No evidence of pulmonary embolism. The lungs are poorly expanded but unremarkable. 12/8/21 MRI lumbar spine: No significant change since the previous study. Chronic degenerative changes. A persistent mild degenerative retrolisthesis is of L5 over S1. Prominent disc osteophyte complexes and facetal arthropathy and resultant mild spinal stenosis at L4-5 and neural foraminal stenosis at L4-5 and L5-S1 as detailed above. CT CHEST WO CONTRAST    Result Date: 1/6/2022  1. Stable chest CT with an unchanged tiny subpleural nodule within the right upper lobe. Signed by Dr Rohan Rahman:    Orders Placed This Encounter   Procedures    CBC     Standing Status:   Standing     Number of Occurrences:   12     Standing Expiration Date:   1/19/2023    CBC Auto Differential     Standing Status:   Standing     Number of Occurrences:   12     Standing Expiration Date:   1/19/2023    Comprehensive Metabolic Panel     Standing Status:   Future     Standing Expiration Date:   1/19/2023    Cancer Antigen 15-3     Standing Status:   Future     Standing Expiration Date:   1/19/2023    Cancer Antigen 27.29     Standing Status:   Future     Standing Expiration Date:   1/19/2023    CEA     Standing Status:   Future     Standing Expiration Date:   1/19/2023    External Referral To Dermatology     Referral Priority:   Urgent     Referral Type:   Eval and Treat     Referral Reason:   Specialty Services Required     Requested Specialty:   Dermatology     Number of Visits Requested:   1      Tereso Mac was seen today for follow-up. Diagnoses and all orders for this visit:    Care plan discussed with patient    Abnormal thyroid stimulating hormone level    Alopecia  -     External Referral To Dermatology    [facility]  -     CBC;  Standing  -     CBC Auto Differential; Standing  - Comprehensive Metabolic Panel; Future  -     Cancer Antigen 15-3; Future  -     Cancer Antigen 27.29; Future  -     CEA; Future    Chemotherapy management, encounter for    Adverse effect of chemotherapy, subsequent encounter      Metastatic breast cancer, hormone sensitive  2/25/2021discussed the results of CT chest abdomen pelvis. No evidence of disease progression. In fact, excellent response with no measurable metastatic disease at this time. 1/21/2021CA 15-3 = 23, CA 27-29 = 20.3. Currently Faslodex/Ibrance. 6/3/2021CA 15-323, CA 27-2925, CEA 1.8  Regimen:  Faslodex to 500 mg subcutaneous every 4 weeks. Continue Ibrance days 121 q. 28 days    Clinical/laboratory assessment of toxicity for Ibrance      8/19/2021CT chest/abdomen/pelvisno evidence of metastatic disease. Findings of a pulmonary lung nodule in the left lower lobe    Essentially, good response to Faslodex/Ibrance. Last cancer marker 7/29/2021 were normal.    B12 deficiencyB12 levels 189. Recommended B12 2000 mcg p.o. daily. Will start B12 injections today 1000mcg with Faslodex. 3/1/2021- Methylmalonic Acid 171, Vitamin B12 >2000. B12 replaced monthly. Chemotherapy-induced neuropathycontinue gabapentin. COVID-19 vaccination responsepatient has not been vaccinated. Encouraged to get vaccinated. Left lower lobe lung noduleshe is currently being seen by pulmonary at [facility]. They believe that this could be inflammatory. She received a trial of antibiotics and will have a repeat CT scan with them on 1/6/2021. Acute left upper extremity DVT/left upper chest port, October 2021US upper extremity showed acute DVT likely related to port Oct 2021. Currently on Eliquis. S/p Mediport removal.    Normocytic anemiahemoglobin 10.8. Improving. Continue to monitor BC.  10/10/2021ferritin 92, iron saturation 17%, TIBC 242, folate 4.6, vitamin B12 154. Continue B12 replacement    Alopecianew onset.   [place] can certainly be a culprit. Recommended Rogaine. Refer to dermatology. PLAN:  · RTC with MD 4 weeks  · Faslodex today and every 4 weeks  · Continue follow-up with wound care  · Follow up with Dr. Darus Sandifer for PCP care  · CBC CMP CEA CA 15-3 CA 27-29 today  · Continue Ibrance  · Continue B12 replacement monthly  · Refer to Dermatology for alopecia    AMBER, Linda Phan am pre-charting as a registered nurse for Rosi Reddy MD. Electronically signed by Linda Phan RN on 1/19/2022 at 7:23 PM CST. Fernanda Harrison, ben scribing for Rosi Reddy MD. Electronically signed by Linda Phan RN on 1/19/2022 at 11:11 AM CST. I, Dr Janell Crigler, personally performed the services described in this documentation as scribed by Linda Phan RN in my presence and is both accurate and complete. I have seen, examined and reviewed this patient medication list, appropriate labs and imaging studies. I reviewed relevant medical records and others physicians notes. I discussed the plans of care with the patient. I answered all the questions to the patients satisfaction. I have also reviewed the chief complaint (CC) and part of the history (History of Present Illness (HPI), Past Family Social History [facility]), or Review of Systems (ROS) and made changes when appropriated.        (Please note that portions of this note were completed with a voice recognition program. Efforts were made to edit the dictations but occasionally words are mis-transcribed.)    Electronically signed by Rosi Reddy MD on 1/19/2022 at 11:21 AM

## 2022-01-19 ENCOUNTER — HOSPITAL ENCOUNTER (OUTPATIENT)
Dept: INFUSION THERAPY | Age: 57
Discharge: HOME | End: 2022-01-19
Payer: MEDICARE

## 2022-01-19 ENCOUNTER — OFFICE VISIT (OUTPATIENT)
Dept: HEMATOLOGY | Age: 57
End: 2022-01-19
Payer: MEDICARE

## 2022-01-19 VITALS
DIASTOLIC BLOOD PRESSURE: 80 MMHG | BODY MASS INDEX: 27.9 KG/M2 | HEIGHT: 66 IN | OXYGEN SATURATION: 96 % | WEIGHT: 173.6 LBS | SYSTOLIC BLOOD PRESSURE: 120 MMHG | HEART RATE: 90 BPM

## 2022-01-19 DIAGNOSIS — R79.89 ABNORMAL THYROID STIMULATING HORMONE LEVEL: ICD-10-CM

## 2022-01-19 DIAGNOSIS — L65.9 ALOPECIA: ICD-10-CM

## 2022-01-19 DIAGNOSIS — Z51.11 CHEMOTHERAPY MANAGEMENT, ENCOUNTER FOR: ICD-10-CM

## 2022-01-19 DIAGNOSIS — C50.919 METASTATIC BREAST CANCER (HCC): ICD-10-CM

## 2022-01-19 DIAGNOSIS — C50.919 CARCINOMA OF FEMALE BREAST, UNSPECIFIED ESTROGEN RECEPTOR STATUS, UNSPECIFIED LATERALITY, UNSPECIFIED SITE OF BREAST (HCC): Primary | ICD-10-CM

## 2022-01-19 DIAGNOSIS — C50.919 METASTATIC BREAST CANCER: ICD-10-CM

## 2022-01-19 DIAGNOSIS — T45.1X5D ADVERSE EFFECT OF CHEMOTHERAPY, SUBSEQUENT ENCOUNTER: ICD-10-CM

## 2022-01-19 DIAGNOSIS — D50.8 OTHER IRON DEFICIENCY ANEMIA: ICD-10-CM

## 2022-01-19 DIAGNOSIS — Z71.89 CARE PLAN DISCUSSED WITH PATIENT: Primary | ICD-10-CM

## 2022-01-19 LAB
ALBUMIN SERPL-MCNC: 4.3 G/DL (ref 3.5–5.2)
ALP BLD-CCNC: 86 U/L (ref 35–104)
ALT SERPL-CCNC: 10 U/L (ref 9–52)
ANION GAP SERPL CALCULATED.3IONS-SCNC: 10 MMOL/L (ref 7–19)
AST SERPL-CCNC: 21 U/L (ref 14–36)
BASOPHILS ABSOLUTE: 0.01 K/UL (ref 0.01–0.08)
BASOPHILS RELATIVE PERCENT: 0.2 % (ref 0.1–1.2)
BILIRUB SERPL-MCNC: 0.9 MG/DL (ref 0.2–1.3)
BUN BLDV-MCNC: 7 MG/DL (ref 7–17)
CALCIUM SERPL-MCNC: 8.8 MG/DL (ref 8.4–10.2)
CEA: 1.5 NG/ML (ref 0–4.7)
CHLORIDE BLD-SCNC: 105 MMOL/L (ref 98–111)
CO2: 28 MMOL/L (ref 22–29)
CREAT SERPL-MCNC: 0.6 MG/DL (ref 0.5–1)
EOSINOPHILS ABSOLUTE: 0.01 K/UL (ref 0.04–0.54)
EOSINOPHILS RELATIVE PERCENT: 0.2 % (ref 0.7–7)
GFR NON-AFRICAN AMERICAN: >60
GLOBULIN: 2.8 G/DL
GLUCOSE BLD-MCNC: 101 MG/DL (ref 74–106)
HCT VFR BLD CALC: 34.4 % (ref 34.1–44.9)
HEMOGLOBIN: 11.4 G/DL (ref 11.2–15.7)
LYMPHOCYTES ABSOLUTE: 1.18 K/UL (ref 1.18–3.74)
LYMPHOCYTES RELATIVE PERCENT: 26.5 % (ref 19.3–53.1)
MCH RBC QN AUTO: 33.7 PG (ref 25.6–32.2)
MCHC RBC AUTO-ENTMCNC: 33.1 G/DL (ref 32.3–35.5)
MCV RBC AUTO: 101.8 FL (ref 79.4–94.8)
MONOCYTES ABSOLUTE: 0.62 K/UL (ref 0.24–0.82)
MONOCYTES RELATIVE PERCENT: 13.9 % (ref 4.7–12.5)
NEUTROPHILS ABSOLUTE: 2.63 K/UL (ref 1.56–6.13)
NEUTROPHILS RELATIVE PERCENT: 59.2 % (ref 34–71.1)
PDW BLD-RTO: 14.7 % (ref 11.7–14.4)
PLATELET # BLD: 301 K/UL (ref 182–369)
PMV BLD AUTO: 8.6 FL (ref 7.4–10.4)
POTASSIUM SERPL-SCNC: 3.9 MMOL/L (ref 3.5–5.1)
RBC # BLD: 3.38 M/UL (ref 3.93–5.22)
SODIUM BLD-SCNC: 143 MMOL/L (ref 137–145)
TOTAL PROTEIN: 7.1 G/DL (ref 6.3–8.2)
WBC # BLD: 4.45 K/UL (ref 3.98–10.04)

## 2022-01-19 PROCEDURE — 3017F COLORECTAL CA SCREEN DOC REV: CPT | Performed by: INTERNAL MEDICINE

## 2022-01-19 PROCEDURE — G8427 DOCREV CUR MEDS BY ELIG CLIN: HCPCS | Performed by: INTERNAL MEDICINE

## 2022-01-19 PROCEDURE — G8417 CALC BMI ABV UP PARAM F/U: HCPCS | Performed by: INTERNAL MEDICINE

## 2022-01-19 PROCEDURE — 80053 COMPREHEN METABOLIC PANEL: CPT

## 2022-01-19 PROCEDURE — 85025 COMPLETE CBC W/AUTO DIFF WBC: CPT

## 2022-01-19 PROCEDURE — 36415 COLL VENOUS BLD VENIPUNCTURE: CPT

## 2022-01-19 PROCEDURE — 96372 THER/PROPH/DIAG INJ SC/IM: CPT

## 2022-01-19 PROCEDURE — 6360000002 HC RX W HCPCS: Performed by: INTERNAL MEDICINE

## 2022-01-19 PROCEDURE — 96401 CHEMO ANTI-NEOPL SQ/IM: CPT

## 2022-01-19 PROCEDURE — 99214 OFFICE O/P EST MOD 30 MIN: CPT | Performed by: INTERNAL MEDICINE

## 2022-01-19 PROCEDURE — G8484 FLU IMMUNIZE NO ADMIN: HCPCS | Performed by: INTERNAL MEDICINE

## 2022-01-19 PROCEDURE — 1036F TOBACCO NON-USER: CPT | Performed by: INTERNAL MEDICINE

## 2022-01-19 PROCEDURE — 99212 OFFICE O/P EST SF 10 MIN: CPT

## 2022-01-19 RX ORDER — CYANOCOBALAMIN 1000 UG/ML
1000 INJECTION INTRAMUSCULAR; SUBCUTANEOUS ONCE
Status: COMPLETED
Start: 2022-01-19 | End: 2022-01-19

## 2022-01-19 RX ORDER — LAMOTRIGINE 25 MG/1
250 TABLET ORAL ONCE
Status: COMPLETED | OUTPATIENT
Start: 2022-01-19 | End: 2022-01-19

## 2022-01-19 RX ORDER — CYANOCOBALAMIN 1000 UG/ML
1000 INJECTION INTRAMUSCULAR; SUBCUTANEOUS ONCE
Status: CANCELLED
Start: 2022-02-15

## 2022-01-19 RX ADMIN — FULVESTRANT 250 MG: 50 INJECTION, SOLUTION INTRAMUSCULAR at 11:24

## 2022-01-19 RX ADMIN — CYANOCOBALAMIN 1000 MCG: 1000 INJECTION, SOLUTION INTRAMUSCULAR; SUBCUTANEOUS at 11:24

## 2022-01-21 LAB — CA 27.29: 31.8 U/ML

## 2022-01-22 LAB — CA 15-3: 24 U/ML (ref 0–31)

## 2022-01-25 ENCOUNTER — HOSPITAL ENCOUNTER (OUTPATIENT)
Dept: WOUND CARE | Age: 57
Discharge: HOME | End: 2022-01-25
Payer: MEDICARE

## 2022-01-25 VITALS
RESPIRATION RATE: 18 BRPM | BODY MASS INDEX: 27.8 KG/M2 | HEIGHT: 66 IN | SYSTOLIC BLOOD PRESSURE: 155 MMHG | HEART RATE: 75 BPM | DIASTOLIC BLOOD PRESSURE: 92 MMHG | WEIGHT: 173 LBS | TEMPERATURE: 97.4 F

## 2022-01-25 DIAGNOSIS — L98.492 CHEST WALL ULCER, WITH FAT LAYER EXPOSED (HCC): Primary | ICD-10-CM

## 2022-01-25 PROCEDURE — 99212 OFFICE O/P EST SF 10 MIN: CPT | Performed by: NURSE PRACTITIONER

## 2022-01-25 PROCEDURE — 99212 OFFICE O/P EST SF 10 MIN: CPT

## 2022-01-25 RX ORDER — BETAMETHASONE DIPROPIONATE 0.05 %
OINTMENT (GRAM) TOPICAL ONCE
Status: CANCELLED | OUTPATIENT
Start: 2022-01-25 | End: 2022-01-25

## 2022-01-25 RX ORDER — LIDOCAINE HYDROCHLORIDE 20 MG/ML
JELLY TOPICAL ONCE
Status: CANCELLED | OUTPATIENT
Start: 2022-01-25 | End: 2022-01-25

## 2022-01-25 ASSESSMENT — PAIN DESCRIPTION - ONSET: ONSET: ON-GOING

## 2022-01-25 ASSESSMENT — PAIN DESCRIPTION - PROGRESSION: CLINICAL_PROGRESSION: NOT CHANGED

## 2022-01-25 ASSESSMENT — PAIN SCALES - GENERAL: PAINLEVEL_OUTOF10: 7

## 2022-01-25 ASSESSMENT — PAIN DESCRIPTION - FREQUENCY: FREQUENCY: CONTINUOUS

## 2022-01-25 ASSESSMENT — PAIN DESCRIPTION - PAIN TYPE: TYPE: CHRONIC PAIN

## 2022-01-25 ASSESSMENT — PAIN DESCRIPTION - LOCATION: LOCATION: BACK

## 2022-01-25 ASSESSMENT — PAIN DESCRIPTION - DESCRIPTORS: DESCRIPTORS: ACHING;CONSTANT

## 2022-01-28 ENCOUNTER — HOSPITAL ENCOUNTER (OUTPATIENT)
Dept: VASCULAR LAB | Age: 57
Discharge: HOME | End: 2022-01-28
Payer: MEDICARE

## 2022-01-28 DIAGNOSIS — M79.89 LEFT ARM SWELLING: ICD-10-CM

## 2022-01-28 DIAGNOSIS — M79.602 LEFT ARM PAIN: ICD-10-CM

## 2022-01-28 PROCEDURE — 93971 EXTREMITY STUDY: CPT

## 2022-01-31 ENCOUNTER — VIRTUAL VISIT (OUTPATIENT)
Dept: FAMILY MEDICINE CLINIC | Age: 57
End: 2022-01-31
Payer: MEDICARE

## 2022-01-31 ENCOUNTER — TELEPHONE (OUTPATIENT)
Dept: INFUSION THERAPY | Age: 57
End: 2022-01-31

## 2022-01-31 DIAGNOSIS — U07.1 COVID-19 VIRUS INFECTION: Primary | ICD-10-CM

## 2022-01-31 DIAGNOSIS — C50.919 CARCINOMA OF FEMALE BREAST, UNSPECIFIED ESTROGEN RECEPTOR STATUS, UNSPECIFIED LATERALITY, UNSPECIFIED SITE OF BREAST (HCC): ICD-10-CM

## 2022-01-31 PROCEDURE — G8427 DOCREV CUR MEDS BY ELIG CLIN: HCPCS | Performed by: NURSE PRACTITIONER

## 2022-01-31 PROCEDURE — 3017F COLORECTAL CA SCREEN DOC REV: CPT | Performed by: NURSE PRACTITIONER

## 2022-01-31 PROCEDURE — 99213 OFFICE O/P EST LOW 20 MIN: CPT | Performed by: NURSE PRACTITIONER

## 2022-01-31 RX ORDER — GUAIFENESIN 1200 MG/1
1 TABLET, EXTENDED RELEASE ORAL 2 TIMES DAILY
Qty: 30 TABLET | Refills: 0 | Status: SHIPPED | OUTPATIENT
Start: 2022-01-31

## 2022-01-31 RX ORDER — BENZONATATE 200 MG/1
200 CAPSULE ORAL 3 TIMES DAILY PRN
Qty: 30 CAPSULE | Refills: 0 | Status: SHIPPED | OUTPATIENT
Start: 2022-01-31 | End: 2022-02-07

## 2022-01-31 ASSESSMENT — ENCOUNTER SYMPTOMS
DIARRHEA: 0
CHEST TIGHTNESS: 0
COUGH: 1
ABDOMINAL PAIN: 0
WHEEZING: 1
NAUSEA: 0
SHORTNESS OF BREATH: 1
SORE THROAT: 0

## 2022-01-31 NOTE — PROGRESS NOTES
(DAKINS) 0.125 % SOLN external solution Apply topically 2 times daily  Mireya PedrazaorBRITANY   VENTOLIN  (90 Base) MCG/ACT inhaler Inhale 2 puffs into the lungs 4 times daily as needed for Wheezing  Ashley Morrison MD   pantoprazole (PROTONIX) 40 MG tablet TAKE 1 TABLET BY MOUTH EVERY DAY  Patient taking differently: Take 40 mg by mouth daily   Raven Silva MD   [facility] 4 MG/0.1ML LIQD nasal spray   Historical Provider, MD   cyclobenzaprine (FLEXERIL) 10 MG tablet Take 10 mg by mouth 3 times daily as needed for Muscle spasms  Historical Provider, MD   guaiFENesin-codeine (GUAIFENESIN AC) 100-10 MG/5ML liquid Take 5 mLs by mouth 3 times daily as needed for Cough. Historical Provider, MD   Albuterol Sulfate (PROAIR HFA IN) Inhale 1 each into the lungs daily as needed   Historical Provider, MD   Cholecalciferol (VITAMIN D3) 125 MCG (5000 UT) TABS Take 1 tablet by mouth daily   Historical Provider, MD   lisinopril (PRINIVIL;ZESTRIL) 10 MG tablet Take 10 mg by mouth daily  Historical Provider, MD   acyclovir (ZOVIRAX) 800 MG tablet Take 800 mg by mouth 2 times daily as needed   Historical Provider, MD   ondansetron (ZOFRAN) 4 MG tablet Take 4 mg by mouth every 8 hours as needed for Nausea or Vomiting  Historical Provider, MD   alclomethasone (ACLOVATE) 0.05 % cream Apply topically 2 times daily as needed Apply topically 2 times daily.    Historical Provider, MD       Social History     Tobacco Use    Smoking status: Never Smoker    Smokeless tobacco: Never Used   Vaping Use    Vaping Use: Never used   Substance Use Topics    Alcohol use: Not Currently    Drug use: Not Currently        Allergies   Allergen Reactions    Clindamycin/Lincomycin Hives, Itching and Rash       PHYSICAL EXAMINATION:  [ INSTRUCTIONS:  \"[x]\" Indicates a positive item  \"[]\" Indicates a negative item  -- DELETE ALL ITEMS NOT EXAMINED]  Vital Signs: (As obtained by patient/caregiver or practitioner observation)    Blood pressure-  Heart rate-    Respiratory rate-    Temperature-  Pulse oximetry-     Constitutional: [x] Appears well-developed and well-nourished [x] No apparent distress      [] Abnormal-  Appears to not feel well  Mental status  [] Alert and awake  [x] Oriented to person/place/time [x]Able to follow commands      Eyes:  EOM    [x]  Normal  [] Abnormal-  Sclera  [x]  Normal  [] Abnormal -         Discharge [x]  None visible  [] Abnormal -    HENT:   [x] Normocephalic, atraumatic. [] Abnormal   [] Mouth/Throat: Mucous membranes are moist.     External Ears [] Normal  [] Abnormal-     Neck: [x] No visualized mass     Pulmonary/Chest: [x] Respiratory effort normal.  [x] No visualized signs of difficulty breathing or respiratory distress        [] Abnormal-      Musculoskeletal:   [] Normal gait with no signs of ataxia         [x] Normal range of motion of neck        [] Abnormal-       Neurological:        [x] No Facial Asymmetry (Cranial nerve 7 motor function) (limited exam to video visit)          [x] No gaze palsy        [] Abnormal-         Skin:        [x] No significant exanthematous lesions or discoloration noted on facial skin         [] Abnormal-            Psychiatric:       [x] Normal Affect [x] No Hallucinations        [] Abnormal-     Other pertinent observable physical exam findings-     ASSESSMENT/PLAN:  1. COVID-19 virus infection  -Mucinex ER 1200 mg twice daily  -Tessalon 200 mg 3 times daily as needed for cough  -Advised to continue her rescue inhaler as needed  -Advised ER for any increased shortness of breath, chest pain. I am concerned about some of her current symptoms including the shortness of breath and pain in back. Advised that she go ahead and go to ER but she would like to monitor at home for now and will go this evening with any worsening.  -Quarantine guidelines reviewed. -F/U with Dr. Nola Blake in 1 week for VV    2.  Carcinoma of female breast, unspecified estrogen receptor status, unspecified laterality, unspecified site of breast ([facility] [address]  -Stable, continue management per oncology. No follow-ups on file. Yoly Adams, was evaluated through a synchronous (real-time) audio-video encounter. The patient (or guardian if applicable) is aware that this is a billable service, which includes applicable co-pays. This Virtual Visit was conducted with patient's (and/or legal guardian's) consent. The visit was conducted pursuant to the emergency declaration under the [address] [place] [address], [address] [place] [address] authority and the [employer] General Act. Patient identification was verified, and a caregiver was present when appropriate. The patient was located at home in a state where the provider was licensed to provide care. Total time spent on this encounter: Not billed by time    --BRITANY Ceron on 1/31/2022 at 3:19 PM    An electronic signature was used to authenticate this note.

## 2022-02-07 PROBLEM — T45.1X5A CHEMOTHERAPY-INDUCED NEUROPATHY (HCC): Status: ACTIVE | Noted: 2022-02-07

## 2022-02-07 PROBLEM — G62.0 CHEMOTHERAPY-INDUCED NEUROPATHY (HCC): Status: ACTIVE | Noted: 2022-02-07

## 2022-02-07 RX ORDER — MELOXICAM 15 MG/1
TABLET ORAL
Status: ON HOLD | COMMUNITY
Start: 2022-01-18 | End: 2022-05-10

## 2022-02-08 ENCOUNTER — VIRTUAL VISIT (OUTPATIENT)
Dept: FAMILY MEDICINE CLINIC | Age: 57
End: 2022-02-08
Payer: MEDICARE

## 2022-02-08 DIAGNOSIS — M06.062 RHEUMATOID ARTHRITIS INVOLVING BOTH KNEES WITH NEGATIVE RHEUMATOID FACTOR (HCC): ICD-10-CM

## 2022-02-08 DIAGNOSIS — T45.1X5A CHEMOTHERAPY-INDUCED NEUROPATHY (HCC): ICD-10-CM

## 2022-02-08 DIAGNOSIS — I82.622 ACUTE DEEP VEIN THROMBOSIS (DVT) OF LEFT UPPER EXTREMITY, UNSPECIFIED VEIN (HCC): ICD-10-CM

## 2022-02-08 DIAGNOSIS — U07.1 COVID-19 VIRUS INFECTION: Primary | ICD-10-CM

## 2022-02-08 DIAGNOSIS — G62.0 CHEMOTHERAPY-INDUCED NEUROPATHY (HCC): ICD-10-CM

## 2022-02-08 DIAGNOSIS — M06.061 RHEUMATOID ARTHRITIS INVOLVING BOTH KNEES WITH NEGATIVE RHEUMATOID FACTOR (HCC): ICD-10-CM

## 2022-02-08 PROCEDURE — 99213 OFFICE O/P EST LOW 20 MIN: CPT | Performed by: FAMILY MEDICINE

## 2022-02-08 PROCEDURE — G8427 DOCREV CUR MEDS BY ELIG CLIN: HCPCS | Performed by: FAMILY MEDICINE

## 2022-02-08 PROCEDURE — 3017F COLORECTAL CA SCREEN DOC REV: CPT | Performed by: FAMILY MEDICINE

## 2022-02-08 RX ORDER — ZINC SULFATE 50(220)MG
50 CAPSULE ORAL DAILY
Status: ON HOLD | COMMUNITY
End: 2022-05-10

## 2022-02-08 ASSESSMENT — ENCOUNTER SYMPTOMS
FACIAL SWELLING: 0
SHORTNESS OF BREATH: 1
EYE ITCHING: 0
BACK PAIN: 0
STRIDOR: 0
VOMITING: 0
NAUSEA: 1
APNEA: 0
EYE DISCHARGE: 0
COLOR CHANGE: 0

## 2022-02-08 NOTE — PROGRESS NOTES
Denzel Acosta (:  1965) is a Established patient, here for evaluation of the following:    Assessment & Plan   Below is the assessment and plan developed based on review of pertinent history, physical exam, labs, studies, and medications. 1. COVID-19 virus infection  2. Chemotherapy-induced neuropathy ([facility] [address]  3. Rheumatoid arthritis involving both knees with negative rheumatoid factor (HCC)  4. Acute deep vein thrombosis (DVT) of left upper extremity, unspecified vein (HCC)    Return in about 3 months (around 2022) for virtual visit. Subjective   HPI   **NOTE: This visit was started as a video visit, however due to technical difficulties (audio and/or video), I had to call the patient via telephone to obtain a complete history. I was able to see the patient prior to transitioning to telephone. Patient presents for follow-up of Covid. She did a virtual visit with Nely Burks last week for the same, tested positive on . At that time she was having some shortness of breath as well as pleuritic-type pain in her back. She was prescribed Mucinex and Tessalon Perles. She is overall feeling better. She does easily get fatigued with some mild shortness of breath, when she took a shower yesterday. She rested after that and took albuterol (one-two times daily), which did help. States she was having fatigue even before testing positive, and thinks it may be due to her chemo. Cough is primarily nonproductive at this point. Has had some headaches and nausea, the former was treated with Tylenol. Did lose her smell, still has taste. No other new symptoms. She was considering doing another rapid test at [place] [id number] later this week but wanted to discuss with me first.  Kari Conley would be reasonable to do it on Thursday or Friday, since it would be day 10. She voiced understanding. States she does not need refills of Tessalon Perles or Mucinex at this time    She is otherwise doing okay. Following with vascular and heme-onc for her DVT and neuropathy respectively, stable on medication, notes reviewed. Review of Systems   Constitutional: Positive for fatigue. Negative for chills and fever. HENT: Negative for facial swelling and mouth sores. Eyes: Negative for discharge and itching. Respiratory: Positive for shortness of breath. Negative for apnea and stridor. Cardiovascular: Negative for chest pain and palpitations. Gastrointestinal: Positive for nausea. Negative for vomiting. Endocrine: Negative for cold intolerance and heat intolerance. Genitourinary: Negative for frequency and urgency. Musculoskeletal: Negative for arthralgias and back pain. Skin: Negative for color change and rash. Neurological: Positive for headaches.           Objective   Patient-Reported Vitals  No data recorded     Physical Exam  [INSTRUCTIONS:  \"[x]\" Indicates a positive item  \"[]\" Indicates a negative item  -- DELETE ALL ITEMS NOT EXAMINED]    Constitutional: [x] Appears well-developed and well-nourished [x] No apparent distress      [] Abnormal -     Mental status: [x] Alert and awake  [x] Oriented to person/place/time [x] Able to follow commands    [] Abnormal -     Eyes:   EOM    [x]  Normal    [] Abnormal -   Sclera  [x]  Normal    [] Abnormal -          Discharge [x]  None visible   [] Abnormal -     HENT: [x] Normocephalic, atraumatic  [] Abnormal -   [x] Mouth/Throat: Mucous membranes are moist    External Ears [x] Normal  [] Abnormal -    Neck: [x] No visualized mass [] Abnormal -     Pulmonary/Chest: [x] Respiratory effort normal   [x] No visualized signs of difficulty breathing or respiratory distress        [] Abnormal -      Musculoskeletal:   [x] Normal gait with no signs of ataxia         [x] Normal range of motion of neck        [] Abnormal -     Neurological:        [x] No Facial Asymmetry (Cranial nerve 7 motor function) (limited exam due to video visit)          [x] No gaze palsy [] Abnormal -          Skin:        [x] No significant exanthematous lesions or discoloration noted on facial skin         [] Abnormal -            Psychiatric:       [x] Normal Affect [] Abnormal -        [x] No Hallucinations    Other pertinent observable physical exam findings:-                 Sarahi Bustos, was evaluated through a synchronous (real-time) audio-video encounter. The patient (or guardian if applicable) is aware that this is a billable service, which includes applicable co-pays. This Virtual Visit was conducted with patient's (and/or legal guardian's) consent. The visit was conducted pursuant to the emergency declaration under the [address] [place] [address], [address] [place] [address] authority and the [employer] General Act. Patient identification was verified, and a caregiver was present when appropriate. The patient was located at home in a state where the provider was licensed to provide care.        --Jonatan Quintanilla MD

## 2022-02-13 NOTE — PROGRESS NOTES
MEDICAL ONCOLOGY PROGRESS NOTE    Pt Name: Denzel Acosta  MRN: [id number]  YOB: 1965  Date of evaluation: 2/16/2022    HISTORY OF PRESENT ILLNESS:    Reason for MD visit: Disease/toxicity management  Reggie Juju is here today for monitoring for breast cancer and toxicity assessment. She is currently on palliative treatment with Faslodex/Ibrance. She has been tolerating treatment quite well. She has developed left upper extremity DVT associated with a left upper chest port. Port was discontinued. She also developed infection of the port site status post IV antibiotics. She had a repeat venous ultrasound that showed chronic appearing clot and subacute DVT basilic vein. She is currently on Eliquis for anticoagulation. Denies any bleeding. She had COVID-19, mild infection about 2 weeks ago. She made a full recovery.     Diagnosis  · Grade III Adenocarcinoma of right breast diagnosed 2006  · Stage IIIA, [id number]  · ER/IN Positive, HER-2 bruce/ihc 1+  · April 2013-RECURRENCE in the right axillary region  · Genetic testing- BRCA 1&2 Negative  · 2014 (?) RECURRENCE in the axillary skin  · 01/05/2017- METASTATIC DISEASE with lung, hilar, and axillary lymph node mets  · Osteopenia  · Mediport associated DVT, October 2021  · DVT left upper extremity, November 2021    Treatment Summary  · 2006- Neoadjuvant chemotherapy with AC x 4 cycles followed by Taxol  · 2007- Lumpectomy with axillary lymph node dissection  · 2007- Adjuvant radiation therapy to whole breast and axillary region  · Did not take tamoxifen due to cost  · April 2013- RECURRENCE  · Neoadjuvant chemotherapy with 2 cycles of Taxotere followed by Doxil  · 10/14/2013- Right Breast Mastectomy and Axillary Dissection  · 01/23/2014- Completion of adjuvant RT to chest wall and axillary lymph node with Dr. Governor Dailey  · 02/14- Initiated adjuvant endocrine therapy with tamoxifen  · 2014 (?) RECURRENCE in the axillary skin  · 2014 (?) Surgical resection of the axillary skin  · 2015 (?) YVETTE/BSO  · 09/19/2016- Bilateral exchange, nipple reconstruction and fat grafting and removal of PAC   · 2016- Switched to aromatase inhibitor  · 01/05/2017- METASTATIC DISEASE with lung, hilar, and axillary mets  · 01/11/2017-06/17/2018- Single agent Abraxane x 13 cycles  · 06/27/2018-09/17/2018- Gemzar  · 10/10/2018-Faslodex every 4 weeks/palbociclib    Cancer History:  Dafne Easton was first seen by me on 1/21/2021. She was referred to [facility] of care for history of recurrent metastatic breast cancer. She has been in remission as per the latest CT scan. She is currently on Faslodex and palbociclib. She has received several lines therapy as described below. She moved from [place] to [place] to stay closer to her parents. Her medical history is complex. Further details as below. · 2006- Neoadjuvant chemotherapy with AC x 4 cycles followed by Taxol AC was complicated by viral meningitis; patient experienced neuropathy after 3 doses of Taxol and was switched to Taxotere for last dose  · 2007- Lumpectomy with axillary lymph node dissection 1.9cm, grade 2. No LVI. 1 of 14 lymph nodes positive for malignancy (1/14) [id number]  · 2007- Adjuvant Radiation Therapy to whole breast and axillary region  · April 2013- RECURRENCE presenting as mass in the axillary region  · Neoadjuvant chemotherapy with 2 cycles of Taxotere followed by Doxil- did not have good response to treatment  · 2013- Preop PET/CTshowed 2.8cm, right axillary lymph node with uptake. · 10/14/2013- Right Breast Mastectomy and Axillary Dissection Right breast had benign tissue with fibrous, negative for carcinoma. Axillary contents demonstrated invasive ductal carcinoma, involving fibroadipose tissue and tendinous tissue. 3 benign lymph nodes (0/3). [place] grade 3. 3.0cm in greatest gross dimensions.  Carcinoma permeates among soft tissues and in the right axilla with no apparent involvement of lymph pelvis  · 06/27/2018-09/17/2018- Gemzar  · 09/28/2018- CT Pulmonary Multiple lung mets, mild bilateral hilar and mediastinal lymph nodes  · 10/10/2018-12/21/2020- Faslodex every 4 weeks  · 12/14/2018- CT Chest showed definite decrease in the maximal diameter and volume of all left and right metastatic nodules. Somewhat enlarged paratracheal and left axillary lymph nodes are relatively unchanged. · 04/22/2019- MRI Cervical Spine W WO Contrast Unremarkable  · 04/22/2019- MRI Brain W WO Contrast No enhancing lesions in the brain and no evidence of metastatic disease. · 07/27/2019- CTA Pulmonary showed essentially complete disappearance of the pulmonary metastatic disease. Several tiny flat peripheral nodules are the only sequela. The tiny ones on the right are unchanged, the tiny ones on the left are even smaller. There is no longer any active metastatic disease in either lung. · 12/19/2019- CT Chest/Abdomen/Pelvis Small, tiny subpleural nodules right upper and right midlung field as well as left lung base has remained unchanged. No new focal parenchymal abnormality seen. No adenopathy seen. There is no abdominal or pelvic mass, adenopathy or fluid collection. No lytic or sclerotic bony lesions, but there is a possibility of osteopenia and/or osteoporosis. · 02/17/2020- DEXA Bone Density showed osteopenia of lumbar spine, T-score -1.8, and left femoral neck, T-score -2.0  · 2/25/21 Ct Abdomen Pelvis W Iv Contrast  No evidence of metastatic disease in the abdomen or pelvis. · 2/26/21 Ct Chest W Contrast Tiny nodules in the right lung described above probably represent small foci of pleural thickening due to chronic inflammatory process or small noncalcified granulomas. No features to suggest malignancy or metastatic disease. Bilateral breast implants without complication. · 3/1/2021discussed the results CT chest abdomen pelvis. Essentially no clear evidence of metastatic disease.   This is consistent with excellent response to treatment. Continue current treatment. · 4/1/2021 = 31.6 CA 27-29= 33.5 CEA= 1.6   · 6/3/21 CA 15-3= 23.3 CA 27-29= 25.6  CEA= 1.8   · 7/29/2021 CEA=1.7 CA 15-3=21.50 CA 27.29= 26.0  · 8/19/2021 CT Chest  Left lower lobe pleural-based nodular 1.7 x 0.9 cm is indeterminate. PET/CT recommended. Feeding defect in the left lower lobe bronchus versus a postobstructive airspace disease. This too may be further characterized with PET CT versus direct visualization. · 8/19/2021 CT Abd/Pelvis A stable CT scan of the abdomen and pelvis. No finding to suggest neoplastic/metastatic disease. · 9/28/2021 Final Diagnosis: Arm, excision of left arm thrombus:Fragments of organizing and organized thrombi with dystrophic calcification. Received is a container labeled \"ischemic, left arm\" Received in a fixative is a 3.6 x 2.6 x 1.4 cm aggregate of brown-tan soft tissue fragments and skin. Representative sections are submitted in a single cassette, numerous. · October 2021she developed left upper extremity DVT associated support. Port removed. She also had infection of the port site status post completion to biotics. · 11/19/21 CTA pulmonary: No evidence of pulmonary embolism. · The lungs are poorly expanded but unremarkable. · 12/8/21 MRI lumbar spine: No significant change since the previous study. Chronic degenerative changes. A persistent mild degenerative retrolisthesis is of L5 over S1. Prominent disc osteophyte complexes and facetal arthropathy and resultant mild spinal stenosis at L4-5 and neural foraminal stenosis at L4-5 and L5-S1 as detailed above. · 1/6/22 CT CHEST WO CONTRAST Stable chest CT with an unchanged tiny subpleural nodule within the right upper lobe.    · 1/19/22 CA 15-3 24 CA 27-29 31.8 CEA 1.5    CA 27.29 Dynamics  01/21/2021- 20.3  04/01/2021 33.5  06/03/2021 25.6  07/29/2021 26.0  1/19/22 31.8    Past Medical History:    Past Medical History:   Diagnosis Date    Breast cancer ([facility] [address]     Breast cancer with lung mets (RIGHT)    Chronic back pain     GERD (gastroesophageal reflux disease)     Hx of blood clots     Hypertension     Neuropathy     Post chemo treatment neuropathy       Past Surgical History:    Past Surgical History:   Procedure Laterality Date    BREAST LUMPECTOMY Right     2006    BREAST RECONSTRUCTION Left 09/17/2021    Revision left reconstructed breast, implant performed by Valeri Carranza MD at [address] [place] [address]  2016    Bilateral breast implants    EMBOLECTOMY Left 9/26/2021    LEFT UPPER EXTREMITY  MECHANICAL SUCTION THROMBECTOMY performed by Annette Sanchez DO at [facility], TOTAL ABDOMINAL  2015    MASTECTOMY, BILATERAL      Right 2013 & Left 2016    TUNNELED VENOUS PORT PLACEMENT      VASCULAR SURGERY N/A 10/6/2021    REMOVAL OF MEDIPORT performed by Annette Sanchez DO at [place] [address] History:    Marital status, children: Single, 2 children-female  Smoking status: no  ETOH status: no  Profession: Disabled  Resides: [place], [place]  Recent trips: Moved from [address] [place] [address] [place]: no    Family History:   Family History   Problem Relation Age of Onset    Hypertension Mother     Obesity Mother     Prostate Cancer Father     No Known Problems Sister     No Known Problems Daughter     No Known Problems Daughter        Current Hospital Medications:    Current Outpatient Medications   Medication Sig Dispense Refill    zinc sulfate (ZINCATE) 220 (50 Zn) MG capsule Take 50 mg by mouth daily      meloxicam (MOBIC) 15 MG tablet       guaiFENesin 1200 MG TB12 Take 1 tablet by mouth 2 times daily 30 tablet 0    HYDROcodone-acetaminophen (NORCO) 7.5-325 MG per tablet Take 1 tablet by mouth every 6 hours as needed.        palbociclib (IBRANCE) 75 MG tablet Take 75 mg by mouth daily 21 tablet 1    ELIQUIS 5 MG TABS tablet TAKE 1 TABLET BY MOUTH TWICE A DAY 60 tablet 3    sodium hypochlorite (DAKINS) 0.125 % SOLN external solution Apply topically 2 times daily 1 each 0    VENTOLIN  (90 Base) MCG/ACT inhaler Inhale 2 puffs into the lungs 4 times daily as needed for Wheezing 18 g 5    pantoprazole (PROTONIX) 40 MG tablet TAKE 1 TABLET BY MOUTH EVERY DAY (Patient taking differently: Take 40 mg by mouth daily ) 90 tablet 0    cyclobenzaprine (FLEXERIL) 10 MG tablet Take 10 mg by mouth 3 times daily as needed for Muscle spasms      guaiFENesin-codeine (GUAIFENESIN AC) 100-10 MG/5ML liquid Take 5 mLs by mouth 3 times daily as needed for Cough.  acyclovir (ZOVIRAX) 800 MG tablet Take 800 mg by mouth 2 times daily as needed       ondansetron (ZOFRAN) 4 MG tablet Take 4 mg by mouth every 8 hours as needed for Nausea or Vomiting      alclomethasone (ACLOVATE) 0.05 % cream Apply topically 2 times daily as needed Apply topically 2 times daily.  gabapentin (NEURONTIN) 300 MG capsule TAKE 2 CAPSULES BY MOUTH 3 TIMES DAILY FOR 30 DAYS. 180 capsule 0    NARCAN 4 MG/0.1ML LIQD nasal spray  (Patient not taking: Reported on 2/16/2022)      Cholecalciferol (VITAMIN D3) 125 MCG (5000 UT) TABS Take 1 tablet by mouth daily  (Patient not taking: Reported on 2/16/2022)      lisinopril (PRINIVIL;ZESTRIL) 10 MG tablet Take 10 mg by mouth daily (Patient not taking: Reported on 2/16/2022)       No current facility-administered medications for this visit. Facility-Administered Medications Ordered in Other Visits   Medication Dose Route Frequency Provider Last Rate Last Admin    0.9 % sodium chloride infusion   IntraVENous Continuous Nadia MD Maurilio           Allergies:    Allergies   Allergen Reactions    Clindamycin/Lincomycin Hives, Itching and Rash         Subjective   REVIEW OF SYSTEMS:   CONSTITUTIONAL: no fever, no night sweats,  fatigue;  HEENT: no blurring of vision, no double vision, no hearing difficulty, no tinnitus, no ulceration, no dysplasia, no epistaxis;   LUNGS: no cough, no hemoptysis, no wheeze,  no shortness of breath;  CARDIOVASCULAR: no palpitation, no chest pain, no shortness of breath;  GI: no abdominal pain, no nausea, no vomiting, no diarrhea, no constipation;  PATRICK: no dysuria, no hematuria, no frequency or urgency, no nephrolithiasis;  MUSCULOSKELETAL: no joint pain, no swelling, no stiffness;  ENDOCRINE: no polyuria, no polydipsia, no cold or heat intolerance;  HEMATOLOGY: no easy bruising or bleeding, no history of clotting disorder;  DERMATOLOGY: no skin rash, no eczema, no pruritus;  PSYCHIATRY: no depression, no anxiety, no panic attacks, no suicidal ideation, no homicidal ideation;  NEUROLOGY: no syncope, no seizures, no numbness or tingling of hands, no numbness or tingling of feet, no paresis;       /80   Pulse 92   Ht 5' 6\" (1.676 m)   Wt 167 lb 11.2 oz (76.1 kg)   SpO2 97%   BMI 27.07 kg/m²      PHYSICAL EXAM:  CONSTITUTIONAL: Alert, appropriate, no acute distress  EYES: Non icteric, EOM intact, pupils equal round   ENT: Mucus membranes moist, no oral pharyngeal lesions, external inspection of ears and nose are normal.  NECK: Supple, no masses. No palpable thyroid mass  CHEST/LUNGS: CTA bilaterally, normal respiratory effort   CARDIOVASCULAR: RRR, no murmurs. No lower extremity edema  ABDOMEN: soft non-tender, active bowel sounds, no HSM. No palpable masses  EXTREMITIES: warm, full ROM in all 4 extremities, no focal weakness. SKIN: warm, dry with no rashes or lesions  LYMPH: No cervical, clavicular, axillary, or inguinal lymphadenopathy  NEUROLOGIC: follows commands, non focal   PSYCH: mood and affect appropriate.   Alert and oriented to time, place, person       LABORATORY RESULTS REVIEWED/ANALYZED BY ME:  1/19/22   CA 15-3 24   CA 27-29 31.8   CEA 1.5  Lab Results   Component Value Date    WBC 8.31 02/16/2022    HGB 11.9 02/16/2022    HCT 35.0 02/16/2022    MCV 97.0 (H) 02/16/2022     02/16/2022     Lab Results   Component Value Date    NEUTROABS 5.89 02/16/2022       RADIOLOGY STUDIES REVIEWED BY ME:  VL Extremity Venous Left    Result Date: 1/28/2022  Vascular Upper Extremities Veins Procedure  Demographics   Patient Name     Lenka Powers Age                   49   Patient Number   [id number]          Gender                Female   Visit Number     [id number]       Interpreting          Francheska Aquino                                   Physician   Date of Birth    1965      Referring Physician   Dmitry Guerrero   Accession Number [id number]      [address] [facility]  Procedure Type of Study:   Veins:Upper Extremities Veins, UE VENOUS UNILATERAL/FLU. Indications for Study:F/U DVT left upper extremity. Impression   Chronic appearing deep vein thrombosis (DVT) is seen in the axillary  brachial , left upper extremity(ies). Subacute appearing superficial thrombophlebitis (SVT) is seen in the  basilic vein, left upper extremity(ies). ASSESSMENT:    Orders Placed This Encounter   Procedures    CT ABDOMEN PELVIS W IV CONTRAST Additional Contrast? Oral     Standing Status:   Future     Standing Expiration Date:   2/16/2023     Order Specific Question:   Additional Contrast?     Answer:   Oral     Order Specific Question:   STAT Creatinine as needed:     Answer:   No     Order Specific Question:   Reason for exam:     Answer:   6 mo f/u to assess response to treatment    Comprehensive Metabolic Panel     Standing Status:   Future     Number of Occurrences:   1     Standing Expiration Date:   2/16/2023      Arabella Powers was seen today for follow-up. Diagnoses and all orders for this visit:    Care plan discussed with patient    Metastatic breast cancer ([facility] [address]  -     Cancel: CT ABDOMEN PELVIS W IV CONTRAST Additional Contrast? Oral; Future  -     Comprehensive Metabolic Panel;  Future  -     CT ABDOMEN PELVIS W IV CONTRAST Additional Contrast? Oral; Future    Alopecia    Other iron deficiency anemia    Pulmonary nodule    Chronic deep vein replacement    Alopecianew onset. Ruthie Beaver can certainly be a culprit. Recommended Rogaine. Referred to dermatology. PLAN:  · RTC with MD 4 weeks  · CT abd/pelvis in 3 weeks  · Faslodex today and every 4 weeks  · Continue follow-up with wound care  · Follow up with Dr. Kristen Tolliver for PCP care  · CBC CMP today  · Continue Ibrance  · Continue B12 replacement monthly  · Refer to Dermatology for alopecia    I, Ana Mitchell am pre-charting as a registered nurse for Denise Pizarro MD. Electronically signed by Ana Mitchell RN on 2/16/2022 at 9:42 PM CST. Charla Birmingham am scribing for Denise Pizarro MD. Electronically signed by Ana Mitchell RN on 2/16/2022 at 1:36 PM CST. I, Dr Hansel Redding, personally performed the services described in this documentation as scribed by Ana Mitchell RN in my presence and is both accurate and complete. I have seen, examined and reviewed this patient medication list, appropriate labs and imaging studies. I reviewed relevant medical records and others physicians notes. I discussed the plans of care with the patient. I answered all the questions to the patients satisfaction. I have also reviewed the chief complaint (CC) and part of the history (History of Present Illness (HPI), Past Family Social History [facility]), or Review of Systems (ROS) and made changes when appropriated.        (Please note that portions of this note were completed with a voice recognition program. Efforts were made to edit the dictations but occasionally words are mis-transcribed.)    Electronically signed by Denise Pizarro MD on 2/16/2022 at 4:41 PM

## 2022-02-16 ENCOUNTER — HOSPITAL ENCOUNTER (OUTPATIENT)
Dept: INFUSION THERAPY | Age: 57
Discharge: HOME | End: 2022-02-16
Payer: MEDICARE

## 2022-02-16 ENCOUNTER — OFFICE VISIT (OUTPATIENT)
Dept: HEMATOLOGY | Age: 57
End: 2022-02-16
Payer: MEDICARE

## 2022-02-16 VITALS
HEIGHT: 66 IN | OXYGEN SATURATION: 97 % | BODY MASS INDEX: 26.95 KG/M2 | WEIGHT: 167.7 LBS | HEART RATE: 92 BPM | DIASTOLIC BLOOD PRESSURE: 80 MMHG | SYSTOLIC BLOOD PRESSURE: 116 MMHG

## 2022-02-16 DIAGNOSIS — C50.919 CARCINOMA OF FEMALE BREAST, UNSPECIFIED ESTROGEN RECEPTOR STATUS, UNSPECIFIED LATERALITY, UNSPECIFIED SITE OF BREAST (HCC): ICD-10-CM

## 2022-02-16 DIAGNOSIS — C50.919 METASTATIC BREAST CANCER (HCC): ICD-10-CM

## 2022-02-16 DIAGNOSIS — C50.919 METASTATIC BREAST CANCER (HCC): Primary | ICD-10-CM

## 2022-02-16 DIAGNOSIS — R91.1 PULMONARY NODULE: ICD-10-CM

## 2022-02-16 DIAGNOSIS — Z71.89 CARE PLAN DISCUSSED WITH PATIENT: Primary | ICD-10-CM

## 2022-02-16 DIAGNOSIS — L65.9 ALOPECIA: ICD-10-CM

## 2022-02-16 DIAGNOSIS — D50.8 OTHER IRON DEFICIENCY ANEMIA: ICD-10-CM

## 2022-02-16 DIAGNOSIS — I82.722 CHRONIC DEEP VEIN THROMBOSIS (DVT) OF LEFT UPPER EXTREMITY, UNSPECIFIED VEIN (HCC): ICD-10-CM

## 2022-02-16 LAB
ALBUMIN SERPL-MCNC: 4.1 G/DL (ref 3.5–5.2)
ALP BLD-CCNC: 121 U/L (ref 35–104)
ALT SERPL-CCNC: 11 U/L (ref 9–52)
ANION GAP SERPL CALCULATED.3IONS-SCNC: 11 MMOL/L (ref 7–19)
AST SERPL-CCNC: 23 U/L (ref 14–36)
BASOPHILS ABSOLUTE: 0.01 K/UL (ref 0.01–0.08)
BASOPHILS RELATIVE PERCENT: 0.1 % (ref 0.1–1.2)
BILIRUB SERPL-MCNC: 1.4 MG/DL (ref 0.2–1.3)
BUN BLDV-MCNC: 11 MG/DL (ref 7–17)
CALCIUM SERPL-MCNC: 9.1 MG/DL (ref 8.4–10.2)
CHLORIDE BLD-SCNC: 105 MMOL/L (ref 98–111)
CO2: 29 MMOL/L (ref 22–29)
CREAT SERPL-MCNC: 0.6 MG/DL (ref 0.5–1)
EOSINOPHILS ABSOLUTE: 0.06 K/UL (ref 0.04–0.54)
EOSINOPHILS RELATIVE PERCENT: 0.7 % (ref 0.7–7)
GFR NON-AFRICAN AMERICAN: >60
GLOBULIN: 3 G/DL
GLUCOSE BLD-MCNC: 99 MG/DL (ref 74–106)
HCT VFR BLD CALC: 35 % (ref 34.1–44.9)
HEMOGLOBIN: 11.9 G/DL (ref 11.2–15.7)
LYMPHOCYTES ABSOLUTE: 1.85 K/UL (ref 1.18–3.74)
LYMPHOCYTES RELATIVE PERCENT: 22.3 % (ref 19.3–53.1)
MCH RBC QN AUTO: 33 PG (ref 25.6–32.2)
MCHC RBC AUTO-ENTMCNC: 34 G/DL (ref 32.3–35.5)
MCV RBC AUTO: 97 FL (ref 79.4–94.8)
MONOCYTES ABSOLUTE: 0.5 K/UL (ref 0.24–0.82)
MONOCYTES RELATIVE PERCENT: 6 % (ref 4.7–12.5)
NEUTROPHILS ABSOLUTE: 5.89 K/UL (ref 1.56–6.13)
NEUTROPHILS RELATIVE PERCENT: 70.9 % (ref 34–71.1)
PDW BLD-RTO: 13.5 % (ref 11.7–14.4)
PLATELET # BLD: 316 K/UL (ref 182–369)
PMV BLD AUTO: 8.9 FL (ref 7.4–10.4)
POTASSIUM SERPL-SCNC: 3.9 MMOL/L (ref 3.5–5.1)
RBC # BLD: 3.61 M/UL (ref 3.93–5.22)
SODIUM BLD-SCNC: 145 MMOL/L (ref 137–145)
TOTAL PROTEIN: 7.1 G/DL (ref 6.3–8.2)
WBC # BLD: 8.31 K/UL (ref 3.98–10.04)

## 2022-02-16 PROCEDURE — 1036F TOBACCO NON-USER: CPT | Performed by: INTERNAL MEDICINE

## 2022-02-16 PROCEDURE — G8427 DOCREV CUR MEDS BY ELIG CLIN: HCPCS | Performed by: INTERNAL MEDICINE

## 2022-02-16 PROCEDURE — 96372 THER/PROPH/DIAG INJ SC/IM: CPT

## 2022-02-16 PROCEDURE — 99214 OFFICE O/P EST MOD 30 MIN: CPT | Performed by: INTERNAL MEDICINE

## 2022-02-16 PROCEDURE — 85025 COMPLETE CBC W/AUTO DIFF WBC: CPT

## 2022-02-16 PROCEDURE — 3017F COLORECTAL CA SCREEN DOC REV: CPT | Performed by: INTERNAL MEDICINE

## 2022-02-16 PROCEDURE — G8484 FLU IMMUNIZE NO ADMIN: HCPCS | Performed by: INTERNAL MEDICINE

## 2022-02-16 PROCEDURE — 80053 COMPREHEN METABOLIC PANEL: CPT

## 2022-02-16 PROCEDURE — 6360000002 HC RX W HCPCS: Performed by: INTERNAL MEDICINE

## 2022-02-16 PROCEDURE — 36415 COLL VENOUS BLD VENIPUNCTURE: CPT

## 2022-02-16 PROCEDURE — G8417 CALC BMI ABV UP PARAM F/U: HCPCS | Performed by: INTERNAL MEDICINE

## 2022-02-16 PROCEDURE — 96402 CHEMO HORMON ANTINEOPL SQ/IM: CPT

## 2022-02-16 PROCEDURE — 96401 CHEMO ANTI-NEOPL SQ/IM: CPT

## 2022-02-16 RX ORDER — CYANOCOBALAMIN 1000 UG/ML
1000 INJECTION INTRAMUSCULAR; SUBCUTANEOUS ONCE
Status: COMPLETED
Start: 2022-02-16 | End: 2022-02-16

## 2022-02-16 RX ORDER — SODIUM CHLORIDE 9 MG/ML
INJECTION, SOLUTION INTRAVENOUS CONTINUOUS
Status: DISCONTINUED | OUTPATIENT
Start: 2022-02-16 | End: 2022-02-18 | Stop reason: HOSPADM

## 2022-02-16 RX ORDER — CYANOCOBALAMIN 1000 UG/ML
1000 INJECTION INTRAMUSCULAR; SUBCUTANEOUS ONCE
Status: CANCELLED
Start: 2022-03-16

## 2022-02-16 RX ORDER — LAMOTRIGINE 25 MG/1
250 TABLET ORAL ONCE
Status: COMPLETED | OUTPATIENT
Start: 2022-02-16 | End: 2022-02-16

## 2022-02-16 RX ORDER — CYANOCOBALAMIN 1000 UG/ML
1000 INJECTION INTRAMUSCULAR; SUBCUTANEOUS ONCE
Status: CANCELLED
Start: 2022-02-16

## 2022-02-16 RX ORDER — METHYLPREDNISOLONE SODIUM SUCCINATE 125 MG/2ML
125 INJECTION, POWDER, LYOPHILIZED, FOR SOLUTION INTRAMUSCULAR; INTRAVENOUS ONCE
Status: DISCONTINUED | OUTPATIENT
Start: 2022-02-16 | End: 2022-02-16

## 2022-02-16 RX ORDER — EPINEPHRINE 1 MG/ML
0.3 INJECTION, SOLUTION, CONCENTRATE INTRAVENOUS PRN
Status: DISCONTINUED | OUTPATIENT
Start: 2022-02-16 | End: 2022-02-16

## 2022-02-16 RX ORDER — DIPHENHYDRAMINE HYDROCHLORIDE 50 MG/ML
50 INJECTION INTRAMUSCULAR; INTRAVENOUS ONCE
Status: DISCONTINUED | OUTPATIENT
Start: 2022-02-16 | End: 2022-02-16

## 2022-02-16 RX ADMIN — FULVESTRANT 250 MG: 50 INJECTION, SOLUTION INTRAMUSCULAR at 13:52

## 2022-02-16 RX ADMIN — CYANOCOBALAMIN 1000 MCG: 1000 INJECTION, SOLUTION INTRAMUSCULAR at 13:53

## 2022-02-17 RX ORDER — PALBOCICLIB 125 MG/1
125 TABLET, FILM COATED ORAL DAILY
Qty: 21 TABLET | Refills: 5 | Status: SHIPPED | OUTPATIENT
Start: 2022-02-17 | End: 2022-08-02

## 2022-02-17 RX ORDER — PALBOCICLIB 125 MG/1
TABLET, FILM COATED ORAL
Qty: 21 TABLET | OUTPATIENT
Start: 2022-02-17

## 2022-02-18 ENCOUNTER — TELEMEDICINE (OUTPATIENT)
Dept: VASCULAR SURGERY | Age: 57
End: 2022-02-18
Payer: MEDICARE

## 2022-02-18 DIAGNOSIS — M79.89 LEFT ARM SWELLING: ICD-10-CM

## 2022-02-18 DIAGNOSIS — I82.622 ACUTE DEEP VEIN THROMBOSIS (DVT) OF OTHER VEIN OF LEFT UPPER EXTREMITY (HCC): ICD-10-CM

## 2022-02-18 DIAGNOSIS — M79.602 LEFT ARM PAIN: Primary | ICD-10-CM

## 2022-02-18 PROCEDURE — 99442 PR PHYS/QHP TELEPHONE EVALUATION 11-20 MIN: CPT | Performed by: NURSE PRACTITIONER

## 2022-02-18 NOTE — PROGRESS NOTES
Trisha Beckford is a 49 y.o. female evaluated via telephone on 2022. Trisha Beckford (:  1965) is a 49 y.o. female,Established patient, here for evaluation of the following chief complaint(s):     Consent:  She and/or health care decision maker is aware that that she may receive a bill for this telephone service, depending on her insurance coverage, and has provided verbal consent to proceed: Yes    Patient is located at home  Provider is located at [facility]   Also present during call is no one    She presents for follow-up of known DVT. She has no known history of DVT. Her current treatment includes eliquis. She does not have a family history of hypercoagulability. Risk factors for hypercoagulable state include: port. She does not have an IVC filter. She has symptoms involving   left arm. Symptoms include pain, swelling Onset was abrupt several months ago. These symptoms have been unchanged. There has been 1 episode. .    Differential diagnosis includes but is not limited to CHF, thyroid disease, venous disease, DVT, SVT, peripheral vascular disease.           Trisha Beckford is a 49 y.o. female with the following history reviewed and recorded in [email or web address]:  Patient Active Problem List    Diagnosis Date Noted    Chemotherapy-induced neuropathy ([facility] [address] 2022    Wheezing 2022    Snoring 2022    RUDY (obstructive sleep apnea) 2022    Non-restorative sleep 2022    Non-cardiac chest pain 2021    Chest wall ulcer, with fat layer exposed ([address] 10/15/2021    Port or reservoir infection 10/06/2021    Central line infection 10/05/2021    Exostosis of left foot 2021    Arm DVT (deep venous thromboembolism), acute, left ([address] 2021    S/P revision of left breast reconstruction 2021    Status post bilateral breast reconstruction     Acute purulent bronchitis 2021    Lung nodule 2021    Status post bilateral mastectomy 08/06/2021    Other cyst of bone, left ankle and foot 04/10/2021     No trauma. Advised I believe this is most likely a cyst.  Will monitor closely, if change in size or becomes more painful, will consider x-rays.  Dyspnea and respiratory abnormalities 04/09/2021     Patient has been prescribed Breo Ellipta and albuterol. She states she was never diagnosed with COPD or asthma, was told the cancer \"affected my lungs. \"  She denies any shortness of breath today. Continue current medications.  Chronic pain syndrome 04/09/2021     Refer to pain management as requested.  Right wrist pain 04/09/2021     Refer to orthopedics as requested.  Rheumatoid arthritis involving both knees with negative rheumatoid factor ([facility] [address] 04/09/2021     Refer to rheumatology as requested.  Menopausal symptom 04/08/2021    Benign neoplasm of body of uterus 04/08/2021    Iron deficiency anemia 01/22/2021    Carcinoma of female breast ([facility] [address] 01/21/2021    Poor venous access 01/21/2021     Current Outpatient Medications   Medication Sig Dispense Refill    palbociclib (IBRANCE) 125 MG tablet Take 125 mg by mouth daily For 21 days and off 7 days 21 tablet 5    zinc sulfate (ZINCATE) 220 (50 Zn) MG capsule Take 50 mg by mouth daily      meloxicam (MOBIC) 15 MG tablet       guaiFENesin 1200 MG TB12 Take 1 tablet by mouth 2 times daily 30 tablet 0    gabapentin (NEURONTIN) 300 MG capsule TAKE 2 CAPSULES BY MOUTH 3 TIMES DAILY FOR 30 DAYS. 180 capsule 0    HYDROcodone-acetaminophen (NORCO) 7.5-325 MG per tablet Take 1 tablet by mouth every 6 hours as needed.        palbociclib (IBRANCE) 75 MG tablet Take 75 mg by mouth daily 21 tablet 1    ELIQUIS 5 MG TABS tablet TAKE 1 TABLET BY MOUTH TWICE A DAY 60 tablet 3    sodium hypochlorite (DAKINS) 0.125 % SOLN external solution Apply topically 2 times daily 1 each 0    VENTOLIN  (90 Base) MCG/ACT inhaler Inhale 2 puffs into the lungs 4 times daily as needed for Wheezing 18 g 5    pantoprazole (PROTONIX) 40 MG tablet TAKE 1 TABLET BY MOUTH EVERY DAY (Patient taking differently: Take 40 mg by mouth daily ) 90 tablet 0    NARCAN 4 MG/0.1ML LIQD nasal spray  (Patient not taking: Reported on 2/16/2022)      cyclobenzaprine (FLEXERIL) 10 MG tablet Take 10 mg by mouth 3 times daily as needed for Muscle spasms      guaiFENesin-codeine (GUAIFENESIN AC) 100-10 MG/5ML liquid Take 5 mLs by mouth 3 times daily as needed for Cough.  Cholecalciferol (VITAMIN D3) 125 MCG (5000 UT) TABS Take 1 tablet by mouth daily  (Patient not taking: Reported on 2/16/2022)      lisinopril (PRINIVIL;ZESTRIL) 10 MG tablet Take 10 mg by mouth daily (Patient not taking: Reported on 2/16/2022)      acyclovir (ZOVIRAX) 800 MG tablet Take 800 mg by mouth 2 times daily as needed       ondansetron (ZOFRAN) 4 MG tablet Take 4 mg by mouth every 8 hours as needed for Nausea or Vomiting      alclomethasone (ACLOVATE) 0.05 % cream Apply topically 2 times daily as needed Apply topically 2 times daily. No current facility-administered medications for this visit.      Allergies: Clindamycin/lincomycin  Past Medical History:   Diagnosis Date    Breast cancer ([facility] [address]     Breast cancer with lung mets (RIGHT)    Chronic back pain     GERD (gastroesophageal reflux disease)     Hx of blood clots     Hypertension     Neuropathy     Post chemo treatment neuropathy     Past Surgical History:   Procedure Laterality Date    BREAST LUMPECTOMY Right     2006    BREAST RECONSTRUCTION Left 09/17/2021    Revision left reconstructed breast, implant performed by Jose Duran MD at [address] [place] [address]  2016    Bilateral breast implants    EMBOLECTOMY Left 9/26/2021    LEFT UPPER EXTREMITY  MECHANICAL SUCTION THROMBECTOMY performed by Avis Irene DO at [place] [address], TOTAL ABDOMINAL  2015    MASTECTOMY, BILATERAL      Right 2013 & Left 2016    TUNNELED VENOUS PORT PLACEMENT      VASCULAR SURGERY N/A 10/6/2021    REMOVAL OF MEDIPORT performed by Ivette Beckford DO at [facility] OR     Family History   Problem Relation Age of Onset    Hypertension Mother     Obesity Mother     Prostate Cancer Father     No Known Problems Sister     No Known Problems Daughter     No Known Problems Daughter      Social History     Tobacco Use    Smoking status: Never Smoker    Smokeless tobacco: Never Used   Substance Use Topics    Alcohol use: Not Currently       Patient-Reported Vitals 9/28/2021   Patient-Reported Weight 184   Patient-Reported Height 5\"6'       Review of Systems      Eyes - no sudden vision change or amaurosis. Respiratory - no significant shortness of breath, wheezing, or stridor. No cough,  Cardiovascular - no chest pain, syncope, or significant dizziness. No significant leg swelling.  has not had claudication. Skin -  has not had new wound. Neurologic -  No speech difficulty or lateralizing weakness. Psychiatric - no severe anxiety or nervousness. No confusion. All other review of systems are negative. PHYSICAL EXAMINATION:    [ INSTRUCTIONS:  \"[x]\" Indicates a positive item  \"[]\" Indicates a negative item  -- DELETE ALL ITEMS NOT EXAMINED]    [x] Alert  [x] Oriented to person/place/time    [x] No apparent distress  [] Toxic appearing  [x] Normal Mood  [] Anxious appearing    [] Depressed appearing  [] Confused appearing      [] Poor short term memory  [] Poor long term memory   Memory appears to be intact       Venous scan -   Chronic appearing deep vein thrombosis (DVT) is seen in the axillary    brachial , left upper extremity(ies).    Subacute appearing superficial thrombophlebitis (SVT) is seen in the    basilic vein, left upper extremity(ies). Individual images were reviewed. Results were reviewed with the patient. Assessment    1. Left arm pain    2. Left arm swelling    3.  Acute deep vein thrombosis (DVT) of other vein of left upper extremity ([address]          Plan    1. Left arm pain  2. Left arm swelling  3. Acute deep vein thrombosis (DVT) of other vein of left upper extremity (HCC)    6 months with venous scan  Patient instructed to keep arm elevated as much as possible due to the increased swelling that is associated with DVT. Call the office if pain, swelling, and tenderness extends beyond where it is today. Use warm moist heat for comfort if needed. Continue anticoagulation  Strongly encouraged start/continue statin therapy  Recommended no smoking  No change. 6 months with venous scan  Must have lovenox if needs to be off anticoagulation for back injection      Documentation:  I communicated with the patient and/or health care decision maker about dvt. Details of this discussion including any medical advice provided: as above      I affirm this is a Patient Initiated Episode with a Patient who has not had a related appointment within my department in the past 7 days or scheduled within the next 24 hours. Patient identification was verified at the start of the visit: Yes    Total Time: minutes: 11-20 minutes    Joselin Alexis, was evaluated through a synchronous (real-time) audio-video  encounter. The patient (or guardian if applicable) is aware that this is a billable  service, which includes applicable co-pays. This Virtual Visit was conducted with  patient's (and/or legal guardian's) consent. The visit was conducted pursuant to  the emergency declaration under the [address] [place] [address], [address] [place] [address] authority and the [employer] and  [employer] Act. Patient identification was verified,  and a caregiver was present when appropriate. The patient was located in a  state where the provider was licensed to provide care.     Note: not billable if this call serves to triage the patient into an appointment for the relevant concern      BRITANY Eller

## 2022-02-25 ENCOUNTER — HOSPITAL ENCOUNTER (OUTPATIENT)
Dept: CT IMAGING | Age: 57
Discharge: HOME | End: 2022-02-25
Payer: MEDICARE

## 2022-02-25 DIAGNOSIS — C50.919 METASTATIC BREAST CANCER (HCC): ICD-10-CM

## 2022-02-25 PROCEDURE — 74176 CT ABD & PELVIS W/O CONTRAST: CPT

## 2022-03-09 ENCOUNTER — APPOINTMENT (OUTPATIENT)
Dept: INFUSION THERAPY | Age: 57
End: 2022-03-09
Payer: MEDICARE

## 2022-03-14 NOTE — PROGRESS NOTES
MEDICAL ONCOLOGY PROGRESS NOTE    Pt Name: Marica Galeazzi  MRN: [id number]  YOB: 1965  Date of evaluation: 3/16/2022    HISTORY OF PRESENT ILLNESS:    Reason for MD visit: Disease/toxicity management  Eliana Suarez is here today for monitoring for breast cancer and toxicity assessment. She is currently on palliative treatment with Faslodex/Ibrance. She has been tolerating treatment quite well. She has developed left upper extremity DVT associated with a left upper chest port. Port was discontinued. She also developed infection of the port site status post IV antibiotics. She had a repeat venous ultrasound that showed chronic appearing clot and subacute DVT basilic vein. She is currently on Eliquis for anticoagulation. Denies any bleeding. She had COVID-19, mild infection January 2022. She made a full recovery but has occasional complaints of short of breath.     Diagnosis  · Grade III Adenocarcinoma of right breast diagnosed 2006  · Stage IIIA, [id number]  · ER/OR Positive, HER-2 bruce/ihc 1+  · April 2013-RECURRENCE in the right axillary region  · Genetic testing- BRCA 1&2 Negative  · 2014 (?) RECURRENCE in the axillary skin  · 01/05/2017- METASTATIC DISEASE with lung, hilar, and axillary lymph node mets  · Osteopenia  · Mediport associated DVT, October 2021  · DVT left upper extremity, November 2021    Treatment Summary  · 2006- Neoadjuvant chemotherapy with AC x 4 cycles followed by Taxol  · 2007- Lumpectomy with axillary lymph node dissection  · 2007- Adjuvant radiation therapy to whole breast and axillary region  · Did not take tamoxifen due to cost  · April 2013- RECURRENCE  · Neoadjuvant chemotherapy with 2 cycles of Taxotere followed by Doxil  · 10/14/2013- Right Breast Mastectomy and Axillary Dissection  · 01/23/2014- Completion of adjuvant RT to chest wall and axillary lymph node with Dr. Ki Brandon  · 02/14- Initiated adjuvant endocrine therapy with tamoxifen  · 2014 (?) RECURRENCE in the axillary skin  · 2014 (?) Surgical resection of the axillary skin  · 2015 (?) YVETTE/BSO  · 09/19/2016- Bilateral exchange, nipple reconstruction and fat grafting and removal of PAC   · 2016- Switched to aromatase inhibitor  · 01/05/2017- METASTATIC DISEASE with lung, hilar, and axillary mets  · 01/11/2017-06/17/2018- Single agent Abraxane x 13 cycles  · 06/27/2018-09/17/2018- Gemzar  · 10/10/2018-Faslodex every 4 weeks/palbociclib    Cancer History:  Eliana Suarez was first seen by me on 1/21/2021. She was referred to [facility] for history of recurrent metastatic breast cancer. She has been in remission as per the latest CT scan. She is currently on Faslodex and palbociclib. She has received several lines therapy as described below. She moved from [place] to [place] to stay closer to her parents. Her medical history is complex. Further details as below. · 2006- Neoadjuvant chemotherapy with AC x 4 cycles followed by Taxol AC was complicated by viral meningitis; patient experienced neuropathy after 3 doses of Taxol and was switched to Taxotere for last dose  · 2007- Lumpectomy with axillary lymph node dissection 1.9cm, grade 2. No LVI. 1 of 14 lymph nodes positive for malignancy (1/14) [id number]  · 2007- Adjuvant Radiation Therapy to whole breast and axillary region  · April 2013- RECURRENCE presenting as mass in the axillary region  · Neoadjuvant chemotherapy with 2 cycles of Taxotere followed by Doxil- did not have good response to treatment  · 2013- Preop PET/CTshowed 2.8cm, right axillary lymph node with uptake. · 10/14/2013- Right Breast Mastectomy and Axillary Dissection Right breast had benign tissue with fibrous, negative for carcinoma. Axillary contents demonstrated invasive ductal carcinoma, involving fibroadipose tissue and tendinous tissue. 3 benign lymph nodes (0/3). Yusra grade 3. 3.0cm in greatest gross dimensions.  Carcinoma permeates among soft tissues and in the right axilla with no apparent involvement of lymph nodes. · 01/23/2014- Completion of adjuvant radiation therapy to chest wall and axillary lymph node with Dr. Abdelrahman Cardosor  · 02/14- Initiated adjuvant endocrine therapy with tamoxifen  · 2014 (?) RECURRENCE in the axillary skin  · 2014 (?) Surgical resection of the axillary skin pathology demonstrating tumor at cauterized margin  · 2015 (?) YVETTE/BSO  · 09/19/2016- Bilateral exchange, nipple reconstruction and fat grafting and removal of PAC  · 2016- Switched to aromatase inhibitor patient was non compliant  · 01/05/2017- METASTATIC DISEASE Presented with respiratory failure. CTA Pulmonary showed numerous nodules and masses throughout both lung fields, compatible with metastatic disease. Bilateral hilar adenopathy seen. · 01/11/2017-06/17/2018- Single agent Abraxane x 13 cycles  · 03/15/2017- CT Chest W Contrast Interval decrease in maximal diameter of essentially all the multiple metastatic pulmonary nodules. The average difference is approximately 25%. Some of the much smaller ones have disappeared. Bilateral hilar and aortopulmonary window adenopathy is also similarly smaller. · 06/07/2017- CT Chest W Contrast Multiple lung nodules seen bilaterally. Most of the lung nodules show interval improvement with decreased size by 1 to 3mm. Mediastinal and bilateral hilar adenopathy seen with improvement. · 07/24/2017- PET/CT scan showed marked improvement, is minimal abnormal, has excellent response to therapy  · 02/01/2018- PET/CT scan Numerous bilateral lung masses and nodules, the majority of which do not have appreciable abnormal FDG uptake. The largest mass in the left infrahilar region of the left lower lobe demonstrates a maximum SUV of 3.3. Mediastinal lymphadenopathy. No evidence of metastatic disease in the abdomen or pelvis. · 06/21/2018- CT Chest/Abdomen/Pelvis Multiple lung mets, mild bilateral hilar and mediastinal lymph nodes.  No masses or evidence of metastatic disease in the abdomen or pelvis  · 06/27/2018-09/17/2018- Gemzar  · 09/28/2018- CT Pulmonary Multiple lung mets, mild bilateral hilar and mediastinal lymph nodes  · 10/10/2018-12/21/2020- Faslodex every 4 weeks  · 12/14/2018- CT Chest showed definite decrease in the maximal diameter and volume of all left and right metastatic nodules. Somewhat enlarged paratracheal and left axillary lymph nodes are relatively unchanged. · 04/22/2019- MRI Cervical Spine W WO Contrast Unremarkable  · 04/22/2019- MRI Brain W WO Contrast No enhancing lesions in the brain and no evidence of metastatic disease. · 07/27/2019- CTA Pulmonary showed essentially complete disappearance of the pulmonary metastatic disease. Several tiny flat peripheral nodules are the only sequela. The tiny ones on the right are unchanged, the tiny ones on the left are even smaller. There is no longer any active metastatic disease in either lung. · 12/19/2019- CT Chest/Abdomen/Pelvis Small, tiny subpleural nodules right upper and right midlung field as well as left lung base has remained unchanged. No new focal parenchymal abnormality seen. No adenopathy seen. There is no abdominal or pelvic mass, adenopathy or fluid collection. No lytic or sclerotic bony lesions, but there is a possibility of osteopenia and/or osteoporosis. · 02/17/2020- DEXA Bone Density showed osteopenia of lumbar spine, T-score -1.8, and left femoral neck, T-score -2.0  · 2/25/21 Ct Abdomen Pelvis W Iv Contrast  No evidence of metastatic disease in the abdomen or pelvis. · 2/26/21 Ct Chest W Contrast Tiny nodules in the right lung described above probably represent small foci of pleural thickening due to chronic inflammatory process or small noncalcified granulomas. No features to suggest malignancy or metastatic disease. Bilateral breast implants without complication. · 3/1/2021discussed the results CT chest abdomen pelvis.   Essentially no clear evidence of metastatic disease. This is consistent with excellent response to treatment. Continue current treatment. · 4/1/2021 = 31.6 CA 27-29= 33.5 CEA= 1.6   · 6/3/21 CA 15-3= 23.3 CA 27-29= 25.6  CEA= 1.8   · 7/29/2021 CEA=1.7 CA 15-3=21.50 CA 27.29= 26.0  · 8/19/2021 CT Chest  Left lower lobe pleural-based nodular 1.7 x 0.9 cm is indeterminate. PET/CT recommended. Feeding defect in the left lower lobe bronchus versus a postobstructive airspace disease. This too may be further characterized with PET CT versus direct visualization. · 8/19/2021 CT Abd/Pelvis A stable CT scan of the abdomen and pelvis. No finding to suggest neoplastic/metastatic disease. · 9/28/2021 Final Diagnosis: Arm, excision of left arm thrombus:Fragments of organizing and organized thrombi with dystrophic calcification. Received is a container labeled \"ischemic, left arm\" Received in a fixative is a 3.6 x 2.6 x 1.4 cm aggregate of brown-tan soft tissue fragments and skin. Representative sections are submitted in a single cassette, numerous. · October 2021she developed left upper extremity DVT associated support. Port removed. She also had infection of the port site status post completion to biotics. · 11/19/21 CTA pulmonary: No evidence of pulmonary embolism. · The lungs are poorly expanded but unremarkable. · 12/8/21 MRI lumbar spine: No significant change since the previous study. Chronic degenerative changes. A persistent mild degenerative retrolisthesis is of L5 over S1. Prominent disc osteophyte complexes and facetal arthropathy and resultant mild spinal stenosis at L4-5 and neural foraminal stenosis at L4-5 and L5-S1 as detailed above. · 1/6/22 CT CHEST WO CONTRAST Stable chest CT with an unchanged tiny subpleural nodule within the right upper lobe. · 1/19/22 CA 15-3 24 CA 27-29 31.8 CEA 1.5  · 2/25/2022 CT Abd/Pelvis W IV Contrast(Oral) Gastric wall thickening is nonspecific. It could be an artifact of under distention.  Gastritis and other etiologies are not ruled out. The unenhanced solid organs demonstrate no focal abnormality. Diverticulosis of the colon. Under distention of portions of the colon. Prior hysterectomy. Mild sclerotic changes in the subarticular femoral heads bilaterally is stable. Early AVN is possible. MRI would be more definitive. The signal abnormality is greater on the right side.     CA 27.29 [employer]  01/21/2021- 20.3  04/01/2021 33.5  06/03/2021 25.6  07/29/2021 26.0  1/19/22 31.8    Past Medical History:    Past Medical History:   Diagnosis Date    Breast cancer ([facility] [address]     Breast cancer with lung mets (RIGHT)    Chronic back pain     GERD (gastroesophageal reflux disease)     Hx of blood clots     Hypertension     Neuropathy     Post chemo treatment neuropathy       Past Surgical History:    Past Surgical History:   Procedure Laterality Date    BREAST LUMPECTOMY Right     2006    BREAST RECONSTRUCTION Left 09/17/2021    Revision left reconstructed breast, implant performed by Alva Yuan MD at [address] [place] [address]  2016    Bilateral breast implants    EMBOLECTOMY Left 9/26/2021    LEFT UPPER EXTREMITY  MECHANICAL SUCTION THROMBECTOMY performed by Renetta Sheppard DO at [facility], TOTAL ABDOMINAL  2015    MASTECTOMY, BILATERAL      Right 2013 & Left 2016    TUNNELED VENOUS PORT PLACEMENT      VASCULAR SURGERY N/A 10/6/2021    REMOVAL OF MEDIPORT performed by Renetta Sheppard DO at [place] [id number] History:    Marital status, children: Single, 2 children-female  Smoking status: no  ETOH status: no  Profession: Disabled  Resides: [place], [place]  Recent trips: Moved from [address] [place] [address] [place]: no    Family History:   Family History   Problem Relation Age of Onset    Hypertension Mother     Obesity Mother     Prostate Cancer Father     No Known Problems Sister     No Known Problems Daughter     No Known Problems Daughter        Current Hospital Medications: Current Outpatient Medications   Medication Sig Dispense Refill    palbociclib (IBRANCE) 125 MG tablet Take 125 mg by mouth daily For 21 days and off 7 days 21 tablet 5    zinc sulfate (ZINCATE) 220 (50 Zn) MG capsule Take 50 mg by mouth daily      meloxicam (MOBIC) 15 MG tablet       guaiFENesin 1200 MG TB12 Take 1 tablet by mouth 2 times daily 30 tablet 0    HYDROcodone-acetaminophen (NORCO) 7.5-325 MG per tablet Take 1 tablet by mouth every 6 hours as needed.  ELIQUIS 5 MG TABS tablet TAKE 1 TABLET BY MOUTH TWICE A DAY 60 tablet 3    sodium hypochlorite (DAKINS) 0.125 % SOLN external solution Apply topically 2 times daily 1 each 0    VENTOLIN  (90 Base) MCG/ACT inhaler Inhale 2 puffs into the lungs 4 times daily as needed for Wheezing 18 g 5    pantoprazole (PROTONIX) 40 MG tablet TAKE 1 TABLET BY MOUTH EVERY DAY (Patient taking differently: Take 40 mg by mouth daily ) 90 tablet 0    NARCAN 4 MG/0.1ML LIQD nasal spray       cyclobenzaprine (FLEXERIL) 10 MG tablet Take 10 mg by mouth 3 times daily as needed for Muscle spasms      guaiFENesin-codeine (GUAIFENESIN AC) 100-10 MG/5ML liquid Take 5 mLs by mouth 3 times daily as needed for Cough.  lisinopril (PRINIVIL;ZESTRIL) 10 MG tablet Take 10 mg by mouth daily       acyclovir (ZOVIRAX) 800 MG tablet Take 800 mg by mouth 2 times daily as needed       ondansetron (ZOFRAN) 4 MG tablet Take 4 mg by mouth every 8 hours as needed for Nausea or Vomiting      alclomethasone (ACLOVATE) 0.05 % cream Apply topically 2 times daily as needed Apply topically 2 times daily.  gabapentin (NEURONTIN) 300 MG capsule TAKE 2 CAPSULES BY MOUTH 3 TIMES DAILY FOR 30 DAYS.  180 capsule 0    palbociclib (IBRANCE) 75 MG tablet Take 75 mg by mouth daily 21 tablet 1    Cholecalciferol (VITAMIN D3) 125 MCG (5000 UT) TABS Take 1 tablet by mouth daily  (Patient not taking: Reported on 3/16/2022)       No current facility-administered medications for this visit. Allergies: Allergies   Allergen Reactions    Clindamycin/Lincomycin Hives, Itching and Rash         Subjective   REVIEW OF SYSTEMS:   CONSTITUTIONAL: no fever, no night sweats,  fatigue;  HEENT: no blurring of vision, no double vision, no hearing difficulty, no tinnitus, no ulceration, no dysplasia, no epistaxis;   LUNGS: no cough, no hemoptysis, no wheeze, occasional shortness of breath;  CARDIOVASCULAR: no palpitation, no chest pain, occasional shortness of breath;  GI: no abdominal pain, nausea, vomiting, diarrhea, no constipation;  PATRICK: no dysuria, no hematuria, no frequency or urgency, no nephrolithiasis;  MUSCULOSKELETAL: no joint pain, no swelling, no stiffness;  ENDOCRINE: no polyuria, no polydipsia, no cold or heat intolerance;  HEMATOLOGY: no easy bruising or bleeding, no history of clotting disorder;  DERMATOLOGY: no skin rash, no eczema, no pruritus;  PSYCHIATRY: no depression, no anxiety, no panic attacks, no suicidal ideation, no homicidal ideation;  NEUROLOGY: no syncope, no seizures, no numbness or tingling of hands, no numbness or tingling of feet, no paresis;       /75   Pulse 75   Ht 5' 6\" (1.676 m)   Wt 174 lb 9.6 oz (79.2 kg)   SpO2 96%   BMI 28.18 kg/m²      PHYSICAL EXAM:  CONSTITUTIONAL: Alert, appropriate, no acute distress  EYES: Non icteric, EOM intact, pupils equal round   ENT: Mucus membranes moist, no oral pharyngeal lesions, external inspection of ears and nose are normal.  NECK: Supple, no masses. No palpable thyroid mass  CHEST/LUNGS: CTA bilaterally, normal respiratory effort   CARDIOVASCULAR: RRR, no murmurs. No lower extremity edema  ABDOMEN: soft non-tender, active bowel sounds, no HSM. No palpable masses  EXTREMITIES: warm, full ROM in all 4 extremities, no focal weakness.   SKIN: warm, dry with no rashes or lesions  LYMPH: No cervical, clavicular, axillary, or inguinal lymphadenopathy  NEUROLOGIC: follows commands, non focal   PSYCH: mood and affect appropriate. Alert and oriented to time, place, person       LABORATORY RESULTS REVIEWED/ANALYZED BY ME:  Lab Results   Component Value Date    WBC 9.10 03/16/2022    HGB 11.4 03/16/2022    HCT 35.2 03/16/2022    MCV 98.9 (H) 03/16/2022     03/16/2022       RADIOLOGY STUDIES REVIEWED BY ME:  2/25/2022 CT Abd/Pelvis W IV Contrast(Oral) Gastric wall thickening is nonspecific. It could be an artifact of under distention. Gastritis and other etiologies are not ruled out. The unenhanced solid organs demonstrate no focal abnormality. Diverticulosis of the colon. Under distention of portions of the colon. Prior hysterectomy. Mild sclerotic changes in the subarticular femoral heads bilaterally is stable. Early AVN is possible. MRI would be more definitive. The signal abnormality is greater on the right side. ASSESSMENT:    No orders of the defined types were placed in this encounter. Mervat Kamara was seen today for follow-up. Diagnoses and all orders for this visit:    Metastatic breast cancer [facility])    Care plan discussed with patient    Use of fulvestrant (Faslodex)      Metastatic breast cancer, hormone sensitive  2/25/2021discussed the results of CT chest abdomen pelvis. No evidence of disease progression. In fact, excellent response with no measurable metastatic disease at this time. 1/21/2021CA 15-3 = 23, CA 27-29 = 20.3. Currently Faslodex/Ibrance. Regimen:  Faslodex to 500 mg subcutaneous every 4 weeks. Continue Ibrance days 121 q. 28 days    Clinical/laboratory assessment of toxicity for Ibrance      8/19/2021CT chest/abdomen/pelvisno evidence of metastatic disease. Findings of a pulmonary lung nodule in the left lower lobe  -JAN/FEB 2022- CT C/A/P-MARIA INES    Essentially, good response to Faslodex/Ibrance. Last cancer marker 1/19/2022 were normal.    Plan:  -Continue Faslodex/Ibrance 125 mg days 121 q. 28 days      B12 deficiency Recommended B12 2000 mcg p.o. daily.   Will start B12 injections today 1000mcg with Faslodex. 3/1/2021- Methylmalonic Acid 171, Vitamin B12 >2000. B12 replaced monthly. 10/10/2021vitamin B12 154    Chemotherapy-induced neuropathycontinue gabapentin. COVID-19 vaccination responsepatient had covid 19 Jan 2022. Left lower lobe lung noduleshe is currently being seen by pulmonary at [facility]. They believe that this could be inflammatory. I reviewed the CT scans. Stable pulmonary nodule. -CT chest January 2022subcentimeter stable pulmonary nodules. Acute left upper extremity DVT/left upper chest port, October 2021US upper extremity showed acute DVT likely related to port Oct 2021.   -Currently on Eliquis. S/p Mediport removal.  -US Jan 2022,LUE: Chronic appearing deep vein thrombosis (DVT) is seen in the axillary  brachial , left upper extremity(ies). Subacute appearing superficial thrombophlebitis (SVT) is seen in the  basilic vein, left upper extremity(ies  -Continue Eliquis 5 mg p.o. twice daily    Normocytic anemiahemoglobin 10.8. Improving. Continue to monitor BC.  10/10/2021ferritin 92, iron saturation 17%, TIBC 242, folate 4.6, vitamin B12 154. Continue B12 replacement    Alopecianew onset. [place] can certainly be a culprit. Referred to dermatology. PLAN:  · RTC with MD 8 weeks  · Faslodex today and every 4 weeks  · Continue follow-up with wound care  · Follow up with Dr. Andrew Youssef for PCP care  · CBC CMP today  · Continue Ibrance  · Continue B12 replacement monthly  · Refer to Dermatology for alopecia  · Next CT chest/abdomen/pelvis August 2022       Frantz CLARK am pre charting  as Medical Assistant for Jackson Thomason MD. Electronically signed by Frantz Ngo MA on 3/16/2022 at 9:16 AM CDT. Ary Cam am scribing for Jackson Thomason MD. Electronically signed by Amanda Hidalgo RN on 3/16/2022 at 1:45 PM CDT.     AMBER, Dr Stanislav Castillo, personally performed the services described in this documentation as scribed by [employer], RN in my presence and is both accurate and complete. I have seen, examined and reviewed this patient medication list, appropriate labs and imaging studies. I reviewed relevant medical records and others physicians notes. I discussed the plans of care with the patient. I answered all the questions to the patients satisfaction. I have also reviewed the chief complaint (CC) and part of the history (History of Present Illness (HPI), Past Family Social History [facility]), or Review of Systems (ROS) and made changes when appropriated. (Please note that portions of this note were completed with a voice recognition program. Efforts were made to edit the dictations but occasionally words are mis-transcribed. )Electronically signed by Maggie Simental MD on 3/16/2022 at 1:51 PM

## 2022-03-16 ENCOUNTER — HOSPITAL ENCOUNTER (OUTPATIENT)
Dept: INFUSION THERAPY | Age: 57
Discharge: HOME | End: 2022-03-16
Payer: MEDICARE

## 2022-03-16 ENCOUNTER — OFFICE VISIT (OUTPATIENT)
Dept: HEMATOLOGY | Age: 57
End: 2022-03-16
Payer: MEDICARE

## 2022-03-16 VITALS
SYSTOLIC BLOOD PRESSURE: 120 MMHG | DIASTOLIC BLOOD PRESSURE: 75 MMHG | OXYGEN SATURATION: 96 % | BODY MASS INDEX: 28.06 KG/M2 | WEIGHT: 174.6 LBS | HEIGHT: 66 IN | HEART RATE: 75 BPM

## 2022-03-16 DIAGNOSIS — I82.722 CHRONIC DEEP VEIN THROMBOSIS (DVT) OF LEFT UPPER EXTREMITY, UNSPECIFIED VEIN (HCC): ICD-10-CM

## 2022-03-16 DIAGNOSIS — L65.9 ALOPECIA: ICD-10-CM

## 2022-03-16 DIAGNOSIS — C50.919 METASTATIC BREAST CANCER (HCC): Primary | ICD-10-CM

## 2022-03-16 DIAGNOSIS — C50.919 METASTATIC BREAST CANCER (HCC): ICD-10-CM

## 2022-03-16 DIAGNOSIS — Z13.220 SCREENING FOR HYPERLIPIDEMIA: ICD-10-CM

## 2022-03-16 DIAGNOSIS — Z71.89 CARE PLAN DISCUSSED WITH PATIENT: ICD-10-CM

## 2022-03-16 DIAGNOSIS — D50.8 OTHER IRON DEFICIENCY ANEMIA: Primary | ICD-10-CM

## 2022-03-16 DIAGNOSIS — T45.1X5S ANTINEOPLASTIC DRUGS CAUSING ADVERSE EFFECT, SEQUELA: ICD-10-CM

## 2022-03-16 DIAGNOSIS — C50.919 CARCINOMA OF FEMALE BREAST, UNSPECIFIED ESTROGEN RECEPTOR STATUS, UNSPECIFIED LATERALITY, UNSPECIFIED SITE OF BREAST (HCC): ICD-10-CM

## 2022-03-16 DIAGNOSIS — Z79.818 USE OF FULVESTRANT (FASLODEX): ICD-10-CM

## 2022-03-16 LAB
CHOLESTEROL, TOTAL: 182 MG/DL (ref 160–199)
FERRITIN: 31.1 NG/ML (ref 13–150)
HCT VFR BLD CALC: 35.2 % (ref 34.1–44.9)
HDLC SERPL-MCNC: 61 MG/DL (ref 65–121)
HEMOGLOBIN: 11.4 G/DL (ref 11.2–15.7)
IRON: 38 UG/DL (ref 37–145)
LDL CHOLESTEROL CALCULATED: 114 MG/DL
MCH RBC QN AUTO: 32 PG (ref 25.6–32.2)
MCHC RBC AUTO-ENTMCNC: 32.4 G/DL (ref 32.3–35.5)
MCV RBC AUTO: 98.9 FL (ref 79.4–94.8)
PDW BLD-RTO: 13.1 % (ref 11.7–14.4)
PLATELET # BLD: 350 K/UL (ref 182–369)
PMV BLD AUTO: 8.3 FL (ref 7.4–10.4)
RBC # BLD: 3.56 M/UL (ref 3.93–5.22)
TOTAL IRON BINDING CAPACITY: 364 UG/DL (ref 250–400)
TRIGL SERPL-MCNC: 35 MG/DL (ref 0–149)
WBC # BLD: 9.1 K/UL (ref 3.98–10.04)

## 2022-03-16 PROCEDURE — 36415 COLL VENOUS BLD VENIPUNCTURE: CPT

## 2022-03-16 PROCEDURE — 6360000002 HC RX W HCPCS: Performed by: INTERNAL MEDICINE

## 2022-03-16 PROCEDURE — G8484 FLU IMMUNIZE NO ADMIN: HCPCS | Performed by: INTERNAL MEDICINE

## 2022-03-16 PROCEDURE — 99214 OFFICE O/P EST MOD 30 MIN: CPT | Performed by: INTERNAL MEDICINE

## 2022-03-16 PROCEDURE — 96402 CHEMO HORMON ANTINEOPL SQ/IM: CPT

## 2022-03-16 PROCEDURE — 85027 COMPLETE CBC AUTOMATED: CPT

## 2022-03-16 PROCEDURE — 96372 THER/PROPH/DIAG INJ SC/IM: CPT

## 2022-03-16 PROCEDURE — G8427 DOCREV CUR MEDS BY ELIG CLIN: HCPCS | Performed by: INTERNAL MEDICINE

## 2022-03-16 PROCEDURE — 3017F COLORECTAL CA SCREEN DOC REV: CPT | Performed by: INTERNAL MEDICINE

## 2022-03-16 PROCEDURE — 99211 OFF/OP EST MAY X REQ PHY/QHP: CPT

## 2022-03-16 PROCEDURE — G8417 CALC BMI ABV UP PARAM F/U: HCPCS | Performed by: INTERNAL MEDICINE

## 2022-03-16 PROCEDURE — 1036F TOBACCO NON-USER: CPT | Performed by: INTERNAL MEDICINE

## 2022-03-16 RX ORDER — METHYLPREDNISOLONE SODIUM SUCCINATE 125 MG/2ML
125 INJECTION, POWDER, LYOPHILIZED, FOR SOLUTION INTRAMUSCULAR; INTRAVENOUS ONCE
Status: CANCELLED | OUTPATIENT
Start: 2022-03-16 | End: 2022-03-16

## 2022-03-16 RX ORDER — EPINEPHRINE 1 MG/ML
0.3 INJECTION, SOLUTION, CONCENTRATE INTRAVENOUS PRN
Status: CANCELLED | OUTPATIENT
Start: 2022-03-16

## 2022-03-16 RX ORDER — DIPHENHYDRAMINE HYDROCHLORIDE 50 MG/ML
50 INJECTION INTRAMUSCULAR; INTRAVENOUS ONCE
Status: CANCELLED | OUTPATIENT
Start: 2022-03-16 | End: 2022-03-16

## 2022-03-16 RX ORDER — LAMOTRIGINE 25 MG/1
250 TABLET ORAL ONCE
Status: COMPLETED | OUTPATIENT
Start: 2022-03-16 | End: 2022-03-16

## 2022-03-16 RX ORDER — CYANOCOBALAMIN 1000 UG/ML
1000 INJECTION INTRAMUSCULAR; SUBCUTANEOUS ONCE
Status: COMPLETED
Start: 2022-03-16 | End: 2022-03-16

## 2022-03-16 RX ORDER — SODIUM CHLORIDE 9 MG/ML
INJECTION, SOLUTION INTRAVENOUS CONTINUOUS
Status: CANCELLED | OUTPATIENT
Start: 2022-03-16

## 2022-03-16 RX ORDER — CYANOCOBALAMIN 1000 UG/ML
1000 INJECTION INTRAMUSCULAR; SUBCUTANEOUS ONCE
Status: CANCELLED
Start: 2022-04-13

## 2022-03-16 RX ADMIN — FULVESTRANT 250 MG: 50 INJECTION, SOLUTION INTRAMUSCULAR at 13:25

## 2022-03-16 RX ADMIN — CYANOCOBALAMIN 1000 MCG: 1000 INJECTION, SOLUTION INTRAMUSCULAR at 13:35

## 2022-03-21 RX ORDER — APIXABAN 5 MG/1
TABLET, FILM COATED ORAL
Qty: 60 TABLET | Refills: 3 | Status: SHIPPED | OUTPATIENT
Start: 2022-03-21 | End: 2022-07-19

## 2022-03-22 LAB
ANDROSTENEDIONE: 0.28 NG/ML (ref 0.13–0.82)
DHEA: 1.19 NG/ML (ref 0.63–4.7)

## 2022-03-22 NOTE — PROGRESS NOTES
HISTORY OF PRESENT ILLNESS:    Ms. Dontae Shaw is a 56 y.o. black female who presents today for a 6-month breast exam. This is followed by a left mastectomy reconstruction due to lateral drift on 9/17/2021. 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 It was ER/NC positive and she underwent neoadjuvant chemotherapy with AC followed by Taxol. In 2007 she underwent a right lumpectomy with axillary node dissection and received adjuvant radiation therapy to the breast and axilla. She did not take antihormonal therapy due to cost.    She recurred in 2013 and underwent 2 cycles of Taxotere followed by Doxil and then underwent right mastectomy and axillary node dissection. This was followed by completion of radiation therapy to her chest wall and axillary nodes. In 2016 she underwent implant exchange and a prophylactic mastectomy on the left. She was also switched to aromatase inhibitor. She subsequent developed metastatic disease with lung, hilar, and axillary metastases and has been on Abraxane, Gemzar, and is now doing monthly Faslodex with palbociclib    She currently has Harrod implants. 590 cc on the right. She has an 800 cc implant on the left. She is now status post revision of left mastectomy reconstruction due to lateral drift on 9/17/2021. She had placement of a 700 cc Natrelle soft touch implant and a crescent mastopexy on the left. She also had liposuction of an excessive fat pad adjacent to her reconstruction. PHYSICAL EXAM:  The  wounds look good with no evidence of infection, fluid accumulation, or skin necrosis. She still has a small amount of asymmetry with the left nipple being about a centimeter lower than the right. It is significantly better than previously but it does bother her    In addition she has terrible scarring from her port removal and would like that incision revised.   This is certainly reasonable and the nature of the excision of

## 2022-03-23 ENCOUNTER — OFFICE VISIT (OUTPATIENT)
Dept: SURGERY | Age: 57
End: 2022-03-23
Payer: MEDICARE

## 2022-03-23 VITALS
HEART RATE: 94 BPM | WEIGHT: 172.2 LBS | OXYGEN SATURATION: 98 % | BODY MASS INDEX: 27.68 KG/M2 | TEMPERATURE: 98.1 F | HEIGHT: 66 IN

## 2022-03-23 DIAGNOSIS — Z98.890 STATUS POST BILATERAL BREAST RECONSTRUCTION: Primary | ICD-10-CM

## 2022-03-23 DIAGNOSIS — Z98.890 S/P BREAST RECONSTRUCTION, LEFT: ICD-10-CM

## 2022-03-23 LAB
SEX HORMONE BINDING GLOBULIN: 38 NMOL/L (ref 17–125)
TESTOSTERONE FREE: 0.8 PG/ML (ref 0.6–3.8)
TESTOSTERONE, FEMALES/CHILDREN: 5 NG/DL (ref 9–55)

## 2022-03-23 PROCEDURE — G8484 FLU IMMUNIZE NO ADMIN: HCPCS | Performed by: SURGERY

## 2022-03-23 PROCEDURE — G8417 CALC BMI ABV UP PARAM F/U: HCPCS | Performed by: SURGERY

## 2022-03-23 PROCEDURE — 3017F COLORECTAL CA SCREEN DOC REV: CPT | Performed by: SURGERY

## 2022-03-23 PROCEDURE — 1036F TOBACCO NON-USER: CPT | Performed by: SURGERY

## 2022-03-23 PROCEDURE — G8427 DOCREV CUR MEDS BY ELIG CLIN: HCPCS | Performed by: SURGERY

## 2022-03-23 PROCEDURE — 99214 OFFICE O/P EST MOD 30 MIN: CPT | Performed by: SURGERY

## 2022-04-13 ENCOUNTER — HOSPITAL ENCOUNTER (OUTPATIENT)
Dept: INFUSION THERAPY | Age: 57
Discharge: HOME | End: 2022-04-13
Payer: MEDICARE

## 2022-04-13 DIAGNOSIS — C50.919 METASTATIC BREAST CANCER (HCC): ICD-10-CM

## 2022-04-13 DIAGNOSIS — D50.8 OTHER IRON DEFICIENCY ANEMIA: ICD-10-CM

## 2022-04-13 DIAGNOSIS — C50.919 CARCINOMA OF FEMALE BREAST, UNSPECIFIED ESTROGEN RECEPTOR STATUS, UNSPECIFIED LATERALITY, UNSPECIFIED SITE OF BREAST (HCC): Primary | ICD-10-CM

## 2022-04-13 PROCEDURE — 6360000002 HC RX W HCPCS: Performed by: INTERNAL MEDICINE

## 2022-04-13 PROCEDURE — 96402 CHEMO HORMON ANTINEOPL SQ/IM: CPT

## 2022-04-13 PROCEDURE — 96372 THER/PROPH/DIAG INJ SC/IM: CPT

## 2022-04-13 PROCEDURE — 96401 CHEMO ANTI-NEOPL SQ/IM: CPT

## 2022-04-13 RX ORDER — CYANOCOBALAMIN 1000 UG/ML
1000 INJECTION INTRAMUSCULAR; SUBCUTANEOUS ONCE
Status: CANCELLED
Start: 2022-05-11

## 2022-04-13 RX ORDER — LAMOTRIGINE 25 MG/1
250 TABLET ORAL ONCE
Status: COMPLETED | OUTPATIENT
Start: 2022-04-13 | End: 2022-04-13

## 2022-04-13 RX ORDER — CYANOCOBALAMIN 1000 UG/ML
1000 INJECTION INTRAMUSCULAR; SUBCUTANEOUS ONCE
Status: COMPLETED
Start: 2022-04-13 | End: 2022-04-13

## 2022-04-13 RX ADMIN — FULVESTRANT 250 MG: 50 INJECTION, SOLUTION INTRAMUSCULAR at 13:54

## 2022-04-13 RX ADMIN — CYANOCOBALAMIN 1000 MCG: 1000 INJECTION, SOLUTION INTRAMUSCULAR at 13:54

## 2022-04-26 ENCOUNTER — TELEPHONE (OUTPATIENT)
Dept: VASCULAR SURGERY | Age: 57
End: 2022-04-26

## 2022-04-26 NOTE — TELEPHONE ENCOUNTER
Called the patient back to let her know that I spoke with [facility] and she said that this supplement should NOT be taken with blood thinner.

## 2022-04-26 NOTE — TELEPHONE ENCOUNTER
Patient called and left a voicemail saying that she had started taking a new supplement called PhenuGreek. She asked if she should be taking it with her blood thinner.

## 2022-05-09 ENCOUNTER — HOSPITAL ENCOUNTER (OUTPATIENT)
Dept: PREADMISSION TESTING | Age: 57
Discharge: HOME | End: 2022-05-13
Payer: MEDICARE

## 2022-05-09 VITALS — BODY MASS INDEX: 27.12 KG/M2 | WEIGHT: 168 LBS

## 2022-05-09 LAB
EKG P AXIS: 38 DEGREES
EKG P-R INTERVAL: 146 MS
EKG Q-T INTERVAL: 394 MS
EKG QRS DURATION: 78 MS
EKG QTC CALCULATION (BAZETT): 411 MS
EKG T AXIS: 22 DEGREES

## 2022-05-09 PROCEDURE — 93005 ELECTROCARDIOGRAM TRACING: CPT | Performed by: SURGERY

## 2022-05-09 PROCEDURE — 93010 ELECTROCARDIOGRAM REPORT: CPT | Performed by: INTERNAL MEDICINE

## 2022-05-09 RX ORDER — GABAPENTIN 300 MG/1
300 CAPSULE ORAL ONCE
Status: CANCELLED | OUTPATIENT
Start: 2022-05-10

## 2022-05-09 RX ORDER — ACETAMINOPHEN 325 MG/1
975 TABLET ORAL ONCE
Status: CANCELLED | OUTPATIENT
Start: 2022-05-10

## 2022-05-09 RX ORDER — CELECOXIB 200 MG/1
200 CAPSULE ORAL ONCE
Status: CANCELLED | OUTPATIENT
Start: 2022-05-10

## 2022-05-10 ENCOUNTER — ANESTHESIA EVENT (OUTPATIENT)
Dept: OPERATING ROOM | Age: 57
End: 2022-05-10
Payer: MEDICARE

## 2022-05-10 ENCOUNTER — ANESTHESIA (OUTPATIENT)
Dept: OPERATING ROOM | Age: 57
End: 2022-05-10
Payer: MEDICARE

## 2022-05-10 ENCOUNTER — HOSPITAL ENCOUNTER (OUTPATIENT)
Age: 57
Setting detail: OUTPATIENT SURGERY
Discharge: HOME | End: 2022-05-10
Attending: SURGERY | Admitting: SURGERY
Payer: MEDICARE

## 2022-05-10 VITALS
HEIGHT: 66 IN | HEART RATE: 57 BPM | WEIGHT: 170 LBS | BODY MASS INDEX: 27.32 KG/M2 | RESPIRATION RATE: 18 BRPM | SYSTOLIC BLOOD PRESSURE: 131 MMHG | TEMPERATURE: 98.5 F | DIASTOLIC BLOOD PRESSURE: 74 MMHG | OXYGEN SATURATION: 100 %

## 2022-05-10 VITALS
OXYGEN SATURATION: 98 % | RESPIRATION RATE: 14 BRPM | DIASTOLIC BLOOD PRESSURE: 43 MMHG | TEMPERATURE: 98 F | SYSTOLIC BLOOD PRESSURE: 80 MMHG

## 2022-05-10 PROCEDURE — 15877 SUCTION LIPECTOMY TRUNK: CPT | Performed by: SURGERY

## 2022-05-10 PROCEDURE — 3600000013 HC SURGERY LEVEL 3 ADDTL 15MIN: Performed by: SURGERY

## 2022-05-10 PROCEDURE — 2709999900 HC NON-CHARGEABLE SUPPLY: Performed by: SURGERY

## 2022-05-10 PROCEDURE — 3700000000 HC ANESTHESIA ATTENDED CARE: Performed by: SURGERY

## 2022-05-10 PROCEDURE — 7100000000 HC PACU RECOVERY - FIRST 15 MIN: Performed by: SURGERY

## 2022-05-10 PROCEDURE — 2500000003 HC RX 250 WO HCPCS: Performed by: SURGERY

## 2022-05-10 PROCEDURE — 6360000002 HC RX W HCPCS: Performed by: NURSE ANESTHETIST, CERTIFIED REGISTERED

## 2022-05-10 PROCEDURE — 6370000000 HC RX 637 (ALT 250 FOR IP): Performed by: SURGERY

## 2022-05-10 PROCEDURE — 88305 TISSUE EXAM BY PATHOLOGIST: CPT

## 2022-05-10 PROCEDURE — 2580000003 HC RX 258: Performed by: SURGERY

## 2022-05-10 PROCEDURE — 3700000001 HC ADD 15 MINUTES (ANESTHESIA): Performed by: SURGERY

## 2022-05-10 PROCEDURE — 2580000003 HC RX 258: Performed by: ANESTHESIOLOGY

## 2022-05-10 PROCEDURE — 7100000001 HC PACU RECOVERY - ADDTL 15 MIN: Performed by: SURGERY

## 2022-05-10 PROCEDURE — 19380 REVJ RECONSTRUCTED BREAST: CPT | Performed by: SURGERY

## 2022-05-10 PROCEDURE — 7100000011 HC PHASE II RECOVERY - ADDTL 15 MIN: Performed by: SURGERY

## 2022-05-10 PROCEDURE — 3600000003 HC SURGERY LEVEL 3 BASE: Performed by: SURGERY

## 2022-05-10 PROCEDURE — 6360000002 HC RX W HCPCS: Performed by: SURGERY

## 2022-05-10 PROCEDURE — 7100000010 HC PHASE II RECOVERY - FIRST 15 MIN: Performed by: SURGERY

## 2022-05-10 PROCEDURE — A4217 STERILE WATER/SALINE, 500 ML: HCPCS | Performed by: SURGERY

## 2022-05-10 RX ORDER — FENTANYL CITRATE 50 UG/ML
INJECTION, SOLUTION INTRAMUSCULAR; INTRAVENOUS PRN
Status: DISCONTINUED | OUTPATIENT
Start: 2022-05-10 | End: 2022-05-10 | Stop reason: SDUPTHER

## 2022-05-10 RX ORDER — ACETAMINOPHEN 325 MG/1
975 TABLET ORAL ONCE
Status: COMPLETED | OUTPATIENT
Start: 2022-05-10 | End: 2022-05-10

## 2022-05-10 RX ORDER — DIPHENHYDRAMINE HYDROCHLORIDE 50 MG/ML
12.5 INJECTION INTRAMUSCULAR; INTRAVENOUS
Status: DISCONTINUED | OUTPATIENT
Start: 2022-05-10 | End: 2022-05-10 | Stop reason: HOSPADM

## 2022-05-10 RX ORDER — SODIUM CHLORIDE 0.9 % (FLUSH) 0.9 %
5-40 SYRINGE (ML) INJECTION EVERY 12 HOURS SCHEDULED
Status: DISCONTINUED | OUTPATIENT
Start: 2022-05-10 | End: 2022-05-10 | Stop reason: HOSPADM

## 2022-05-10 RX ORDER — CELECOXIB 200 MG/1
200 CAPSULE ORAL ONCE
Status: COMPLETED | OUTPATIENT
Start: 2022-05-10 | End: 2022-05-10

## 2022-05-10 RX ORDER — PROPOFOL 10 MG/ML
INJECTION, EMULSION INTRAVENOUS CONTINUOUS PRN
Status: DISCONTINUED | OUTPATIENT
Start: 2022-05-10 | End: 2022-05-10 | Stop reason: SDUPTHER

## 2022-05-10 RX ORDER — GABAPENTIN 300 MG/1
300 CAPSULE ORAL ONCE
Status: COMPLETED | OUTPATIENT
Start: 2022-05-10 | End: 2022-05-10

## 2022-05-10 RX ORDER — METOCLOPRAMIDE HYDROCHLORIDE 5 MG/ML
10 INJECTION INTRAMUSCULAR; INTRAVENOUS
Status: DISCONTINUED | OUTPATIENT
Start: 2022-05-10 | End: 2022-05-10 | Stop reason: HOSPADM

## 2022-05-10 RX ORDER — SODIUM CHLORIDE, SODIUM LACTATE, POTASSIUM CHLORIDE, CALCIUM CHLORIDE 600; 310; 30; 20 MG/100ML; MG/100ML; MG/100ML; MG/100ML
INJECTION, SOLUTION INTRAVENOUS CONTINUOUS
Status: DISCONTINUED | OUTPATIENT
Start: 2022-05-10 | End: 2022-05-10 | Stop reason: HOSPADM

## 2022-05-10 RX ORDER — HYDROMORPHONE HYDROCHLORIDE 1 MG/ML
0.25 INJECTION, SOLUTION INTRAMUSCULAR; INTRAVENOUS; SUBCUTANEOUS EVERY 5 MIN PRN
Status: DISCONTINUED | OUTPATIENT
Start: 2022-05-10 | End: 2022-05-10 | Stop reason: HOSPADM

## 2022-05-10 RX ORDER — MIDAZOLAM HYDROCHLORIDE 1 MG/ML
INJECTION INTRAMUSCULAR; INTRAVENOUS PRN
Status: DISCONTINUED | OUTPATIENT
Start: 2022-05-10 | End: 2022-05-10 | Stop reason: SDUPTHER

## 2022-05-10 RX ORDER — HYDROMORPHONE HYDROCHLORIDE 1 MG/ML
0.5 INJECTION, SOLUTION INTRAMUSCULAR; INTRAVENOUS; SUBCUTANEOUS EVERY 5 MIN PRN
Status: DISCONTINUED | OUTPATIENT
Start: 2022-05-10 | End: 2022-05-10 | Stop reason: HOSPADM

## 2022-05-10 RX ORDER — SODIUM CHLORIDE 9 MG/ML
INJECTION, SOLUTION INTRAVENOUS PRN
Status: DISCONTINUED | OUTPATIENT
Start: 2022-05-10 | End: 2022-05-10 | Stop reason: HOSPADM

## 2022-05-10 RX ORDER — SODIUM CHLORIDE 0.9 % (FLUSH) 0.9 %
5-40 SYRINGE (ML) INJECTION PRN
Status: DISCONTINUED | OUTPATIENT
Start: 2022-05-10 | End: 2022-05-10 | Stop reason: HOSPADM

## 2022-05-10 RX ADMIN — GABAPENTIN 300 MG: 300 CAPSULE ORAL at 10:00

## 2022-05-10 RX ADMIN — ACETAMINOPHEN 975 MG: 325 TABLET ORAL at 09:59

## 2022-05-10 RX ADMIN — SODIUM CHLORIDE, SODIUM LACTATE, POTASSIUM CHLORIDE, AND CALCIUM CHLORIDE: 600; 310; 30; 20 INJECTION, SOLUTION INTRAVENOUS at 11:42

## 2022-05-10 RX ADMIN — PROPOFOL 100 MCG/KG/MIN: 10 INJECTION, EMULSION INTRAVENOUS at 12:01

## 2022-05-10 RX ADMIN — MIDAZOLAM 2 MG: 1 INJECTION INTRAMUSCULAR; INTRAVENOUS at 11:55

## 2022-05-10 RX ADMIN — FENTANYL CITRATE 50 MCG: 50 INJECTION, SOLUTION INTRAMUSCULAR; INTRAVENOUS at 12:01

## 2022-05-10 RX ADMIN — CELECOXIB 200 MG: 200 CAPSULE ORAL at 10:00

## 2022-05-10 RX ADMIN — FENTANYL CITRATE 50 MCG: 50 INJECTION, SOLUTION INTRAMUSCULAR; INTRAVENOUS at 11:57

## 2022-05-10 ASSESSMENT — PAIN SCALES - GENERAL
PAINLEVEL_OUTOF10: 0
PAINLEVEL_OUTOF10: 0

## 2022-05-10 ASSESSMENT — ENCOUNTER SYMPTOMS: SHORTNESS OF BREATH: 1

## 2022-05-10 ASSESSMENT — LIFESTYLE VARIABLES: SMOKING_STATUS: 0

## 2022-05-10 NOTE — ANESTHESIA PRE PROCEDURE
Department of Anesthesiology  Preprocedure Note       Name:  Delfin Vásquez   Age:  56 y.o.  :  1965                                          MRN:  [id number]         Date:  5/10/2022      Surgeon: Washington Schulte):  Luis Cheng MD    Procedure: Procedure(s):  SCAR REVISION LEFT PORT SITE    Medications prior to admission:   Prior to Admission medications    Medication Sig Start Date End Date Taking? Authorizing Provider   mupirocin (BACTROBAN NASAL) 2 % nasal ointment Apply to each nostril BID 5 days prior to surgery 22   Luis Cheng MD   ELIQUIS 5 MG TABS tablet TAKE 1 TABLET BY MOUTH TWICE A DAY 3/21/22   BRITANY Ackerman   palbociclib (IBRANCE) 125 MG tablet Take 125 mg by mouth daily For 21 days and off 7 days 22   Sarahi Blackmon MD   guaiFENesin 1200 MG TB12 Take 1 tablet by mouth 2 times daily  Patient not taking: Reported on 3/23/2022 1/31/22   BRITANY Neville   gabapentin (NEURONTIN) 300 MG capsule TAKE 2 CAPSULES BY MOUTH 3 TIMES DAILY FOR 30 DAYS. 22  Leanne Hernandez MD   palbociclib [facility]) 75 MG tablet Take 75 mg by mouth daily 21   Leanne Hernandez MD   sodium hypochlorite (DAKINS) 0.125 % SOLN external solution Apply topically 2 times daily  Patient not taking: Reported on 5/10/2022 10/12/21   BRITANY Sage   VENTOLIN  (90 Base) MCG/ACT inhaler Inhale 2 puffs into the lungs 4 times daily as needed for Wheezing 21   Ashley Morrison MD   pantoprazole (PROTONIX) 40 MG tablet TAKE 1 TABLET BY MOUTH EVERY DAY  Patient taking differently: Take 40 mg by mouth daily  21   Sarahi Blackmon MD   [facility] 4 MG/0.1ML LIQD nasal spray  21   Historical Provider, MD   cyclobenzaprine (FLEXERIL) 10 MG tablet Take 10 mg by mouth 3 times daily as needed for Muscle spasms    Historical Provider, MD   guaiFENesin-codeine (GUAIFENESIN AC) 100-10 MG/5ML liquid Take 5 mLs by mouth 3 times daily as needed for Cough. Historical Provider, MD   Cholecalciferol (VITAMIN D3) 125 MCG (5000 UT) TABS Take 1 tablet by mouth daily     Historical Provider, MD   lisinopril (PRINIVIL;ZESTRIL) 10 MG tablet Take 10 mg by mouth daily     Historical Provider, MD   acyclovir (ZOVIRAX) 800 MG tablet Take 800 mg by mouth 2 times daily as needed   Patient not taking: Reported on 3/23/2022    Historical Provider, MD   ondansetron (ZOFRAN) 4 MG tablet Take 4 mg by mouth every 8 hours as needed for Nausea or Vomiting    Historical Provider, MD   alclomethasone (ACLOVATE) 0.05 % cream Apply topically 2 times daily as needed Apply topically 2 times daily. Historical Provider, MD       Current medications:    No current outpatient medications on file. No current facility-administered medications for this visit. Allergies:     Allergies   Allergen Reactions    Clindamycin/Lincomycin Hives, Itching and Rash       Problem List:    Patient Active Problem List   Diagnosis Code    [facility] C50.919    Poor venous access I87.8    Iron deficiency anemia D50.9    Menopausal symptom N95.1    Benign neoplasm of body of uterus D26.1    Dyspnea and respiratory abnormalities R06.00, R06.89    Chronic pain syndrome G89.4    Right wrist pain M25.531    Rheumatoid arthritis involving both knees with negative rheumatoid factor ([facility] M06.061, M06.062    Other cyst of bone, left ankle and foot M85.672    Status post bilateral mastectomy Z90.13    Acute purulent bronchitis J20.9    Lung nodule R91.1    Status post bilateral breast reconstruction Z98.890    S/P revision of left breast reconstruction 9/17/2021 Z98.890    Arm DVT (deep venous thromboembolism), acute, left (HCC) I82.622    Exostosis of left foot M89.8X7    Central line infection T80.219A   [facility] or reservoir infection T80.212A    Chest wall ulcer, with fat layer exposed ([facility] L98.492    Non-cardiac chest pain R07.89    Wheezing R06.2    Snoring R06.83  RUDY (obstructive sleep apnea) G47.33    Non-restorative sleep G47.8    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, T45.1X5A       Past Medical History:        Diagnosis Date    Breast cancer ([facility]     Breast cancer with lung mets     Chronic back pain     GERD (gastroesophageal reflux disease)     Hx of blood clots     Hypertension     Neuropathy     Post chemo treatment neuropathy       Past Surgical History:        Procedure Laterality Date    BREAST LUMPECTOMY Right     2006    BREAST RECONSTRUCTION Left 09/17/2021    Revision left reconstructed breast, implant performed by Lainey Sainz MD at [address] [place] [address]  2016    Bilateral breast implants    EMBOLECTOMY Left 9/26/2021    LEFT UPPER EXTREMITY  MECHANICAL SUCTION THROMBECTOMY performed by Annamarie Heredia DO at [facility], TOTAL ABDOMINAL  2015    MASTECTOMY, BILATERAL      Right 2013 & Left 2016    TUNNELED VENOUS PORT PLACEMENT      VASCULAR SURGERY N/A 10/6/2021    REMOVAL OF MEDIPORT performed by Annamarie [facility]redia DO at [facility] History:    Social History     Tobacco Use    Smoking status: Never Smoker    Smokeless tobacco: Never Used   Substance Use Topics    Alcohol use: Not Currently                                Counseling given: Not Answered      Vital Signs (Current): There were no vitals filed for this visit.                                            BP Readings from Last 3 Encounters:   05/10/22 123/81   03/16/22 120/75   02/16/22 116/80       NPO Status:                                                                                 BMI:   Wt Readings from Last 3 Encounters:   05/10/22 170 lb (77.1 kg)   05/09/22 168 lb (76.2 kg)   03/23/22 172 lb 3.2 oz (78.1 kg)     There is no height or weight on file to calculate BMI.    CBC:   Lab Results   Component Value Date    WBC 9.10 03/16/2022    RBC 3.56 03/16/2022    HGB 11.4 03/16/2022    HCT 35.2 03/16/2022    MCV 98.9 03/16/2022    RDW 13.1 03/16/2022     03/16/2022       CMP:   Lab Results   Component Value Date     02/16/2022    K 3.9 02/16/2022    K 4.1 10/08/2021     02/16/2022    CO2 29 02/16/2022    BUN 11 02/16/2022    CREATININE 0.6 02/16/2022    GFRAA >59 11/19/2021    AGRATIO 1.6 04/01/2021    LABGLOM >60 02/16/2022    GLUCOSE 99 02/16/2022    PROT 7.1 02/16/2022    CALCIUM 9.1 02/16/2022    BILITOT 1.4 02/16/2022    ALKPHOS 121 02/16/2022    AST 23 02/16/2022    ALT 11 02/16/2022       POC Tests: No results for input(s): POCGLU, POCNA, POCK, POCCL, POCBUN, POCHEMO, POCHCT in the last 72 hours. Coags:   Lab Results   Component Value Date    PROTIME 15.5 11/19/2021    INR 1.22 11/19/2021    APTT 34.3 11/19/2021       HCG (If Applicable): No results found for: PREGTESTUR, PREGSERUM, HCG, HCGQUANT     ABGs: No results found for: PHART, PO2ART, UBK0UBR, YYP7INS, BEART, S4JPNOSC     Type & Screen (If Applicable):  No results found for: LABABO, LABRH    Drug/Infectious Status (If Applicable):  No results found for: HIV, HEPCAB    COVID-19 Screening (If Applicable):   Lab Results   Component Value Date    COVID19 Not Detected 11/19/2021    COVID19 Not Detected 09/13/2021           Anesthesia Evaluation  Patient summary reviewed and Nursing notes reviewed no history of anesthetic complications:   Airway: Mallampati: II  TM distance: >3 FB   Neck ROM: full  Mouth opening: > = 3 FB Dental: normal exam         Pulmonary:Negative Pulmonary ROS and normal exam  breath sounds clear to auscultation  (+) shortness of breath:      (-) not a current smoker          Patient did not smoke on day of surgery.                  Cardiovascular:    (+) hypertension:,     (-) CAD,  angina and  CHF    NYHA Classification: I  ECG reviewed  Rhythm: regular  Rate: normal           Beta Blocker:  Not on Beta Blocker         Neuro/Psych:   Negative Neuro/Psych ROS     (-) seizures, CVA and depression/anxiety GI/Hepatic/Renal: Neg GI/Hepatic/Renal ROS       (-) hiatal hernia and GERD       Endo/Other: Negative Endo/Other ROS   (+) blood dyscrasia: anemia, arthritis: rheumatoid. , malignancy/cancer. Pt had PAT visit. Abdominal:       Abdomen: soft. Vascular:   + DVT, . Other Findings:               Anesthesia Plan      MAC     ASA 3     (Iv zofran within 30 min of closing )  Induction: intravenous. BIS    Anesthetic plan and risks discussed with patient. Use of blood products discussed with patient whom. Plan discussed with CRNA.     Attending anesthesiologist reviewed and agrees with Pre Eval content              Bernadette Ramirez MD   5/10/2022

## 2022-05-10 NOTE — H&P
HISTORY OF PRESENT ILLNESS:     Ms. Devon Kuo is a 56 y.o. black female who presents today for a 6-month breast exam. This is followed by a left mastectomy reconstruction due to lateral drift on 9/17/2021.        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 It was ER/TX positive and she underwent neoadjuvant chemotherapy with AC followed by Taxol. In 2007 she underwent a right lumpectomy with axillary node dissection and received adjuvant radiation therapy to the breast and axilla. She did not take antihormonal therapy due to cost.     She recurred in 2013 and underwent 2 cycles of Taxotere followed by Doxil and then underwent right mastectomy and axillary node dissection. This was followed by completion of radiation therapy to her chest wall and axillary nodes.     In 2016 she underwent implant exchange and a prophylactic mastectomy on the left. She was also switched to aromatase inhibitor.     She subsequent developed metastatic disease with lung, hilar, and axillary metastases and has been on Abraxane, Gemzar, and is now doing monthly Faslodex with palbociclib     She currently has [place] implants. 590 cc on the right. She has an 800 cc implant on the left.     She is now status post revision of left mastectomy reconstruction due to lateral drift on 9/17/2021. She had placement of a 700 cc Natrelle soft touch implant and a crescent mastopexy on the left. She also had liposuction of an excessive fat pad adjacent to her reconstruction.     Phi Blair is a 56 y.o. female with the following history as recorded in [facility]:  Patient Active Problem List    Diagnosis Date Noted    Chemotherapy-induced neuropathy ([facility] 02/07/2022    Wheezing 01/11/2022    Snoring 01/11/2022    RUDY (obstructive sleep apnea) 01/11/2022    Non-restorative sleep 01/11/2022    Non-cardiac chest pain 11/19/2021    Chest wall ulcer, with fat layer exposed ([address] 10/15/2021    Port or reservoir infection 10/06/2021    Central line infection 10/05/2021    Exostosis of left foot 09/29/2021    Arm DVT (deep venous thromboembolism), acute, left ([address] 09/24/2021    S/P revision of left breast reconstruction 9/17/2021 09/23/2021    Status post bilateral breast reconstruction     Acute purulent bronchitis 09/02/2021    Lung nodule 09/02/2021    Status post bilateral mastectomy 08/06/2021    Other cyst of bone, left ankle and foot 04/10/2021    Dyspnea and respiratory abnormalities 04/09/2021    Chronic pain syndrome 04/09/2021    Right wrist pain 04/09/2021    Rheumatoid arthritis involving both knees with negative rheumatoid factor ([facility] 04/09/2021    Menopausal symptom 04/08/2021    Benign neoplasm of body of uterus 04/08/2021    Iron deficiency anemia 01/22/2021    Carcinoma of female breast ([facility] 01/21/2021    Poor venous access 01/21/2021     No current facility-administered medications for this encounter.      Allergies: Clindamycin/lincomycin  Past Medical History:   Diagnosis Date    Breast cancer ([address]     Breast cancer with lung mets (RIGHT)    Chronic back pain     GERD (gastroesophageal reflux disease)     Hx of blood clots     Hypertension     Neuropathy     Post chemo treatment neuropathy     Past Surgical History:   Procedure Laterality Date    BREAST LUMPECTOMY Right     2006    BREAST RECONSTRUCTION Left 09/17/2021    Revision left reconstructed breast, implant performed by Ysabel Myers MD at [address]  2016    Bilateral breast implants    EMBOLECTOMY Left 9/26/2021    LEFT UPPER EXTREMITY  MECHANICAL SUCTION THROMBECTOMY performed by Bhavik Lr DO at [place] [address], TOTAL ABDOMINAL  2015    MASTECTOMY, BILATERAL      Right 2013 & Left 2016    TUNNELED VENOUS PORT PLACEMENT      VASCULAR SURGERY N/A 10/6/2021    REMOVAL OF MEDIPORT performed by Bhavik Lr DO at [address] [facility]     Family History Problem Relation Age of Onset    Hypertension Mother     Obesity Mother     Prostate Cancer Father     No Known Problems Sister     No Known Problems Daughter     No Known Problems Daughter      Social History     Tobacco Use    Smoking status: Never Smoker    Smokeless tobacco: Never Used   Substance Use Topics    Alcohol use: Not Currently       ROS:  14 point review of systems is negative except for the above. PHYSICAL EXAM:    The patient is a 56 y.o. female  in no acute distress. She is alert oriented and cooperative. Mood and affect are appropriate. Skin is warm and dry without rashes. /81   Pulse 72   Temp 97.7 °F (36.5 °C) (Temporal)   Resp 16   Ht 5' 6\" (1.676 m)   Wt 170 lb (77.1 kg)   SpO2 100%   BMI 27.44 kg/m²       HEENT: Normocephalic and atraumatic. EOMs intact. Pupils equal and round and reactive to light and accommodation. External ears and nose are normal.  Sclera nonicteric. Conjunctiva normal  Oropharynx without masses or lesions. Neck: Neck is supple without masses or thyromegaly    Chest: Lungs are clear to auscultation. Respiratory effort normal    Cardiac: Regular rate and rhythm without rubs, murmurs, or gallops    Breasts: The  wounds look good with no evidence of infection, fluid accumulation, or skin necrosis. She still has a small amount of asymmetry with the left nipple being about a centimeter lower than the right. It is significantly better than previously but it does bother her     In addition she has terrible scarring from her port removal and would like that incision revised. This is certainly reasonable and the nature of the excision of this area will likely raise her left nipple appropriately. Abdomen: The abdomen is soft and nontender with no hepatosplenomegaly. There are no abdominal hernias noted. Extremities:  The extremities are normal. There are no signs of clubbing, cyanosis, or edema.     IMPRESSION:     Doing well status post revision of left reconstructed breast  Terrible scar left port site     PLAN: Revision port site at her convenience. We discussed the risks, benefits, and options and she understands agreeable. It has been about 6 months since the port removal and infection so it is probably not going to get much better without surgery.     I have seen, examined and reviewed this patient medication list, appropriate labs and imaging studies. I reviewed relevant medical records and others physicians notes. I discussed the plans of care with the patient. I answered all the questions to the patients satisfaction. I, Dr Marisa Kendall, personally performed the services described in this documentation as scribed by Arthur Mao MA in my presence and is both accurate and complete.     (Please note that portions of this note were completed with a voice recognition program. Efforts were made to edit the dictations but occasionally words are mis-transcribed.)  Over 50% of the total visit time of 25 minutes in face to face encounter with the patient, out of which more than 50% of the time was spent in counseling patient or family and coordination of care. Counseling included but was not limited to time spent reviewing labs, imaging studies/ treatment plan and answering questions.

## 2022-05-10 NOTE — BRIEF OP NOTE
Brief Postoperative Note      DATE OF PROCEDURE: 5/10/2022     SURGEON: Murray Montes MD    PREOPERATIVE DIAGNOSIS:  BREAST CANCER    POSTOPERATIVE DIAGNOSIS: Same     OPERATION: Procedure(s):  SCAR REVISION LEFT PORT SITE   Liposuction right chest/reconstructed breast    ANESTHESIA: MAC    ESTIMATED BLOOD LOSS: Minimal    COMPLICATIONS: None. SPECIMENS:   ID Type Source Tests Collected by Time Destination   A : left chest scar revision Tissue Chest SURGICAL PATHOLOGY Murray Montes MD 5/10/2022 1229        DRAINS: None    The patient tolerated the procedure well.     Electronically signed by Murray Montes MD  on 5/10/2022 at 12:36 PM

## 2022-05-10 NOTE — ANESTHESIA POSTPROCEDURE EVALUATION
Department of Anesthesiology  Postprocedure Note    Patient: Hector Pichardo  MRN: [id number]  YOB: 1965  Date of evaluation: 5/10/2022  Time:  1:08 PM     Procedure Summary     Date: 05/10/22 Room / Location: [address]    Anesthesia Start: 4369 Anesthesia Stop:     Procedure: [facility] (Left Breast) Diagnosis: (BREAST CANCER)    Surgeons: Jefe Grijalva MD Responsible Provider: BRITANY Bhatia CRNA    Anesthesia Type: MAC ASA Status: 3          Anesthesia Type: No value filed. Dena Phase I: Dena Score: 10    Dena Phase II:      Last vitals: Reviewed and per EMR flowsheets.        Anesthesia Post Evaluation    Patient location during evaluation: PACU  Patient participation: complete - patient participated  Level of consciousness: awake  Pain score: 0  Airway patency: patent  Nausea & Vomiting: no nausea and no vomiting  Complications: no  Cardiovascular status: hemodynamically stable  Respiratory status: acceptable and spontaneous ventilation  Hydration status: euvolemic

## 2022-05-11 NOTE — OP NOTE
[facility] OPAL [address] [facility]                                OPERATIVE REPORT    PATIENT NAME: Andre Hoffmann                    :        1965  MED REC NO:   [id number]                              ROOM:  ACCOUNT NO:   [de-identified]                           ADMIT DATE: 05/10/2022  PROVIDER:     Amelie Day MD    DATE OF PROCEDURE:  05/10/2022    PREOPERATIVE DIAGNOSES:  Severe scarring, left port site and asymmetry  of reconstructed breast.    POSTOPERATIVE DIAGNOSES:  Severe scarring, left port site and asymmetry  of reconstructed breast.    PROCEDURE PERFORMED:  1.  Scar revision, left breast with a complex closure, 11 cm in length. 2.  Liposuction, right reconstructed breast.    SURGEON:  Amelie Day MD    ASSISTANT:  Cory Reaves PA-C    ANESTHESIA:  MAC with local.    INDICATIONS:  The patient is a 53-year-old lady who has had bilateral  mastectomy and reconstruction, got a little extra fat now at the lateral  and the inframammary crease on the right of her right reconstructed  breast.  She also had a port infection that healed nastily, just a  terrible looking scar there. She also has a little breast asymmetry  that I feel excising and re-closing the port sites will give her the 1  cm or so lift that she needs. OPERATIVE PROCEDURE:  Today, she was brought to the operating room,  adequately sedated. We anesthetized a 5 x 9 area at the lateral  inframammary area on the right with 1/16% Xylocaine as a tumescent  solution. We then injected same solution around the old port site. We  then excised the old area that had complex closure, was approximately 11  cm in length, was closed with 2-0 and 3-0 Vicryl and 4-0 Stratafix and  Dermabond for the skin. It looked much better.   We then made a skin  nick and utilized the 6 mm liposuction cannula to remove a fair amount  of fatty tissue from this area in her lateral right reconstructed  breast.  Estimated blood loss, minimal.  Complications, none. She  tolerated all this quite well.         Maria A Ward MD    D: 05/10/2022 14:23:14      T: 05/11/2022 1:51:57     [email or web address]  Job#: [id number]     Doc#: [id number]    CC:

## 2022-05-13 ENCOUNTER — TELEPHONE (OUTPATIENT)
Dept: SURGERY | Age: 57
End: 2022-05-13

## 2022-05-13 NOTE — TELEPHONE ENCOUNTER
5/13/2022 Patient called and stated she had surgery on Tuesday and has opened up the wound.     Please call back at [phone]  dmw

## 2022-05-24 ENCOUNTER — OFFICE VISIT (OUTPATIENT)
Dept: SURGERY | Age: 57
End: 2022-05-24

## 2022-05-24 VITALS — DIASTOLIC BLOOD PRESSURE: 80 MMHG | SYSTOLIC BLOOD PRESSURE: 124 MMHG | HEART RATE: 76 BPM

## 2022-05-24 DIAGNOSIS — Z09 ENCOUNTER FOR EXAMINATION FOLLOWING SURGERY: Primary | ICD-10-CM

## 2022-05-24 PROCEDURE — 99024 POSTOP FOLLOW-UP VISIT: CPT | Performed by: PHYSICIAN ASSISTANT

## 2022-05-24 NOTE — PROGRESS NOTES
MEDICAL ONCOLOGY PROGRESS NOTE    Pt Name: Heather Murcia  MRN: [id number]  YOB: 1965  Date of evaluation: 5/25/2022    HISTORY OF PRESENT ILLNESS:    Reason for MD visit: Disease/toxicity management  Sd Martinez is here today for monitoring for breast cancer and toxicity assessment. She is currently on palliative treatment with Faslodex/Ibrance initiated in January 2017. She has been tolerating treatment quite well. She has developed left upper extremity DVT associated with a left upper chest port. Port was discontinued. She also developed infection of the port site status post IV antibiotics. She had a repeat venous ultrasound that showed chronic appearing clot and subacute DVT basilic vein. She is currently on Eliquis for anticoagulation. She had liposuction, right reconstructed breast surgery with Dr. Billy Bess recently. She is quite happy with the results. In addition, scar revision, left breast with a complex closure, 11 cm in length. She has been tolerating treatment with Faslodex/Ibrance well. She denies any mucositis. Denies any diarrhea. She had complaints of hair loss. She was referred to dermatology.       Diagnosis  · Grade III Adenocarcinoma of right breast diagnosed 2006  · Stage IIIA, [id number]  · ER/NC Positive, HER-2 bruce/ihc 1+  · April 2013-RECURRENCE in the right axillary region  · Genetic testing- BRCA 1&2 Negative  · 2014 (?) RECURRENCE in the axillary skin  · 01/05/2017- METASTATIC DISEASE with lung, hilar, and axillary lymph node mets  · Osteopenia  · Mediport associated DVT, October 2021  · DVT left upper extremity, November 2021    Treatment Summary  · 2006- Neoadjuvant chemotherapy with AC x 4 cycles followed by Taxol  · 2007- Lumpectomy with axillary lymph node dissection  · 2007- Adjuvant radiation therapy to whole breast and axillary region  · Did not take tamoxifen due to cost  · April 2013- RECURRENCE  · Neoadjuvant chemotherapy with 2 cycles of Taxotere followed by Doxil  · 10/14/2013- Right Breast Mastectomy and Axillary Dissection  · 01/23/2014- Completion of adjuvant RT to chest wall and axillary lymph node with Dr. Nate Ray  · 02/14- Initiated adjuvant endocrine therapy with tamoxifen  · 2014 (?) RECURRENCE in the axillary skin  · 2014 (?) Surgical resection of the axillary skin  · 2015 (?) YVETTE/BSO  · 09/19/2016- Bilateral exchange, nipple reconstruction and fat grafting and removal of PAC   · 2016- Switched to aromatase inhibitor  · 01/05/2017- METASTATIC DISEASE with lung, hilar, and axillary mets  · 01/11/2017-06/17/2018- Single agent Abraxane x 13 cycles  · 06/27/2018-09/17/2018- Gemzar  · 10/10/2018-Faslodex every 4 weeks/palbociclib    Cancer History:  Nicole Diaz was first seen by me on 1/21/2021. She was referred to [facility] of care for history of recurrent metastatic breast cancer. She has been in remission as per the latest CT scan. She is currently on Faslodex and palbociclib. She has received several lines therapy as described below. She moved from [place] to [place] to stay closer to her parents. Her medical history is complex. Further details as below. · 2006- Neoadjuvant chemotherapy with AC x 4 cycles followed by Taxol AC was complicated by viral meningitis; patient experienced neuropathy after 3 doses of Taxol and was switched to Taxotere for last dose  · 2007- Lumpectomy with axillary lymph node dissection 1.9cm, grade 2. No LVI. 1 of 14 lymph nodes positive for malignancy (1/14) [id number]  · 2007- Adjuvant Radiation Therapy to whole breast and axillary region  · April 2013- RECURRENCE presenting as mass in the axillary region  · Neoadjuvant chemotherapy with 2 cycles of Taxotere followed by Doxil- did not have good response to treatment  · 2013- Preop PET/CTshowed 2.8cm, right axillary lymph node with uptake.    · 10/14/2013- Right Breast Mastectomy and Axillary Dissection Right breast had benign tissue with fibrous, negative for carcinoma. Axillary contents demonstrated invasive ductal carcinoma, involving fibroadipose tissue and tendinous tissue. 3 benign lymph nodes (0/3). [place] grade 3. 3.0cm in greatest gross dimensions. Carcinoma permeates among soft tissues and in the right axilla with no apparent involvement of lymph nodes. · 01/23/2014- Completion of adjuvant radiation therapy to chest wall and axillary lymph node with Dr. Sparkle Barger  · 02/14- Initiated adjuvant endocrine therapy with tamoxifen  · 2014 (?) RECURRENCE in the axillary skin  · 2014 (?) Surgical resection of the axillary skin pathology demonstrating tumor at cauterized margin  · 2015 (?) YVETTE/BSO  · 09/19/2016- Bilateral exchange, nipple reconstruction and fat grafting and removal of PAC  · 2016- Switched to aromatase inhibitor patient was non compliant  · 01/05/2017- METASTATIC DISEASE Presented with respiratory failure. CTA Pulmonary showed numerous nodules and masses throughout both lung fields, compatible with metastatic disease. Bilateral hilar adenopathy seen. · 01/11/2017-06/17/2018- Single agent Abraxane x 13 cycles  · 03/15/2017- CT Chest W Contrast Interval decrease in maximal diameter of essentially all the multiple metastatic pulmonary nodules. The average difference is approximately 25%. Some of the much smaller ones have disappeared. Bilateral hilar and aortopulmonary window adenopathy is also similarly smaller. · 06/07/2017- CT Chest W Contrast Multiple lung nodules seen bilaterally. Most of the lung nodules show interval improvement with decreased size by 1 to 3mm. Mediastinal and bilateral hilar adenopathy seen with improvement. · 07/24/2017- PET/CT scan showed marked improvement, is minimal abnormal, has excellent response to therapy  · 02/01/2018- PET/CT scan Numerous bilateral lung masses and nodules, the majority of which do not have appreciable abnormal FDG uptake.  The largest mass in the left infrahilar region of the left lower lobe demonstrates a maximum SUV of 3.3. Mediastinal lymphadenopathy. No evidence of metastatic disease in the abdomen or pelvis. · 06/21/2018- CT Chest/Abdomen/Pelvis Multiple lung mets, mild bilateral hilar and mediastinal lymph nodes. No masses or evidence of metastatic disease in the abdomen or pelvis  · 06/27/2018-09/17/2018- Gemzar  · 09/28/2018- CT Pulmonary Multiple lung mets, mild bilateral hilar and mediastinal lymph nodes  · 10/10/2018-12/21/2020- Faslodex every 4 weeks  · 12/14/2018- CT Chest showed definite decrease in the maximal diameter and volume of all left and right metastatic nodules. Somewhat enlarged paratracheal and left axillary lymph nodes are relatively unchanged. · 04/22/2019- MRI Cervical Spine W WO Contrast Unremarkable  · 04/22/2019- MRI Brain W WO Contrast No enhancing lesions in the brain and no evidence of metastatic disease. · 07/27/2019- CTA Pulmonary showed essentially complete disappearance of the pulmonary metastatic disease. Several tiny flat peripheral nodules are the only sequela. The tiny ones on the right are unchanged, the tiny ones on the left are even smaller. There is no longer any active metastatic disease in either lung. · 12/19/2019- CT Chest/Abdomen/Pelvis Small, tiny subpleural nodules right upper and right midlung field as well as left lung base has remained unchanged. No new focal parenchymal abnormality seen. No adenopathy seen. There is no abdominal or pelvic mass, adenopathy or fluid collection. No lytic or sclerotic bony lesions, but there is a possibility of osteopenia and/or osteoporosis. · 02/17/2020- DEXA Bone Density showed osteopenia of lumbar spine, T-score -1.8, and left femoral neck, T-score -2.0  · 2/25/21 Ct Abdomen Pelvis W Iv Contrast  No evidence of metastatic disease in the abdomen or pelvis.    · 2/26/21 Ct Chest W Contrast Tiny nodules in the right lung described above probably represent small foci of pleural thickening due to chronic inflammatory process or small noncalcified granulomas. No features to suggest malignancy or metastatic disease. Bilateral breast implants without complication. · 3/1/2021-discussed the results CT chest abdomen pelvis. Essentially no clear evidence of metastatic disease. This is consistent with excellent response to treatment. Continue current treatment. · 4/1/2021 = 31.6 CA 27-29= 33.5 CEA= 1.6   · 6/3/21 CA 15-3= 23.3 CA 27-29= 25.6  CEA= 1.8   · 7/29/2021 CEA=1.7 CA 15-3=21.50 CA 27.29= 26.0  · 8/19/2021 CT Chest  Left lower lobe pleural-based nodular 1.7 x 0.9 cm is indeterminate. PET/CT recommended. Feeding defect in the left lower lobe bronchus versus a postobstructive airspace disease. This too may be further characterized with PET CT versus direct visualization. · 8/19/2021 CT Abd/Pelvis A stable CT scan of the abdomen and pelvis. No finding to suggest neoplastic/metastatic disease. · 9/28/2021 Final Diagnosis: Arm, excision of left arm thrombus:Fragments of organizing and organized thrombi with dystrophic calcification. Received is a container labeled \"ischemic, left arm\" Received in a fixative is a 3.6 x 2.6 x 1.4 cm aggregate of brown-tan soft tissue fragments and skin. Representative sections are submitted in a single cassette, numerous. · October 2021-she developed left upper extremity DVT associated support. Port removed. She also had infection of the port site status post completion to biotics. · 11/19/21 CTA pulmonary: No evidence of pulmonary embolism. · The lungs are poorly expanded but unremarkable. · 12/8/21 MRI lumbar spine: No significant change since the previous study. Chronic degenerative changes. A persistent mild degenerative retrolisthesis is of L5 over S1. Prominent disc osteophyte complexes and facetal arthropathy and resultant mild spinal stenosis at L4-5 and neural foraminal stenosis at L4-5 and L5-S1 as detailed above.   · 1/6/22 CT CHEST WO CONTRAST Stable chest CT with an unchanged tiny subpleural nodule within the right upper lobe. · 1/19/22 CA 15-3 24 CA 27-29 31.8 CEA 1.5  · 2/25/2022 CT Abd/Pelvis W IV Contrast(Oral) Gastric wall thickening is nonspecific. It could be an artifact of under distention. Gastritis and other etiologies are not ruled out. The unenhanced solid organs demonstrate no focal abnormality. Diverticulosis of the colon. Under distention of portions of the colon. Prior hysterectomy. Mild sclerotic changes in the subarticular femoral heads bilaterally is stable. Early AVN is possible. MRI would be more definitive. The signal abnormality is greater on the right side. · 5/10/2022 Skin, revision of left chest scar: Cicatrix identified with foreign body type response, negative for evidence of malignancy.     CA 27.29 Dynamics  01/21/2021- 20.3  04/01/2021 33.5  06/03/2021 25.6  07/29/2021 26.0  1/19/22 31.8    Past Medical History:    Past Medical History:   Diagnosis Date    Breast cancer ([address]     Breast cancer with lung mets     Chronic back pain     GERD (gastroesophageal reflux disease)     Hx of blood clots     Hypertension     Neuropathy     Post chemo treatment neuropathy       Past Surgical History:    Past Surgical History:   Procedure Laterality Date    BREAST LUMPECTOMY Right     2006    BREAST RECONSTRUCTION Left 09/17/2021    Revision left reconstructed breast, implant performed by Lupis Medina MD at [address] [place] [address]  2016    Bilateral breast implants    EMBOLECTOMY Left 9/26/2021    LEFT UPPER EXTREMITY  MECHANICAL SUCTION THROMBECTOMY performed by Donald Osorio DO at [facility], TOTAL ABDOMINAL  2015    MASTECTOMY, BILATERAL      Right 2013 & Left 2016    SCAR REVISION Left 5/10/2022    SCAR REVISION LEFT PORT SITE performed by Lupis Medina MD at [address] [place] [address] VASCULAR SURGERY N/A 10/6/2021    REMOVAL OF MEDIPORT performed by Maureen Regalado, DO at [place] [id number] History:    Marital status, children: Single, 2 children-female  Smoking status: no  ETOH status: no  Profession: Disabled  Resides: [place], [place]  Recent trips: Moved from [address] [place] [address] López: no    Family History:   Family History   Problem Relation Age of Onset    Hypertension Mother     Obesity Mother     Prostate Cancer Father     No Known Problems Sister     No Known Problems Daughter     No Known Problems Daughter        Current Hospital Medications:    Current Outpatient Medications   Medication Sig Dispense Refill    cyclobenzaprine (FLEXERIL) 10 mg tablet Take 1 tablet by mouth 3 times daily as needed for Muscle spasms 30 tablet 0    Cholecalciferol (VITAMIN D3) 1.25 MG (80283 UT) TABS Take 1 tablet by mouth once a week 6 tablet 0    mupirocin (BACTROBAN NASAL) 2 % nasal ointment Apply to each nostril BID 5 days prior to surgery 1 each 0    ELIQUIS 5 MG TABS tablet TAKE 1 TABLET BY MOUTH TWICE A DAY 60 tablet 3    palbociclib (IBRANCE) 125 MG tablet Take 125 mg by mouth daily For 21 days and off 7 days 21 tablet 5    guaiFENesin 1200 MG TB12 Take 1 tablet by mouth 2 times daily 30 tablet 0    gabapentin (NEURONTIN) 300 MG capsule TAKE 2 CAPSULES BY MOUTH 3 TIMES DAILY FOR 30 DAYS. 180 capsule 0    sodium hypochlorite (DAKINS) 0.125 % SOLN external solution Apply topically 2 times daily 1 each 0    VENTOLIN  (90 Base) MCG/ACT inhaler Inhale 2 puffs into the lungs 4 times daily as needed for Wheezing 18 g 5    pantoprazole (PROTONIX) 40 MG tablet TAKE 1 TABLET BY MOUTH EVERY DAY (Patient taking differently: Take 40 mg by mouth daily ) 90 tablet 0    guaiFENesin-codeine (GUAIFENESIN AC) 100-10 MG/5ML liquid Take 5 mLs by mouth 3 times daily as needed for Cough.       acyclovir (ZOVIRAX) 800 MG tablet Take 800 mg by mouth 2 times daily as needed       ondansetron (ZOFRAN) 4 MG tablet Take 4 mg by mouth every 8 hours as needed for Nausea or Vomiting  alclomethasone (ACLOVATE) 0.05 % cream Apply topically 2 times daily as needed Apply topically 2 times daily.  palbociclib (IBRANCE) 75 MG tablet Take 75 mg by mouth daily 21 tablet 1    NARCAN 4 MG/0.1ML LIQD nasal spray  (Patient not taking: Reported on 5/25/2022)      lisinopril (PRINIVIL;ZESTRIL) 10 MG tablet Take 10 mg by mouth daily  (Patient not taking: Reported on 5/25/2022)       No current facility-administered medications for this visit. Allergies: Allergies   Allergen Reactions    Clindamycin/Lincomycin Hives, Itching and Rash         Subjective   REVIEW OF SYSTEMS:   CONSTITUTIONAL: no fever, no night sweats, weakness, fatigue;  HEENT: no blurring of vision, no double vision, no hearing difficulty, no tinnitus, no ulceration, no dysplasia, no epistaxis;   LUNGS: no cough, no hemoptysis, no wheeze,  no shortness of breath;  CARDIOVASCULAR: no palpitation, no chest pain, no shortness of breath;  GI: no abdominal pain, no nausea, no vomiting, no diarrhea, no constipation;  PATRICK: no dysuria, no hematuria, no frequency or urgency, no nephrolithiasis;  MUSCULOSKELETAL: no joint pain, no swelling, no stiffness;  ENDOCRINE: no polyuria, no polydipsia, no cold or heat intolerance;  HEMATOLOGY: no easy bruising or bleeding, no history of clotting disorder;  DERMATOLOGY: no skin rash, no eczema, no pruritus;  PSYCHIATRY: no depression, no anxiety, no panic attacks, no suicidal ideation, no homicidal ideation;  NEUROLOGY: no syncope, no seizures, no numbness or tingling of hands, no numbness or tingling of feet, no paresis;    /84   Pulse 78   Ht 5' 6\" (1.676 m)   Wt 174 lb 11.2 oz (79.2 kg)   SpO2 98%   BMI 28.20 kg/m²      PHYSICAL EXAM:  CONSTITUTIONAL: Alert, appropriate, no acute distress  EYES: Non icteric, EOM intact, pupils equal round   ENT: Mucus membranes moist, no oral pharyngeal lesions, external inspection of ears and nose are normal.  NECK: Supple, no masses.   No palpable thyroid mass  CHEST/LUNGS: CTA bilaterally, normal respiratory effort   CARDIOVASCULAR: RRR, no murmurs. No lower extremity edema  ABDOMEN: soft non-tender, active bowel sounds, no HSM. No palpable masses  EXTREMITIES: warm, full ROM in all 4 extremities, no focal weakness. SKIN: warm, dry with no rashes or lesions  LYMPH: No cervical, clavicular, axillary, or inguinal lymphadenopathy  NEUROLOGIC: follows commands, non focal   PSYCH: mood and affect appropriate. Alert and oriented to time, place, person       LABORATORY RESULTS REVIEWED/ANALYZED BY ME:  5/10/2022 Skin, revision of left chest scar: Cicatrix identified with foreign body   type response, negative for evidence of malignancy.      Lab Results   Component Value Date    WBC 8.48 05/25/2022    HGB 12.4 05/25/2022    HCT 42.0 05/25/2022    MCV 95.7 (H) 05/25/2022     05/25/2022     Lab Results   Component Value Date    NEUTROABS 4.89 05/25/2022     Lab Results   Component Value Date     05/25/2022    K 4.3 05/25/2022     05/25/2022    CO2 25 05/25/2022    BUN 14 05/25/2022    CREATININE 0.5 05/25/2022    GLUCOSE 76 05/25/2022    CALCIUM 9.2 05/25/2022    PROT 7.5 05/25/2022    LABALBU 4.8 05/25/2022    BILITOT 0.7 05/25/2022    ALKPHOS 126 (H) 05/25/2022    AST 17 05/25/2022    ALT 15 05/25/2022    LABGLOM >60 05/25/2022    GFRAA >59 05/25/2022    AGRATIO 1.6 04/01/2021    GLOB 3.0 02/16/2022     CA 15-3 = 15    RADIOLOGY STUDIES REVIEWED BY ME:  None    ASSESSMENT:    Orders Placed This Encounter   Procedures    CT ABDOMEN PELVIS W IV CONTRAST Additional Contrast? Oral     Standing Status:   Future     Standing Expiration Date:   5/25/2023     Scheduling Instructions:      sched 2 months     Order Specific Question:   Additional Contrast?     Answer:   Oral     Order Specific Question:   STAT Creatinine as needed:     Answer:   Yes     Order Specific Question:   Reason for exam:     Answer:   surveillance    CT CHEST W CONTRAST     Standing Status:   Future     Standing Expiration Date:   5/25/2023     Scheduling Instructions:      sched 2 months     Order Specific Question:   STAT Creatinine as needed:     Answer:   Yes     Order Specific Question:   Reason for exam:     Answer:   surveillance    Vitamin D 25 Hydroxy     Standing Status:   Future     Number of Occurrences:   1     Standing Expiration Date:   5/25/2023    Zinc     Standing Status:   Future     Number of Occurrences:   1     Standing Expiration Date:   5/25/2023    Cancer Antigen 15-3     Standing Status:   Future     Number of Occurrences:   1     Standing Expiration Date:   5/25/2023    CEA     Standing Status:   Future     Number of Occurrences:   1     Standing Expiration Date:   5/25/2023    Cancer Antigen 27.29     Standing Status:   Future     Number of Occurrences:   1     Standing Expiration Date:   5/25/2023      Deng Holguin was seen today for follow-up. Diagnoses and all orders for this visit:    Metastatic breast cancer ([facility] [address]  -     Vitamin D 25 Hydroxy; Future  -     Zinc; Future  -     Cancel: Magnesium; Future  -     Cancel: Comprehensive Metabolic Panel; Future  -     Cancer Antigen 15-3; Future  -     CEA; Future  -     Cancer Antigen 27.29; Future  -     CT ABDOMEN PELVIS W IV CONTRAST Additional Contrast? Oral; Future  -     CT CHEST W CONTRAST; Future    Care plan discussed with patient    Use of fulvestrant (Faslodex)    Vitamin D deficiency  -     Vitamin D 25 Hydroxy; Future    Antineoplastic drugs causing adverse effect, sequela    Chronic deep vein thrombosis (DVT) of left upper extremity, unspecified vein (HCC)    Other orders  -     cyclobenzaprine (FLEXERIL) 10 mg tablet;  Take 1 tablet by mouth 3 times daily as needed for Muscle spasms  -     Cholecalciferol (VITAMIN D3) 1.25 MG (98443 UT) TABS; Take 1 tablet by mouth once a week      Metastatic breast cancer, hormone sensitive  2/25/2021-discussed the results of CT chest abdomen pelvis. No evidence of disease progression. In fact, excellent response with no measurable metastatic disease at this time. Currently Faslodex/Ibrance. Regimen:  Faslodex to 500 mg subcutaneous every 4 weeks. Continue Ibrance days 1-21 q. 28 days    Clinical/laboratory assessment of toxicity for Ibrance    8/19/2021-CT chest/abdomen/pelvis-no evidence of metastatic disease. Findings of a pulmonary lung nodule in the left lower lobe  -JAN/FEB 2022- CT C/A/P-MARIA INES    Essentially, good response to Faslodex/Ibrance. Last cancer marker 1/19/2022 were normal.    Plan:  -Continue Faslodex/Ibrance 125 mg days 1-21 q. 28 days      B12 deficiency- Recommended B12 2000 mcg p.o. daily. Will start B12 injections today 1000mcg with Faslodex. 3/1/2021- Methylmalonic Acid 171, Vitamin B12 >2000. B12 replaced monthly. 10/10/2021-vitamin B12 154. She is currently receiving B12 replacement. Chemotherapy-induced neuropathy-continue gabapentin. COVID-19 vaccination response-patient had covid 19 Jan 2022. Left lower lobe lung nodule-she is currently being seen by pulmonary at [facility]. They believe that this could be inflammatory. I reviewed the CT scans. Stable pulmonary nodule. -CT chest January 2022-subcentimeter stable pulmonary nodules. Acute left upper extremity DVT/left upper chest port, October 2021-US upper extremity showed acute DVT likely related to port Oct 2021.   -Currently on Eliquis. S/p Mediport removal.  -US Jan 2022,LUE: Chronic appearing deep vein thrombosis (DVT) is seen in the axillary  brachial , left upper extremity(ies). Subacute appearing superficial thrombophlebitis (SVT) is seen in the  basilic vein, left upper extremity(ies  -Continue Eliquis 5 mg p.o. twice daily    Normocytic anemia-hemoglobin 10.8. Improving. Continue to monitor BC.  10/10/2021-ferritin 92, iron saturation 17%, TIBC 242, folate 4.6, vitamin B12 154. Continue B12 replacement    Alopecia-new onset. Traci Gonzalez can certainly be a culprit. Referred to dermatology. PLAN:  · RTC with MD 8 weeks  · Faslodex today and every 4 weeks  · Continue follow-up with wound care  · Follow up with Dr. Sterling Romero for PCP care  · CBC, CMP, Mag, Zinc, Vitamin D level, CEA, CA 15-3, CA 27-29 today  · Continue Ibrance  · Continue B12 replacement monthly  · Repeat B12 next visit  · CT chest/abdomen/pelvis prior to next MD visit      Antonia Nicolas am pre charting  as Medical Assistant for Caren Mcadams MD. Electronically signed by Ayla Ellis MA on 5/25/2022 at 11:08 AM CDT. Hiral Denis am scribing for Caren Mcadams MD. Electronically signed by Judy Mina RN on 5/25/2022 at 12:19 PM CDT. I, Dr Catalina Rodriguez, personally performed the services described in this documentation as scribed by Judy Mina RN in my presence and is both accurate and complete. I have seen, examined and reviewed this patient medication list, appropriate labs and imaging studies. I reviewed relevant medical records and others physicians notes. I discussed the plans of care with the patient. I answered all the questions to the patients satisfaction. I have also reviewed the chief complaint (CC) and part of the history (History of Present Illness (HPI), Past Family Social History [facility]), or Review of Systems (ROS) and made changes when appropriated.        (Please note that portions of this note were completed with a voice recognition program. Efforts were made to edit the dictations but occasionally words are mis-transcribed.)    Electronically signed by Caren Mcadams MD on 5/25/2022 at 5:03 PM

## 2022-05-24 NOTE — PROGRESS NOTES
Subjective  Hector Pichardo comes today in follow-up from port site scar revision and liposuction to the right upper chest wall. She states she is doing well.     Objective  Patient Active Problem List    Diagnosis Date Noted    Chemotherapy-induced neuropathy ([facility] [address] 02/07/2022    Wheezing 01/11/2022    Snoring 01/11/2022    RUDY (obstructive sleep apnea) 01/11/2022    Non-restorative sleep 01/11/2022    Non-cardiac chest pain 11/19/2021    Chest wall ulcer, with fat layer exposed ([address] 10/15/2021    Port or reservoir infection 10/06/2021    Central line infection 10/05/2021    Exostosis of left foot 09/29/2021    Arm DVT (deep venous thromboembolism), acute, left ([address] 09/24/2021    S/P revision of left breast reconstruction 9/17/2021 09/23/2021    Status post bilateral breast reconstruction     Acute purulent bronchitis 09/02/2021    Lung nodule 09/02/2021    Status post bilateral mastectomy 08/06/2021    Other cyst of bone, left ankle and foot 04/10/2021    Dyspnea and respiratory abnormalities 04/09/2021    Chronic pain syndrome 04/09/2021    Right wrist pain 04/09/2021    Rheumatoid arthritis involving both knees with negative rheumatoid factor ([facility] [address] 04/09/2021    Menopausal symptom 04/08/2021    Benign neoplasm of body of uterus 04/08/2021    Iron deficiency anemia 01/22/2021    Carcinoma of female breast ([facility] [address] 01/21/2021    Poor venous access 01/21/2021       Current Outpatient Medications   Medication Sig Dispense Refill    mupirocin (BACTROBAN NASAL) 2 % nasal ointment Apply to each nostril BID 5 days prior to surgery 1 each 0    ELIQUIS 5 MG TABS tablet TAKE 1 TABLET BY MOUTH TWICE A DAY 60 tablet 3    palbociclib (IBRANCE) 125 MG tablet Take 125 mg by mouth daily For 21 days and off 7 days 21 tablet 5    guaiFENesin 1200 MG TB12 Take 1 tablet by mouth 2 times daily 30 tablet 0    sodium hypochlorite (DAKINS) 0.125 % SOLN external solution Apply topically 2 times daily 1 each 0  VENTOLIN  (90 Base) MCG/ACT inhaler Inhale 2 puffs into the lungs 4 times daily as needed for Wheezing 18 g 5    pantoprazole (PROTONIX) 40 MG tablet TAKE 1 TABLET BY MOUTH EVERY DAY (Patient taking differently: Take 40 mg by mouth daily ) 90 tablet 0    NARCAN 4 MG/0.1ML LIQD nasal spray       cyclobenzaprine (FLEXERIL) 10 MG tablet Take 10 mg by mouth 3 times daily as needed for Muscle spasms      guaiFENesin-codeine (GUAIFENESIN AC) 100-10 MG/5ML liquid Take 5 mLs by mouth 3 times daily as needed for Cough.  Cholecalciferol (VITAMIN D3) 125 MCG (5000 UT) TABS Take 1 tablet by mouth daily       lisinopril (PRINIVIL;ZESTRIL) 10 MG tablet Take 10 mg by mouth daily       acyclovir (ZOVIRAX) 800 MG tablet Take 800 mg by mouth 2 times daily as needed       ondansetron (ZOFRAN) 4 MG tablet Take 4 mg by mouth every 8 hours as needed for Nausea or Vomiting      alclomethasone (ACLOVATE) 0.05 % cream Apply topically 2 times daily as needed Apply topically 2 times daily.  gabapentin (NEURONTIN) 300 MG capsule TAKE 2 CAPSULES BY MOUTH 3 TIMES DAILY FOR 30 DAYS. 180 capsule 0    palbociclib (IBRANCE) 75 MG tablet Take 75 mg by mouth daily 21 tablet 1     No current facility-administered medications for this visit.        Allergies: Clindamycin/lincomycin    Past Medical History:   Diagnosis Date    Breast cancer ([facility] [address]     Breast cancer with lung mets     Chronic back pain     GERD (gastroesophageal reflux disease)     Hx of blood clots     Hypertension     Neuropathy     Post chemo treatment neuropathy       Past Surgical History:   Procedure Laterality Date    BREAST LUMPECTOMY Right     2006    BREAST RECONSTRUCTION Left 09/17/2021    Revision left reconstructed breast, implant performed by Derick Vicente MD at [address]  2016    Bilateral breast implants    EMBOLECTOMY Left 9/26/2021    LEFT UPPER EXTREMITY  MECHANICAL SUCTION THROMBECTOMY performed by My DO Guy at [facility] OR    HYSTERECTOMY      HYSTERECTOMY, TOTAL ABDOMINAL  2015    MASTECTOMY, BILATERAL      Right 2013 & Left 2016    SCAR REVISION Left 5/10/2022    SCAR REVISION LEFT PORT SITE performed by Juan Alberto Sy MD at [address] [facility] TUNNELED VENOUS PORT PLACEMENT      VASCULAR SURGERY N/A 10/6/2021    REMOVAL OF MEDIPORT performed by Mir Blanca DO at [facility] OR       Family History   Problem Relation Age of Onset    Hypertension Mother     Obesity Mother     Prostate Cancer Father     No Known Problems Sister     No Known Problems Daughter     No Known Problems Daughter        Social History     Tobacco Use    Smoking status: Never Smoker    Smokeless tobacco: Never Used   Substance Use Topics    Alcohol use: Not Currently        Review of systems  Reviewed and positive for the above all other systems noted to be negative    Exam  Blood pressure 124/80, pulse 76. On examination to her port scar revision site, it is healing well. She has a normal healing ridge. On examination to the liposuction area. There is a healing ridge noted in the insertion site. There is no evidence of infection. Assessment  Doing well status post liposuction and port site revision. Plan  We will see her back in the office on a as needed basis. She will continue her oncology follow-up with Dr. Jony Flores. If she needs to be seen by Dr. Vy Gomez for any further surgical evaluation, I have instructed her to feel free to give us a call.

## 2022-05-25 ENCOUNTER — OFFICE VISIT (OUTPATIENT)
Dept: HEMATOLOGY | Age: 57
End: 2022-05-25
Payer: MEDICARE

## 2022-05-25 ENCOUNTER — HOSPITAL ENCOUNTER (OUTPATIENT)
Dept: INFUSION THERAPY | Age: 57
Discharge: HOME | End: 2022-05-25
Payer: MEDICARE

## 2022-05-25 VITALS
SYSTOLIC BLOOD PRESSURE: 132 MMHG | DIASTOLIC BLOOD PRESSURE: 84 MMHG | HEART RATE: 78 BPM | WEIGHT: 174.7 LBS | OXYGEN SATURATION: 98 % | BODY MASS INDEX: 28.08 KG/M2 | HEIGHT: 66 IN

## 2022-05-25 DIAGNOSIS — D50.8 OTHER IRON DEFICIENCY ANEMIA: ICD-10-CM

## 2022-05-25 DIAGNOSIS — T45.1X5S ANTINEOPLASTIC DRUGS CAUSING ADVERSE EFFECT, SEQUELA: ICD-10-CM

## 2022-05-25 DIAGNOSIS — Z79.818 USE OF FULVESTRANT (FASLODEX): ICD-10-CM

## 2022-05-25 DIAGNOSIS — I82.722 CHRONIC DEEP VEIN THROMBOSIS (DVT) OF LEFT UPPER EXTREMITY, UNSPECIFIED VEIN (HCC): ICD-10-CM

## 2022-05-25 DIAGNOSIS — Z71.89 CARE PLAN DISCUSSED WITH PATIENT: ICD-10-CM

## 2022-05-25 DIAGNOSIS — C50.919 CARCINOMA OF FEMALE BREAST, UNSPECIFIED ESTROGEN RECEPTOR STATUS, UNSPECIFIED LATERALITY, UNSPECIFIED SITE OF BREAST (HCC): ICD-10-CM

## 2022-05-25 DIAGNOSIS — E55.9 VITAMIN D DEFICIENCY: ICD-10-CM

## 2022-05-25 DIAGNOSIS — C50.919 METASTATIC BREAST CANCER (HCC): Primary | ICD-10-CM

## 2022-05-25 DIAGNOSIS — C50.919 METASTATIC BREAST CANCER (HCC): ICD-10-CM

## 2022-05-25 LAB
ALBUMIN SERPL-MCNC: 4.8 G/DL (ref 3.5–5.2)
ALP BLD-CCNC: 126 U/L (ref 35–104)
ALT SERPL-CCNC: 15 U/L (ref 5–33)
ANION GAP SERPL CALCULATED.3IONS-SCNC: 13 MMOL/L (ref 7–19)
AST SERPL-CCNC: 17 U/L (ref 5–32)
BASOPHILS ABSOLUTE: 0.03 K/UL (ref 0.01–0.08)
BASOPHILS RELATIVE PERCENT: 0.4 % (ref 0.1–1.2)
BILIRUB SERPL-MCNC: 0.7 MG/DL (ref 0.2–1.2)
BUN BLDV-MCNC: 14 MG/DL (ref 6–20)
CA 15-3: 15 U/ML (ref 0–25)
CALCIUM SERPL-MCNC: 9.2 MG/DL (ref 8.6–10)
CEA: 1.9 NG/ML (ref 0–4.7)
CHLORIDE BLD-SCNC: 101 MMOL/L (ref 98–111)
CO2: 25 MMOL/L (ref 22–29)
CREAT SERPL-MCNC: 0.5 MG/DL (ref 0.5–0.9)
EOSINOPHILS ABSOLUTE: 0.05 K/UL (ref 0.04–0.54)
EOSINOPHILS RELATIVE PERCENT: 0.6 % (ref 0.7–7)
GFR AFRICAN AMERICAN: >59
GFR NON-AFRICAN AMERICAN: >60
GLUCOSE BLD-MCNC: 76 MG/DL (ref 74–109)
HCT VFR BLD CALC: 42 % (ref 34.1–44.9)
HEMOGLOBIN: 12.4 G/DL (ref 11.2–15.7)
LYMPHOCYTES ABSOLUTE: 3.01 K/UL (ref 1.18–3.74)
LYMPHOCYTES RELATIVE PERCENT: 35.5 % (ref 19.3–53.1)
MAGNESIUM: 2 MG/DL (ref 1.6–2.6)
MCH RBC QN AUTO: 28.2 PG (ref 25.6–32.2)
MCHC RBC AUTO-ENTMCNC: 29.5 G/DL (ref 32.3–35.5)
MCV RBC AUTO: 95.7 FL (ref 79.4–94.8)
MONOCYTES ABSOLUTE: 0.49 K/UL (ref 0.24–0.82)
MONOCYTES RELATIVE PERCENT: 5.8 % (ref 4.7–12.5)
NEUTROPHILS ABSOLUTE: 4.89 K/UL (ref 1.56–6.13)
NEUTROPHILS RELATIVE PERCENT: 57.6 % (ref 34–71.1)
PDW BLD-RTO: 13.1 % (ref 11.7–14.4)
PLATELET # BLD: 273 K/UL (ref 182–369)
PMV BLD AUTO: 9 FL (ref 7.4–10.4)
POTASSIUM SERPL-SCNC: 4.3 MMOL/L (ref 3.5–5)
RBC # BLD: 4.39 M/UL (ref 3.93–5.22)
SODIUM BLD-SCNC: 139 MMOL/L (ref 136–145)
TOTAL PROTEIN: 7.5 G/DL (ref 6.6–8.7)
VITAMIN D 25-HYDROXY: 63.8 NG/ML
WBC # BLD: 8.48 K/UL (ref 3.98–10.04)

## 2022-05-25 PROCEDURE — G8427 DOCREV CUR MEDS BY ELIG CLIN: HCPCS | Performed by: INTERNAL MEDICINE

## 2022-05-25 PROCEDURE — 85025 COMPLETE CBC W/AUTO DIFF WBC: CPT

## 2022-05-25 PROCEDURE — 3017F COLORECTAL CA SCREEN DOC REV: CPT | Performed by: INTERNAL MEDICINE

## 2022-05-25 PROCEDURE — 1036F TOBACCO NON-USER: CPT | Performed by: INTERNAL MEDICINE

## 2022-05-25 PROCEDURE — 96402 CHEMO HORMON ANTINEOPL SQ/IM: CPT

## 2022-05-25 PROCEDURE — 6360000002 HC RX W HCPCS: Performed by: INTERNAL MEDICINE

## 2022-05-25 PROCEDURE — 6360000002 HC RX W HCPCS

## 2022-05-25 PROCEDURE — 99214 OFFICE O/P EST MOD 30 MIN: CPT | Performed by: INTERNAL MEDICINE

## 2022-05-25 PROCEDURE — G8417 CALC BMI ABV UP PARAM F/U: HCPCS | Performed by: INTERNAL MEDICINE

## 2022-05-25 PROCEDURE — 99212 OFFICE O/P EST SF 10 MIN: CPT

## 2022-05-25 PROCEDURE — 96372 THER/PROPH/DIAG INJ SC/IM: CPT

## 2022-05-25 RX ORDER — METHYLPREDNISOLONE SODIUM SUCCINATE 125 MG/2ML
125 INJECTION, POWDER, LYOPHILIZED, FOR SOLUTION INTRAMUSCULAR; INTRAVENOUS ONCE
Status: CANCELLED | OUTPATIENT
Start: 2022-05-25 | End: 2022-05-25

## 2022-05-25 RX ORDER — CYANOCOBALAMIN 1000 UG/ML
1000 INJECTION INTRAMUSCULAR; SUBCUTANEOUS ONCE
Status: CANCELLED
Start: 2022-06-08

## 2022-05-25 RX ORDER — LAMOTRIGINE 25 MG/1
250 TABLET ORAL ONCE
Status: COMPLETED | OUTPATIENT
Start: 2022-05-25 | End: 2022-05-25

## 2022-05-25 RX ORDER — CYANOCOBALAMIN 1000 UG/ML
INJECTION INTRAMUSCULAR; SUBCUTANEOUS
Status: COMPLETED
Start: 2022-05-25 | End: 2022-05-25

## 2022-05-25 RX ORDER — SODIUM CHLORIDE 9 MG/ML
INJECTION, SOLUTION INTRAVENOUS CONTINUOUS
Status: CANCELLED | OUTPATIENT
Start: 2022-05-25

## 2022-05-25 RX ORDER — CYANOCOBALAMIN 1000 UG/ML
1000 INJECTION INTRAMUSCULAR; SUBCUTANEOUS ONCE
Status: COMPLETED
Start: 2022-05-25 | End: 2022-05-25

## 2022-05-25 RX ORDER — DIPHENHYDRAMINE HYDROCHLORIDE 50 MG/ML
50 INJECTION INTRAMUSCULAR; INTRAVENOUS ONCE
Status: CANCELLED | OUTPATIENT
Start: 2022-05-25 | End: 2022-05-25

## 2022-05-25 RX ORDER — CYCLOBENZAPRINE HCL 10 MG
10 TABLET ORAL 3 TIMES DAILY PRN
Qty: 30 TABLET | Refills: 0 | Status: SHIPPED | OUTPATIENT
Start: 2022-05-25 | End: 2022-06-04

## 2022-05-25 RX ORDER — FAMOTIDINE 10 MG/ML
20 INJECTION, SOLUTION INTRAVENOUS ONCE
Status: CANCELLED | OUTPATIENT
Start: 2022-05-25 | End: 2022-05-25

## 2022-05-25 RX ORDER — EPINEPHRINE 1 MG/ML
0.3 INJECTION, SOLUTION, CONCENTRATE INTRAVENOUS PRN
Status: CANCELLED | OUTPATIENT
Start: 2022-05-25

## 2022-05-25 RX ADMIN — FULVESTRANT 250 MG: 50 INJECTION, SOLUTION INTRAMUSCULAR at 12:38

## 2022-05-25 RX ADMIN — CYANOCOBALAMIN 1000 MCG: 1000 INJECTION, SOLUTION INTRAMUSCULAR at 12:39

## 2022-05-25 RX ADMIN — CYANOCOBALAMIN 1000 MCG: 1000 INJECTION INTRAMUSCULAR; SUBCUTANEOUS at 12:39

## 2022-05-26 DIAGNOSIS — G62.0 CHEMOTHERAPY-INDUCED NEUROPATHY (HCC): ICD-10-CM

## 2022-05-26 DIAGNOSIS — Z71.89 CARE PLAN DISCUSSED WITH PATIENT: ICD-10-CM

## 2022-05-26 DIAGNOSIS — T45.1X5A CHEMOTHERAPY-INDUCED NEUROPATHY (HCC): ICD-10-CM

## 2022-05-26 RX ORDER — GABAPENTIN 300 MG/1
600 CAPSULE ORAL 3 TIMES DAILY
Qty: 180 CAPSULE | Refills: 0 | Status: SHIPPED | OUTPATIENT
Start: 2022-05-26 | End: 2022-10-26

## 2022-05-28 LAB
CA 27.29: 18.2 U/ML
ZINC: 79.5 UG/DL (ref 60–120)

## 2022-06-07 ENCOUNTER — TELEPHONE (OUTPATIENT)
Dept: FAMILY MEDICINE CLINIC | Age: 57
End: 2022-06-07

## 2022-06-07 NOTE — TELEPHONE ENCOUNTER
[facility] at Dr. Guero Blum office called and states that pt is needing a covid order for travel. She states that they are unable to provide this for her & Dr. Mervin Barboza asked her to call Dr. Magaly Bhardwaj office. Please advise?

## 2022-06-22 ENCOUNTER — HOSPITAL ENCOUNTER (OUTPATIENT)
Dept: INFUSION THERAPY | Age: 57
Discharge: HOME | End: 2022-06-22
Payer: MEDICARE

## 2022-06-22 VITALS
HEART RATE: 98 BPM | SYSTOLIC BLOOD PRESSURE: 122 MMHG | BODY MASS INDEX: 28.61 KG/M2 | WEIGHT: 178 LBS | DIASTOLIC BLOOD PRESSURE: 92 MMHG | HEIGHT: 66 IN | OXYGEN SATURATION: 98 %

## 2022-06-22 DIAGNOSIS — C50.919 METASTATIC BREAST CANCER (HCC): Primary | ICD-10-CM

## 2022-06-22 DIAGNOSIS — D50.8 OTHER IRON DEFICIENCY ANEMIA: ICD-10-CM

## 2022-06-22 DIAGNOSIS — C50.919 CARCINOMA OF FEMALE BREAST, UNSPECIFIED ESTROGEN RECEPTOR STATUS, UNSPECIFIED LATERALITY, UNSPECIFIED SITE OF BREAST (HCC): ICD-10-CM

## 2022-06-22 LAB
BASOPHILS ABSOLUTE: 0.02 K/UL (ref 0.01–0.08)
BASOPHILS RELATIVE PERCENT: 0.2 % (ref 0.1–1.2)
EOSINOPHILS ABSOLUTE: 0.03 K/UL (ref 0.04–0.54)
EOSINOPHILS RELATIVE PERCENT: 0.3 % (ref 0.7–7)
HCT VFR BLD CALC: 41.4 % (ref 34.1–44.9)
HEMOGLOBIN: 13 G/DL (ref 11.2–15.7)
LYMPHOCYTES ABSOLUTE: 3.56 K/UL (ref 1.18–3.74)
LYMPHOCYTES RELATIVE PERCENT: 36.5 % (ref 19.3–53.1)
MCH RBC QN AUTO: 28.2 PG (ref 25.6–32.2)
MCHC RBC AUTO-ENTMCNC: 31.4 G/DL (ref 32.3–35.5)
MCV RBC AUTO: 89.8 FL (ref 79.4–94.8)
MONOCYTES ABSOLUTE: 0.61 K/UL (ref 0.24–0.82)
MONOCYTES RELATIVE PERCENT: 6.3 % (ref 4.7–12.5)
NEUTROPHILS ABSOLUTE: 5.52 K/UL (ref 1.56–6.13)
NEUTROPHILS RELATIVE PERCENT: 56.5 % (ref 34–71.1)
PDW BLD-RTO: 13.5 % (ref 11.7–14.4)
PLATELET # BLD: 224 K/UL (ref 182–369)
PMV BLD AUTO: 9.3 FL (ref 7.4–10.4)
RBC # BLD: 4.61 M/UL (ref 3.93–5.22)
WBC # BLD: 9.76 K/UL (ref 3.98–10.04)

## 2022-06-22 PROCEDURE — 96372 THER/PROPH/DIAG INJ SC/IM: CPT

## 2022-06-22 PROCEDURE — 6360000002 HC RX W HCPCS: Performed by: INTERNAL MEDICINE

## 2022-06-22 PROCEDURE — 96402 CHEMO HORMON ANTINEOPL SQ/IM: CPT

## 2022-06-22 PROCEDURE — 85025 COMPLETE CBC W/AUTO DIFF WBC: CPT

## 2022-06-22 RX ORDER — LAMOTRIGINE 25 MG/1
250 TABLET ORAL ONCE
Status: COMPLETED | OUTPATIENT
Start: 2022-06-22 | End: 2022-06-22

## 2022-06-22 RX ORDER — CYANOCOBALAMIN 1000 UG/ML
1000 INJECTION INTRAMUSCULAR; SUBCUTANEOUS ONCE
Status: COMPLETED
Start: 2022-06-22 | End: 2022-06-22

## 2022-06-22 RX ORDER — CYANOCOBALAMIN 1000 UG/ML
1000 INJECTION INTRAMUSCULAR; SUBCUTANEOUS ONCE
Status: CANCELLED
Start: 2022-07-20

## 2022-06-22 RX ADMIN — FULVESTRANT 250 MG: 50 INJECTION, SOLUTION INTRAMUSCULAR at 13:48

## 2022-06-22 RX ADMIN — CYANOCOBALAMIN 1000 MCG: 1000 INJECTION, SOLUTION INTRAMUSCULAR; SUBCUTANEOUS at 13:53

## 2022-06-28 ENCOUNTER — OFFICE VISIT (OUTPATIENT)
Dept: FAMILY MEDICINE CLINIC | Age: 57
End: 2022-06-28
Payer: MEDICARE

## 2022-06-28 VITALS
BODY MASS INDEX: 28.18 KG/M2 | SYSTOLIC BLOOD PRESSURE: 130 MMHG | HEART RATE: 79 BPM | OXYGEN SATURATION: 98 % | WEIGHT: 174.6 LBS | TEMPERATURE: 97.3 F | DIASTOLIC BLOOD PRESSURE: 82 MMHG

## 2022-06-28 DIAGNOSIS — H10.9 CONJUNCTIVITIS OF BOTH EYES, UNSPECIFIED CONJUNCTIVITIS TYPE: Primary | ICD-10-CM

## 2022-06-28 PROCEDURE — 99213 OFFICE O/P EST LOW 20 MIN: CPT | Performed by: NURSE PRACTITIONER

## 2022-06-28 PROCEDURE — G8417 CALC BMI ABV UP PARAM F/U: HCPCS | Performed by: NURSE PRACTITIONER

## 2022-06-28 PROCEDURE — G8427 DOCREV CUR MEDS BY ELIG CLIN: HCPCS | Performed by: NURSE PRACTITIONER

## 2022-06-28 PROCEDURE — 3017F COLORECTAL CA SCREEN DOC REV: CPT | Performed by: NURSE PRACTITIONER

## 2022-06-28 PROCEDURE — 1036F TOBACCO NON-USER: CPT | Performed by: NURSE PRACTITIONER

## 2022-06-28 RX ORDER — OFLOXACIN 3 MG/ML
2 SOLUTION/ DROPS OPHTHALMIC 4 TIMES DAILY
Qty: 5 ML | Refills: 0 | Status: SHIPPED | OUTPATIENT
Start: 2022-06-28 | End: 2022-07-03

## 2022-06-28 ASSESSMENT — PATIENT HEALTH QUESTIONNAIRE - PHQ9
SUM OF ALL RESPONSES TO PHQ9 QUESTIONS 1 & 2: 0
SUM OF ALL RESPONSES TO PHQ QUESTIONS 1-9: 0
2. FEELING DOWN, DEPRESSED OR HOPELESS: 0
1. LITTLE INTEREST OR PLEASURE IN DOING THINGS: 0
SUM OF ALL RESPONSES TO PHQ QUESTIONS 1-9: 0

## 2022-06-28 ASSESSMENT — ENCOUNTER SYMPTOMS
GASTROINTESTINAL NEGATIVE: 1
EYE REDNESS: 1
EYE PAIN: 1
RESPIRATORY NEGATIVE: 1
EYE DISCHARGE: 1
ALLERGIC/IMMUNOLOGIC NEGATIVE: 1

## 2022-06-28 NOTE — PATIENT INSTRUCTIONS
Patient Education        Pinkeye: Care Instructions  Overview     Pinkeye is redness and swelling of the eye surface and the conjunctiva (the lining of the eyelid and the covering of the white part of the eye). Pinkeye is also called conjunctivitis. Pinkeye is often caused by infection with bacteriaor a virus. Dry air, allergies, smoke, and chemicals are other common causes. Pinkeye often gets better on its own in 7 to 10 days. Antibiotics only help if the pinkeye is caused by bacteria. Pinkeye caused by infection spreads easily. If an allergy or chemical is causing pinkeye, it will not go away unless youcan avoid whatever is causing it. Follow-up care is a key part of your treatment and safety. Be sure to make and go to all appointments, and call your doctor if you are having problems. It's also a good idea to know your test results and keep alist of the medicines you take. How can you care for yourself at home?  Wash your hands often. Always wash them before and after you treat pinkeye or touch your eyes or face.  Use moist cotton or a clean, wet cloth to remove crust. Wipe from the inside corner of the eye to the outside. Use a clean part of the cloth for each wipe.  Put cold or warm wet cloths on your eye a few times a day if the eye hurts.  Do not wear contact lenses or eye makeup until the pinkeye is gone. Throw away any eye makeup you were using when you got pinkeye. Clean your contacts and storage case. If you wear disposable contacts, use a new pair when your eye has cleared and it is safe to wear contacts again.  If the doctor gave you antibiotic ointment or eyedrops, use them as directed. Use the medicine for as long as instructed, even if your eye starts looking better soon. Keep the bottle tip clean, and do not let it touch the eye area.  To put in eyedrops or ointment:  ? Tilt your head back, and pull your lower eyelid down with one finger. ?  Drop or squirt the medicine inside the lower lid. ? Close your eye for 30 to 60 seconds to let the drops or ointment move around. ? Do not touch the ointment or dropper tip to your eyelashes or any other surface.  Do not share towels, pillows, or washcloths while you have pinkeye. When should you call for help? Call your doctor now or seek immediate medical care if:     You have pain in your eye, not just irritation on the surface.      You have a change in vision or loss of vision.      You have an increase in discharge from the eye.      Your eye has not started to improve or begins to get worse within 48 hours after you start using antibiotics.      Pinkeye lasts longer than 7 days. Watch closely for changes in your health, and be sure to contact your doctor ifyou have any problems. Where can you learn more? Go to [email or web address]://[employer]nicoleeb.[employer]. org and sign in to your [employer] account. Enter Y392 in the ZingCheckout box to learn more about \"Pinkeye: Care Instructions. \"     If you do not have an account, please click on the \"Sign Up Now\" link. Current as of: March 9, 2022               Content Version: 13.3  © 8816-3802 Healthwise, Incorporated. Care instructions adapted under license by [facility] ([facility]). If you have questions about a medical condition or this instruction, always ask your healthcare professional. [address] any warranty or liability for your use of this information.

## 2022-06-28 NOTE — PROGRESS NOTES
formerly [facility] PHYSICIAN SERVICES  [facility] FAMILY MEDICINE  [address] [address]  Dept: [phone]  Dept Fax: [phone]  Loc: [phone]    Joaquín De La Cruz is a 56 y.o. female who presents today for her medical conditions/complaints as noted below. Joaquín De La Cruz is c/o of Eye Problem (eyes have been red and glassy, eyes were \"wet\", blurred vision ) and Headache (pain between and around the eyes )        HPI:   She reports eyes have been red and glassy since Wednesday. She reports having a cancer treatment last Wednesday. She states Sunday night her sacs in lower part of eyes were real red and are lighter now. She reports pain in her right eye and mild headache. She reports blurred vision. She states it may be her eye liner \"because I need new eye liner\". She reports eyes were wet when she woke up this morning.   HPI   Chief Complaint   Patient presents with    Eye Problem     eyes have been red and glassy, eyes were \"wet\", blurred vision     Headache     pain between and around the eyes      Past Medical History:   Diagnosis Date    Breast cancer ([address]     Breast cancer with lung mets     Chronic back pain     GERD (gastroesophageal reflux disease)     Hx of blood clots     Hypertension     Neuropathy     Post chemo treatment neuropathy      Past Surgical History:   Procedure Laterality Date    BREAST LUMPECTOMY Right     2006    BREAST RECONSTRUCTION Left 09/17/2021    Revision left reconstructed breast, implant performed by Desiree Carver MD at [address]  2016    Bilateral breast implants    EMBOLECTOMY Left 9/26/2021    LEFT UPPER EXTREMITY  MECHANICAL SUCTION THROMBECTOMY performed by Jaydon Carnes DO at [facility] (CERVIX STATUS UNKNOWN)      HYSTERECTOMY, TOTAL ABDOMINAL (CERVIX REMOVED)  2015    MASTECTOMY, BILATERAL      Right 2013 & Left 2016    SCAR REVISION Left 5/10/2022    SCAR REVISION LEFT PORT SITE performed by Brandyn Wilson MD at [address] [facility] TUNNELED VENOUS PORT PLACEMENT      VASCULAR SURGERY N/A 10/6/2021    REMOVAL OF MEDIPORT performed by Marisela Henderson DO at [address] [facility] 6/28/2022 6/22/2022 5/25/2022 5/24/2022 5/10/2022 6/57/6449   SYSTOLIC [phone] 729 - 80   DIASTOLIC 82 92 84 80 - 43   Site - - - Right Upper Arm - -   Position - - - Sitting - -   Cuff Size - - - Medium Adult - -   Pulse 79 98 78 76 - -   Temp 97.3 - - - - -   Resp - - - - 14 13   SpO2 98 98 98 - 98 98   Weight 174 lb 9.6 oz 178 lb 174 lb 11.2 oz - - -   Height - 5' 6\" 5' 6\" - - -   Body mass index - 28.73 kg/m2 28.19 kg/m2 - - -   Some recent data might be hidden       Family History   Problem Relation Age of Onset    Hypertension Mother     Obesity Mother     Prostate Cancer Father     No Known Problems Sister     No Known Problems Daughter     No Known Problems Daughter        Social History     Tobacco Use    Smoking status: Never Smoker    Smokeless tobacco: Never Used   Substance Use Topics    Alcohol use: Not Currently      Current Outpatient Medications on File Prior to Visit   Medication Sig Dispense Refill    gabapentin (NEURONTIN) 300 MG capsule Take 2 capsules by mouth 3 times daily for 30 days.  180 capsule 0    Cholecalciferol (VITAMIN D3) 1.25 MG (15756 UT) TABS Take 1 tablet by mouth once a week 6 tablet 0    mupirocin (BACTROBAN NASAL) 2 % nasal ointment Apply to each nostril BID 5 days prior to surgery 1 each 0    ELIQUIS 5 MG TABS tablet TAKE 1 TABLET BY MOUTH TWICE A DAY 60 tablet 3    palbociclib (IBRANCE) 125 MG tablet Take 125 mg by mouth daily For 21 days and off 7 days 21 tablet 5    guaiFENesin 1200 MG TB12 Take 1 tablet by mouth 2 times daily 30 tablet 0    palbociclib (IBRANCE) 75 MG tablet Take 75 mg by mouth daily 21 tablet 1    sodium hypochlorite (DAKINS) 0.125 % SOLN external solution Apply topically 2 times daily 1 each 0    VENTOLIN  (90 Base) MCG/ACT inhaler Inhale 2 puffs into the lungs 4 times daily as needed for Wheezing 18 g 5    pantoprazole (PROTONIX) 40 MG tablet TAKE 1 TABLET BY MOUTH EVERY DAY (Patient taking differently: Take 40 mg by mouth daily ) 90 tablet 0    NARCAN 4 MG/0.1ML LIQD nasal spray  (Patient not taking: Reported on 5/25/2022)      guaiFENesin-codeine (GUAIFENESIN AC) 100-10 MG/5ML liquid Take 5 mLs by mouth 3 times daily as needed for Cough.  lisinopril (PRINIVIL;ZESTRIL) 10 MG tablet Take 10 mg by mouth daily  (Patient not taking: Reported on 5/25/2022)      acyclovir (ZOVIRAX) 800 MG tablet Take 800 mg by mouth 2 times daily as needed       ondansetron (ZOFRAN) 4 MG tablet Take 4 mg by mouth every 8 hours as needed for Nausea or Vomiting      alclomethasone (ACLOVATE) 0.05 % cream Apply topically 2 times daily as needed Apply topically 2 times daily. No current facility-administered medications on file prior to visit. Allergies   Allergen Reactions    Clindamycin/Lincomycin Hives, Itching and Rash       Health Maintenance   Topic Date Due    Annual Wellness Visit (AWV)  Never done    Pneumococcal 0-64 years Vaccine (1 - PCV) Never done    COVID-19 Vaccine (1) Never done    DTaP/Tdap/Td vaccine (1 - Tdap) Never done    Shingles vaccine (1 of 2) Never done    Depression Screen  04/09/2022    Flu vaccine (Season Ended) 09/01/2022    Colorectal Cancer Screen  10/12/2022    Lipids  03/16/2027    Hepatitis A vaccine  Aged Out    Hepatitis B vaccine  Aged Out    Hib vaccine  Aged Out    Meningococcal (ACWY) vaccine  Aged Out    Hepatitis C screen  Discontinued    HIV screen  Discontinued       Subjective:      Review of Systems   Constitutional: Negative. HENT: Negative. Eyes: Positive for pain, discharge, redness and visual disturbance. Respiratory: Negative. Cardiovascular: Negative. Gastrointestinal: Negative. Endocrine: Negative. Genitourinary: Negative. Musculoskeletal: Negative. Skin: Negative. Allergic/Immunologic: Negative. Neurological: Positive for headaches. Hematological: Negative. Psychiatric/Behavioral: Negative. Objective:     Physical Exam  Vitals and nursing note reviewed. Constitutional:       Appearance: Normal appearance. HENT:      Head: Normocephalic. Nose: Nose normal.   Eyes:      General:         Right eye: No discharge. Left eye: No discharge. Conjunctiva/sclera:      Right eye: Right conjunctiva is injected (mild). No exudate or hemorrhage. Left eye: Left conjunctiva is injected (mild). No exudate or hemorrhage. Pupils: Pupils are equal, round, and reactive to light. Cardiovascular:      Rate and Rhythm: Normal rate and regular rhythm. Pulmonary:      Effort: Pulmonary effort is normal.   Skin:     General: Skin is warm and dry. Capillary Refill: Capillary refill takes less than 2 seconds. Neurological:      General: No focal deficit present. Mental Status: She is alert. Psychiatric:         Mood and Affect: Mood normal.         Behavior: Behavior normal.         Thought Content: Thought content normal.         Judgment: Judgment normal.       /82   Pulse 79   Temp 97.3 °F (36.3 °C)   Wt 174 lb 9.6 oz (79.2 kg)   SpO2 98%   BMI 28.18 kg/m²     Assessment:       Diagnosis Orders   1. Conjunctivitis of both eyes, unspecified conjunctivitis type           Plan:   More than 50% of the time was spent counseling and coordinating care for a total time of 15 min face to face. Oflaxacin 2 drops every 4 hours x 5 days then switch to Artificial tear/lubricating drops  Follow up as needed    PDMP Monitoring:    Last PDMP Bk as Reviewed [facility]):  Review User Review Instant Review Result            Urine Drug Screenings (1 yr)    No resulted procedures found.        Medication Contract and Consent for Opioid Use Documents Filed      No documents found                 Patient given educational materials -see patient instructions. Discussed use, benefit, and side effects of prescribed medications. All patient questions answered. Pt voiced understanding. Reviewed health maintenance. Instructed to continue currentmedications, diet and exercise. Patient agreed with treatment plan. Follow up as directed. MEDICATIONS:  Orders Placed This Encounter   Medications    ofloxacin (OCUFLOX) 0.3 % solution     Sig: Place 2 drops into both eyes 4 times daily for 5 days     Dispense:  5 mL     Refill:  0         ORDERS:  No orders of the defined types were placed in this encounter. Follow-up:  No follow-ups on file. PATIENT INSTRUCTIONS:  Patient Instructions       Patient Education        Pinkeye: Care Instructions  Overview     Pinkeye is redness and swelling of the eye surface and the conjunctiva (the lining of the eyelid and the covering of the white part of the eye). Pinkeye is also called conjunctivitis. Pinkeye is often caused by infection with bacteriaor a virus. Dry air, allergies, smoke, and chemicals are other common causes. Pinkeye often gets better on its own in 7 to 10 days. Antibiotics only help if the pinkeye is caused by bacteria. Pinkeye caused by infection spreads easily. If an allergy or chemical is causing pinkeye, it will not go away unless youcan avoid whatever is causing it. Follow-up care is a key part of your treatment and safety. Be sure to make and go to all appointments, and call your doctor if you are having problems. It's also a good idea to know your test results and keep alist of the medicines you take. How can you care for yourself at home?  Wash your hands often. Always wash them before and after you treat pinkeye or touch your eyes or face.  Use moist cotton or a clean, wet cloth to remove crust. Wipe from the inside corner of the eye to the outside. Use a clean part of the cloth for each wipe.  Put cold or warm wet cloths on your eye a few times a day if the eye hurts.    Do not wear contact lenses or eye makeup until the pinkeye is gone. Throw away any eye makeup you were using when you got pinkeye. Clean your contacts and storage case. If you wear disposable contacts, use a new pair when your eye has cleared and it is safe to wear contacts again.  If the doctor gave you antibiotic ointment or eyedrops, use them as directed. Use the medicine for as long as instructed, even if your eye starts looking better soon. Keep the bottle tip clean, and do not let it touch the eye area.  To put in eyedrops or ointment:  ? Tilt your head back, and pull your lower eyelid down with one finger. ? Drop or squirt the medicine inside the lower lid. ? Close your eye for 30 to 60 seconds to let the drops or ointment move around. ? Do not touch the ointment or dropper tip to your eyelashes or any other surface.  Do not share towels, pillows, or washcloths while you have pinkeye. When should you call for help? Call your doctor now or seek immediate medical care if:     You have pain in your eye, not just irritation on the surface.      You have a change in vision or loss of vision.      You have an increase in discharge from the eye.      Your eye has not started to improve or begins to get worse within 48 hours after you start using antibiotics.      Pinkeye lasts longer than 7 days. Watch closely for changes in your health, and be sure to contact your doctor ifyou have any problems. Where can you learn more? Go to [email or web address].[email or web address] org and sign in to your [employer] account. Enter Y392 in the [facility] box to learn more about \"Pinkeye: Care Instructions. \"     If you do not have an account, please click on the \"Sign Up Now\" link. Current as of: March 9, 2022               Content Version: 13.3  © 7291-2563 Healthwise, Incorporated. Care instructions adapted under license by [facility] ([facility]).  If you have questions about a medical condition or this instruction, always ask your healthcare professional. [place] [address] any warranty or liability for your use of this information. Electronically signed by BRITANY Mccullough on 6/28/2022 at 1:52 PM    EMR Dragon/transcription disclaimer:  Much of thisencounter note is electronic transcription/translation of spoken language to printed texts. The electronic translation of spoken language may be erroneous, or at times, nonsensical words or phrases may be inadvertentlytranscribed.   Although I have reviewed the note for such errors, some may still exist.

## 2022-07-12 ENCOUNTER — HOSPITAL ENCOUNTER (OUTPATIENT)
Dept: CT IMAGING | Age: 57
Discharge: HOME | End: 2022-07-12
Payer: MEDICARE

## 2022-07-12 DIAGNOSIS — R91.1 LUNG NODULE: ICD-10-CM

## 2022-07-12 PROCEDURE — 71250 CT THORAX DX C-: CPT

## 2022-07-12 PROCEDURE — 71250 CT THORAX DX C-: CPT | Performed by: RADIOLOGY

## 2022-07-15 NOTE — PROGRESS NOTES
MEDICAL ONCOLOGY PROGRESS NOTE    Pt Name: Brian Sharma  MRN: [id number]  YOB: 1965  Date of evaluation: 7/25/2022    HISTORY OF PRESENT ILLNESS:    Reason for MD visit: Disease/toxicity management  Sergio Magallanes is here today for monitoring for breast cancer and toxicity assessment. She is currently on palliative treatment with Faslodex/Ibrance initiated in January 2017. She has been tolerating treatment quite well. She has developed left upper extremity DVT associated with a left upper chest port. Port was discontinued. She also developed infection of the port site status post IV antibiotics. She had a repeat venous ultrasound that showed chronic appearing clot and subacute DVT basilic vein. She is currently on Eliquis for anticoagulation. She has been tolerating treatment with Faslodex/Ibrance well. She denies any mucositis. Denies any diarrhea. She had CT scans performed to assess response. She has also noticed increase in size of her left upper extremity. She has recently been on a plane ride.       Diagnosis  Grade III Adenocarcinoma of right breast diagnosed 2006  Stage IIIA, [id number]  ER/ID Positive, HER-2 bruce/ihc 1+  April 2013-RECURRENCE in the right axillary region  Genetic testing- BRCA 1&2 Negative  2014 (?) RECURRENCE in the axillary skin  01/05/2017- METASTATIC DISEASE with lung, hilar, and axillary lymph node mets  Osteopenia  Mediport associated DVT, October 2021  DVT left upper extremity, November 2021    Treatment Summary  2006- Neoadjuvant chemotherapy with AC x 4 cycles followed by Taxol  2007- Lumpectomy with axillary lymph node dissection  2007- Adjuvant radiation therapy to whole breast and axillary region  Did not take tamoxifen due to cost  April 2013- RECURRENCE  Neoadjuvant chemotherapy with 2 cycles of Taxotere followed by Doxil  10/14/2013- Right Breast Mastectomy and Axillary Dissection  01/23/2014- Completion of adjuvant RT to chest wall and axillary lymph node with Dr. Deysi Mckenzie  02/14- Initiated adjuvant endocrine therapy with tamoxifen  2014 (?) RECURRENCE in the axillary skin  2014 (?) Surgical resection of the axillary skin  2015 (?) YVETTE/BSO  09/19/2016- Bilateral exchange, nipple reconstruction and fat grafting and removal of PAC   2016- Switched to aromatase inhibitor  01/05/2017- METASTATIC DISEASE with lung, hilar, and axillary mets  01/11/2017-06/17/2018- Single agent Abraxane x 13 cycles  06/27/2018-09/17/2018- Gemzar  10/10/2018-Faslodex every 4 weeks/palbociclib    Cancer History:  Argelia Alexander was first seen by me on 1/21/2021. She was referred to [facility] of care for history of recurrent metastatic breast cancer. She has been in remission as per the latest CT scan. She is currently on Faslodex and palbociclib. She has received several lines therapy as described below. She moved from [place] to [address] [place] [address] to stay closer to her parents. Her medical history is complex. Further details as below. 2006- Neoadjuvant chemotherapy with AC x 4 cycles followed by Taxol AC was complicated by viral meningitis; patient experienced neuropathy after 3 doses of Taxol and was switched to Taxotere for last dose  2007- Lumpectomy with axillary lymph node dissection 1.9cm, grade 2. No LVI. 1 of 14 lymph nodes positive for malignancy (1/14) [id number]  2007- Adjuvant Radiation Therapy to whole breast and axillary region  April 2013- RECURRENCE presenting as mass in the axillary region  Neoadjuvant chemotherapy with 2 cycles of Taxotere followed by Doxil- did not have good response to treatment  2013- Preop PET/CTshowed 2.8cm, right axillary lymph node with uptake. 10/14/2013- Right Breast Mastectomy and Axillary Dissection Right breast had benign tissue with fibrous, negative for carcinoma. Axillary contents demonstrated invasive ductal carcinoma, involving fibroadipose tissue and tendinous tissue. 3 benign lymph nodes (0/3).  Yusra grade 3. 3.0cm in greatest gross dimensions. Carcinoma permeates among soft tissues and in the right axilla with no apparent involvement of lymph nodes. 01/23/2014- Completion of adjuvant radiation therapy to chest wall and axillary lymph node with Dr. Racheal Machado  02/14- Initiated adjuvant endocrine therapy with tamoxifen  2014 (?) RECURRENCE in the axillary skin  2014 (?) Surgical resection of the axillary skin pathology demonstrating tumor at cauterized margin  2015 (?) YVETTE/BSO  09/19/2016- Bilateral exchange, nipple reconstruction and fat grafting and removal of PAC  2016- Switched to aromatase inhibitor patient was non compliant  01/05/2017- METASTATIC DISEASE Presented with respiratory failure. CTA Pulmonary showed numerous nodules and masses throughout both lung fields, compatible with metastatic disease. Bilateral hilar adenopathy seen. 01/11/2017-06/17/2018- Single agent Abraxane x 13 cycles  03/15/2017- CT Chest W Contrast Interval decrease in maximal diameter of essentially all the multiple metastatic pulmonary nodules. The average difference is approximately 25%. Some of the much smaller ones have disappeared. Bilateral hilar and aortopulmonary window adenopathy is also similarly smaller. 06/07/2017- CT Chest W Contrast Multiple lung nodules seen bilaterally. Most of the lung nodules show interval improvement with decreased size by 1 to 3mm. Mediastinal and bilateral hilar adenopathy seen with improvement. 07/24/2017- PET/CT scan showed marked improvement, is minimal abnormal, has excellent response to therapy  02/01/2018- PET/CT scan Numerous bilateral lung masses and nodules, the majority of which do not have appreciable abnormal FDG uptake. The largest mass in the left infrahilar region of the left lower lobe demonstrates a maximum SUV of 3.3. Mediastinal lymphadenopathy.  No evidence of metastatic disease in the abdomen or pelvis.   06/21/2018- CT Chest/Abdomen/Pelvis Multiple lung mets, mild bilateral hilar and mediastinal lymph nodes. No masses or evidence of metastatic disease in the abdomen or pelvis  06/27/2018-09/17/2018- Gemzar  09/28/2018- CT Pulmonary Multiple lung mets, mild bilateral hilar and mediastinal lymph nodes  10/10/2018-12/21/2020- Faslodex every 4 weeks  12/14/2018- CT Chest showed definite decrease in the maximal diameter and volume of all left and right metastatic nodules. Somewhat enlarged paratracheal and left axillary lymph nodes are relatively unchanged. 04/22/2019- MRI Cervical Spine W WO Contrast Unremarkable  04/22/2019- MRI Brain W WO Contrast No enhancing lesions in the brain and no evidence of metastatic disease. 07/27/2019- CTA Pulmonary showed essentially complete disappearance of the pulmonary metastatic disease. Several tiny flat peripheral nodules are the only sequela. The tiny ones on the right are unchanged, the tiny ones on the left are even smaller. There is no longer any active metastatic disease in either lung. 12/19/2019- CT Chest/Abdomen/Pelvis Small, tiny subpleural nodules right upper and right midlung field as well as left lung base has remained unchanged. No new focal parenchymal abnormality seen. No adenopathy seen. There is no abdominal or pelvic mass, adenopathy or fluid collection. No lytic or sclerotic bony lesions, but there is a possibility of osteopenia and/or osteoporosis. 02/17/2020- DEXA Bone Density showed osteopenia of lumbar spine, T-score -1.8, and left femoral neck, T-score -2.0  2/25/21 Ct Abdomen Pelvis W Iv Contrast  No evidence of metastatic disease in the abdomen or pelvis. 2/26/21 Ct Chest W Contrast Tiny nodules in the right lung described above probably represent small foci of pleural thickening due to chronic inflammatory process or small noncalcified granulomas. No features to suggest malignancy or metastatic disease. Bilateral breast implants without complication. 3/1/2021-discussed the results CT chest abdomen pelvis.   Essentially no clear evidence of metastatic disease. This is consistent with excellent response to treatment. Continue current treatment. 4/1/2021 = 31.6 CA 27-29= 33.5 CEA= 1.6   6/3/21 CA 15-3= 23.3 CA 27-29= 25.6  CEA= 1.8   7/29/2021 CEA=1.7 CA 15-3=21.50 CA 27.29= 26.0  8/19/2021 CT Chest  Left lower lobe pleural-based nodular 1.7 x 0.9 cm is indeterminate. PET/CT recommended. Feeding defect in the left lower lobe bronchus versus a postobstructive airspace disease. This too may be further characterized with PET CT versus direct visualization. 8/19/2021 CT Abd/Pelvis A stable CT scan of the abdomen and pelvis. No finding to suggest neoplastic/metastatic disease. 9/28/2021 Final Diagnosis: Arm, excision of left arm thrombus:Fragments of organizing and organized thrombi with dystrophic calcification. Received is a container labeled \"ischemic, left arm\" Received in a fixative is a 3.6 x 2.6 x 1.4 cm aggregate of brown-tan soft tissue fragments and skin. Representative sections are submitted in a single cassette, numerous. October 2021-she developed left upper extremity DVT associated support. Port removed. She also had infection of the port site status post completion to biotics. 11/19/21 CTA pulmonary: No evidence of pulmonary embolism. The lungs are poorly expanded but unremarkable. 12/8/21 MRI lumbar spine: No significant change since the previous study. Chronic degenerative changes. A persistent mild degenerative retrolisthesis is of L5 over S1. Prominent disc osteophyte complexes and facetal arthropathy and resultant mild spinal stenosis at L4-5 and neural foraminal stenosis at L4-5 and L5-S1 as detailed above. 1/6/22 CT CHEST WO CONTRAST Stable chest CT with an unchanged tiny subpleural nodule within the right upper lobe. 1/19/22 CA 15-3 24 CA 27-29 31.8 CEA 1.5  2/25/2022 CT Abd/Pelvis W IV Contrast(Oral) Gastric wall thickening is nonspecific. It could be an artifact of under distention.  Gastritis and other etiologies are not ruled out. The unenhanced solid organs demonstrate no focal abnormality. Diverticulosis of the colon. Under distention of portions of the colon. Prior hysterectomy. Mild sclerotic changes in the subarticular femoral heads bilaterally is stable. Early AVN is possible. MRI would be more definitive. The signal abnormality is greater on the right side. 5/10/2022 Skin, revision of left chest scar: Cicatrix identified with foreign body type response, negative for evidence of malignancy. 7/18/2022 CT Chest W Contrast Bibasilar subsegmental   atelectasis,table   and unchanged. Prominent osteophyte formation out in the lower thoracic spine with associated subsegmental atelectasis. No pulmonary nodules, masses, pleural effusion or pneumothorax. Bilateral breast implants. Stable interval appearance since recent study 7/12/22 7/18/2022 CT Abd/Pelvis W IV Contrast (oral) Fecal loaded colon possible constipation. Scattered colon diverticulosis. No acute diverticulitis. No acute abdominopelvic abnormality . 7/25/2022-no imaging evidence of disease recurrence. Continue treatment with Faslodex and Ibrance.       CA 27.29 Dynamics  01/21/2021- 20.3  04/01/2021 33.5  06/03/2021 25.6  07/29/2021 26.0  1/19/22 31.8    Past Medical History:    Past Medical History:   Diagnosis Date    Breast cancer ([address]     Breast cancer with lung mets     Chronic back pain     GERD (gastroesophageal reflux disease)     Hx of blood clots     Hypertension     Neuropathy     Post chemo treatment neuropathy       Past Surgical History:    Past Surgical History:   Procedure Laterality Date    BREAST LUMPECTOMY Right     2006    BREAST RECONSTRUCTION Left 09/17/2021    Revision left reconstructed breast, implant performed by Jay Pina MD at [address]  2016    Bilateral breast implants    EMBOLECTOMY Left 9/26/2021    LEFT UPPER EXTREMITY  MECHANICAL SUCTION THROMBECTOMY performed by Mohsen Lama DO at [address] (CERVIX STATUS UNKNOWN)      HYSTERECTOMY, TOTAL ABDOMINAL (CERVIX REMOVED)  2015    MASTECTOMY, BILATERAL      Right 2013 & Left 2016    SCAR REVISION Left 5/10/2022    SCAR REVISION LEFT PORT SITE performed by Lula Jaquez MD at [address] [place] [address] Dr      VASCULAR SURGERY N/A 10/6/2021    REMOVAL OF MEDIPORT performed by Hernesto Stokes DO at [facility] History:    Marital status, children: Single, 2 children-female  Smoking status: no  ETOH status: no  Profession: Disabled  Resides: [place], [place]  Recent trips: Moved from [address] [place] [address] López: no    Family History:   Family History   Problem Relation Age of Onset    Hypertension Mother     Obesity Mother     Prostate Cancer Father     No Known Problems Sister     No Known Problems Daughter     No Known Problems Daughter        Current Hospital Medications:    Current Outpatient Medications   Medication Sig Dispense Refill    ELIQUIS 5 MG TABS tablet TAKE 1 TABLET BY MOUTH TWICE A DAY 60 tablet 3    gabapentin (NEURONTIN) 300 MG capsule Take 2 capsules by mouth 3 times daily for 30 days.  180 capsule 0    Cholecalciferol (VITAMIN D3) 1.25 MG (70460 UT) TABS Take 1 tablet by mouth once a week 6 tablet 0    mupirocin (BACTROBAN NASAL) 2 % nasal ointment Apply to each nostril BID 5 days prior to surgery 1 each 0    palbociclib (IBRANCE) 125 MG tablet Take 125 mg by mouth daily For 21 days and off 7 days 21 tablet 5    guaiFENesin 1200 MG TB12 Take 1 tablet by mouth 2 times daily 30 tablet 0    sodium hypochlorite (DAKINS) 0.125 % SOLN external solution Apply topically 2 times daily 1 each 0    VENTOLIN  (90 Base) MCG/ACT inhaler Inhale 2 puffs into the lungs 4 times daily as needed for Wheezing 18 g 5    pantoprazole (PROTONIX) 40 MG tablet TAKE 1 TABLET BY MOUTH EVERY DAY (Patient taking differently: Take 40 mg by mouth daily ) 90 tablet 0    NARCAN 4 MG/0.1ML LIQD nasal spray  (Patient not taking: Reported on 5/25/2022)      guaiFENesin-codeine (GUAIFENESIN AC) 100-10 MG/5ML liquid Take 5 mLs by mouth 3 times daily as needed for Cough. lisinopril (PRINIVIL;ZESTRIL) 10 MG tablet Take 10 mg by mouth daily  (Patient not taking: Reported on 5/25/2022)      acyclovir (ZOVIRAX) 800 MG tablet Take 800 mg by mouth 2 times daily as needed       ondansetron (ZOFRAN) 4 MG tablet Take 4 mg by mouth every 8 hours as needed for Nausea or Vomiting      alclomethasone (ACLOVATE) 0.05 % cream Apply topically 2 times daily as needed Apply topically 2 times daily. No current facility-administered medications for this visit. Allergies:    Allergies   Allergen Reactions    Clindamycin/Lincomycin Hives, Itching and Rash         Subjective   REVIEW OF SYSTEMS:   CONSTITUTIONAL: no fever, no night sweats, fatigue;  HEENT: no blurring of vision, no double vision, no hearing difficulty, no tinnitus, no ulceration, no dysplasia, no epistaxis;   LUNGS: no cough, no hemoptysis, no wheeze,  no shortness of breath;  CARDIOVASCULAR: no palpitation, no chest pain, no shortness of breath;  GI: no abdominal pain, no nausea, no vomiting, no diarrhea, no constipation;  PATRICK: no dysuria, no hematuria, no frequency or urgency, no nephrolithiasis;  MUSCULOSKELETAL: no joint pain, no swelling, no stiffness;  ENDOCRINE: no polyuria, no polydipsia, no cold or heat intolerance;  HEMATOLOGY: no easy bruising or bleeding, no history of clotting disorder;  DERMATOLOGY: no skin rash, no eczema, no pruritus;  PSYCHIATRY: no depression, no anxiety, no panic attacks, no suicidal ideation, no homicidal ideation;  NEUROLOGY: no syncope, no seizures, no numbness or tingling of hands, no numbness or tingling of feet, no paresis;     /78 (Site: Left Upper Arm, Position: Sitting)   Pulse 80   Ht 5' 6\" (1.676 m)   Wt 182 lb (82.6 kg)   SpO2 98%   BMI 29.38 kg/m²      PHYSICAL EXAM:  CONSTITUTIONAL: Alert, appropriate, no acute distress  EYES: Non icteric, EOM intact, pupils equal round   ENT: Mucus membranes moist, no oral pharyngeal lesions, external inspection of ears and nose are normal.  NECK: Supple, no masses. No palpable thyroid mass  CHEST/LUNGS: CTA bilaterally, normal respiratory effort   CARDIOVASCULAR: RRR, no murmurs. No lower extremity edema  ABDOMEN: soft non-tender, active bowel sounds, no HSM. No palpable masses  EXTREMITIES: warm, full ROM in all 4 extremities, no focal weakness. SKIN: warm, dry with no rashes or lesions  LYMPH: No cervical, clavicular, axillary, or inguinal lymphadenopathy  NEUROLOGIC: follows commands, non focal   PSYCH: mood and affect appropriate. Alert and oriented to time, place, person       LABORATORY RESULTS REVIEWED/ANALYZED BY ME:  5/25/2022 Tumor Markers: CEA 1.9, CA 15-3-15, CA 27.29- 18.2  5/25/2022 Mag 2.0, Vitamin D 63.8, Zinc 79.5      RADIOLOGY STUDIES REVIEWED BY ME:  7/18/2022 CT Chest W Contrast Bibasilar subsegmental atelectasis, stable and unchanged. Prominent osteophyte formation out in the lower thoracic spine with associated subsegmental atelectasis. No pulmonary nodules, masses, pleural effusion or pneumothorax. Bilateral breast implants. Stable interval appearance since recent study 7/2/22 7/18/2022 CT Abd/Pelvis W IV Contrast (oral) Fecal loaded colon possible constipation. Scattered colon diverticulosis. No acute diverticulitis. No acute abdominopelvic abnormality . ASSESSMENT:    Orders Placed This Encounter   Procedures    US DUP UPPER EXTREMITY LEFT VENOUS     Standing Status:   Future     Standing Expiration Date:   7/25/2023        Sujata Wolff was seen today for follow-up.     Diagnoses and all orders for this visit:    Metastatic breast cancer [facility])    Care plan discussed with patient    Chronic deep vein thrombosis (DVT) of left upper extremity, unspecified vein (HCC)  -     US DUP UPPER EXTREMITY LEFT VENOUS; Future    Chemotherapy management, encounter for    Adverse effect of chemotherapy, subsequent encounter      Metastatic breast cancer, hormone sensitive  2/25/2021-discussed the results of CT chest abdomen pelvis. No evidence of disease progression. In fact, excellent response with no measurable metastatic disease at this time. Currently Faslodex/Ibrance. Regimen:  Faslodex to 500 mg subcutaneous every 4 weeks. Continue Ibrance days 1-21 q. 28 days    Clinical/laboratory assessment of toxicity for Ibrance    8/19/2021-CT chest/abdomen/pelvis-no evidence of metastatic disease. Findings of a pulmonary lung nodule in the left lower lobe  -JAN/FEB 2022- CT C/A/P-MARIA INES  -July 2022-CT C/A/P-MARIA INES    Essentially, good response to Faslodex/Ibrance. Last cancer marker may were normal.    Plan:  -Continue Faslodex/Ibrance 125 mg days 1-21 q. 28 days      B12 deficiency- Recommended B12 2000 mcg p.o. daily. Will start B12 injections today 1000mcg with Faslodex. 3/1/2021- Methylmalonic Acid 171, Vitamin B12 >2000. B12 replaced monthly. 10/10/2021-vitamin B12 154. She is currently receiving B12 replacement. Chemotherapy-induced neuropathy-continue gabapentin. COVID-19 vaccination response-patient had covid 19 Jan 2022. Left lower lobe lung nodule-she is currently being seen by pulmonary at [facility]. They believe that this could be inflammatory. I reviewed the CT scans. Stable pulmonary nodule. -CT chest January 2022-subcentimeter stable pulmonary nodules. Acute left upper extremity DVT/left upper chest port, October 2021-US upper extremity showed acute DVT likely related to port Oct 2021.   -Currently on Eliquis. S/p Mediport removal.  -US Jan 2022,LUE: Chronic appearing deep vein thrombosis (DVT) is seen in the axillary  brachial , left upper extremity(ies).   Subacute appearing superficial thrombophlebitis (SVT) is seen in the  basilic vein, left upper extremity(ies  -Continue Eliquis 5 mg p.o. twice daily    Normocytic anemia-hemoglobin 10.8. Improving. Continue to monitor BC.  10/10/2021-ferritin 92, iron saturation 17%, TIBC 242, folate 4.6, vitamin B12 154. Continue B12 replacement  -Hemoglbin 12.3    LUE-  -New onset left upper extremity swelling after a plane ride  -Continue Eliquis  -Ultrasound left upper extremity today  Ultrasound left upper extremity showed   Impression:    There is chronic deep vein thrombosis (DVT) of the left axillary vein and   superficial thrombophlebitis (SVT) in the left basilic vein. -Please call the patient and check to see if she is currently taking Eliquis. If not, then start on Eliquis 10 mg p.o. twice daily x7 days followed by 5 mg p.o. twice daily indefinitely    PLAN:  RTC with MD 4 weeks  Recommend US LUE today  Faslodex and B12 today and every 4 weeks  Continue follow-up with wound care  Follow up with Dr. Waqas Kevin for PCP care  CBC, CMP, Mag, Zinc, Vitamin D level, CEA, CA 15-3, CA 27-29 today  Continue Ibrance  Continue B12 replacement monthly  CT chest/abdomen/pelvis prior to next MD visit  We will restart Eliquis    I, Letty Moritz, am pre charting  as Medical Assistant for Junior Kam MD. Electronically signed by Letty Moritz, MA on 7/25/2022 at 8:00 AM CDT. Jessica Vega am scribing as Medical Assistant for Junior Kam MD. Electronically signed by Letty Moritz, MA on 7/25/2022 at 10:25 AM CDT. I, Dr Marsha Omalley, personally performed the services described in this documentation as scribed by Letty Moritz, MA in my presence and is both accurate and complete. I have seen, examined and reviewed this patient medication list, appropriate labs and imaging studies. I reviewed relevant medical records and others physicians notes. I discussed the plans of care with the patient. I answered all the questions to the patients satisfaction.  I have also reviewed the chief complaint (CC) and part of the history (History of Present Illness (HPI), Past Family Social

## 2022-07-18 ENCOUNTER — HOSPITAL ENCOUNTER (OUTPATIENT)
Dept: CT IMAGING | Age: 57
Discharge: HOME | End: 2022-07-18
Payer: MEDICARE

## 2022-07-18 DIAGNOSIS — C50.919 METASTATIC BREAST CANCER (HCC): ICD-10-CM

## 2022-07-18 PROCEDURE — G1010 CDSM STANSON: HCPCS | Performed by: RADIOLOGY

## 2022-07-18 PROCEDURE — 6360000004 HC RX CONTRAST MEDICATION: Performed by: INTERNAL MEDICINE

## 2022-07-18 PROCEDURE — 71260 CT THORAX DX C+: CPT

## 2022-07-18 PROCEDURE — 71260 CT THORAX DX C+: CPT | Performed by: RADIOLOGY

## 2022-07-18 PROCEDURE — 74177 CT ABD & PELVIS W/CONTRAST: CPT | Performed by: RADIOLOGY

## 2022-07-18 PROCEDURE — 74177 CT ABD & PELVIS W/CONTRAST: CPT

## 2022-07-18 RX ADMIN — IOPAMIDOL 75 ML: 755 INJECTION, SOLUTION INTRAVENOUS at 11:54

## 2022-07-19 RX ORDER — APIXABAN 5 MG/1
TABLET, FILM COATED ORAL
Qty: 60 TABLET | Refills: 3 | Status: SHIPPED | OUTPATIENT
Start: 2022-07-19

## 2022-07-25 ENCOUNTER — HOSPITAL ENCOUNTER (OUTPATIENT)
Dept: INFUSION THERAPY | Age: 57
Discharge: HOME | End: 2022-07-25
Payer: MEDICARE

## 2022-07-25 ENCOUNTER — CLINICAL DOCUMENTATION (OUTPATIENT)
Dept: HEMATOLOGY | Age: 57
End: 2022-07-25

## 2022-07-25 ENCOUNTER — HOSPITAL ENCOUNTER (OUTPATIENT)
Dept: VASCULAR LAB | Age: 57
Discharge: HOME | End: 2022-07-25
Payer: MEDICARE

## 2022-07-25 ENCOUNTER — OFFICE VISIT (OUTPATIENT)
Dept: HEMATOLOGY | Age: 57
End: 2022-07-25
Payer: MEDICARE

## 2022-07-25 VITALS
HEART RATE: 80 BPM | OXYGEN SATURATION: 98 % | DIASTOLIC BLOOD PRESSURE: 78 MMHG | HEIGHT: 66 IN | BODY MASS INDEX: 29.25 KG/M2 | SYSTOLIC BLOOD PRESSURE: 118 MMHG | WEIGHT: 182 LBS

## 2022-07-25 DIAGNOSIS — D50.8 OTHER IRON DEFICIENCY ANEMIA: ICD-10-CM

## 2022-07-25 DIAGNOSIS — T45.1X5D ADVERSE EFFECT OF CHEMOTHERAPY, SUBSEQUENT ENCOUNTER: ICD-10-CM

## 2022-07-25 DIAGNOSIS — I82.722 CHRONIC DEEP VEIN THROMBOSIS (DVT) OF LEFT UPPER EXTREMITY, UNSPECIFIED VEIN (HCC): ICD-10-CM

## 2022-07-25 DIAGNOSIS — C50.919 CARCINOMA OF FEMALE BREAST, UNSPECIFIED ESTROGEN RECEPTOR STATUS, UNSPECIFIED LATERALITY, UNSPECIFIED SITE OF BREAST (HCC): ICD-10-CM

## 2022-07-25 DIAGNOSIS — Z71.89 CARE PLAN DISCUSSED WITH PATIENT: ICD-10-CM

## 2022-07-25 DIAGNOSIS — C50.919 METASTATIC BREAST CANCER (HCC): Primary | ICD-10-CM

## 2022-07-25 DIAGNOSIS — Z51.11 CHEMOTHERAPY MANAGEMENT, ENCOUNTER FOR: ICD-10-CM

## 2022-07-25 LAB
BASOPHILS ABSOLUTE: 0.02 K/UL (ref 0.01–0.08)
BASOPHILS RELATIVE PERCENT: 0.2 % (ref 0.1–1.2)
EOSINOPHILS ABSOLUTE: 0.05 K/UL (ref 0.04–0.54)
EOSINOPHILS RELATIVE PERCENT: 0.6 % (ref 0.7–7)
HCT VFR BLD CALC: 38.9 % (ref 34.1–44.9)
HEMOGLOBIN: 12.3 G/DL (ref 11.2–15.7)
LYMPHOCYTES ABSOLUTE: 2.85 K/UL (ref 1.18–3.74)
LYMPHOCYTES RELATIVE PERCENT: 33.6 % (ref 19.3–53.1)
MCH RBC QN AUTO: 28.6 PG (ref 25.6–32.2)
MCHC RBC AUTO-ENTMCNC: 31.6 G/DL (ref 32.3–35.5)
MCV RBC AUTO: 90.5 FL (ref 79.4–94.8)
MONOCYTES ABSOLUTE: 0.51 K/UL (ref 0.24–0.82)
MONOCYTES RELATIVE PERCENT: 6 % (ref 4.7–12.5)
NEUTROPHILS ABSOLUTE: 5.03 K/UL (ref 1.56–6.13)
NEUTROPHILS RELATIVE PERCENT: 59.4 % (ref 34–71.1)
PDW BLD-RTO: 14.2 % (ref 11.7–14.4)
PLATELET # BLD: 208 K/UL (ref 182–369)
PMV BLD AUTO: 9.7 FL (ref 7.4–10.4)
RBC # BLD: 4.3 M/UL (ref 3.93–5.22)
WBC # BLD: 8.48 K/UL (ref 3.98–10.04)

## 2022-07-25 PROCEDURE — 99212 OFFICE O/P EST SF 10 MIN: CPT

## 2022-07-25 PROCEDURE — 1036F TOBACCO NON-USER: CPT | Performed by: INTERNAL MEDICINE

## 2022-07-25 PROCEDURE — 96402 CHEMO HORMON ANTINEOPL SQ/IM: CPT

## 2022-07-25 PROCEDURE — 96372 THER/PROPH/DIAG INJ SC/IM: CPT

## 2022-07-25 PROCEDURE — 85025 COMPLETE CBC W/AUTO DIFF WBC: CPT

## 2022-07-25 PROCEDURE — 36415 COLL VENOUS BLD VENIPUNCTURE: CPT

## 2022-07-25 PROCEDURE — 99214 OFFICE O/P EST MOD 30 MIN: CPT | Performed by: INTERNAL MEDICINE

## 2022-07-25 PROCEDURE — 6360000002 HC RX W HCPCS: Performed by: INTERNAL MEDICINE

## 2022-07-25 PROCEDURE — 93971 EXTREMITY STUDY: CPT

## 2022-07-25 PROCEDURE — G8417 CALC BMI ABV UP PARAM F/U: HCPCS | Performed by: INTERNAL MEDICINE

## 2022-07-25 PROCEDURE — 3017F COLORECTAL CA SCREEN DOC REV: CPT | Performed by: INTERNAL MEDICINE

## 2022-07-25 PROCEDURE — G8427 DOCREV CUR MEDS BY ELIG CLIN: HCPCS | Performed by: INTERNAL MEDICINE

## 2022-07-25 RX ORDER — CYANOCOBALAMIN 1000 UG/ML
1000 INJECTION INTRAMUSCULAR; SUBCUTANEOUS ONCE
Status: CANCELLED
Start: 2022-08-17

## 2022-07-25 RX ORDER — LAMOTRIGINE 25 MG/1
250 TABLET ORAL ONCE
Status: COMPLETED | OUTPATIENT
Start: 2022-07-25 | End: 2022-07-25

## 2022-07-25 RX ORDER — CYANOCOBALAMIN 1000 UG/ML
1000 INJECTION INTRAMUSCULAR; SUBCUTANEOUS ONCE
Status: COMPLETED
Start: 2022-07-25 | End: 2022-07-25

## 2022-07-25 RX ADMIN — CYANOCOBALAMIN 1000 MCG: 1000 INJECTION, SOLUTION INTRAMUSCULAR; SUBCUTANEOUS at 10:32

## 2022-07-25 RX ADMIN — FULVESTRANT 250 MG: 50 INJECTION, SOLUTION INTRAMUSCULAR at 10:30

## 2022-07-26 ENCOUNTER — TELEPHONE (OUTPATIENT)
Dept: HEMATOLOGY | Age: 57
End: 2022-07-26

## 2022-07-26 NOTE — TELEPHONE ENCOUNTER
Called patient to verify status of Eliquis due to DVT/SVT in LUE. Patient stated she is still taking Eliquis 5mg PO BID.

## 2022-07-26 NOTE — PROGRESS NOTES
Ultrasound left upper extremity showed   Impression:      There is chronic deep vein thrombosis (DVT) of the left axillary vein and   superficial thrombophlebitis (SVT) in the left basilic vein. -Please call the patient and check to see if she is currently taking Eliquis.   If not, then start on Eliquis 10 mg p.o. twice daily x7 days followed by 5 mg p.o. twice daily indefinitely

## 2022-08-02 DIAGNOSIS — C50.919 METASTATIC BREAST CANCER (HCC): Primary | ICD-10-CM

## 2022-08-02 RX ORDER — PALBOCICLIB 125 MG/1
TABLET, FILM COATED ORAL
Qty: 21 TABLET | Refills: 5 | Status: ACTIVE | OUTPATIENT
Start: 2022-08-02

## 2022-08-02 NOTE — PROGRESS NOTES
[facility] Update    Date: 08/02/22    We do not have access to the prescribed medication. Rx routed to existing pharmacy [facility] pharmacy for processing. Please call us with any questions at [phone] opt.  2.

## 2022-08-02 NOTE — PROGRESS NOTES
[facility] Update    Date: 08/02/22    Patient's prescription benefits are being verified for coverage. Status update to follow as soon as possible. Please call us with any questions at [phone] opt.  2.

## 2022-08-22 NOTE — PROGRESS NOTES
MEDICAL ONCOLOGY PROGRESS NOTE    Pt Name: Nenita Melendez  MRN: [id number]  YOB: 1965  Date of evaluation: 8/23/2022    HISTORY OF PRESENT ILLNESS:    Reason for MD visit: Disease/toxicity management  Ilya Courtney is here today for monitoring for breast cancer and toxicity assessment. She is currently on palliative treatment with Faslodex/Ibrance initiated in January 2017. She has been tolerating treatment quite well. She has developed left upper extremity DVT associated with a left upper chest port. Port was discontinued. She also developed infection of the port site status post IV antibiotics. She had a repeat venous ultrasound that showed chronic appearing clot and subacute DVT basilic vein. She is currently on Eliquis for anticoagulation. She has been tolerating treatment with Faslodex/Ibrance well. She denies any mucositis. Denies any diarrhea. She is currently followed by vascular. She is going to have a repeat ultrasound, vascular next month. She denies any new breast complaints. Denies any new symptoms.       Diagnosis  Grade III Adenocarcinoma of right breast diagnosed 2006  Stage IIIA, [id number]  ER/AR Positive, HER-2 bruce/ihc 1+  April 2013-RECURRENCE in the right axillary region  Genetic testing- BRCA 1&2 Negative  2014 (?) RECURRENCE in the axillary skin  01/05/2017- METASTATIC DISEASE with lung, hilar, and axillary lymph node mets  Osteopenia  Mediport associated DVT, October 2021  DVT left upper extremity, November 2021    Treatment Summary  2006- Neoadjuvant chemotherapy with AC x 4 cycles followed by Taxol  2007- Lumpectomy with axillary lymph node dissection  2007- Adjuvant radiation therapy to whole breast and axillary region  Did not take tamoxifen due to cost  April 2013- RECURRENCE  Neoadjuvant chemotherapy with 2 cycles of Taxotere followed by Doxil  10/14/2013- Right Breast Mastectomy and Axillary Dissection  01/23/2014- Completion of adjuvant RT to chest wall and grade 3. 3.0cm in greatest gross dimensions. Carcinoma permeates among soft tissues and in the right axilla with no apparent involvement of lymph nodes. 01/23/2014- Completion of adjuvant radiation therapy to chest wall and axillary lymph node with Dr. Amol Edwards  02/14- Initiated adjuvant endocrine therapy with tamoxifen  2014 (?) RECURRENCE in the axillary skin  2014 (?) Surgical resection of the axillary skin pathology demonstrating tumor at cauterized margin  2015 (?) YVETTE/BSO  09/19/2016- Bilateral exchange, nipple reconstruction and fat grafting and removal of PAC  2016- Switched to aromatase inhibitor patient was non compliant  01/05/2017- METASTATIC DISEASE Presented with respiratory failure. CTA Pulmonary showed numerous nodules and masses throughout both lung fields, compatible with metastatic disease. Bilateral hilar adenopathy seen. 01/11/2017-06/17/2018- Single agent Abraxane x 13 cycles  03/15/2017- CT Chest W Contrast Interval decrease in maximal diameter of essentially all the multiple metastatic pulmonary nodules. The average difference is approximately 25%. Some of the much smaller ones have disappeared. Bilateral hilar and aortopulmonary window adenopathy is also similarly smaller. 06/07/2017- CT Chest W Contrast Multiple lung nodules seen bilaterally. Most of the lung nodules show interval improvement with decreased size by 1 to 3mm. Mediastinal and bilateral hilar adenopathy seen with improvement. 07/24/2017- PET/CT scan showed marked improvement, is minimal abnormal, has excellent response to therapy  02/01/2018- PET/CT scan Numerous bilateral lung masses and nodules, the majority of which do not have appreciable abnormal FDG uptake. The largest mass in the left infrahilar region of the left lower lobe demonstrates a maximum SUV of 3.3. Mediastinal lymphadenopathy.  No evidence of metastatic disease in the abdomen or pelvis.   06/21/2018- CT Chest/Abdomen/Pelvis Multiple lung mets, mild bilateral hilar and mediastinal lymph nodes. No masses or evidence of metastatic disease in the abdomen or pelvis  06/27/2018-09/17/2018- Gemzar  09/28/2018- CT Pulmonary Multiple lung mets, mild bilateral hilar and mediastinal lymph nodes  10/10/2018-12/21/2020- Faslodex every 4 weeks  12/14/2018- CT Chest showed definite decrease in the maximal diameter and volume of all left and right metastatic nodules. Somewhat enlarged paratracheal and left axillary lymph nodes are relatively unchanged. 04/22/2019- MRI Cervical Spine W WO Contrast Unremarkable  04/22/2019- MRI Brain W WO Contrast No enhancing lesions in the brain and no evidence of metastatic disease. 07/27/2019- CTA Pulmonary showed essentially complete disappearance of the pulmonary metastatic disease. Several tiny flat peripheral nodules are the only sequela. The tiny ones on the right are unchanged, the tiny ones on the left are even smaller. There is no longer any active metastatic disease in either lung. 12/19/2019- CT Chest/Abdomen/Pelvis Small, tiny subpleural nodules right upper and right midlung field as well as left lung base has remained unchanged. No new focal parenchymal abnormality seen. No adenopathy seen. There is no abdominal or pelvic mass, adenopathy or fluid collection. No lytic or sclerotic bony lesions, but there is a possibility of osteopenia and/or osteoporosis. 02/17/2020- DEXA Bone Density showed osteopenia of lumbar spine, T-score -1.8, and left femoral neck, T-score -2.0  2/25/21 Ct Abdomen Pelvis W Iv Contrast  No evidence of metastatic disease in the abdomen or pelvis. 2/26/21 Ct Chest W Contrast Tiny nodules in the right lung described above probably represent small foci of pleural thickening due to chronic inflammatory process or small noncalcified granulomas. No features to suggest malignancy or metastatic disease. Bilateral breast implants without complication.    3/1/2021-discussed the results CT chest abdomen pelvis. Essentially no clear evidence of metastatic disease. This is consistent with excellent response to treatment. Continue current treatment. 4/1/2021 = 31.6 CA 27-29= 33.5 CEA= 1.6   6/3/21 CA 15-3= 23.3 CA 27-29= 25.6  CEA= 1.8   7/29/2021 CEA=1.7 CA 15-3=21.50 CA 27.29= 26.0  8/19/2021 CT Chest  Left lower lobe pleural-based nodular 1.7 x 0.9 cm is indeterminate. PET/CT recommended. Feeding defect in the left lower lobe bronchus versus a postobstructive airspace disease. This too may be further characterized with PET CT versus direct visualization. 8/19/2021 CT Abd/Pelvis A stable CT scan of the abdomen and pelvis. No finding to suggest neoplastic/metastatic disease. 9/28/2021 Final Diagnosis: Arm, excision of left arm thrombus:Fragments of organizing and organized thrombi with dystrophic calcification. Received is a container labeled \"ischemic, left arm\" Received in a fixative is a 3.6 x 2.6 x 1.4 cm aggregate of brown-tan soft tissue fragments and skin. Representative sections are submitted in a single cassette, numerous. October 2021-she developed left upper extremity DVT associated support. Port removed. She also had infection of the port site status post completion to biotics. 11/19/21 CTA pulmonary: No evidence of pulmonary embolism. The lungs are poorly expanded but unremarkable. 12/8/21 MRI lumbar spine: No significant change since the previous study. Chronic degenerative changes. A persistent mild degenerative retrolisthesis is of L5 over S1. Prominent disc osteophyte complexes and facetal arthropathy and resultant mild spinal stenosis at L4-5 and neural foraminal stenosis at L4-5 and L5-S1 as detailed above. 1/6/22 CT CHEST WO CONTRAST Stable chest CT with an unchanged tiny subpleural nodule within the right upper lobe. 1/19/22 CA 15-3 24 CA 27-29 31.8 CEA 1.5  2/25/2022 CT Abd/Pelvis W IV Contrast(Oral) Gastric wall thickening is nonspecific.  It could be an artifact of under distention. Gastritis and other etiologies are not ruled out. The unenhanced solid organs demonstrate no focal abnormality. Diverticulosis of the colon. Under distention of portions of the colon. Prior hysterectomy. Mild sclerotic changes in the subarticular femoral heads bilaterally is stable. Early AVN is possible. MRI would be more definitive. The signal abnormality is greater on the right side. 5/10/2022 Skin, revision of left chest scar: Cicatrix identified with foreign body type response, negative for evidence of malignancy. 5/25/2022 Tumor Markers: CEA 1.9, CA 15-3-15, CA 27.29-18.2  7/18/2022 CT Chest W Contrast Bibasilar subsegmental atelectasis,table   and unchanged. Prominent osteophyte formation out in the lower thoracic spine with associated subsegmental atelectasis. No pulmonary nodules, masses, pleural effusion or pneumothorax. Bilateral breast implants. Stable interval appearance since recent study 7/12/22 7/18/2022 CT Abd/Pelvis W IV Contrast (oral)-. scattered colon diverticulosis. No acute diverticulitis. No acute abdominopelvic abnormality . 7/25/2022-no imaging evidence of disease recurrence. Continue treatment with Faslodex and Ibrance.   7/25/2022 VL Extremity Venous Left  There is chronic deep vein thrombosis (DVT) of the left axillary vein and  superficial thrombophlebitis (SVT) in the left basilic vein      CA 94.50 Dynamics  01/21/2021- 20.3  04/01/2021 33.5  06/03/2021 25.6  07/29/2021 26.0  1/19/22 31.8    Past Medical History:    Past Medical History:   Diagnosis Date    Breast cancer ([facility] [address]     Breast cancer with lung mets     Chronic back pain     GERD (gastroesophageal reflux disease)     Hx of blood clots     Hypertension     Neuropathy     Post chemo treatment neuropathy       Past Surgical History:    Past Surgical History:   Procedure Laterality Date    BREAST LUMPECTOMY Right     2006    BREAST RECONSTRUCTION Left 09/17/2021    Revision left reconstructed breast, implant performed by Walt Hahn MD at [address]  2016    Bilateral breast implants    EMBOLECTOMY Left 9/26/2021    LEFT UPPER EXTREMITY  MECHANICAL SUCTION THROMBECTOMY performed by Avis Brandon DO at [facility] ([facility])      HYSTERECTOMY, TOTAL ABDOMINAL (CERVIX REMOVED)  2015    MASTECTOMY, BILATERAL      Right 2013 & Left 2016    SCAR REVISION Left 5/10/2022    SCAR REVISION LEFT PORT SITE performed by Walt Hahn MD at [address] [place] [address] Dr      VASCULAR SURGERY N/A 10/6/2021    REMOVAL [facility]IPORT [facility] History:    Marital status, children: Single, 2 children-female  Smoking status: no  ETOH status: no  Profession: Disabled  Resides: [facility], [facility] [address]  Recent trips: Moved from [facility] [place]: no    Family History:   Family History   Problem Relation Age of Onset    Hypertension Mother     Obesity Mother     Prostate Cancer Father     No Known Problems Sister     No Known Problems Daughter     No Known Problems Daughter        Current Hospital Medications:    Current Outpatient Medications   Medication Sig Dispense Refill    IBRANCE 125 MG tablet Take 1 tablet (125mg) by mouth once daily for 21 days, then 7 days off. 21 tablet 5    ELIQUIS 5 MG TABS tablet TAKE 1 TABLET BY MOUTH TWICE A DAY 60 tablet 3    gabapentin (NEURONTIN) 300 MG capsule Take 2 capsules by mouth 3 times daily for 30 days.  180 capsule 0    Cholecalciferol (VITAMIN D3) 1.25 MG (33730 UT) TABS Take 1 tablet by mouth once a week 6 tablet 0    mupirocin (BACTROBAN NASAL) 2 % nasal ointment Apply to each nostril BID 5 days prior to surgery 1 each 0    guaiFENesin 1200 MG TB12 Take 1 tablet by mouth 2 times daily 30 tablet 0    sodium hypochlorite (DAKINS) 0.125 % SOLN external solution Apply topically 2 times daily 1 each 0    VENTOLIN  (90 Base) MCG/ACT inhaler Inhale 2 puffs into the lungs 4 times daily as needed for Wheezing 18 g 5    pantoprazole (PROTONIX) 40 MG tablet TAKE 1 TABLET BY MOUTH EVERY DAY (Patient taking differently: Take 40 mg by mouth daily) 90 tablet 0    NARCAN 4 MG/0.1ML LIQD nasal spray       guaiFENesin-codeine (GUAIFENESIN AC) 100-10 MG/5ML liquid Take 5 mLs by mouth 3 times daily as needed for Cough. lisinopril (PRINIVIL;ZESTRIL) 10 MG tablet Take 10 mg by mouth daily      acyclovir (ZOVIRAX) 800 MG tablet Take 800 mg by mouth 2 times daily as needed       ondansetron (ZOFRAN) 4 MG tablet Take 4 mg by mouth every 8 hours as needed for Nausea or Vomiting      alclomethasone (ACLOVATE) 0.05 % cream Apply topically 2 times daily as needed Apply topically 2 times daily. No current facility-administered medications for this visit. Allergies:    Allergies   Allergen Reactions    Clindamycin/Lincomycin Hives, Itching and Rash         Subjective   REVIEW OF SYSTEMS:   CONSTITUTIONAL: no fever, no night sweats,no fatigue;  HEENT: no blurring of vision, no double vision, no hearing difficulty, no tinnitus, no ulceration, no dysplasia, no epistaxis;   LUNGS: no cough, no hemoptysis, no wheeze,  no shortness of breath;  CARDIOVASCULAR: no palpitation, no chest pain, no shortness of breath;  GI: no abdominal pain, no nausea, no vomiting, no diarrhea, no constipation;  PATRICK: no dysuria, no hematuria, no frequency or urgency, no nephrolithiasis;  MUSCULOSKELETAL: no joint pain, no swelling, no stiffness;  ENDOCRINE: no polyuria, no polydipsia, no cold or heat intolerance;  HEMATOLOGY: no easy bruising or bleeding, no history of clotting disorder;  DERMATOLOGY: no skin rash, no eczema, no pruritus;  PSYCHIATRY: no depression, no anxiety, no panic attacks, no suicidal ideation, no homicidal ideation;  NEUROLOGY: no syncope, no seizures, no numbness or tingling of hands, no numbness or tingling of feet, no paresis;     BP (!) 152/90   Pulse 97   Wt 180 lb 12.8 oz (82 kg)   SpO2 98%   BMI 29.18 kg/m² PHYSICAL EXAM:  CONSTITUTIONAL: Alert, appropriate, no acute distress  EYES: Non icteric, EOM intact, pupils equal round   ENT: Mucus membranes moist, no oral pharyngeal lesions, external inspection of ears and nose are normal.  NECK: Supple, no masses. No palpable thyroid mass  CHEST/LUNGS: CTA bilaterally, normal respiratory effort   CARDIOVASCULAR: RRR, no murmurs. No lower extremity edema  ABDOMEN: soft non-tender, active bowel sounds, no HSM. No palpable masses  EXTREMITIES: warm, full ROM in all 4 extremities, no focal weakness. SKIN: warm, dry with no rashes or lesions  LYMPH: No cervical, clavicular, axillary, or inguinal lymphadenopathy  NEUROLOGIC: follows commands, non focal   PSYCH: mood and affect appropriate.   Alert and oriented to time, place, person       LABORATORY RESULTS REVIEWED/ANALYZED BY ME:  5/25/2022 Tumor Markers: CEA 1.9, CA 15-3-15, CA 27.29-18.2  Lab Results   Component Value Date    WBC 8.38 08/23/2022    HGB 12.5 08/23/2022    HCT 40.2 08/23/2022    MCV 94.6 08/23/2022     08/23/2022     Lab Results   Component Value Date    NEUTROABS 5.43 08/23/2022     Lab Results   Component Value Date     05/25/2022    K 4.3 05/25/2022     05/25/2022    CO2 25 05/25/2022    BUN 14 05/25/2022    CREATININE 0.5 05/25/2022    GLUCOSE 76 05/25/2022    CALCIUM 9.2 05/25/2022    PROT 7.5 05/25/2022    LABALBU 4.8 05/25/2022    BILITOT 0.7 05/25/2022    ALKPHOS 126 (H) 05/25/2022    AST 17 05/25/2022    ALT 15 05/25/2022    LABGLOM >60 05/25/2022    GFRAA >59 05/25/2022    AGRATIO 1.6 04/01/2021    GLOB 3.0 02/16/2022     RADIOLOGY STUDIES REVIEWED BY ME:  7/25/2022 VL Extremity Venous Left  There is chronic deep vein thrombosis (DVT) of the left axillary vein and  superficial thrombophlebitis (SVT) in the left basilic vein    ASSESSMENT:    Orders Placed This Encounter   Procedures    Cancer Antigen 15-3     Standing Status:   Future     Number of Occurrences:   1     Standing Expiration Date:   8/23/2023    Cancer Antigen 27.29     Standing Status:   Future     Number of Occurrences:   1     Standing Expiration Date:   8/23/2023    CEA     Standing Status:   Future     Number of Occurrences:   1     Standing Expiration Date:   8/23/2023    Comprehensive Metabolic Panel     Standing Status:   Future     Number of Occurrences:   1     Standing Expiration Date:   8/23/2023          Nelson Hayes was seen today for follow-up. Diagnoses and all orders for this visit:    Care plan discussed with patient    Metastatic breast cancer ([facility] [address]  -     Cancer Antigen 15-3; Future  -     Cancer Antigen 27.29; Future  -     CEA; Future  -     Comprehensive Metabolic Panel; Future    Chronic deep vein thrombosis (DVT) of left upper extremity, unspecified vein (HCC)    Use of fulvestrant (Faslodex)        Metastatic breast cancer, hormone sensitive  2/25/2021-discussed the results of CT chest abdomen pelvis. No evidence of disease progression. In fact, excellent response with no measurable metastatic disease at this time. Currently Faslodex/Ibrance. Regimen:  Faslodex to 500 mg subcutaneous every 4 weeks. Continue Ibrance days 1-21 q. 28 days    Clinical/laboratory assessment of toxicity for Ibrance    8/19/2021-CT chest/abdomen/pelvis-no evidence of metastatic disease. Findings of a pulmonary lung nodule in the left lower lobe  -JAN/FEB 2022- CT C/A/P-MARIA INES  -July 2022-CT C/A/P-MARIA INES    Essentially, good response to Faslodex/Ibrance. Last cancer marker may were normal.    Plan:  -Continue Faslodex/Ibrance 125 mg days 1-21 q. 28 days      B12 deficiency- Recommended B12 2000 mcg p.o. daily. Will start B12 injections today 1000mcg with Faslodex. 3/1/2021- Methylmalonic Acid 171, Vitamin B12 >2000. B12 replaced monthly. 10/10/2021-vitamin B12 154. She is currently receiving B12 replacement. Chemotherapy-induced neuropathy-continue gabapentin.     COVID-19 vaccination response-patient had covid 19 Jan 2022.    Left lower lobe lung nodule-she is currently being seen by pulmonary at [facility]. They believe that this could be inflammatory. I reviewed the CT scans. Stable pulmonary nodule. -CT chest January 2022-subcentimeter stable pulmonary nodules. Acute left upper extremity DVT/left upper chest port, October 2021-US upper extremity showed acute DVT likely related to port Oct 2021.   -Currently on Eliquis. S/p Mediport removal.  -US Jan 2022,LUE: Chronic appearing deep vein thrombosis (DVT) is seen in the axillary  brachial , left upper extremity(ies). Subacute appearing superficial thrombophlebitis (SVT) is seen in the  basilic vein, left upper extremity(ies  -Continue Eliquis 5 mg p.o. twice daily    Normocytic anemia-hemoglobin 10.8. Improving. Continue to monitor BC.  10/10/2021-ferritin 92, iron saturation 17%, TIBC 242, folate 4.6, vitamin B12 154. Continue B12 replacement  -Hemoglbin 12.3  Lab Results   Component Value Date    WBC 8.38 08/23/2022    HGB 12.5 08/23/2022    HCT 40.2 08/23/2022    MCV 94.6 08/23/2022     08/23/2022       LUE-  -New onset left upper extremity swelling after a plane ride  -Continue Eliquis  -Ultrasound left upper extremity today  Ultrasound left upper extremity showed   Impression:    There is chronic deep vein thrombosis (DVT) of the left axillary vein and   superficial thrombophlebitis (SVT) in the left basilic vein.     -Patient is currently on Eliquis 5 mg p.o. twice daily. PLAN:  RTC with MD 8 weeks  Faslodex and B12 today and every 4 weeks  Follow up with Dr. Arnoldo Pat for PCP care  CBC, CMP, CEA, CA 15-3, CA 27-29 today  Continue Ibrance  Continue B12 replacement monthly  Repeat CT chest/abdomen/pelvis in 6 months, Jan 2023  Continue Eliquis       Marvin CLARK am pre charting  as Medical Assistant for Palak Ho MD. Electronically signed by Marvin Avila MA on 8/23/2022 at 4:37 PM CDT.     Penelope CLARK am scribing for [employer] Claire Calixto MD. Electronically signed by Betty Sewell RN on 8/23/2022 at 1:01 PM CDT. I, Dr Melissa Olson, personally performed the services described in this documentation as scribed by Betty Sewell RN in my presence and is both accurate and complete. I have seen, examined and reviewed this patient medication list, appropriate labs and imaging studies. I reviewed relevant medical records and others physicians notes. I discussed the plans of care with the patient. I answered all the questions to the patients satisfaction. I have also reviewed the chief complaint (CC) and part of the history (History of Present Illness (HPI), Past Family Social History [facility]), or Review of Systems (ROS) and made changes when appropriated.        (Please note that portions of this note were completed with a voice recognition program. Efforts were made to edit the dictations but occasionally words are mis-transcribed.)  Electronically signed by Jose Barksdale MD on 8/23/2022 at 1:33 PM

## 2022-08-23 ENCOUNTER — HOSPITAL ENCOUNTER (OUTPATIENT)
Dept: INFUSION THERAPY | Age: 57
Discharge: HOME | End: 2022-08-23
Payer: MEDICARE

## 2022-08-23 ENCOUNTER — OFFICE VISIT (OUTPATIENT)
Dept: HEMATOLOGY | Age: 57
End: 2022-08-23
Payer: MEDICARE

## 2022-08-23 VITALS
SYSTOLIC BLOOD PRESSURE: 152 MMHG | HEART RATE: 97 BPM | DIASTOLIC BLOOD PRESSURE: 90 MMHG | BODY MASS INDEX: 29.18 KG/M2 | WEIGHT: 180.8 LBS | OXYGEN SATURATION: 98 %

## 2022-08-23 DIAGNOSIS — D50.8 OTHER IRON DEFICIENCY ANEMIA: ICD-10-CM

## 2022-08-23 DIAGNOSIS — C50.919 METASTATIC BREAST CANCER (HCC): ICD-10-CM

## 2022-08-23 DIAGNOSIS — Z79.818 USE OF FULVESTRANT (FASLODEX): ICD-10-CM

## 2022-08-23 DIAGNOSIS — C50.919 CARCINOMA OF FEMALE BREAST, UNSPECIFIED ESTROGEN RECEPTOR STATUS, UNSPECIFIED LATERALITY, UNSPECIFIED SITE OF BREAST (HCC): ICD-10-CM

## 2022-08-23 DIAGNOSIS — I82.722 CHRONIC DEEP VEIN THROMBOSIS (DVT) OF LEFT UPPER EXTREMITY, UNSPECIFIED VEIN (HCC): ICD-10-CM

## 2022-08-23 DIAGNOSIS — T45.1X5S ANTINEOPLASTIC DRUGS CAUSING ADVERSE EFFECT, SEQUELA: ICD-10-CM

## 2022-08-23 DIAGNOSIS — C50.919 METASTATIC BREAST CANCER (HCC): Primary | ICD-10-CM

## 2022-08-23 DIAGNOSIS — Z71.89 CARE PLAN DISCUSSED WITH PATIENT: Primary | ICD-10-CM

## 2022-08-23 LAB
ALP BLD-CCNC: 120 U/L (ref 35–104)
ANION GAP SERPL CALCULATED.3IONS-SCNC: 7 MMOL/L (ref 7–19)
BASOPHILS ABSOLUTE: 0.02 K/UL (ref 0.01–0.08)
BASOPHILS RELATIVE PERCENT: 0.2 % (ref 0.1–1.2)
CA 15-3: 14 U/ML (ref 0–25)
CALCIUM SERPL-MCNC: 9.5 MG/DL (ref 8.4–10.2)
CEA: 1.9 NG/ML (ref 0–4.7)
CHLORIDE BLD-SCNC: 104 MMOL/L (ref 98–111)
CO2: 32 MMOL/L (ref 22–29)
EOSINOPHILS ABSOLUTE: 0.05 K/UL (ref 0.04–0.54)
EOSINOPHILS RELATIVE PERCENT: 0.6 % (ref 0.7–7)
HCT VFR BLD CALC: 40.2 % (ref 34.1–44.9)
HEMOGLOBIN: 12.5 G/DL (ref 11.2–15.7)
LYMPHOCYTES ABSOLUTE: 2.47 K/UL (ref 1.18–3.74)
LYMPHOCYTES RELATIVE PERCENT: 29.5 % (ref 19.3–53.1)
MCH RBC QN AUTO: 29.4 PG (ref 25.6–32.2)
MCHC RBC AUTO-ENTMCNC: 31.1 G/DL (ref 32.3–35.5)
MCV RBC AUTO: 94.6 FL (ref 79.4–94.8)
MONOCYTES ABSOLUTE: 0.39 K/UL (ref 0.24–0.82)
MONOCYTES RELATIVE PERCENT: 4.7 % (ref 4.7–12.5)
NEUTROPHILS ABSOLUTE: 5.43 K/UL (ref 1.56–6.13)
NEUTROPHILS RELATIVE PERCENT: 64.8 % (ref 34–71.1)
PDW BLD-RTO: 13.9 % (ref 11.7–14.4)
PLATELET # BLD: 272 K/UL (ref 182–369)
PMV BLD AUTO: 8.7 FL (ref 7.4–10.4)
RBC # BLD: 4.25 M/UL (ref 3.93–5.22)
TOTAL PROTEIN: 7.5 G/DL (ref 6.3–8.2)
WBC # BLD: 8.38 K/UL (ref 3.98–10.04)

## 2022-08-23 PROCEDURE — 96402 CHEMO HORMON ANTINEOPL SQ/IM: CPT

## 2022-08-23 PROCEDURE — 6360000002 HC RX W HCPCS: Performed by: INTERNAL MEDICINE

## 2022-08-23 PROCEDURE — 1036F TOBACCO NON-USER: CPT | Performed by: INTERNAL MEDICINE

## 2022-08-23 PROCEDURE — 36415 COLL VENOUS BLD VENIPUNCTURE: CPT | Performed by: INTERNAL MEDICINE

## 2022-08-23 PROCEDURE — 85025 COMPLETE CBC W/AUTO DIFF WBC: CPT

## 2022-08-23 PROCEDURE — 99212 OFFICE O/P EST SF 10 MIN: CPT

## 2022-08-23 PROCEDURE — G8417 CALC BMI ABV UP PARAM F/U: HCPCS | Performed by: INTERNAL MEDICINE

## 2022-08-23 PROCEDURE — 3017F COLORECTAL CA SCREEN DOC REV: CPT | Performed by: INTERNAL MEDICINE

## 2022-08-23 PROCEDURE — G8427 DOCREV CUR MEDS BY ELIG CLIN: HCPCS | Performed by: INTERNAL MEDICINE

## 2022-08-23 PROCEDURE — 99214 OFFICE O/P EST MOD 30 MIN: CPT | Performed by: INTERNAL MEDICINE

## 2022-08-23 PROCEDURE — 96372 THER/PROPH/DIAG INJ SC/IM: CPT

## 2022-08-23 RX ORDER — CYANOCOBALAMIN 1000 UG/ML
1000 INJECTION INTRAMUSCULAR; SUBCUTANEOUS ONCE
Status: CANCELLED
Start: 2022-09-19

## 2022-08-23 RX ORDER — METHYLPREDNISOLONE SODIUM SUCCINATE 125 MG/2ML
125 INJECTION, POWDER, LYOPHILIZED, FOR SOLUTION INTRAMUSCULAR; INTRAVENOUS ONCE
Status: CANCELLED | OUTPATIENT
Start: 2022-08-23 | End: 2022-08-23

## 2022-08-23 RX ORDER — EPINEPHRINE 1 MG/ML
0.3 INJECTION, SOLUTION, CONCENTRATE INTRAVENOUS PRN
Status: CANCELLED | OUTPATIENT
Start: 2022-08-23

## 2022-08-23 RX ORDER — DIPHENHYDRAMINE HYDROCHLORIDE 50 MG/ML
50 INJECTION INTRAMUSCULAR; INTRAVENOUS ONCE
Status: CANCELLED | OUTPATIENT
Start: 2022-08-23 | End: 2022-08-23

## 2022-08-23 RX ORDER — SODIUM CHLORIDE 9 MG/ML
INJECTION, SOLUTION INTRAVENOUS CONTINUOUS
Status: CANCELLED | OUTPATIENT
Start: 2022-08-23

## 2022-08-23 RX ORDER — LAMOTRIGINE 25 MG/1
250 TABLET ORAL ONCE
Status: COMPLETED | OUTPATIENT
Start: 2022-08-23 | End: 2022-08-23

## 2022-08-23 RX ORDER — CYANOCOBALAMIN 1000 UG/ML
1000 INJECTION INTRAMUSCULAR; SUBCUTANEOUS ONCE
Status: COMPLETED
Start: 2022-08-23 | End: 2022-08-23

## 2022-08-23 RX ORDER — FAMOTIDINE 10 MG/ML
20 INJECTION, SOLUTION INTRAVENOUS ONCE
Status: CANCELLED | OUTPATIENT
Start: 2022-08-23 | End: 2022-08-23

## 2022-08-23 RX ADMIN — FULVESTRANT 250 MG: 50 INJECTION, SOLUTION INTRAMUSCULAR at 13:10

## 2022-08-23 RX ADMIN — CYANOCOBALAMIN 1000 MCG: 1000 INJECTION, SOLUTION INTRAMUSCULAR; SUBCUTANEOUS at 13:10

## 2022-08-26 LAB — CA 27.29: 17.6 U/ML

## 2022-09-13 ENCOUNTER — OFFICE VISIT (OUTPATIENT)
Dept: VASCULAR SURGERY | Age: 57
End: 2022-09-13
Payer: MEDICARE

## 2022-09-13 ENCOUNTER — HOSPITAL ENCOUNTER (OUTPATIENT)
Dept: VASCULAR LAB | Age: 57
Discharge: HOME | End: 2022-09-13
Payer: MEDICARE

## 2022-09-13 VITALS
DIASTOLIC BLOOD PRESSURE: 88 MMHG | OXYGEN SATURATION: 96 % | TEMPERATURE: 97.9 F | BODY MASS INDEX: 29.18 KG/M2 | HEART RATE: 86 BPM | HEIGHT: 66 IN | SYSTOLIC BLOOD PRESSURE: 135 MMHG

## 2022-09-13 DIAGNOSIS — M79.602 LEFT ARM PAIN: Primary | ICD-10-CM

## 2022-09-13 DIAGNOSIS — M79.89 LEFT ARM SWELLING: ICD-10-CM

## 2022-09-13 DIAGNOSIS — I82.A22: ICD-10-CM

## 2022-09-13 DIAGNOSIS — I82.622 ACUTE DEEP VEIN THROMBOSIS (DVT) OF OTHER VEIN OF LEFT UPPER EXTREMITY (HCC): ICD-10-CM

## 2022-09-13 DIAGNOSIS — M79.602 LEFT ARM PAIN: ICD-10-CM

## 2022-09-13 PROCEDURE — 93971 EXTREMITY STUDY: CPT

## 2022-09-13 PROCEDURE — 3017F COLORECTAL CA SCREEN DOC REV: CPT | Performed by: NURSE PRACTITIONER

## 2022-09-13 PROCEDURE — G8417 CALC BMI ABV UP PARAM F/U: HCPCS | Performed by: NURSE PRACTITIONER

## 2022-09-13 PROCEDURE — 1036F TOBACCO NON-USER: CPT | Performed by: NURSE PRACTITIONER

## 2022-09-13 PROCEDURE — G8427 DOCREV CUR MEDS BY ELIG CLIN: HCPCS | Performed by: NURSE PRACTITIONER

## 2022-09-13 PROCEDURE — 99214 OFFICE O/P EST MOD 30 MIN: CPT | Performed by: NURSE PRACTITIONER

## 2022-09-13 NOTE — PROGRESS NOTES
Danielle Hero (:  1965) is a 56 y.o. female,Established patient, here for evaluation of the following chief complaint(s):  Follow-up (Post VL Extremity Venous Left )            SUBJECTIVE/OBJECTIVE:  She presents for follow-up of known DVT. She developed this with mediport that was placed for chemo. It was infected and was removed. She has no visible cancer at this time. She has no known history of DVT. Her current treatment includes  eliquis . She does not have a family history of hypercoagulability. Risk factors for hypercoagulable state include: cancer. She does not have an IVC filter. She has minimal intermittent swelling. Differential diagnosis includes but is not limited to CHF, thyroid disease, venous disease, DVT, SVT, peripheral vascular disease.           Danielle Gutierrez is a 56 y.o. female with the following history as recorded in [facility]:  Patient Active Problem List    Diagnosis Date Noted    Chemotherapy-induced neuropathy ([address] 2022    Wheezing 2022    Snoring 2022    RUDY (obstructive sleep apnea) 2022    Non-restorative sleep 2022    Non-cardiac chest pain 2021    Chest wall ulcer, with fat layer exposed ([address] 10/15/2021    Port or reservoir infection 10/06/2021    Central line infection 10/05/2021    Exostosis of left foot 2021    Arm DVT (deep venous thromboembolism), acute, left ([address] 2021    S/P revision of left breast reconstruction 2021    Status post bilateral breast reconstruction     Acute purulent bronchitis 2021    Lung nodule 2021    Status post bilateral mastectomy 2021    Other cyst of bone, left ankle and foot 04/10/2021    Dyspnea and respiratory abnormalities 2021    Chronic pain syndrome 2021    Right wrist pain 2021    Rheumatoid arthritis involving both knees with negative rheumatoid factor ([address] 2021    Menopausal symptom 2021    Benign neoplasm of body of uterus 04/08/2021    Iron deficiency anemia 01/22/2021    Carcinoma of female breast ([facility] 01/21/2021    Poor venous access 01/21/2021     Current Outpatient Medications   Medication Sig Dispense Refill    IBRANCE 125 MG tablet Take 1 tablet (125mg) by mouth once daily for 21 days, then 7 days off. 21 tablet 5    ELIQUIS 5 MG TABS tablet TAKE 1 TABLET BY MOUTH TWICE A DAY 60 tablet 3    Cholecalciferol (VITAMIN D3) 1.25 MG (54833 UT) TABS Take 1 tablet by mouth once a week 6 tablet 0    mupirocin (BACTROBAN NASAL) 2 % nasal ointment Apply to each nostril BID 5 days prior to surgery 1 each 0    guaiFENesin 1200 MG TB12 Take 1 tablet by mouth 2 times daily 30 tablet 0    sodium hypochlorite (DAKINS) 0.125 % SOLN external solution Apply topically 2 times daily 1 each 0    VENTOLIN  (90 Base) MCG/ACT inhaler Inhale 2 puffs into the lungs 4 times daily as needed for Wheezing 18 g 5    pantoprazole (PROTONIX) 40 MG tablet TAKE 1 TABLET BY MOUTH EVERY DAY (Patient taking differently: Take 40 mg by mouth daily) 90 tablet 0    NARCAN 4 MG/0.1ML LIQD nasal spray       guaiFENesin-codeine (GUAIFENESIN AC) 100-10 MG/5ML liquid Take 5 mLs by mouth 3 times daily as needed for Cough. lisinopril (PRINIVIL;ZESTRIL) 10 MG tablet Take 10 mg by mouth daily      acyclovir (ZOVIRAX) 800 MG tablet Take 800 mg by mouth 2 times daily as needed       ondansetron (ZOFRAN) 4 MG tablet Take 4 mg by mouth every 8 hours as needed for Nausea or Vomiting      alclomethasone (ACLOVATE) 0.05 % cream Apply topically 2 times daily as needed Apply topically 2 times daily. gabapentin (NEURONTIN) 300 MG capsule Take 2 capsules by mouth 3 times daily for 30 days. 180 capsule 0     No current facility-administered medications for this visit.      Allergies: Clindamycin/lincomycin  Past Medical History:   Diagnosis Date    Breast cancer ([facility]     Breast cancer with lung mets     Chronic back pain     GERD (gastroesophageal reflux disease)     Hx of blood clots     Hypertension     Neuropathy     Post chemo treatment neuropathy     Past Surgical History:   Procedure Laterality Date    BREAST LUMPECTOMY Right     2006    BREAST RECONSTRUCTION Left 09/17/2021    Revision left reconstructed breast, implant performed by Fabian Ch MD at [address]  2016    Bilateral breast implants    EMBOLECTOMY Left 9/26/2021    LEFT UPPER EXTREMITY  MECHANICAL SUCTION THROMBECTOMY performed by Emil Elena, DO at [address] (CERVIX STATUS UNKNOWN)      HYSTERECTOMY, TOTAL ABDOMINAL (CERVIX REMOVED)  2015    MASTECTOMY, BILATERAL      Right 2013 & Left 2016    SCAR REVISION Left 5/10/2022    SCAR REVISION LEFT PORT SITE performed by Fabian Ch MD at [facility]      VASCULAR SURGERY N/A 10/6/2021    REMOVAL OF MEDIPORT performed by Emil Elena, DO at [facility] OR     Family History   Problem Relation Age of Onset    Hypertension Mother     Obesity Mother     Prostate Cancer Father     No Known Problems Sister     No Known Problems Daughter     No Known Problems Daughter      Social History     Tobacco Use    Smoking status: Never    Smokeless tobacco: Never   Substance Use Topics    Alcohol use: Not Currently       ROS  Eyes - no sudden vision change or amaurosis. Respiratory - no significant shortness of breath,  Cardiovascular - no chest pain or syncope. no significant leg swelling. no claudication. Musculoskeletal - no gait disturbance  Skin - no new wound. Neurologic -  No speech difficulty or lateralizing weakness. All other review of systems are negative. Physical Exam    /88 (Site: Left Upper Arm, Position: Sitting)   Pulse 86   Temp 97.9 °F (36.6 °C)   Ht 5' 6\" (1.676 m)   SpO2 96%   BMI 29.18 kg/m²       Neck- carotid pulses 2+ to palpation with no bruit  Cardiovascular - Regular rate and rhythm.     Pulmonary - effort appears normal.  No respiratory distress. Lungs - Breath sounds normal. No wheezes or rales. Has spidering of veins across left chest.    Extremities -  Radial and brachial pulses are 2+ to palpation bilaterally. Right femoral pulse: present 2+; Right popliteal pulse: absent Right DP: absent; Right PT absent; Left femoral pulse: present 2+; Left popliteal pulse: absent; Left DP: absent; Left PT: absent No cyanosis, clubbing,  no  significant edema. No signs atheroembolic event. Neurologic - alert and oriented X 3. Physiologic. Face symmetric. Skin - warm, dry, and intact. no  wound  Psychiatric - mood, affect, and behavior appear normal.  Judgment and thought processes appear normal.    Risk factors for atherosclerosis of all vascular beds have been reviewed with the patient including:  Family history, tobacco abuse in all forms, elevated cholesterol, hyperlipidemia, and diabetes. Venous Scan: shows dvt left axillary, basilic, and cephalic  Individual films reviewed: yes. Test results were reviewed with the patient  Disease process is stable    after I reviewed the ultrasound images, I see no change from the previous study        Reviewed on this visit: previous venous scan, op notes, speciality notes from oncology             ASSESSMENT/PLAN:  1. Left arm pain  2. Left arm swelling  3. DVT of left axillary vein, chronic (HCC)      Discussed management of venous scan which includes:  continue eliquis to reduce risk of venous thrombosis    Recommend no smoking - discussed the effect tobacco has on illness;   Proceed with blood thinner for life. She has known subclavian vein stenosis. For this reason, this will need to be lifelong       Patient instructed to keep leg elevated as much as possible due to the increased swelling that is associated with DVT. Call the office if pain, swelling, and tenderness extends beyond where it is today.   Wear  support hose every day from the time they get up in the morning until they go to be at night.  Use warm moist heat for comfort if needed. An electronic signature was used to authenticate this note.     --BRITANY Jacobson

## 2022-09-13 NOTE — Clinical Note
Please let her know dr. Alma Rosa Cr reviewed all of her films. She has a known narrowing in the subclavian vein in the shoulder. She would recommend blood thinner for life for this reason. She only needs to follow up with us if she starts to have pain or swelling.

## 2022-09-15 ENCOUNTER — TELEPHONE (OUTPATIENT)
Dept: VASCULAR SURGERY | Age: 57
End: 2022-09-15

## 2022-09-15 NOTE — TELEPHONE ENCOUNTER
Left a message for the patient to let her know the following. I left my phone number to call me back if she had any questions.           ----- Message from BRITANY Joshi sent at 9/15/2022  1:12 PM CDT -----  Please let her know dr. Yee Fuentes reviewed all of her films. She has a known narrowing in the subclavian vein in the shoulder. She would recommend blood thinner for life for this reason. She only needs to follow up with us if she starts to have pain or swelling.

## 2022-09-20 ENCOUNTER — HOSPITAL ENCOUNTER (OUTPATIENT)
Dept: INFUSION THERAPY | Age: 57
Discharge: HOME | End: 2022-09-20
Payer: MEDICARE

## 2022-09-20 ENCOUNTER — APPOINTMENT (OUTPATIENT)
Dept: INFUSION THERAPY | Age: 57
End: 2022-09-20
Payer: MEDICARE

## 2022-09-20 VITALS
BODY MASS INDEX: 28.75 KG/M2 | HEIGHT: 66 IN | HEART RATE: 92 BPM | OXYGEN SATURATION: 99 % | DIASTOLIC BLOOD PRESSURE: 80 MMHG | SYSTOLIC BLOOD PRESSURE: 130 MMHG | WEIGHT: 178.9 LBS

## 2022-09-20 DIAGNOSIS — C50.919 CARCINOMA OF FEMALE BREAST, UNSPECIFIED ESTROGEN RECEPTOR STATUS, UNSPECIFIED LATERALITY, UNSPECIFIED SITE OF BREAST (HCC): ICD-10-CM

## 2022-09-20 DIAGNOSIS — D50.8 OTHER IRON DEFICIENCY ANEMIA: ICD-10-CM

## 2022-09-20 DIAGNOSIS — C50.919 METASTATIC BREAST CANCER (HCC): Primary | ICD-10-CM

## 2022-09-20 LAB
HCT VFR BLD CALC: 41.7 % (ref 34.1–44.9)
HEMOGLOBIN: 12.6 G/DL (ref 11.2–15.7)
LYMPHOCYTES ABSOLUTE: 2.11 K/UL (ref 1.18–3.74)
LYMPHOCYTES RELATIVE PERCENT: 27.1 % (ref 19.3–53.1)
MCH RBC QN AUTO: 29.3 PG (ref 25.6–32.2)
MCHC RBC AUTO-ENTMCNC: 30.2 G/DL (ref 32.3–35.5)
MCV RBC AUTO: 97 FL (ref 79.4–94.8)
MONOCYTES ABSOLUTE: 0.35 K/UL (ref 0.24–0.82)
MONOCYTES RELATIVE PERCENT: 4.5 % (ref 4.7–12.5)
NEUTROPHILS ABSOLUTE: 5.26 K/UL (ref 1.56–6.13)
NEUTROPHILS RELATIVE PERCENT: 67.6 % (ref 34–71.1)
PDW BLD-RTO: 14 % (ref 11.7–14.4)
PLATELET # BLD: 144 K/UL (ref 182–369)
PMV BLD AUTO: 10 FL (ref 7.4–10.4)
RBC # BLD: 4.3 M/UL (ref 3.93–5.22)
WBC # BLD: 7.78 K/UL (ref 3.98–10.04)

## 2022-09-20 PROCEDURE — 85025 COMPLETE CBC W/AUTO DIFF WBC: CPT

## 2022-09-20 PROCEDURE — 6360000002 HC RX W HCPCS: Performed by: INTERNAL MEDICINE

## 2022-09-20 PROCEDURE — 96372 THER/PROPH/DIAG INJ SC/IM: CPT

## 2022-09-20 PROCEDURE — 96402 CHEMO HORMON ANTINEOPL SQ/IM: CPT

## 2022-09-20 RX ORDER — CYANOCOBALAMIN 1000 UG/ML
1000 INJECTION INTRAMUSCULAR; SUBCUTANEOUS ONCE
Status: CANCELLED
Start: 2022-10-18

## 2022-09-20 RX ORDER — LAMOTRIGINE 25 MG/1
250 TABLET ORAL ONCE
Status: COMPLETED | OUTPATIENT
Start: 2022-09-20 | End: 2022-09-20

## 2022-09-20 RX ORDER — CYANOCOBALAMIN 1000 UG/ML
1000 INJECTION INTRAMUSCULAR; SUBCUTANEOUS ONCE
Status: COMPLETED
Start: 2022-09-20 | End: 2022-09-20

## 2022-09-20 RX ADMIN — CYANOCOBALAMIN 1000 MCG: 1000 INJECTION, SOLUTION INTRAMUSCULAR; SUBCUTANEOUS at 10:23

## 2022-09-20 RX ADMIN — FULVESTRANT 250 MG: 50 INJECTION, SOLUTION INTRAMUSCULAR at 10:23

## 2022-09-23 ENCOUNTER — TELEPHONE (OUTPATIENT)
Dept: FAMILY MEDICINE CLINIC | Age: 57
End: 2022-09-23

## 2022-09-23 NOTE — TELEPHONE ENCOUNTER
The patient called and said she went to [facility] and had a block on 9/15/22. Her back pain isn't any better. She called the pain clinic and had an injection done on Monday but that still hasn't helped. The patient states she is having pain on her right lower back that is radiating around to the front and down her leg into her butt area. The patient would like a second opinion about what she should do. Please advise.

## 2022-09-30 ENCOUNTER — HOSPITAL ENCOUNTER (EMERGENCY)
Age: 57
Discharge: HOME | End: 2022-09-30
Payer: MEDICARE

## 2022-09-30 ENCOUNTER — APPOINTMENT (OUTPATIENT)
Dept: CT IMAGING | Age: 57
End: 2022-09-30
Payer: MEDICARE

## 2022-09-30 VITALS
TEMPERATURE: 98.9 F | SYSTOLIC BLOOD PRESSURE: 145 MMHG | RESPIRATION RATE: 20 BRPM | HEART RATE: 79 BPM | OXYGEN SATURATION: 94 % | DIASTOLIC BLOOD PRESSURE: 78 MMHG

## 2022-09-30 DIAGNOSIS — R51.0 POSTURAL HEADACHE: Primary | ICD-10-CM

## 2022-09-30 PROCEDURE — 2500000003 HC RX 250 WO HCPCS: Performed by: PHYSICIAN ASSISTANT

## 2022-09-30 PROCEDURE — 6360000002 HC RX W HCPCS: Performed by: PHYSICIAN ASSISTANT

## 2022-09-30 PROCEDURE — 70450 CT HEAD/BRAIN W/O DYE: CPT | Performed by: RADIOLOGY

## 2022-09-30 PROCEDURE — 96375 TX/PRO/DX INJ NEW DRUG ADDON: CPT

## 2022-09-30 PROCEDURE — 96365 THER/PROPH/DIAG IV INF INIT: CPT

## 2022-09-30 PROCEDURE — 70450 CT HEAD/BRAIN W/O DYE: CPT

## 2022-09-30 PROCEDURE — 2580000003 HC RX 258: Performed by: PHYSICIAN ASSISTANT

## 2022-09-30 PROCEDURE — 99284 EMERGENCY DEPT VISIT MOD MDM: CPT

## 2022-09-30 RX ORDER — KETOROLAC TROMETHAMINE 30 MG/ML
30 INJECTION, SOLUTION INTRAMUSCULAR; INTRAVENOUS ONCE
Status: COMPLETED | OUTPATIENT
Start: 2022-09-30 | End: 2022-09-30

## 2022-09-30 RX ORDER — 0.9 % SODIUM CHLORIDE 0.9 %
500 INTRAVENOUS SOLUTION INTRAVENOUS ONCE
Status: COMPLETED | OUTPATIENT
Start: 2022-09-30 | End: 2022-09-30

## 2022-09-30 RX ORDER — KETOROLAC TROMETHAMINE 10 MG/1
10 TABLET, FILM COATED ORAL EVERY 6 HOURS PRN
Qty: 20 TABLET | Refills: 0 | Status: SHIPPED | OUTPATIENT
Start: 2022-09-30

## 2022-09-30 RX ORDER — ORPHENADRINE CITRATE 30 MG/ML
60 INJECTION INTRAMUSCULAR; INTRAVENOUS ONCE
Status: COMPLETED | OUTPATIENT
Start: 2022-09-30 | End: 2022-09-30

## 2022-09-30 RX ORDER — CAFFEINE CITRATE 20 MG/ML
60 SOLUTION INTRAVENOUS ONCE
Status: DISCONTINUED | OUTPATIENT
Start: 2022-09-30 | End: 2022-09-30 | Stop reason: ALTCHOICE

## 2022-09-30 RX ORDER — PROCHLORPERAZINE EDISYLATE 5 MG/ML
10 INJECTION INTRAMUSCULAR; INTRAVENOUS ONCE
Status: COMPLETED | OUTPATIENT
Start: 2022-09-30 | End: 2022-09-30

## 2022-09-30 RX ORDER — DIPHENHYDRAMINE HYDROCHLORIDE 50 MG/ML
25 INJECTION INTRAMUSCULAR; INTRAVENOUS ONCE
Status: COMPLETED | OUTPATIENT
Start: 2022-09-30 | End: 2022-09-30

## 2022-09-30 RX ADMIN — PROCHLORPERAZINE EDISYLATE 10 MG: 5 INJECTION INTRAMUSCULAR; INTRAVENOUS at 16:38

## 2022-09-30 RX ADMIN — DIPHENHYDRAMINE HYDROCHLORIDE 25 MG: 50 INJECTION INTRAMUSCULAR; INTRAVENOUS at 16:40

## 2022-09-30 RX ADMIN — KETOROLAC TROMETHAMINE 30 MG: 30 INJECTION, SOLUTION INTRAMUSCULAR at 16:41

## 2022-09-30 RX ADMIN — CAFFEINE AND SODIUM BENZOATE 500 MG: 125 INJECTION, SOLUTION INTRAMUSCULAR; INTRAVENOUS at 18:38

## 2022-09-30 RX ADMIN — ORPHENADRINE CITRATE 60 MG: 30 INJECTION INTRAMUSCULAR; INTRAVENOUS at 18:37

## 2022-09-30 RX ADMIN — SODIUM CHLORIDE 500 ML: 9 INJECTION, SOLUTION INTRAVENOUS at 16:44

## 2022-09-30 ASSESSMENT — PAIN DESCRIPTION - LOCATION: LOCATION: HEAD

## 2022-09-30 ASSESSMENT — ENCOUNTER SYMPTOMS
PHOTOPHOBIA: 0
ABDOMINAL PAIN: 0
ABDOMINAL DISTENTION: 0
SORE THROAT: 0
RHINORRHEA: 0
NAUSEA: 0
EYE PAIN: 0
BACK PAIN: 0
EYE DISCHARGE: 0
COLOR CHANGE: 0
COUGH: 0
APNEA: 0
SHORTNESS OF BREATH: 0

## 2022-09-30 ASSESSMENT — PAIN SCALES - GENERAL: PAINLEVEL_OUTOF10: 6

## 2022-09-30 ASSESSMENT — PAIN - FUNCTIONAL ASSESSMENT: PAIN_FUNCTIONAL_ASSESSMENT: 0-10

## 2022-09-30 NOTE — ED PROVIDER NOTES
[facility] EMER[facility]Y DEPT  eMERGENCYdEPARTMENT eNCOUnter      Pt Name: Claire Diaz  MRN: [id number]  [place] 1965  Date of evaluation: 9/30/2022  Provider:AUSTYN Nava    CHIEF COMPLAINT       Chief Complaint   Patient presents with    Headache      Second block yesterday and now has headache            HISTORY OF PRESENT ILLNESS  (Location/Symptom, Timing/Onset, Context/Setting, Quality, Duration, Modifying Factors, Severity.)   Claire Diaz is a 56 y.o. female who presents to the emergency department with complaints of second nerve block this is SI joint related. Possible ablation candidate with dr ladd. She has breast cancer hx treatement starting back in 06 not prone to headaches it is positional. No vision changes. Nausea no emesis correlates with injection yesterday. No neck pain or stiffness. HPI    Nursing Notes were reviewed and I agree. REVIEW OF SYSTEMS    (2-9 systems for level 4, 10 or more for level 5)     Review of Systems   Constitutional:  Negative for activity change, appetite change, chills and fever. HENT:  Negative for congestion, postnasal drip, rhinorrhea and sore throat. Eyes:  Negative for photophobia, pain, discharge and visual disturbance. Respiratory:  Negative for apnea, cough and shortness of breath. Cardiovascular:  Negative for chest pain and leg swelling. Gastrointestinal:  Negative for abdominal distention, abdominal pain and nausea. Genitourinary:  Negative for vaginal bleeding. Musculoskeletal:  Negative for arthralgias, back pain, joint swelling, neck pain and neck stiffness. Skin:  Negative for color change and rash. Neurological:  Positive for headaches. Negative for dizziness, syncope and facial asymmetry. Hematological:  Negative for adenopathy. Does not bruise/bleed easily. Psychiatric/Behavioral:  Negative for agitation, behavioral problems and confusion.        Except as noted above the remainder of the review of systems was reviewed and negative. PAST MEDICAL HISTORY     Past Medical History:   Diagnosis Date    Breast cancer ([address]     Breast cancer with lung mets     Chronic back pain     GERD (gastroesophageal reflux disease)     Hx of blood clots     Hypertension     Neuropathy     Post chemo treatment neuropathy         SURGICAL HISTORY       Past Surgical History:   Procedure Laterality Date    BREAST LUMPECTOMY Right     2006    BREAST RECONSTRUCTION Left 09/17/2021    Revision left reconstructed breast, implant performed by Shelbi Mercedes MD at [address]  2016    Bilateral breast implants    EMBOLECTOMY Left 9/26/2021    LEFT UPPER EXTREMITY  MECHANICAL SUCTION THROMBECTOMY performed by Jerry Chahal DO at [address] ([address] [facility])      HYSTERECTOMY, TOTAL ABDOMINAL (CERVIX REMOVED)  2015    MASTECTOMY, BILATERAL      Right 2013 & Left 2016    SCAR REVISION Left 5/10/2022    SCAR REVISION LEFT PORT SITE performed by Shelbi Mercedes MD at [address] N/A 10/6/2021    REMOVAL OF MEDIPORT performed by Jerry Chahal DO at [address] [facility]       Discharge Medication List as of 9/30/2022  8:25 PM        CONTINUE these medications which have NOT CHANGED    Details   IBRANCE 125 MG tablet Take 1 tablet (125mg) by mouth once daily for 21 days, then 7 days off., Disp-21 tablet, R-5Normal      ELIQUIS 5 MG TABS tablet TAKE 1 TABLET BY MOUTH TWICE A DAY, Disp-60 tablet, R-3Normal      gabapentin (NEURONTIN) 300 MG capsule Take 2 capsules by mouth 3 times daily for 30 days. , Disp-180 capsule, R-0Normal      Cholecalciferol (VITAMIN D3) 1.25 MG (57188 UT) TABS Take 1 tablet by mouth once a week, Disp-6 tablet, R-0Normal      mupirocin (BACTROBAN NASAL) 2 % nasal ointment Apply to each nostril BID 5 days prior to surgery, Disp-1 each, R-0, Normal      guaiFENesin 1200 MG TB12 Take 1 tablet by mouth 2 times daily, Disp-30 tablet, R-0Normal      sodium hypochlorite (DAKINS) 0.125 % SOLN external solution Apply topically 2 times daily, Topical, 2 TIMES DAILY Starting Tue 10/12/2021, Disp-1 each, R-0, Normal      VENTOLIN  (90 Base) MCG/ACT inhaler Inhale 2 puffs into the lungs 4 times daily as needed for Wheezing, Disp-18 g, R-5, DAWMust be Ventolin (brand name only)Normal      pantoprazole (PROTONIX) 40 MG tablet TAKE 1 TABLET BY MOUTH EVERY DAY, Disp-90 tablet, R-0Normal      NARCAN 4 MG/0.1ML LIQD nasal spray DAWHistorical Med      guaiFENesin-codeine (GUAIFENESIN AC) 100-10 MG/5ML liquid Take 5 mLs by mouth 3 times daily as needed for Cough. Historical Med      lisinopril (PRINIVIL;ZESTRIL) 10 MG tablet Take 10 mg by mouth dailyHistorical Med      acyclovir (ZOVIRAX) 800 MG tablet Take 800 mg by mouth 2 times daily as needed Historical Med      ondansetron (ZOFRAN) 4 MG tablet Take 4 mg by mouth every 8 hours as needed for Nausea or VomitingHistorical Med      alclomethasone (ACLOVATE) 0.05 % cream Apply topically 2 times daily as needed Apply topically 2 times daily.  , Topical, 2 TIMES DAILY PRN, Historical Med             ALLERGIES     Clindamycin/lincomycin    FAMILY HISTORY       Family History   Problem Relation Age of Onset    Hypertension Mother     Obesity Mother     Prostate Cancer Father     No Known Problems Sister     No Known Problems Daughter     No Known Problems Daughter           SOCIAL HISTORY       Social History     Socioeconomic History    Marital status: Single     Spouse name: None    Number of children: 2    Years of education: None    Highest education level: None   Occupational History    Occupation:      Comment: disabled   Tobacco Use    Smoking status: Never    Smokeless tobacco: Never   Vaping Use    Vaping Use: Never used   Substance and Sexual Activity    Alcohol use: Not Currently    Drug use: Not Currently   Social History Narrative    CODE STATUS: Full Code    HEALTH CARE PROXY: her Father, Mr. Josue Dimas, [phone]    AMBULATES: uses a cane sometimes, usually independent    DOMICILED: stays with friends at this time, has no stairs that she uses, no pets there       SCREENINGS           PHYSICAL EXAM    (up to 7 forlevel 4, 8 or more for level 5)     ED Triage Vitals [09/30/22 1601]   BP Temp Temp src Heart Rate Resp SpO2 Height Weight   (!) 156/101 98 °F (36.7 °C) -- 99 16 94 % -- --       Physical Exam  Vitals and nursing note reviewed. Constitutional:       General: She is not in acute distress. Appearance: Normal appearance. She is well-developed. She is not diaphoretic. HENT:      Head: Normocephalic and atraumatic. Right Ear: Tympanic membrane, ear canal and external ear normal.      Left Ear: Tympanic membrane, ear canal and external ear normal.      Nose: Nose normal.      Mouth/Throat:      Mouth: Mucous membranes are moist.      Pharynx: No oropharyngeal exudate. Eyes:      General:         Right eye: No discharge. Left eye: No discharge. Pupils: Pupils are equal, round, and reactive to light. Neck:      Thyroid: No thyromegaly. Cardiovascular:      Rate and Rhythm: Normal rate and regular rhythm. Heart sounds: Normal heart sounds. No murmur heard. No friction rub. Pulmonary:      Effort: Pulmonary effort is normal. No respiratory distress. Breath sounds: Normal breath sounds. No stridor. No wheezing. Abdominal:      General: Abdomen is flat. Bowel sounds are normal. There is no distension. Palpations: Abdomen is soft. Tenderness: There is no abdominal tenderness. Musculoskeletal:         General: Normal range of motion. Cervical back: Normal range of motion and neck supple. Skin:     General: Skin is warm and dry. Capillary Refill: Capillary refill takes less than 2 seconds. Findings: No rash. Neurological:      General: No focal deficit present.       Mental Status: She is alert and oriented to person, place, and time. Mental status is at baseline. Cranial Nerves: No cranial nerve deficit. Sensory: No sensory deficit. Motor: No weakness. Coordination: Coordination normal.      Gait: Gait normal.      Deep Tendon Reflexes: Reflexes normal.   Psychiatric:         Mood and Affect: Mood normal.         Behavior: Behavior normal.         Thought Content: Thought content normal.         Judgment: Judgment normal.         DIAGNOSTIC RESULTS     RADIOLOGY:   Non-plain film images such as CT, Ultrasound and MRI are read by the radiologist. Plain radiographic images are visualized and preliminarilyinterpreted by No att. providers found with the below findings:      Interpretation per the Radiologist below, if available at the time of this note:    CT HEAD WO CONTRAST   Final Result   No acute hemorrhage   Recommendation: Follow up as clinically indicated. All CT scans at this facility utilize dose modulation, iterative reconstruction, and/or weight based dosing when appropriate to reduce radiation dose to as low as reasonably achievable. Electronically Signed by Vida Chawla MD at 30-Sep-2022 06:27:02 PM                   LABS:  Labs Reviewed - No data to display    All other labs were within normal range or notreturned as of this dictation. RE-ASSESSMENT        EMERGENCY DEPARTMENT COURSE and DIFFERENTIAL DIAGNOSIS/MDM:   Vitals:    Vitals:    09/30/22 1601 09/30/22 1845 09/30/22 1952   BP: (!) 156/101 (!) 148/93 (!) 145/78   Pulse: 99 74 79   Resp: 16 16 20   Temp: 98 °F (36.7 °C) 98.9 °F (37.2 °C)    TempSrc:  Oral    SpO2: 94% 90% 94%         MDM  Head CT is negative.   Patient is feeling much better here with subsequent treatment for postural headache including anti-inflammatories caffeine and muscle relaxer the initial generic migraine cocktail did not seem to benefit her as much is presumed to be likely related from her recent injection in her spine she has no fever full range of motion I did have a strong suspicion for meningitis issue but she does understand return precautions should it persist I would ask her to consider returning for blood patch possibility and also understands red flags and reasons to return for LP. She is going to go home with anti-inflammatories has muscle relaxers already at home and is can get some caffeine at the pharmacy as well plan for discharge. PROCEDURES:    Procedures      FINAL IMPRESSION      1.  Postural headache          DISPOSITION/PLAN   DISPOSITION Decision To Discharge 09/30/2022 08:02:58 PM      PATIENT REFERRED TO:  [facility] EMERGENCY DEPT  [facility]  [phone]    If symptoms worsen; Sunday for blood patch    Franchesca Loyd MD  [address] [facility]  [phone]      As needed    DISCHARGE MEDICATIONS:  Discharge Medication List as of 9/30/2022  8:25 PM        START taking these medications    Details   ketorolac (TORADOL) 10 MG tablet Take 1 tablet by mouth every 6 hours as needed for Pain, Disp-20 tablet, R-0Normal             (Please note that portions of this note were completed with a voice recognition program.  Efforts were made to edit the dictations but occasionallywords are mis-transcribed.)    Danny Villagran, [facility] [place]  10/01/22 [facility] [place]  10/03/22 7351

## 2022-10-03 ENCOUNTER — CARE COORDINATION (OUTPATIENT)
Dept: CARE COORDINATION | Age: 57
End: 2022-10-03

## 2022-10-14 NOTE — PROGRESS NOTES
MEDICAL ONCOLOGY PROGRESS NOTE    Pt Name: Yary Rodriguez  MRN: [id number]  YOB: 1965  Date of evaluation: 10/18/2022    HISTORY OF PRESENT ILLNESS:    Reason for MD visit: Disease/toxicity management  Aurea Mitchell is here today for monitoring for breast cancer and toxicity assessment. She is currently on palliative treatment with Faslodex/Ibrance initiated in January 2017. She has not been taking her Ibrance for the last 6 months. She has been compliant with her Faslodex.     Diagnosis  Grade III Adenocarcinoma of right breast diagnosed 2006  Stage IIIA, [id number]  ER/MS Positive, HER-2 bruce/ihc 1+  April 2013-RECURRENCE in the right axillary region  Genetic testing- BRCA 1&2 Negative  2014 (?) RECURRENCE in the axillary skin  01/05/2017- METASTATIC DISEASE with lung, hilar, and axillary lymph node mets  Osteopenia  Mediport associated DVT, October 2021  DVT left upper extremity, November 2021    Treatment Summary  2006- Neoadjuvant chemotherapy with AC x 4 cycles followed by Taxol  2007- Lumpectomy with axillary lymph node dissection  2007- Adjuvant radiation therapy to whole breast and axillary region  Did not take tamoxifen due to cost  April 2013- RECURRENCE  Neoadjuvant chemotherapy with 2 cycles of Taxotere followed by Doxil  10/14/2013- Right Breast Mastectomy and Axillary Dissection  01/23/2014- Completion of adjuvant RT to chest wall and axillary lymph node with Dr. Carmita Mccartney  02/14- Initiated adjuvant endocrine therapy with tamoxifen  2014 (?) RECURRENCE in the axillary skin  2014 (?) Surgical resection of the axillary skin  2015 (?) YVETTE/BSO  09/19/2016- Bilateral exchange, nipple reconstruction and fat grafting and removal of PAC   2016- Switched to aromatase inhibitor  01/05/2017- METASTATIC DISEASE with lung, hilar, and axillary mets  01/11/2017-06/17/2018- Single agent Abraxane x 13 cycles  06/27/2018-09/17/2018- Gemzar  10/10/2018-Faslodex every 4 weeks/palbociclib  She stopped Xiang Hence around April 2022    Cancer History:  Malini Liz was first seen by me on 1/21/2021. She was referred to [facility] of care for history of recurrent metastatic breast cancer. She has been in remission as per the latest CT scan. She is currently on Faslodex and palbociclib. She has received several lines therapy as described below. She moved from [place] to [facility] to stay closer to her parents. Her medical history is complex. Further details as below. 2006- Neoadjuvant chemotherapy with AC x 4 cycles followed by Taxol AC was complicated by viral meningitis; patient experienced neuropathy after 3 doses of Taxol and was switched to Taxotere for last dose  2007- Lumpectomy with axillary lymph node dissection 1.9cm, grade 2. No LVI. 1 of 14 lymph nodes positive for malignancy (1/14) [id number]  2007- Adjuvant Radiation Therapy to whole breast and axillary region  April 2013- RECURRENCE presenting as mass in the axillary region  Neoadjuvant chemotherapy with 2 cycles of Taxotere followed by Doxil- did not have good response to treatment  2013- Preop PET/CTshowed 2.8cm, right axillary lymph node with uptake. 10/14/2013- Right Breast Mastectomy and Axillary Dissection Right breast had benign tissue with fibrous, negative for carcinoma. Axillary contents demonstrated invasive ductal carcinoma, involving fibroadipose tissue and tendinous tissue. 3 benign lymph nodes (0/3). Yusra grade 3. 3.0cm in greatest gross dimensions. Carcinoma permeates among soft tissues and in the right axilla with no apparent involvement of lymph nodes.    01/23/2014- Completion of adjuvant radiation therapy to chest wall and axillary lymph node with Dr. Negro Gay  02/14- Initiated adjuvant endocrine therapy with tamoxifen  2014 (?) RECURRENCE in the axillary skin  2014 (?) Surgical resection of the axillary skin pathology demonstrating tumor at cauterized margin  2015 (?) YVETTE/BSO  09/19/2016- Bilateral exchange, nipple reconstruction and fat grafting and removal of PAC  2016- Switched to aromatase inhibitor patient was non compliant  01/05/2017- METASTATIC DISEASE Presented with respiratory failure. CTA Pulmonary showed numerous nodules and masses throughout both lung fields, compatible with metastatic disease. Bilateral hilar adenopathy seen. 01/11/2017-06/17/2018- Single agent Abraxane x 13 cycles  03/15/2017- CT Chest W Contrast Interval decrease in maximal diameter of essentially all the multiple metastatic pulmonary nodules. The average difference is approximately 25%. Some of the much smaller ones have disappeared. Bilateral hilar and aortopulmonary window adenopathy is also similarly smaller. 06/07/2017- CT Chest W Contrast Multiple lung nodules seen bilaterally. Most of the lung nodules show interval improvement with decreased size by 1 to 3mm. Mediastinal and bilateral hilar adenopathy seen with improvement. 07/24/2017- PET/CT scan showed marked improvement, is minimal abnormal, has excellent response to therapy  02/01/2018- PET/CT scan Numerous bilateral lung masses and nodules, the majority of which do not have appreciable abnormal FDG uptake. The largest mass in the left infrahilar region of the left lower lobe demonstrates a maximum SUV of 3.3. Mediastinal lymphadenopathy. No evidence of metastatic disease in the abdomen or pelvis.   06/21/2018- CT Chest/Abdomen/Pelvis Multiple lung mets, mild bilateral hilar and mediastinal lymph nodes. No masses or evidence of metastatic disease in the abdomen or pelvis  06/27/2018-09/17/2018- Gemzar  09/28/2018- CT Pulmonary Multiple lung mets, mild bilateral hilar and mediastinal lymph nodes  10/10/2018-12/21/2020- Faslodex every 4 weeks  12/14/2018- CT Chest showed definite decrease in the maximal diameter and volume of all left and right metastatic nodules. Somewhat enlarged paratracheal and left axillary lymph nodes are relatively unchanged.    04/22/2019- MRI Cervical Spine W WO Contrast Unremarkable  04/22/2019- MRI Brain W WO Contrast No enhancing lesions in the brain and no evidence of metastatic disease. 07/27/2019- CTA Pulmonary showed essentially complete disappearance of the pulmonary metastatic disease. Several tiny flat peripheral nodules are the only sequela. The tiny ones on the right are unchanged, the tiny ones on the left are even smaller. There is no longer any active metastatic disease in either lung. 12/19/2019- CT Chest/Abdomen/Pelvis Small, tiny subpleural nodules right upper and right midlung field as well as left lung base has remained unchanged. No new focal parenchymal abnormality seen. No adenopathy seen. There is no abdominal or pelvic mass, adenopathy or fluid collection. No lytic or sclerotic bony lesions, but there is a possibility of osteopenia and/or osteoporosis. 02/17/2020- DEXA Bone Density showed osteopenia of lumbar spine, T-score -1.8, and left femoral neck, T-score -2.0  2/25/21 Ct Abdomen Pelvis W Iv Contrast  No evidence of metastatic disease in the abdomen or pelvis. 2/26/21 Ct Chest W Contrast Tiny nodules in the right lung described above probably represent small foci of pleural thickening due to chronic inflammatory process or small noncalcified granulomas. No features to suggest malignancy or metastatic disease. Bilateral breast implants without complication. 3/1/2021-discussed the results CT chest abdomen pelvis. Essentially no clear evidence of metastatic disease. This is consistent with excellent response to treatment. Continue current treatment. 4/1/2021 = 31.6 CA 27-29= 33.5 CEA= 1.6   6/3/21 CA 15-3= 23.3 CA 27-29= 25.6  CEA= 1.8   7/29/2021 CEA=1.7 CA 15-3=21.50 CA 27.29= 26.0  8/19/2021 CT Chest  Left lower lobe pleural-based nodular 1.7 x 0.9 cm is indeterminate. PET/CT recommended. Feeding defect in the left lower lobe bronchus versus a postobstructive airspace disease.  This too may be further characterized with PET CT versus direct visualization. 8/19/2021 CT Abd/Pelvis A stable CT scan of the abdomen and pelvis. No finding to suggest neoplastic/metastatic disease. 9/28/2021 Final Diagnosis: Arm, excision of left arm thrombus:Fragments of organizing and organized thrombi with dystrophic calcification. Received is a container labeled \"ischemic, left arm\" Received in a fixative is a 3.6 x 2.6 x 1.4 cm aggregate of brown-tan soft tissue fragments and skin. Representative sections are submitted in a single cassette, numerous. October 2021-she developed left upper extremity DVT associated support. Port removed. She also had infection of the port site status post completion to biotics. 11/19/21 CTA pulmonary: No evidence of pulmonary embolism. The lungs are poorly expanded but unremarkable. 12/8/21 MRI lumbar spine: No significant change since the previous study. Chronic degenerative changes. A persistent mild degenerative retrolisthesis is of L5 over S1. Prominent disc osteophyte complexes and facetal arthropathy and resultant mild spinal stenosis at L4-5 and neural foraminal stenosis at L4-5 and L5-S1 as detailed above. 1/6/22 CT CHEST WO CONTRAST Stable chest CT with an unchanged tiny subpleural nodule within the right upper lobe. 1/19/22 CA 15-3 24 CA 27-29 31.8 CEA 1.5  2/25/2022 CT Abd/Pelvis W IV Contrast(Oral) Gastric wall thickening is nonspecific. It could be an artifact of under distention. Gastritis and other etiologies are not ruled out. The unenhanced solid organs demonstrate no focal abnormality. Diverticulosis of the colon. Under distention of portions of the colon. Prior hysterectomy. Mild sclerotic changes in the subarticular femoral heads bilaterally is stable. Early AVN is possible. MRI would be more definitive. The signal abnormality is greater on the right side.   5/10/2022 Skin, revision of left chest scar: Cicatrix identified with foreign body type response, negative for evidence of malignancy. 5/25/2022 Tumor Markers: CEA 1.9, CA 15-3-15, CA 27.29-18.2  7/18/2022 CT Chest W Contrast([facility]) Bibasilar subsegmental atelectasis,table   and unchanged. Prominent osteophyte formation out in the lower thoracic spine with associated subsegmental atelectasis. No pulmonary nodules, masses, pleural effusion or pneumothorax. Bilateral breast implants. Stable interval appearance since recent study 7/12/22 7/18/2022 CT Abd/Pelvis W IV Contrast ([facility])-.scattered colon diverticulosis. No acute diverticulitis. No acute abdominopelvic abnormality . 7/25/2022-no imaging evidence of disease recurrence. Continue treatment with Faslodex and Ibrance.   7/25/2022 VL Extremity Venous Left  There is chronic deep vein thrombosis (DVT) of the left axillary vein and  superficial thrombophlebitis (SVT) in the left basilic vein  2/95/4892 Tumor Markers: CEA 1.9, CA 15-3-14, CA 27.29-17.6  9/30/22 CT Head WO Contrast No acute hemorrhage      CA 27.29 [employer]  01/21/2021- 20.3  04/01/2021 33.5  06/03/2021 25.6  07/29/2021 26.0  1/19/22 31.8  8/23/22-17.6    Past Medical History:    Past Medical History:   Diagnosis Date    Breast cancer ([address]     Breast cancer with lung mets     Chronic back pain     GERD (gastroesophageal reflux disease)     Hx of blood clots     Hypertension     Neuropathy     Post chemo treatment neuropathy       Past Surgical History:    Past Surgical History:   Procedure Laterality Date    BREAST LUMPECTOMY Right     2006    BREAST RECONSTRUCTION Left 09/17/2021    Revision left reconstructed breast, implant performed by Janie Doty MD at [address]  2016    Bilateral breast implants    EMBOLECTOMY Left 9/26/2021    LEFT UPPER EXTREMITY  MECHANICAL SUCTION THROMBECTOMY performed by Rosa Maria Noble DO at [address] ([address])      HYSTERECTOMY, TOTAL ABDOMINAL (CERVIX REMOVED)  2015    MASTECTOMY, BILATERAL      Right 2013 & Left 2016    SCAR REVISION Left 5/10/2022    SCAR REVISION LEFT PORT SITE performed by Liz Moreno MD at [address]      VASCULAR SURGERY N/A 10/6/2021    REM[facility] History:    Marital status, children: Single, 2 children-female  Smoking status: no  ETOH status: no  Profession: Disabled  Resides: [address] [place] [address]  Recent trips: Moved from [address] [place] [address] [place]: no    Family History:   Family History   Problem Relation Age of Onset    Hypertension Mother     Obesity Mother     Prostate Cancer Father     No Known Problems Sister     No Known Problems Daughter     No Known Problems Daughter        Current Hospital Medications:    Current Outpatient Medications   Medication Sig Dispense Refill    ketorolac (TORADOL) 10 MG tablet Take 1 tablet by mouth every 6 hours as needed for Pain 20 tablet 0    IBRANCE 125 MG tablet Take 1 tablet (125mg) by mouth once daily for 21 days, then 7 days off. 21 tablet 5    ELIQUIS 5 MG TABS tablet TAKE 1 TABLET BY MOUTH TWICE A DAY 60 tablet 3    Cholecalciferol (VITAMIN D3) 1.25 MG (26458 UT) TABS Take 1 tablet by mouth once a week 6 tablet 0    mupirocin (BACTROBAN NASAL) 2 % nasal ointment Apply to each nostril BID 5 days prior to surgery 1 each 0    guaiFENesin 1200 MG TB12 Take 1 tablet by mouth 2 times daily 30 tablet 0    sodium hypochlorite (DAKINS) 0.125 % SOLN external solution Apply topically 2 times daily 1 each 0    VENTOLIN  (90 Base) MCG/ACT inhaler Inhale 2 puffs into the lungs 4 times daily as needed for Wheezing 18 g 5    pantoprazole (PROTONIX) 40 MG tablet TAKE 1 TABLET BY MOUTH EVERY DAY (Patient taking differently: Take 40 mg by mouth daily) 90 tablet 0    NARCAN 4 MG/0.1ML LIQD nasal spray       guaiFENesin-codeine (GUAIFENESIN AC) 100-10 MG/5ML liquid Take 5 mLs by mouth 3 times daily as needed for Cough.       lisinopril (PRINIVIL;ZESTRIL) 10 MG tablet Take 10 mg by mouth daily      acyclovir (ZOVIRAX) 800 MG tablet Take 800 mg by mouth 2 times daily as needed       ondansetron (ZOFRAN) 4 MG tablet Take 4 mg by mouth every 8 hours as needed for Nausea or Vomiting      alclomethasone (ACLOVATE) 0.05 % cream Apply topically 2 times daily as needed Apply topically 2 times daily. gabapentin (NEURONTIN) 300 MG capsule Take 2 capsules by mouth 3 times daily for 30 days. 180 capsule 0     No current facility-administered medications for this visit. Facility-Administered Medications Ordered in Other Visits   Medication Dose Route Frequency Provider Last Rate Last Admin    fulvestrant (FASLODEX) IM injection 250 mg  250 mg IntraMUSCular Once Martin Panda MD        fulvestrant (FASLODEX) IM injection 250 mg  250 mg IntraMUSCular Once Martin Panda MD        cyanocobalamin injection 1,000 mcg  1,000 mcg IntraMUSCular Once Martin Panda MD           Allergies:    Allergies   Allergen Reactions    Clindamycin/Lincomycin Hives, Itching and Rash         Subjective   REVIEW OF SYSTEMS:   CONSTITUTIONAL: no fever, no night sweats, fatigue;  HEENT: no blurring of vision, no double vision, no hearing difficulty, no tinnitus, no ulceration, no dysplasia, no epistaxis;   LUNGS: no cough, no hemoptysis, no wheeze,  no shortness of breath;  CARDIOVASCULAR: no palpitation, no chest pain, no shortness of breath;  GI: no abdominal pain, no nausea, no vomiting, no diarrhea, no constipation;  PATRICK: no dysuria, no hematuria, no frequency or urgency, no nephrolithiasis;  MUSCULOSKELETAL: no joint pain, no swelling, no stiffness;  ENDOCRINE: no polyuria, no polydipsia, no cold or heat intolerance;  HEMATOLOGY: no easy bruising or bleeding, no history of clotting disorder;  DERMATOLOGY: no skin rash, no eczema, no pruritus;  PSYCHIATRY: no depression, no anxiety, no panic attacks, no suicidal ideation, no homicidal ideation;  NEUROLOGY: no syncope, no seizures, no numbness or tingling of hands, no numbness or tingling of feet, no paresis;     /88   Pulse 96   Ht 5' 6\" (1.676 m)   Wt 177 lb (80.3 kg)   SpO2 98%   BMI 28.57 kg/m²      PHYSICAL EXAM:  CONSTITUTIONAL: Alert, appropriate, no acute distress  EYES: Non icteric, EOM intact, pupils equal round   ENT: Mucus membranes moist, no oral pharyngeal lesions, external inspection of ears and nose are normal.  NECK: Supple, no masses. No palpable thyroid mass  CHEST/LUNGS: CTA bilaterally, normal respiratory effort   CARDIOVASCULAR: RRR, no murmurs. No lower extremity edema  ABDOMEN: soft non-tender, active bowel sounds, no HSM. No palpable masses  EXTREMITIES: warm, full ROM in all 4 extremities, no focal weakness. SKIN: warm, dry with no rashes or lesions  LYMPH: No cervical, clavicular, axillary, or inguinal lymphadenopathy  NEUROLOGIC: follows commands, non focal   PSYCH: mood and affect appropriate. Alert and oriented to time, place, person       LABORATORY RESULTS REVIEWED/ANALYZED BY ME:  8/23/2022 Tumor Markers: CEA 1.9, CA 15-3-14, CA 27.29-17.6    10/18/2022 CBC  WBC 9.99  HGB 13.4    Neut 6.96    RADIOLOGY STUDIES REVIEWED BY ME:  9/30/22 CT Head WO Contrast No acute hemorrhage    ASSESSMENT:    Orders Placed This Encounter   Procedures    Comprehensive Metabolic Panel     Standing Status:   Future     Standing Expiration Date:   10/18/2023    CEA     Standing Status:   Future     Standing Expiration Date:   10/18/2023    Cancer Antigen 15-3     Standing Status:   Future     Standing Expiration Date:   10/18/2023    Cancer Antigen 27.29     Standing Status:   Future     Standing Expiration Date:   10/18/2023          Tasia Claude was seen today for follow-up. Diagnoses and all orders for this visit:    Metastatic breast cancer ([facility] [address]  -     Comprehensive Metabolic Panel; Future  -     CEA; Future  -     Cancer Antigen 15-3; Future  -     Cancer Antigen 27.29;  Future    Care plan discussed with patient    Antineoplastic drugs causing adverse effect, sequela    Chronic deep vein thrombosis (DVT) of left upper extremity, unspecified vein (HCC)    Use of fulvestrant (Faslodex)      Metastatic breast cancer, hormone sensitive  2/25/2021-discussed the results of CT chest abdomen pelvis. No evidence of disease progression. In fact, excellent response with no measurable metastatic disease at this time. Currently Faslodex/Ibrance. Regimen:  Faslodex to 500 mg subcutaneous every 4 weeks. Continue Ibrance days 1-21 q. 28 days    Clinical/laboratory assessment of toxicity for Ibrance    8/19/2021-CT chest/abdomen/pelvis-no evidence of metastatic disease. Findings of a pulmonary lung nodule in the left lower lobe  -JAN/FEB 2022- CT C/A/P-MARIA INES  -July 2022-CT C/A/P-MARIA INES    Essentially, good response to Faslodex/Ibrance. Last cancer marker may were normal.    Plan:  -Continue Faslodex/Ibrance 125 mg days 1-21 q. 28 days      B12 deficiency- Recommended B12 2000 mcg p.o. daily. Will start B12 injections today 1000mcg with Faslodex. 3/1/2021- Methylmalonic Acid 171, Vitamin B12 >2000. B12 replaced monthly. 10/10/2021-vitamin B12 154. She is currently receiving B12 replacement. Chemotherapy-induced neuropathy-continue gabapentin. COVID-19 vaccination response-patient had covid 19 Jan 2022. Left lower lobe lung nodule-she is currently being seen by pulmonary at [facility]. They believe that this could be inflammatory. I reviewed the CT scans. Stable pulmonary nodule. -CT chest January 2022-subcentimeter stable pulmonary nodules. Acute left upper extremity DVT/left upper chest port, October 2021-US upper extremity showed acute DVT likely related to port Oct 2021.   -Currently on Eliquis. S/p Mediport removal.  -US Jan 2022,LUE: Chronic appearing deep vein thrombosis (DVT) is seen in the axillary  brachial , left upper extremity(ies).   Subacute appearing superficial thrombophlebitis (SVT) is seen in the  basilic vein, left upper extremity(ies  -Continue Eliquis 5 mg p.o. twice daily    Normocytic anemia-hemoglobin 10.8. Improving. Continue to monitor BC.  10/10/2021-ferritin 92, iron saturation 17%, TIBC 242, folate 4.6, vitamin B12 154. Continue B12 replacement  -Hemoglbin 12.3  Lab Results   Component Value Date    WBC 9.99 10/18/2022    HGB 13.4 10/18/2022    HCT 44.1 10/18/2022    MCV 97.6 (H) 10/18/2022     10/18/2022       LUE chronic DVT  -Continue Eliquis  -US showed chronic deep vein thrombosis (DVT) of the left axillary vein and   superficial thrombophlebitis (SVT) in the left basilic vein.   -Patient is currently on Eliquis 5 mg p.o. twice daily. Compliance: I stressed the importance of being compliant to treatment. Decreased compliance to adjuvant endocrine therapy has been linked to decreased survival. We discussed the various barriers and side effects of endocrine therapy and ways to improved compliance. The patient acknowledged understanding. PLAN:  RTC with MD 2 months  Faslodex and B12 today and every 4 weeks  Follow up with Dr. Mayelin Bills for PCP care  CBC, CMP, CEA, CA 15-3, CA 27-29 today  Consider resuming  Ibrance  Continue B12 replacement monthly  Repeat CT chest/abdomen/pelvis in 6 months, Jan 2023  Continue Eliquis     Matt CLARK am pre charting  as Medical Assistant for Kyler Pulliam MD. Electronically signed by Matt Nunez MA on 10/18/2022 at 8:39 AM CDT. Kenneth Campbell am scribing as Medical Assistant for Kyler Pulliam MD. Electronically signed by Matt Nunez MA on 10/18/2022 at 11:49 AM CDT. I, Dr Lux Cornelius, personally performed the services described in this documentation as scribed by Matt Nunez MA in my presence and is both accurate and complete. I have seen, examined and reviewed this patient medication list, appropriate labs and imaging studies. I reviewed relevant medical records and others physicians notes.  I discussed the plans of care with the patient. I answered all the questions to the patients satisfaction. I have also reviewed the chief complaint (CC) and part of the history (History of Present Illness (HPI), Past Family Social History [facility]), or Review of Systems (ROS) and made changes when appropriated.        (Please note that portions of this note were completed with a voice recognition program. Efforts were made to edit the dictations but occasionally words are mis-transcribed.)  Electronically signed by David Nichols MD on 10/18/2022 at 12:23 PM

## 2022-10-18 ENCOUNTER — HOSPITAL ENCOUNTER (OUTPATIENT)
Dept: INFUSION THERAPY | Age: 57
Discharge: HOME | End: 2022-10-18
Payer: MEDICARE

## 2022-10-18 ENCOUNTER — OFFICE VISIT (OUTPATIENT)
Dept: HEMATOLOGY | Age: 57
End: 2022-10-18
Payer: MEDICARE

## 2022-10-18 VITALS
OXYGEN SATURATION: 98 % | WEIGHT: 177 LBS | HEART RATE: 96 BPM | DIASTOLIC BLOOD PRESSURE: 88 MMHG | HEIGHT: 66 IN | BODY MASS INDEX: 28.45 KG/M2 | SYSTOLIC BLOOD PRESSURE: 138 MMHG

## 2022-10-18 DIAGNOSIS — Z71.89 CARE PLAN DISCUSSED WITH PATIENT: ICD-10-CM

## 2022-10-18 DIAGNOSIS — C50.919 METASTATIC BREAST CANCER (HCC): Primary | ICD-10-CM

## 2022-10-18 DIAGNOSIS — C50.919 METASTATIC BREAST CANCER (HCC): ICD-10-CM

## 2022-10-18 DIAGNOSIS — T45.1X5S ANTINEOPLASTIC DRUGS CAUSING ADVERSE EFFECT, SEQUELA: ICD-10-CM

## 2022-10-18 DIAGNOSIS — Z79.818 USE OF FULVESTRANT (FASLODEX): ICD-10-CM

## 2022-10-18 DIAGNOSIS — I82.722 CHRONIC DEEP VEIN THROMBOSIS (DVT) OF LEFT UPPER EXTREMITY, UNSPECIFIED VEIN (HCC): ICD-10-CM

## 2022-10-18 DIAGNOSIS — C50.919 CARCINOMA OF FEMALE BREAST, UNSPECIFIED ESTROGEN RECEPTOR STATUS, UNSPECIFIED LATERALITY, UNSPECIFIED SITE OF BREAST (HCC): ICD-10-CM

## 2022-10-18 DIAGNOSIS — D50.8 OTHER IRON DEFICIENCY ANEMIA: ICD-10-CM

## 2022-10-18 LAB
ALBUMIN SERPL-MCNC: 4.9 G/DL (ref 3.5–5.2)
ALP BLD-CCNC: 112 U/L (ref 35–104)
ALT SERPL-CCNC: 20 U/L (ref 9–52)
ANION GAP SERPL CALCULATED.3IONS-SCNC: 16 MMOL/L (ref 7–19)
AST SERPL-CCNC: 24 U/L (ref 14–36)
BASOPHILS ABSOLUTE: 0.03 K/UL (ref 0.01–0.08)
BASOPHILS RELATIVE PERCENT: 0.3 % (ref 0.1–1.2)
BILIRUB SERPL-MCNC: 1.4 MG/DL (ref 0.2–1.3)
BUN BLDV-MCNC: 12 MG/DL (ref 7–17)
CA 15-3: 16 U/ML (ref 0–25)
CALCIUM SERPL-MCNC: 9.4 MG/DL (ref 8.4–10.2)
CEA: 1.8 NG/ML (ref 0–4.7)
CHLORIDE BLD-SCNC: 105 MMOL/L (ref 98–111)
CO2: 24 MMOL/L (ref 22–29)
CREAT SERPL-MCNC: 0.6 MG/DL (ref 0.5–1)
EOSINOPHILS ABSOLUTE: 0.02 K/UL (ref 0.04–0.54)
EOSINOPHILS RELATIVE PERCENT: 0.2 % (ref 0.7–7)
GFR SERPL CREATININE-BSD FRML MDRD: >60 ML/MIN/{1.73_M2}
GLOBULIN: 3.5 G/DL
GLUCOSE BLD-MCNC: 89 MG/DL (ref 74–106)
HCT VFR BLD CALC: 44.1 % (ref 34.1–44.9)
HEMOGLOBIN: 13.4 G/DL (ref 11.2–15.7)
LYMPHOCYTES ABSOLUTE: 2.35 K/UL (ref 1.18–3.74)
LYMPHOCYTES RELATIVE PERCENT: 23.5 % (ref 19.3–53.1)
MCH RBC QN AUTO: 29.6 PG (ref 25.6–32.2)
MCHC RBC AUTO-ENTMCNC: 30.4 G/DL (ref 32.3–35.5)
MCV RBC AUTO: 97.6 FL (ref 79.4–94.8)
MONOCYTES ABSOLUTE: 0.61 K/UL (ref 0.24–0.82)
MONOCYTES RELATIVE PERCENT: 6.1 % (ref 4.7–12.5)
NEUTROPHILS ABSOLUTE: 6.96 K/UL (ref 1.56–6.13)
NEUTROPHILS RELATIVE PERCENT: 69.7 % (ref 34–71.1)
PDW BLD-RTO: 13.4 % (ref 11.7–14.4)
PLATELET # BLD: 274 K/UL (ref 182–369)
PMV BLD AUTO: 9 FL (ref 7.4–10.4)
POTASSIUM SERPL-SCNC: 4 MMOL/L (ref 3.5–5.1)
RBC # BLD: 4.52 M/UL (ref 3.93–5.22)
SODIUM BLD-SCNC: 145 MMOL/L (ref 137–145)
TOTAL PROTEIN: 8.4 G/DL (ref 6.3–8.2)
WBC # BLD: 9.99 K/UL (ref 3.98–10.04)

## 2022-10-18 PROCEDURE — G8417 CALC BMI ABV UP PARAM F/U: HCPCS | Performed by: INTERNAL MEDICINE

## 2022-10-18 PROCEDURE — 3017F COLORECTAL CA SCREEN DOC REV: CPT | Performed by: INTERNAL MEDICINE

## 2022-10-18 PROCEDURE — G8484 FLU IMMUNIZE NO ADMIN: HCPCS | Performed by: INTERNAL MEDICINE

## 2022-10-18 PROCEDURE — 96372 THER/PROPH/DIAG INJ SC/IM: CPT

## 2022-10-18 PROCEDURE — 99212 OFFICE O/P EST SF 10 MIN: CPT

## 2022-10-18 PROCEDURE — 1036F TOBACCO NON-USER: CPT | Performed by: INTERNAL MEDICINE

## 2022-10-18 PROCEDURE — 85025 COMPLETE CBC W/AUTO DIFF WBC: CPT

## 2022-10-18 PROCEDURE — 99214 OFFICE O/P EST MOD 30 MIN: CPT | Performed by: INTERNAL MEDICINE

## 2022-10-18 PROCEDURE — 80053 COMPREHEN METABOLIC PANEL: CPT

## 2022-10-18 PROCEDURE — 96402 CHEMO HORMON ANTINEOPL SQ/IM: CPT

## 2022-10-18 PROCEDURE — G8427 DOCREV CUR MEDS BY ELIG CLIN: HCPCS | Performed by: INTERNAL MEDICINE

## 2022-10-18 PROCEDURE — 6360000002 HC RX W HCPCS: Performed by: INTERNAL MEDICINE

## 2022-10-18 PROCEDURE — 36415 COLL VENOUS BLD VENIPUNCTURE: CPT

## 2022-10-18 RX ORDER — DIPHENHYDRAMINE HYDROCHLORIDE 50 MG/ML
50 INJECTION INTRAMUSCULAR; INTRAVENOUS ONCE
Status: CANCELLED | OUTPATIENT
Start: 2022-10-19 | End: 2022-10-19

## 2022-10-18 RX ORDER — FAMOTIDINE 10 MG/ML
20 INJECTION, SOLUTION INTRAVENOUS ONCE
Status: CANCELLED | OUTPATIENT
Start: 2022-10-19 | End: 2022-10-19

## 2022-10-18 RX ORDER — CYANOCOBALAMIN 1000 UG/ML
1000 INJECTION, SOLUTION INTRAMUSCULAR; SUBCUTANEOUS ONCE
Status: COMPLETED
Start: 2022-10-18 | End: 2022-10-18

## 2022-10-18 RX ORDER — CYANOCOBALAMIN 1000 UG/ML
1000 INJECTION, SOLUTION INTRAMUSCULAR; SUBCUTANEOUS ONCE
Status: CANCELLED
Start: 2022-11-15

## 2022-10-18 RX ORDER — SODIUM CHLORIDE 9 MG/ML
INJECTION, SOLUTION INTRAVENOUS CONTINUOUS
Status: CANCELLED | OUTPATIENT
Start: 2022-10-19

## 2022-10-18 RX ORDER — METHYLPREDNISOLONE SODIUM SUCCINATE 125 MG/2ML
125 INJECTION, POWDER, LYOPHILIZED, FOR SOLUTION INTRAMUSCULAR; INTRAVENOUS ONCE
Status: CANCELLED | OUTPATIENT
Start: 2022-10-19 | End: 2022-10-19

## 2022-10-18 RX ORDER — EPINEPHRINE 1 MG/ML
0.3 INJECTION, SOLUTION, CONCENTRATE INTRAVENOUS PRN
Status: CANCELLED | OUTPATIENT
Start: 2022-10-19

## 2022-10-18 RX ADMIN — FULVESTRANT 250 MG: 50 INJECTION, SOLUTION INTRAMUSCULAR at 12:15

## 2022-10-18 RX ADMIN — CYANOCOBALAMIN 1000 MCG: 1000 INJECTION, SOLUTION INTRAMUSCULAR; SUBCUTANEOUS at 12:15

## 2022-10-21 LAB — CA 27.29: 21.1 U/ML

## 2022-10-25 ENCOUNTER — TELEPHONE (OUTPATIENT)
Dept: FAMILY MEDICINE CLINIC | Age: 57
End: 2022-10-25

## 2022-10-25 RX ORDER — MELOXICAM 15 MG/1
TABLET ORAL
COMMUNITY
Start: 2022-08-20

## 2022-10-25 RX ORDER — CYCLOBENZAPRINE HCL 10 MG
TABLET ORAL
COMMUNITY
Start: 2022-08-30

## 2022-10-25 NOTE — TELEPHONE ENCOUNTER
The patient called and said she has had a buzzing in her right ear since her lumbar puncture. She also has been feeling like she is falling when she gets up. It also makes her feel nauseated. I scheduled the patient.

## 2022-10-26 ENCOUNTER — OFFICE VISIT (OUTPATIENT)
Dept: FAMILY MEDICINE CLINIC | Age: 57
End: 2022-10-26
Payer: MEDICARE

## 2022-10-26 VITALS
SYSTOLIC BLOOD PRESSURE: 124 MMHG | HEART RATE: 83 BPM | HEIGHT: 66 IN | WEIGHT: 179 LBS | DIASTOLIC BLOOD PRESSURE: 84 MMHG | OXYGEN SATURATION: 99 % | TEMPERATURE: 97.6 F | RESPIRATION RATE: 18 BRPM | BODY MASS INDEX: 28.77 KG/M2

## 2022-10-26 DIAGNOSIS — R42 DIZZINESS: Primary | ICD-10-CM

## 2022-10-26 DIAGNOSIS — H93.11 TINNITUS OF RIGHT EAR: ICD-10-CM

## 2022-10-26 PROCEDURE — 99214 OFFICE O/P EST MOD 30 MIN: CPT | Performed by: FAMILY MEDICINE

## 2022-10-26 PROCEDURE — G8427 DOCREV CUR MEDS BY ELIG CLIN: HCPCS | Performed by: FAMILY MEDICINE

## 2022-10-26 PROCEDURE — 3017F COLORECTAL CA SCREEN DOC REV: CPT | Performed by: FAMILY MEDICINE

## 2022-10-26 PROCEDURE — G8417 CALC BMI ABV UP PARAM F/U: HCPCS | Performed by: FAMILY MEDICINE

## 2022-10-26 PROCEDURE — G8484 FLU IMMUNIZE NO ADMIN: HCPCS | Performed by: FAMILY MEDICINE

## 2022-10-26 PROCEDURE — 1036F TOBACCO NON-USER: CPT | Performed by: FAMILY MEDICINE

## 2022-10-26 ASSESSMENT — ENCOUNTER SYMPTOMS
COLOR CHANGE: 0
BACK PAIN: 0
STRIDOR: 0
NAUSEA: 0
EYE ITCHING: 0
VOMITING: 0
APNEA: 0
FACIAL SWELLING: 0
EYE DISCHARGE: 0

## 2022-10-26 NOTE — PROGRESS NOTES
[facility] PHYSICIAN SERVICES  [facility] FAMILY MEDICINE  [address] [address] [address]  Dept: [phone]  Dept Fax: [phone]  Loc: [phone]    Subjective:     Saad Saab is a 56 y.o. female who presents today for her medical conditions/complaints as noted below. Saad Saab is c/o of Dizziness        HPI:   Patient presents with dizziness, right-sided tinnitus and headaches. She is also having some disbalance when she walks, feels like she is going to fall. She states that she had a test procedure pain management, that was 2 parts, to see if she would be candidate for ablation. After the first part, she had to have lower back pain for about 2 weeks. The new she did the second part, and her back pain improved. However shortly thereafter she started to have the a forementioned symptoms. The headache was so severe that she went to the ED on September 30, notes this results reviewed, her head CT was negative. She was given prescription for Toradol. The headache is better, but she is having still the a forementioned symptoms. Diagnosed with a \"spinal headache. \"  Of note her blood pressure was quite elevated in the ED, 156/101. She is normotensive today. PHQ Scores 6/28/2022 4/9/2021   PHQ2 Score 0 1   PHQ9 Score 0 1     Interpretation of Total Score Depression Severity: 1-4 = Minimal depression, 5-9 = Mild depression, 10-14 = Moderate depression, 15-19 = Moderately severe depression, 20-27 = Severe depression     No flowsheet data found. Interpretation of CLEMENCIA-7 score: 5-9 = mild anxiety, 10-14 = moderate anxiety, 15+ = severe anxiety. Recommend referral to behavioral health for scores 10 or greater.      Past Medical History:   Diagnosis Date    Breast cancer ([address]     Breast cancer with lung mets     Chronic back pain     GERD (gastroesophageal reflux disease)     Hx of blood clots     Hypertension     Neuropathy     Post chemo treatment neuropathy     Past Surgical History:   Procedure Laterality Date    BREAST LUMPECTOMY Right     2006    BREAST RECONSTRUCTION Left 09/17/2021    Revision left reconstructed breast, implant performed by Yosi Gregory MD at [address]  2016    Bilateral breast implants    EMBOLECTOMY Left 9/26/2021    LEFT UPPER EXTREMITY  MECHANICAL SUCTION THROMBECTOMY performed by Sanda Harada, DO at [address] (CERVIX STATUS UNKNOWN)      HYSTERECTOMY, TOTAL ABDOMINAL (CERVIX REMOVED)  2015    MASTECTOMY, BILATERAL      Right 2013 & Left 2016    SCAR REVISION Left 5/10/2022    SCAR REVISION LEFT PORT SITE performed by Yosi Gregory MD at [address]      VASCULAR SURGERY N/A 10/6/2021    REMOVAL OF MEDIPORT performed by Sanda Harada, DO at [facility] History   Problem Relation Age of Onset    Hypertension Mother     Obesity Mother     Prostate Cancer Father     No Known Problems Sister     No Known Problems Daughter     No Known Problems Daughter        Social History     Tobacco Use    Smoking status: Never    Smokeless tobacco: Never   Substance Use Topics    Alcohol use: Not Currently      Current Outpatient Medications   Medication Sig Dispense Refill    cyclobenzaprine (FLEXERIL) 10 MG tablet TAKE 1 TABLET BY MOUTH EVERY 8 HOURS AS NEEDED      meloxicam (MOBIC) 15 MG tablet TAKE 1 TABLET EVERY DAY AS NEEDED      ketorolac (TORADOL) 10 MG tablet Take 1 tablet by mouth every 6 hours as needed for Pain 20 tablet 0    IBRANCE 125 MG tablet Take 1 tablet (125mg) by mouth once daily for 21 days, then 7 days off. 21 tablet 5    ELIQUIS 5 MG TABS tablet TAKE 1 TABLET BY MOUTH TWICE A DAY 60 tablet 3    gabapentin (NEURONTIN) 300 MG capsule Take 2 capsules by mouth 3 times daily for 30 days.  180 capsule 0    mupirocin (BACTROBAN NASAL) 2 % nasal ointment Apply to each nostril BID 5 days prior to surgery 1 each 0    guaiFENesin 1200 MG TB12 Take 1 tablet by mouth 2 times daily 30 tablet 0    sodium hypochlorite (DAKINS) 0.125 % SOLN external solution Apply topically 2 times daily 1 each 0    VENTOLIN  (90 Base) MCG/ACT inhaler Inhale 2 puffs into the lungs 4 times daily as needed for Wheezing 18 g 5    pantoprazole (PROTONIX) 40 MG tablet TAKE 1 TABLET BY MOUTH EVERY DAY (Patient taking differently: Take 40 mg by mouth daily) 90 tablet 0    NARCAN 4 MG/0.1ML LIQD nasal spray       guaiFENesin-codeine (GUAIFENESIN AC) 100-10 MG/5ML liquid Take 5 mLs by mouth 3 times daily as needed for Cough. acyclovir (ZOVIRAX) 800 MG tablet Take 800 mg by mouth 2 times daily as needed       ondansetron (ZOFRAN) 4 MG tablet Take 4 mg by mouth every 8 hours as needed for Nausea or Vomiting      alclomethasone (ACLOVATE) 0.05 % cream Apply topically 2 times daily as needed Apply topically 2 times daily. Cholecalciferol (VITAMIN D3) 1.25 MG (06344 UT) TABS Take 1 tablet by mouth once a week (Patient not taking: Reported on 10/26/2022) 6 tablet 0    lisinopril (PRINIVIL;ZESTRIL) 10 MG tablet Take 10 mg by mouth daily (Patient not taking: Reported on 10/26/2022)       No current facility-administered medications for this visit. Allergies   Allergen Reactions    Clindamycin/Lincomycin Hives, Itching and Rash       Review of Systems   Constitutional:  Negative for chills and fever. HENT:  Positive for tinnitus. Negative for facial swelling and mouth sores. Eyes:  Negative for discharge and itching. Respiratory:  Negative for apnea and stridor. Cardiovascular:  Negative for chest pain and palpitations. Gastrointestinal:  Negative for nausea and vomiting. Endocrine: Negative for cold intolerance and heat intolerance. Genitourinary:  Negative for frequency and urgency. Musculoskeletal:  Positive for gait problem. Negative for arthralgias and back pain. Skin:  Negative for color change and rash. Neurological:  Positive for headaches.        See HPI for visit specific review of symptoms. All others negative      Objective:   /84   Pulse 83   Temp 97.6 °F (36.4 °C) (Temporal)   Resp 18   Ht 5' 6\" (1.676 m)   Wt 179 lb (81.2 kg)   SpO2 99%   BMI 28.89 kg/m²     Vitals:    10/26/22 1534 10/26/22 1545 10/26/22 1546   BP: 126/88 126/86 124/84   Pulse: 88 83 83   Resp: 18     Temp: 97.6 °F (36.4 °C)     TempSrc: Temporal     SpO2: 99%     Weight: 179 lb (81.2 kg)     Height: 5' 6\" (1.676 m)          Physical Exam  Constitutional:       Appearance: She is well-developed. HENT:      Head: Normocephalic and atraumatic. Right Ear: Tympanic membrane and external ear normal.      Left Ear: Tympanic membrane and external ear normal.   Eyes:      Conjunctiva/sclera: Conjunctivae normal.      Pupils: Pupils are equal, round, and reactive to light. Cardiovascular:      Rate and Rhythm: Normal rate and regular rhythm. Heart sounds: Normal heart sounds. Pulmonary:      Effort: Pulmonary effort is normal. No respiratory distress. Breath sounds: Normal breath sounds. Abdominal:      General: Bowel sounds are normal. There is no distension. Palpations: Abdomen is soft. Tenderness: There is no abdominal tenderness. Musculoskeletal:         General: Normal range of motion. Cervical back: Normal range of motion and neck supple. Skin:     General: Skin is warm. Capillary Refill: Capillary refill takes less than 2 seconds. Findings: No rash. Neurological:      Mental Status: She is alert and oriented to person, place, and time. Cranial Nerves: No cranial nerve deficit. Gait: Gait is intact. Psychiatric:         Thought Content:  Thought content normal.         Lab Review   Recent Results (from the past 672 hour(s))   CBC Auto Differential    Collection Time: 10/18/22 11:28 AM   Result Value Ref Range    WBC 9.99 3.98 - 10.04 K/uL    RBC 4.52 3.93 - 5.22 M/uL    Hemoglobin 13.4 11.2 - 15.7 g/dL    Hematocrit 44.1 34.1 - 44.9 % MCV 97.6 (H) 79.4 - 94.8 fL    MCH 29.6 25.6 - 32.2 pg    MCHC 30.4 (L) 32.3 - 35.5 g/dL    RDW 13.4 11.7 - 14.4 %    Platelets 394 136 - 908 K/uL    MPV 9.0 7.4 - 10.4 fL    Neutrophils % 69.7 34.0 - 71.1 %    Lymphocytes % 23.5 19.3 - 53.1 %    Monocytes % 6.1 4.7 - 12.5 %    Eosinophils % 0.2 (L) 0.7 - 7.0 %    Basophils % 0.3 0.1 - 1.2 %    Neutrophils Absolute 6.96 (H) 1.56 - 6.13 K/uL    Lymphocytes Absolute 2.35 1.18 - 3.74 K/uL    Monocytes Absolute 0.61 0.24 - 0.82 K/uL    Eosinophils Absolute 0.02 (L) 0.04 - 0.54 K/uL    Basophils Absolute 0.03 0.01 - 0.08 K/uL   Cancer Antigen 27.29    Collection Time: 10/18/22 12:36 PM   Result Value Ref Range    CA 27.29 21.1 <=39.0 U/mL   Cancer Antigen 15-3    Collection Time: 10/18/22 12:36 PM   Result Value Ref Range    CA 15-3 16 0 - 25 U/mL   CEA    Collection Time: 10/18/22 12:36 PM   Result Value Ref Range    CEA 1.8 0.0 - 4.7 ng/mL   Comprehensive Metabolic Panel    Collection Time: 10/18/22 12:42 PM   Result Value Ref Range    Sodium 145 137 - 145 mmol/L    Potassium 4.0 3.5 - 5.1 mmol/L    Chloride 105 98 - 111 mmol/L    CO2 24 22 - 29 mmol/L    Anion Gap 16 7 - 19 mmol/L    Glucose 89 74 - 106 mg/dL    BUN 12 7 - 17 mg/dL    Creatinine 0.6 0.5 - 1.0 mg/dL    Est, Glom Filt Rate >60 >60    Calcium 9.4 8.4 - 10.2 mg/dL    Total Protein 8.4 (H) 6.3 - 8.2 g/dL    Albumin 4.9 3.5 - 5.2 g/dL    Total Bilirubin 1.4 (H) 0.2 - 1.3 mg/dL    Alkaline Phosphatase 112 (H) 35 - 104 U/L    ALT 20 9 - 52 U/L    AST 24 14 - 36 U/L    Globulin 3.5 g/dL               Assessment & Plan:      The following diagnoses and conditions are stable with no further orders unless indicated:    Patient Active Problem List    Diagnosis Date Noted    Chemotherapy-induced neuropathy ([facility] [address] 02/07/2022    Wheezing 01/11/2022    Snoring 01/11/2022    RUDY (obstructive sleep apnea) 01/11/2022    Non-restorative sleep 01/11/2022    Non-cardiac chest pain 11/19/2021    Chest wall ulcer, with fat layer exposed ([facility] 10/15/2021    Port or reservoir infection 10/06/2021    Central line infection 10/05/2021    Exostosis of left foot 09/29/2021    Arm DVT (deep venous thromboembolism), acute, left ([address] 09/24/2021    S/P revision of left breast reconstruction 9/17/2021 09/23/2021    Status post bilateral breast reconstruction     Acute purulent bronchitis 09/02/2021    Lung nodule 09/02/2021    Status post bilateral mastectomy 08/06/2021    Other cyst of bone, left ankle and foot 04/10/2021     Overview Note:     No trauma. Advised I believe this is most likely a cyst.  Will monitor closely, if change in size or becomes more painful, will consider x-rays. Dyspnea and respiratory abnormalities 04/09/2021     Overview Note:     Patient has been prescribed Breo Ellipta and albuterol. She states she was never diagnosed with COPD or asthma, was told the cancer \"affected my lungs. \"  She denies any shortness of breath today. Continue current medications. Chronic pain syndrome 04/09/2021     Overview Note:     Refer to pain management as requested. Right wrist pain 04/09/2021     Overview Note:     Refer to orthopedics as requested. Rheumatoid arthritis involving both knees with negative rheumatoid factor ([facility] 04/09/2021     Overview Note:     Refer to rheumatology as requested. Menopausal symptom 04/08/2021    Benign neoplasm of body of uterus 04/08/2021    Iron deficiency anemia 01/22/2021    Carcinoma of female breast ([facility] [address] 01/21/2021    Poor venous access 01/21/2021        Felicita Arevalo was seen today for dizziness. Diagnoses and all orders for this visit:    Dizziness  -     MRI IAC POSTERIOR FOSSA W WO CONTRAST; Future  -     MRI BRAIN W WO CONTRAST; Future    Tinnitus of right ear  -     MRI IAC POSTERIOR FOSSA W WO CONTRAST; Future  -     MRI BRAIN W WO CONTRAST; Future    As above.   Given her asymmetrical symptoms and history of cancer, recommend MRI for further evaluation, she voiced understanding and is agreeable. Over 50% of the total visit time of 30 minutes was spent on counseling and/or coordination of care of   1. Dizziness    2. Tinnitus of right ear          Health Maintenance   Topic Date Due    COVID-19 Vaccine (1) Never done    Pneumococcal 0-64 years Vaccine (1 - PCV) Never done    DTaP/Tdap/Td vaccine (1 - Tdap) Never done    Shingles vaccine (1 of 2) Never done    Annual Wellness Visit (AWV)  Never done    Flu vaccine (1) Never done    Colorectal Cancer Screen  10/12/2022    Depression Screen  06/28/2023    Lipids  03/16/2027    Hepatitis A vaccine  Aged Out    Hib vaccine  Aged Out    Meningococcal (ACWY) vaccine  Aged Out    Hepatitis C screen  Discontinued    HIV screen  Discontinued         Return in about 3 months (around 1/26/2023) for AWV 40 minute appt, virtual visit. Discussed use, benefit, and side effects of prescribed medications. All patient questions answered. Pt voiced understanding. Reviewed health maintenance. Instructed to continue current medications, diet and exercise. Patient agreedwith treatment plan. Follow up as directed. Old records reviewed, where available.     Antonio Merino MD    Note:  dictated using Dragon software

## 2022-10-31 ENCOUNTER — OFFICE VISIT (OUTPATIENT)
Dept: SURGERY | Age: 57
End: 2022-10-31
Payer: MEDICARE

## 2022-10-31 VITALS — DIASTOLIC BLOOD PRESSURE: 70 MMHG | HEART RATE: 76 BPM | SYSTOLIC BLOOD PRESSURE: 124 MMHG

## 2022-10-31 DIAGNOSIS — Z98.890 STATUS POST BILATERAL BREAST RECONSTRUCTION: ICD-10-CM

## 2022-10-31 DIAGNOSIS — Z98.890 S/P BREAST RECONSTRUCTION, LEFT: Primary | ICD-10-CM

## 2022-10-31 DIAGNOSIS — C50.919 CARCINOMA OF FEMALE BREAST, UNSPECIFIED ESTROGEN RECEPTOR STATUS, UNSPECIFIED LATERALITY, UNSPECIFIED SITE OF BREAST (HCC): ICD-10-CM

## 2022-10-31 PROCEDURE — 1036F TOBACCO NON-USER: CPT | Performed by: SURGERY

## 2022-10-31 PROCEDURE — G8427 DOCREV CUR MEDS BY ELIG CLIN: HCPCS | Performed by: SURGERY

## 2022-10-31 PROCEDURE — 99214 OFFICE O/P EST MOD 30 MIN: CPT | Performed by: SURGERY

## 2022-10-31 PROCEDURE — G8417 CALC BMI ABV UP PARAM F/U: HCPCS | Performed by: SURGERY

## 2022-10-31 PROCEDURE — G8484 FLU IMMUNIZE NO ADMIN: HCPCS | Performed by: SURGERY

## 2022-10-31 PROCEDURE — 3017F COLORECTAL CA SCREEN DOC REV: CPT | Performed by: SURGERY

## 2022-10-31 NOTE — Clinical Note
Fat grafting right lateral nipple and fat grafting inferior left midline [place] mastopexy left General anesthesia, at her convenience

## 2022-11-02 NOTE — PROGRESS NOTES
HISTORY OF PRESENT ILLNESS:    Ms. Cher Stanton is a 56 y.o. black female who presents today for a 6-month breast exam. This is followed by a left mastectomy reconstruction due to lateral drift on 9/17/2021. 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 It was ER/TN positive and she underwent neoadjuvant chemotherapy with AC followed by Taxol. In 2007 she underwent a right lumpectomy with axillary node dissection and received adjuvant radiation therapy to the breast and axilla. She did not take antihormonal therapy due to cost.    She recurred in 2013 and underwent 2 cycles of Taxotere followed by Doxil and then underwent right mastectomy and axillary node dissection. This was followed by completion of radiation therapy to her chest wall and axillary nodes. In 2016 she underwent implant exchange and a prophylactic mastectomy on the left. She was also switched to aromatase inhibitor. She subsequent developed metastatic disease with lung, hilar, and axillary metastases and has been on Abraxane, Gemzar, and is now doing monthly Faslodex with palbociclib    She currently has [place] implants. 590 cc on the right. She has an 800 cc implant on the left. She is now status post revision of left mastectomy reconstruction due to lateral drift on 9/17/2021. She had placement of a 700 cc Natrelle soft touch implant and a crescent mastopexy on the left. She also had liposuction of an excessive fat pad adjacent to her reconstruction. PHYSICAL EXAM:  The  wounds look good with no evidence of infection, fluid accumulation, or skin necrosis. She still has a small amount of asymmetry with the left nipple being about a centimeter lower than the right. It is significantly better than previously but it does bother her    Some fat grafting laterally on the right will change the direction of her nipple somewhat.   She also has this the defect inferiorly on the left that I think some fat grafting will help lastly she has not really moved the left nipple very much so another centimeter lift should help with that. IMPRESSION:    Doing well status post revision of left reconstructed breast  Deformity bilateral breast status post reconstructions    PLAN:   I have seen, examined and reviewed this patient medication list, appropriate labs and imaging studies. I reviewed relevant medical records and others physicians notes. I discussed the plans of care with the patient. I answered all the questions to the patients satisfaction. I, Dr Celina Montelongo, personally performed the services described in this documentation as scribed by Nadira Santillan MA in my presence and is both accurate and complete. (Please note that portions of this note were completed with a voice recognition program. Efforts were made to edit the dictations but occasionally words are mis-transcribed.)  Over 50% of the total visit time of 25 minutes in face to face encounter with the patient, out of which more than 50% of the time was spent in counseling patient or family and coordination of care. Counseling included but was not limited to time spent reviewing labs, imaging studies/ treatment plan and answering questions.

## 2022-11-03 ENCOUNTER — HOSPITAL ENCOUNTER (OUTPATIENT)
Dept: MRI IMAGING | Age: 57
Discharge: HOME | End: 2022-11-03
Payer: MEDICARE

## 2022-11-03 DIAGNOSIS — H93.11 TINNITUS OF RIGHT EAR: ICD-10-CM

## 2022-11-03 DIAGNOSIS — R42 DIZZINESS: ICD-10-CM

## 2022-11-03 PROCEDURE — 6360000004 HC RX CONTRAST MEDICATION: Performed by: FAMILY MEDICINE

## 2022-11-03 PROCEDURE — A9577 INJ MULTIHANCE: HCPCS | Performed by: FAMILY MEDICINE

## 2022-11-03 PROCEDURE — 70553 MRI BRAIN STEM W/O & W/DYE: CPT

## 2022-11-03 RX ADMIN — GADOBENATE DIMEGLUMINE 15 ML: 529 INJECTION, SOLUTION INTRAVENOUS at 07:52

## 2022-11-07 NOTE — PROGRESS NOTES
MEDICAL ONCOLOGY PROGRESS NOTE    Pt Name: Geo Gray  MRN: [id number]  YOB: 1965  Date of evaluation: 10/11/2021  ROOM: 334      Subjective: She reports nausea and vomiting last night. Better with Zofran. Local skin reaction from tape to right upper extremity PICC site. HISTORY OF PRESENT ILLNESS:  Rogerio Wilsno is a very pleasant 49years old female who has been treated for a diagnosed stage IV metastatic breast cancer. She developed acute DVT of the left upper extremity related to prior port placement. 9/26/2021-she underwent a venous mechanical thrombectomy by Dr. Kenna Miller. She was found to have significant left subclavian vein stenosis. The patient presented to the hospital with complaints of redness/tenderness of her port site and also fever 101.  10/6/2021-she underwent a left upper extremity venous mechanical thrombectomy. Mechanical thrombectomy of the area with good resolution of the thrombus.             Diagnosis  · Grade III Adenocarcinoma of right breast diagnosed 2006  · Stage IIIA, [id number]  · ER/VT Positive, HER-2 bruce/ihc 1+  · April 2013-RECURRENCE in the right axillary region  · Genetic testing- BRCA 1&2 Negative  · 2014 (?) RECURRENCE in the axillary skin  · 01/05/2017- METASTATIC DISEASE with lung, hilar, and axillary lymph node mets  · Osteopenia     Treatment Summary  · 2006- Neoadjuvant chemotherapy with AC x 4 cycles followed by Taxol  · 2007- Lumpectomy with axillary lymph node dissection  · 2007- Adjuvant radiation therapy to whole breast and axillary region  · Did not take tamoxifen due to cost  · April 2013- RECURRENCE  · Neoadjuvant chemotherapy with 2 cycles of Taxotere followed by Doxil  · 10/14/2013- Right Breast Mastectomy and Axillary Dissection  · 01/23/2014- Completion of adjuvant RT to chest wall and axillary lymph node with Dr. Dewey Gonsales  · 02/14- Initiated adjuvant endocrine therapy with tamoxifen  · 2014 (?) RECURRENCE in the axillary skin  · 2014 (?) Surgical resection of the axillary skin  · 2015 (?) YVETTE/BSO  · 09/19/2016- Bilateral exchange, nipple reconstruction and fat grafting and removal of PAC   · 2016- Switched to aromatase inhibitor  · 01/05/2017- METASTATIC DISEASE with lung, hilar, and axillary mets  · 01/11/2017-06/17/2018- Single agent Abraxane x 13 cycles  · 06/27/2018-09/17/2018- Gemzar  · 10/10/2018-Faslodex every 4 weeks/palbociclib     Cancer History:  Pelon Conde was first seen by me on 1/21/2021. She was referred to [facility] of care for history of recurrent metastatic breast cancer. She has been in remission as per the latest CT scan. She is currently on Faslodex and palbociclib. She has received several lines therapy as described below. She moved from [place] to [place] to stay closer to her parents. Her medical history is complex. Further details as below. · 2006- Neoadjuvant chemotherapy with AC x 4 cycles followed by Taxol AC was complicated by viral meningitis; patient experienced neuropathy after 3 doses of Taxol and was switched to Taxotere for last dose  · 2007- Lumpectomy with axillary lymph node dissection 1.9cm, grade 2. No LVI. 1 of 14 lymph nodes positive for malignancy (1/14) [id number]  · 2007- Adjuvant Radiation Therapy to whole breast and axillary region  · April 2013- RECURRENCE presenting as mass in the axillary region  · Neoadjuvant chemotherapy with 2 cycles of Taxotere followed by Doxil- did not have good response to treatment  · 2013- Preop PET/CTshowed 2.8cm, right axillary lymph node with uptake. · 10/14/2013- Right Breast Mastectomy and Axillary Dissection Right breast had benign tissue with fibrous, negative for carcinoma. Axillary contents demonstrated invasive ductal carcinoma, involving fibroadipose tissue and tendinous tissue. 3 benign lymph nodes (0/3). Yusra grade 3. 3.0cm in greatest gross dimensions.  Carcinoma permeates among soft tissues and in the right axilla with no apparent involvement of lymph nodes. · 01/23/2014- Completion of adjuvant radiation therapy to chest wall and axillary lymph node with Dr. Baljinder Oropeza  · 02/14- Initiated adjuvant endocrine therapy with tamoxifen  · 2014 (?) RECURRENCE in the axillary skin  · 2014 (?) Surgical resection of the axillary skin pathology demonstrating tumor at cauterized margin  · 2015 (?) YVETTE/BSO  · 09/19/2016- Bilateral exchange, nipple reconstruction and fat grafting and removal of PAC  · 2016- Switched to aromatase inhibitor patient was non compliant  · 01/05/2017- METASTATIC DISEASE Presented with respiratory failure. CTA Pulmonary showed numerous nodules and masses throughout both lung fields, compatible with metastatic disease. Bilateral hilar adenopathy seen. · 01/11/2017-06/17/2018- Single agent Abraxane x 13 cycles  · 03/15/2017- CT Chest W Contrast Interval decrease in maximal diameter of essentially all the multiple metastatic pulmonary nodules. The average difference is approximately 25%. Some of the much smaller ones have disappeared. Bilateral hilar and aortopulmonary window adenopathy is also similarly smaller. · 06/07/2017- CT Chest W Contrast Multiple lung nodules seen bilaterally. Most of the lung nodules show interval improvement with decreased size by 1 to 3mm. Mediastinal and bilateral hilar adenopathy seen with improvement. · 07/24/2017- PET/CT scan showed marked improvement, is minimal abnormal, has excellent response to therapy  · 02/01/2018- PET/CT scan Numerous bilateral lung masses and nodules, the majority of which do not have appreciable abnormal FDG uptake. The largest mass in the left infrahilar region of the left lower lobe demonstrates a maximum SUV of 3.3. Mediastinal lymphadenopathy. No evidence of metastatic disease in the abdomen or pelvis. · 06/21/2018- CT Chest/Abdomen/Pelvis Multiple lung mets, mild bilateral hilar and mediastinal lymph nodes.  No masses or evidence of metastatic disease in the abdomen or pelvis  · 06/27/2018-09/17/2018- Gemzar  · 09/28/2018- CT Pulmonary Multiple lung mets, mild bilateral hilar and mediastinal lymph nodes  · 10/10/2018-12/21/2020- Faslodex every 4 weeks  · 12/14/2018- CT Chest showed definite decrease in the maximal diameter and volume of all left and right metastatic nodules. Somewhat enlarged paratracheal and left axillary lymph nodes are relatively unchanged. · 04/22/2019- MRI Cervical Spine W WO Contrast Unremarkable  · 04/22/2019- MRI Brain W WO Contrast No enhancing lesions in the brain and no evidence of metastatic disease. · 07/27/2019- CTA Pulmonary showed essentially complete disappearance of the pulmonary metastatic disease. Several tiny flat peripheral nodules are the only sequela. The tiny ones on the right are unchanged, the tiny ones on the left are even smaller. There is no longer any active metastatic disease in either lung. · 12/19/2019- CT Chest/Abdomen/Pelvis Small, tiny subpleural nodules right upper and right midlung field as well as left lung base has remained unchanged. No new focal parenchymal abnormality seen. No adenopathy seen. There is no abdominal or pelvic mass, adenopathy or fluid collection. No lytic or sclerotic bony lesions, but there is a possibility of osteopenia and/or osteoporosis. · 02/17/2020- DEXA Bone Density showed osteopenia of lumbar spine, T-score -1.8, and left femoral neck, T-score -2.0  · 2/25/21 Ct Abdomen Pelvis W Iv Contrast  No evidence of metastatic disease in the abdomen or pelvis. · 2/26/21 Ct Chest W Contrast Tiny nodules in the right lung described above probably represent small foci of pleural thickening due to chronic inflammatory process or small noncalcified granulomas. No features to suggest malignancy or metastatic disease. Bilateral breast implants without complication. · 3/1/2021-discussed the results CT chest abdomen pelvis. Essentially no clear evidence of metastatic disease.   This is consistent with excellent response to treatment. Continue current treatment. · 4/1/2021 = 31.6 CA 27-29= 33.5 CEA= 1.6   · 6/3/21 CA 15-3= 23.3 CA 27-29= 25.6  CEA= 1.8   · 7/29/2021 CEA=1.7 CA 15-3=21.50 CA 27.29= 26.0  · 8/19/2021 CT Chest  Left lower lobe pleural-based nodular 1.7 x 0.9 cm is indeterminate. PET/CT recommended. Feeding defect in the left lower lobe bronchus versus a postobstructive airspace disease. This too may be further characterized with PET CT versus direct visualization. · 8/19/2021 CT Abd/Pelvis A stable CT scan of the abdomen and pelvis. No finding to suggest neoplastic/metastatic disease.      CA 27.29 [employer]  01/21/2021- 20.3  04/01/2021 33.5  06/03/2021 25.6  07/29/2021 26.0       Current Hospital Medications:    Current Facility-Administered Medications   Medication Dose Route Frequency Provider Last Rate Last Admin    cyanocobalamin injection 2,000 mcg  2,000 mcg IntraMUSCular Once BRITANY Rivera        cyanocobalamin injection 1,000 mcg  1,000 mcg IntraMUSCular Daily BRITANY Rivera        ondansetron (ZOFRAN) injection 4 mg  4 mg IntraVENous Q6H PRN Addison Garcia MD   4 mg at 10/10/21 1954    hydrALAZINE (APRESOLINE) injection 5 mg  5 mg IntraVENous Q6H PRN Addison Garcia MD   5 mg at 10/10/21 1955    cefTRIAXone (ROCEPHIN) 1,000 mg in sterile water 10 mL IV syringe  1,000 mg IntraVENous Q24H Sarita Mcnally MD   1,000 mg at 10/10/21 1527    lidocaine PF 1 % injection 5 mL  5 mL IntraDERmal Once Jayne Pickett MD        sodium chloride flush 0.9 % injection 5-40 mL  5-40 mL IntraVENous 2 times per day Jayne Pickett MD   10 mL at 10/10/21 1145    sodium chloride flush 0.9 % injection 5-40 mL  5-40 mL IntraVENous PRN Jayne Pickett MD        0.9 % sodium chloride infusion  25 mL IntraVENous PRN Jayne Pickett MD        apixaban [facility]) tablet 5 mg  5 mg Oral BID Emil Elena DO   5 mg at 10/10/21 0987    famotidine (PEPCID) tablet 20 mg  20 mg Oral Daily Lisa Face, DO   20 mg at 10/10/21 0932    HYDROcodone-acetaminophen (NORCO)  MG per tablet 1 tablet  1 tablet Oral Q6H PRN Lisa Face, DO   1 tablet at 10/10/21 1527    lisinopril (PRINIVIL;ZESTRIL) tablet 10 mg  10 mg Oral Daily Lisa Face, DO   10 mg at 10/10/21 0931    meloxicam (MOBIC) tablet 15 mg  15 mg Oral Daily Lisa Face, DO   15 mg at 10/10/21 0931    pantoprazole (PROTONIX) tablet 40 mg  40 mg Oral Daily [facility] Ivan[facility]off, DO   40 mg at 10/10/21 0932    sodium hypochlorite (DAKINS) 0.125 % external solution   Irrigation BID BRITANY Trevizo   Given at 10/10/21 2215    palbociclib (IBRANCE) tablet TABS 125 mg  125 mg Oral Daily Yelitza Gage MD   125 mg at 10/10/21 0931       Allergies: Allergies   Allergen Reactions    Clindamycin/Lincomycin Hives, Itching and Rash         Objective   /63   Pulse 81   Temp 97.5 °F (36.4 °C) (Temporal)   Resp 14   Ht 5' 6\" (1.676 m)   Wt 179 lb (81.2 kg)   SpO2 97%   BMI 28.89 kg/m²     PHYSICAL EXAM:  CONSTITUTIONAL: Alert, appropriate, no acute distress  EYES: Non icteric, EOM intact, pupils equal round   ENT: Mucus membranes moist, no oral pharyngeal lesions, external inspection of ears and nose are normal  NECK: Supple, no masses. No palpable thyroid mass  CHEST/LUNGS: CTA bilaterally, normal respiratory effort. Dressing left upper chest is clean. CARDIOVASCULAR: RRR, no murmurs. No lower extremity edema  ABDOMEN: soft non-tender, active bowel sounds, no HSM. No palpable masses  EXTREMITIES: PICC line right upper extremity, warm, full ROM in all 4 extremities, no focal weakness. LYMPH: No cervical, clavicular, axillary, or inguinal lymphadenopathy  NEUROLOGIC: follows commands, non focal   PSYCH: mood and affect appropriate.  Alert and oriented to time, place, person        LABORATORY RESULTS REVIEWED/ANALYZED BY ME:  Recent Labs 10/11/21  0018 10/09/21  0346 10/05/21  1554   WBC 4.3* 4.0* 6.5   HGB 8.3* 8.7* 11.2*   HCT 26.4* 27.4* 34.5*   .8* 111.8* 109.9*    395 551*       Lab Results   Component Value Date     10/09/2021    K 4.4 10/09/2021     10/09/2021    CO2 26 10/09/2021    BUN 14 10/09/2021    CREATININE 0.6 10/09/2021    GLUCOSE 103 10/09/2021    CALCIUM 8.3 (L) 10/09/2021    PROT 7.1 09/24/2021    LABALBU 4.1 09/24/2021    BILITOT 0.7 09/24/2021    ALKPHOS 99 09/24/2021    AST 22 09/24/2021    ALT 29 09/24/2021    LABGLOM >60 10/09/2021    GFRAA >59 10/09/2021    AGRATIO 1.6 04/01/2021    GLOB 3.3 08/27/2021       Lab Results   Component Value Date    INR 1.20 (H) 10/06/2021    INR 1.23 (H) 10/05/2021    INR 1.02 09/24/2021    PROTIME 15.4 (H) 10/06/2021    PROTIME 15.7 (H) 10/05/2021    PROTIME 13.6 09/24/2021       RADIOLOGY STUDIES REPORT/REVIEWED AND INTERPRETED BY ME:  XR CHEST (2 VW)    Result Date: 10/5/2021  XR CHEST (2 VW) 10/5/2021 4:04 PM History: Preop vascular surgery procedure. Two-view chest x-ray. Comparison is made with September 24, 2021. Heart size is within normal limits. The mediastinum has a normal appearance. The lungs are adequately expanded with no pneumonia or pneumothorax. There is mild chronic interstitial disease with scattered calcified granulomas. There is no significant pleural fluid. No congestive failure changes. Unchanged left chest wall port. 1. No acute disease. Signed by Dr Itzel Lara    Result Date: 9/30/2021  EXAM: CT CHEST W CONTRAST -- 9/30/2021 8:52 AM HISTORY: 49 years, Female, lung nodule, breast cancer COMPARISON: 8/19/2021 DLP: 543 mGy cm. Automated exposure control was utilized to minimize patient radiation dose. TECHNIQUE: Unenhanced CT images obtained with multiplanar reformats.  FINDINGS: AIRWAYS/PULMONARY PARENCHYMA: The central airways are midline and patent. No convincing small airway filling defect on today's exam at the left lower lobe. Redemonstration 16 x 7 mm pulmonary nodule at the medial aspect left lower lobe on axial series 3, image 69, previously reported as 9 x 17. On today's exam, this appears less rounded. No new suspicious pulmonary finding. No pleural effusion. No pneumothorax. VASCULATURE: Limited assessment of vasculature without intravenous contrast. Thoracic aorta is normal in course and caliber. Normal pulmonary artery caliber. Left jugular vein stent. CARDIAC:  Cardiac size is normal.  No pericardial effusion. Scattered coronary artery calcifications. MEDIASTINUM: There is no mediastinal or hilar lymphadenopathy by CT size criteria. Esophagus has normal coarse, caliber and wall thickness. EXTRATHORACIC: The visualized portions of the thyroid gland are unremarkable. No thoracic inlet or axillary adenopathy. Port at the left chest wall, catheter tip in the lower SVC. Bilateral breast implants. Left chest wall collaterals suggests chronic left subclavian vein stenosis. INCLUDED UPPER ABDOMEN: Visualized portion of the liver, gallbladder, pancreas, spleen, adrenal glands and upper kidneys appear within normal limits. OSSEOUS: Normal     1. Similar left lower lobe pleural-based nodule or opacity. This appears less rounded than prior. 2. Left chest wall collaterals suggest chronic left subclavian vein stenosis. 3. Coronary artery calcifications.  Signed by Dr Mag Dickerson    VL EXTREMITY VENOUS LEFT    Result Date: 10/6/2021  Vascular Upper Extremities Veins Procedure  Demographics   Patient Name    Alec Deng Age                  49   Patient Number  [id number]          Gender               Female   Visit Number    [id number]       Luzmaria Hopson                                  Physician   Date of Birth   1965      Referring Physician  Wilton Savannah   Accession       [id number]      [id number]          [id number] Grady RT, RVT  Number  Procedure Type of Study:   Veins:Upper Extremities Veins, US Doppler Venous Upper Extremity  Unilateral.  Indications for Study:F/U DVT left upper extremity. Impression   Chronic appearing deep vein thrombosis (DVT) is seen in the internal  jugular vein brachial , left upper extremity(ies). Subacute appearing superficial thrombophlebitis (SVT) is seen in the  basilic vein, left upper extremity(ies). VL Extremity Venous Left    Result Date: 9/24/2021  Vascular Upper Extremities Veins Procedure  Demographics   Patient Name    Janie Ren Age                   49   Patient Number  [id number]          Gender                Female   Visit Number    [id number]       Interpreting          Liu Zheng                                  Physician   Date of Birth   1965      Referring Physician   Peggy Pabloo   Accession       [id number]      [address], RVT  Number  Procedure Type of Study:   Veins:Upper Extremities Veins, UE VENOUS UNILATERAL/FLU. Indications for Study:Arm swelling and Arm pain. Impression   Acute appearing deep vein thrombosis (DVT) is seen in the internal jugular  vein subclavian axillary brachial ulnar , left upper extremity(ies). Superficial venous thrombosis is visualized in the basilic and cephalic  vein of the left upper extremity. XR CHEST PORTABLE    Result Date: 9/24/2021  XR CHEST PORTABLE 9/24/2021 8:00 PM HISTORY: Chest pain COMPARISON: 7/28/2019 CXR: A single frontal view of the chest is performed. Findings: Lungs are hypoventilated. Bilateral breast implants with associated soft tissue attenuation. No pulmonary consolidation or edema. Cardiac and mediastinal contours normal. No pleural effusion or pneumothorax. Left subclavian port in place with tip overlying the SVC. Right axillary surgical clips. 1. Bilateral breast implants. No acute pulmonary process identified. Left subclavian port.  Signed by  Jos Hernandez      ASSESSMENT:    #Port related infection- Streptococcus mitis/oralis   S/p Mediport removal per Dr. Zavala Citizen 10/06/21. Continue Vancomycin  Port tip culture on 10/6/2021 -Streptococcus mitis/oralis   Left chest wall wound cultures on 10/6/2021-many WBCs with no organisms seen, culture no growth to date. Left chest wall wound recultured on 10/8/2021-Gram stain result no organisms seen with few WBCs and few RBCs. Left Chest Wound: L=1.0cmXW=6.0cmXD=4.5cm      Photo taken by Gali Gleason RN on 10/8/2020 at 2446      #Acute left upper extremity DVT/left upper chest wall port      A noninvasive venous study of the left upper extremity documented the following:  · Acute appearing deep vein thrombosis (DVT) is seen in the internal jugular vein subclavian axillary brachial ulnar , left upper extremity(ies). · Superficial venous thrombosis is visualized in the basilic and cephalic vein of the left upper extremity. She was admitted and placed on IV heparin drip     A preliminary prothrombotic work-up with antiphospholipid syndrome testing will be initiated, further serological work-up will be done in outpatient setting under the direction of Dr. Loleta Babinski. Anticardiolipin ab IgG and IgM - Negative     Yandy underwent partial left upper extremity mechanical suction thrombectomy 9/26/2021 by Dr. Shen Larry       10/6/2021-she underwent a left upper extremity venous mechanical thrombectomy. Mechanical thrombectomy of the area with good resolution of the thrombus. S/p Mediport removal per Dr. Zavala Citizen 10/06/21. Eliquis 5 BID    Stage IV Metastatic breast cancer, hormone sensitive  2/25/2021-discussed the results of CT chest abdomen pelvis. No evidence of disease progression. In fact, excellent response with no measurable metastatic disease at this time. 1/21/2021-CA 15-3 = 23, CA 27-29 = 20.3. Currently Faslodex/Ibrance.    6/3/2021-CA 15-3-23, CA 27-29-25, CEA 1.8  Regimen:  Faslodex to 500 mg subcutaneous every 4 weeks. Continue Ibrance days 1-21 q. 28 days  8/19/2021-CT chest/abdomen/pelvis-no evidence of metastatic disease. Findings of a pulmonary lung nodule in the left lower lobe   Essentially, good response to Faslodex/Ibrance. Last cancer marker 7/29/2021 were normal.     Macrocytic anemia with B12 deficiency   B12 levels 189. B12 1000 mcg injections 9/26/2021- 3/1/2021  3/1/2021- Methylmalonic Acid 171, Vitamin B12 >2000. Serology 10/9/2021  · Ferritin 92.6  · Iron 41  · Iron saturation 17% . · TIBC 242  · Folate 4.6  · Absolute reticulocyte count - 0.0316  · Vitamin B12 154    Resume Paraenteral B12 replacement with 2000 mcg subcu today followed by B12 1000 mcg daily during admission and then will resume monthly treatment with next dose to be given on 10/22/2021. Requested occult stool on 10/9/2021        Left lower lobe lung nodule  She is currently being seen by pulmonary at [facility]. They believe that this could be inflammatory. She received a trial of antibiotics and will have a repeat CT scan with them. She is a scheduled for a repeat CT scan January 2022    GERD  Possible decreased efficacy of Ibrance when taken with Protonix  Patient states GERD is unrelieved with Protonix  Rx Pepcid sent to [facility]    Poor IV access-portion, the only available site for a PICC insertion is the right upper extremity, same site of prior mastectomy. Okay to use this for IV midline placement. Discussed with the patient. PICC line placed in right basilic vein via vascular services on 10/8/2021    PLAN:  Antibiotic as per hospitalist/ID, currently receiving ceftriaxone  Continue  Ibrance and Eliquis  Continue supportive care    Resume B12 placement: 2000 mcg was given on10/10/2021, followed by B12 1000 mcg daily during admission and then will resume monthly treatment with next dose to be given on 10/22/2021 in clinic.        (Please note that portions of this note were completed with a voice recognition program. Efforts were made to edit the dictations but occasionally words are mis-transcribed.)        Judy Gipson PA-C    10/11/21  6:54 AM   Physician's attestation/substantial contribution:  I, Dr Angela Garcia, independently performed an evaluation on Liana Garcia. I have reviewed relevant medical information/data to include but not limited to medication list, relevant appropriate labs and imaging when applicable. I reviewed other physician's notes, ancillary services and nurses assessment. I have reviewed the above documentation completed by the Nurse Practitioner or Physician Assistant. Please see my additional contributions to the history of present illness, physical examination, and assessment/medical decision-making that reflect my findings and impressions. I discussed essential elements of the care plan with the NP or PA and the patient as well. I answered all the questions to the patient's satisfaction. I concur with above stated. Subjective-afebrile. Felt somewhat nauseated yesterday. No vomiting. Resolved with antiemetic. Objective-no change  Assessment/plan:  Port site infection-continue ceftriaxone as per ID. Breast cancer stage IV-she has appointment 10/22/2021 for Faslodex shot.   Anemia of inflammation/infection-no intervention  DVT, port associated-continue Eliquis 5 mg p.o. twice daily    Angela Garcia MD Skin normal color for race, warm, dry and intact. No evidence of rash.

## 2022-11-09 DIAGNOSIS — Z98.890 S/P BREAST RECONSTRUCTION, LEFT: Primary | ICD-10-CM

## 2022-11-15 ENCOUNTER — APPOINTMENT (OUTPATIENT)
Dept: INFUSION THERAPY | Age: 57
End: 2022-11-15
Payer: MEDICARE

## 2022-11-15 RX ORDER — APIXABAN 5 MG/1
TABLET, FILM COATED ORAL
Qty: 60 TABLET | Refills: 3 | Status: SHIPPED | OUTPATIENT
Start: 2022-11-15

## 2022-11-16 ENCOUNTER — HOSPITAL ENCOUNTER (OUTPATIENT)
Dept: INFUSION THERAPY | Age: 57
Discharge: HOME | End: 2022-11-16
Payer: MEDICARE

## 2022-11-16 DIAGNOSIS — D50.8 OTHER IRON DEFICIENCY ANEMIA: ICD-10-CM

## 2022-11-16 DIAGNOSIS — R42 DIZZINESS: ICD-10-CM

## 2022-11-16 DIAGNOSIS — H93.11 TINNITUS OF RIGHT EAR: Primary | ICD-10-CM

## 2022-11-16 DIAGNOSIS — C50.919 CARCINOMA OF FEMALE BREAST, UNSPECIFIED ESTROGEN RECEPTOR STATUS, UNSPECIFIED LATERALITY, UNSPECIFIED SITE OF BREAST (HCC): ICD-10-CM

## 2022-11-16 DIAGNOSIS — C50.919 METASTATIC BREAST CANCER (HCC): Primary | ICD-10-CM

## 2022-11-16 LAB
HCT VFR BLD CALC: 41 % (ref 34.1–44.9)
HEMOGLOBIN: 13.2 G/DL (ref 11.2–15.7)
LYMPHOCYTES ABSOLUTE: 2.37 K/UL (ref 1.18–3.74)
LYMPHOCYTES RELATIVE PERCENT: 27.7 % (ref 19.3–53.1)
MCH RBC QN AUTO: 29.6 PG (ref 25.6–32.2)
MCHC RBC AUTO-ENTMCNC: 32.2 G/DL (ref 32.3–35.5)
MCV RBC AUTO: 91.9 FL (ref 79.4–94.8)
MONOCYTES ABSOLUTE: 0.4 K/UL (ref 0.24–0.82)
MONOCYTES RELATIVE PERCENT: 4.7 % (ref 4.7–12.5)
NEUTROPHILS ABSOLUTE: 5.72 K/UL (ref 1.56–6.13)
NEUTROPHILS RELATIVE PERCENT: 66.6 % (ref 34–71.1)
PDW BLD-RTO: 13.2 % (ref 11.7–14.4)
PLATELET # BLD: 284 K/UL (ref 182–369)
PMV BLD AUTO: 8.3 FL (ref 7.4–10.4)
RBC # BLD: 4.46 M/UL (ref 3.93–5.22)
WBC # BLD: 8.57 K/UL (ref 3.98–10.04)

## 2022-11-16 PROCEDURE — 36415 COLL VENOUS BLD VENIPUNCTURE: CPT

## 2022-11-16 PROCEDURE — 85025 COMPLETE CBC W/AUTO DIFF WBC: CPT

## 2022-11-16 PROCEDURE — 6360000002 HC RX W HCPCS: Performed by: INTERNAL MEDICINE

## 2022-11-16 PROCEDURE — 96372 THER/PROPH/DIAG INJ SC/IM: CPT

## 2022-11-16 PROCEDURE — 96402 CHEMO HORMON ANTINEOPL SQ/IM: CPT

## 2022-11-16 RX ORDER — CYANOCOBALAMIN 1000 UG/ML
1000 INJECTION, SOLUTION INTRAMUSCULAR; SUBCUTANEOUS ONCE
Status: COMPLETED
Start: 2022-11-16 | End: 2022-11-16

## 2022-11-16 RX ORDER — CYANOCOBALAMIN 1000 UG/ML
1000 INJECTION, SOLUTION INTRAMUSCULAR; SUBCUTANEOUS ONCE
Start: 2022-12-13

## 2022-11-16 RX ADMIN — FULVESTRANT 250 MG: 50 INJECTION, SOLUTION INTRAMUSCULAR at 10:24

## 2022-11-16 RX ADMIN — FULVESTRANT 250 MG: 50 INJECTION, SOLUTION INTRAMUSCULAR at 10:23

## 2022-11-16 RX ADMIN — CYANOCOBALAMIN 1000 MCG: 1000 INJECTION, SOLUTION INTRAMUSCULAR; SUBCUTANEOUS at 10:23

## 2022-12-05 ENCOUNTER — HOSPITAL ENCOUNTER (OUTPATIENT)
Dept: PREADMISSION TESTING | Age: 57
Discharge: HOME | End: 2022-12-09
Payer: MEDICARE

## 2022-12-05 VITALS — WEIGHT: 178 LBS | BODY MASS INDEX: 28.73 KG/M2

## 2022-12-05 LAB
EKG P AXIS: 46 DEGREES
EKG P-R INTERVAL: 142 MS
EKG Q-T INTERVAL: 394 MS
EKG QRS DURATION: 84 MS
EKG QTC CALCULATION (BAZETT): 402 MS
EKG T AXIS: 21 DEGREES

## 2022-12-05 PROCEDURE — 93005 ELECTROCARDIOGRAM TRACING: CPT | Performed by: SURGERY

## 2022-12-05 PROCEDURE — 93010 ELECTROCARDIOGRAM REPORT: CPT | Performed by: INTERNAL MEDICINE

## 2022-12-05 RX ORDER — ACETAMINOPHEN 325 MG/1
975 TABLET ORAL ONCE
Status: CANCELLED | OUTPATIENT
Start: 2022-12-09

## 2022-12-05 RX ORDER — CELECOXIB 200 MG/1
200 CAPSULE ORAL ONCE
Status: CANCELLED | OUTPATIENT
Start: 2022-12-09

## 2022-12-05 RX ORDER — GABAPENTIN 300 MG/1
300 CAPSULE ORAL ONCE
Status: CANCELLED | OUTPATIENT
Start: 2022-12-09

## 2022-12-05 NOTE — DISCHARGE INSTRUCTIONS
The day before surgery you will receive a phone call from the surgery nurse to let you know what time to arrive on the day of surgery. This call will usually be between 2-4 PM. If you do not receive a phone call by 4 PM the day before your surgery please call [phone] and let them know you have not received an arrival time. If your surgery is on Monday, your call will be on the Friday before your Monday surgery. The morning of surgery, you may take all your prescribed medications with a sip of water. Any exceptions to this would be listed below:       Re[facility]old [facility]    A script for Bactroban ointment has been call to your pharmacy or was given to you in written form by your surgeon. The guidelines for the ointment use are as follows:    1)  Start using the ointment 7 days before your surgery date    2)  Use the ointment two times a day - morning and night    3)  Place the ointment on a Q-tip and swirl up in your nose making sure you cover completely       the skin just inside of each nostril. Use one end of the Q-tip for each nostril. Chlorhexidine Gluconate 4% Solution    Patient should shower with this soap the night before surgery and the morning of surgery) washing from the neck down (avoiding contact with genitalia). DO NOT [address] YOUR HAIR OR FACE WITH THIS SOAP. When washing with this soap, apply enough to suds up the body thoroughly, turn the water away from your body and allow the soap suds to remain on the body for 2 full minutes, then rinse body completely. After using this soap on the body, please do not apply powders or lotions to your body. [facility] Patients-Revised 6-    Visitors for surgery patients are essential for the patient's emotional well-being and care       post operatively. 2.   Visitor Expectations and Limitations          3. One visitor allowed with patients in the preop/postop rooms.     4.  A second visitor may sit in the waiting area. 5.  No children under 13 allowed in the pre-post op areas unless they are the patient. 6.  Two people may be with an underage surgical/procedural patient in preop/postop        room. 7.  If you are admitted to the hospital post operatively, there are NO RESTRICTIONS on       the floor at this time. 8.  If you are admitted to ICU postoperatively, you may have one visitor in the room from        7A-7P. A second visitor may sit in the ICU waiting room. There can be no overnight     PREOPERATIVE GUIDELINES WHEN RECEIVING ANESTHESIA    Do not eat or drink anything after midnight, the night before your surgery. This is extremely important for your safety. Take a bath (or shower) the night before your surgery and you may brush your teeth the morning of your surgery. You will be scheduled to arrive at the hospital 2 hours before your surgery, or follow your surgeon's instructions. Dress comfortably. Wear loose clothing that will be easy to remove and comfortable for your trip home. You may wear eyeglasses or contacts but bring your cases with you as they must be remove before your surgery. Hearing aids and dentures will need to be removed before your surgery. Do not wear any jewelry, including body jewelry. All jewelry will need to be removed prior to your surgery. Do not wear fingernail polish or make-up. It is best not to bring any valuables with you. If you are to stay in the hospital overnight, bring your robe, slippers and personal toiletries that you may need. POSTOPERATIVE GUIDELINES AFTER RECEIVING ANESTHESIA    If you are to go home after your surgery, you will need a responsible adult to drive you home. You will not be able to take public transportation after your discharge from the Operative Care Unit unless you are accompanied by a        responsible adult.     On returning home, be sure to follow your physician's orders regarding diet, activity and medications. Remember, surgery with general anesthesia or sedation may leave you sleepy, very tired and with a decreased appetite for 12 to 24 hours. If you develop any post-surgical complications or problems, call your surgeon or [facility] Emergency Department ([phone]).

## 2022-12-09 ENCOUNTER — ANESTHESIA EVENT (OUTPATIENT)
Dept: OPERATING ROOM | Age: 57
End: 2022-12-09
Payer: MEDICARE

## 2022-12-09 ENCOUNTER — HOSPITAL ENCOUNTER (OUTPATIENT)
Age: 57
Setting detail: OUTPATIENT SURGERY
Discharge: HOME | End: 2022-12-09
Attending: SURGERY | Admitting: SURGERY
Payer: MEDICARE

## 2022-12-09 ENCOUNTER — ANESTHESIA (OUTPATIENT)
Dept: OPERATING ROOM | Age: 57
End: 2022-12-09
Payer: MEDICARE

## 2022-12-09 VITALS
DIASTOLIC BLOOD PRESSURE: 87 MMHG | HEIGHT: 66 IN | RESPIRATION RATE: 16 BRPM | SYSTOLIC BLOOD PRESSURE: 135 MMHG | HEART RATE: 69 BPM | BODY MASS INDEX: 28.61 KG/M2 | TEMPERATURE: 97.1 F | OXYGEN SATURATION: 98 % | WEIGHT: 178 LBS

## 2022-12-09 DIAGNOSIS — N65.0 DEFORMITY AND DISPROPORTION OF RECONSTRUCTED BREAST: Primary | ICD-10-CM

## 2022-12-09 DIAGNOSIS — N65.1 DEFORMITY AND DISPROPORTION OF RECONSTRUCTED BREAST: Primary | ICD-10-CM

## 2022-12-09 PROCEDURE — 7100000011 HC PHASE II RECOVERY - ADDTL 15 MIN: Performed by: SURGERY

## 2022-12-09 PROCEDURE — 6360000002 HC RX W HCPCS: Performed by: ANESTHESIOLOGY

## 2022-12-09 PROCEDURE — 6360000002 HC RX W HCPCS

## 2022-12-09 PROCEDURE — 2500000003 HC RX 250 WO HCPCS: Performed by: SURGERY

## 2022-12-09 PROCEDURE — 6360000002 HC RX W HCPCS: Performed by: SURGERY

## 2022-12-09 PROCEDURE — 3600000005 HC SURGERY LEVEL 5 BASE: Performed by: SURGERY

## 2022-12-09 PROCEDURE — 6370000000 HC RX 637 (ALT 250 FOR IP): Performed by: SURGERY

## 2022-12-09 PROCEDURE — 3600000015 HC SURGERY LEVEL 5 ADDTL 15MIN: Performed by: SURGERY

## 2022-12-09 PROCEDURE — 6370000000 HC RX 637 (ALT 250 FOR IP): Performed by: ANESTHESIOLOGY

## 2022-12-09 PROCEDURE — 2580000003 HC RX 258: Performed by: SURGERY

## 2022-12-09 PROCEDURE — 19316 MASTOPEXY: CPT | Performed by: SURGERY

## 2022-12-09 PROCEDURE — 19316 MASTOPEXY: CPT | Performed by: PHYSICIAN ASSISTANT

## 2022-12-09 PROCEDURE — 3700000001 HC ADD 15 MINUTES (ANESTHESIA): Performed by: SURGERY

## 2022-12-09 PROCEDURE — 15771 GRFG AUTOL FAT LIPO 50 CC/<: CPT | Performed by: SURGERY

## 2022-12-09 PROCEDURE — A4217 STERILE WATER/SALINE, 500 ML: HCPCS | Performed by: SURGERY

## 2022-12-09 PROCEDURE — 3700000000 HC ANESTHESIA ATTENDED CARE: Performed by: SURGERY

## 2022-12-09 PROCEDURE — 7100000000 HC PACU RECOVERY - FIRST 15 MIN: Performed by: SURGERY

## 2022-12-09 PROCEDURE — 2500000003 HC RX 250 WO HCPCS

## 2022-12-09 PROCEDURE — 7100000001 HC PACU RECOVERY - ADDTL 15 MIN: Performed by: SURGERY

## 2022-12-09 PROCEDURE — 2580000003 HC RX 258: Performed by: ANESTHESIOLOGY

## 2022-12-09 PROCEDURE — 7100000010 HC PHASE II RECOVERY - FIRST 15 MIN: Performed by: SURGERY

## 2022-12-09 PROCEDURE — 2709999900 HC NON-CHARGEABLE SUPPLY: Performed by: SURGERY

## 2022-12-09 RX ORDER — ACETAMINOPHEN 325 MG/1
975 TABLET ORAL ONCE
Status: COMPLETED | OUTPATIENT
Start: 2022-12-09 | End: 2022-12-09

## 2022-12-09 RX ORDER — FAMOTIDINE 10 MG/ML
20 INJECTION, SOLUTION INTRAVENOUS ONCE
OUTPATIENT
Start: 2022-12-14 | End: 2022-12-14

## 2022-12-09 RX ORDER — CELECOXIB 200 MG/1
200 CAPSULE ORAL ONCE
Status: COMPLETED | OUTPATIENT
Start: 2022-12-09 | End: 2022-12-09

## 2022-12-09 RX ORDER — SODIUM CHLORIDE 0.9 % (FLUSH) 0.9 %
5-40 SYRINGE (ML) INJECTION EVERY 12 HOURS SCHEDULED
Status: DISCONTINUED | OUTPATIENT
Start: 2022-12-09 | End: 2022-12-09 | Stop reason: HOSPADM

## 2022-12-09 RX ORDER — APREPITANT 40 MG/1
40 CAPSULE ORAL ONCE
Status: DISCONTINUED | OUTPATIENT
Start: 2022-12-09 | End: 2022-12-09 | Stop reason: HOSPADM

## 2022-12-09 RX ORDER — MIDAZOLAM HYDROCHLORIDE 2 MG/2ML
2 INJECTION, SOLUTION INTRAMUSCULAR; INTRAVENOUS
Status: COMPLETED | OUTPATIENT
Start: 2022-12-09 | End: 2022-12-09

## 2022-12-09 RX ORDER — FAMOTIDINE 20 MG/1
20 TABLET, FILM COATED ORAL ONCE
Status: COMPLETED | OUTPATIENT
Start: 2022-12-09 | End: 2022-12-09

## 2022-12-09 RX ORDER — EPINEPHRINE 1 MG/ML
0.3 INJECTION, SOLUTION, CONCENTRATE INTRAVENOUS PRN
OUTPATIENT
Start: 2022-12-14

## 2022-12-09 RX ORDER — SODIUM CHLORIDE 9 MG/ML
INJECTION, SOLUTION INTRAVENOUS PRN
Status: DISCONTINUED | OUTPATIENT
Start: 2022-12-09 | End: 2022-12-09 | Stop reason: HOSPADM

## 2022-12-09 RX ORDER — MEPERIDINE HYDROCHLORIDE 25 MG/ML
12.5 INJECTION INTRAMUSCULAR; INTRAVENOUS; SUBCUTANEOUS EVERY 5 MIN PRN
Status: DISCONTINUED | OUTPATIENT
Start: 2022-12-09 | End: 2022-12-09 | Stop reason: HOSPADM

## 2022-12-09 RX ORDER — HYDROCODONE BITARTRATE AND ACETAMINOPHEN 5; 325 MG/1; MG/1
1 TABLET ORAL EVERY 6 HOURS PRN
Qty: 12 TABLET | Refills: 0 | Status: SHIPPED | OUTPATIENT
Start: 2022-12-09 | End: 2022-12-12

## 2022-12-09 RX ORDER — LIDOCAINE HYDROCHLORIDE 10 MG/ML
INJECTION, SOLUTION EPIDURAL; INFILTRATION; INTRACAUDAL; PERINEURAL PRN
Status: DISCONTINUED | OUTPATIENT
Start: 2022-12-09 | End: 2022-12-09 | Stop reason: SDUPTHER

## 2022-12-09 RX ORDER — GABAPENTIN 300 MG/1
300 CAPSULE ORAL ONCE
Status: COMPLETED | OUTPATIENT
Start: 2022-12-09 | End: 2022-12-09

## 2022-12-09 RX ORDER — SODIUM CHLORIDE 0.9 % (FLUSH) 0.9 %
5-40 SYRINGE (ML) INJECTION PRN
Status: DISCONTINUED | OUTPATIENT
Start: 2022-12-09 | End: 2022-12-09 | Stop reason: HOSPADM

## 2022-12-09 RX ORDER — FENTANYL CITRATE 50 UG/ML
INJECTION, SOLUTION INTRAMUSCULAR; INTRAVENOUS PRN
Status: DISCONTINUED | OUTPATIENT
Start: 2022-12-09 | End: 2022-12-09 | Stop reason: SDUPTHER

## 2022-12-09 RX ORDER — SODIUM CHLORIDE 9 MG/ML
INJECTION, SOLUTION INTRAVENOUS CONTINUOUS
OUTPATIENT
Start: 2022-12-14

## 2022-12-09 RX ORDER — HYDROMORPHONE HYDROCHLORIDE 1 MG/ML
0.5 INJECTION, SOLUTION INTRAMUSCULAR; INTRAVENOUS; SUBCUTANEOUS EVERY 5 MIN PRN
Status: DISCONTINUED | OUTPATIENT
Start: 2022-12-09 | End: 2022-12-09 | Stop reason: HOSPADM

## 2022-12-09 RX ORDER — DIPHENHYDRAMINE HYDROCHLORIDE 50 MG/ML
50 INJECTION INTRAMUSCULAR; INTRAVENOUS ONCE
OUTPATIENT
Start: 2022-12-14 | End: 2022-12-14

## 2022-12-09 RX ORDER — SCOLOPAMINE TRANSDERMAL SYSTEM 1 MG/1
1 PATCH, EXTENDED RELEASE TRANSDERMAL
Status: DISCONTINUED | OUTPATIENT
Start: 2022-12-09 | End: 2022-12-09 | Stop reason: HOSPADM

## 2022-12-09 RX ORDER — LIDOCAINE HYDROCHLORIDE 10 MG/ML
1 INJECTION, SOLUTION EPIDURAL; INFILTRATION; INTRACAUDAL; PERINEURAL
Status: DISCONTINUED | OUTPATIENT
Start: 2022-12-09 | End: 2022-12-09 | Stop reason: HOSPADM

## 2022-12-09 RX ORDER — ONDANSETRON 2 MG/ML
INJECTION INTRAMUSCULAR; INTRAVENOUS PRN
Status: DISCONTINUED | OUTPATIENT
Start: 2022-12-09 | End: 2022-12-09 | Stop reason: SDUPTHER

## 2022-12-09 RX ORDER — METHYLPREDNISOLONE SODIUM SUCCINATE 125 MG/2ML
125 INJECTION, POWDER, LYOPHILIZED, FOR SOLUTION INTRAMUSCULAR; INTRAVENOUS ONCE
OUTPATIENT
Start: 2022-12-14 | End: 2022-12-14

## 2022-12-09 RX ORDER — LAMOTRIGINE 25 MG/1
250 TABLET ORAL ONCE
OUTPATIENT
Start: 2022-12-14 | End: 2022-12-14

## 2022-12-09 RX ORDER — FENTANYL CITRATE 50 UG/ML
50 INJECTION, SOLUTION INTRAMUSCULAR; INTRAVENOUS
Status: DISCONTINUED | OUTPATIENT
Start: 2022-12-09 | End: 2022-12-09 | Stop reason: HOSPADM

## 2022-12-09 RX ORDER — HYDROMORPHONE HYDROCHLORIDE 1 MG/ML
0.25 INJECTION, SOLUTION INTRAMUSCULAR; INTRAVENOUS; SUBCUTANEOUS EVERY 5 MIN PRN
Status: DISCONTINUED | OUTPATIENT
Start: 2022-12-09 | End: 2022-12-09 | Stop reason: HOSPADM

## 2022-12-09 RX ORDER — DEXAMETHASONE SODIUM PHOSPHATE 10 MG/ML
INJECTION, SOLUTION INTRAMUSCULAR; INTRAVENOUS PRN
Status: DISCONTINUED | OUTPATIENT
Start: 2022-12-09 | End: 2022-12-09 | Stop reason: SDUPTHER

## 2022-12-09 RX ORDER — SODIUM CHLORIDE 9 MG/ML
INJECTION, SOLUTION INTRAVENOUS CONTINUOUS
Status: DISCONTINUED | OUTPATIENT
Start: 2022-12-09 | End: 2022-12-09 | Stop reason: HOSPADM

## 2022-12-09 RX ORDER — PROPOFOL 10 MG/ML
INJECTION, EMULSION INTRAVENOUS PRN
Status: DISCONTINUED | OUTPATIENT
Start: 2022-12-09 | End: 2022-12-09 | Stop reason: SDUPTHER

## 2022-12-09 RX ORDER — METOCLOPRAMIDE HYDROCHLORIDE 5 MG/ML
10 INJECTION INTRAMUSCULAR; INTRAVENOUS
Status: DISCONTINUED | OUTPATIENT
Start: 2022-12-09 | End: 2022-12-09 | Stop reason: HOSPADM

## 2022-12-09 RX ORDER — FENTANYL CITRATE 50 UG/ML
25 INJECTION, SOLUTION INTRAMUSCULAR; INTRAVENOUS
Status: DISCONTINUED | OUTPATIENT
Start: 2022-12-09 | End: 2022-12-09 | Stop reason: HOSPADM

## 2022-12-09 RX ORDER — SODIUM CHLORIDE, SODIUM LACTATE, POTASSIUM CHLORIDE, CALCIUM CHLORIDE 600; 310; 30; 20 MG/100ML; MG/100ML; MG/100ML; MG/100ML
INJECTION, SOLUTION INTRAVENOUS CONTINUOUS
Status: DISCONTINUED | OUTPATIENT
Start: 2022-12-09 | End: 2022-12-09 | Stop reason: HOSPADM

## 2022-12-09 RX ORDER — DIPHENHYDRAMINE HYDROCHLORIDE 50 MG/ML
12.5 INJECTION INTRAMUSCULAR; INTRAVENOUS
Status: DISCONTINUED | OUTPATIENT
Start: 2022-12-09 | End: 2022-12-09 | Stop reason: HOSPADM

## 2022-12-09 RX ADMIN — ONDANSETRON 4 MG: 2 INJECTION INTRAMUSCULAR; INTRAVENOUS at 15:27

## 2022-12-09 RX ADMIN — PHENYLEPHRINE HYDROCHLORIDE 100 MCG: 10 INJECTION INTRAVENOUS at 15:44

## 2022-12-09 RX ADMIN — PROPOFOL 180 MCG/KG/MIN: 10 INJECTION, EMULSION INTRAVENOUS at 15:11

## 2022-12-09 RX ADMIN — GABAPENTIN 300 MG: 300 CAPSULE ORAL at 13:18

## 2022-12-09 RX ADMIN — PROPOFOL 150 MG: 10 INJECTION, EMULSION INTRAVENOUS at 15:10

## 2022-12-09 RX ADMIN — CELECOXIB 200 MG: 200 CAPSULE ORAL at 13:18

## 2022-12-09 RX ADMIN — SODIUM CHLORIDE, SODIUM LACTATE, POTASSIUM CHLORIDE, AND CALCIUM CHLORIDE: 600; 310; 30; 20 INJECTION, SOLUTION INTRAVENOUS at 14:05

## 2022-12-09 RX ADMIN — DEXAMETHASONE SODIUM PHOSPHATE 8 MG: 10 INJECTION, SOLUTION INTRAMUSCULAR; INTRAVENOUS at 15:29

## 2022-12-09 RX ADMIN — LIDOCAINE HYDROCHLORIDE 50 MG: 10 INJECTION, SOLUTION EPIDURAL; INFILTRATION; INTRACAUDAL; PERINEURAL at 15:10

## 2022-12-09 RX ADMIN — MIDAZOLAM HYDROCHLORIDE 2 MG: 1 INJECTION, SOLUTION INTRAMUSCULAR; INTRAVENOUS at 13:19

## 2022-12-09 RX ADMIN — FAMOTIDINE 20 MG: 20 TABLET ORAL at 13:18

## 2022-12-09 RX ADMIN — ACETAMINOPHEN 975 MG: 325 TABLET ORAL at 13:18

## 2022-12-09 RX ADMIN — FENTANYL CITRATE 50 MCG: 50 INJECTION, SOLUTION INTRAMUSCULAR; INTRAVENOUS at 15:10

## 2022-12-09 RX ADMIN — CEFAZOLIN 2000 MG: 2 INJECTION, POWDER, FOR SOLUTION INTRAMUSCULAR; INTRAVENOUS at 15:17

## 2022-12-09 RX ADMIN — HYDROMORPHONE HYDROCHLORIDE 0.5 MG: 1 INJECTION, SOLUTION INTRAMUSCULAR; INTRAVENOUS; SUBCUTANEOUS at 16:28

## 2022-12-09 ASSESSMENT — PAIN SCALES - GENERAL
PAINLEVEL_OUTOF10: 7
PAINLEVEL_OUTOF10: 4

## 2022-12-09 ASSESSMENT — PAIN DESCRIPTION - LOCATION: LOCATION: BREAST

## 2022-12-09 ASSESSMENT — PAIN DESCRIPTION - ORIENTATION: ORIENTATION: RIGHT;LEFT

## 2022-12-09 ASSESSMENT — PAIN - FUNCTIONAL ASSESSMENT: PAIN_FUNCTIONAL_ASSESSMENT: 0-10

## 2022-12-09 ASSESSMENT — LIFESTYLE VARIABLES: SMOKING_STATUS: 0

## 2022-12-09 ASSESSMENT — ENCOUNTER SYMPTOMS: SHORTNESS OF BREATH: 1

## 2022-12-09 NOTE — ANESTHESIA POSTPROCEDURE EVALUATION
Department of Anesthesiology  Postprocedure Note    Patient: Isaura Chawla  MRN: [id number]  YOB: 1965  Date of evaluation: 12/9/2022      Procedure Summary     Date: 12/09/22 Room / Location: [address]    Anesthesia Start: 8957 Anesthesia Stop: 2717    Procedure: CRESCENT LEFT MASTOPEXY, FAT GRAFTING RIGHT LATERAL NIPPLE & FAT GRAFTING INFERIOR LEFT MIDLINE (Bilateral: Breast) Diagnosis:       Disproportion of reconstructed breast      Malignant neoplasm of female breast, unspecified estrogen receptor status, unspecified laterality, unspecified site of breast ([address]      (Disproportion of reconstructed breast [N65.1])      (Malignant neoplasm of female breast, unspecified estrogen receptor status, unspecified laterality, unspecified site of breast ([address] Luke Marquez)    Surgeons: Tabby Randle MD Responsible Provider: BRITANY Clement CRNA    Anesthesia Type: MAC, TIVA ASA Status: 3          Anesthesia Type: No value filed.     Dena Phase I: Dena Score: 10    Dena Phase II:        Anesthesia Post Evaluation    Patient location during evaluation: PACU  Patient participation: waiting for patient participation  Level of consciousness: obtunded/minimal responses  Pain score: 0  Airway patency: patent  Nausea & Vomiting: no vomiting and no nausea  Complications: no  Cardiovascular status: hemodynamically stable  Respiratory status: oral airway, acceptable and room air  Hydration status: stable

## 2022-12-09 NOTE — DISCHARGE INSTRUCTIONS
PLEASE WEAR YOUR SURGI BRA OR COMFORTABLE BRA UNTIL FOLLOW UP APPOINTMENT  MONITOR INCISION SITE FOR REDNESS, SWELLING, PUS-LIKE DRAINAGE, RED STREAKS LEADING AWAY FROM INCISION SITE OR PUNCTURE SITES, OR FEVER GREATER THAN 100.4 AND CALL WITH ANY PROBLEMS TO DR. Marcelo Zuniga OFFICE    MAY SHOWER ON Sunday    1. Shower or bathe as desired ON Sunday Pat incision dry do not rub. 2.If small area of wound seperates or becomes red and inflamed, call the office and make an appointment. 3.Pain medication may be constipating, you may take laxative of choice. 4.Call the office if you have any further questionsduring the nurse's hours, Monday-Friday, 9:00 to 4:00. In case of emergency after hours the answering service will take your call. [phone]  5. Call one hour before your appointment time to see if appointments are running on schedule. Call the office for you pathology report 3 working days after your procedure.

## 2022-12-09 NOTE — BRIEF OP NOTE
Brief Postoperative Note      DATE OF PROCEDURE: 12/9/2022     SURGEON: Sharri rOtiz MD    PREOPERATIVE DIAGNOSIS:  Disproportion of reconstructed breast [N65.1]  Malignant neoplasm of female breast, unspecified estrogen receptor status, unspecified laterality, unspecified site of breast ([facility] [C50.919]    POSTOPERATIVE DIAGNOSIS: Same     OPERATION: Procedure(s):  CRESCENT LEFT MASTOPEXY, FAT GRAFTING RIGHT LATERAL NIPPLE & FAT GRAFTING INFERIOR LEFT MIDLINE    ANESTHESIA: General    ESTIMATED BLOOD LOSS: Minimal    COMPLICATIONS: None. SPECIMENS: * No specimens in log *    DRAINS: None    The patient tolerated the procedure well.     Electronically signed by Sharri Ortiz MD  on 12/9/2022 at 4:10 PM

## 2022-12-09 NOTE — PROGRESS NOTES
Lab Results   Component Value Date    WBC 8.57 11/16/2022    HGB 13.2 11/16/2022    HCT 41.0 11/16/2022    MCV 91.9 11/16/2022     11/16/2022     Lab Results   Component Value Date    NEUTROABS 5.72 11/16/2022     Lab Results   Component Value Date     10/18/2022    K 4.0 10/18/2022     10/18/2022    CO2 24 10/18/2022    BUN 12 10/18/2022    CREATININE 0.6 10/18/2022    GLUCOSE 89 10/18/2022    CALCIUM 9.4 10/18/2022    PROT 8.4 (H) 10/18/2022    LABALBU 4.9 10/18/2022    BILITOT 1.4 (H) 10/18/2022    ALKPHOS 112 (H) 10/18/2022    AST 24 10/18/2022    ALT 20 10/18/2022    LABGLOM >60 10/18/2022    GFRAA >59 05/25/2022    AGRATIO 1.6 04/01/2021    GLOB 3.5 10/18/2022

## 2022-12-09 NOTE — H&P
HISTORY OF PRESENT ILLNESS:     Ms. Clement Tse is a 56 y.o. black female who presents today for a 6-month breast exam. This is followed by a left mastectomy reconstruction due to lateral drift on 9/17/2021. 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 It was ER/MO positive and she underwent neoadjuvant chemotherapy with AC followed by Taxol. In 2007 she underwent a right lumpectomy with axillary node dissection and received adjuvant radiation therapy to the breast and axilla. She did not take antihormonal therapy due to cost.     She recurred in 2013 and underwent 2 cycles of Taxotere followed by Doxil and then underwent right mastectomy and axillary node dissection. This was followed by completion of radiation therapy to her chest wall and axillary nodes. In 2016 she underwent implant exchange and a prophylactic mastectomy on the left. She was also switched to aromatase inhibitor. She subsequent developed metastatic disease with lung, hilar, and axillary metastases and has been on Abraxane, Gemzar, and is now doing monthly Faslodex with palbociclib     She currently has [place] implants. 590 cc on the right. She has an 800 cc implant on the left. She is now status post revision of left mastectomy reconstruction due to lateral drift on 9/17/2021. She had placement of a 700 cc Natrelle soft touch implant and a crescent mastopexy on the left. She also had liposuction of an excessive fat pad adjacent to her reconstruction.     Eulalio Donovan is a 56 y.o. female with the following history as recorded in [facility]:  Patient Active Problem List    Diagnosis Date Noted    Chemotherapy-induced neuropathy ([facility] 02/07/2022    Wheezing 01/11/2022    Snoring 01/11/2022    RUDY (obstructive sleep apnea) 01/11/2022    Non-restorative sleep 01/11/2022    Non-cardiac chest pain 11/19/2021    Chest wall ulcer, with fat layer exposed ([address] 10/15/2021    Port or reservoir infection 10/06/2021    Central line infection 10/05/2021    Exostosis of left foot 09/29/2021    Arm DVT (deep venous thromboembolism), acute, left ([facility] 09/24/2021    S/P revision of left breast reconstruction 9/17/2021 09/23/2021    Status post bilateral breast reconstruction     Acute purulent bronchitis 09/02/2021    Lung nodule 09/02/2021    Status post bilateral mastectomy 08/06/2021    Other cyst of bone, left ankle and foot 04/10/2021    Dyspnea and respiratory abnormalities 04/09/2021    Chronic pain syndrome 04/09/2021    Right wrist pain 04/09/2021    Rheumatoid arthritis involving both knees with negative rheumatoid factor ([facility] 04/09/2021    Menopausal symptom 04/08/2021    Benign neoplasm of body of uterus 04/08/2021    Iron deficiency anemia 01/22/2021    Carcinoma of female breast ([facility] 01/21/2021    Poor venous access 01/21/2021     Current Facility-Administered Medications   Medication Dose Route Frequency Provider Last Rate Last Admin    celecoxib (CELEBREX) capsule 200 mg  200 mg Oral Once Janie Doty MD        acetaminophen (TYLENOL) tablet 975 mg  975 mg Oral Once Janie Doty MD        gabapentin (NEURONTIN) capsule 300 mg  300 mg Oral Once Janie Doty MD        lactated ringers infusion   IntraVENous Continuous Christiano Duron DO         Allergies: Clindamycin/lincomycin  Past Medical History:   Diagnosis Date    Breast cancer ([facility]     Breast cancer with lung mets     Chronic back pain     GERD (gastroesophageal reflux disease)     Hx of blood clots     Hypertension     Neuropathy     Post chemo treatment neuropathy     Past Surgical History:   Procedure Laterality Date    BREAST LUMPECTOMY Right     2006    BREAST RECONSTRUCTION Left 09/17/2021    Revision left reconstructed breast, implant performed by Janie Doty MD at [address]  2016    Bilateral breast implants    EMBOLECTOMY Left 9/26/2021    LEFT UPPER EXTREMITY  MECHANICAL SUCTION THROMBECTOMY performed by Neymar Macias DO at [address] [facility] (CERVIX STATUS UNKNOWN)      HYSTERECTOMY, TOTAL ABDOMINAL (CERVIX REMOVED)  2015    MASTECTOMY, BILATERAL      Right 2013 & Left 2016    SCAR REVISION Left 5/10/2022    SCAR REVISION LEFT PORT SITE performed by Onelia See MD at [place] [address]      VASCULAR SURGERY N/A 10/6/2021    REMOVAL OF MEDIPORT performed by Neymar Macias DO at [facility] OR     Family History   Problem Relation Age of Onset    Hypertension Mother     Obesity Mother     Prostate Cancer Father     No Known Problems Sister     No Known Problems Daughter     No Known Problems Daughter      Social History     Tobacco Use    Smoking status: Never    Smokeless tobacco: Never   Substance Use Topics    Alcohol use: Not Currently       ROS:  14 point review of systems is negative except for the above. PHYSICAL EXAM:    The patient is a 56 y.o. female  in no acute distress. She is alert oriented and cooperative. Mood and affect are appropriate. Skin is warm and dry without rashes. BP (!) 140/76   Pulse 86   Temp 99 °F (37.2 °C) (Tympanic)   Resp 16   Ht 5' 6\" (1.676 m)   Wt 178 lb (80.7 kg)   SpO2 100%   BMI 28.73 kg/m²       HEENT: Normocephalic and atraumatic. EOMs intact. Pupils equal and round and reactive to light and accommodation. External ears and nose are normal.  Sclera nonicteric. Conjunctiva normal  Oropharynx without masses or lesions. Neck: Neck is supple without masses or thyromegaly    Chest: Lungs are clear to auscultation. Respiratory effort normal    Cardiac: Regular rate and rhythm without rubs, murmurs, or gallops    Breasts: The  wounds look good with no evidence of infection, fluid accumulation, or skin necrosis. She still has a small amount of asymmetry with the left nipple being about a centimeter lower than the right.   It is significantly better than previously but it does bother her     Some fat grafting laterally on the right will change the direction of her nipple somewhat. She also has this the defect inferiorly on the left that I think some fat grafting will help lastly she has not really moved the left nipple very much so another centimeter lift should help with that. Abdomen: The abdomen is soft and nontender with no hepatosplenomegaly. There are no abdominal hernias noted. Extremities: The extremities are normal. There are no signs of clubbing, cyanosis, or edema. IMPRESSION:     Doing well status post revision of left reconstructed breast  Deformity bilateral breast status post reconstructions     PLAN:   I have seen, examined and reviewed this patient medication list, appropriate labs and imaging studies. I reviewed relevant medical records and others physicians notes. I discussed the plans of care with the patient. I answered all the questions to the patients satisfaction. I, Dr Kiley Welch, personally performed the services described in this documentation as scribed by Caroline Crisostomo MA in my presence and is both accurate and complete. (Please note that portions of this note were completed with a voice recognition program. Efforts were made to edit the dictations but occasionally words are mis-transcribed.)  Over 50% of the total visit time of 25 minutes in face to face encounter with the patient, out of which more than 50% of the time was spent in counseling patient or family and coordination of care. Counseling included but was not limited to time spent reviewing labs, imaging studies/ treatment plan and answering questions.

## 2022-12-09 NOTE — ANESTHESIA PRE PROCEDURE
Department of Anesthesiology  Preprocedure Note       Name:  Caroline Fry   Age:  56 y.o.  :  1965                                          MRN:  [id number]         Date:  2022      Surgeon: Mairsol Whalen):  Christina Tobar MD    Procedure: Procedure(s):  CRESCENT LEFT MASTOPEXY, FAT GRAFTING RIGHT LATERAL NIPPLE & FAT GRAFTING INFERIOR LEFT MIDLINE    Medications prior to admission:   Prior to Admission medications    Medication Sig Start Date End Date Taking? Authorizing Provider   ELIQUIS 5 MG TABS tablet TAKE 1 TABLET BY MOUTH TWICE A DAY  Patient taking differently: Take 5 mg by mouth 2 times daily 11/15/22   BRITANY Mcmahon   mupirocin (BACTROBAN) 2 % ointment Applied to each nostril twice daily for 5 days prior to surgery  Patient taking differently: Apply 1 each topically 2 times daily Applied to each nostril twice daily for 5 days prior to surgery 22   Shelby Timmons PA-C   cyclobenzaprine (FLEXERIL) 10 MG tablet Take 10 mg by mouth 3 times daily as needed 22   Yakov Favors   meloxicam (MOBIC) 15 MG tablet TAKE 1 TABLET EVERY DAY AS NEEDED  Patient not taking: No sig reported 22   Yakov Favors   ketorolac (TORADOL) 10 MG tablet Take 1 tablet by mouth every 6 hours as needed for Pain 22   AUSTYN Everett   IBRANCE 125 MG tablet Take 1 tablet (125mg) by mouth once daily for 21 days, then 7 days off. Patient taking differently: Take 125 mg by mouth daily 22   Doreen Philip MD   gabapentin (NEURONTIN) 300 MG capsule Take 2 capsules by mouth 3 times daily for 30 days.  5/26/22 10/26/22  Doreen Philip MD   Cholecalciferol (VITAMIN D3) 1.25 MG (40569 UT) TABS Take 1 tablet by mouth once a week 22   Doreen Philip MD   mupirocin [facility] NASAL) 2 % nasal ointment Apply to each nostril BID 5 days prior to surgery 22   Christina Tobar MD   guaiFENesin 1200 MG TB12 Take 1 tablet by mouth 2 times daily 22   BRITANY Ortega sodium hypochlorite (DAKINS) 0.125 % SOLN external solution Apply topically 2 times daily  Patient not taking: No sig reported 10/12/21   BRITANY Bassett   VENTOLIN  (90 Base) MCG/ACT inhaler Inhale 2 puffs into the lungs 4 times daily as needed for Wheezing 9/2/21   Ashley Morrison MD   pantoprazole (PROTONIX) 40 MG tablet TAKE 1 TABLET BY MOUTH EVERY DAY  Patient taking differently: Take 40 mg by mouth daily 8/12/21   Marcos Rose MD   [facility] 4 MG/0.1ML LIQD nasal spray 1 spray as needed 5/20/21   Historical Provider, MD   guaiFENesin-codeine (GUAIFENESIN AC) 100-10 MG/5ML liquid Take 5 mLs by mouth 3 times daily as needed for Cough. Historical Provider, MD   lisinopril (PRINIVIL;ZESTRIL) 10 MG tablet Take 10 mg by mouth daily    Historical Provider, MD   acyclovir (ZOVIRAX) 800 MG tablet Take 800 mg by mouth 2 times daily as needed     Historical Provider, MD   ondansetron (ZOFRAN) 4 MG tablet Take 4 mg by mouth every 8 hours as needed for Nausea or Vomiting    Historical Provider, MD   alclomethasone (ACLOVATE) 0.05 % cream Apply 1 Units topically 2 times daily as needed Apply topically 2 times daily. Historical Provider, MD       Current medications:    No current facility-administered medications for this visit. No current outpatient medications on file. Facility-Administered Medications Ordered in Other Visits   Medication Dose Route Frequency Provider Last Rate Last Admin    celecoxib (CELEBREX) capsule 200 mg  200 mg Oral Once Noel Kennedy MD        acetaminophen (TYLENOL) tablet 975 mg  975 mg Oral Once Noel Kennedy MD        gabapentin (NEURONTIN) capsule 300 mg  300 mg Oral Once Noel Kennedy MD        lactated ringers infusion   IntraVENous Continuous Suzy Walker DO        ceFAZolin (ANCEF) 2,000 mg in sterile water 20 mL IV syringe  2,000 mg IntraVENous On Call to [address] [place], MD           Allergies:     Allergies   Allergen Reactions    Clindamycin/Lincomycin Hives, Itching and Rash       Problem List:    Patient Active Problem List   Diagnosis Code    Carcinoma of female breast ([facility] C50.919    Poor venous access I87.8    Iron deficiency anemia D50.9    Menopausal symptom N95.1    Benign neoplasm of body of uterus D26.1    Dyspnea and respiratory abnormalities R06.00, R06.89    Chronic pain syndrome G89.4    Right wrist pain M25.531    Rheumatoid arthritis involving both knees with negative rheumatoid factor ([facility] M06.061, S96.979    Other cyst of bone, left ankle and foot M85.672    Status post bilateral mastectomy Z90.13    Acute purulent bronchitis J20.9    Lung nodule R91.1    Status post bilateral breast reconstruction Z98.890    S/P revision of left breast reconstruction 9/17/2021 Z98.890    Arm DVT (deep venous thromboembolism), acute, left (HCC) I82.622    Exostosis of left foot M89.8X7    Central line infection T80.219A   [facility] or reservoir infection T80.212A    Chest wall ulcer, with fat layer exposed ([facility] L98.492    Non-cardiac chest pain R07.89    Wheezing R06.2    Snoring R06.83    RUDY (obstructive sleep apnea) G47.33    Non-restorative sleep G47.8    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, T45.1X5A       Past Medical History:        Diagnosis Date    Breast cancer ([facility]     Breast cancer with lung mets     Chronic back pain     GERD (gastroesophageal reflux disease)     Hx of blood clots     Hypertension     Neuropathy     Post chemo treatment neuropathy       Past Surgical History:        Procedure Laterality Date    BREAST LUMPECTOMY Right     2006    BREAST RECONSTRUCTION Left 09/17/2021    Revision left reconstructed breast, implant performed by Cristal Perry MD at [address]  2016    Bilateral breast implants    EMBOLECTOMY Left 9/26/2021    LEFT UPPER EXTREMITY  MECHANICAL SUCTION THROMBECTOMY performed by Jorge Rothman DO at [address] [facility] HYSTERECTOMY (CERVIX STATUS UNKNOWN)      HYSTERECTOMY, TOTAL ABDOMINAL (CERVIX REMOVED)  2015    MASTECTOMY, BILATERAL      Right 2013 & Left 2016    SCAR REVISION Left 5/10/2022    SCAR REVISION LEFT PORT SITE performed by Tabby Randle MD at [address] [facility] TUNNELED VENOUS PORT PLACEMENT      VASCULAR SURGERY N/A 10/6/2021    REMOVAL OF MEDIPORT performed by Reji Syed DO at [address] History:    Social History     Tobacco Use    Smoking status: Never    Smokeless tobacco: Never   Substance Use Topics    Alcohol use: Not Currently                                Counseling given: Not Answered      Vital Signs (Current): There were no vitals filed for this visit.                                            BP Readings from Last 3 Encounters:   12/09/22 (!) 140/76   10/31/22 124/70   10/26/22 124/84       NPO Status:                                                                                 BMI:   Wt Readings from Last 3 Encounters:   12/09/22 178 lb (80.7 kg)   12/05/22 178 lb (80.7 kg)   10/26/22 179 lb (81.2 kg)     There is no height or weight on file to calculate BMI.    CBC:   Lab Results   Component Value Date/Time    WBC 8.57 11/16/2022 10:15 AM    RBC 4.46 11/16/2022 10:15 AM    HGB 13.2 11/16/2022 10:15 AM    HCT 41.0 11/16/2022 10:15 AM    MCV 91.9 11/16/2022 10:15 AM    RDW 13.2 11/16/2022 10:15 AM     11/16/2022 10:15 AM       CMP:   Lab Results   Component Value Date/Time     10/18/2022 12:42 PM    K 4.0 10/18/2022 12:42 PM    K 4.1 10/08/2021 02:50 PM     10/18/2022 12:42 PM    CO2 24 10/18/2022 12:42 PM    BUN 12 10/18/2022 12:42 PM    CREATININE 0.6 10/18/2022 12:42 PM    GFRAA >59 05/25/2022 02:02 PM    AGRATIO 1.6 04/01/2021 03:33 PM    LABGLOM >60 10/18/2022 12:42 PM    GLUCOSE 89 10/18/2022 12:42 PM    PROT 8.4 10/18/2022 12:42 PM    CALCIUM 9.4 10/18/2022 12:42 PM    BILITOT 1.4 10/18/2022 12:42 PM    ALKPHOS 112 10/18/2022 12:42 PM    AST 24 10/18/2022 12:42 PM    ALT 20 10/18/2022 12:42 PM       POC Tests: No results for input(s): POCGLU, POCNA, POCK, POCCL, POCBUN, POCHEMO, POCHCT in the last 72 hours. Coags:   Lab Results   Component Value Date/Time    PROTIME 15.5 11/19/2021 12:06 PM    INR 1.22 11/19/2021 12:06 PM    APTT 34.3 11/19/2021 12:06 PM       HCG (If Applicable): No results found for: PREGTESTUR, PREGSERUM, HCG, HCGQUANT     ABGs: No results found for: PHART, PO2ART, TVA4LYG, PJE8EOX, BEART, S3OABZRC     Type & Screen (If Applicable):  No results found for: LABABO, LABRH    Drug/Infectious Status (If Applicable):  No results found for: HIV, HEPCAB    COVID-19 Screening (If Applicable):   Lab Results   Component Value Date/Time    COVID19 Not Detected 11/19/2021 06:20 PM    COVID19 Not Detected 09/13/2021 10:11 AM           Anesthesia Evaluation  Patient summary reviewed and Nursing notes reviewed no history of anesthetic complications:   Airway: Mallampati: II  TM distance: >3 FB   Neck ROM: full  Mouth opening: > = 3 FB   Dental: normal exam         Pulmonary:normal exam  breath sounds clear to auscultation  (+) shortness of breath:      (-) not a current smoker          Patient did not smoke on day of surgery. Cardiovascular:  Exercise tolerance: good (>4 METS),   (+) hypertension:,     (-) CAD,  angina and  CHF    NYHA Classification: I  ECG reviewed  Rhythm: regular  Rate: normal           Beta Blocker:  Not on Beta Blocker         Neuro/Psych:   Negative Neuro/Psych ROS     (-) seizures, CVA and depression/anxiety            GI/Hepatic/Renal: Neg GI/Hepatic/Renal ROS       (-) hiatal hernia and GERD       Endo/Other:    (+) blood dyscrasia (elequis stopped 5 days ago): anemia and anticoagulation therapy, arthritis: rheumatoid. , malignancy/cancer. Pt had PAT visit. Abdominal:       Abdomen: soft. Vascular:   + DVT (chronic DVT in left upper arm. Elequis use.), .        Other Findings: Anesthesia Plan      MAC and TIVA     ASA 3     (Scop, emend, and pepcid in preop. Versed in preop.)  Induction: intravenous. BIS  MIPS: Postoperative opioids intended and Prophylactic antiemetics administered. Anesthetic plan and risks discussed with patient. Use of blood products discussed with patient whom. Plan discussed with CRNA.     Attending anesthesiologist reviewed and agrees with Pre Eval content                Alirio Jimenez DO   12/9/2022

## 2022-12-10 NOTE — OP NOTE
[facility] [address] [facility]                                OPERATIVE REPORT    PATIENT NAME: Selvin Jaffe                    :        1965  MED REC NO:   [id number]                              ROOM:  ACCOUNT NO:   [de-identified]                           ADMIT DATE: 2022  PROVIDER:     Devin Clifford MD    DATE OF PROCEDURE:  2022    PREOPERATIVE DIAGNOSIS:  Deformities of bilateral breasts post  mastectomies and reconstruction. POSTOPERATIVE DIAGNOSIS:  Deformities of bilateral breasts post  mastectomies and reconstruction. PROCEDURE PERFORMED:  Left crescent mastopexy and fat grafting less than  50 mL. SURGEON:  Devin Clifford MD    ASSISTANT:  Jerome Henderson PA-C    ANESTHESIA:  MAC.    INDICATIONS:  The patient is a 79-year-old lady who is status post  extensive breast surgery for cancer. She has metastatic disease but  currently is stable on treatment. She has deformities of her breasts. There is asymmetry of the nipples as well as a scarred area in the  inferior aspect of the left breast and the right reconstructed nipple is  aiming laterally. We discussed the risks and benefits of elevating the  left nipple as well as fat grafting the scar beneath that and fat  grafting the right reconstructed nipple to get some symmetry. She  understands and is agreeable. OPERATIVE PROCEDURE:  She was marked preoperatively. She was brought to  the operating room where she was adequately sedated and then we injected  tumescent saline in the upper abdominal wall for harvesting a fat. We  made a small skin nick and aspirated about 35 mL of fat, which we  cleaned appropriately. While that was being done, we did a crescent  mastopexy on the left. We made our incisions for the skin,  de-epithelialized it and then reapproximated the edges with running 4-0  Stratafix. It went quite nicely.   We then made a skin nick at the  inferior aspect of her left breast, a vertical limb incision and  inserted the tenolysis fork and freed up the scar tissue underneath the  vertical scar. We then injected approximately 18 mL of cleaned washed  fat in this area, which filled that in very nicely. We then went to the  right side and as I said, her reconstructed nipple was deviated  laterally and again, we made a skin nick and injected about 6 mL of fat,  which seemed to move it much more straightforward and much more  symmetric with the other side. We then utilized Dermabond for dressing. We injected a total of about 30 mL of washed fat. Estimated blood loss,  minimal.  Complications, none. She tolerated the procedure well.         Julian Solorio MD    D: 12/09/2022 18:02:19      T: 12/10/2022 3:23:03     [email or web address]  Job#: [id number]     Doc#: [id number]    CC:

## 2022-12-12 ENCOUNTER — TELEPHONE (OUTPATIENT)
Dept: HEMATOLOGY | Age: 57
End: 2022-12-12

## 2022-12-12 NOTE — PROGRESS NOTES
MEDICAL ONCOLOGY PROGRESS NOTE    Pt Name: Cristine Simpson  MRN: [id number]  YOB: 1965  Date of evaluation: 12/13/2022    HISTORY OF PRESENT ILLNESS:    Reason for MD visit: Disease/toxicity management  Jasmyne Alva is here today for monitoring for breast cancer and toxicity assessment. She is currently on palliative treatment with Faslodex/Ibrance initiated in January 2017. She has not been taking her Ibrance for the last 6 months. She has been compliant with her Faslodex. She denies any new complaints.     Diagnosis  Grade III Adenocarcinoma of right breast diagnosed 2006  Stage IIIA, [id number]  ER/NV Positive, HER-2 bruce/ihc 1+  April 2013-RECURRENCE in the right axillary region  Genetic testing- BRCA 1&2 Negative  2014 (?) RECURRENCE in the axillary skin  01/05/2017- METASTATIC DISEASE with lung, hilar, and axillary lymph node mets  Osteopenia  Mediport associated DVT, October 2021  DVT left upper extremity, November 2021    Treatment Summary  2006- Neoadjuvant chemotherapy with AC x 4 cycles followed by Taxol  2007- Lumpectomy with axillary lymph node dissection  2007- Adjuvant radiation therapy to whole breast and axillary region  Did not take tamoxifen due to cost  April 2013- RECURRENCE  Neoadjuvant chemotherapy with 2 cycles of Taxotere followed by Doxil  10/14/2013- Right Breast Mastectomy and Axillary Dissection  01/23/2014- Completion of adjuvant RT to chest wall and axillary lymph node with Dr. Mateo Mac  02/14- Initiated adjuvant endocrine therapy with tamoxifen  2014 (?) RECURRENCE in the axillary skin  2014 (?) Surgical resection of the axillary skin  2015 (?) YVETTE/BSO  09/19/2016- Bilateral exchange, nipple reconstruction and fat grafting and removal of PAC   2016- Switched to aromatase inhibitor  01/05/2017- METASTATIC DISEASE with lung, hilar, and axillary mets  01/11/2017-06/17/2018- Single agent Abraxane x 13 cycles  06/27/2018-09/17/2018- Gemzar  10/10/2018-Faslodex every 4 YVETTE/BSO  09/19/2016- Bilateral exchange, nipple reconstruction and fat grafting and removal of PAC  2016- Switched to aromatase inhibitor patient was non compliant  01/05/2017- METASTATIC DISEASE Presented with respiratory failure. CTA Pulmonary showed numerous nodules and masses throughout both lung fields, compatible with metastatic disease. Bilateral hilar adenopathy seen. 01/11/2017-06/17/2018- Single agent Abraxane x 13 cycles  03/15/2017- CT Chest W Contrast Interval decrease in maximal diameter of essentially all the multiple metastatic pulmonary nodules. The average difference is approximately 25%. Some of the much smaller ones have disappeared. Bilateral hilar and aortopulmonary window adenopathy is also similarly smaller. 06/07/2017- CT Chest W Contrast Multiple lung nodules seen bilaterally. Most of the lung nodules show interval improvement with decreased size by 1 to 3mm. Mediastinal and bilateral hilar adenopathy seen with improvement. 07/24/2017- PET/CT scan showed marked improvement, is minimal abnormal, has excellent response to therapy  02/01/2018- PET/CT scan Numerous bilateral lung masses and nodules, the majority of which do not have appreciable abnormal FDG uptake. The largest mass in the left infrahilar region of the left lower lobe demonstrates a maximum SUV of 3.3. Mediastinal lymphadenopathy. No evidence of metastatic disease in the abdomen or pelvis.   06/21/2018- CT Chest/Abdomen/Pelvis Multiple lung mets, mild bilateral hilar and mediastinal lymph nodes. No masses or evidence of metastatic disease in the abdomen or pelvis  06/27/2018-09/17/2018- Gemzar  09/28/2018- CT Pulmonary Multiple lung mets, mild bilateral hilar and mediastinal lymph nodes  10/10/2018-12/21/2020- Faslodex every 4 weeks  12/14/2018- CT Chest showed definite decrease in the maximal diameter and volume of all left and right metastatic nodules.  Somewhat enlarged paratracheal and left axillary lymph nodes are relatively unchanged. 04/22/2019- MRI Cervical Spine W WO Contrast Unremarkable  04/22/2019- MRI Brain W WO Contrast No enhancing lesions in the brain and no evidence of metastatic disease. 07/27/2019- CTA Pulmonary showed essentially complete disappearance of the pulmonary metastatic disease. Several tiny flat peripheral nodules are the only sequela. The tiny ones on the right are unchanged, the tiny ones on the left are even smaller. There is no longer any active metastatic disease in either lung. 12/19/2019- CT Chest/Abdomen/Pelvis Small, tiny subpleural nodules right upper and right midlung field as well as left lung base has remained unchanged. No new focal parenchymal abnormality seen. No adenopathy seen. There is no abdominal or pelvic mass, adenopathy or fluid collection. No lytic or sclerotic bony lesions, but there is a possibility of osteopenia and/or osteoporosis. 02/17/2020- DEXA Bone Density showed osteopenia of lumbar spine, T-score -1.8, and left femoral neck, T-score -2.0  2/25/21 Ct Abdomen Pelvis W Iv Contrast  No evidence of metastatic disease in the abdomen or pelvis. 2/26/21 Ct Chest W Contrast Tiny nodules in the right lung described above probably represent small foci of pleural thickening due to chronic inflammatory process or small noncalcified granulomas. No features to suggest malignancy or metastatic disease. Bilateral breast implants without complication. 3/1/2021-discussed the results CT chest abdomen pelvis. Essentially no clear evidence of metastatic disease. This is consistent with excellent response to treatment. Continue current treatment. 4/1/2021 = 31.6 CA 27-29= 33.5 CEA= 1.6   6/3/21 CA 15-3= 23.3 CA 27-29= 25.6  CEA= 1.8   7/29/2021 CEA=1.7 CA 15-3=21.50 CA 27.29= 26.0  8/19/2021 CT Chest  Left lower lobe pleural-based nodular 1.7 x 0.9 cm is indeterminate. PET/CT recommended.  Feeding defect in the left lower lobe bronchus versus a postobstructive airspace disease. This too may be further characterized with PET CT versus direct visualization. 8/19/2021 CT Abd/Pelvis A stable CT scan of the abdomen and pelvis. No finding to suggest neoplastic/metastatic disease. 9/28/2021 Final Diagnosis: Arm, excision of left arm thrombus:Fragments of organizing and organized thrombi with dystrophic calcification. Received is a container labeled \"ischemic, left arm\" Received in a fixative is a 3.6 x 2.6 x 1.4 cm aggregate of brown-tan soft tissue fragments and skin. Representative sections are submitted in a single cassette, numerous. October 2021-she developed left upper extremity DVT associated support. Port removed. She also had infection of the port site status post completion to biotics. 11/19/21 CTA pulmonary: No evidence of pulmonary embolism. The lungs are poorly expanded but unremarkable. 12/8/21 MRI lumbar spine: No significant change since the previous study. Chronic degenerative changes. A persistent mild degenerative retrolisthesis is of L5 over S1. Prominent disc osteophyte complexes and facetal arthropathy and resultant mild spinal stenosis at L4-5 and neural foraminal stenosis at L4-5 and L5-S1 as detailed above. 1/6/22 CT CHEST WO CONTRAST Stable chest CT with an unchanged tiny subpleural nodule within the right upper lobe. 1/19/22 CA 15-3 24 CA 27-29 31.8 CEA 1.5  2/25/2022 CT Abd/Pelvis W IV Contrast(Oral) Gastric wall thickening is nonspecific. It could be an artifact of under distention. Gastritis and other etiologies are not ruled out. The unenhanced solid organs demonstrate no focal abnormality. Diverticulosis of the colon. Under distention of portions of the colon. Prior hysterectomy. Mild sclerotic changes in the subarticular femoral heads bilaterally is stable. Early AVN is possible. MRI would be more definitive. The signal abnormality is greater on the right side.   5/10/2022 Skin, revision of left chest scar: Cicatrix identified with foreign body type response, negative for evidence of malignancy. 5/25/2022 Tumor Markers: CEA 1.9, CA 15-3-15, CA 27.29-18.2  7/18/2022 CT Chest W Contrast Bibasilar subsegmental atelectasis,table   and unchanged. Prominent osteophyte formation out in the lower thoracic spine with associated subsegmental atelectasis. No pulmonary nodules, masses, pleural effusion or pneumothorax. Bilateral breast implants. Stable interval appearance since recent study 7/12/22 7/18/2022 CT Abd/Pelvis W IV Contrast-.scattered colon diverticulosis. No acute diverticulitis. No acute abdominopelvic abnormality . 7/25/2022-no imaging evidence of disease recurrence. Continue treatment with Faslodex and Ibrance. 7/25/2022 VL Extremity Venous Left  There is chronic deep vein thrombosis (DVT) of the left axillary vein and  superficial thrombophlebitis (SVT) in the left basilic vein  3/24/5854 Tumor Markers: CEA 1.9, CA 15-3-14, CA 27.29-17.6  9/30/22 CT Head WO Contrast No acute hemorrhage  10/18/22 Tumor Markers: CEA 1.8, CA 15-3-16, CA 27.29-21.1  11/3/22 MRI IAC Posterior Fossa W Contrast No acute intracranial abnormality. Unremarkable appearance of the internal auditory canals. Mild to moderate cerebral white matter disease, which is nonspecific and may represent chronic microvascular ischemic change, demyelination, postinfectious process such as Lyme disease or vasculitis.       CA 27.29 Dynamics  01/21/2021- 20.3  04/01/2021 33.5  06/03/2021 25.6  07/29/2021 26.0  1/19/22 31.8  8/23/22-17.6  10/18/22-21.1    Past Medical History:    Past Medical History:   Diagnosis Date    Breast cancer ([address]     Breast cancer with lung mets     Chronic back pain     GERD (gastroesophageal reflux disease)     Hx of blood clots     Hypertension     Neuropathy     Post chemo treatment neuropathy       Past Surgical History:    Past Surgical History:   Procedure Laterality Date    BREAST LUMPECTOMY Right     2006    BREAST RECONSTRUCTION Left 09/17/2021 Revision left reconstructed breast, implant performed by Katie Lopez MD at [address] Bilateral 12/9/2022    CRESCENT LEFT MASTOPEXY, FAT GRAFTING RIGHT LATERAL NIPPLE & FAT GRAFTING INFERIOR LEFT MIDLINE performed by Katie Lopez MD at [address]  2016    Bilateral breast implants    EMBOLECTOMY Left 9/26/2021    LEFT UPPER EXTREMITY  MECHANICAL SUCTION THROMBECTOMY performed by Dagmar Rodríguez DO at [address] ([facility])      HYSTERECTOMY, TOTAL ABDOMINAL (CERVIX REMOVED)  2015    MASTECTOMY, BILATERAL      Right 2013 & Left 2016    SCAR REVISION Left 5/10/2022    SCAR REVISION LEFT PORT SITE performed by Katie Lopez MD at [address] [place] [address] Dr      VASCULAR SURGERY N/A 10/6/2021    REMOVAL OF MEDIPORT performed by Dagmar Rodríguez DO at [facility] History:    Marital status, children: Single, 2 children-female  Smoking status: no  ETOH status: no  Profession: Disabled  Resides: [place], [place]  Recent trips: Moved from [facility] [place]: no    Family History:   Family History   Problem Relation Age of Onset    Hypertension Mother     Obesity Mother     Prostate Cancer Father     No Known Problems Sister     No Known Problems Daughter     No Known Problems Daughter        Current Hospital Medications:    Current Outpatient Medications   Medication Sig Dispense Refill    ELIQUIS 5 MG TABS tablet TAKE 1 TABLET BY MOUTH TWICE A DAY (Patient taking differently: Take 5 mg by mouth 2 times daily) 60 tablet 3    mupirocin (BACTROBAN) 2 % ointment Applied to each nostril twice daily for 5 days prior to surgery (Patient taking differently: Apply 1 each topically 2 times daily Applied to each nostril twice daily for 5 days prior to surgery) 1 each 0    cyclobenzaprine (FLEXERIL) 10 MG tablet Take 10 mg by mouth 3 times daily as needed      meloxicam (MOBIC) 15 MG tablet TAKE 1 TABLET EVERY DAY AS NEEDED      ketorolac (TORADOL) 10 MG tablet Take 1 tablet by mouth every 6 hours as needed for Pain 20 tablet 0    IBRANCE 125 MG tablet Take 1 tablet (125mg) by mouth once daily for 21 days, then 7 days off. (Patient taking differently: Take 125 mg by mouth daily) 21 tablet 5    Cholecalciferol (VITAMIN D3) 1.25 MG (18877 UT) TABS Take 1 tablet by mouth once a week 6 tablet 0    mupirocin (BACTROBAN NASAL) 2 % nasal ointment Apply to each nostril BID 5 days prior to surgery 1 each 0    guaiFENesin 1200 MG TB12 Take 1 tablet by mouth 2 times daily 30 tablet 0    sodium hypochlorite (DAKINS) 0.125 % SOLN external solution Apply topically 2 times daily 1 each 0    VENTOLIN  (90 Base) MCG/ACT inhaler Inhale 2 puffs into the lungs 4 times daily as needed for Wheezing 18 g 5    pantoprazole (PROTONIX) 40 MG tablet TAKE 1 TABLET BY MOUTH EVERY DAY (Patient taking differently: Take 40 mg by mouth daily) 90 tablet 0    NARCAN 4 MG/0.1ML LIQD nasal spray 1 spray as needed      guaiFENesin-codeine (GUAIFENESIN AC) 100-10 MG/5ML liquid Take 5 mLs by mouth 3 times daily as needed for Cough. lisinopril (PRINIVIL;ZESTRIL) 10 MG tablet Take 10 mg by mouth daily      acyclovir (ZOVIRAX) 800 MG tablet Take 800 mg by mouth 2 times daily as needed       ondansetron (ZOFRAN) 4 MG tablet Take 4 mg by mouth every 8 hours as needed for Nausea or Vomiting      alclomethasone (ACLOVATE) 0.05 % cream Apply 1 Units topically 2 times daily as needed Apply topically 2 times daily. gabapentin (NEURONTIN) 300 MG capsule Take 2 capsules by mouth 3 times daily for 30 days. 180 capsule 0     No current facility-administered medications for this visit.      Facility-Administered Medications Ordered in Other Visits   Medication Dose Route Frequency Provider Last Rate Last Admin    fulvestrant (FASLODEX) IM injection 250 mg  250 mg IntraMUSCular Once Jose King MD        fulvestrant (FASLODEX) IM injection 250 mg  250 mg IntraMUSCular Once Tarik Cadet MD        cyanocobalamin injection 1,000 mcg  1,000 mcg IntraMUSCular Once Tarik Cadet MD           Allergies: Allergies   Allergen Reactions    Clindamycin/Lincomycin Hives, Itching and Rash         Subjective   REVIEW OF SYSTEMS:   CONSTITUTIONAL: no fever, no night sweats, fatigue  HEENT: no blurring of vision, no double vision, no hearing difficulty, no tinnitus, no ulceration, no dysplasia, no epistaxis;   LUNGS: no cough, no hemoptysis, no wheeze,  no shortness of breath;  CARDIOVASCULAR: no palpitation, no chest pain, no shortness of breath;  GI: no abdominal pain, no nausea, no vomiting, no diarrhea, no constipation;  PATRICK: no dysuria, no hematuria, no frequency or urgency, no nephrolithiasis;  MUSCULOSKELETAL: no joint pain, no swelling, no stiffness;  ENDOCRINE: no polyuria, no polydipsia, no cold or heat intolerance;  HEMATOLOGY: no easy bruising or bleeding, no history of clotting disorder;  DERMATOLOGY: no skin rash, no eczema, no pruritus;  PSYCHIATRY: no depression, no anxiety, no panic attacks, no suicidal ideation, no homicidal ideation;  NEUROLOGY: no syncope, no seizures, no numbness or tingling of hands, no numbness or tingling of feet, no paresis;     /77   Pulse 77   Ht 5' 6\" (1.676 m)   Wt 181 lb (82.1 kg)   SpO2 96%   BMI 29.21 kg/m²      PHYSICAL EXAM:  CONSTITUTIONAL: Alert, appropriate, no acute distress  EYES: Non icteric, EOM intact, pupils equal round   ENT: Mucus membranes moist, no oral pharyngeal lesions, external inspection of ears and nose are normal.  NECK: Supple, no masses. No palpable thyroid mass  CHEST/LUNGS: CTA bilaterally, normal respiratory effort   CARDIOVASCULAR: RRR, no murmurs. No lower extremity edema  ABDOMEN: soft non-tender, active bowel sounds, no HSM. No palpable masses  EXTREMITIES: warm, full ROM in all 4 extremities, no focal weakness.   SKIN: warm, dry with no rashes or lesions  LYMPH: No cervical, clavicular, axillary, or inguinal lymphadenopathy  NEUROLOGIC: follows commands, non focal   PSYCH: mood and affect appropriate. Alert and oriented to time, place, person       LABORATORY RESULTS REVIEWED/ANALYZED BY ME:  10/18/22 Tumor Markers: CEA 1.8, CA 15-3-16, CA 27.29-21.1    12/13/22 CBC  WBC 11.4  HGB 13.7    Neut 7.9    RADIOLOGY STUDIES REVIEWED BY ME:  11/3/22 MRI IAC Posterior Fossa W Contrast No acute intracranial abnormality. Unremarkable appearance of the internal auditory canals. Mild to moderate cerebral white matter disease, which is nonspecific and may represent chronic microvascular ischemic change, demyelination, postinfectious process such as Lyme disease or vasculitis.     ASSESSMENT:    Orders Placed This Encounter   Procedures    CT ABDOMEN PELVIS W IV CONTRAST Additional Contrast? Oral     Standing Status:   Future     Standing Expiration Date:   6/13/2024     Scheduling Instructions:      SCHED 7 WEEKS     Order Specific Question:   Additional Contrast?     Answer:   Oral     Order Specific Question:   STAT Creatinine as needed:     Answer:   Yes     Order Specific Question:   Reason for exam:     Answer:   COMPARE TO PREVIOUS SCANS TO ASSESS RESPONSE TO TREATMENT    CT CHEST W CONTRAST     Standing Status:   Future     Standing Expiration Date:   6/13/2024     Scheduling Instructions:      [id number] 7 WEEKS     Order Specific Question:   STAT Creatinine as needed:     Answer:   Yes     Order Specific Question:   Reason for exam:     Answer:   COMPARE TO PREVIOUS SCANS TO ASSESS RESPONSE TO TREATMENT    NM BONE SCAN WHOLE BODY     Standing Status:   Future     Standing Expiration Date:   6/13/2024     Scheduling Instructions:      [facility] 7 WEEKS     Order Specific Question:   Reason for exam:     Answer:   COMPARE TO PREVIOUS SCANS TO ASSESS RESPONSE TO TREATMENT    Cancer Antigen 15-3     Standing Status:   Future     Standing Expiration Date:   12/13/2023    CEA     Standing Status:   Future I reviewed the CT scans. Stable pulmonary nodule. -CT chest January 2022-subcentimeter stable pulmonary nodules. Acute left upper extremity DVT/left upper chest port, October 2021-US upper extremity showed acute DVT likely related to port Oct 2021.   -Currently on Eliquis. S/p Mediport removal.  -US Jan 2022,LUE: Chronic appearing deep vein thrombosis (DVT) is seen in the axillary  brachial , left upper extremity(ies). Subacute appearing superficial thrombophlebitis (SVT) is seen in the  basilic vein, left upper extremity(ies  -Continue Eliquis 5 mg p.o. twice daily    Normocytic anemia-hemoglobin 10.8. Improving. Continue to monitor BC.  10/10/2021-ferritin 92, iron saturation 17%, TIBC 242, folate 4.6, vitamin B12 154. Continue B12 replacement  -Hemoglbin 12.3  Lab Results   Component Value Date    WBC 8.57 11/16/2022    HGB 13.2 11/16/2022    HCT 41.0 11/16/2022    MCV 91.9 11/16/2022     11/16/2022       LUE chronic DVT  -Continue Eliquis  -US showed chronic deep vein thrombosis (DVT) of the left axillary vein and   superficial thrombophlebitis (SVT) in the left basilic vein.   -Patient is currently on Eliquis 5 mg p.o. twice daily. Compliance: I stressed the importance of being compliant to treatment. Decreased compliance to adjuvant endocrine therapy has been linked to decreased survival. We discussed the various barriers and side effects of endocrine therapy and ways to improved compliance. The patient acknowledged understanding. PLAN:  RTC with MD 2 months  CBC, CMP, CEA, CA 15-3, CA 27-29 today  Faslodex and B12 today and every 4 weeks  Follow up with Dr. Danial Garcia for PCP care  Consider resuming  Ibrance  Continue B12 replacement monthly  Repeat CT chest/abdomen/pelvis in 6 months, Feb 2023  Continue MichellequChelo Block am pre charting  as Medical Assistant for Ryan Ferrara MD. Electronically signed by Chelo Finch MA on 12/13/2022 at 1:20 PM CST.     Sergo Escoto ben Tapia scribing for Jose King MD. Electronically signed by Arian Camacho RN on 12/13/2022 at 9:42 AM [facility]. I, Dr Ximena Avila, personally performed the services described in this documentation as scribed by Arian Camacho RN in my presence and is both accurate and complete. I have seen, examined and reviewed this patient medication list, appropriate labs and imaging studies. I reviewed relevant medical records and others physicians notes. I discussed the plans of care with the patient. I answered all the questions to the patients satisfaction. I have also reviewed the chief complaint (CC) and part of the history (History of Present Illness (HPI), Past Family Social History [facility]), or Review of Systems (ROS) and made changes when appropriated.        (Please note that portions of this note were completed with a voice recognition program. Efforts were made to edit the dictations but occasionally words are mis-transcribed.)  Electronically signed by Jose King MD on 12/13/2022 at 9:47 AM

## 2022-12-12 NOTE — TELEPHONE ENCOUNTER
Left voicemail for patient with a new time. I have requested for patient to return phone call if time conflicts.

## 2022-12-13 ENCOUNTER — APPOINTMENT (OUTPATIENT)
Dept: INFUSION THERAPY | Age: 57
End: 2022-12-13
Payer: MEDICARE

## 2022-12-13 ENCOUNTER — HOSPITAL ENCOUNTER (OUTPATIENT)
Dept: INFUSION THERAPY | Age: 57
Discharge: HOME | End: 2022-12-13
Payer: MEDICARE

## 2022-12-13 ENCOUNTER — OFFICE VISIT (OUTPATIENT)
Dept: HEMATOLOGY | Age: 57
End: 2022-12-13
Payer: MEDICARE

## 2022-12-13 VITALS
OXYGEN SATURATION: 96 % | HEIGHT: 66 IN | HEART RATE: 77 BPM | SYSTOLIC BLOOD PRESSURE: 128 MMHG | BODY MASS INDEX: 29.09 KG/M2 | WEIGHT: 181 LBS | DIASTOLIC BLOOD PRESSURE: 77 MMHG

## 2022-12-13 DIAGNOSIS — C50.919 METASTATIC BREAST CANCER (HCC): Primary | ICD-10-CM

## 2022-12-13 DIAGNOSIS — C50.919 CARCINOMA OF FEMALE BREAST, UNSPECIFIED ESTROGEN RECEPTOR STATUS, UNSPECIFIED LATERALITY, UNSPECIFIED SITE OF BREAST (HCC): ICD-10-CM

## 2022-12-13 DIAGNOSIS — T45.1X5S ANTINEOPLASTIC DRUGS CAUSING ADVERSE EFFECT, SEQUELA: ICD-10-CM

## 2022-12-13 DIAGNOSIS — D50.8 OTHER IRON DEFICIENCY ANEMIA: ICD-10-CM

## 2022-12-13 DIAGNOSIS — Z71.89 CARE PLAN DISCUSSED WITH PATIENT: ICD-10-CM

## 2022-12-13 DIAGNOSIS — C50.919 METASTATIC BREAST CANCER (HCC): ICD-10-CM

## 2022-12-13 LAB
ALBUMIN SERPL-MCNC: 4.5 G/DL (ref 3.5–5.2)
ALP BLD-CCNC: 113 U/L (ref 35–104)
ALT SERPL-CCNC: 13 U/L (ref 9–52)
ANION GAP SERPL CALCULATED.3IONS-SCNC: 7 MMOL/L (ref 7–19)
AST SERPL-CCNC: 21 U/L (ref 14–36)
BILIRUB SERPL-MCNC: 0.9 MG/DL (ref 0.2–1.3)
BUN BLDV-MCNC: 27 MG/DL (ref 7–17)
CA 15-3: 17 U/ML (ref 0–25)
CALCIUM SERPL-MCNC: 9.1 MG/DL (ref 8.4–10.2)
CEA: 1.9 NG/ML (ref 0–4.7)
CHLORIDE BLD-SCNC: 103 MMOL/L (ref 98–111)
CO2: 29 MMOL/L (ref 22–29)
CREAT SERPL-MCNC: 0.7 MG/DL (ref 0.5–1)
GFR SERPL CREATININE-BSD FRML MDRD: >60 ML/MIN/{1.73_M2}
GLOBULIN: 3.1 G/DL
GLUCOSE BLD-MCNC: 105 MG/DL (ref 74–106)
HCT VFR BLD CALC: 44.1 % (ref 34.1–44.9)
HEMOGLOBIN: 13.7 G/DL (ref 11.2–15.7)
LYMPHOCYTES ABSOLUTE: 3 K/UL (ref 1.18–3.74)
LYMPHOCYTES RELATIVE PERCENT: 26.1 % (ref 19.3–53.1)
MCH RBC QN AUTO: 29.5 PG (ref 25.6–32.2)
MCHC RBC AUTO-ENTMCNC: 31.1 G/DL (ref 32.3–35.5)
MCV RBC AUTO: 94.8 FL (ref 79.4–94.8)
MONOCYTES ABSOLUTE: 0.5 K/UL (ref 0.24–0.82)
MONOCYTES RELATIVE PERCENT: 4.8 % (ref 4.7–12.5)
NEUTROPHILS ABSOLUTE: 7.9 K/UL (ref 1.56–6.13)
NEUTROPHILS RELATIVE PERCENT: 69.1 % (ref 34–71.1)
PDW BLD-RTO: 12.5 % (ref 11.7–14.4)
PLATELET # BLD: 266 K/UL (ref 182–369)
PMV BLD AUTO: 8.6 FL (ref 7.4–10.4)
POTASSIUM SERPL-SCNC: 4 MMOL/L (ref 3.5–5.1)
RBC # BLD: 4.65 M/UL (ref 3.93–5.22)
SODIUM BLD-SCNC: 139 MMOL/L (ref 137–145)
TOTAL PROTEIN: 7.7 G/DL (ref 6.3–8.2)
WBC # BLD: 11.4 K/UL (ref 3.98–10.04)

## 2022-12-13 PROCEDURE — 96402 CHEMO HORMON ANTINEOPL SQ/IM: CPT

## 2022-12-13 PROCEDURE — G8417 CALC BMI ABV UP PARAM F/U: HCPCS | Performed by: INTERNAL MEDICINE

## 2022-12-13 PROCEDURE — 85025 COMPLETE CBC W/AUTO DIFF WBC: CPT

## 2022-12-13 PROCEDURE — 3017F COLORECTAL CA SCREEN DOC REV: CPT | Performed by: INTERNAL MEDICINE

## 2022-12-13 PROCEDURE — 36415 COLL VENOUS BLD VENIPUNCTURE: CPT

## 2022-12-13 PROCEDURE — 1036F TOBACCO NON-USER: CPT | Performed by: INTERNAL MEDICINE

## 2022-12-13 PROCEDURE — 96372 THER/PROPH/DIAG INJ SC/IM: CPT

## 2022-12-13 PROCEDURE — 99212 OFFICE O/P EST SF 10 MIN: CPT

## 2022-12-13 PROCEDURE — 6360000002 HC RX W HCPCS: Performed by: INTERNAL MEDICINE

## 2022-12-13 PROCEDURE — 99213 OFFICE O/P EST LOW 20 MIN: CPT | Performed by: INTERNAL MEDICINE

## 2022-12-13 PROCEDURE — G8484 FLU IMMUNIZE NO ADMIN: HCPCS | Performed by: INTERNAL MEDICINE

## 2022-12-13 PROCEDURE — 80053 COMPREHEN METABOLIC PANEL: CPT

## 2022-12-13 PROCEDURE — G8427 DOCREV CUR MEDS BY ELIG CLIN: HCPCS | Performed by: INTERNAL MEDICINE

## 2022-12-13 RX ORDER — CYANOCOBALAMIN 1000 UG/ML
1000 INJECTION, SOLUTION INTRAMUSCULAR; SUBCUTANEOUS ONCE
Start: 2022-12-14

## 2022-12-13 RX ORDER — CYANOCOBALAMIN 1000 UG/ML
1000 INJECTION, SOLUTION INTRAMUSCULAR; SUBCUTANEOUS ONCE
Status: COMPLETED
Start: 2022-12-13 | End: 2022-12-13

## 2022-12-13 RX ORDER — LAMOTRIGINE 25 MG/1
250 TABLET ORAL ONCE
Status: COMPLETED | OUTPATIENT
Start: 2022-12-13 | End: 2022-12-13

## 2022-12-13 RX ADMIN — CYANOCOBALAMIN 1000 MCG: 1000 INJECTION, SOLUTION INTRAMUSCULAR; SUBCUTANEOUS at 09:58

## 2022-12-13 RX ADMIN — FULVESTRANT 250 MG: 50 INJECTION, SOLUTION INTRAMUSCULAR at 09:57

## 2022-12-14 LAB — CA 27.29: 25.4 U/ML

## 2022-12-14 NOTE — PROGRESS NOTES
HISTORY OF PRESENT ILLNESS:    Ms. Oriana Gardner is a 56 y.o. black female who presents today for a one week post op breast check. This is following Left crescent mastopexy and fat grafting on 12/9/2022    This is following a left mastectomy reconstruction due to lateral drift on 9/17/2021. 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 It was ER/CT positive and she underwent neoadjuvant chemotherapy with AC followed by Taxol. In 2007 she underwent a right lumpectomy with axillary node dissection and received adjuvant radiation therapy to the breast and axilla. She did not take antihormonal therapy due to cost.    She recurred in 2013 and underwent 2 cycles of Taxotere followed by Doxil and then underwent right mastectomy and axillary node dissection. This was followed by completion of radiation therapy to her chest wall and axillary nodes. In 2016 she underwent implant exchange and a prophylactic mastectomy on the left. She was also switched to aromatase inhibitor. She subsequent developed metastatic disease with lung, hilar, and axillary metastases and has been on Abraxane, Gemzar, and is now doing monthly Faslodex with palbociclib    She currently has [place] implants. 590 cc on the right. She has an 800 cc implant on the left. She is now status post revision of left mastectomy reconstruction due to lateral drift on 9/17/2021. She had placement of a 700 cc Natrelle soft touch implant and a crescent mastopexy on the left. She also had liposuction of an excessive fat pad adjacent to her reconstruction. PHYSICAL EXAM:  The  wounds look good with no evidence of infection, fluid accumulation, or skin necrosis. She still has a small amount of asymmetry with the left nipple being about a centimeter lower than the right.   It is significantly better than previously but it does bother her    Some fat grafting laterally on the right will change the direction of her nipple somewhat. She also has this the defect inferiorly on the left that I think some fat grafting will help lastly she has not really moved the left nipple very much so another centimeter lift should help with that. PHYSICAL EXAM:  The  wounds look good with no evidence of infection, fluid accumulation, or skin necrosis. She is well pleased and I think she looks great. IMPRESSION:    Doing well s/p Left crescent mastopexy and fat grafting      PLAN: Follow-up in 1 month for physical exam    I have seen, examined and reviewed this patient medication list, appropriate labs and imaging studies. I reviewed relevant medical records and others physicians notes. I discussed the plans of care with the patient. I answered all the questions to the patients satisfaction. I, Dr Umer Garcia, personally performed the services described in this documentation as scribed by Ramiro Meredith MA in my presence and is both accurate and complete. (Please note that portions of this note were completed with a voice recognition program. Efforts were made to edit the dictations but occasionally words are mis-transcribed.)  Over 50% of the total visit time of 15 minutes in face to face encounter with the patient, out of which more than 50% of the time was spent in counseling patient or family and coordination of care. Counseling included but was not limited to time spent reviewing labs, imaging studies/ treatment plan and answering questions.

## 2022-12-15 ENCOUNTER — OFFICE VISIT (OUTPATIENT)
Dept: SURGERY | Age: 57
End: 2022-12-15

## 2022-12-15 VITALS — DIASTOLIC BLOOD PRESSURE: 80 MMHG | SYSTOLIC BLOOD PRESSURE: 126 MMHG | HEART RATE: 80 BPM

## 2022-12-15 DIAGNOSIS — Z98.890 S/P BREAST RECONSTRUCTION, LEFT: ICD-10-CM

## 2022-12-15 DIAGNOSIS — N65.1 DEFORMITY AND DISPROPORTION OF RECONSTRUCTED BREAST: Primary | ICD-10-CM

## 2022-12-15 DIAGNOSIS — N65.0 DEFORMITY AND DISPROPORTION OF RECONSTRUCTED BREAST: Primary | ICD-10-CM

## 2022-12-15 PROCEDURE — 99024 POSTOP FOLLOW-UP VISIT: CPT | Performed by: SURGERY

## 2023-01-10 ENCOUNTER — HOSPITAL ENCOUNTER (OUTPATIENT)
Dept: INFUSION THERAPY | Age: 58
Discharge: HOME | End: 2023-01-10
Payer: MEDICARE

## 2023-01-10 VITALS
WEIGHT: 182.8 LBS | TEMPERATURE: 98.8 F | HEART RATE: 91 BPM | BODY MASS INDEX: 29.38 KG/M2 | DIASTOLIC BLOOD PRESSURE: 84 MMHG | OXYGEN SATURATION: 97 % | SYSTOLIC BLOOD PRESSURE: 132 MMHG | HEIGHT: 66 IN

## 2023-01-10 DIAGNOSIS — D50.8 OTHER IRON DEFICIENCY ANEMIA: ICD-10-CM

## 2023-01-10 DIAGNOSIS — C50.919 CARCINOMA OF FEMALE BREAST, UNSPECIFIED ESTROGEN RECEPTOR STATUS, UNSPECIFIED LATERALITY, UNSPECIFIED SITE OF BREAST (HCC): ICD-10-CM

## 2023-01-10 DIAGNOSIS — C50.919 METASTATIC BREAST CANCER (HCC): Primary | ICD-10-CM

## 2023-01-10 LAB
HCT VFR BLD CALC: 40.4 % (ref 34.1–44.9)
HEMOGLOBIN: 12.9 G/DL (ref 11.2–15.7)
LYMPHOCYTES ABSOLUTE: 2.62 K/UL (ref 1.18–3.74)
LYMPHOCYTES RELATIVE PERCENT: 28.2 % (ref 19.3–53.1)
MCH RBC QN AUTO: 29.3 PG (ref 25.6–32.2)
MCHC RBC AUTO-ENTMCNC: 31.9 G/DL (ref 32.3–35.5)
MCV RBC AUTO: 91.6 FL (ref 79.4–94.8)
MONOCYTES ABSOLUTE: 0.4 K/UL (ref 0.24–0.82)
MONOCYTES RELATIVE PERCENT: 4.3 % (ref 4.7–12.5)
NEUTROPHILS ABSOLUTE: 6.19 K/UL (ref 1.56–6.13)
NEUTROPHILS RELATIVE PERCENT: 66.7 % (ref 34–71.1)
PDW BLD-RTO: 12.6 % (ref 11.7–14.4)
PLATELET # BLD: 306 K/UL (ref 182–369)
PMV BLD AUTO: 8.5 FL (ref 7.4–10.4)
RBC # BLD: 4.41 M/UL (ref 3.93–5.22)
WBC # BLD: 9.29 K/UL (ref 3.98–10.04)

## 2023-01-10 PROCEDURE — 85025 COMPLETE CBC W/AUTO DIFF WBC: CPT

## 2023-01-10 PROCEDURE — 96372 THER/PROPH/DIAG INJ SC/IM: CPT

## 2023-01-10 PROCEDURE — 6360000002 HC RX W HCPCS: Performed by: INTERNAL MEDICINE

## 2023-01-10 PROCEDURE — 96402 CHEMO HORMON ANTINEOPL SQ/IM: CPT

## 2023-01-10 PROCEDURE — 36415 COLL VENOUS BLD VENIPUNCTURE: CPT

## 2023-01-10 RX ORDER — CYANOCOBALAMIN 1000 UG/ML
1000 INJECTION, SOLUTION INTRAMUSCULAR; SUBCUTANEOUS ONCE
Status: COMPLETED
Start: 2023-01-10 | End: 2023-01-10

## 2023-01-10 RX ORDER — CYANOCOBALAMIN 1000 UG/ML
1000 INJECTION, SOLUTION INTRAMUSCULAR; SUBCUTANEOUS ONCE
Start: 2023-02-07

## 2023-01-10 RX ORDER — LAMOTRIGINE 25 MG/1
500 TABLET ORAL ONCE
Status: COMPLETED | OUTPATIENT
Start: 2023-01-10 | End: 2023-01-10

## 2023-01-10 RX ADMIN — CYANOCOBALAMIN 1000 MCG: 1000 INJECTION, SOLUTION INTRAMUSCULAR; SUBCUTANEOUS at 13:56

## 2023-01-10 RX ADMIN — FULVESTRANT 500 MG: 50 INJECTION, SOLUTION INTRAMUSCULAR at 13:56

## 2023-01-19 ENCOUNTER — OFFICE VISIT (OUTPATIENT)
Dept: SURGERY | Age: 58
End: 2023-01-19

## 2023-01-19 VITALS — DIASTOLIC BLOOD PRESSURE: 84 MMHG | HEART RATE: 84 BPM | SYSTOLIC BLOOD PRESSURE: 128 MMHG

## 2023-01-19 DIAGNOSIS — N65.1 DEFORMITY AND DISPROPORTION OF RECONSTRUCTED BREAST: Primary | ICD-10-CM

## 2023-01-19 DIAGNOSIS — N65.0 DEFORMITY AND DISPROPORTION OF RECONSTRUCTED BREAST: Primary | ICD-10-CM

## 2023-01-19 PROCEDURE — 99024 POSTOP FOLLOW-UP VISIT: CPT | Performed by: SURGERY

## 2023-01-19 NOTE — PROGRESS NOTES
HISTORY OF PRESENT ILLNESS:    Ms. Dickinson is a 57 y.o. black female who presents today for a one month post op breast check. This is following Left crescent mastopexy and fat grafting on 12/9/2022    This is following a left mastectomy reconstruction due to lateral drift on 9/17/2021.      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  It was ER/[place] positive and she underwent neoadjuvant chemotherapy with AC followed by Taxol.  In 2007 she underwent a right lumpectomy with axillary node dissection and received adjuvant radiation therapy to the breast and axilla.  She did not take antihormonal therapy due to cost.    She recurred in 2013 and underwent 2 cycles of Taxotere followed by Doxil and then underwent right mastectomy and axillary node dissection.  This was followed by completion of radiation therapy to her chest wall and axillary nodes.    In 2016 she underwent implant exchange and a prophylactic mastectomy on the left.  She was also switched to aromatase inhibitor.    She subsequent developed metastatic disease with lung, hilar, and axillary metastases and has been on Abraxane, Gemzar, and is now doing monthly Faslodex with palbociclib    She currently has [place] implants.  590 cc on the right.  She has an 800 cc implant on the left.    She is now status post revision of left mastectomy reconstruction due to lateral drift on 9/17/2021.  She had placement of a 700 cc Natrelle soft touch implant and a crescent mastopexy on the left.  She also had liposuction of an excessive fat pad adjacent to her reconstruction.    PHYSICAL EXAM:  The  wounds look good with no evidence of infection, fluid accumulation, or skin necrosis.  She still has a small amount of asymmetry with the left nipple being about a centimeter lower than the right.  It is significantly better than previously but it does bother her    Some fat grafting laterally on the right will change the direction of her  nipple somewhat. She also has this the defect inferiorly on the left that I think some fat grafting will help lastly she has not really moved the left nipple very much so another centimeter lift should help with that. PHYSICAL EXAM:  The  wounds look good with no evidence of infection, fluid accumulation, or skin necrosis. She is well pleased and I think she looks great. IMPRESSION:    Doing well s/p Left crescent mastopexy and fat grafting      PLAN: Follow-up in 6 months. We will discuss nipple tattooing at that time    I have seen, examined and reviewed this patient medication list, appropriate labs and imaging studies. I reviewed relevant medical records and others physicians notes. I discussed the plans of care with the patient. I answered all the questions to the patients satisfaction. I, Dr Latrice Arias, personally performed the services described in this documentation as scribed by Donnie Leventhal, MA in my presence and is both accurate and complete. (Please note that portions of this note were completed with a voice recognition program. Efforts were made to edit the dictations but occasionally words are mis-transcribed.)  Over 50% of the total visit time of 20 minutes in face to face encounter with the patient, out of which more than 50% of the time was spent in counseling patient or family and coordination of care. Counseling included but was not limited to time spent reviewing labs, imaging studies/ treatment plan and answering questions.

## 2023-02-01 ENCOUNTER — HOSPITAL ENCOUNTER (OUTPATIENT)
Dept: CT IMAGING | Age: 58
Discharge: HOME | End: 2023-02-01
Payer: MEDICARE

## 2023-02-01 ENCOUNTER — HOSPITAL ENCOUNTER (OUTPATIENT)
Dept: NUCLEAR MEDICINE | Age: 58
Discharge: HOME | End: 2023-02-03
Payer: MEDICARE

## 2023-02-01 DIAGNOSIS — C50.919 METASTATIC BREAST CANCER (HCC): ICD-10-CM

## 2023-02-01 PROCEDURE — 71260 CT THORAX DX C+: CPT | Performed by: RADIOLOGY

## 2023-02-01 PROCEDURE — 3430000000 HC RX DIAGNOSTIC RADIOPHARMACEUTICAL: Performed by: INTERNAL MEDICINE

## 2023-02-01 PROCEDURE — G1010 CDSM STANSON: HCPCS | Performed by: RADIOLOGY

## 2023-02-01 PROCEDURE — 74177 CT ABD & PELVIS W/CONTRAST: CPT | Performed by: RADIOLOGY

## 2023-02-01 PROCEDURE — A9503 TC99M MEDRONATE: HCPCS | Performed by: INTERNAL MEDICINE

## 2023-02-01 PROCEDURE — G1010 CDSM STANSON: HCPCS | Performed by: INTERNAL MEDICINE

## 2023-02-01 PROCEDURE — 6360000004 HC RX CONTRAST MEDICATION: Performed by: INTERNAL MEDICINE

## 2023-02-01 PROCEDURE — G1010 CDSM STANSON: HCPCS

## 2023-02-01 PROCEDURE — 71260 CT THORAX DX C+: CPT

## 2023-02-01 PROCEDURE — 78306 BONE IMAGING WHOLE BODY: CPT | Performed by: RADIOLOGY

## 2023-02-01 RX ORDER — TC 99M MEDRONATE 20 MG/10ML
20 INJECTION, POWDER, LYOPHILIZED, FOR SOLUTION INTRAVENOUS
Status: COMPLETED | OUTPATIENT
Start: 2023-02-01 | End: 2023-02-01

## 2023-02-01 RX ADMIN — IOPAMIDOL 75 ML: 755 INJECTION, SOLUTION INTRAVENOUS at 11:43

## 2023-02-01 RX ADMIN — TC 99M MEDRONATE 20 MILLICURIE: 20 INJECTION, POWDER, LYOPHILIZED, FOR SOLUTION INTRAVENOUS at 13:23

## 2023-02-02 ENCOUNTER — TELEPHONE (OUTPATIENT)
Dept: HEMATOLOGY | Age: 58
End: 2023-02-02

## 2023-02-02 DIAGNOSIS — R91.1 PULMONARY NODULE 1 CM OR GREATER IN DIAMETER: Primary | ICD-10-CM

## 2023-02-02 DIAGNOSIS — R91.1 PULMONARY NODULE, LEFT: ICD-10-CM

## 2023-02-02 NOTE — TELEPHONE ENCOUNTER
Called patient discussed CT chest results and that Dr. Rubin Must wants patient to have a PET scan. Patient is to be scheduled as soon as possible. Patient verbalized understanding.  Electronically signed by Soha Tovar RN on 2/2/2023 at 9:18 AM

## 2023-02-06 ENCOUNTER — HOSPITAL ENCOUNTER (OUTPATIENT)
Dept: NUCLEAR MEDICINE | Age: 58
Discharge: HOME | End: 2023-02-08
Payer: MEDICARE

## 2023-02-06 DIAGNOSIS — R91.1 PULMONARY NODULE 1 CM OR GREATER IN DIAMETER: Primary | ICD-10-CM

## 2023-02-06 DIAGNOSIS — R91.1 PULMONARY NODULE 1 CM OR GREATER IN DIAMETER: ICD-10-CM

## 2023-02-06 DIAGNOSIS — C50.919 METASTATIC BREAST CANCER (HCC): ICD-10-CM

## 2023-02-06 DIAGNOSIS — R91.1 PULMONARY NODULE, LEFT: ICD-10-CM

## 2023-02-06 LAB
GLUCOSE BLD-MCNC: 80 MG/DL (ref 70–99)
PERFORMED ON: NORMAL

## 2023-02-06 PROCEDURE — 82962 GLUCOSE BLOOD TEST: CPT

## 2023-02-06 PROCEDURE — A9552 F18 FDG: HCPCS | Performed by: INTERNAL MEDICINE

## 2023-02-06 PROCEDURE — G1010 CDSM STANSON: HCPCS

## 2023-02-06 PROCEDURE — 3430000000 HC RX DIAGNOSTIC RADIOPHARMACEUTICAL: Performed by: INTERNAL MEDICINE

## 2023-02-06 RX ORDER — FLUDEOXYGLUCOSE F 18 200 MCI/ML
10 INJECTION, SOLUTION INTRAVENOUS
Status: COMPLETED | OUTPATIENT
Start: 2023-02-06 | End: 2023-02-06

## 2023-02-06 RX ADMIN — FLUDEOXYGLUCOSE F 18 10 MILLICURIE: 200 INJECTION, SOLUTION INTRAVENOUS at 12:29

## 2023-02-06 NOTE — PROGRESS NOTES
MEDICAL ONCOLOGY PROGRESS NOTE    Pt Name: Blair Ash  MRN: [id number]  YOB: 1965  Date of evaluation: 2/7/2023    HISTORY OF PRESENT ILLNESS:    Reason for MD visit: Disease/toxicity management  Izabela Caballero is here today for monitoring for breast cancer and toxicity assessment. She is currently on palliative treatment with Faslodex/Ibrance initiated in January 2017. She has not been taking her Ibrance for the last 6 months. She has been compliant with her Faslodex. She denies any new complaints. She had CT scans that showed a left lower lobe pulmonary nodule. A PET scan was performed that showed increased FDG uptake in the nodule. No other evidence of metastatic disease. Discussed pulmonary today. The patient will be referred back for consideration of navigational bronchoscopy and biopsy of the left lower lobe nodule. Of note, she has been on Eliquis for prior history of DVT. She denies any bleeding.     Diagnosis  Grade III Adenocarcinoma of right breast diagnosed 2006  Stage IIIA, [id number]  ER/HI Positive, HER-2 bruce/ihc 1+  April 2013-RECURRENCE in the right axillary region  Genetic testing- BRCA 1&2 Negative  2014 (?) RECURRENCE in the axillary skin  01/05/2017- METASTATIC DISEASE with lung, hilar, and axillary lymph node mets  Osteopenia  Mediport associated DVT, October 2021  DVT left upper extremity, November 2021    Treatment Summary  2006- Neoadjuvant chemotherapy with AC x 4 cycles followed by Taxol  2007- Lumpectomy with axillary lymph node dissection  2007- Adjuvant radiation therapy to whole breast and axillary region  Did not take tamoxifen due to cost  April 2013- RECURRENCE  Neoadjuvant chemotherapy with 2 cycles of Taxotere followed by Doxil  10/14/2013- Right Breast Mastectomy and Axillary Dissection  01/23/2014- Completion of adjuvant RT to chest wall and axillary lymph node with Dr. Donn Ellison  02/14- Initiated adjuvant endocrine therapy with tamoxifen  2014 (?) RECURRENCE in the axillary skin  2014 (?) Surgical resection of the axillary skin  2015 (?) YVETTE/BSO  09/19/2016- Bilateral exchange, nipple reconstruction and fat grafting and removal of PAC   2016- Switched to aromatase inhibitor  01/05/2017- METASTATIC DISEASE with lung, hilar, and axillary mets  01/11/2017-06/17/2018- Single agent Abraxane x 13 cycles  06/27/2018-09/17/2018- Gemzar  10/10/2018-Faslodex every 4 weeks/palbociclib  She stopped Ibrance around April 2022    Cancer History:  Lakshmi Macario was first seen by me on 1/21/2021. She was referred to [facility] of care for history of recurrent metastatic breast cancer. She has been in remission as per the latest CT scan. She is currently on Faslodex and palbociclib. She has received several lines therapy as described below. She moved from [place] to [place] to stay closer to her parents. Her medical history is complex. Further details as below. 2006- Neoadjuvant chemotherapy with AC x 4 cycles followed by Taxol AC was complicated by viral meningitis; patient experienced neuropathy after 3 doses of Taxol and was switched to Taxotere for last dose  2007- Lumpectomy with axillary lymph node dissection 1.9cm, grade 2. No LVI. 1 of 14 lymph nodes positive for malignancy (1/14) [id number]  2007- Adjuvant Radiation Therapy to whole breast and axillary region  April 2013- RECURRENCE presenting as mass in the axillary region  Neoadjuvant chemotherapy with 2 cycles of Taxotere followed by Doxil- did not have good response to treatment  2013- Preop PET/CTshowed 2.8cm, right axillary lymph node with uptake. 10/14/2013- Right Breast Mastectomy and Axillary Dissection Right breast had benign tissue with fibrous, negative for carcinoma. Axillary contents demonstrated invasive ductal carcinoma, involving fibroadipose tissue and tendinous tissue. 3 benign lymph nodes (0/3). [place] grade 3. 3.0cm in greatest gross dimensions.  Carcinoma permeates among soft tissues and in the right axilla with no apparent involvement of lymph nodes. 01/23/2014- Completion of adjuvant radiation therapy to chest wall and axillary lymph node with Dr. Garcia Para  02/14- Initiated adjuvant endocrine therapy with tamoxifen  2014 (?) RECURRENCE in the axillary skin  2014 (?) Surgical resection of the axillary skin pathology demonstrating tumor at cauterized margin  2015 (?) YVETTE/BSO  09/19/2016- Bilateral exchange, nipple reconstruction and fat grafting and removal of PAC  2016- Switched to aromatase inhibitor patient was non compliant  01/05/2017- METASTATIC DISEASE Presented with respiratory failure. CTA Pulmonary showed numerous nodules and masses throughout both lung fields, compatible with metastatic disease. Bilateral hilar adenopathy seen. 01/11/2017-06/17/2018- Single agent Abraxane x 13 cycles  03/15/2017- CT Chest W Contrast Interval decrease in maximal diameter of essentially all the multiple metastatic pulmonary nodules. The average difference is approximately 25%. Some of the much smaller ones have disappeared. Bilateral hilar and aortopulmonary window adenopathy is also similarly smaller. 06/07/2017- CT Chest W Contrast Multiple lung nodules seen bilaterally. Most of the lung nodules show interval improvement with decreased size by 1 to 3mm. Mediastinal and bilateral hilar adenopathy seen with improvement. 07/24/2017- PET/CT scan showed marked improvement, is minimal abnormal, has excellent response to therapy  02/01/2018- PET/CT scan Numerous bilateral lung masses and nodules, the majority of which do not have appreciable abnormal FDG uptake. The largest mass in the left infrahilar region of the left lower lobe demonstrates a maximum SUV of 3.3. Mediastinal lymphadenopathy. No evidence of metastatic disease in the abdomen or pelvis.   06/21/2018- CT Chest/Abdomen/Pelvis Multiple lung mets, mild bilateral hilar and mediastinal lymph nodes.  No masses or evidence of metastatic disease in the abdomen or pelvis  06/27/2018-09/17/2018- Gemzar  09/28/2018- CT Pulmonary Multiple lung mets, mild bilateral hilar and mediastinal lymph nodes  10/10/2018-12/21/2020- Faslodex every 4 weeks  12/14/2018- CT Chest showed definite decrease in the maximal diameter and volume of all left and right metastatic nodules. Somewhat enlarged paratracheal and left axillary lymph nodes are relatively unchanged. 04/22/2019- MRI Cervical Spine W WO Contrast Unremarkable  04/22/2019- MRI Brain W WO Contrast No enhancing lesions in the brain and no evidence of metastatic disease. 07/27/2019- CTA Pulmonary showed essentially complete disappearance of the pulmonary metastatic disease. Several tiny flat peripheral nodules are the only sequela. The tiny ones on the right are unchanged, the tiny ones on the left are even smaller. There is no longer any active metastatic disease in either lung. 12/19/2019- CT Chest/Abdomen/Pelvis Small, tiny subpleural nodules right upper and right midlung field as well as left lung base has remained unchanged. No new focal parenchymal abnormality seen. No adenopathy seen. There is no abdominal or pelvic mass, adenopathy or fluid collection. No lytic or sclerotic bony lesions, but there is a possibility of osteopenia and/or osteoporosis. 02/17/2020- DEXA Bone Density showed osteopenia of lumbar spine, T-score -1.8, and left femoral neck, T-score -2.0  2/25/21 Ct Abdomen Pelvis W Iv Contrast  No evidence of metastatic disease in the abdomen or pelvis. 2/26/21 Ct Chest W Contrast Tiny nodules in the right lung described above probably represent small foci of pleural thickening due to chronic inflammatory process or small noncalcified granulomas. No features to suggest malignancy or metastatic disease. Bilateral breast implants without complication. 3/1/2021-discussed the results CT chest abdomen pelvis. Essentially no clear evidence of metastatic disease.   This is consistent with excellent response to treatment. Continue current treatment. 4/1/2021 = 31.6 CA 27-29= 33.5 CEA= 1.6   6/3/21 CA 15-3= 23.3 CA 27-29= 25.6  CEA= 1.8   7/29/2021 CEA=1.7 CA 15-3=21.50 CA 27.29= 26.0  8/19/2021 CT Chest  Left lower lobe pleural-based nodular 1.7 x 0.9 cm is indeterminate. PET/CT recommended. Feeding defect in the left lower lobe bronchus versus a postobstructive airspace disease. This too may be further characterized with PET CT versus direct visualization. 8/19/2021 CT Abd/Pelvis A stable CT scan of the abdomen and pelvis. No finding to suggest neoplastic/metastatic disease. 9/28/2021 Final Diagnosis: Arm, excision of left arm thrombus:Fragments of organizing and organized thrombi with dystrophic calcification. Received is a container labeled \"ischemic, left arm\" Received in a fixative is a 3.6 x 2.6 x 1.4 cm aggregate of brown-tan soft tissue fragments and skin. Representative sections are submitted in a single cassette, numerous. October 2021-she developed left upper extremity DVT associated support. Port removed. She also had infection of the port site status post completion to biotics. 11/19/21 CTA pulmonary: No evidence of pulmonary embolism. The lungs are poorly expanded but unremarkable. 12/8/21 MRI lumbar spine: No significant change since the previous study. Chronic degenerative changes. A persistent mild degenerative retrolisthesis is of L5 over S1. Prominent disc osteophyte complexes and facetal arthropathy and resultant mild spinal stenosis at L4-5 and neural foraminal stenosis at L4-5 and L5-S1 as detailed above. 1/6/22 CT CHEST WO CONTRAST Stable chest CT with an unchanged tiny subpleural nodule within the right upper lobe. 1/19/22 CA 15-3 24 CA 27-29 31.8 CEA 1.5  2/25/2022 CT Abd/Pelvis W IV Contrast(Oral) Gastric wall thickening is nonspecific. It could be an artifact of under distention. Gastritis and other etiologies are not ruled out.  The unenhanced solid organs demonstrate no focal abnormality. Diverticulosis of the colon. Under distention of portions of the colon. Prior hysterectomy. Mild sclerotic changes in the subarticular femoral heads bilaterally is stable. Early AVN is possible. MRI would be more definitive. The signal abnormality is greater on the right side. 5/10/2022 Skin, revision of left chest scar: Cicatrix identified with foreign body type response, negative for evidence of malignancy. 5/25/2022 Tumor Markers: CEA 1.9, CA 15-3-15, CA 27.29-18.2  7/18/2022 CT Chest W Contrast Bibasilar subsegmental atelectasis,table   and unchanged. Prominent osteophyte formation out in the lower thoracic spine with associated subsegmental atelectasis. No pulmonary nodules, masses, pleural effusion or pneumothorax. Bilateral breast implants. Stable interval appearance since recent study 7/12/22 7/18/2022 CT Abd/Pelvis W IV Contrast-.scattered colon diverticulosis. No acute diverticulitis. No acute abdominopelvic abnormality . 7/25/2022-no imaging evidence of disease recurrence. Continue treatment with Faslodex and Ibrance. 7/25/2022 VL Extremity Venous Left  There is chronic deep vein thrombosis (DVT) of the left axillary vein and  superficial thrombophlebitis (SVT) in the left basilic vein  6/80/8196 Tumor Markers: CEA 1.9, CA 15-3-14, CA 27.29-17.6  9/30/22 CT Head WO Contrast No acute hemorrhage  10/18/22 Tumor Markers: CEA 1.8, CA 15-3-16, CA 27.29-21.1  11/3/22 MRI IAC Posterior Fossa W Contrast No acute intracranial abnormality. Unremarkable appearance of the internal auditory canals. Mild to moderate cerebral white matter disease, which is nonspecific and may represent chronic microvascular ischemic change, demyelination, postinfectious process such as Lyme disease or vasculitis. 12/13/22 Tumor Markers: CEA 1.9,CA 15-3-17, CA 27.29-25.4  2/1/23 CT Chest W Contrast A 2.2 cm solid pulmonary nodule in the inferomedial left lower lobe. PET/CT versus tissue sampling is recommended for further characterization. Metastatic disease cannot be excluded. 2/1/23 CT Abd/Pelvis W IV Contrast (oral) No evidence of metastatic disease in the abdomen or pelvis. 2/1/23 Bone Scan No findings to suggest osteoblastic metastatic disease. 2/6/23 PET Scan Left medial lower lobe nodule in the superior segment of the left lower lobe has increased FDG uptake max SUV 3.9 measures 1.5 x 1.1 cm. No additional suspicious focal hypermetabolic uptake or pathologic appearing adenopathy otherwise noted. 2/7/2023-I reviewed CT C/A/P and bone scan. New nodule in the left lower lobe. PET scan showed single nodule with increased FDG uptake in the left lower lobe. Discussed pulmonary. Referral back for medication bronchoscopy and biopsy of the nodule. Continue Faslodex for now.       CA 27.29 Dynamics  01/21/2021- 20.3  04/01/2021 33.5  06/03/2021 25.6  07/29/2021 26.0  1/19/22 31.8  8/23/22-17.6  10/18/22-21.1  12/13/22-25.4    Past Medical History:    Past Medical History:   Diagnosis Date    Breast cancer ([address]     Breast cancer with lung mets     Chronic back pain     GERD (gastroesophageal reflux disease)     Hx of blood clots     Hypertension     Neuropathy     Post chemo treatment neuropathy       Past Surgical History:    Past Surgical History:   Procedure Laterality Date    BREAST LUMPECTOMY Right     2006    BREAST RECONSTRUCTION Left 09/17/2021    Revision left reconstructed breast, implant performed by Valorie Taylor MD at [facility] Bilateral 12/9/2022    CRESCENT LEFT MASTOPEXY, FAT GRAFTING RIGHT LATERAL NIPPLE & FAT GRAFTING INFERIOR LEFT MIDLINE performed by Valorie Taylor MD at [address]  2016    Bilateral breast implants    EMBOLECTOMY Left 9/26/2021    LEFT UPPER EXTREMITY  MECHANICAL SUCTION THROMBECTOMY performed by Kirstin Montes DO at [address] (CERVIX STATUS UNKNOWN)      HYSTERECTOMY, TOTAL ABDOMINAL (CERVIX REMOVED)  2015    MASTECTOMY, BILATERAL      Right 2013 & Left 2016    SCAR REVISION Left 5/10/2022    SCAR REVISION LEFT PORT SITE performed by Chuck Pena MD at [address] [place] [address] Dr      VASCULAR SURGERY N/A 10/6/2021    REMOVAL OF MEDIPORT performed by Berle Romberg, DO at [facility]  History:    Marital status, children: Single, 2 children-female  Smoking status: no  ETOH status: no  Profession: Disabled  Resides: [place], [place]  Recent trips: Moved from [address] [place] [address] [place]: no    Family History:   Family History   Problem Relation Age of Onset    Hypertension Mother     Obesity Mother     Prostate Cancer Father     No Known Problems Sister     No Known Problems Daughter     No Known Problems Daughter        Current Hospital Medications:    Current Outpatient Medications   Medication Sig Dispense Refill    ELIQUIS 5 MG TABS tablet TAKE 1 TABLET BY MOUTH TWICE A DAY 60 tablet 3    cyclobenzaprine (FLEXERIL) 10 MG tablet Take 10 mg by mouth 3 times daily as needed      meloxicam (MOBIC) 15 MG tablet TAKE 1 TABLET EVERY DAY AS NEEDED      ketorolac (TORADOL) 10 MG tablet Take 1 tablet by mouth every 6 hours as needed for Pain 20 tablet 0    gabapentin (NEURONTIN) 300 MG capsule Take 2 capsules by mouth 3 times daily for 30 days.  180 capsule 0    Cholecalciferol (VITAMIN D3) 1.25 MG (08714 UT) TABS Take 1 tablet by mouth once a week 6 tablet 0    mupirocin (BACTROBAN NASAL) 2 % nasal ointment Apply to each nostril BID 5 days prior to surgery 1 each 0    guaiFENesin 1200 MG TB12 Take 1 tablet by mouth 2 times daily 30 tablet 0    sodium hypochlorite (DAKINS) 0.125 % SOLN external solution Apply topically 2 times daily 1 each 0    VENTOLIN  (90 Base) MCG/ACT inhaler Inhale 2 puffs into the lungs 4 times daily as needed for Wheezing 18 g 5    pantoprazole (PROTONIX) 40 MG tablet TAKE 1 TABLET BY MOUTH EVERY DAY 90 tablet 0    NARCAN 4 MG/0.1ML LIQD nasal spray 1 spray as needed      guaiFENesin-codeine (GUAIFENESIN AC) 100-10 MG/5ML liquid Take 5 mLs by mouth 3 times daily as needed for Cough. lisinopril (PRINIVIL;ZESTRIL) 10 MG tablet Take 10 mg by mouth daily      acyclovir (ZOVIRAX) 800 MG tablet Take 800 mg by mouth 2 times daily as needed       ondansetron (ZOFRAN) 4 MG tablet Take 4 mg by mouth every 8 hours as needed for Nausea or Vomiting      alclomethasone (ACLOVATE) 0.05 % cream Apply 1 Units topically 2 times daily as needed Apply topically 2 times daily. No current facility-administered medications for this visit. Allergies:    Allergies   Allergen Reactions    Clindamycin/Lincomycin Hives, Itching and Rash         Subjective   REVIEW OF SYSTEMS:   CONSTITUTIONAL: no fever, no night sweats, no fatigue;  HEENT: no blurring of vision, no double vision, no hearing difficulty, no tinnitus, no ulceration, no dysplasia, no epistaxis;   LUNGS: no cough, no hemoptysis, no wheeze,  no shortness of breath;  CARDIOVASCULAR: no palpitation, no chest pain, no shortness of breath;  GI: no abdominal pain, no nausea, no vomiting, no diarrhea, no constipation;  PATRICK: no dysuria, no hematuria, no frequency or urgency, no nephrolithiasis;  MUSCULOSKELETAL: no joint pain, no swelling, no stiffness;  ENDOCRINE: no polyuria, no polydipsia, no cold or heat intolerance;  HEMATOLOGY: no easy bruising or bleeding, no history of clotting disorder;  DERMATOLOGY: no skin rash, no eczema, no pruritus;  PSYCHIATRY: no depression, no anxiety, no panic attacks, no suicidal ideation, no homicidal ideation;  NEUROLOGY: no syncope, no seizures, no numbness or tingling of hands, no numbness or tingling of feet, no paresis;      /68   Pulse 78   Ht 5' 6\" (1.676 m)   SpO2 100%   BMI 29.50 kg/m²      PHYSICAL EXAM:  CONSTITUTIONAL: Alert, appropriate, no acute distress  EYES: Non icteric, EOM intact, pupils equal round   ENT: Mucus membranes moist, no oral pharyngeal lesions, external inspection of ears and nose are normal.  NECK: Supple, no masses. No palpable thyroid mass  CHEST/LUNGS: CTA bilaterally, normal respiratory effort   CARDIOVASCULAR: RRR, no murmurs. No lower extremity edema  ABDOMEN: soft non-tender, active bowel sounds, no HSM. No palpable masses  EXTREMITIES: warm, full ROM in all 4 extremities, no focal weakness. SKIN: warm, dry with no rashes or lesions  LYMPH: No cervical, clavicular, axillary, or inguinal lymphadenopathy  NEUROLOGIC: follows commands, non focal   PSYCH: mood and affect appropriate. Alert and oriented to time, place, person       LABORATORY RESULTS REVIEWED/ANALYZED BY ME:  12/13/22 Tumor Markers: CEA 1.9,CA 15-3-17, CA 27.29-25.4    RADIOLOGY STUDIES REVIEWED BY ME:  2/1/23 CT Chest W Contrast A 2.2 cm solid pulmonary nodule in the inferomedial left lower lobe. PET/CT versus tissue sampling is recommended for further characterization. Metastatic disease cannot be excluded. 2/1/23 CT Abd/Pelvis W IV Contrast (oral) No evidence of metastatic disease in the abdomen or pelvis. 2/1/23 Bone Scan No findings to suggest osteoblastic metastatic disease. 2/6/23 PET Scan Left medial lower lobe nodule in the superior segment of the left lower lobe has increased FDG uptake max SUV 3.9 measures 1.5 x 1.1 cm. No additional suspicious focal hypermetabolic uptake or pathologic appearing adenopathy otherwise noted. My assessment-left lower lobe nodule with increased FDG uptake.     ASSESSMENT:    Orders Placed This Encounter   Procedures    Cancer Antigen 15-3     Standing Status:   Future     Number of Occurrences:   1     Standing Expiration Date:   2/7/2024    CEA     Standing Status:   Future     Number of Occurrences:   1     Standing Expiration Date:   2/7/2024    Cancer Antigen 27.29     Standing Status:   Future     Number of Occurrences:   1     Standing Expiration Date:   2/7/2024    Comprehensive Metabolic Panel     Standing Status:   Future Number of Occurrences:   1     Standing Expiration Date:   2/7/2024    Av. Alisha Colón MD, Pulmonary Disease, [facility]     Referral Priority:   Urgent     Referral Type:   Eval and Treat     Referral Reason:   Specialty Services Required     Referred to Provider:   Dianah Bosworth, MD     Requested Specialty:   Pulmonary Disease     Number of Visits Requested:   1              Patrick Pickard was seen today for follow-up. Diagnoses and all orders for this visit:    Metastatic breast cancer ([address]  -     Cancer Antigen 15-3; Future  -     CEA; Future  -     Cancer Antigen 27.29; Future  -     Comprehensive Metabolic Panel; Future    Pulmonary nodule 1 cm or greater in diameter  -     Cancer Antigen 15-3; Future  -     CEA; Future  -     Cancer Antigen 27.29; Future  -     Comprehensive Metabolic Panel; Future  -     Av. Alisha Colón MD, Pulmonary Disease, [facility] plan discussed with patient    Use of fulvestrant (Faslodex)    Pulmonary nodule, left    Chronic anticoagulation    Coordination of complex care  Comments:  Called and discussed with pulmonary for coordination of care. Metastatic breast cancer, hormone sensitive  2/1/23 CT Chest W Contrast A 2.2 cm solid pulmonary nodule in the inferomedial left lower lobe. PET/CT versus tissue sampling is recommended for further characterization. Metastatic disease cannot be excluded. 2/1/23 CT Abd/Pelvis W IV Contrast (oral) No evidence of metastatic disease in the abdomen or pelvis. 2/1/23 Bone Scan No findings to suggest osteoblastic metastatic disease. 2/6/23 PET Scan Left medial lower lobe nodule in the superior segment of the left lower lobe has increased FDG uptake max SUV 3.9 measures 1.5 x 1.1 cm. No additional suspicious focal hypermetabolic uptake or pathologic appearing adenopathy otherwise noted. 2/7/2023-I reviewed CT C/A/P and bone scan. New nodule in the left lower lobe.   PET scan showed single nodule with increased FDG uptake in the left lower lobe. Discussed pulmonary. Referral back for medication bronchoscopy and biopsy of the nodule. Continue Faslodex for now. Plan:  -Continue Faslodex only as per patient request.  She has stopped Ibrance in the past.      New left lower lobe pulmonary nodule  -2/6/23 PET Scan Left medial lower lobe nodule in the superior segment of the left lower lobe has increased FDG uptake max SUV 3.9 measures 1.5 x 1.1 cm. No additional suspicious focal hypermetabolic uptake or pathologic appearing adenopathy otherwise noted. -Referred to pulmonary for consideration of biopsy. Discussed with pulmonary Dr Samuels Later. The patient was referred for navigational bronchoscopy for biopsy    B12 deficiency- Recommended B12 2000 mcg p.o. daily. Will start B12 injections today 1000mcg with Faslodex. 3/1/2021- Methylmalonic Acid 171, Vitamin B12 >2000. B12 replaced monthly. 10/10/2021-vitamin B12 154. She is currently receiving B12 replacement. Chemotherapy-induced neuropathy-continue gabapentin. COVID-19 vaccination response-patient had covid 19 Jan 2022. Left lower lobe lung nodule-she is currently being seen by pulmonary at [facility]. They believe that this could be inflammatory. I reviewed the CT scans. Stable pulmonary nodule. -CT chest January 2022-subcentimeter stable pulmonary nodules. Acute left upper extremity DVT/left upper chest port, October 2021-US upper extremity showed acute DVT likely related to port Oct 2021.   -Currently on Eliquis. S/p Mediport removal.  -US Jan 2022,LUE: Chronic appearing deep vein thrombosis (DVT) is seen in the axillary  brachial , left upper extremity(ies). Subacute appearing superficial thrombophlebitis (SVT) is seen in the  basilic vein, left upper extremity(ies  -Continue Eliquis 5 mg p.o. twice daily    Normocytic anemia-hemoglobin 10.8. Improving.  Continue to monitor BC.  10/10/2021-ferritin 92, iron saturation 17%, TIBC 242, folate 4.6, vitamin B12 154. Continue B12 replacement  -Hemoglbin 12.3  Lab Results   Component Value Date    WBC 7.39 02/07/2023    HGB 13.1 02/07/2023    HCT 40.7 02/07/2023    MCV 91.3 02/07/2023     02/07/2023       LUE chronic DVT  -Continue Eliquis  -US showed chronic deep vein thrombosis (DVT) of the left axillary vein and   superficial thrombophlebitis (SVT) in the left basilic vein.   -Patient is currently on Eliquis 5 mg p.o. twice daily. Compliance: I stressed the importance of being compliant to treatment. Decreased compliance to adjuvant endocrine therapy has been linked to decreased survival. We discussed the various barriers and side effects of endocrine therapy and ways to improved compliance. The patient acknowledged understanding. PLAN:  RTC with MD 4 weeks  CBC, CMP, CEA, CA 15-3, CA 27-29 today  Faslodex and B12 today and every 4 weeks  Follow up with Dr. Annette Ruiz for PCP care  Continue B12 replacement monthly  Continue Eliquis 5 mg BID  Referral to pulmonary    I, Johnny Al am pre charting  as Medical Assistant for Rosy Cee MD. Electronically signed by Johnny Al MA on 2/7/2023 at 4:33 PM CST. Mansi Tejeda am scribing for Rosy Cee MD. Electronically signed by Noris Soto RN on 2/7/2023 at 12:56 PM CST. I, Dr Agnieszka Leary, personally performed the services described in this documentation as scribed by Noris Soto RN in my presence and is both accurate and complete. I have seen, examined and reviewed this patient medication list, appropriate labs and imaging studies. I reviewed relevant medical records and others physicians notes. I discussed the plans of care with the patient. I answered all the questions to the patients satisfaction.  I have also reviewed the chief complaint (CC) and part of the history (History of Present Illness (HPI), Past Family Social History ([address] [place] [address]), or Review of Systems (ROS) and made changes when appropriated. (Please note that portions of this note were completed with a voice recognition program. Efforts were made to edit the dictations but occasionally words are mis-transcribed. )Electronically signed by Brittney Guidry MD on 2/7/2023 at 5:00 PM

## 2023-02-07 ENCOUNTER — TELEPHONE (OUTPATIENT)
Dept: PULMONOLOGY | Age: 58
End: 2023-02-07

## 2023-02-07 ENCOUNTER — OFFICE VISIT (OUTPATIENT)
Dept: HEMATOLOGY | Age: 58
End: 2023-02-07
Payer: MEDICARE

## 2023-02-07 ENCOUNTER — HOSPITAL ENCOUNTER (OUTPATIENT)
Dept: INFUSION THERAPY | Age: 58
Discharge: HOME | End: 2023-02-07
Payer: MEDICARE

## 2023-02-07 VITALS
OXYGEN SATURATION: 100 % | BODY MASS INDEX: 29.5 KG/M2 | HEART RATE: 78 BPM | SYSTOLIC BLOOD PRESSURE: 120 MMHG | DIASTOLIC BLOOD PRESSURE: 68 MMHG | HEIGHT: 66 IN

## 2023-02-07 DIAGNOSIS — R91.1 PULMONARY NODULE, LEFT: ICD-10-CM

## 2023-02-07 DIAGNOSIS — C50.919 CARCINOMA OF FEMALE BREAST, UNSPECIFIED ESTROGEN RECEPTOR STATUS, UNSPECIFIED LATERALITY, UNSPECIFIED SITE OF BREAST (HCC): ICD-10-CM

## 2023-02-07 DIAGNOSIS — C50.919 METASTATIC BREAST CANCER (HCC): ICD-10-CM

## 2023-02-07 DIAGNOSIS — C50.919 METASTATIC BREAST CANCER (HCC): Primary | ICD-10-CM

## 2023-02-07 DIAGNOSIS — C50.919 METASTATIC BREAST CANCER: ICD-10-CM

## 2023-02-07 DIAGNOSIS — Z71.89 COORDINATION OF COMPLEX CARE: ICD-10-CM

## 2023-02-07 DIAGNOSIS — Z79.01 CHRONIC ANTICOAGULATION: ICD-10-CM

## 2023-02-07 DIAGNOSIS — Z79.818 USE OF FULVESTRANT (FASLODEX): ICD-10-CM

## 2023-02-07 DIAGNOSIS — Z71.89 CARE PLAN DISCUSSED WITH PATIENT: ICD-10-CM

## 2023-02-07 DIAGNOSIS — R91.1 PULMONARY NODULE 1 CM OR GREATER IN DIAMETER: Primary | ICD-10-CM

## 2023-02-07 DIAGNOSIS — R91.1 PULMONARY NODULE 1 CM OR GREATER IN DIAMETER: ICD-10-CM

## 2023-02-07 DIAGNOSIS — D50.8 OTHER IRON DEFICIENCY ANEMIA: ICD-10-CM

## 2023-02-07 LAB
ALBUMIN SERPL-MCNC: 4.6 G/DL (ref 3.5–5.2)
ALP BLD-CCNC: 108 U/L (ref 35–104)
ALT SERPL-CCNC: 8 U/L (ref 5–33)
ANION GAP SERPL CALCULATED.3IONS-SCNC: 15 MMOL/L (ref 7–19)
AST SERPL-CCNC: 18 U/L (ref 5–32)
BASOPHILS ABSOLUTE: 0.02 K/UL (ref 0.01–0.08)
BASOPHILS RELATIVE PERCENT: 0.3 % (ref 0.1–1.2)
BILIRUB SERPL-MCNC: 0.7 MG/DL (ref 0.2–1.2)
BUN BLDV-MCNC: 9 MG/DL (ref 6–20)
CA 15-3: 14 U/ML (ref 0–25)
CALCIUM SERPL-MCNC: 9.2 MG/DL (ref 8.6–10)
CEA: 2.3 NG/ML (ref 0–4.7)
CHLORIDE BLD-SCNC: 98 MMOL/L (ref 98–111)
CO2: 28 MMOL/L (ref 22–29)
CREAT SERPL-MCNC: 0.6 MG/DL (ref 0.5–0.9)
EOSINOPHILS ABSOLUTE: 0.03 K/UL (ref 0.04–0.54)
EOSINOPHILS RELATIVE PERCENT: 0.4 % (ref 0.7–7)
GFR SERPL CREATININE-BSD FRML MDRD: >60 ML/MIN/{1.73_M2}
GLUCOSE BLD-MCNC: 66 MG/DL (ref 74–109)
HCT VFR BLD CALC: 40.7 % (ref 34.1–44.9)
HEMOGLOBIN: 13.1 G/DL (ref 11.2–15.7)
LYMPHOCYTES ABSOLUTE: 2.16 K/UL (ref 1.18–3.74)
LYMPHOCYTES RELATIVE PERCENT: 29.2 % (ref 19.3–53.1)
MCH RBC QN AUTO: 29.4 PG (ref 25.6–32.2)
MCHC RBC AUTO-ENTMCNC: 32.2 G/DL (ref 32.3–35.5)
MCV RBC AUTO: 91.3 FL (ref 79.4–94.8)
MONOCYTES ABSOLUTE: 0.42 K/UL (ref 0.24–0.82)
MONOCYTES RELATIVE PERCENT: 5.7 % (ref 4.7–12.5)
NEUTROPHILS ABSOLUTE: 4.74 K/UL (ref 1.56–6.13)
NEUTROPHILS RELATIVE PERCENT: 64.1 % (ref 34–71.1)
PDW BLD-RTO: 13.1 % (ref 11.7–14.4)
PLATELET # BLD: 245 K/UL (ref 182–369)
PMV BLD AUTO: 9.3 FL (ref 7.4–10.4)
POTASSIUM SERPL-SCNC: 4.1 MMOL/L (ref 3.5–5)
RBC # BLD: 4.46 M/UL (ref 3.93–5.22)
SODIUM BLD-SCNC: 141 MMOL/L (ref 136–145)
TOTAL PROTEIN: 7.2 G/DL (ref 6.6–8.7)
WBC # BLD: 7.39 K/UL (ref 3.98–10.04)

## 2023-02-07 PROCEDURE — 99214 OFFICE O/P EST MOD 30 MIN: CPT | Performed by: INTERNAL MEDICINE

## 2023-02-07 PROCEDURE — 3017F COLORECTAL CA SCREEN DOC REV: CPT | Performed by: INTERNAL MEDICINE

## 2023-02-07 PROCEDURE — G8484 FLU IMMUNIZE NO ADMIN: HCPCS | Performed by: INTERNAL MEDICINE

## 2023-02-07 PROCEDURE — 1036F TOBACCO NON-USER: CPT | Performed by: INTERNAL MEDICINE

## 2023-02-07 PROCEDURE — 85025 COMPLETE CBC W/AUTO DIFF WBC: CPT

## 2023-02-07 PROCEDURE — 36415 COLL VENOUS BLD VENIPUNCTURE: CPT

## 2023-02-07 PROCEDURE — 96402 CHEMO HORMON ANTINEOPL SQ/IM: CPT

## 2023-02-07 PROCEDURE — 99212 OFFICE O/P EST SF 10 MIN: CPT

## 2023-02-07 PROCEDURE — G8417 CALC BMI ABV UP PARAM F/U: HCPCS | Performed by: INTERNAL MEDICINE

## 2023-02-07 PROCEDURE — 6360000002 HC RX W HCPCS: Performed by: INTERNAL MEDICINE

## 2023-02-07 PROCEDURE — 96372 THER/PROPH/DIAG INJ SC/IM: CPT

## 2023-02-07 PROCEDURE — G8427 DOCREV CUR MEDS BY ELIG CLIN: HCPCS | Performed by: INTERNAL MEDICINE

## 2023-02-07 RX ORDER — CYANOCOBALAMIN 1000 UG/ML
1000 INJECTION, SOLUTION INTRAMUSCULAR; SUBCUTANEOUS ONCE
Status: COMPLETED
Start: 2023-02-07 | End: 2023-02-07

## 2023-02-07 RX ORDER — CYANOCOBALAMIN 1000 UG/ML
1000 INJECTION, SOLUTION INTRAMUSCULAR; SUBCUTANEOUS ONCE
Start: 2023-03-07

## 2023-02-07 RX ORDER — FAMOTIDINE 10 MG/ML
20 INJECTION, SOLUTION INTRAVENOUS ONCE
OUTPATIENT
Start: 2023-02-07 | End: 2023-02-07

## 2023-02-07 RX ORDER — METHYLPREDNISOLONE SODIUM SUCCINATE 125 MG/2ML
125 INJECTION, POWDER, LYOPHILIZED, FOR SOLUTION INTRAMUSCULAR; INTRAVENOUS ONCE
OUTPATIENT
Start: 2023-02-07 | End: 2023-02-07

## 2023-02-07 RX ORDER — EPINEPHRINE 1 MG/ML
0.3 INJECTION, SOLUTION, CONCENTRATE INTRAVENOUS PRN
OUTPATIENT
Start: 2023-02-07

## 2023-02-07 RX ORDER — LAMOTRIGINE 25 MG/1
500 TABLET ORAL ONCE
Status: COMPLETED | OUTPATIENT
Start: 2023-02-07 | End: 2023-02-07

## 2023-02-07 RX ORDER — SODIUM CHLORIDE 9 MG/ML
INJECTION, SOLUTION INTRAVENOUS CONTINUOUS
OUTPATIENT
Start: 2023-02-07

## 2023-02-07 RX ORDER — DIPHENHYDRAMINE HYDROCHLORIDE 50 MG/ML
50 INJECTION INTRAMUSCULAR; INTRAVENOUS ONCE
OUTPATIENT
Start: 2023-02-07 | End: 2023-02-07

## 2023-02-07 RX ADMIN — CYANOCOBALAMIN 1000 MCG: 1000 INJECTION, SOLUTION INTRAMUSCULAR; SUBCUTANEOUS at 13:20

## 2023-02-07 RX ADMIN — FULVESTRANT 500 MG: 50 INJECTION, SOLUTION INTRAMUSCULAR at 13:16

## 2023-02-07 NOTE — ADDENDUM NOTE
"General Surgery Progress Note    Assessment and Plan:  65 year old female complex anterior fluid collections; S/p Drainage per IR, also with UTI  Perforated duodenal ulcer, s/p ext lap, TRUONG, Malecot drain placement with perforation, and wound VAC on 2019. Repeat ex-lap, TRUONG, closure of duodenal perforation over Malecot, G-tube/J-tube placements, delayed primary closure on 2020    - Continue all drains  - WBC 9.3 yesterday, on Zosyn and Vanc, ID consulted  - WOC Rn following, appreciate your help  - Med mngt per hospitalist, appreciate your help     Interval Hx:   Sleepy, overall feels better.    Exam:  Vitals: Blood pressure (!) 153/76, pulse 82, temperature 97.3  F (36.3  C), temperature source Axillary, resp. rate 16, height 1.651 m (5' 5\"), weight 121.8 kg (268 lb 8.3 oz), SpO2 95 %, not currently breastfeeding.  Temperature Temp  Av  F (36.1  C)  Min: 96.1  F (35.6  C)  Max: 98  F (36.7  C)   I/O last 3 completed shifts:  In: -   Out: 2310 [Urine:1150; Emesis/NG output:500; Drains:660]    Abd: soft, mildy ttp around some drains, nd. Incision open. G tube venting, J tube feeds going, Malecot bilious, THAI drain serous, IR drains purulent drainage.    Data:   Recent Labs   Lab Test 20  0729 20  0746 20  0821 20  2241 20  0555   NA  --   --  138 136 137   POTASSIUM  --   --  4.0 3.7 3.6   CHLORIDE  --   --  97 94 96   CO2  --   --  41* 41* 40*   BUN  --   --  25 27 25   CR 0.56 0.65 0.65 0.62 0.77       Esther Bonilla PA-C  [facility]  [phone]  " Addended by: Larry Landaverde on: 2/7/2023 05:01 PM     Modules accepted: Level of Service

## 2023-02-07 NOTE — TELEPHONE ENCOUNTER
Left message for patient to return call to office.   Needs to schedule appointment to see Dr Mehdi Edwards on 2/13/23

## 2023-02-09 ENCOUNTER — OFFICE VISIT (OUTPATIENT)
Dept: PULMONOLOGY | Age: 58
End: 2023-02-09
Payer: MEDICARE

## 2023-02-09 ENCOUNTER — HOSPITAL ENCOUNTER (EMERGENCY)
Age: 58
Discharge: HOME | End: 2023-02-09
Attending: EMERGENCY MEDICINE
Payer: MEDICARE

## 2023-02-09 VITALS
OXYGEN SATURATION: 99 % | DIASTOLIC BLOOD PRESSURE: 72 MMHG | SYSTOLIC BLOOD PRESSURE: 139 MMHG | HEIGHT: 66 IN | WEIGHT: 175.8 LBS | HEART RATE: 80 BPM | BODY MASS INDEX: 28.25 KG/M2 | TEMPERATURE: 97.8 F

## 2023-02-09 VITALS
HEART RATE: 75 BPM | TEMPERATURE: 98.4 F | HEIGHT: 66 IN | SYSTOLIC BLOOD PRESSURE: 119 MMHG | BODY MASS INDEX: 27.42 KG/M2 | WEIGHT: 170.6 LBS | RESPIRATION RATE: 18 BRPM | OXYGEN SATURATION: 98 % | DIASTOLIC BLOOD PRESSURE: 74 MMHG

## 2023-02-09 DIAGNOSIS — Z90.13 STATUS POST BILATERAL MASTECTOMY: ICD-10-CM

## 2023-02-09 DIAGNOSIS — M79.605 LEFT LEG PAIN: Primary | ICD-10-CM

## 2023-02-09 DIAGNOSIS — R06.2 WHEEZING: ICD-10-CM

## 2023-02-09 DIAGNOSIS — R91.1 LUNG NODULE: Primary | ICD-10-CM

## 2023-02-09 DIAGNOSIS — C50.919 CARCINOMA OF FEMALE BREAST, UNSPECIFIED ESTROGEN RECEPTOR STATUS, UNSPECIFIED LATERALITY, UNSPECIFIED SITE OF BREAST (HCC): ICD-10-CM

## 2023-02-09 DIAGNOSIS — Z86.718 HISTORY OF DVT (DEEP VEIN THROMBOSIS): ICD-10-CM

## 2023-02-09 LAB
ALBUMIN SERPL-MCNC: 4 G/DL (ref 3.5–5.2)
ALP BLD-CCNC: 95 U/L (ref 35–104)
ALT SERPL-CCNC: 8 U/L (ref 5–33)
ANION GAP SERPL CALCULATED.3IONS-SCNC: 12 MMOL/L (ref 7–19)
AST SERPL-CCNC: 15 U/L (ref 5–32)
BASOPHILS ABSOLUTE: 0 K/UL (ref 0–0.2)
BASOPHILS RELATIVE PERCENT: 0.4 % (ref 0–1)
BILIRUB SERPL-MCNC: 0.7 MG/DL (ref 0.2–1.2)
BUN BLDV-MCNC: 9 MG/DL (ref 6–20)
CA 27.29: 20.8 U/ML
CALCIUM SERPL-MCNC: 9 MG/DL (ref 8.6–10)
CHLORIDE BLD-SCNC: 99 MMOL/L (ref 98–111)
CO2: 26 MMOL/L (ref 22–29)
CREAT SERPL-MCNC: 0.5 MG/DL (ref 0.5–0.9)
EOSINOPHILS ABSOLUTE: 0 K/UL (ref 0–0.6)
EOSINOPHILS RELATIVE PERCENT: 0.5 % (ref 0–5)
GFR SERPL CREATININE-BSD FRML MDRD: >60 ML/MIN/{1.73_M2}
GLUCOSE BLD-MCNC: 81 MG/DL (ref 74–109)
HCT VFR BLD CALC: 40 % (ref 37–47)
HEMOGLOBIN: 12.8 G/DL (ref 12–16)
IMMATURE GRANULOCYTES #: 0 K/UL
LYMPHOCYTES ABSOLUTE: 2.2 K/UL (ref 1.1–4.5)
LYMPHOCYTES RELATIVE PERCENT: 28.4 % (ref 20–40)
MCH RBC QN AUTO: 29.6 PG (ref 27–31)
MCHC RBC AUTO-ENTMCNC: 32 G/DL (ref 33–37)
MCV RBC AUTO: 92.6 FL (ref 81–99)
MONOCYTES ABSOLUTE: 0.4 K/UL (ref 0–0.9)
MONOCYTES RELATIVE PERCENT: 5.8 % (ref 0–10)
NEUTROPHILS ABSOLUTE: 4.9 K/UL (ref 1.5–7.5)
NEUTROPHILS RELATIVE PERCENT: 64.6 % (ref 50–65)
PDW BLD-RTO: 13 % (ref 11.5–14.5)
PLATELET # BLD: 245 K/UL (ref 130–400)
PMV BLD AUTO: 9.1 FL (ref 9.4–12.3)
POTASSIUM SERPL-SCNC: 4 MMOL/L (ref 3.5–5)
RBC # BLD: 4.32 M/UL (ref 4.2–5.4)
SODIUM BLD-SCNC: 137 MMOL/L (ref 136–145)
TOTAL PROTEIN: 7.1 G/DL (ref 6.6–8.7)
WBC # BLD: 7.6 K/UL (ref 4.8–10.8)

## 2023-02-09 PROCEDURE — 93971 EXTREMITY STUDY: CPT

## 2023-02-09 PROCEDURE — 80053 COMPREHEN METABOLIC PANEL: CPT

## 2023-02-09 PROCEDURE — 99214 OFFICE O/P EST MOD 30 MIN: CPT | Performed by: INTERNAL MEDICINE

## 2023-02-09 PROCEDURE — G8427 DOCREV CUR MEDS BY ELIG CLIN: HCPCS | Performed by: INTERNAL MEDICINE

## 2023-02-09 PROCEDURE — 85025 COMPLETE CBC W/AUTO DIFF WBC: CPT

## 2023-02-09 PROCEDURE — 3017F COLORECTAL CA SCREEN DOC REV: CPT | Performed by: INTERNAL MEDICINE

## 2023-02-09 PROCEDURE — G8484 FLU IMMUNIZE NO ADMIN: HCPCS | Performed by: INTERNAL MEDICINE

## 2023-02-09 PROCEDURE — 36415 COLL VENOUS BLD VENIPUNCTURE: CPT

## 2023-02-09 PROCEDURE — 99284 EMERGENCY DEPT VISIT MOD MDM: CPT

## 2023-02-09 PROCEDURE — G8417 CALC BMI ABV UP PARAM F/U: HCPCS | Performed by: INTERNAL MEDICINE

## 2023-02-09 PROCEDURE — 1036F TOBACCO NON-USER: CPT | Performed by: INTERNAL MEDICINE

## 2023-02-09 ASSESSMENT — PAIN - FUNCTIONAL ASSESSMENT: PAIN_FUNCTIONAL_ASSESSMENT: 0-10

## 2023-02-09 ASSESSMENT — ENCOUNTER SYMPTOMS
RHINORRHEA: 0
SHORTNESS OF BREATH: 1
CHEST TIGHTNESS: 0
ABDOMINAL DISTENTION: 0
WHEEZING: 0
BACK PAIN: 0
ANAL BLEEDING: 0
COUGH: 1
APNEA: 0
ABDOMINAL PAIN: 0

## 2023-02-09 ASSESSMENT — PAIN DESCRIPTION - ORIENTATION: ORIENTATION: LEFT

## 2023-02-09 ASSESSMENT — PAIN DESCRIPTION - DESCRIPTORS: DESCRIPTORS: SHARP;CRAMPING

## 2023-02-09 ASSESSMENT — PAIN SCALES - GENERAL: PAINLEVEL_OUTOF10: 10

## 2023-02-09 ASSESSMENT — PAIN DESCRIPTION - LOCATION: LOCATION: KNEE

## 2023-02-09 NOTE — PROGRESS NOTES
Pulmonary and Sleep Medicine    Rashid Flores (:  1965) is a 56 y.o. female,Established patient, here for evaluation of the following chief complaint(s):  Follow-up (Follow up- lung nodule ,left knee pain )      Referring physician:  Amor Vivas [id number]     ASSESSMENT/PLAN:  1. Lung nodule  -     CT CHEST WO CONTRAST; Future  -     Case Request  2. Carcinoma of female breast, unspecified estrogen receptor status, unspecified laterality, unspecified site of breast ([facility] [address]  -     Protime-INR; Future  -     CBC with Auto Differential; Future  3. Status post bilateral mastectomy and reconstruction   4. Wheezing      Discussed bronch with risk benefit she is in agreement to proceed with the procedure. Will refer to the ED for further evaluation of her left knee swelling. Lindsay Rodrigues MD, FCCP, [facility]    Return in about 3 weeks (around 3/2/2023). SUBJECTIVE/OBJECTIVE:  The patient is here for follow up on lung nodule. She had a recent CT and PET CT that showed a new LLL nodule that is FDG avid. She complains of sever left knee swelling. Prior to Visit Medications    Medication Sig Taking? Authorizing Provider   ELIQUIS 5 MG TABS tablet TAKE 1 TABLET BY MOUTH TWICE A DAY Yes BRITANY Liz   cyclobenzaprine (FLEXERIL) 10 MG tablet Take 10 mg by mouth 3 times daily as needed Yes Gibran Harvey   meloxicam (MOBIC) 15 MG tablet TAKE 1 TABLET EVERY DAY AS NEEDED Yes Gibran Harvey   ketorolac (TORADOL) 10 MG tablet Take 1 tablet by mouth every 6 hours as needed for Pain Yes AUSTYN Ruvalcaba   gabapentin (NEURONTIN) 300 MG capsule Take 2 capsules by mouth 3 times daily for 30 days.  Yes Keily Heath MD   Cholecalciferol (VITAMIN D3) 1.25 MG (45573 UT) TABS Take 1 tablet by mouth once a week Yes Keily Heath MD   mupirocin [facility] NASAL) 2 % nasal ointment Apply to each nostril BID 5 days prior to surgery Yes Lainey Mejia MD   guaiFENesin 1200 MG TB12 Take 1 tablet by mouth 2 times daily Yes Gaynel Peppers APRREINA   sodium hypochlorite (DAKINS) 0.125 % SOLN external solution Apply topically 2 times daily Yes BRITANY Calloway   VENTOLIN  (90 Base) MCG/ACT inhaler Inhale 2 puffs into the lungs 4 times daily as needed for Wheezing Yes Luis Bolaños MD   guaiFENesin-codeine (GUAIFENESIN AC) 100-10 MG/5ML liquid Take 5 mLs by mouth 3 times daily as needed for Cough. Yes Historical Provider, MD   acyclovir (ZOVIRAX) 800 MG tablet Take 800 mg by mouth 2 times daily as needed  Yes Historical Provider, MD   ondansetron (ZOFRAN) 4 MG tablet Take 4 mg by mouth every 8 hours as needed for Nausea or Vomiting Yes Historical Provider, MD   alclomethasone (ACLOVATE) 0.05 % cream Apply 1 Units topically 2 times daily as needed Apply topically 2 times daily. Yes Historical Provider, MD   pantoprazole (PROTONIX) 40 MG tablet TAKE 1 TABLET BY MOUTH EVERY DAY  Patient not taking: Reported on 2/9/2023  José Chanel MD   [facility] 4 MG/0.1ML LIQD nasal spray 1 spray as needed  Patient not taking: Reported on 2/9/2023  Historical Provider, MD   lisinopril (PRINIVIL;ZESTRIL) 10 MG tablet Take 10 mg by mouth daily  Patient not taking: Reported on 2/9/2023  Historical Provider, MD        Review of Systems   Constitutional:  Negative for activity change, appetite change, chills, diaphoresis and fatigue. HENT:  Negative for congestion, dental problem, drooling, ear discharge, postnasal drip and rhinorrhea. Eyes:  Negative for visual disturbance. Respiratory:  Positive for cough and shortness of breath. Negative for apnea, chest tightness and wheezing. Gastrointestinal:  Negative for abdominal distention, abdominal pain and anal bleeding. Endocrine: Negative for cold intolerance, heat intolerance and polydipsia. Genitourinary:  Negative for difficulty urinating, dysuria, enuresis and flank pain. Musculoskeletal:  Negative for arthralgias, back pain and gait problem. Allergic/Immunologic: Negative for environmental allergies. Neurological:  Negative for dizziness, facial asymmetry, light-headedness and headaches. Vitals:    02/09/23 1131   BP: 139/72   Pulse: 80   Temp: 97.8 °F (36.6 °C)   SpO2: 99%     BMI Readings from Last 1 Encounters:   02/09/23 28.37 kg/m²         Physical Exam  Vitals reviewed. Constitutional:       Appearance: Normal appearance. HENT:      Head: Normocephalic and atraumatic. Nose: Nose normal.   Eyes:      Extraocular Movements: Extraocular movements intact. Conjunctiva/sclera: Conjunctivae normal.   Cardiovascular:      Rate and Rhythm: Normal rate and regular rhythm. Heart sounds: No murmur heard. No friction rub. Pulmonary:      Effort: Pulmonary effort is normal. No respiratory distress. Breath sounds: Normal breath sounds. No stridor. No wheezing, rhonchi or rales. Abdominal:      General: There is no distension. Palpations: There is no mass. Tenderness: There is no abdominal tenderness. There is no guarding or rebound. Musculoskeletal:      Cervical back: Normal range of motion and neck supple. Neurological:      Mental Status: She is alert and oriented to person, place, and time. This note was generated using a voice recognition software. Errors in voice recognition may have occurred. An electronic signature was used to authenticate this note.     --Sundar Andrade MD

## 2023-02-09 NOTE — ED PROVIDER NOTES
[facility] EMERGENCY DEPT  eMERGENCY dEPARTMENT eNCOUnter      Pt Name: Elvira Carlos  MRN: [id number]  [place] 1965  Date of evaluation: 2/9/2023  Provider: Abraham Barnes MD    CHIEF COMPLAINT       Chief Complaint   Patient presents with    Knee Pain     Pt arrived to the ed with c/o left knee pain. Onset Sunday night. Pt denies injury. Hx of DVT         HISTORY OF PRESENT ILLNESS   (Location/Symptom, Timing/Onset,Context/Setting, Quality, Duration, Modifying Factors, Severity)  Note limiting factors. Elvira Carlos is a 56 y.o. female who presents to the emergency department ***     HPI    NursingNotes were reviewed.     REVIEW OF SYSTEMS    (2-9 systems for level 4, 10 or more for level 5)     Review of Systems         PAST MEDICALHISTORY     Past Medical History:   Diagnosis Date    Breast cancer ([address]     Breast cancer with lung mets     Chronic back pain     GERD (gastroesophageal reflux disease)     Hx of blood clots     Hypertension     Neuropathy     Post chemo treatment neuropathy         SURGICAL HISTORY       Past Surgical History:   Procedure Laterality Date    BREAST LUMPECTOMY Right     2006    BREAST RECONSTRUCTION Left 09/17/2021    Revision left reconstructed breast, implant performed by Brian Slaughter MD at [address] [facility] Bilateral 12/9/2022    CRESCENT LEFT MASTOPEXY, FAT GRAFTING RIGHT LATERAL NIPPLE & FAT GRAFTING INFERIOR LEFT MIDLINE performed by Brian Slaughter MD at [address]  2016    Bilateral breast implants    EMBOLECTOMY Left 9/26/2021    LEFT UPPER EXTREMITY  MECHANICAL SUCTION THROMBECTOMY performed by Aren Ann DO at [address] ([address])      HYSTERECTOMY, TOTAL ABDOMINAL (CERVIX REMOVED)  2015    MASTECTOMY, BILATERAL      Right 2013 & Left 2016    SCAR REVISION Left 5/10/2022    SCAR REVISION LEFT PORT SITE performed by Brian Slaughter MD at [address] 10/6/2021    REMOVAL OF MEDIPORT performed by Jazz Bowen DO at [address]     Previous Medications    ACYCLOVIR (ZOVIRAX) 800 MG TABLET    Take 800 mg by mouth 2 times daily as needed     ALCLOMETHASONE (ACLOVATE) 0.05 % CREAM    Apply 1 Units topically 2 times daily as needed Apply topically 2 times daily. CHOLECALCIFEROL (VITAMIN D3) 1.25 MG (47652 UT) TABS    Take 1 tablet by mouth once a week    CYCLOBENZAPRINE (FLEXERIL) 10 MG TABLET    Take 10 mg by mouth 3 times daily as needed    ELIQUIS 5 MG TABS TABLET    TAKE 1 TABLET BY MOUTH TWICE A DAY    GABAPENTIN (NEURONTIN) 300 MG CAPSULE    Take 2 capsules by mouth 3 times daily for 30 days. GUAIFENESIN 1200 MG TB12    Take 1 tablet by mouth 2 times daily    GUAIFENESIN-CODEINE (GUAIFENESIN AC) 100-10 MG/5ML LIQUID    Take 5 mLs by mouth 3 times daily as needed for Cough.     KETOROLAC (TORADOL) 10 MG TABLET    Take 1 tablet by mouth every 6 hours as needed for Pain    LISINOPRIL (PRINIVIL;ZESTRIL) 10 MG TABLET    Take 10 mg by mouth daily    MELOXICAM (MOBIC) 15 MG TABLET    TAKE 1 TABLET EVERY DAY AS NEEDED    MUPIROCIN (BACTROBAN NASAL) 2 % NASAL OINTMENT    Apply to each nostril BID 5 days prior to surgery    NARCAN 4 MG/0.1ML LIQD NASAL SPRAY    1 spray as needed    ONDANSETRON (ZOFRAN) 4 MG TABLET    Take 4 mg by mouth every 8 hours as needed for Nausea or Vomiting    PANTOPRAZOLE (PROTONIX) 40 MG TABLET    TAKE 1 TABLET BY MOUTH EVERY DAY    SODIUM HYPOCHLORITE (DAKINS) 0.125 % SOLN EXTERNAL SOLUTION    Apply topically 2 times daily    VENTOLIN  (90 BASE) MCG/ACT INHALER    Inhale 2 puffs into the lungs 4 times daily as needed for Wheezing       ALLERGIES     Clindamycin/lincomycin    FAMILY HISTORY       Family History   Problem Relation Age of Onset    Hypertension Mother     Obesity Mother     Prostate Cancer Father     No Known Problems Sister     No Known Problems Daughter     No Known Problems Daughter SOCIAL HISTORY       Social History     Socioeconomic History    Marital status: Single     Spouse name: None    Number of children: 2    Years of education: None    Highest education level: None   Occupational History    Occupation:      Comment: disabled   Tobacco Use    Smoking status: Never    Smokeless tobacco: Never   Vaping Use    Vaping Use: Never used   Substance and Sexual Activity    Alcohol use: Not Currently    Drug use: Not Currently   Social History Narrative    CODE STATUS: Full Code    HEALTH CARE PROXY: her Father, Mr. Sina Henriquez, [phone]    AMBULATES: uses a cane sometimes, usually independent    DOMICILED: stays with friends at this time, has no stairs that she uses, no pets there       SCREENINGS    Shari Coma Scale  Eye Opening: Spontaneous  Best Verbal Response: Oriented  Best Motor Response: Obeys commands  Shari Coma Scale Score: 15        PHYSICAL EXAM    (up to 7 for level 4, 8 or more for level 5)     ED Triage Vitals [02/09/23 1258]   BP Temp Temp Source Heart Rate Resp SpO2 Height Weight   128/85 98.4 °F (36.9 °C) Oral 79 15 96 % 5' 6\" (1.676 m) 170 lb 9.6 oz (77.4 kg)       Physical Exam    DIAGNOSTIC RESULTS     EKG: All EKG's areinterpreted by the Emergency Department Physician who either signs or Co-signs this chart in the absence of a cardiologist.    ***    RADIOLOGY:  Non-plain film images such as CT, Ultrasound and MRI are read by the radiologist. Plain radiographic images are visualized and preliminarily interpreted bythe emergency physician with the below findings:    ***      VL Extremity Venous Left                 LABS:  Labs Reviewed   CBC WITH AUTO DIFFERENTIAL - Abnormal; Notable for the following components:       Result Value    MCHC 32.0 (*)     MPV 9.1 (*)     All other components within normal limits   COMPREHENSIVE METABOLIC PANEL       All other labs were within normal range or not returned as of this dictation. EMERGENCY DEPARTMENT COURSE and DIFFERENTIAL DIAGNOSIS/MDM:   Vitals:    Vitals:    02/09/23 1258   BP: 128/85   Pulse: 79   Resp: 15   Temp: 98.4 °F (36.9 °C)   TempSrc: Oral   SpO2: 96%   Weight: 170 lb 9.6 oz (77.4 kg)   Height: 5' 6\" (1.676 m)       MDM      Reassessment  ***    CONSULTS:  None    PROCEDURES:  Unless otherwise noted below, none     Procedures    FINAL IMPRESSION      1. Left leg pain    2.  History of DVT (deep vein thrombosis)          DISPOSITION/PLAN   DISPOSITION Decision To Discharge 02/09/2023 04:19:23 PM      PATIENT REFERRED TO:  Kristen Dolan MD  [address] [facility]  [phone]          DISCHARGE MEDICATIONS:  New Prescriptions    No medications on file          (Please note that portions of this note were completed with a voice recognition program.  Efforts were made to edit thedictations but occasionally words are mis-transcribed.)    Emma Le MD (electronically signed)  Attending Emergency Physician ----------------------------------------------------------------    Electronically signed by Virgilio Andrea(Interpreting    physician) on 02/10/2023 02:11 PM    ----------------------------------------------------------------       LABS:  Labs Reviewed   CBC WITH AUTO DIFFERENTIAL - Abnormal; Notable for the following components:       Result Value    MCHC 32.0 (*)     MPV 9.1 (*)     All other components within normal limits   COMPREHENSIVE METABOLIC PANEL       All other labs were within normal range or not returned as of this dictation. EMERGENCY DEPARTMENT COURSE and DIFFERENTIAL DIAGNOSIS/MDM:   Vitals:    Vitals:    02/09/23 1258   BP: 128/85   Pulse: 79   Resp: 15   Temp: 98.4 °F (36.9 °C)   TempSrc: Oral   SpO2: 96%   Weight: 170 lb 9.6 oz (77.4 kg)   Height: 5' 6\" (1.676 m)       MDM     Amount and/or Complexity of Data Reviewed  Clinical lab tests: ordered and reviewed  Tests in the radiology section of CPT®: ordered and reviewed    Patient presents with several day history of left lower extremity pain. Prior history of DVT on oral anticoagulants at this time. Vital signs are otherwise stable with a normal room air oxygen saturation of 98%. Laboratory studies look okay with a normal serum creatinine of 0.5. Her WBC count is normal.  Lower extremity venous Doppler does not demonstrate evidence of DVT. Importance of return precautions have been reviewed with patient. She will be following up as outpatient with her PMD.      PROCEDURES:  Unless otherwise noted below, none     Procedures    FINAL IMPRESSION      1. Left leg pain    2.  History of DVT (deep vein thrombosis)          DISPOSITION/PLAN   DISPOSITION Decision To Discharge 02/09/2023 04:19:23 PM      PATIENT REFERRED TO:  Teodora Boston MD  [address] [facility]  [phone]          DISCHARGE MEDICATIONS:  New Prescriptions    No medications on file          (Please note that portions of this note were completed with a voice recognition program.  Efforts were made to edit thedictations but occasionally words are mis-transcribed.)    Nicole Wilson MD (electronically signed)  Attending Emergency Physician         Nicole Wilson MD  02/24/23 [id number]

## 2023-02-10 ENCOUNTER — CARE COORDINATION (OUTPATIENT)
Dept: CARE COORDINATION | Age: 58
End: 2023-02-10

## 2023-02-10 ENCOUNTER — TELEPHONE (OUTPATIENT)
Dept: PULMONOLOGY | Age: 58
End: 2023-02-10

## 2023-02-10 NOTE — TELEPHONE ENCOUNTER
Contacted patient to notify of arrival time of 7:15 on 2/23/23 for monarch procedure with Dr Brad Butler

## 2023-02-10 NOTE — CARE COORDINATION
Spoke with patient. Robin Jernigan went to the ED after a visit with [address] [facility] Pulmonology - Provider recommended pt go for evaluation of her swollen knee. Patient has h/o DVT and Rheumatoid Arthritis. Patient was not prescribed any new medications. She does have Hydrocodone for pain and stated she has all of her routine medications. Patient stated she contacted her Rheumatologist, Dr. Man Resendiz and is scheduled for f/u about her knee on 2/15/23 at 2 pm. She feels this is the best route to take due to her medical history. Patient does not feel at this time she needs f/u with PCP. ACM encouraged pt to elevate her leg for 30-45 minutes three to four times daily. Recommended leg be elevated above level of patient's heart to facilitate fluid drainage from her leg. Patient voiced understanding. Patient denied any concerns or barriers to her current healthcare issues. She stated she is scheduled for pulm procedure next week to check a nodule found in her lung. Patient stated she has good support and no further needs at this time. She will call ACM if needed.   Electronically signed by Shonda Lim RN on 2/10/2023 at 3:53 PM

## 2023-02-20 ENCOUNTER — ANESTHESIA EVENT (OUTPATIENT)
Dept: ENDOSCOPY | Age: 58
End: 2023-02-20
Payer: MEDICARE

## 2023-02-22 ENCOUNTER — HOSPITAL ENCOUNTER (OUTPATIENT)
Dept: CT IMAGING | Age: 58
Discharge: HOME | End: 2023-02-22
Payer: MEDICARE

## 2023-02-22 ENCOUNTER — HOSPITAL ENCOUNTER (OUTPATIENT)
Dept: LAB | Age: 58
Discharge: HOME | End: 2023-02-22
Payer: MEDICARE

## 2023-02-22 DIAGNOSIS — R91.1 LUNG NODULE: ICD-10-CM

## 2023-02-22 DIAGNOSIS — C50.919 CARCINOMA OF FEMALE BREAST, UNSPECIFIED ESTROGEN RECEPTOR STATUS, UNSPECIFIED LATERALITY, UNSPECIFIED SITE OF BREAST (HCC): ICD-10-CM

## 2023-02-22 DIAGNOSIS — C50.919 METASTATIC BREAST CANCER: ICD-10-CM

## 2023-02-22 DIAGNOSIS — R91.1 PULMONARY NODULE 1 CM OR GREATER IN DIAMETER: ICD-10-CM

## 2023-02-22 LAB
BASOPHILS ABSOLUTE: 0 K/UL (ref 0–0.2)
BASOPHILS RELATIVE PERCENT: 0.3 % (ref 0–1)
EOSINOPHILS ABSOLUTE: 0 K/UL (ref 0–0.6)
EOSINOPHILS RELATIVE PERCENT: 0.4 % (ref 0–5)
HCT VFR BLD CALC: 39.6 % (ref 37–47)
HEMOGLOBIN: 12.3 G/DL (ref 12–16)
IMMATURE GRANULOCYTES #: 0 K/UL
INR BLD: 0.94 (ref 0.88–1.18)
LYMPHOCYTES ABSOLUTE: 2.6 K/UL (ref 1.1–4.5)
LYMPHOCYTES RELATIVE PERCENT: 28 % (ref 20–40)
MCH RBC QN AUTO: 29.4 PG (ref 27–31)
MCHC RBC AUTO-ENTMCNC: 31.1 G/DL (ref 33–37)
MCV RBC AUTO: 94.5 FL (ref 81–99)
MONOCYTES ABSOLUTE: 0.7 K/UL (ref 0–0.9)
MONOCYTES RELATIVE PERCENT: 7 % (ref 0–10)
NEUTROPHILS ABSOLUTE: 6 K/UL (ref 1.5–7.5)
NEUTROPHILS RELATIVE PERCENT: 64 % (ref 50–65)
PDW BLD-RTO: 13.8 % (ref 11.5–14.5)
PLATELET # BLD: 306 K/UL (ref 130–400)
PMV BLD AUTO: 9.1 FL (ref 9.4–12.3)
PROTHROMBIN TIME: 12.5 SEC (ref 12–14.6)
RBC # BLD: 4.19 M/UL (ref 4.2–5.4)
WBC # BLD: 9.4 K/UL (ref 4.8–10.8)

## 2023-02-22 PROCEDURE — 71250 CT THORAX DX C-: CPT | Performed by: RADIOLOGY

## 2023-02-22 PROCEDURE — G1010 CDSM STANSON: HCPCS | Performed by: RADIOLOGY

## 2023-02-22 PROCEDURE — G1010 CDSM STANSON: HCPCS

## 2023-02-23 ENCOUNTER — HOSPITAL ENCOUNTER (OUTPATIENT)
Age: 58
Setting detail: OUTPATIENT SURGERY
Discharge: HOME | End: 2023-02-23
Attending: INTERNAL MEDICINE | Admitting: INTERNAL MEDICINE
Payer: MEDICARE

## 2023-02-23 ENCOUNTER — PREP FOR PROCEDURE (OUTPATIENT)
Dept: PULMONOLOGY | Age: 58
End: 2023-02-23

## 2023-02-23 ENCOUNTER — ANESTHESIA (OUTPATIENT)
Dept: ENDOSCOPY | Age: 58
End: 2023-02-23
Payer: MEDICARE

## 2023-02-23 VITALS
SYSTOLIC BLOOD PRESSURE: 142 MMHG | OXYGEN SATURATION: 96 % | BODY MASS INDEX: 27.48 KG/M2 | HEIGHT: 66 IN | HEART RATE: 95 BPM | WEIGHT: 171 LBS | TEMPERATURE: 97.6 F | RESPIRATION RATE: 16 BRPM | DIASTOLIC BLOOD PRESSURE: 75 MMHG

## 2023-02-23 DIAGNOSIS — R91.1 LUNG NODULE: ICD-10-CM

## 2023-02-23 PROCEDURE — 2720000010 HC SURG SUPPLY STERILE: Performed by: INTERNAL MEDICINE

## 2023-02-23 PROCEDURE — 7100000011 HC PHASE II RECOVERY - ADDTL 15 MIN: Performed by: INTERNAL MEDICINE

## 2023-02-23 PROCEDURE — 3700000001 HC ADD 15 MINUTES (ANESTHESIA): Performed by: INTERNAL MEDICINE

## 2023-02-23 PROCEDURE — 31627 NAVIGATIONAL BRONCHOSCOPY: CPT | Performed by: INTERNAL MEDICINE

## 2023-02-23 PROCEDURE — 6370000000 HC RX 637 (ALT 250 FOR IP): Performed by: INTERNAL MEDICINE

## 2023-02-23 PROCEDURE — 2580000003 HC RX 258: Performed by: INTERNAL MEDICINE

## 2023-02-23 PROCEDURE — 7100000001 HC PACU RECOVERY - ADDTL 15 MIN: Performed by: INTERNAL MEDICINE

## 2023-02-23 PROCEDURE — 94640 AIRWAY INHALATION TREATMENT: CPT

## 2023-02-23 PROCEDURE — 3700000000 HC ANESTHESIA ATTENDED CARE: Performed by: INTERNAL MEDICINE

## 2023-02-23 PROCEDURE — 2709999900 HC NON-CHARGEABLE SUPPLY: Performed by: INTERNAL MEDICINE

## 2023-02-23 PROCEDURE — 3609155400 HC ADD ON COMPUTER ASSISTED NAVIGATION: Performed by: INTERNAL MEDICINE

## 2023-02-23 PROCEDURE — 7100000000 HC PACU RECOVERY - FIRST 15 MIN: Performed by: INTERNAL MEDICINE

## 2023-02-23 PROCEDURE — 31629 BRONCHOSCOPY/NEEDLE BX EACH: CPT | Performed by: INTERNAL MEDICINE

## 2023-02-23 PROCEDURE — 7100000010 HC PHASE II RECOVERY - FIRST 15 MIN: Performed by: INTERNAL MEDICINE

## 2023-02-23 PROCEDURE — 6360000002 HC RX W HCPCS: Performed by: NURSE ANESTHETIST, CERTIFIED REGISTERED

## 2023-02-23 PROCEDURE — 2500000003 HC RX 250 WO HCPCS: Performed by: NURSE ANESTHETIST, CERTIFIED REGISTERED

## 2023-02-23 RX ORDER — IPRATROPIUM BROMIDE AND ALBUTEROL SULFATE 2.5; .5 MG/3ML; MG/3ML
1 SOLUTION RESPIRATORY (INHALATION) ONCE
Status: COMPLETED | OUTPATIENT
Start: 2023-02-23 | End: 2023-02-23

## 2023-02-23 RX ORDER — SODIUM CHLORIDE 9 MG/ML
INJECTION, SOLUTION INTRAVENOUS PRN
Status: DISCONTINUED | OUTPATIENT
Start: 2023-02-23 | End: 2023-02-23 | Stop reason: HOSPADM

## 2023-02-23 RX ORDER — SODIUM CHLORIDE 0.9 % (FLUSH) 0.9 %
5-40 SYRINGE (ML) INJECTION EVERY 12 HOURS SCHEDULED
Status: DISCONTINUED | OUTPATIENT
Start: 2023-02-23 | End: 2023-02-23 | Stop reason: HOSPADM

## 2023-02-23 RX ORDER — METOCLOPRAMIDE HYDROCHLORIDE 5 MG/ML
10 INJECTION INTRAMUSCULAR; INTRAVENOUS
Status: DISCONTINUED | OUTPATIENT
Start: 2023-02-23 | End: 2023-02-23 | Stop reason: HOSPADM

## 2023-02-23 RX ORDER — HYDROMORPHONE HYDROCHLORIDE 1 MG/ML
0.5 INJECTION, SOLUTION INTRAMUSCULAR; INTRAVENOUS; SUBCUTANEOUS EVERY 5 MIN PRN
Status: DISCONTINUED | OUTPATIENT
Start: 2023-02-23 | End: 2023-02-23 | Stop reason: HOSPADM

## 2023-02-23 RX ORDER — FENTANYL CITRATE 50 UG/ML
INJECTION, SOLUTION INTRAMUSCULAR; INTRAVENOUS PRN
Status: DISCONTINUED | OUTPATIENT
Start: 2023-02-23 | End: 2023-02-23 | Stop reason: SDUPTHER

## 2023-02-23 RX ORDER — SODIUM CHLORIDE, SODIUM LACTATE, POTASSIUM CHLORIDE, CALCIUM CHLORIDE 600; 310; 30; 20 MG/100ML; MG/100ML; MG/100ML; MG/100ML
INJECTION, SOLUTION INTRAVENOUS CONTINUOUS
Status: DISCONTINUED | OUTPATIENT
Start: 2023-02-23 | End: 2023-02-23 | Stop reason: HOSPADM

## 2023-02-23 RX ORDER — HYDROMORPHONE HYDROCHLORIDE 1 MG/ML
0.25 INJECTION, SOLUTION INTRAMUSCULAR; INTRAVENOUS; SUBCUTANEOUS EVERY 5 MIN PRN
Status: DISCONTINUED | OUTPATIENT
Start: 2023-02-23 | End: 2023-02-23 | Stop reason: HOSPADM

## 2023-02-23 RX ORDER — ROCURONIUM BROMIDE 10 MG/ML
INJECTION, SOLUTION INTRAVENOUS PRN
Status: DISCONTINUED | OUTPATIENT
Start: 2023-02-23 | End: 2023-02-23 | Stop reason: SDUPTHER

## 2023-02-23 RX ORDER — DIPHENHYDRAMINE HYDROCHLORIDE 50 MG/ML
12.5 INJECTION INTRAMUSCULAR; INTRAVENOUS
Status: DISCONTINUED | OUTPATIENT
Start: 2023-02-23 | End: 2023-02-23 | Stop reason: HOSPADM

## 2023-02-23 RX ORDER — ONDANSETRON 2 MG/ML
INJECTION INTRAMUSCULAR; INTRAVENOUS PRN
Status: DISCONTINUED | OUTPATIENT
Start: 2023-02-23 | End: 2023-02-23 | Stop reason: SDUPTHER

## 2023-02-23 RX ORDER — LIDOCAINE HYDROCHLORIDE 10 MG/ML
INJECTION, SOLUTION INFILTRATION; PERINEURAL PRN
Status: DISCONTINUED | OUTPATIENT
Start: 2023-02-23 | End: 2023-02-23 | Stop reason: SDUPTHER

## 2023-02-23 RX ORDER — PROPOFOL 10 MG/ML
INJECTION, EMULSION INTRAVENOUS PRN
Status: DISCONTINUED | OUTPATIENT
Start: 2023-02-23 | End: 2023-02-23 | Stop reason: SDUPTHER

## 2023-02-23 RX ORDER — SODIUM CHLORIDE 0.9 % (FLUSH) 0.9 %
5-40 SYRINGE (ML) INJECTION PRN
Status: DISCONTINUED | OUTPATIENT
Start: 2023-02-23 | End: 2023-02-23 | Stop reason: HOSPADM

## 2023-02-23 RX ORDER — MIDAZOLAM HYDROCHLORIDE 1 MG/ML
INJECTION INTRAMUSCULAR; INTRAVENOUS PRN
Status: DISCONTINUED | OUTPATIENT
Start: 2023-02-23 | End: 2023-02-23 | Stop reason: SDUPTHER

## 2023-02-23 RX ADMIN — LIDOCAINE HYDROCHLORIDE 40 MG: 10 INJECTION, SOLUTION INFILTRATION; PERINEURAL at 08:17

## 2023-02-23 RX ADMIN — ROCURONIUM BROMIDE 40 MG: 10 INJECTION INTRAVENOUS at 08:20

## 2023-02-23 RX ADMIN — PROPOFOL 160 MG: 10 INJECTION, EMULSION INTRAVENOUS at 08:17

## 2023-02-23 RX ADMIN — IPRATROPIUM BROMIDE AND ALBUTEROL SULFATE 1 AMPULE: .5; 2.5 SOLUTION RESPIRATORY (INHALATION) at 11:19

## 2023-02-23 RX ADMIN — ONDANSETRON 4 MG: 2 INJECTION INTRAMUSCULAR; INTRAVENOUS at 08:14

## 2023-02-23 RX ADMIN — FENTANYL CITRATE 50 MCG: 50 INJECTION INTRAMUSCULAR; INTRAVENOUS at 08:31

## 2023-02-23 RX ADMIN — SODIUM CHLORIDE, POTASSIUM CHLORIDE, SODIUM LACTATE AND CALCIUM CHLORIDE: 600; 310; 30; 20 INJECTION, SOLUTION INTRAVENOUS at 07:53

## 2023-02-23 RX ADMIN — SUGAMMADEX 155 MG: 100 INJECTION, SOLUTION INTRAVENOUS at 09:17

## 2023-02-23 RX ADMIN — FENTANYL CITRATE 50 MCG: 50 INJECTION INTRAMUSCULAR; INTRAVENOUS at 08:15

## 2023-02-23 RX ADMIN — MIDAZOLAM 2 MG: 1 INJECTION INTRAMUSCULAR; INTRAVENOUS at 08:15

## 2023-02-23 RX ADMIN — LIDOCAINE HYDROCHLORIDE: 20 SOLUTION ORAL; TOPICAL at 11:13

## 2023-02-23 ASSESSMENT — PAIN SCALES - GENERAL
PAINLEVEL_OUTOF10: 0
PAINLEVEL_OUTOF10: 0

## 2023-02-23 ASSESSMENT — ENCOUNTER SYMPTOMS: SHORTNESS OF BREATH: 1

## 2023-02-23 ASSESSMENT — LIFESTYLE VARIABLES: SMOKING_STATUS: 0

## 2023-02-23 ASSESSMENT — PAIN - FUNCTIONAL ASSESSMENT: PAIN_FUNCTIONAL_ASSESSMENT: 0-10

## 2023-02-23 NOTE — ANESTHESIA PRE PROCEDURE
Department of Anesthesiology  Preprocedure Note       Name:  Regino Carty   Age:  56 y.o.  :  1965                                          MRN:  [id number]         Date:  2023      Surgeon: Ramone Rivera): Facundo Velázquez MD    Procedure: Procedure(s):  BRONCHOSCOPY ADD ON COMPUTER ASSISTED ROBOTIC    Medications prior to admission:   Prior to Admission medications    Medication Sig Start Date End Date Taking? Authorizing Provider   ELIQUIS 5 MG TABS tablet TAKE 1 TABLET BY MOUTH TWICE A DAY 11/15/22   BRITANY Cobb   cyclobenzaprine (FLEXERIL) 10 MG tablet Take 10 mg by mouth 3 times daily as needed 22   Towana Rummer   ketorolac (TORADOL) 10 MG tablet Take 1 tablet by mouth every 6 hours as needed for Pain 22   AUSTYN Vasquez   gabapentin (NEURONTIN) 300 MG capsule Take 2 capsules by mouth 3 times daily for 30 days. 22  Daniel Hallman MD   Cholecalciferol (VITAMIN D3) 1.25 MG (97282 UT) TABS Take 1 tablet by mouth once a week  Patient not taking: Reported on 2023   Daniel Hallman MD   guaiFENesin 1200 MG TB12 Take 1 tablet by mouth 2 times daily  Patient not taking: Reported on 2023   Tad ReamerBRITANY   sodium hypochlorite (DAKINS) 0.125 % SOLN external solution Apply topically 2 times daily  Patient not taking: Reported on 2023 10/12/21   ClaudiaBRITANY Baltazar   VENTOLIN  (90 Base) MCG/ACT inhaler Inhale 2 puffs into the lungs 4 times daily as needed for Wheezing 21   Ashley Morrison MD   pantoprazole (PROTONIX) 40 MG tablet TAKE 1 TABLET BY MOUTH EVERY DAY 21   Daniel Hallman MD   NARCAN 4 MG/0.1ML LIQD nasal spray 1 spray as needed  Patient not taking: Reported on 2023   Historical Provider, MD   guaiFENesin-codeine (GUAIFENESIN AC) 100-10 MG/5ML liquid Take 5 mLs by mouth 3 times daily as needed for Cough.   Patient not taking: Reported on 2023    Historical Provider, MD   lisinopril (PRINIVIL;ZESTRIL) 10 MG tablet Take 10 mg by mouth daily  Patient not taking: No sig reported    Historical Provider, MD   acyclovir (ZOVIRAX) 800 MG tablet Take 800 mg by mouth 2 times daily as needed   Patient not taking: Reported on 2/23/2023    Historical Provider, MD   ondansetron (ZOFRAN) 4 MG tablet Take 4 mg by mouth every 8 hours as needed for Nausea or Vomiting    Historical Provider, MD   alclomethasone (ACLOVATE) 0.05 % cream Apply 1 Units topically 2 times daily as needed Apply topically 2 times daily. Historical Provider, MD       Current medications:    No current facility-administered medications for this visit. No current outpatient medications on file. Facility-Administered Medications Ordered in Other Visits   Medication Dose Route Frequency Provider Last Rate Last Admin    lactated ringers IV soln infusion   IntraVENous Continuous Ashley Morrison  mL/hr at 02/23/23 0753 New Bag at 02/23/23 0753       Allergies:     Allergies   Allergen Reactions    Clindamycin/Lincomycin Hives, Itching and Rash       Problem List:    Patient Active Problem List   Diagnosis Code    Car[facility] of female breast [facility] C50.919    Poor venous access I87.8    Iron deficiency anemia D50.9    Menopausal symptom N95.1    Benign neoplasm of body of uterus D26.1    Dyspnea and respiratory abnormalities R06.00, R06.89    Chronic pain syndrome G89.4    Right wrist pain M25.531    Rheumatoid arthritis involving both knees with negative rheumatoid factor ([facility] M06.061, M06.062    Other cyst of bone, left ankle and foot M85.672    Status post bilateral mastectomy Z90.13    Acute purulent bronchitis J20.9    Lung nodule R91.1    Status post bilateral breast reconstruction Z98.890    S/P revision of left breast reconstruction 9/17/2021 Z98.890    Arm DVT (deep venous thromboembolism), acute, left (HCC) I82.622    Exostosis of left foot M89.8X7    Central line infection T80.219A   [facility] or reservoir infection T80.212A    Chest wall ulcer, with fat layer exposed ([facility] L98.492    Non-cardiac chest pain R07.89    Wheezing R06.2    Snoring R06.83    RUDY (obstructive sleep apnea) G47.33    Non-restorative sleep G47.8    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, T45.1X5A    Deformity and disproportion of reconstructed breast N65.0, N65.1       Past Medical History:        Diagnosis Date    Breast cancer ([facility]     Breast cancer with lung mets     Chronic back pain     GERD (gastroesophageal reflux disease)     Hx of blood clots     Hypertension     Neuropathy     Post chemo treatment neuropathy       Past Surgical History:        Procedure Laterality Date    BREAST LUMPECTOMY Right     2006    BREAST RECONSTRUCTION Left 09/17/2021    Revision left reconstructed breast, implant performed by Lawerence Phalen, MD at [facility] [address] Bilateral 12/9/2022    CRESCENT LEFT MASTOPEXY, FAT GRAFTING RIGHT LATERAL NIPPLE & FAT GRAFTING INFERIOR LEFT MIDLINE performed by Lawerence Phalen, MD at [address]  2016    Bilateral breast implants    EMBOLECTOMY Left 9/26/2021    LEFT UPPER EXTREMITY  MECHANICAL SUCTION THROMBECTOMY performed by Carlos Serrato DO at [facility] ([address] [place] [address])      HYSTERECTOMY, TOTAL ABDOMINAL (CERVIX REMOVED)  2015    MASTECTOMY, BILATERAL      Right 2013 & Left 2016    SCAR REVISION Left 5/10/2022    SCAR REVISION LEFT PORT SITE performed by Lawerence Phalen, MD at [address] TUNNELED VENOUS PORT PLACEMENT      VASCULAR SURGERY N/A 10/6/2021    REMOVAL OF MEDIPORT performed by Carlos Serrato DO at [place] [address] History:    Social History     Tobacco Use    Smoking status: Never    Smokeless tobacco: Never   Substance Use Topics    Alcohol use: Not Currently                                Counseling given: Not Answered      Vital Signs (Current):  There were no vitals filed for this visit. BP Readings from Last 3 Encounters:   02/23/23 122/72   02/09/23 119/74   02/09/23 139/72       NPO Status:                                                                                 BMI:   Wt Readings from Last 3 Encounters:   02/23/23 171 lb (77.6 kg)   02/09/23 170 lb 9.6 oz (77.4 kg)   02/09/23 175 lb 12.8 oz (79.7 kg)     There is no height or weight on file to calculate BMI.    CBC:   Lab Results   Component Value Date/Time    WBC 9.4 02/22/2023 08:21 AM    RBC 4.19 02/22/2023 08:21 AM    HGB 12.3 02/22/2023 08:21 AM    HCT 39.6 02/22/2023 08:21 AM    MCV 94.5 02/22/2023 08:21 AM    RDW 13.8 02/22/2023 08:21 AM     02/22/2023 08:21 AM       CMP:   Lab Results   Component Value Date/Time     02/09/2023 01:57 PM    K 4.0 02/09/2023 01:57 PM    K 4.1 10/08/2021 02:50 PM    CL 99 02/09/2023 01:57 PM    CO2 26 02/09/2023 01:57 PM    BUN 9 02/09/2023 01:57 PM    CREATININE 0.5 02/09/2023 01:57 PM    GFRAA >59 05/25/2022 02:02 PM    AGRATIO 1.6 04/01/2021 03:33 PM    LABGLOM >60 02/09/2023 01:57 PM    GLUCOSE 81 02/09/2023 01:57 PM    PROT 7.1 02/09/2023 01:57 PM    CALCIUM 9.0 02/09/2023 01:57 PM    BILITOT 0.7 02/09/2023 01:57 PM    ALKPHOS 95 02/09/2023 01:57 PM    AST 15 02/09/2023 01:57 PM    ALT 8 02/09/2023 01:57 PM       POC Tests: No results for input(s): POCGLU, POCNA, POCK, POCCL, POCBUN, POCHEMO, POCHCT in the last 72 hours.     Coags:   Lab Results   Component Value Date/Time    PROTIME 12.5 02/22/2023 08:21 AM    INR 0.94 02/22/2023 08:21 AM    APTT 34.3 11/19/2021 12:06 PM       HCG (If Applicable): No results found for: PREGTESTUR, PREGSERUM, HCG, HCGQUANT     ABGs: No results found for: PHART, PO2ART, PMW3ZIK, PNF7UAN, BEART, E4FSUSDD     Type & Screen (If Applicable):  No results found for: LABABO, LABRH    Drug/Infectious Status (If Applicable):  No results found for: HIV, HEPCAB    COVID-19 Screening (If Applicable): Lab Results   Component Value Date/Time    COVID19 Not Detected 11/19/2021 06:20 PM    COVID19 Not Detected 09/13/2021 10:11 AM           Anesthesia Evaluation  Patient summary reviewed and Nursing notes reviewed no history of anesthetic complications:   Airway: Mallampati: II  TM distance: >3 FB   Neck ROM: full  Mouth opening: > = 3 FB   Dental: normal exam         Pulmonary:normal exam  breath sounds clear to auscultation  (+) shortness of breath:      (-) not a current smoker          Patient did not smoke on day of surgery. ROS comment: Lung nodule   Cardiovascular:  Exercise tolerance: good (>4 METS),   (+) hypertension:,     (-) CAD,  angina and  CHF    NYHA Classification: I  ECG reviewed  Rhythm: regular  Rate: normal           Beta Blocker:  Not on Beta Blocker         Neuro/Psych:   Negative Neuro/Psych ROS     (-) seizures, CVA and depression/anxiety            GI/Hepatic/Renal: Neg GI/Hepatic/Renal ROS       (-) hiatal hernia and GERD       Endo/Other:    (+) blood dyscrasia (elequis 2/18/2023): anemia and anticoagulation therapy, arthritis: rheumatoid. , malignancy/cancer (h/o breast cancer). Pt had PAT visit. Abdominal:       Abdomen: soft. Vascular:   + DVT (chronic DVT in left upper arm. Elequis use.), . Other Findings:             Anesthesia Plan      general     ASA 3       Induction: intravenous. MIPS: Postoperative opioids intended and Prophylactic antiemetics administered. Anesthetic plan and risks discussed with patient.         Attending anesthesiologist reviewed and agrees with Tai Lemos, BRITANY - CRNA   2/23/2023

## 2023-02-23 NOTE — PROCEDURES
After consent and under general anesthesia the patient underwent bronchoscopy through the endotracheal tube. White light bronchoscopy was used to explore the airway. The distal trachea was normal.  Melisa was sharp and midline. Right and left bronchial trees were explored. There was no endobronchial disease bilaterally. Navigational bronchoscopy using the [facility] platform was used to navigate to the left lower lobe lesion. Navigation was successful. Transbronchial needle aspiration was done under fluoroscopic guidance through the [facility] scope. 10 specimens were obtained and placed in [address] [place] [address]. No immediate postoperative complications. Patient tolerated well. Estimated blood loss: 1 cc. Complications: None. Specimen: Right lower lobe transbronchial fine-needle aspirate x10. Cytology.   Disposition: Post anesthesia recovery per anesthesia service

## 2023-02-23 NOTE — INTERVAL H&P NOTE
Update History & Physical    The patient's History and Physical of February 9, 2023 was reviewed with the patient and I examined the patient. There was no change. The surgical site was confirmed by the patient and me. Plan: The risks, benefits, expected outcome, and alternative to the recommended procedure have been discussed with the patient. Patient understands and wants to proceed with the procedure.      Electronically signed by Weston Navarrete MD on 2/23/2023 at 8:19 AM

## 2023-02-23 NOTE — ANESTHESIA POSTPROCEDURE EVALUATION
Department of Anesthesiology  Postprocedure Note    Patient: Julio Bush  MRN: [id number]  YOB: 1965  Date of evaluation: 2/23/2023      Procedure Summary     Date: 02/23/23 Room / Location: [address]    Anesthesia Start: 9745 Anesthesia Stop:     Procedure: BRONCHOSCOPY ADD ON COMPUTER ASSISTED ROBOTIC (Left) Diagnosis:       Lung nodule      (Lung nodule [R91.1])    Surgeons: Kristin Garnica MD Responsible Provider: BRITANY Roblero CRNA    Anesthesia Type: general ASA Status: 3          Anesthesia Type: No value filed.     Dena Phase I: Dena Score: 10    Dena Phase II:        Anesthesia Post Evaluation    Patient location during evaluation: PACU  Patient participation: complete - patient participated  Level of consciousness: sleepy but conscious  Pain score: 2  Airway patency: patent  Nausea & Vomiting: no nausea and no vomiting  Complications: no  Cardiovascular status: hemodynamically stable and blood pressure returned to baseline  Respiratory status: acceptable and nasal cannula  Hydration status: stable

## 2023-02-23 NOTE — H&P (VIEW-ONLY)
Pulmonary and Sleep Medicine    Greg Renae (:  1965) is a 56 y.o. female,Established patient, here for evaluation of the following chief complaint(s):  Follow-up (Follow up- lung nodule ,left knee pain )      Referring physician:  Vinny Vivas [id number]     ASSESSMENT/PLAN:  1. Lung nodule  -     CT CHEST WO CONTRAST; Future  -     Case Request  2. Carcinoma of female breast, unspecified estrogen receptor status, unspecified laterality, unspecified site of breast ([facility]  -     Protime-INR; Future  -     CBC with Auto Differential; Future  3. Status post bilateral mastectomy and reconstruction   4. Wheezing      Discussed bronch with risk benefit she is in agreement to proceed with the procedure. Will refer to the ED for further evaluation of her left knee swelling. Truong Dominguez MD, [facility], [facility]    Return in about 3 weeks (around 3/2/2023). SUBJECTIVE/OBJECTIVE:  The patient is here for follow up on lung nodule. She had a recent CT and PET CT that showed a new LLL nodule that is FDG avid. She complains of sever left knee swelling. Prior to Visit Medications    Medication Sig Taking? Authorizing Provider   ELIQUIS 5 MG TABS tablet TAKE 1 TABLET BY MOUTH TWICE A DAY Yes Yandy Poag, APRN   cyclobenzaprine (FLEXERIL) 10 MG tablet Take 10 mg by mouth 3 times daily as needed Yes Alicia Orellana   meloxicam (MOBIC) 15 MG tablet TAKE 1 TABLET EVERY DAY AS NEEDED Yes Alicia Orellana   ketorolac (TORADOL) 10 MG tablet Take 1 tablet by mouth every 6 hours as needed for Pain Yes AUSTYN Heaton   gabapentin (NEURONTIN) 300 MG capsule Take 2 capsules by mouth 3 times daily for 30 days.  Yes Sara Lu MD   Cholecalciferol (VITAMIN D3) 1.25 MG (33589 UT) TABS Take 1 tablet by mouth once a week Yes Sara Lu MD   musujatarocin [facility] NASAL) 2 % nasal ointment Apply to each nostril BID 5 days prior to surgery Yes Valorie Taylor MD   guaiFENesin 1200 MG TB12 Take 1 tablet by mouth 2 times daily Yes BRITANY Mejia   sodium hypochlorite (DAKINS) 0.125 % SOLN external solution Apply topically 2 times daily Yes BRITANY Flores   VENTOLIN  (90 Base) MCG/ACT inhaler Inhale 2 puffs into the lungs 4 times daily as needed for Wheezing Yes Weston Navarrete MD   guaiFENesin-codeine (GUAIFENESIN AC) 100-10 MG/5ML liquid Take 5 mLs by mouth 3 times daily as needed for Cough. Yes Historical Provider, MD   acyclovir (ZOVIRAX) 800 MG tablet Take 800 mg by mouth 2 times daily as needed  Yes Historical Provider, MD   ondansetron (ZOFRAN) 4 MG tablet Take 4 mg by mouth every 8 hours as needed for Nausea or Vomiting Yes Historical Provider, MD   alclomethasone (ACLOVATE) 0.05 % cream Apply 1 Units topically 2 times daily as needed Apply topically 2 times daily. Yes Historical Provider, MD   pantoprazole (PROTONIX) 40 MG tablet TAKE 1 TABLET BY MOUTH EVERY DAY  Patient not taking: Reported on 2/9/2023  Claudia Dewitt MD   [facility] 4 MG/0.1ML LIQD nasal spray 1 spray as needed  Patient not taking: Reported on 2/9/2023  Historical Provider, MD   lisinopril (PRINIVIL;ZESTRIL) 10 MG tablet Take 10 mg by mouth daily  Patient not taking: Reported on 2/9/2023  Historical Provider, MD        Review of Systems   Constitutional:  Negative for activity change, appetite change, chills, diaphoresis and fatigue. HENT:  Negative for congestion, dental problem, drooling, ear discharge, postnasal drip and rhinorrhea. Eyes:  Negative for visual disturbance. Respiratory:  Positive for cough and shortness of breath. Negative for apnea, chest tightness and wheezing. Gastrointestinal:  Negative for abdominal distention, abdominal pain and anal bleeding. Endocrine: Negative for cold intolerance, heat intolerance and polydipsia. Genitourinary:  Negative for difficulty urinating, dysuria, enuresis and flank pain. Musculoskeletal:  Negative for arthralgias, back pain and gait problem. Allergic/Immunologic: Negative for environmental allergies. Neurological:  Negative for dizziness, facial asymmetry, light-headedness and headaches. Vitals:    02/09/23 1131   BP: 139/72   Pulse: 80   Temp: 97.8 °F (36.6 °C)   SpO2: 99%     BMI Readings from Last 1 Encounters:   02/09/23 28.37 kg/m²         Physical Exam  Vitals reviewed. Constitutional:       Appearance: Normal appearance. HENT:      Head: Normocephalic and atraumatic. Nose: Nose normal.   Eyes:      Extraocular Movements: Extraocular movements intact. Conjunctiva/sclera: Conjunctivae normal.   Cardiovascular:      Rate and Rhythm: Normal rate and regular rhythm. Heart sounds: No murmur heard. No friction rub. Pulmonary:      Effort: Pulmonary effort is normal. No respiratory distress. Breath sounds: Normal breath sounds. No stridor. No wheezing, rhonchi or rales. Abdominal:      General: There is no distension. Palpations: There is no mass. Tenderness: There is no abdominal tenderness. There is no guarding or rebound. Musculoskeletal:      Cervical back: Normal range of motion and neck supple. Neurological:      Mental Status: She is alert and oriented to person, place, and time. This note was generated using a voice recognition software. Errors in voice recognition may have occurred. An electronic signature was used to authenticate this note.     --Matt Marcial MD

## 2023-02-23 NOTE — DISCHARGE INSTRUCTIONS
Bronchoscopy: What to Expect at [facility]     Bronchoscopy lets your doctor look at your airway through a tube called a bronchoscope. Afterward, you may feel tired for 1 or 2 days. Your mouth may feel very dry for several hours after the procedure. You may also have a sore throat and a hoarse voice for a few days. Sucking on throat lozenges or gargling with warm salt water may help soothe your sore throat. If a sample of tissue (biopsy) was taken, you may spit up a small amount of blood or have bloody saliva. This is normal.  Ask your doctor when you can drive again. Do not smoke for at least 24 hours. This care sheet gives you a general idea about how long it will take for you to recover. But each person recovers at a different pace. Follow the steps below to get better as quickly as possible. How can you care for yourself at home? Activity    Do not eat anything for 2 hours after the procedure. Rest when you feel tired. Getting enough sleep will help you recover. Avoid strenuous activities, such as bicycle riding, jogging, weight lifting, or aerobic exercise, until your doctor says it is okay. Ask your doctor when you can drive again. Diet    You can eat your normal diet. If your stomach is upset, try bland, low-fat foods like plain rice, broiled chicken, toast, and yogurt. If it is painful to swallow, start out with cold drinks, flavored ice pops, and ice cream. Next, try soft foods like pudding, yogurt, canned or cooked fruit, scrambled eggs, and mashed potatoes. Avoid eating hard or scratchy foods like chips or raw vegetables. Avoid orange or tomato juice and other acidic foods that can sting the throat. Drink plenty of fluids to avoid becoming dehydrated (unless your doctor tells you not to). Medicines    Take pain medicines exactly as directed. If the doctor gave you a prescription medicine for pain, take it as prescribed.   If you are not taking a prescription pain medicine, ask your doctor if you can take an over-the-counter medicine. If you think your pain medicine is making you sick to your stomach: Take your medicine after meals (unless your doctor has told you not to). Ask your doctor for a different pain medicine. If your doctor prescribed antibiotics, take them as directed. Do not stop taking them just because you feel better. You need to take the full course of antibiotics. Follow-up care is a key part of your treatment and safety. Be sure to make and go to all appointments, and call your doctor if you are having problems. It's also a good idea to know your test results and keep a list of the medicines you take. When should you call for help? Call 911 anytime you think you may need emergency care. For example, call if:    You passed out (lost consciousness). You have sudden chest pain and shortness of breath. You cough up large amounts of bright red blood. You have severe pain in your chest.     You have severe trouble breathing. Call your doctor now or seek immediate medical care if:    You cough up more than a few tablespoons of blood. You have pain that does not get better after you take pain medicine. You have a fever over 100°F.     You still sound hoarse after a few days. You have bubbles under the skin around the collarbone. These may crackle and pop when you press on them. Watch closely for changes in your health, and be sure to contact your doctor if you have any problems. Where can you learn more? Go to http://www.woods.com/ and enter S288 to learn more about \"Bronchoscopy: What to Expect at Home. \"  Current as of: March 9, 2022               Content Version: 13.5  © 1552-3631 [employer]. Care instructions adapted under license by [facility] ([facility]).  If you have questions about a medical condition or this instruction, always ask your healthcare professional. Cecilio Arteaga disclaims any warranty or liability for your use of this information.

## 2023-02-24 ASSESSMENT — ENCOUNTER SYMPTOMS
SHORTNESS OF BREATH: 0
VOMITING: 0
COUGH: 0
ABDOMINAL PAIN: 0
NAUSEA: 0

## 2023-03-06 NOTE — PROGRESS NOTES
MEDICAL ONCOLOGY PROGRESS NOTE    Pt Name: Yandy Dickinson  MRN: [id number]  YOB: 1965  Date of evaluation: 3/7/2023    HISTORY OF PRESENT ILLNESS:    Reason for MD visit: Disease/toxicity management  Yandy Dickinson is here today for monitoring for breast cancer and toxicity assessment.    She is currently on palliative treatment with Faslodex/Ibrance initiated in January 2017.  She has not been taking her Ibrance for the last 6 months.  She has been compliant with her Faslodex. She denies any new complaints.  She had CT scans that showed a left lower lobe pulmonary nodule.  A PET scan was performed that showed increased FDG uptake in the nodule.  No other evidence of metastatic disease.  Discussed pulmonary and she underwent a bronchoscopy and biopsy.  Unfortunately, BAL/biopsy was nondiagnostic.  She complains of significant productive cough since her procedure.  She denies any fever.    Diagnosis  Grade III Adenocarcinoma of right breast diagnosed 2006  Stage IIIA, [id number]  ER/FL Positive, HER-2 bruce/ihc 1+  April 2013-RECURRENCE in the right axillary region  Genetic testing- BRCA 1&2 Negative  2014 (?) RECURRENCE in the axillary skin  01/05/2017- METASTATIC DISEASE with lung, hilar, and axillary lymph node mets  Osteopenia  Mediport associated DVT, October 2021  DVT left upper extremity, November 2021    Treatment Summary  2006- Neoadjuvant chemotherapy with AC x 4 cycles followed by Taxol  2007- Lumpectomy with axillary lymph node dissection  2007- Adjuvant radiation therapy to whole breast and axillary region  Did not take tamoxifen due to cost  April 2013- RECURRENCE  Neoadjuvant chemotherapy with 2 cycles of Taxotere followed by Doxil  10/14/2013- Right Breast Mastectomy and Axillary Dissection  01/23/2014- Completion of adjuvant RT to chest wall and axillary lymph node with Dr. Box  02/14- Initiated adjuvant endocrine therapy with tamoxifen  2014 (?) RECURRENCE in the axillary skin  2014 (?)  Surgical resection of the axillary skin  2015 (?) YVETTE/BSO  09/19/2016- Bilateral exchange, nipple reconstruction and fat grafting and removal of PAC   2016- Switched to aromatase inhibitor  01/05/2017- METASTATIC DISEASE with lung, hilar, and axillary mets  01/11/2017-06/17/2018- Single agent Abraxane x 13 cycles  06/27/2018-09/17/2018- Gemzar  10/10/2018-Faslodex every 4 weeks/palbociclib  She stopped Ibrance around April 2022    Cancer History:  Vonda Bautista was first seen by me on 1/21/2021. She was referred to [facility] for history of recurrent metastatic breast cancer. She has been in remission as per the latest CT scan. She is currently on Faslodex and palbociclib. She has received several lines therapy as described below. She moved from [place] to [place] to stay closer to her parents. Her medical history is complex. Further details as below. 2006- Neoadjuvant chemotherapy with AC x 4 cycles followed by Taxol AC was complicated by viral meningitis; patient experienced neuropathy after 3 doses of Taxol and was switched to Taxotere for last dose  2007- Lumpectomy with axillary lymph node dissection 1.9cm, grade 2. No LVI. 1 of 14 lymph nodes positive for malignancy (1/14) [id number]  2007- Adjuvant Radiation Therapy to whole breast and axillary region  April 2013- RECURRENCE presenting as mass in the axillary region  Neoadjuvant chemotherapy with 2 cycles of Taxotere followed by Doxil- did not have good response to treatment  2013- Preop PET/CTshowed 2.8cm, right axillary lymph node with uptake. 10/14/2013- Right Breast Mastectomy and Axillary Dissection Right breast had benign tissue with fibrous, negative for carcinoma. Axillary contents demonstrated invasive ductal carcinoma, involving fibroadipose tissue and tendinous tissue. 3 benign lymph nodes (0/3). [place] grade 3. 3.0cm in greatest gross dimensions.  Carcinoma permeates among soft tissues and in the right axilla with no apparent involvement of lymph nodes. 01/23/2014- Completion of adjuvant radiation therapy to chest wall and axillary lymph node with Dr. Michael Elizalde  02/14- Initiated adjuvant endocrine therapy with tamoxifen  2014 (?) RECURRENCE in the axillary skin  2014 (?) Surgical resection of the axillary skin pathology demonstrating tumor at cauterized margin  2015 (?) YVETTE/BSO  09/19/2016- Bilateral exchange, nipple reconstruction and fat grafting and removal of PAC  2016- Switched to aromatase inhibitor patient was non compliant  01/05/2017- METASTATIC DISEASE Presented with respiratory failure. CTA Pulmonary showed numerous nodules and masses throughout both lung fields, compatible with metastatic disease. Bilateral hilar adenopathy seen. 01/11/2017-06/17/2018- Single agent Abraxane x 13 cycles  03/15/2017- CT Chest W Contrast Interval decrease in maximal diameter of essentially all the multiple metastatic pulmonary nodules. The average difference is approximately 25%. Some of the much smaller ones have disappeared. Bilateral hilar and aortopulmonary window adenopathy is also similarly smaller. 06/07/2017- CT Chest W Contrast Multiple lung nodules seen bilaterally. Most of the lung nodules show interval improvement with decreased size by 1 to 3mm. Mediastinal and bilateral hilar adenopathy seen with improvement. 07/24/2017- PET/CT scan showed marked improvement, is minimal abnormal, has excellent response to therapy  02/01/2018- PET/CT scan Numerous bilateral lung masses and nodules, the majority of which do not have appreciable abnormal FDG uptake. The largest mass in the left infrahilar region of the left lower lobe demonstrates a maximum SUV of 3.3. Mediastinal lymphadenopathy. No evidence of metastatic disease in the abdomen or pelvis.   06/21/2018- CT Chest/Abdomen/Pelvis Multiple lung mets, mild bilateral hilar and mediastinal lymph nodes.  No masses or evidence of metastatic disease in the abdomen or pelvis  06/27/2018-09/17/2018- Gemzar  09/28/2018- CT Pulmonary Multiple lung mets, mild bilateral hilar and mediastinal lymph nodes  10/10/2018-12/21/2020- Faslodex every 4 weeks  12/14/2018- CT Chest showed definite decrease in the maximal diameter and volume of all left and right metastatic nodules. Somewhat enlarged paratracheal and left axillary lymph nodes are relatively unchanged. 04/22/2019- MRI Cervical Spine W WO Contrast Unremarkable  04/22/2019- MRI Brain W WO Contrast No enhancing lesions in the brain and no evidence of metastatic disease. 07/27/2019- CTA Pulmonary showed essentially complete disappearance of the pulmonary metastatic disease. Several tiny flat peripheral nodules are the only sequela. The tiny ones on the right are unchanged, the tiny ones on the left are even smaller. There is no longer any active metastatic disease in either lung. 12/19/2019- CT Chest/Abdomen/Pelvis Small, tiny subpleural nodules right upper and right midlung field as well as left lung base has remained unchanged. No new focal parenchymal abnormality seen. No adenopathy seen. There is no abdominal or pelvic mass, adenopathy or fluid collection. No lytic or sclerotic bony lesions, but there is a possibility of osteopenia and/or osteoporosis. 02/17/2020- DEXA Bone Density showed osteopenia of lumbar spine, T-score -1.8, and left femoral neck, T-score -2.0  2/25/21 Ct Abdomen Pelvis W Iv Contrast  No evidence of metastatic disease in the abdomen or pelvis. 2/26/21 Ct Chest W Contrast Tiny nodules in the right lung described above probably represent small foci of pleural thickening due to chronic inflammatory process or small noncalcified granulomas. No features to suggest malignancy or metastatic disease. Bilateral breast implants without complication. 3/1/2021-discussed the results CT chest abdomen pelvis. Essentially no clear evidence of metastatic disease.   This is consistent with excellent response to treatment. Continue current treatment. 4/1/2021 = 31.6 CA 27-29= 33.5 CEA= 1.6   6/3/21 CA 15-3= 23.3 CA 27-29= 25.6  CEA= 1.8   7/29/2021 CEA=1.7 CA 15-3=21.50 CA 27.29= 26.0  8/19/2021 CT Chest  Left lower lobe pleural-based nodular 1.7 x 0.9 cm is indeterminate. PET/CT recommended. Feeding defect in the left lower lobe bronchus versus a postobstructive airspace disease. This too may be further characterized with PET CT versus direct visualization. 8/19/2021 CT Abd/Pelvis A stable CT scan of the abdomen and pelvis. No finding to suggest neoplastic/metastatic disease. 9/28/2021 Final Diagnosis: Arm, excision of left arm thrombus:Fragments of organizing and organized thrombi with dystrophic calcification. Received is a container labeled \"ischemic, left arm\" Received in a fixative is a 3.6 x 2.6 x 1.4 cm aggregate of brown-tan soft tissue fragments and skin. Representative sections are submitted in a single cassette, numerous. October 2021-she developed left upper extremity DVT associated support. Port removed. She also had infection of the port site status post completion to biotics. 11/19/21 CTA pulmonary: No evidence of pulmonary embolism. The lungs are poorly expanded but unremarkable. 12/8/21 MRI lumbar spine: No significant change since the previous study. Chronic degenerative changes. A persistent mild degenerative retrolisthesis is of L5 over S1. Prominent disc osteophyte complexes and facetal arthropathy and resultant mild spinal stenosis at L4-5 and neural foraminal stenosis at L4-5 and L5-S1 as detailed above. 1/6/22 CT CHEST WO CONTRAST Stable chest CT with an unchanged tiny subpleural nodule within the right upper lobe. 1/19/22 CA 15-3 24 CA 27-29 31.8 CEA 1.5  2/25/2022 CT Abd/Pelvis W IV Contrast(Oral) Gastric wall thickening is nonspecific. It could be an artifact of under distention. Gastritis and other etiologies are not ruled out.  The unenhanced solid organs demonstrate no focal abnormality. Diverticulosis of the colon. Under distention of portions of the colon. Prior hysterectomy. Mild sclerotic changes in the subarticular femoral heads bilaterally is stable. Early AVN is possible. MRI would be more definitive. The signal abnormality is greater on the right side.  5/10/2022 Skin, revision of left chest scar: Cicatrix identified with foreign body type response, negative for evidence of malignancy.  5/25/2022 Tumor Markers: CEA 1.9, CA 15-3-15, CA 27.29-18.2  7/18/2022 CT Chest W Contrast Bibasilar subsegmental atelectasis,table   and unchanged.Prominent osteophyte formation out in the lower thoracic spine with associated subsegmental atelectasis. No pulmonary nodules, masses, pleural effusion or pneumothorax. Bilateral breast implants. Stable interval appearance since recent study 7/12/22 7/18/2022 CT Abd/Pelvis W IV Contrast-.scattered colon diverticulosis. No acute diverticulitis. No acute abdominopelvic abnormality .7/25/2022-no imaging evidence of disease recurrence.  Continue treatment with Faslodex and Ibrance.  7/25/2022 VL Extremity Venous Left  There is chronic deep vein thrombosis (DVT) of the left axillary vein and  superficial thrombophlebitis (SVT) in the left basilic vein  8/23/2022 Tumor Markers: CEA 1.9, CA 15-3-14, CA 27.29-17.6  9/30/22 CT Head WO Contrast No acute hemorrhage  10/18/22 Tumor Markers: CEA 1.8, CA 15-3-16, CA 27.29-21.1  11/3/22 MRI IAC Posterior Fossa W Contrast No acute intracranial abnormality.Unremarkable appearance of the internal auditory canals.Mild to moderate cerebral white matter disease, which is nonspecific and may represent chronic microvascular ischemic change, demyelination, postinfectious process such as Lyme disease or vasculitis.  12/13/22 Tumor Markers: CEA 1.9,CA 15-3-17, CA 27.29-25.4  2/1/23 CT Chest W Contrast A 2.2 cm solid pulmonary nodule in the inferomedial left lower lobe.PET/CT versus tissue sampling is recommended for  further characterization. Metastatic disease cannot be excluded. 2/1/23 CT Abd/Pelvis W IV Contrast (oral) No evidence of metastatic disease in the abdomen or pelvis. 2/1/23 Bone Scan No findings to suggest osteoblastic metastatic disease. 2/6/23 PET Scan Left medial lower lobe nodule in the superior segment of the left lower lobe has increased FDG uptake max SUV 3.9 measures 1.5 x 1.1 cm. No additional suspicious focal hypermetabolic uptake or pathologic appearing adenopathy otherwise noted. 2/7/2023-I reviewed CT C/A/P and bone scan. New nodule in the left lower lobe. PET scan showed single nodule with increased FDG uptake in the left lower lobe. Discussed pulmonary. Referral back for medication bronchoscopy and biopsy of the nodule. Continue Faslodex for now. 2/7/23 Tumor Markers: CEA 2.3, CA 15-3-14, CA 27.29-25.4  2/22/23 CT Chest WO Contrast No suspicious masses or nodules. Lungs are clear. 2/23/23 Mass, left lower lobe superior segment, bronchoscopy and EBUS (ThinPrep and cell block section) by : No malignant cells identified. Benign bronchial epithelial cells and alveolar macrophages in a background of fibrin.          CA 27.29 Dynamics  01/21/2021- 20.3  04/01/2021 33.5  06/03/2021 25.6  07/29/2021 26.0  1/19/22 31.8  8/23/22-17.6  10/18/22-21.1  12/13/22-25.4  2/7/23-25.4    Past Medical History:    Past Medical History:   Diagnosis Date    Breast cancer ([address]     Breast cancer with lung mets     Chronic back pain     GERD (gastroesophageal reflux disease)     Hx of blood clots     Hypertension     Neuropathy     Post chemo treatment neuropathy       Past Surgical History:    Past Surgical History:   Procedure Laterality Date    BREAST LUMPECTOMY Right     2006    BREAST RECONSTRUCTION Left 09/17/2021    Revision left reconstructed breast, implant performed by Lula Jaquez MD at [address] [place] [address] Bilateral 12/9/2022    CRESCENT LEFT MASTOPEXY, FAT GRAFTING RIGHT LATERAL NIPPLE & FAT GRAFTING INFERIOR LEFT MIDLINE performed by Jalen Dodd MD at [address] Left 2/23/2023    BRONCHOSCOPY ADD ON COMPUTER ASSISTED ROBOTIC performed by Paris Garcia MD at [facility]    COSMETIC SURGERY  2016    Bilateral breast implants    EMBOLECTOMY Left 9/26/2021    LEFT UPPER EXTREMITY  MECHANICAL SUCTION THROMBECTOMY performed by Mar Gutierrez DO at [address] ([address] [facility] [place] [facility])      HYSTERECTOMY, TOTAL ABDOMINAL (CERVIX REMOVED)  2015    MASTECTOMY, BILATERAL      Right 2013 & Left 2016    SCAR REVISION Left 5/10/2022    SCAR REVISION LEFT PORT SITE performed by Jalen Dodd MD at [address] [place] [address] Dr      VASCULAR SURGERY N/A 10/6/2021    REMOVAL OF MEDIPORT performed by Mar Gutierrez DO at [place] [address] History:    Marital status, children: Single, 2 children-female  Smoking status: no  ETOH status: no  Profession: Disabled  Resides: [place], [place]  Recent trips: Moved from [facility] [place]: no    Family History:   Family History   Problem Relation Age of Onset    Hypertension Mother     Obesity Mother     Prostate Cancer Father     No Known Problems Sister     No Known Problems Daughter     No Known Problems Daughter        Current Hospital Medications:    Current Outpatient Medications   Medication Sig Dispense Refill    ELIQUIS 5 MG TABS tablet TAKE 1 TABLET BY MOUTH TWICE A DAY 60 tablet 3    cyclobenzaprine (FLEXERIL) 10 MG tablet Take 10 mg by mouth 3 times daily as needed      ketorolac (TORADOL) 10 MG tablet Take 1 tablet by mouth every 6 hours as needed for Pain 20 tablet 0    guaiFENesin 1200 MG TB12 Take 1 tablet by mouth 2 times daily 30 tablet 0    sodium hypochlorite (DAKINS) 0.125 % SOLN external solution Apply topically 2 times daily 1 each 0    VENTOLIN  (90 Base) MCG/ACT inhaler Inhale 2 puffs into the lungs 4 times daily as needed for Wheezing 18 g 5    pantoprazole (PROTONIX) 40 MG tablet TAKE 1 TABLET BY MOUTH EVERY DAY 90 tablet 0    NARCAN 4 MG/0.1ML LIQD nasal spray 1 spray as needed      guaiFENesin-codeine (GUAIFENESIN AC) 100-10 MG/5ML liquid Take 5 mLs by mouth 3 times daily as needed for Cough. lisinopril (PRINIVIL;ZESTRIL) 10 MG tablet Take 10 mg by mouth daily      ondansetron (ZOFRAN) 4 MG tablet Take 4 mg by mouth every 8 hours as needed for Nausea or Vomiting      alclomethasone (ACLOVATE) 0.05 % cream Apply 1 Units topically 2 times daily as needed Apply topically 2 times daily. gabapentin (NEURONTIN) 300 MG capsule Take 2 capsules by mouth 3 times daily for 30 days. 180 capsule 0     No current facility-administered medications for this visit. Facility-Administered Medications Ordered in Other Visits   Medication Dose Route Frequency Provider Last Rate Last Admin    cyanocobalamin injection 1,000 mcg  1,000 mcg IntraMUSCular Once Junior Kam MD           Allergies:    Allergies   Allergen Reactions    Clindamycin/Lincomycin Hives, Itching and Rash         Subjective   REVIEW OF SYSTEMS:   CONSTITUTIONAL: no fever, no night sweats, no fatigue;  HEENT: no blurring of vision, no double vision, no hearing difficulty, no tinnitus, no ulceration, no dysplasia, no epistaxis;   LUNGS: cough, no hemoptysis,  wheeze,  no shortness of breath;  CARDIOVASCULAR: no palpitation, no chest pain, no shortness of breath;  GI: no abdominal pain, no nausea, no vomiting, no diarrhea, no constipation;  PATRICK: no dysuria, no hematuria, no frequency or urgency, no nephrolithiasis;  MUSCULOSKELETAL: no joint pain, no swelling, no stiffness;  ENDOCRINE: no polyuria, no polydipsia, no cold or heat intolerance;  HEMATOLOGY: no easy bruising or bleeding, no history of clotting disorder;  DERMATOLOGY: no skin rash, no eczema, no pruritus;  PSYCHIATRY: no depression, no anxiety, no panic attacks, no suicidal ideation, no homicidal ideation;  NEUROLOGY: no syncope, no seizures, no numbness or tingling of hands, no numbness or tingling of feet, no paresis;      /80 (Site: Left Upper Arm, Position: Sitting, Cuff Size: Medium Adult)   Pulse 62   Temp 99.6 °F (37.6 °C) (Oral)   Ht 5' 6\" (1.676 m)   Wt 172 lb 9.6 oz (78.3 kg)   SpO2 97%   BMI 27.86 kg/m²      PHYSICAL EXAM:  CONSTITUTIONAL: Alert, appropriate, no acute distress  EYES: Non icteric, EOM intact, pupils equal round   ENT: Mucus membranes moist, no oral pharyngeal lesions, external inspection of ears and nose are normal.  NECK: Supple, no masses. No palpable thyroid mass  CHEST/LUNGS: CTA bilaterally, normal respiratory effort   CARDIOVASCULAR: RRR, no murmurs. No lower extremity edema  ABDOMEN: soft non-tender, active bowel sounds, no HSM. No palpable masses  EXTREMITIES: warm, full ROM in all 4 extremities, no focal weakness. SKIN: warm, dry with no rashes or lesions  LYMPH: No cervical, clavicular, axillary, or inguinal lymphadenopathy  NEUROLOGIC: follows commands, non focal   PSYCH: mood and affect appropriate. Alert and oriented to time, place, person       LABORATORY RESULTS REVIEWED/ANALYZED BY ME:  2/7/23 Tumor Markers: CEA 2.3, CA 15-3-14, CA 27.29-25.4    2/23/23 Mass, left lower lobe superior segment, EBUS (ThinPrep and cell block   section) by : No malignant cells identified. Benign bronchial epithelial cells and alveolar macrophages in a background of fibrin. 3/7/23 CBC  WBC 8.55  HGB 13    Neut 5.65    RADIOLOGY STUDIES REVIEWED BY ME:  2/22/23 CT Chest WO Contrast No suspicious masses or nodules. Lungs are clear.    -Left lung medial aspect pulmonary nodule. ASSESSMENT:    No orders of the defined types were placed in this encounter. [facility] was seen today for follow-up.     Diagnoses and all orders for this visit:    Metastatic breast cancer ([address]    Pulmonary nodule 1 cm or greater in diameter    Care plan discussed with patient    Acute cough    Use of fulvestrant (Faslodex)    Metastatic breast cancer, hormone sensitive  2/1/23 CT Chest W Contrast A 2.2 cm solid pulmonary nodule in the inferomedial left lower lobe. PET/CT versus tissue sampling is recommended for further characterization. Metastatic disease cannot be excluded. 2/1/23 CT Abd/Pelvis W IV Contrast (oral) No evidence of metastatic disease in the abdomen or pelvis. 2/1/23 Bone Scan No findings to suggest osteoblastic metastatic disease. 2/6/23 PET Scan Left medial lower lobe nodule in the superior segment of the left lower lobe has increased FDG uptake max SUV 3.9 measures 1.5 x 1.1 cm. No additional suspicious focal hypermetabolic uptake or pathologic appearing adenopathy otherwise noted. 2/7/2023-I reviewed CT C/A/P and bone scan. New nodule in the left lower lobe. PET scan showed single nodule with increased FDG uptake in the left lower lobe. Discussed pulmonary. Referral back for medication bronchoscopy and biopsy of the nodule. Continue Faslodex for now.  2/23/23 Mass, left lower lobe superior segment, bronchoscopy and EBUS (ThinPrep and cell block section) by : No malignant cells identified. Benign bronchial epithelial cells and alveolar macrophages in a background of fibrin. Plan:  -Continue Faslodex only as per patient request.  She has stopped Ibrance in the past.  -Repeat CT chest June 2023. New left lower lobe pulmonary nodule  -2/6/23 PET Scan Left medial lower lobe nodule in the superior segment of the left lower lobe has increased FDG uptake max SUV 3.9 measures 1.5 x 1.1 cm. No additional suspicious focal hypermetabolic uptake or pathologic appearing adenopathy otherwise noted. -Referred to pulmonary for consideration of biopsy. Discussed with pulmonary Dr Baljinder Taylor.   The patient was referred for navigational bronchoscopy for biopsy  2/23/23 Mass, left lower lobe superior segment, bronchoscopy and EBUS (ThinPrep and cell block section) by : No malignant cells identified. Benign bronchial epithelial cells and alveolar macrophages in a background of fibrin. At this point, BAL/biopsy was nondiagnostic. Last CT chest did not describe and pulmonary nodule. However, the high SUV uptake nodule is still present as per my review. At this point, we will repeat her CT chest in June 2023    B12 deficiency- Recommended B12 2000 mcg p.o. daily. Will start B12 injections today 1000mcg with Faslodex. 3/1/2021- Methylmalonic Acid 171, Vitamin B12 >2000. B12 replaced monthly. 10/10/2021-vitamin B12 154. She is currently receiving B12 replacement. Chemotherapy-induced neuropathy-continue gabapentin. COVID-19 vaccination response-patient had covid 19 Jan 2022. Acute left upper extremity DVT/left upper chest port, October 2021-US upper extremity showed acute DVT likely related to port Oct 2021.   -Currently on Eliquis. S/p Mediport removal.  -US Jan 2022,LUE: Chronic appearing deep vein thrombosis (DVT) is seen in the axillary  brachial , left upper extremity(ies). Subacute appearing superficial thrombophlebitis (SVT) is seen in the  basilic vein, left upper extremity(ies  -Continue Eliquis 5 mg p.o. twice daily    Normocytic anemia-hemoglobin 10.8. Improving. Continue to monitor BC.  10/10/2021-ferritin 92, iron saturation 17%, TIBC 242, folate 4.6, vitamin B12 154. Continue B12 replacement  -Hemoglbin 12.3  Lab Results   Component Value Date    WBC 8.55 03/07/2023    HGB 13.0 03/07/2023    HCT 41.9 03/07/2023    MCV 94.8 03/07/2023     (L) 03/07/2023       LUE chronic DVT  -Continue Eliquis  -US showed chronic deep vein thrombosis (DVT) of the left axillary vein and   superficial thrombophlebitis (SVT) in the left basilic vein.   -Patient is currently on Eliquis 5 mg p.o. twice daily.     Productive cough-Levaquin 750 mg p.o. daily x7 days    Prevention of vaginal candidiasis-fluconazole 150 mg every 72 hours x3 doses    PLAN:  RTC with MD 8 weeks  CBC today  Recommend Levaquin 750 mg x 7 days-script sent  Recommend Diflucan 150 mg every 3 days x 3 doses-script sent  Faslodex and B12 today and every 4 weeks  Follow up with Dr. Abigail Razo for PCP care  Continue B12 replacement monthly  Continue Eliquis 5 mg BID  Continue follow-up with /Pulmonology,3/8/23       Carlota CLARK am pre charting  as Medical Assistant for Clarissa Gastelum MD. Electronically signed by Carlota Gar MA on 3/7/2023 at 4:53 PM CST. Sonal Marcial am scribing for Clarissa Gastelum MD. Electronically signed by Rocío Painter RN on 3/7/2023 at 11:08 AM CST. I, Dr Latoya Ramírez, personally performed the services described in this documentation as scribed by Rocío Painter, RN in my presence and is both accurate and complete. I have seen, examined and reviewed this patient medication list, appropriate labs and imaging studies. I reviewed relevant medical records and others physicians notes. I discussed the plans of care with the patient. I answered all the questions to the patients satisfaction. I have also reviewed the chief complaint (CC) and part of the history (History of Present Illness (HPI), Past Family Social History [facility]), or Review of Systems (ROS) and made changes when appropriated. (Please note that portions of this note were completed with a voice recognition program. Efforts were made to edit the dictations but occasionally words are mis-transcribed. )Electronically signed by Clarissa Gastelum MD on 3/7/2023 at 11:12 AM

## 2023-03-07 ENCOUNTER — OFFICE VISIT (OUTPATIENT)
Dept: HEMATOLOGY | Age: 58
End: 2023-03-07
Payer: MEDICARE

## 2023-03-07 ENCOUNTER — HOSPITAL ENCOUNTER (OUTPATIENT)
Dept: INFUSION THERAPY | Age: 58
Discharge: HOME | End: 2023-03-07
Payer: MEDICARE

## 2023-03-07 VITALS
SYSTOLIC BLOOD PRESSURE: 132 MMHG | TEMPERATURE: 99.6 F | BODY MASS INDEX: 27.74 KG/M2 | OXYGEN SATURATION: 97 % | HEIGHT: 66 IN | HEART RATE: 62 BPM | DIASTOLIC BLOOD PRESSURE: 80 MMHG | WEIGHT: 172.6 LBS

## 2023-03-07 DIAGNOSIS — C50.919 CARCINOMA OF FEMALE BREAST, UNSPECIFIED ESTROGEN RECEPTOR STATUS, UNSPECIFIED LATERALITY, UNSPECIFIED SITE OF BREAST (HCC): ICD-10-CM

## 2023-03-07 DIAGNOSIS — Z79.818 USE OF FULVESTRANT (FASLODEX): ICD-10-CM

## 2023-03-07 DIAGNOSIS — C50.919 METASTATIC BREAST CANCER (HCC): Primary | ICD-10-CM

## 2023-03-07 DIAGNOSIS — D50.8 OTHER IRON DEFICIENCY ANEMIA: ICD-10-CM

## 2023-03-07 DIAGNOSIS — R05.1 ACUTE COUGH: ICD-10-CM

## 2023-03-07 DIAGNOSIS — C50.919 METASTATIC BREAST CANCER (HCC): ICD-10-CM

## 2023-03-07 DIAGNOSIS — Z71.89 CARE PLAN DISCUSSED WITH PATIENT: ICD-10-CM

## 2023-03-07 DIAGNOSIS — R91.1 PULMONARY NODULE 1 CM OR GREATER IN DIAMETER: Primary | ICD-10-CM

## 2023-03-07 DIAGNOSIS — R91.1 PULMONARY NODULE 1 CM OR GREATER IN DIAMETER: ICD-10-CM

## 2023-03-07 LAB
BASOPHILS ABSOLUTE: 0.02 K/UL (ref 0.01–0.08)
BASOPHILS RELATIVE PERCENT: 0.2 % (ref 0.1–1.2)
EOSINOPHILS ABSOLUTE: 0.03 K/UL (ref 0.04–0.54)
EOSINOPHILS RELATIVE PERCENT: 0.4 % (ref 0.7–7)
HCT VFR BLD CALC: 41.9 % (ref 34.1–44.9)
HEMOGLOBIN: 13 G/DL (ref 11.2–15.7)
LYMPHOCYTES ABSOLUTE: 2.41 K/UL (ref 1.18–3.74)
LYMPHOCYTES RELATIVE PERCENT: 28.2 % (ref 19.3–53.1)
MCH RBC QN AUTO: 29.4 PG (ref 25.6–32.2)
MCHC RBC AUTO-ENTMCNC: 31 G/DL (ref 32.3–35.5)
MCV RBC AUTO: 94.8 FL (ref 79.4–94.8)
MONOCYTES ABSOLUTE: 0.43 K/UL (ref 0.24–0.82)
MONOCYTES RELATIVE PERCENT: 5 % (ref 4.7–12.5)
NEUTROPHILS ABSOLUTE: 5.65 K/UL (ref 1.56–6.13)
NEUTROPHILS RELATIVE PERCENT: 66.1 % (ref 34–71.1)
PDW BLD-RTO: 13 % (ref 11.7–14.4)
PLATELET # BLD: 177 K/UL (ref 182–369)
PMV BLD AUTO: 9.4 FL (ref 7.4–10.4)
RBC # BLD: 4.42 M/UL (ref 3.93–5.22)
WBC # BLD: 8.55 K/UL (ref 3.98–10.04)

## 2023-03-07 PROCEDURE — G8427 DOCREV CUR MEDS BY ELIG CLIN: HCPCS | Performed by: INTERNAL MEDICINE

## 2023-03-07 PROCEDURE — 3017F COLORECTAL CA SCREEN DOC REV: CPT | Performed by: INTERNAL MEDICINE

## 2023-03-07 PROCEDURE — 96402 CHEMO HORMON ANTINEOPL SQ/IM: CPT

## 2023-03-07 PROCEDURE — G8484 FLU IMMUNIZE NO ADMIN: HCPCS | Performed by: INTERNAL MEDICINE

## 2023-03-07 PROCEDURE — 36415 COLL VENOUS BLD VENIPUNCTURE: CPT

## 2023-03-07 PROCEDURE — 99214 OFFICE O/P EST MOD 30 MIN: CPT | Performed by: INTERNAL MEDICINE

## 2023-03-07 PROCEDURE — 6360000002 HC RX W HCPCS: Performed by: INTERNAL MEDICINE

## 2023-03-07 PROCEDURE — 96372 THER/PROPH/DIAG INJ SC/IM: CPT

## 2023-03-07 PROCEDURE — 1036F TOBACCO NON-USER: CPT | Performed by: INTERNAL MEDICINE

## 2023-03-07 PROCEDURE — 99212 OFFICE O/P EST SF 10 MIN: CPT

## 2023-03-07 PROCEDURE — 85025 COMPLETE CBC W/AUTO DIFF WBC: CPT

## 2023-03-07 PROCEDURE — G8417 CALC BMI ABV UP PARAM F/U: HCPCS | Performed by: INTERNAL MEDICINE

## 2023-03-07 RX ORDER — LEVOFLOXACIN 750 MG/1
750 TABLET ORAL DAILY
Qty: 7 TABLET | Refills: 0 | Status: SHIPPED | OUTPATIENT
Start: 2023-03-07 | End: 2023-03-14

## 2023-03-07 RX ORDER — LEVOFLOXACIN 250 MG/1
750 TABLET ORAL DAILY
Qty: 21 TABLET | Refills: 0 | Status: CANCELLED | OUTPATIENT
Start: 2023-03-07 | End: 2023-03-14

## 2023-03-07 RX ORDER — LAMOTRIGINE 25 MG/1
500 TABLET ORAL ONCE
Status: COMPLETED | OUTPATIENT
Start: 2023-03-07 | End: 2023-03-07

## 2023-03-07 RX ORDER — CYANOCOBALAMIN 1000 UG/ML
1000 INJECTION, SOLUTION INTRAMUSCULAR; SUBCUTANEOUS ONCE
Status: COMPLETED
Start: 2023-03-07 | End: 2023-03-07

## 2023-03-07 RX ORDER — FLUCONAZOLE 150 MG/1
150 TABLET ORAL
Qty: 3 TABLET | Refills: 0 | Status: CANCELLED | OUTPATIENT
Start: 2023-03-07 | End: 2023-03-10

## 2023-03-07 RX ORDER — FLUCONAZOLE 150 MG/1
150 TABLET ORAL
Qty: 3 TABLET | Refills: 0 | Status: SHIPPED | OUTPATIENT
Start: 2023-03-07 | End: 2023-03-10

## 2023-03-07 RX ADMIN — FULVESTRANT 500 MG: 50 INJECTION, SOLUTION INTRAMUSCULAR at 11:19

## 2023-03-07 RX ADMIN — CYANOCOBALAMIN 1000 MCG: 1000 INJECTION, SOLUTION INTRAMUSCULAR; SUBCUTANEOUS at 11:19

## 2023-03-08 ENCOUNTER — OFFICE VISIT (OUTPATIENT)
Dept: PULMONOLOGY | Age: 58
End: 2023-03-08
Payer: MEDICARE

## 2023-03-08 ENCOUNTER — HOSPITAL ENCOUNTER (OUTPATIENT)
Dept: GENERAL RADIOLOGY | Age: 58
Discharge: HOME | End: 2023-03-08
Payer: MEDICARE

## 2023-03-08 VITALS
DIASTOLIC BLOOD PRESSURE: 72 MMHG | TEMPERATURE: 97.9 F | HEIGHT: 66 IN | OXYGEN SATURATION: 97 % | BODY MASS INDEX: 27.97 KG/M2 | SYSTOLIC BLOOD PRESSURE: 134 MMHG | WEIGHT: 174 LBS | HEART RATE: 72 BPM

## 2023-03-08 DIAGNOSIS — Z90.13 STATUS POST BILATERAL MASTECTOMY: ICD-10-CM

## 2023-03-08 DIAGNOSIS — R91.1 LUNG NODULE: Primary | ICD-10-CM

## 2023-03-08 DIAGNOSIS — R13.19 ESOPHAGEAL DYSPHAGIA: ICD-10-CM

## 2023-03-08 DIAGNOSIS — J20.9 ACUTE PURULENT BRONCHITIS: ICD-10-CM

## 2023-03-08 DIAGNOSIS — C50.919 CARCINOMA OF FEMALE BREAST, UNSPECIFIED ESTROGEN RECEPTOR STATUS, UNSPECIFIED LATERALITY, UNSPECIFIED SITE OF BREAST (HCC): ICD-10-CM

## 2023-03-08 DIAGNOSIS — R13.19 ESOPHAGEAL DYSPHAGIA: Primary | ICD-10-CM

## 2023-03-08 PROCEDURE — G8417 CALC BMI ABV UP PARAM F/U: HCPCS | Performed by: INTERNAL MEDICINE

## 2023-03-08 PROCEDURE — 99214 OFFICE O/P EST MOD 30 MIN: CPT | Performed by: INTERNAL MEDICINE

## 2023-03-08 PROCEDURE — 1036F TOBACCO NON-USER: CPT | Performed by: INTERNAL MEDICINE

## 2023-03-08 PROCEDURE — 3017F COLORECTAL CA SCREEN DOC REV: CPT | Performed by: INTERNAL MEDICINE

## 2023-03-08 PROCEDURE — 71046 X-RAY EXAM CHEST 2 VIEWS: CPT

## 2023-03-08 PROCEDURE — G8484 FLU IMMUNIZE NO ADMIN: HCPCS | Performed by: INTERNAL MEDICINE

## 2023-03-08 PROCEDURE — G8427 DOCREV CUR MEDS BY ELIG CLIN: HCPCS | Performed by: INTERNAL MEDICINE

## 2023-03-08 RX ORDER — PREDNISONE 10 MG/1
TABLET ORAL
Qty: 35 TABLET | Refills: 0 | Status: SHIPPED | OUTPATIENT
Start: 2023-03-08

## 2023-03-08 ASSESSMENT — ENCOUNTER SYMPTOMS
BACK PAIN: 0
ABDOMINAL DISTENTION: 0
ANAL BLEEDING: 0
SHORTNESS OF BREATH: 0
RHINORRHEA: 0
COUGH: 1
APNEA: 0
ABDOMINAL PAIN: 0
WHEEZING: 0
CHEST TIGHTNESS: 0

## 2023-03-08 NOTE — PROGRESS NOTES
Pulmonary and Sleep Medicine    Ino Torrey (:  1965) is a 66 y.o. female,Established patient, here for evaluation of the following chief complaint(s):  Follow-up (Follow up- [place], cough, chest pain, SOB )      Referring physician:  No referring provider defined for this encounter. ASSESSMENT/PLAN:  1. Lung nodule  -     CT CHEST WO CONTRAST; Future  2. Carcinoma of female breast, unspecified estrogen receptor status, unspecified laterality, unspecified site of breast ([facility] [address]  3. Status post bilateral mastectomy and reconstruction   4. Acute purulent bronchitis  -     predniSONE (DELTASONE) 10 MG tablet; 40 mg a day and taper by 10 mg every third day to off, Disp-35 tablet, R-0Normal  -     XR CHEST (2 VW); Future  5. Esophageal dysphagia  -     FL MODIFIED BARIUM SWALLOW W VIDEO; Future  -     Ambulatory referral to Gastroenterology        Agree with Levaquin will add steroids. .  We will also obtain chest x-ray today. Lung biopsy showed alveolar macrophages in background of fibrin. Findings are suggestive of an inflammatory process. Cannot completely exclude malignancy however. SUVs PET/CT or below 5 units. Follow-up CT of the chest in 3 months. Will obtain CT of the chest in 3 months to assess stability of the lung nodule. She does have difficulty swallowing with odynophagia. We will obtain barium swallow. Bryanna Rahman MD, [facility]    Return in about 4 weeks (around 2023). SUBJECTIVE/OBJECTIVE:  The patient having difficulty swallowing. She says that she can swallow but once the food goes down her esophagus that she gets a heavy feeling in her chest.  She has been having a cough. Her cough is productive of scant sputum. Have a low-grade fever yesterday. Prior to Visit Medications    Medication Sig Taking?  Authorizing Provider   levoFLOXacin (LEVAQUIN) 750 MG tablet Take 1 tablet by mouth daily for 7 days Yes Earline Grant MD fluconazole (DIFLUCAN) 150 MG tablet Take 1 tablet by mouth every 3 days for 3 days Yes Maryjo Lunsford MD   ELIQUIS 5 MG TABS tablet TAKE 1 TABLET BY MOUTH TWICE A DAY Yes BRITANY Cartagena   cyclobenzaprine (FLEXERIL) 10 MG tablet Take 10 mg by mouth 3 times daily as needed Yes Natalie Angel   ketorolac (TORADOL) 10 MG tablet Take 1 tablet by mouth every 6 hours as needed for Pain Yes AUSTYN Rankin   gabapentin (NEURONTIN) 300 MG capsule Take 2 capsules by mouth 3 times daily for 30 days. Yes Maryjo Lunsford MD   guaiFENesin 1200 MG TB12 Take 1 tablet by mouth 2 times daily Yes BRITANY Harding   sodium hypochlorite (DAKINS) 0.125 % SOLN external solution Apply topically 2 times daily Yes BRITANY Watts   VENTOLIN  (90 Base) MCG/ACT inhaler Inhale 2 puffs into the lungs 4 times daily as needed for Wheezing Yes Ashley Morrison MD   pantoprazole (PROTONIX) 40 MG tablet TAKE 1 TABLET BY MOUTH EVERY DAY Yes Maryjo Lunsford MD   guaiFENesin-codeine (GUAIFENESIN AC) 100-10 MG/5ML liquid Take 5 mLs by mouth 3 times daily as needed for Cough. Yes Historical Provider, MD   ondansetron (ZOFRAN) 4 MG tablet Take 4 mg by mouth every 8 hours as needed for Nausea or Vomiting Yes Historical Provider, MD   alclomethasone (ACLOVATE) 0.05 % cream Apply 1 Units topically 2 times daily as needed Apply topically 2 times daily. Yes Historical Provider, MD   NARCAN 4 MG/0.1ML LIQD nasal spray 1 spray as needed  Patient not taking: Reported on 3/8/2023  Historical Provider, MD   lisinopril (PRINIVIL;ZESTRIL) 10 MG tablet Take 10 mg by mouth daily  Patient not taking: Reported on 3/8/2023  Historical Provider, MD        Review of Systems   Constitutional:  Negative for activity change, appetite change, chills, diaphoresis and fatigue. HENT:  Negative for congestion, dental problem, drooling, ear discharge, postnasal drip and rhinorrhea. Eyes:  Negative for visual disturbance. Respiratory:  Positive for cough. Negative for apnea, chest tightness, shortness of breath and wheezing. Gastrointestinal:  Negative for abdominal distention, abdominal pain and anal bleeding. Difficulty swallowing       Endocrine: Negative for cold intolerance, heat intolerance and polydipsia. Genitourinary:  Negative for difficulty urinating, dysuria, enuresis and flank pain. Musculoskeletal:  Negative for arthralgias, back pain and gait problem. Allergic/Immunologic: Negative for environmental allergies. Neurological:  Negative for dizziness, facial asymmetry, light-headedness and headaches. Vitals:    03/08/23 1316   BP: 134/72   Pulse: 72   Temp: 97.9 °F (36.6 °C)   SpO2: 97%     BMI Readings from Last 1 Encounters:   03/08/23 28.08 kg/m²         Physical Exam  Vitals reviewed. Constitutional:       Appearance: Normal appearance. HENT:      Head: Normocephalic and atraumatic. Nose: Nose normal.   Eyes:      Extraocular Movements: Extraocular movements intact. Conjunctiva/sclera: Conjunctivae normal.   Cardiovascular:      Rate and Rhythm: Normal rate and regular rhythm. Heart sounds: No murmur heard. No friction rub. Pulmonary:      Effort: Pulmonary effort is normal. No respiratory distress. Breath sounds: Normal breath sounds. No stridor. No wheezing, rhonchi or rales. Abdominal:      General: There is no distension. Palpations: There is no mass. Tenderness: There is no abdominal tenderness. There is no guarding or rebound. Musculoskeletal:      Cervical back: Normal range of motion and neck supple. Neurological:      Mental Status: She is alert and oriented to person, place, and time. This note was generated using a voice recognition software. Errors in voice recognition may have occurred. An electronic signature was used to authenticate this note.     --Ryan Velez MD

## 2023-03-13 ENCOUNTER — TELEPHONE (OUTPATIENT)
Dept: GASTROENTEROLOGY | Age: 58
End: 2023-03-13

## 2023-04-04 ENCOUNTER — HOSPITAL ENCOUNTER (OUTPATIENT)
Dept: INFUSION THERAPY | Age: 58
Discharge: HOME | End: 2023-04-04
Payer: MEDICARE

## 2023-04-04 VITALS
WEIGHT: 170 LBS | BODY MASS INDEX: 27.44 KG/M2 | OXYGEN SATURATION: 96 % | DIASTOLIC BLOOD PRESSURE: 72 MMHG | SYSTOLIC BLOOD PRESSURE: 136 MMHG | HEART RATE: 96 BPM

## 2023-04-04 DIAGNOSIS — C50.919 CARCINOMA OF FEMALE BREAST, UNSPECIFIED ESTROGEN RECEPTOR STATUS, UNSPECIFIED LATERALITY, UNSPECIFIED SITE OF BREAST (HCC): ICD-10-CM

## 2023-04-04 DIAGNOSIS — C50.919 PRIMARY MALIGNANT NEOPLASM OF BREAST WITH METASTASIS (HCC): ICD-10-CM

## 2023-04-04 DIAGNOSIS — J20.9 ACUTE PURULENT BRONCHITIS: ICD-10-CM

## 2023-04-04 DIAGNOSIS — D50.8 OTHER IRON DEFICIENCY ANEMIA: ICD-10-CM

## 2023-04-04 DIAGNOSIS — R91.1 PULMONARY NODULE 1 CM OR GREATER IN DIAMETER: Primary | ICD-10-CM

## 2023-04-04 LAB
ALBUMIN SERPL-MCNC: 4.4 G/DL (ref 3.5–5.2)
ALP SERPL-CCNC: 106 U/L (ref 35–104)
ALT SERPL-CCNC: 13 U/L (ref 9–52)
ANION GAP SERPL CALCULATED.3IONS-SCNC: 8 MMOL/L (ref 7–19)
AST SERPL-CCNC: 22 U/L (ref 14–36)
BILIRUB SERPL-MCNC: 1 MG/DL (ref 0.2–1.3)
BUN SERPL-MCNC: 15 MG/DL (ref 7–17)
CALCIUM SERPL-MCNC: 9.2 MG/DL (ref 8.4–10.2)
CHLORIDE SERPL-SCNC: 107 MMOL/L (ref 98–111)
CO2 SERPL-SCNC: 25 MMOL/L (ref 22–29)
CREAT SERPL-MCNC: 0.6 MG/DL (ref 0.5–1)
ERYTHROCYTE [DISTWIDTH] IN BLOOD BY AUTOMATED COUNT: 13 % (ref 11.7–14.4)
GLUCOSE SERPL-MCNC: 89 MG/DL (ref 74–106)
HCT VFR BLD AUTO: 39 % (ref 34.1–44.9)
HGB BLD-MCNC: 12.7 G/DL (ref 11.2–15.7)
LYMPHOCYTES # BLD: 2.07 K/UL (ref 1.18–3.74)
LYMPHOCYTES NFR BLD: 26.1 % (ref 19.3–53.1)
MCH RBC QN AUTO: 29.7 PG (ref 25.6–32.2)
MCHC RBC AUTO-ENTMCNC: 32.6 G/DL (ref 32.3–35.5)
MCV RBC AUTO: 91.3 FL (ref 79.4–94.8)
MONOCYTES # BLD: 0.39 K/UL (ref 0.24–0.82)
MONOCYTES NFR BLD: 4.9 % (ref 4.7–12.5)
NEUTROPHILS # BLD: 5.41 K/UL (ref 1.56–6.13)
NEUTS SEG NFR BLD: 68.2 % (ref 34–71.1)
PLATELET # BLD AUTO: 295 K/UL (ref 182–369)
PMV BLD AUTO: 8.5 FL (ref 7.4–10.4)
POTASSIUM SERPL-SCNC: 4.1 MMOL/L (ref 3.5–5.1)
PROT SERPL-MCNC: 7.7 G/DL (ref 6.3–8.2)
RBC # BLD AUTO: 4.27 M/UL (ref 3.93–5.22)
SODIUM SERPL-SCNC: 140 MMOL/L (ref 137–145)
WBC # BLD AUTO: 7.93 K/UL (ref 3.98–10.04)

## 2023-04-04 PROCEDURE — 6360000002 HC RX W HCPCS: Performed by: INTERNAL MEDICINE

## 2023-04-04 PROCEDURE — 80053 COMPREHEN METABOLIC PANEL: CPT

## 2023-04-04 PROCEDURE — 96372 THER/PROPH/DIAG INJ SC/IM: CPT

## 2023-04-04 PROCEDURE — 96402 CHEMO HORMON ANTINEOPL SQ/IM: CPT

## 2023-04-04 PROCEDURE — 85025 COMPLETE CBC W/AUTO DIFF WBC: CPT

## 2023-04-04 PROCEDURE — 36415 COLL VENOUS BLD VENIPUNCTURE: CPT

## 2023-04-04 RX ORDER — LAMOTRIGINE 25 MG/1
500 TABLET ORAL ONCE
Status: COMPLETED | OUTPATIENT
Start: 2023-04-04 | End: 2023-04-04

## 2023-04-04 RX ORDER — CYANOCOBALAMIN 1000 UG/ML
1000 INJECTION, SOLUTION INTRAMUSCULAR; SUBCUTANEOUS ONCE
Status: COMPLETED
Start: 2023-04-04 | End: 2023-04-04

## 2023-04-04 RX ADMIN — CYANOCOBALAMIN 1000 MCG: 1000 INJECTION, SOLUTION INTRAMUSCULAR; SUBCUTANEOUS at 11:39

## 2023-04-04 RX ADMIN — FULVESTRANT 500 MG: 50 INJECTION, SOLUTION INTRAMUSCULAR at 11:32

## 2023-04-05 NOTE — TELEPHONE ENCOUNTER
Zane Ornelasfela called to request a refill on her medication.       Last office visit : 3/8/2023   Next office visit : 4/25/2023     Requested Prescriptions     Pending Prescriptions Disp Refills    predniSONE (DELTASONE) 10 MG tablet [Pharmacy Med Name: PREDNISONE 10 MG TABLET] 35 tablet 0     Sig: TAKE 40 MG BY MOUTH A DAY AND TAPER BY 10 MG EVERY THIRD DAY TO OFF            Dianna Krishnan

## 2023-04-06 RX ORDER — PREDNISONE 10 MG/1
TABLET ORAL
Qty: 35 TABLET | Refills: 0 | Status: SHIPPED | OUTPATIENT
Start: 2023-04-06

## 2023-04-20 DIAGNOSIS — E53.8 VITAMIN B 12 DEFICIENCY: ICD-10-CM

## 2023-04-20 RX ORDER — CYANOCOBALAMIN 1000 UG/ML
1000 INJECTION, SOLUTION INTRAMUSCULAR; SUBCUTANEOUS ONCE
OUTPATIENT
Start: 2023-05-02 | End: 2023-05-02

## 2023-04-21 ENCOUNTER — TELEPHONE (OUTPATIENT)
Dept: PULMONOLOGY | Age: 58
End: 2023-04-21

## 2023-04-21 DIAGNOSIS — T78.40XA ALLERGIC REACTION, INITIAL ENCOUNTER: Primary | ICD-10-CM

## 2023-04-21 RX ORDER — TRIAMCINOLONE ACETONIDE 0.25 MG/G
OINTMENT TOPICAL
Qty: 80 G | Refills: 1 | Status: SHIPPED | OUTPATIENT
Start: 2023-04-21 | End: 2023-04-28

## 2023-04-21 NOTE — TELEPHONE ENCOUNTER
Armando Thompson called in stating she has developed a red bumpy rash on her arms and chest that was burning. She stated she had purchased some new scented body oil and had applied it this morning before the rash appeared. She said her pcp couldn't get her in today and wanted to be seen by Dr Ying Nuno to have it evaluated. I let her know Dr Ying Nuno was not in the office today and I recommended she visit urgent care since she did not want to go to the ER for this.   She voiced understanding

## 2023-04-21 NOTE — TELEPHONE ENCOUNTER
I have attempted to call patient to verify pharmacy. Patient called with complaints of allergic reaction from oils being applied to chest. Nick Rodriguez  is aware and has recommended to send some cream to be applied BID. Patient has returned phone call and I have verified pharmacy with  her.

## 2023-04-25 ENCOUNTER — OFFICE VISIT (OUTPATIENT)
Dept: PULMONOLOGY | Age: 58
End: 2023-04-25
Payer: MEDICARE

## 2023-04-25 VITALS
DIASTOLIC BLOOD PRESSURE: 72 MMHG | OXYGEN SATURATION: 99 % | TEMPERATURE: 97.5 F | SYSTOLIC BLOOD PRESSURE: 132 MMHG | WEIGHT: 183 LBS | HEART RATE: 90 BPM | BODY MASS INDEX: 29.41 KG/M2 | HEIGHT: 66 IN

## 2023-04-25 DIAGNOSIS — C50.919 CARCINOMA OF FEMALE BREAST, UNSPECIFIED ESTROGEN RECEPTOR STATUS, UNSPECIFIED LATERALITY, UNSPECIFIED SITE OF BREAST (HCC): ICD-10-CM

## 2023-04-25 DIAGNOSIS — G47.33 OSA (OBSTRUCTIVE SLEEP APNEA): ICD-10-CM

## 2023-04-25 DIAGNOSIS — R13.19 ESOPHAGEAL DYSPHAGIA: ICD-10-CM

## 2023-04-25 DIAGNOSIS — R91.1 LUNG NODULE: Primary | ICD-10-CM

## 2023-04-25 PROCEDURE — G8417 CALC BMI ABV UP PARAM F/U: HCPCS | Performed by: INTERNAL MEDICINE

## 2023-04-25 PROCEDURE — G8427 DOCREV CUR MEDS BY ELIG CLIN: HCPCS | Performed by: INTERNAL MEDICINE

## 2023-04-25 PROCEDURE — 1036F TOBACCO NON-USER: CPT | Performed by: INTERNAL MEDICINE

## 2023-04-25 PROCEDURE — 99213 OFFICE O/P EST LOW 20 MIN: CPT | Performed by: INTERNAL MEDICINE

## 2023-04-25 PROCEDURE — 3017F COLORECTAL CA SCREEN DOC REV: CPT | Performed by: INTERNAL MEDICINE

## 2023-04-25 ASSESSMENT — ENCOUNTER SYMPTOMS
WHEEZING: 0
BACK PAIN: 0
ABDOMINAL DISTENTION: 0
APNEA: 0
COUGH: 0
ANAL BLEEDING: 0
RHINORRHEA: 0
SHORTNESS OF BREATH: 0
ABDOMINAL PAIN: 0
CHEST TIGHTNESS: 0

## 2023-04-25 NOTE — PROGRESS NOTES
Pulmonary and Sleep Medicine    Tracy Quintero (:  1965) is a 66 y.o. female,Established patient, here for evaluation of the following chief complaint(s):  Follow-up (Follow up- lung nodule )      Referring physician:  No referring provider defined for this encounter. ASSESSMENT/PLAN:  1. Lung nodule  2. Esophageal dysphagia  3. RUDY (obstructive sleep apnea)  4. Carcinoma of female breast, unspecified estrogen receptor status, unspecified laterality, unspecified site of breast ([facility] [address]        We will continue with the current management, she doing very well. Await the CT of the chest in . Her cough subsided. She did not have the swallow eval as her symptoms subsided. Radha Mcgill MD, [facility]    Return in about 7 weeks (around 2023). SUBJECTIVE/OBJECTIVE:  She is here for follow up on cough. She says her cough subsided. No new complaints. Prior to Visit Medications    Medication Sig Taking? Authorizing Provider   triamcinolone (KENALOG) 0.025 % ointment Apply topically 2 times daily. Yes Suki Ortega MD   predniSONE (DELTASONE) 10 MG tablet TAKE 40 MG BY MOUTH A DAY AND TAPER BY 10 MG EVERY THIRD DAY TO OFF Yes Ashley Morrison MD   ELIQUIS 5 MG TABS tablet TAKE 1 TABLET BY MOUTH TWICE A DAY Yes BRITANY Luke   cyclobenzaprine (FLEXERIL) 10 MG tablet Take 1 tablet by mouth 3 times daily as needed Yes Davi Finley   ketorolac (TORADOL) 10 MG tablet Take 1 tablet by mouth every 6 hours as needed for Pain Yes AUSTYN Garrison   gabapentin (NEURONTIN) 300 MG capsule Take 2 capsules by mouth 3 times daily for 30 days.  Yes Suki Ortega MD   guaiFENesin 1200 MG TB12 Take 1 tablet by mouth 2 times daily Yes BRITANY Tucker   sodium hypochlorite (DAKINS) 0.125 % SOLN external solution Apply topically 2 times daily Yes BRITANY Byrnes   VENTOLIN  (90 Base) MCG/ACT inhaler Inhale 2 puffs into the lungs 4 times

## 2023-05-01 NOTE — PROGRESS NOTES
MEDICAL ONCOLOGY PROGRESS NOTE    Pt Name: Caroline White  MRN: [id number]  YOB: 1965  Date of evaluation: 5/2/2023    HISTORY OF PRESENT ILLNESS:    Reason for MD visit: Disease/toxicity management  Stacey Ambrose is here today for monitoring for breast cancer and toxicity assessment. She is currently on palliative treatment with Faslodex/Ibrance initiated in January 2017. She has not been taking her Ibrance. She has been compliant with her Faslodex. She denies any new complaints. She had CT scans that showed a left lower lobe pulmonary nodule. A PET scan was performed that showed increased FDG uptake in the nodule. No other evidence of metastatic disease. Discussed pulmonary and she underwent a bronchoscopy and biopsy. Unfortunately, BAL/biopsy was nondiagnostic. Overall, her pulmonary symptoms have improved significantly.     Diagnosis  Grade III Adenocarcinoma of right breast diagnosed 2006  Stage IIIA, [id number]  ER/W[place] Positive, HER-2 bruce/ihc 1+  April 2013-RECURRENCE in the right axillary region  Genetic testing- BRCA 1&2 Negative  2014 (?) RECURRENCE in the axillary skin  01/05/2017- METASTATIC DISEASE with lung, hilar, and axillary lymph node mets  Osteopenia  Mediport associated DVT, October 2021  DVT left upper extremity, November 2021    Treatment Summary  2006- Neoadjuvant chemotherapy with AC x 4 cycles followed by Taxol  2007- Lumpectomy with axillary lymph node dissection  2007- Adjuvant radiation therapy to whole breast and axillary region  Did not take tamoxifen due to cost  April 2013- RECURRENCE  Neoadjuvant chemotherapy with 2 cycles of Taxotere followed by Doxil  10/14/2013- Right Breast Mastectomy and Axillary Dissection  01/23/2014- Completion of adjuvant RT to chest wall and axillary lymph node with Dr. Sonja Dempsey  02/14- Initiated adjuvant endocrine therapy with tamoxifen  2014 (?) RECURRENCE in the axillary skin  2014 (?) Surgical resection of the axillary skin  2015 (?)

## 2023-05-02 ENCOUNTER — OFFICE VISIT (OUTPATIENT)
Dept: HEMATOLOGY | Age: 58
End: 2023-05-02
Payer: MEDICARE

## 2023-05-02 ENCOUNTER — HOSPITAL ENCOUNTER (OUTPATIENT)
Dept: INFUSION THERAPY | Age: 58
Discharge: HOME | End: 2023-05-02
Payer: MEDICARE

## 2023-05-02 VITALS
BODY MASS INDEX: 28.54 KG/M2 | OXYGEN SATURATION: 98 % | HEART RATE: 97 BPM | SYSTOLIC BLOOD PRESSURE: 136 MMHG | DIASTOLIC BLOOD PRESSURE: 72 MMHG | WEIGHT: 176.8 LBS

## 2023-05-02 DIAGNOSIS — C50.919 PRIMARY MALIGNANT NEOPLASM OF BREAST WITH METASTASIS (HCC): ICD-10-CM

## 2023-05-02 DIAGNOSIS — C50.919 CARCINOMA OF FEMALE BREAST, UNSPECIFIED ESTROGEN RECEPTOR STATUS, UNSPECIFIED LATERALITY, UNSPECIFIED SITE OF BREAST (HCC): ICD-10-CM

## 2023-05-02 DIAGNOSIS — Z71.89 CARE PLAN DISCUSSED WITH PATIENT: ICD-10-CM

## 2023-05-02 DIAGNOSIS — E53.8 VITAMIN B 12 DEFICIENCY: ICD-10-CM

## 2023-05-02 DIAGNOSIS — D50.8 OTHER IRON DEFICIENCY ANEMIA: ICD-10-CM

## 2023-05-02 DIAGNOSIS — R91.1 PULMONARY NODULE 1 CM OR GREATER IN DIAMETER: Primary | ICD-10-CM

## 2023-05-02 DIAGNOSIS — C50.011 CARCINOMA OF AREOLA OF RIGHT BREAST IN FEMALE, UNSPECIFIED ESTROGEN RECEPTOR STATUS (HCC): ICD-10-CM

## 2023-05-02 DIAGNOSIS — Z79.818 USE OF FULVESTRANT (FASLODEX): ICD-10-CM

## 2023-05-02 LAB
BASOPHILS # BLD: 0.02 K/UL (ref 0.01–0.08)
BASOPHILS NFR BLD: 0.2 % (ref 0.1–1.2)
EOSINOPHIL # BLD: 0.03 K/UL (ref 0.04–0.54)
EOSINOPHIL NFR BLD: 0.2 % (ref 0.7–7)
ERYTHROCYTE [DISTWIDTH] IN BLOOD BY AUTOMATED COUNT: 12.9 % (ref 11.7–14.4)
HCT VFR BLD AUTO: 46.4 % (ref 34.1–44.9)
HGB BLD-MCNC: 13.8 G/DL (ref 11.2–15.7)
LYMPHOCYTES # BLD: 2.13 K/UL (ref 1.18–3.74)
LYMPHOCYTES NFR BLD: 16.3 % (ref 19.3–53.1)
MCH RBC QN AUTO: 29.3 PG (ref 25.6–32.2)
MCHC RBC AUTO-ENTMCNC: 29.7 G/DL (ref 32.3–35.5)
MCV RBC AUTO: 98.5 FL (ref 79.4–94.8)
MONOCYTES # BLD: 0.67 K/UL (ref 0.24–0.82)
MONOCYTES NFR BLD: 5.1 % (ref 4.7–12.5)
NEUTROPHILS # BLD: 10.22 K/UL (ref 1.56–6.13)
NEUTS SEG NFR BLD: 78 % (ref 34–71.1)
PLATELET # BLD AUTO: 300 K/UL (ref 182–369)
PMV BLD AUTO: 8.7 FL (ref 7.4–10.4)
RBC # BLD AUTO: 4.71 M/UL (ref 3.93–5.22)
WBC # BLD AUTO: 13.1 K/UL (ref 3.98–10.04)

## 2023-05-02 PROCEDURE — 3017F COLORECTAL CA SCREEN DOC REV: CPT | Performed by: INTERNAL MEDICINE

## 2023-05-02 PROCEDURE — 96372 THER/PROPH/DIAG INJ SC/IM: CPT

## 2023-05-02 PROCEDURE — 99213 OFFICE O/P EST LOW 20 MIN: CPT | Performed by: INTERNAL MEDICINE

## 2023-05-02 PROCEDURE — G8417 CALC BMI ABV UP PARAM F/U: HCPCS | Performed by: INTERNAL MEDICINE

## 2023-05-02 PROCEDURE — 99212 OFFICE O/P EST SF 10 MIN: CPT

## 2023-05-02 PROCEDURE — 1036F TOBACCO NON-USER: CPT | Performed by: INTERNAL MEDICINE

## 2023-05-02 PROCEDURE — 36415 COLL VENOUS BLD VENIPUNCTURE: CPT

## 2023-05-02 PROCEDURE — 96402 CHEMO HORMON ANTINEOPL SQ/IM: CPT

## 2023-05-02 PROCEDURE — 6360000002 HC RX W HCPCS: Performed by: INTERNAL MEDICINE

## 2023-05-02 PROCEDURE — G8428 CUR MEDS NOT DOCUMENT: HCPCS | Performed by: INTERNAL MEDICINE

## 2023-05-02 PROCEDURE — 85025 COMPLETE CBC W/AUTO DIFF WBC: CPT

## 2023-05-02 RX ORDER — LAMOTRIGINE 25 MG/1
500 TABLET ORAL ONCE
Status: COMPLETED | OUTPATIENT
Start: 2023-05-02 | End: 2023-05-02

## 2023-05-02 RX ORDER — CYANOCOBALAMIN 1000 UG/ML
1000 INJECTION, SOLUTION INTRAMUSCULAR; SUBCUTANEOUS ONCE
Status: COMPLETED | OUTPATIENT
Start: 2023-05-02 | End: 2023-05-02

## 2023-05-02 RX ORDER — CYANOCOBALAMIN 1000 UG/ML
1000 INJECTION, SOLUTION INTRAMUSCULAR; SUBCUTANEOUS ONCE
OUTPATIENT
Start: 2023-05-30 | End: 2023-05-30

## 2023-05-02 RX ADMIN — FULVESTRANT 500 MG: 50 INJECTION, SOLUTION INTRAMUSCULAR at 11:11

## 2023-05-02 RX ADMIN — CYANOCOBALAMIN 1000 MCG: 1000 INJECTION, SOLUTION INTRAMUSCULAR; SUBCUTANEOUS at 11:12

## 2023-05-04 ENCOUNTER — TELEPHONE (OUTPATIENT)
Dept: PRIMARY CARE CLINIC | Age: 58
End: 2023-05-04

## 2023-05-04 ENCOUNTER — TELEPHONE (OUTPATIENT)
Dept: PULMONOLOGY | Age: 58
End: 2023-05-04

## 2023-05-04 NOTE — TELEPHONE ENCOUNTER
Noel called in stating she has tested positive for covid. She states horrible cough, headache, body aches, extreme fatigue. She had diarrhea yesterday but no other GI complaints at this time. Also states no respiratory distress. She is wanting something just to help suppress her cough at this time. I advised Dr Karen Sylvester was not currently in office but I would address it with him as soon as he was available and that she may want to contact her pcp as well. I also advised any trouble breathing, low O2 levels, high fever that wont break would all warrant an ER visit.   She voiced understanding

## 2023-06-08 ENCOUNTER — HOSPITAL ENCOUNTER (OUTPATIENT)
Dept: CT IMAGING | Age: 58
Discharge: HOME | End: 2023-06-08
Payer: MEDICARE

## 2023-06-08 DIAGNOSIS — R91.1 PULMONARY NODULE 1 CM OR GREATER IN DIAMETER: ICD-10-CM

## 2023-06-08 DIAGNOSIS — R91.1 LUNG NODULE: ICD-10-CM

## 2023-06-08 DIAGNOSIS — C50.011 CARCINOMA OF AREOLA OF RIGHT BREAST IN FEMALE, UNSPECIFIED ESTROGEN RECEPTOR STATUS (HCC): ICD-10-CM

## 2023-06-08 PROCEDURE — 74177 CT ABD & PELVIS W/CONTRAST: CPT

## 2023-06-08 PROCEDURE — 71250 CT THORAX DX C-: CPT

## 2023-06-08 PROCEDURE — 6360000004 HC RX CONTRAST MEDICATION: Performed by: INTERNAL MEDICINE

## 2023-06-08 RX ADMIN — IOPAMIDOL 75 ML: 755 INJECTION, SOLUTION INTRAVENOUS at 15:51

## 2023-06-19 ENCOUNTER — OFFICE VISIT (OUTPATIENT)
Dept: HEMATOLOGY | Age: 58
End: 2023-06-19
Payer: MEDICARE

## 2023-06-19 ENCOUNTER — TELEPHONE (OUTPATIENT)
Dept: HEMATOLOGY | Age: 58
End: 2023-06-19

## 2023-06-19 ENCOUNTER — HOSPITAL ENCOUNTER (OUTPATIENT)
Dept: INFUSION THERAPY | Age: 58
Discharge: HOME | End: 2023-06-19
Payer: MEDICARE

## 2023-06-19 VITALS
TEMPERATURE: 97.3 F | HEART RATE: 69 BPM | DIASTOLIC BLOOD PRESSURE: 78 MMHG | BODY MASS INDEX: 27.83 KG/M2 | WEIGHT: 172.4 LBS | OXYGEN SATURATION: 100 % | SYSTOLIC BLOOD PRESSURE: 124 MMHG

## 2023-06-19 DIAGNOSIS — E53.8 VITAMIN B 12 DEFICIENCY: ICD-10-CM

## 2023-06-19 DIAGNOSIS — C50.919 CARCINOMA OF FEMALE BREAST, UNSPECIFIED ESTROGEN RECEPTOR STATUS, UNSPECIFIED LATERALITY, UNSPECIFIED SITE OF BREAST (HCC): ICD-10-CM

## 2023-06-19 DIAGNOSIS — D50.8 OTHER IRON DEFICIENCY ANEMIA: ICD-10-CM

## 2023-06-19 DIAGNOSIS — C50.919 PRIMARY MALIGNANT NEOPLASM OF BREAST WITH METASTASIS (HCC): ICD-10-CM

## 2023-06-19 DIAGNOSIS — C50.011 CARCINOMA OF AREOLA OF RIGHT BREAST IN FEMALE, UNSPECIFIED ESTROGEN RECEPTOR STATUS (HCC): Primary | ICD-10-CM

## 2023-06-19 DIAGNOSIS — R91.1 PULMONARY NODULE 1 CM OR GREATER IN DIAMETER: Primary | ICD-10-CM

## 2023-06-19 DIAGNOSIS — M87.059 AVASCULAR NECROSIS OF BONE OF HIP, UNSPECIFIED LATERALITY (HCC): ICD-10-CM

## 2023-06-19 LAB
ERYTHROCYTE [DISTWIDTH] IN BLOOD BY AUTOMATED COUNT: 12.9 % (ref 11.7–14.4)
HCT VFR BLD AUTO: 41.1 % (ref 34.1–44.9)
HGB BLD-MCNC: 13.2 G/DL (ref 11.2–15.7)
LYMPHOCYTES # BLD: 2.6 K/UL (ref 1.18–3.74)
LYMPHOCYTES NFR BLD: 28.7 % (ref 19.3–53.1)
MCH RBC QN AUTO: 29.9 PG (ref 25.6–32.2)
MCHC RBC AUTO-ENTMCNC: 32.1 G/DL (ref 32.3–35.5)
MCV RBC AUTO: 93 FL (ref 79.4–94.8)
MONOCYTES # BLD: 0.5 K/UL (ref 0.24–0.82)
MONOCYTES NFR BLD: 5.8 % (ref 4.7–12.5)
NEUTROPHILS # BLD: 6.1 K/UL (ref 1.56–6.13)
NEUTS SEG NFR BLD: 65.5 % (ref 34–71.1)
PLATELET # BLD AUTO: 309 K/UL (ref 182–369)
PMV BLD AUTO: 8.2 FL (ref 7.4–10.4)
RBC # BLD AUTO: 4.42 M/UL (ref 3.93–5.22)
WBC # BLD AUTO: 9.2 K/UL (ref 3.98–10.04)

## 2023-06-19 PROCEDURE — 99212 OFFICE O/P EST SF 10 MIN: CPT

## 2023-06-19 PROCEDURE — 96402 CHEMO HORMON ANTINEOPL SQ/IM: CPT

## 2023-06-19 PROCEDURE — 96372 THER/PROPH/DIAG INJ SC/IM: CPT

## 2023-06-19 PROCEDURE — 36415 COLL VENOUS BLD VENIPUNCTURE: CPT

## 2023-06-19 PROCEDURE — G8417 CALC BMI ABV UP PARAM F/U: HCPCS | Performed by: INTERNAL MEDICINE

## 2023-06-19 PROCEDURE — 1036F TOBACCO NON-USER: CPT | Performed by: INTERNAL MEDICINE

## 2023-06-19 PROCEDURE — G8427 DOCREV CUR MEDS BY ELIG CLIN: HCPCS | Performed by: INTERNAL MEDICINE

## 2023-06-19 PROCEDURE — 3017F COLORECTAL CA SCREEN DOC REV: CPT | Performed by: INTERNAL MEDICINE

## 2023-06-19 PROCEDURE — 85025 COMPLETE CBC W/AUTO DIFF WBC: CPT

## 2023-06-19 PROCEDURE — 6360000002 HC RX W HCPCS: Performed by: INTERNAL MEDICINE

## 2023-06-19 PROCEDURE — 99214 OFFICE O/P EST MOD 30 MIN: CPT | Performed by: INTERNAL MEDICINE

## 2023-06-19 RX ORDER — CYANOCOBALAMIN 1000 UG/ML
1000 INJECTION, SOLUTION INTRAMUSCULAR; SUBCUTANEOUS ONCE
Status: COMPLETED | OUTPATIENT
Start: 2023-06-19 | End: 2023-06-19

## 2023-06-19 RX ORDER — CYANOCOBALAMIN 1000 UG/ML
1000 INJECTION, SOLUTION INTRAMUSCULAR; SUBCUTANEOUS ONCE
OUTPATIENT
Start: 2023-06-26 | End: 2023-06-26

## 2023-06-19 RX ORDER — LAMOTRIGINE 25 MG/1
500 TABLET ORAL ONCE
Status: COMPLETED | OUTPATIENT
Start: 2023-06-19 | End: 2023-06-19

## 2023-06-19 RX ADMIN — FULVESTRANT 500 MG: 50 INJECTION, SOLUTION INTRAMUSCULAR at 12:36

## 2023-06-19 RX ADMIN — CYANOCOBALAMIN 1000 MCG: 1000 INJECTION, SOLUTION INTRAMUSCULAR; SUBCUTANEOUS at 12:36

## 2023-06-20 ENCOUNTER — TELEPHONE (OUTPATIENT)
Dept: HEMATOLOGY | Age: 58
End: 2023-06-20

## 2023-06-20 NOTE — TELEPHONE ENCOUNTER
Joya Galindo is working on this referral.. Faxed all pertinent information and waiting response for appt date/time.

## 2023-06-28 ENCOUNTER — TELEPHONE (OUTPATIENT)
Dept: INFUSION THERAPY | Age: 58
End: 2023-06-28

## 2023-06-28 RX ORDER — VALACYCLOVIR HYDROCHLORIDE 1 G/1
1000 TABLET, FILM COATED ORAL 2 TIMES DAILY
Qty: 20 TABLET | Refills: 0 | Status: SHIPPED | OUTPATIENT
Start: 2023-06-28 | End: 2023-07-08

## 2023-07-18 NOTE — PROGRESS NOTES
HISTORY OF PRESENT ILLNESS:    Ms. Wilman Villarreal is a 66 y.o. black female who presents today for a 6-month breast exam following Left crescent mastopexy and fat grafting on 12/9/2022    She has a little concerned about the positioning of her right nipple as well as would like a little more fullness in the inferior left breast    This is following a left mastectomy reconstruction due to lateral drift on 9/17/2021. 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 It was ER/AK positive and she underwent neoadjuvant chemotherapy with AC followed by Taxol. In 2007 she underwent a right lumpectomy with axillary node dissection and received adjuvant radiation therapy to the breast and axilla. She did not take antihormonal therapy due to cost.    She recurred in 2013 and underwent 2 cycles of Taxotere followed by Doxil and then underwent right mastectomy and axillary node dissection. This was followed by completion of radiation therapy to her chest wall and axillary nodes. In 2016 she underwent implant exchange and a prophylactic mastectomy on the left. She was also switched to aromatase inhibitor. She subsequent developed metastatic disease with lung, hilar, and axillary metastases and has been on Abraxane, Gemzar, and is now doing monthly Faslodex with palbociclib    She currently has [place] implants. 590 cc on the right. She has an 800 cc implant on the left. She is now status post revision of left mastectomy reconstruction due to lateral drift on 9/17/2021. She had placement of a 700 cc Natrelle soft touch implant and a crescent mastopexy on the left. She also had liposuction of an excessive fat pad adjacent to her reconstruction. PHYSICAL EXAM:  The  wounds look good with no evidence of infection, fluid accumulation, or skin necrosis. She still has a small amount of asymmetry with the left nipple being about 2 mm lower than the right.   It is significantly better than

## 2023-07-19 ENCOUNTER — OFFICE VISIT (OUTPATIENT)
Dept: SURGERY | Age: 58
End: 2023-07-19
Payer: MEDICARE

## 2023-07-19 VITALS — DIASTOLIC BLOOD PRESSURE: 76 MMHG | HEART RATE: 80 BPM | SYSTOLIC BLOOD PRESSURE: 118 MMHG

## 2023-07-19 DIAGNOSIS — N65.0 DEFORMITY AND DISPROPORTION OF RECONSTRUCTED BREAST: ICD-10-CM

## 2023-07-19 DIAGNOSIS — Z90.13 STATUS POST BILATERAL MASTECTOMY: Primary | ICD-10-CM

## 2023-07-19 DIAGNOSIS — N65.1 DEFORMITY AND DISPROPORTION OF RECONSTRUCTED BREAST: ICD-10-CM

## 2023-07-19 DIAGNOSIS — Z98.890 STATUS POST BILATERAL BREAST RECONSTRUCTION: ICD-10-CM

## 2023-07-19 PROCEDURE — G8417 CALC BMI ABV UP PARAM F/U: HCPCS | Performed by: SURGERY

## 2023-07-19 PROCEDURE — 3017F COLORECTAL CA SCREEN DOC REV: CPT | Performed by: SURGERY

## 2023-07-19 PROCEDURE — G8427 DOCREV CUR MEDS BY ELIG CLIN: HCPCS | Performed by: SURGERY

## 2023-07-19 PROCEDURE — 1036F TOBACCO NON-USER: CPT | Performed by: SURGERY

## 2023-07-19 PROCEDURE — 99213 OFFICE O/P EST LOW 20 MIN: CPT | Performed by: SURGERY

## 2023-07-24 ENCOUNTER — HOSPITAL ENCOUNTER (OUTPATIENT)
Dept: INFUSION THERAPY | Age: 58
Discharge: HOME | End: 2023-07-24
Payer: MEDICARE

## 2023-07-24 VITALS
BODY MASS INDEX: 27.28 KG/M2 | WEIGHT: 169 LBS | SYSTOLIC BLOOD PRESSURE: 132 MMHG | HEART RATE: 107 BPM | OXYGEN SATURATION: 97 % | DIASTOLIC BLOOD PRESSURE: 70 MMHG

## 2023-07-24 DIAGNOSIS — R91.1 PULMONARY NODULE 1 CM OR GREATER IN DIAMETER: Primary | ICD-10-CM

## 2023-07-24 DIAGNOSIS — C50.919 CARCINOMA OF FEMALE BREAST, UNSPECIFIED ESTROGEN RECEPTOR STATUS, UNSPECIFIED LATERALITY, UNSPECIFIED SITE OF BREAST (HCC): ICD-10-CM

## 2023-07-24 DIAGNOSIS — C50.919 PRIMARY MALIGNANT NEOPLASM OF BREAST WITH METASTASIS (HCC): ICD-10-CM

## 2023-07-24 DIAGNOSIS — D50.8 OTHER IRON DEFICIENCY ANEMIA: ICD-10-CM

## 2023-07-24 DIAGNOSIS — E53.8 VITAMIN B 12 DEFICIENCY: ICD-10-CM

## 2023-07-24 LAB
ERYTHROCYTE [DISTWIDTH] IN BLOOD BY AUTOMATED COUNT: 13.2 % (ref 11.7–14.4)
HCT VFR BLD AUTO: 39.3 % (ref 34.1–44.9)
HGB BLD-MCNC: 12.8 G/DL (ref 11.2–15.7)
LYMPHOCYTES # BLD: 2.2 K/UL (ref 1.18–3.74)
LYMPHOCYTES NFR BLD: 29.5 % (ref 19.3–53.1)
MCH RBC QN AUTO: 28.7 PG (ref 25.6–32.2)
MCHC RBC AUTO-ENTMCNC: 32.6 G/DL (ref 32.3–35.5)
MCV RBC AUTO: 88.1 FL (ref 79.4–94.8)
MONOCYTES # BLD: 0.5 K/UL (ref 0.24–0.82)
MONOCYTES NFR BLD: 6.7 % (ref 4.7–12.5)
NEUTROPHILS # BLD: 4.71 K/UL (ref 1.56–6.13)
NEUTS SEG NFR BLD: 63 % (ref 34–71.1)
PLATELET # BLD AUTO: 180 K/UL (ref 182–369)
PMV BLD AUTO: 9.9 FL (ref 7.4–10.4)
RBC # BLD AUTO: 4.46 M/UL (ref 3.93–5.22)
WBC # BLD AUTO: 7.47 K/UL (ref 3.98–10.04)

## 2023-07-24 PROCEDURE — 96401 CHEMO ANTI-NEOPL SQ/IM: CPT

## 2023-07-24 PROCEDURE — 36415 COLL VENOUS BLD VENIPUNCTURE: CPT

## 2023-07-24 PROCEDURE — 85025 COMPLETE CBC W/AUTO DIFF WBC: CPT

## 2023-07-24 PROCEDURE — 96402 CHEMO HORMON ANTINEOPL SQ/IM: CPT

## 2023-07-24 PROCEDURE — 96372 THER/PROPH/DIAG INJ SC/IM: CPT

## 2023-07-24 PROCEDURE — 6360000002 HC RX W HCPCS: Performed by: INTERNAL MEDICINE

## 2023-07-24 RX ORDER — LAMOTRIGINE 25 MG/1
500 TABLET ORAL ONCE
Status: COMPLETED | OUTPATIENT
Start: 2023-07-24 | End: 2023-07-24

## 2023-07-24 RX ORDER — CYANOCOBALAMIN 1000 UG/ML
1000 INJECTION, SOLUTION INTRAMUSCULAR; SUBCUTANEOUS ONCE
Status: COMPLETED | OUTPATIENT
Start: 2023-07-24 | End: 2023-07-24

## 2023-07-24 RX ORDER — CYANOCOBALAMIN 1000 UG/ML
1000 INJECTION, SOLUTION INTRAMUSCULAR; SUBCUTANEOUS ONCE
OUTPATIENT
Start: 2023-08-14 | End: 2023-08-14

## 2023-07-24 RX ORDER — LAMOTRIGINE 25 MG/1
500 TABLET ORAL ONCE
Status: CANCELLED
Start: 2023-07-24 | End: 2023-07-24

## 2023-07-24 RX ADMIN — CYANOCOBALAMIN 1000 MCG: 1000 INJECTION, SOLUTION INTRAMUSCULAR; SUBCUTANEOUS at 11:13

## 2023-07-24 RX ADMIN — FULVESTRANT 500 MG: 50 INJECTION, SOLUTION INTRAMUSCULAR at 11:13

## 2023-08-04 ENCOUNTER — HOSPITAL ENCOUNTER (OUTPATIENT)
Dept: PREADMISSION TESTING | Age: 58
Discharge: HOME | End: 2023-08-08
Payer: MEDICARE

## 2023-08-04 VITALS — WEIGHT: 168 LBS | BODY MASS INDEX: 27 KG/M2 | HEIGHT: 66 IN

## 2023-08-04 LAB
ANION GAP SERPL CALCULATED.3IONS-SCNC: 11 MMOL/L (ref 7–19)
BUN SERPL-MCNC: 13 MG/DL (ref 6–20)
CALCIUM SERPL-MCNC: 9.1 MG/DL (ref 8.6–10)
CHLORIDE SERPL-SCNC: 108 MMOL/L (ref 98–111)
CO2 SERPL-SCNC: 27 MMOL/L (ref 22–29)
CREAT SERPL-MCNC: 0.6 MG/DL (ref 0.5–0.9)
EKG P AXIS: 50 DEGREES
EKG P-R INTERVAL: 148 MS
EKG Q-T INTERVAL: 420 MS
EKG QRS DURATION: 76 MS
EKG QTC CALCULATION (BAZETT): 417 MS
EKG T AXIS: 29 DEGREES
GLUCOSE SERPL-MCNC: 85 MG/DL (ref 74–109)
POTASSIUM SERPL-SCNC: 4.1 MMOL/L (ref 3.5–5)
SODIUM SERPL-SCNC: 146 MMOL/L (ref 136–145)

## 2023-08-04 PROCEDURE — 80048 BASIC METABOLIC PNL TOTAL CA: CPT

## 2023-08-04 PROCEDURE — 93010 ELECTROCARDIOGRAM REPORT: CPT | Performed by: INTERNAL MEDICINE

## 2023-08-04 PROCEDURE — 93005 ELECTROCARDIOGRAM TRACING: CPT | Performed by: SURGERY

## 2023-08-04 RX ORDER — CELECOXIB 200 MG/1
200 CAPSULE ORAL ONCE
Status: CANCELLED | OUTPATIENT
Start: 2023-08-11

## 2023-08-04 RX ORDER — HYDROCODONE BITARTRATE AND ACETAMINOPHEN 7.5; 325 MG/1; MG/1
1 TABLET ORAL EVERY 6 HOURS PRN
COMMUNITY

## 2023-08-04 RX ORDER — ACETAMINOPHEN 325 MG/1
975 TABLET ORAL ONCE
Status: CANCELLED | OUTPATIENT
Start: 2023-08-11

## 2023-08-04 RX ORDER — HYDROXYZINE HYDROCHLORIDE 10 MG/1
10 TABLET, FILM COATED ORAL 3 TIMES DAILY PRN
COMMUNITY

## 2023-08-04 RX ORDER — GABAPENTIN 300 MG/1
300 CAPSULE ORAL ONCE
Status: CANCELLED | OUTPATIENT
Start: 2023-08-11

## 2023-08-04 NOTE — DISCHARGE INSTRUCTIONS
The day before surgery you will receive a phone call from the surgery nurse to let you know what time to arrive on the day of surgery. This call will usually be between 2-4 PM. If you do not receive a phone call by 4 PM the day before your surgery please call [phone] and let them know you have not received an arrival time. If your surgery is on Monday, your call will be on the Friday before your Monday surgery. Chlorhexidine Gluconate 4% Solution    Patient should shower with this soap the night before surgery and the morning of surgery) washing from the neck down (avoiding contact with genitalia). DO  [address] YOUR HAIR OR FACE WITH THIS SOAP. When washing with this soap, apply enough to suds up the body thoroughly, turn the water away from your body and allow the soap suds to remain on the body for 2 full minutes, then rinse body completely. After using this soap on the body, please do not apply powders or lotions to your body. After the shower the night before surgery, please dry off with a new towel, sleep in new freshly laundered pj's, and change your bed linen before going to sleep. PREOPERATIVE GUIDELINES WHEN RECEIVING ANESTHESIA    Do not eat or drink anything after midnight, the night before your surgery. No gum or candy the morning of surgery. This is extremely important for your safety. Take a bath (or shower) the night before your surgery and you may brush your teeth the morning of your surgery. You will be scheduled to arrive at the hospital 2 hours before your surgery, or follow your surgeon's instructions. Dress comfortably. Wear loose clothing that will be easy to remove and comfortable for your trip home. You may wear eyeglasses or contacts but bring your cases with you as they must be remove before your surgery. Hearing aids and dentures will need to be removed before your surgery. Do not wear any jewelry, including body jewelry.   All jewelry will need

## 2023-08-11 ENCOUNTER — TELEPHONE (OUTPATIENT)
Dept: SURGERY | Age: 58
End: 2023-08-11

## 2023-08-11 ENCOUNTER — HOSPITAL ENCOUNTER (OUTPATIENT)
Age: 58
Setting detail: OUTPATIENT SURGERY
Discharge: HOME | End: 2023-08-11
Attending: SURGERY | Admitting: SURGERY
Payer: MEDICARE

## 2023-08-11 ENCOUNTER — ANESTHESIA (OUTPATIENT)
Dept: OPERATING ROOM | Age: 58
End: 2023-08-11
Payer: MEDICARE

## 2023-08-11 ENCOUNTER — ANESTHESIA EVENT (OUTPATIENT)
Dept: OPERATING ROOM | Age: 58
End: 2023-08-11
Payer: MEDICARE

## 2023-08-11 VITALS
SYSTOLIC BLOOD PRESSURE: 158 MMHG | HEART RATE: 55 BPM | OXYGEN SATURATION: 96 % | TEMPERATURE: 97.8 F | WEIGHT: 168 LBS | BODY MASS INDEX: 27 KG/M2 | RESPIRATION RATE: 18 BRPM | HEIGHT: 66 IN | DIASTOLIC BLOOD PRESSURE: 90 MMHG

## 2023-08-11 PROCEDURE — 2580000003 HC RX 258: Performed by: ANESTHESIOLOGY

## 2023-08-11 PROCEDURE — 7100000010 HC PHASE II RECOVERY - FIRST 15 MIN: Performed by: SURGERY

## 2023-08-11 PROCEDURE — 2500000003 HC RX 250 WO HCPCS: Performed by: NURSE ANESTHETIST, CERTIFIED REGISTERED

## 2023-08-11 PROCEDURE — 2720000010 HC SURG SUPPLY STERILE: Performed by: SURGERY

## 2023-08-11 PROCEDURE — 3700000001 HC ADD 15 MINUTES (ANESTHESIA): Performed by: SURGERY

## 2023-08-11 PROCEDURE — 2580000003 HC RX 258: Performed by: SURGERY

## 2023-08-11 PROCEDURE — 3700000000 HC ANESTHESIA ATTENDED CARE: Performed by: SURGERY

## 2023-08-11 PROCEDURE — 6360000002 HC RX W HCPCS: Performed by: SURGERY

## 2023-08-11 PROCEDURE — 2709999900 HC NON-CHARGEABLE SUPPLY: Performed by: SURGERY

## 2023-08-11 PROCEDURE — 7100000000 HC PACU RECOVERY - FIRST 15 MIN: Performed by: SURGERY

## 2023-08-11 PROCEDURE — 7100000001 HC PACU RECOVERY - ADDTL 15 MIN: Performed by: SURGERY

## 2023-08-11 PROCEDURE — 6360000002 HC RX W HCPCS: Performed by: ANESTHESIOLOGY

## 2023-08-11 PROCEDURE — A4217 STERILE WATER/SALINE, 500 ML: HCPCS | Performed by: SURGERY

## 2023-08-11 PROCEDURE — 6360000002 HC RX W HCPCS: Performed by: NURSE ANESTHETIST, CERTIFIED REGISTERED

## 2023-08-11 PROCEDURE — 2500000003 HC RX 250 WO HCPCS: Performed by: SURGERY

## 2023-08-11 PROCEDURE — 3600000004 HC SURGERY LEVEL 4 BASE: Performed by: SURGERY

## 2023-08-11 PROCEDURE — 7100000011 HC PHASE II RECOVERY - ADDTL 15 MIN: Performed by: SURGERY

## 2023-08-11 PROCEDURE — 3600000014 HC SURGERY LEVEL 4 ADDTL 15MIN: Performed by: SURGERY

## 2023-08-11 PROCEDURE — 6370000000 HC RX 637 (ALT 250 FOR IP): Performed by: ANESTHESIOLOGY

## 2023-08-11 PROCEDURE — 6370000000 HC RX 637 (ALT 250 FOR IP): Performed by: SURGERY

## 2023-08-11 RX ORDER — PROPOFOL 10 MG/ML
INJECTION, EMULSION INTRAVENOUS PRN
Status: DISCONTINUED | OUTPATIENT
Start: 2023-08-11 | End: 2023-08-11 | Stop reason: SDUPTHER

## 2023-08-11 RX ORDER — FENTANYL CITRATE 50 UG/ML
INJECTION, SOLUTION INTRAMUSCULAR; INTRAVENOUS PRN
Status: DISCONTINUED | OUTPATIENT
Start: 2023-08-11 | End: 2023-08-11 | Stop reason: SDUPTHER

## 2023-08-11 RX ORDER — LIDOCAINE HYDROCHLORIDE 10 MG/ML
1 INJECTION, SOLUTION EPIDURAL; INFILTRATION; INTRACAUDAL; PERINEURAL
Status: DISCONTINUED | OUTPATIENT
Start: 2023-08-11 | End: 2023-08-11 | Stop reason: HOSPADM

## 2023-08-11 RX ORDER — ONDANSETRON 2 MG/ML
INJECTION INTRAMUSCULAR; INTRAVENOUS PRN
Status: DISCONTINUED | OUTPATIENT
Start: 2023-08-11 | End: 2023-08-11 | Stop reason: SDUPTHER

## 2023-08-11 RX ORDER — PROPOFOL 10 MG/ML
INJECTION, EMULSION INTRAVENOUS CONTINUOUS PRN
Status: DISCONTINUED | OUTPATIENT
Start: 2023-08-11 | End: 2023-08-11 | Stop reason: SDUPTHER

## 2023-08-11 RX ORDER — CELECOXIB 200 MG/1
200 CAPSULE ORAL ONCE
Status: COMPLETED | OUTPATIENT
Start: 2023-08-11 | End: 2023-08-11

## 2023-08-11 RX ORDER — ACETAMINOPHEN 325 MG/1
975 TABLET ORAL ONCE
Status: COMPLETED | OUTPATIENT
Start: 2023-08-11 | End: 2023-08-11

## 2023-08-11 RX ORDER — SODIUM CHLORIDE, SODIUM LACTATE, POTASSIUM CHLORIDE, CALCIUM CHLORIDE 600; 310; 30; 20 MG/100ML; MG/100ML; MG/100ML; MG/100ML
INJECTION, SOLUTION INTRAVENOUS CONTINUOUS
Status: DISCONTINUED | OUTPATIENT
Start: 2023-08-11 | End: 2023-08-11 | Stop reason: HOSPADM

## 2023-08-11 RX ORDER — SCOLOPAMINE TRANSDERMAL SYSTEM 1 MG/1
1 PATCH, EXTENDED RELEASE TRANSDERMAL
Status: DISCONTINUED | OUTPATIENT
Start: 2023-08-11 | End: 2023-08-11 | Stop reason: HOSPADM

## 2023-08-11 RX ORDER — HYDROMORPHONE HYDROCHLORIDE 1 MG/ML
0.25 INJECTION, SOLUTION INTRAMUSCULAR; INTRAVENOUS; SUBCUTANEOUS EVERY 5 MIN PRN
Status: DISCONTINUED | OUTPATIENT
Start: 2023-08-11 | End: 2023-08-11 | Stop reason: HOSPADM

## 2023-08-11 RX ORDER — SODIUM CHLORIDE 0.9 % (FLUSH) 0.9 %
5-40 SYRINGE (ML) INJECTION EVERY 12 HOURS SCHEDULED
Status: DISCONTINUED | OUTPATIENT
Start: 2023-08-11 | End: 2023-08-11 | Stop reason: HOSPADM

## 2023-08-11 RX ORDER — SODIUM CHLORIDE 9 MG/ML
INJECTION, SOLUTION INTRAVENOUS PRN
Status: DISCONTINUED | OUTPATIENT
Start: 2023-08-11 | End: 2023-08-11 | Stop reason: HOSPADM

## 2023-08-11 RX ORDER — HYDROMORPHONE HYDROCHLORIDE 1 MG/ML
0.5 INJECTION, SOLUTION INTRAMUSCULAR; INTRAVENOUS; SUBCUTANEOUS EVERY 5 MIN PRN
Status: DISCONTINUED | OUTPATIENT
Start: 2023-08-11 | End: 2023-08-11 | Stop reason: HOSPADM

## 2023-08-11 RX ORDER — METOCLOPRAMIDE HYDROCHLORIDE 5 MG/ML
10 INJECTION INTRAMUSCULAR; INTRAVENOUS
Status: DISCONTINUED | OUTPATIENT
Start: 2023-08-11 | End: 2023-08-11 | Stop reason: HOSPADM

## 2023-08-11 RX ORDER — FAMOTIDINE 20 MG/1
20 TABLET, FILM COATED ORAL ONCE
Status: COMPLETED | OUTPATIENT
Start: 2023-08-11 | End: 2023-08-11

## 2023-08-11 RX ORDER — SODIUM CHLORIDE 0.9 % (FLUSH) 0.9 %
5-40 SYRINGE (ML) INJECTION PRN
Status: DISCONTINUED | OUTPATIENT
Start: 2023-08-11 | End: 2023-08-11 | Stop reason: HOSPADM

## 2023-08-11 RX ORDER — MIDAZOLAM HYDROCHLORIDE 2 MG/2ML
2 INJECTION, SOLUTION INTRAMUSCULAR; INTRAVENOUS
Status: DISCONTINUED | OUTPATIENT
Start: 2023-08-11 | End: 2023-08-11 | Stop reason: HOSPADM

## 2023-08-11 RX ORDER — DIPHENHYDRAMINE HYDROCHLORIDE 50 MG/ML
12.5 INJECTION INTRAMUSCULAR; INTRAVENOUS
Status: DISCONTINUED | OUTPATIENT
Start: 2023-08-11 | End: 2023-08-11 | Stop reason: HOSPADM

## 2023-08-11 RX ORDER — GABAPENTIN 300 MG/1
300 CAPSULE ORAL ONCE
Status: COMPLETED | OUTPATIENT
Start: 2023-08-11 | End: 2023-08-11

## 2023-08-11 RX ORDER — LIDOCAINE HYDROCHLORIDE 10 MG/ML
INJECTION, SOLUTION EPIDURAL; INFILTRATION; INTRACAUDAL; PERINEURAL PRN
Status: DISCONTINUED | OUTPATIENT
Start: 2023-08-11 | End: 2023-08-11 | Stop reason: SDUPTHER

## 2023-08-11 RX ADMIN — CEFAZOLIN 2000 MG: 2 INJECTION, POWDER, FOR SOLUTION INTRAMUSCULAR; INTRAVENOUS at 10:07

## 2023-08-11 RX ADMIN — GABAPENTIN 300 MG: 300 CAPSULE ORAL at 07:55

## 2023-08-11 RX ADMIN — CELECOXIB 200 MG: 200 CAPSULE ORAL at 07:55

## 2023-08-11 RX ADMIN — HYDROMORPHONE HYDROCHLORIDE 0.5 MG: 1 INJECTION, SOLUTION INTRAMUSCULAR; INTRAVENOUS; SUBCUTANEOUS at 11:36

## 2023-08-11 RX ADMIN — FAMOTIDINE 20 MG: 20 TABLET, FILM COATED ORAL at 07:55

## 2023-08-11 RX ADMIN — LIDOCAINE HYDROCHLORIDE 50 MG: 10 INJECTION, SOLUTION EPIDURAL; INFILTRATION; INTRACAUDAL; PERINEURAL at 10:02

## 2023-08-11 RX ADMIN — SODIUM CHLORIDE, POTASSIUM CHLORIDE, SODIUM LACTATE AND CALCIUM CHLORIDE: 600; 310; 30; 20 INJECTION, SOLUTION INTRAVENOUS at 07:45

## 2023-08-11 RX ADMIN — ONDANSETRON 4 MG: 2 INJECTION INTRAMUSCULAR; INTRAVENOUS at 10:18

## 2023-08-11 RX ADMIN — PROPOFOL 70 MG: 10 INJECTION, EMULSION INTRAVENOUS at 10:02

## 2023-08-11 RX ADMIN — FENTANYL CITRATE 50 MCG: 0.05 INJECTION, SOLUTION INTRAMUSCULAR; INTRAVENOUS at 10:04

## 2023-08-11 RX ADMIN — PROPOFOL 160 MCG/KG/MIN: 10 INJECTION, EMULSION INTRAVENOUS at 10:02

## 2023-08-11 RX ADMIN — ACETAMINOPHEN 975 MG: 325 TABLET ORAL at 07:55

## 2023-08-11 ASSESSMENT — ENCOUNTER SYMPTOMS: SHORTNESS OF BREATH: 1

## 2023-08-11 ASSESSMENT — PAIN - FUNCTIONAL ASSESSMENT: PAIN_FUNCTIONAL_ASSESSMENT: NONE - DENIES PAIN

## 2023-08-11 ASSESSMENT — LIFESTYLE VARIABLES: SMOKING_STATUS: 0

## 2023-08-11 ASSESSMENT — PAIN DESCRIPTION - LOCATION
LOCATION: CHEST;ABDOMEN
LOCATION: BREAST
LOCATION: BREAST

## 2023-08-11 ASSESSMENT — PAIN DESCRIPTION - DESCRIPTORS
DESCRIPTORS: ACHING;BURNING
DESCRIPTORS: TENDER

## 2023-08-11 ASSESSMENT — PAIN SCALES - GENERAL
PAINLEVEL_OUTOF10: 5
PAINLEVEL_OUTOF10: 8
PAINLEVEL_OUTOF10: 6

## 2023-08-11 ASSESSMENT — PAIN DESCRIPTION - ORIENTATION
ORIENTATION: RIGHT;LEFT
ORIENTATION: MID

## 2023-08-11 NOTE — TELEPHONE ENCOUNTER
[place] with Same day surgery called requesting 1 wk Post op appt  with Dr Vicki Olivares. [address] not able to accommodate. Please reach out to patient to schedule.      Thank you

## 2023-08-11 NOTE — ADDENDUM NOTE
Addendum  created 08/11/23 1129 by Kanu Clayton MD    Order list changed, Pharmacy for encounter modified

## 2023-08-11 NOTE — H&P
HISTORY OF PRESENT ILLNESS:     Ms. Anjali Garza is a 66 y.o. black female who presents today for a 6-month breast exam following Left crescent mastopexy and fat grafting on 12/9/2022     She has a little concerned about the positioning of her right nipple as well as would like a little more fullness in the inferior left breast     This is following a left mastectomy reconstruction due to lateral drift on 9/17/2021. 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 It was ER/ME positive and she underwent neoadjuvant chemotherapy with AC followed by Taxol. In 2007 she underwent a right lumpectomy with axillary node dissection and received adjuvant radiation therapy to the breast and axilla. She did not take antihormonal therapy due to cost.     She recurred in 2013 and underwent 2 cycles of Taxotere followed by Doxil and then underwent right mastectomy and axillary node dissection. This was followed by completion of radiation therapy to her chest wall and axillary nodes. In 2016 she underwent implant exchange and a prophylactic mastectomy on the left. She was also switched to aromatase inhibitor. She subsequent developed metastatic disease with lung, hilar, and axillary metastases and has been on Abraxane, Gemzar, and is now doing monthly Faslodex with palbociclib     She currently has [place] implants. 590 cc on the right. She has an 800 cc implant on the left. She is now status post revision of left mastectomy reconstruction due to lateral drift on 9/17/2021. She had placement of a 700 cc Natrelle soft touch implant and a crescent mastopexy on the left. She also had liposuction of an excessive fat pad adjacent to her reconstruction.     Drake Lerner is a 66 y.o. female with the following history as recorded in [facility]:  Patient Active Problem List    Diagnosis Date Noted    Deformity and disproportion of reconstructed breast 12/09/2022    Esophageal dysphagia midazolam PF (VERSED) injection 2 mg  2 mg IntraVENous Once PRN Tejal Hardin MD         Allergies: Clindamycin/lincomycin  Past Medical History:   Diagnosis Date    Asthma     Breast cancer ([address])     Breast cancer with lung mets     Chronic back pain     GERD (gastroesophageal reflux disease)     Hx of blood clots     Hypertension     Lung cancer ([address])     Neuropathy     Post chemo treatment neuropathy     Past Surgical History:   Procedure Laterality Date    BREAST LUMPECTOMY Right     2006    BREAST RECONSTRUCTION Left 09/17/2021    Revision left reconstructed breast, implant performed by Sondra Hernandez MD at [address] Bilateral 12/9/2022    CRESCENT LEFT MASTOPEXY, FAT GRAFTING RIGHT LATERAL NIPPLE & FAT GRAFTING INFERIOR LEFT MIDLINE performed by Sondra Hernandez MD at [address] [place] Left 2/23/2023    BRONCHOSCOPY ADD ON COMPUTER ASSISTED ROBOTIC performed by Severiano Ramming, MD at [facility]    COSMETIC SURGERY  2016    Bilateral breast implants    EMBOLECTOMY Left 9/26/2021    LEFT UPPER EXTREMITY  MECHANICAL SUCTION THROMBECTOMY performed by Kristy Bocanegra DO at [facility] (CERVIX STATUS UNKNOWN)      HYSTERECTOMY, TOTAL ABDOMINAL (CERVIX REMOVED)  2015    MASTECTOMY, BILATERAL      Right 2013 & Left 2016    SCAR REVISION Left 5/10/2022    SCAR REVISION LEFT PORT SITE performed by Sondra Hernandez MD at [facility]      VASCULAR SURGERY N/A 10/6/2021    REMOVAL OF MEDIPORT performed by Kristy Bocanegra DO at [facility] OR     Family History   Problem Relation Age of Onset    Hypertension Mother     Obesity Mother     Prostate Cancer Father     No Known Problems Sister     No Known Problems Daughter     No Known Problems Daughter      Social History     Tobacco Use    Smoking status: Never    Smokeless tobacco: Never   Substance Use Topics    Alcohol use: Not Currently       ROS:  14 point review of systems is negative

## 2023-08-11 NOTE — ANESTHESIA PRE PROCEDURE
Department of Anesthesiology  Preprocedure Note       Name:  Tabitha Caraballo   Age:  66 y.o.  :  1965                                          MRN:  [id number]         Date:  2023      Surgeon: Renea Rutherford):  Josefina Jaquez MD    Procedure: Procedure(s):  FAT GRAFTING LEFT BREAST & REPOSITIONING RIGHT NIPPLE    Medications prior to admission:   Prior to Admission medications    Medication Sig Start Date End Date Taking? Authorizing Provider   HYDROcodone-acetaminophen (NORCO) 7.5-325 MG per tablet Take 1 tablet by mouth every 6 hours as needed for Pain. Max Daily Amount: 4 tablets    Historical Provider, MD   hydrOXYzine HCl (ATARAX) 10 MG tablet Take 1 tablet by mouth 3 times daily as needed for Itching    Historical Provider, MD   ELIQUIS 5 MG TABS tablet TAKE 1 TABLET BY MOUTH TWICE A DAY  Patient not taking: Reported on 2023 11/15/22   BRITANY Johnson   cyclobenzaprine (FLEXERIL) 10 MG tablet Take 1 tablet by mouth 3 times daily as needed 22   [place]   ketorolac (TORADOL) 10 MG tablet Take 1 tablet by mouth every 6 hours as needed for Pain 22   AUSTYN Nichole   gabapentin (NEURONTIN) 300 MG capsule Take 2 capsules by mouth 3 times daily for 30 days. Patient taking differently: Take 2 capsules by mouth 3 times daily as needed.  22  Mag Perry MD   VENTOLIN  (90 Base) MCG/ACT inhaler Inhale 2 puffs into the lungs 4 times daily as needed for Wheezing 21   Ashley Morrison MD   pantoprazole (PROTONIX) 40 MG tablet TAKE 1 TABLET BY MOUTH EVERY DAY  Patient taking differently: Take 1 tablet by mouth daily 21   Mag Perry MD   Health system 4 MG/0.1ML LIQD nasal spray 1 spray as needed 21   Historical Provider, MD   ondansetron (ZOFRAN) 4 MG tablet Take 1 tablet by mouth every 8 hours as needed for Nausea or Vomiting    Historical Provider, MD       Current medications:    No current facility-administered medications for this

## 2023-08-11 NOTE — OP NOTE
[facility] [address], [address] [place] [address]                                OPERATIVE REPORT    PATIENT NAME: Danny Hinton                    :        1965  MED REC NO:   [id number]                              ROOM:  ACCOUNT NO:   [de-identified]                           ADMIT DATE: 2023  PROVIDER:     Kristyn Hawley MD    DATE OF PROCEDURE:  2023    PREOPERATIVE DIAGNOSIS:  Postoperative deformity of breast.    POSTOPERATIVE DIAGNOSIS:  Postoperative deformity of breast.    PROCEDURES:  1. Exploration repositioning of right nipple. 2.  Bilateral fat grafting. SURGEON:  Kristyn Hawley MD    ASSISTANT:  Ashvin Reardon PA-C    ANESTHESIA:  Local MAC. INDICATIONS:  The patient is a 61-year-old lady who has had bilateral  mastectomy and reconstruction as well as multiple flaps and we are in  ongoing process of repairing the multiple defects in all of this. Today, she comes in wanting the right nipple to be moved a little  medially and inferiorly and with a little fat grafting laterally to push  it that way as well and also some additional fat grafting in the  inferior aspect of the left breast where it needs a little more fullness  to be symmetrical.  We discussed all this at great length. She  understands and is agreeable. OPERATIVE PROCEDURE:  Today, she was brought to the operating room,  adequately sedated and prepped with chlorhexidine. We then marked an  area on her upper abdominal wall and anesthetized this with a  combination of 0.2% ropivacaine and saline and then made a skin nick and  evacuated about 40 mL of fat. We then held compression on this. We  then processed the fat appropriately, washed it and transferred into 3  mL syringes.   We then placed one syringe at the lateral aspect of the  right nipple and the rest of it which is a total of about 20 mL under  the vertical scar inferior left breast.  This all went quite smoothly. We then excised a crescent of tissue medial to her right nipple and the  area was de-epithelialized and then closed with 4-0 Monocryl. Dermabond  dressing was applied. Estimated blood loss, minimal.  Complications,  none. She tolerated this quite well.         Kristyn Hawley MD    D: 08/11/2023 12:34:19      T: 08/11/2023 12:38:19     [email or web address]  Job#: [id number]     Doc#: [id number]    CC:

## 2023-08-11 NOTE — DISCHARGE INSTRUCTIONS
1. You may shower in 48 hours. 2. If a small are of the wound separates or becomes red and inflamed, call the office to make an appointment. 3. Pain medication may cause constipation. You may take the laxative of your choice. 4. Call the office if you have any further questions during regular hours, mon-fri 9:00-4:00. In case of emergency the answering service will take your call    Remove your scopalamine patch the day after surgery. Be sure to wash your hands when you touch it and when you remove it. It may dilate your eyes and cause blurry vision. If this happens please do not go to the emergency room as this is a side effect of the medication and will go away in 4-6 hours.

## 2023-08-11 NOTE — ANESTHESIA POSTPROCEDURE EVALUATION
Department of Anesthesiology  Postprocedure Note    Patient: Reyes Omalley  MRN: [id number]  YOB: 1965  Date of evaluation: 8/11/2023      Procedure Summary     Date: 08/11/23 Room / Location: [address]    Anesthesia Start: 4062 Anesthesia Stop: 1059    Procedure: FAT GRAFTING LEFT BREAST & REPOSITIONING RIGHT NIPPLE Diagnosis:       Deformity of reconstructed breast      Disproportion of reconstructed breast      Status post bilateral mastectomy      (Deformity of reconstructed breast [N65.0])      (Disproportion of reconstructed breast [N65.1])      (Status post bilateral mastectomy [Z90.13])    Surgeons: Renan Chen MD Responsible Provider: BRITANY Worley CRNA    Anesthesia Type: general, TIVA ASA Status: 3          Anesthesia Type: No value filed.     Dena Phase I: Dena Score: 10    Dena Phase II:        Anesthesia Post Evaluation    Patient location during evaluation: PACU  Patient participation: complete - patient participated  Level of consciousness: sleepy but conscious  Pain score: 0  Airway patency: patent  Nausea & Vomiting: no nausea and no vomiting  Complications: no  Cardiovascular status: hemodynamically stable  Respiratory status: acceptable  Hydration status: stable  Pain management: adequate

## 2023-08-11 NOTE — BRIEF OP NOTE
Brief Postoperative Note      DATE OF PROCEDURE: 8/11/2023     SURGEON: Kurtis Martinez MD    PREOPERATIVE DIAGNOSIS:  Deformity of reconstructed breast [N65.0]  Disproportion of reconstructed breast [N65.1]  Status post bilateral mastectomy [Z90.13]    POSTOPERATIVE DIAGNOSIS: Same     OPERATION: Procedure(s):  FAT GRAFTING LEFT BREAST & REPOSITIONING RIGHT NIPPLE    ANESTHESIA: local/mac    ESTIMATED BLOOD LOSS: Minimal    COMPLICATIONS: None. SPECIMENS: * No specimens in log *    DRAINS: None    The patient tolerated the procedure well.     Electronically signed by Kurtis Martinez MD  on 8/11/2023 at 11:16 AM

## 2023-08-21 ENCOUNTER — HOSPITAL ENCOUNTER (OUTPATIENT)
Dept: INFUSION THERAPY | Age: 58
Discharge: HOME | End: 2023-08-21
Payer: MEDICARE

## 2023-08-21 VITALS
HEIGHT: 66 IN | HEART RATE: 92 BPM | WEIGHT: 173.9 LBS | SYSTOLIC BLOOD PRESSURE: 132 MMHG | DIASTOLIC BLOOD PRESSURE: 78 MMHG | BODY MASS INDEX: 27.95 KG/M2 | OXYGEN SATURATION: 98 %

## 2023-08-21 DIAGNOSIS — R91.1 PULMONARY NODULE 1 CM OR GREATER IN DIAMETER: Primary | ICD-10-CM

## 2023-08-21 DIAGNOSIS — C50.011 CARCINOMA OF AREOLA OF RIGHT BREAST IN FEMALE, UNSPECIFIED ESTROGEN RECEPTOR STATUS (HCC): ICD-10-CM

## 2023-08-21 DIAGNOSIS — D50.8 OTHER IRON DEFICIENCY ANEMIA: ICD-10-CM

## 2023-08-21 DIAGNOSIS — E53.8 VITAMIN B 12 DEFICIENCY: ICD-10-CM

## 2023-08-21 DIAGNOSIS — C50.919 CARCINOMA OF FEMALE BREAST, UNSPECIFIED ESTROGEN RECEPTOR STATUS, UNSPECIFIED LATERALITY, UNSPECIFIED SITE OF BREAST (HCC): ICD-10-CM

## 2023-08-21 DIAGNOSIS — C50.919 PRIMARY MALIGNANT NEOPLASM OF BREAST WITH METASTASIS (HCC): ICD-10-CM

## 2023-08-21 LAB
ALBUMIN SERPL-MCNC: 4.5 G/DL (ref 3.5–5.2)
ALP SERPL-CCNC: 125 U/L (ref 35–104)
ALT SERPL-CCNC: 27 U/L (ref 9–52)
ANION GAP SERPL CALCULATED.3IONS-SCNC: 18 MMOL/L (ref 7–19)
AST SERPL-CCNC: 28 U/L (ref 14–36)
BILIRUB SERPL-MCNC: 1.1 MG/DL (ref 0.2–1.3)
BUN SERPL-MCNC: 13 MG/DL (ref 7–17)
CA 15-3: 17 U/ML (ref 0–25)
CALCIUM SERPL-MCNC: 9.1 MG/DL (ref 8.4–10.2)
CEA SERPL-MCNC: 1.9 NG/ML (ref 0–4.7)
CHLORIDE SERPL-SCNC: 103 MMOL/L (ref 98–111)
CO2 SERPL-SCNC: 22 MMOL/L (ref 22–29)
CREAT SERPL-MCNC: 0.6 MG/DL (ref 0.5–1)
ERYTHROCYTE [DISTWIDTH] IN BLOOD BY AUTOMATED COUNT: 13.7 % (ref 11.7–14.4)
GLUCOSE SERPL-MCNC: 93 MG/DL (ref 74–106)
HCT VFR BLD AUTO: 39.2 % (ref 34.1–44.9)
HGB BLD-MCNC: 12.8 G/DL (ref 11.2–15.7)
LYMPHOCYTES # BLD: 2.48 K/UL (ref 1.18–3.74)
LYMPHOCYTES NFR BLD: 27.6 % (ref 19.3–53.1)
MCH RBC QN AUTO: 28.8 PG (ref 25.6–32.2)
MCHC RBC AUTO-ENTMCNC: 32.7 G/DL (ref 32.3–35.5)
MCV RBC AUTO: 88.1 FL (ref 79.4–94.8)
MONOCYTES # BLD: 0.59 K/UL (ref 0.24–0.82)
MONOCYTES NFR BLD: 6.6 % (ref 4.7–12.5)
NEUTROPHILS # BLD: 5.83 K/UL (ref 1.56–6.13)
NEUTS SEG NFR BLD: 64.7 % (ref 34–71.1)
PLATELET # BLD AUTO: 297 K/UL (ref 182–369)
PMV BLD AUTO: 8.8 FL (ref 7.4–10.4)
POTASSIUM SERPL-SCNC: 4.2 MMOL/L (ref 3.5–5.1)
PROT SERPL-MCNC: 7.9 G/DL (ref 6.3–8.2)
RBC # BLD AUTO: 4.45 M/UL (ref 3.93–5.22)
SODIUM SERPL-SCNC: 143 MMOL/L (ref 137–145)
WBC # BLD AUTO: 9 K/UL (ref 3.98–10.04)

## 2023-08-21 PROCEDURE — 80053 COMPREHEN METABOLIC PANEL: CPT

## 2023-08-21 PROCEDURE — 6360000002 HC RX W HCPCS: Performed by: INTERNAL MEDICINE

## 2023-08-21 PROCEDURE — 96372 THER/PROPH/DIAG INJ SC/IM: CPT

## 2023-08-21 PROCEDURE — 36415 COLL VENOUS BLD VENIPUNCTURE: CPT

## 2023-08-21 PROCEDURE — 85025 COMPLETE CBC W/AUTO DIFF WBC: CPT

## 2023-08-21 PROCEDURE — 96402 CHEMO HORMON ANTINEOPL SQ/IM: CPT

## 2023-08-21 RX ORDER — LAMOTRIGINE 25 MG/1
500 TABLET ORAL ONCE
Status: COMPLETED | OUTPATIENT
Start: 2023-08-21 | End: 2023-08-21

## 2023-08-21 RX ORDER — EPINEPHRINE 1 MG/ML
0.3 INJECTION, SOLUTION, CONCENTRATE INTRAVENOUS PRN
OUTPATIENT
Start: 2023-08-21

## 2023-08-21 RX ORDER — SODIUM CHLORIDE 9 MG/ML
INJECTION, SOLUTION INTRAVENOUS CONTINUOUS
OUTPATIENT
Start: 2023-08-21

## 2023-08-21 RX ORDER — CYANOCOBALAMIN 1000 UG/ML
1000 INJECTION, SOLUTION INTRAMUSCULAR; SUBCUTANEOUS ONCE
Status: COMPLETED | OUTPATIENT
Start: 2023-08-21 | End: 2023-08-21

## 2023-08-21 RX ORDER — FAMOTIDINE 10 MG/ML
20 INJECTION, SOLUTION INTRAVENOUS ONCE
OUTPATIENT
Start: 2023-08-21 | End: 2023-08-21

## 2023-08-21 RX ORDER — CYANOCOBALAMIN 1000 UG/ML
1000 INJECTION, SOLUTION INTRAMUSCULAR; SUBCUTANEOUS ONCE
OUTPATIENT
Start: 2023-09-18 | End: 2023-09-18

## 2023-08-21 RX ORDER — DIPHENHYDRAMINE HYDROCHLORIDE 50 MG/ML
50 INJECTION INTRAMUSCULAR; INTRAVENOUS ONCE
OUTPATIENT
Start: 2023-08-21 | End: 2023-08-21

## 2023-08-21 RX ADMIN — CYANOCOBALAMIN 1000 MCG: 1000 INJECTION, SOLUTION INTRAMUSCULAR; SUBCUTANEOUS at 13:42

## 2023-08-21 RX ADMIN — FULVESTRANT 500 MG: 50 INJECTION, SOLUTION INTRAMUSCULAR at 13:41

## 2023-08-23 LAB — CANCER AG27-29 SERPL-ACNC: 21.7 U/ML

## 2023-08-24 ENCOUNTER — OFFICE VISIT (OUTPATIENT)
Dept: SURGERY | Age: 58
End: 2023-08-24

## 2023-08-24 VITALS — SYSTOLIC BLOOD PRESSURE: 124 MMHG | DIASTOLIC BLOOD PRESSURE: 70 MMHG | HEART RATE: 80 BPM

## 2023-08-24 DIAGNOSIS — N65.1 DEFORMITY AND DISPROPORTION OF RECONSTRUCTED BREAST: Primary | ICD-10-CM

## 2023-08-24 DIAGNOSIS — N65.0 DEFORMITY AND DISPROPORTION OF RECONSTRUCTED BREAST: Primary | ICD-10-CM

## 2023-08-24 PROCEDURE — 99024 POSTOP FOLLOW-UP VISIT: CPT | Performed by: SURGERY

## 2023-09-29 ENCOUNTER — HOSPITAL ENCOUNTER (OUTPATIENT)
Dept: INFUSION THERAPY | Age: 58
Discharge: HOME | End: 2023-09-29
Payer: MEDICARE

## 2023-09-29 VITALS
SYSTOLIC BLOOD PRESSURE: 122 MMHG | WEIGHT: 175 LBS | HEART RATE: 90 BPM | HEIGHT: 66 IN | DIASTOLIC BLOOD PRESSURE: 82 MMHG | OXYGEN SATURATION: 96 % | BODY MASS INDEX: 28.12 KG/M2

## 2023-09-29 DIAGNOSIS — C50.919 CARCINOMA OF FEMALE BREAST, UNSPECIFIED ESTROGEN RECEPTOR STATUS, UNSPECIFIED LATERALITY, UNSPECIFIED SITE OF BREAST (HCC): ICD-10-CM

## 2023-09-29 DIAGNOSIS — R91.1 PULMONARY NODULE 1 CM OR GREATER IN DIAMETER: Primary | ICD-10-CM

## 2023-09-29 DIAGNOSIS — E53.8 VITAMIN B 12 DEFICIENCY: ICD-10-CM

## 2023-09-29 DIAGNOSIS — D50.8 OTHER IRON DEFICIENCY ANEMIA: ICD-10-CM

## 2023-09-29 DIAGNOSIS — C50.919 PRIMARY MALIGNANT NEOPLASM OF BREAST WITH METASTASIS (HCC): ICD-10-CM

## 2023-09-29 LAB
BASOPHILS # BLD: 0.02 K/UL (ref 0.01–0.08)
BASOPHILS NFR BLD: 0.3 % (ref 0.1–1.2)
EOSINOPHIL # BLD: 0.07 K/UL (ref 0.04–0.54)
EOSINOPHIL NFR BLD: 1 % (ref 0.7–7)
ERYTHROCYTE [DISTWIDTH] IN BLOOD BY AUTOMATED COUNT: 13.6 % (ref 11.7–14.4)
HCT VFR BLD AUTO: 39.1 % (ref 34.1–44.9)
HGB BLD-MCNC: 13.2 G/DL (ref 11.2–15.7)
LYMPHOCYTES # BLD: 1.77 K/UL (ref 1.18–3.74)
LYMPHOCYTES NFR BLD: 26.1 % (ref 19.3–53.1)
MCH RBC QN AUTO: 29.7 PG (ref 25.6–32.2)
MCHC RBC AUTO-ENTMCNC: 33.8 G/DL (ref 32.3–35.5)
MCV RBC AUTO: 88.1 FL (ref 79.4–94.8)
MONOCYTES # BLD: 0.46 K/UL (ref 0.24–0.82)
MONOCYTES NFR BLD: 6.8 % (ref 4.7–12.5)
NEUTROPHILS # BLD: 4.46 K/UL (ref 1.56–6.13)
NEUTS SEG NFR BLD: 65.7 % (ref 34–71.1)
PLATELET # BLD AUTO: 295 K/UL (ref 182–369)
PMV BLD AUTO: 8.5 FL (ref 7.4–10.4)
RBC # BLD AUTO: 4.44 M/UL (ref 3.93–5.22)
WBC # BLD AUTO: 6.79 K/UL (ref 3.98–10.04)

## 2023-09-29 PROCEDURE — 6360000002 HC RX W HCPCS: Performed by: INTERNAL MEDICINE

## 2023-09-29 PROCEDURE — 36415 COLL VENOUS BLD VENIPUNCTURE: CPT

## 2023-09-29 PROCEDURE — 96402 CHEMO HORMON ANTINEOPL SQ/IM: CPT

## 2023-09-29 PROCEDURE — 85025 COMPLETE CBC W/AUTO DIFF WBC: CPT

## 2023-09-29 PROCEDURE — 96372 THER/PROPH/DIAG INJ SC/IM: CPT

## 2023-09-29 RX ORDER — CYANOCOBALAMIN 1000 UG/ML
1000 INJECTION, SOLUTION INTRAMUSCULAR; SUBCUTANEOUS ONCE
Status: COMPLETED | OUTPATIENT
Start: 2023-09-29 | End: 2023-09-29

## 2023-09-29 RX ORDER — LAMOTRIGINE 25 MG/1
500 TABLET ORAL ONCE
Status: COMPLETED | OUTPATIENT
Start: 2023-09-29 | End: 2023-09-29

## 2023-09-29 RX ORDER — CYANOCOBALAMIN 1000 UG/ML
1000 INJECTION, SOLUTION INTRAMUSCULAR; SUBCUTANEOUS ONCE
OUTPATIENT
Start: 2023-10-20 | End: 2023-10-20

## 2023-09-29 RX ORDER — LAMOTRIGINE 25 MG/1
500 TABLET ORAL ONCE
Status: CANCELLED
Start: 2023-09-29 | End: 2023-09-29

## 2023-09-29 RX ADMIN — FULVESTRANT 500 MG: 50 INJECTION, SOLUTION INTRAMUSCULAR at 10:36

## 2023-09-29 RX ADMIN — CYANOCOBALAMIN 1000 MCG: 1000 INJECTION, SOLUTION INTRAMUSCULAR; SUBCUTANEOUS at 10:36

## 2023-10-03 ENCOUNTER — TELEPHONE (OUTPATIENT)
Dept: INFUSION THERAPY | Age: 58
End: 2023-10-03

## 2023-10-03 NOTE — TELEPHONE ENCOUNTER
Pt contacted clinic and left vm stating she was supposed to have a CBC and CMP done last Friday 9/29 when here for injection and that blood was drawn from her arm but she does not see CMP results in her chart. Attempted to return phone call to patient to inform her that her CMP was not done. No answer from pt.

## 2023-10-26 ENCOUNTER — TELEPHONE (OUTPATIENT)
Dept: HEMATOLOGY | Age: 58
End: 2023-10-26

## 2023-10-26 DIAGNOSIS — C50.011 CARCINOMA OF AREOLA OF RIGHT BREAST IN FEMALE, UNSPECIFIED ESTROGEN RECEPTOR STATUS (HCC): Primary | ICD-10-CM

## 2023-10-26 NOTE — PROGRESS NOTES
MEDICAL ONCOLOGY PROGRESS NOTE    Pt Name: Claudia Members  MRN: [id number]  YOB: 1965  Date of evaluation: 10/27/2023    HISTORY OF PRESENT ILLNESS:    Reason for MD visit: Disease/toxicity management  Kasey Sherwood is here today for monitoring for breast cancer and toxicity assessment. She is currently on palliative treatment with Faslodex/Ibrance initiated in January 2017. She has not been taking her Ibrance. She has been compliant with her Faslodex. She denies any new complaints. She had CT scans that showed a left lower lobe pulmonary nodule. A PET scan was performed that showed increased FDG uptake in the nodule. No other evidence of metastatic disease. Discussed pulmonary and she underwent a bronchoscopy and biopsy. Unfortunately, BAL/biopsy was nondiagnostic. Overall, her pulmonary symptoms have improved significantly. She had a repeat CT scan that showed stable left lower lobe pulmonary nodule.     Diagnosis  Grade III Adenocarcinoma of right breast diagnosed 2006  Stage IIIA, [id number]  ER/NC Positive, HER-2 bruce/ihc 1+  April 2013-RECURRENCE in the right axillary region  Genetic testing- BRCA 1&2 Negative  2014 (?) RECURRENCE in the axillary skin  01/05/2017- METASTATIC DISEASE with lung, hilar, and axillary lymph node mets  Osteopenia  Mediport associated DVT, October 2021  DVT left upper extremity, November 2021    Treatment Summary  2006- Neoadjuvant chemotherapy with AC x 4 cycles followed by Taxol  2007- Lumpectomy with axillary lymph node dissection  2007- Adjuvant radiation therapy to whole breast and axillary region  Did not take tamoxifen due to cost  April 2013- RECURRENCE  Neoadjuvant chemotherapy with 2 cycles of Taxotere followed by Doxil  10/14/2013- Right Breast Mastectomy and Axillary Dissection  01/23/2014- Completion of adjuvant RT to chest wall and axillary lymph node with Dr. Sunitha Moreno  02/14- Initiated adjuvant endocrine therapy with tamoxifen  2014 (?) RECURRENCE in

## 2023-10-26 NOTE — TELEPHONE ENCOUNTER
Called patient to remind them of their appointment on 10/27/2023. Detailed voicemail was left with appointment date and time.

## 2023-10-27 ENCOUNTER — OFFICE VISIT (OUTPATIENT)
Dept: HEMATOLOGY | Age: 58
End: 2023-10-27
Payer: MEDICARE

## 2023-10-27 ENCOUNTER — HOSPITAL ENCOUNTER (OUTPATIENT)
Dept: INFUSION THERAPY | Age: 58
Discharge: HOME | End: 2023-10-27
Payer: MEDICARE

## 2023-10-27 VITALS
BODY MASS INDEX: 28.77 KG/M2 | WEIGHT: 179 LBS | OXYGEN SATURATION: 97 % | DIASTOLIC BLOOD PRESSURE: 82 MMHG | HEIGHT: 66 IN | SYSTOLIC BLOOD PRESSURE: 144 MMHG | HEART RATE: 96 BPM

## 2023-10-27 DIAGNOSIS — Z79.818 USE OF FULVESTRANT (FASLODEX): ICD-10-CM

## 2023-10-27 DIAGNOSIS — D50.8 OTHER IRON DEFICIENCY ANEMIA: ICD-10-CM

## 2023-10-27 DIAGNOSIS — R91.1 PULMONARY NODULE 1 CM OR GREATER IN DIAMETER: ICD-10-CM

## 2023-10-27 DIAGNOSIS — C50.919 CARCINOMA OF FEMALE BREAST, UNSPECIFIED ESTROGEN RECEPTOR STATUS, UNSPECIFIED LATERALITY, UNSPECIFIED SITE OF BREAST (HCC): ICD-10-CM

## 2023-10-27 DIAGNOSIS — E53.8 VITAMIN B 12 DEFICIENCY: ICD-10-CM

## 2023-10-27 DIAGNOSIS — C50.919 PRIMARY MALIGNANT NEOPLASM OF BREAST WITH METASTASIS (HCC): ICD-10-CM

## 2023-10-27 DIAGNOSIS — C50.011 CARCINOMA OF AREOLA OF RIGHT BREAST IN FEMALE, UNSPECIFIED ESTROGEN RECEPTOR STATUS (HCC): ICD-10-CM

## 2023-10-27 DIAGNOSIS — R91.1 PULMONARY NODULE 1 CM OR GREATER IN DIAMETER: Primary | ICD-10-CM

## 2023-10-27 DIAGNOSIS — Z71.89 CARE PLAN DISCUSSED WITH PATIENT: ICD-10-CM

## 2023-10-27 DIAGNOSIS — C50.011 CARCINOMA OF AREOLA OF RIGHT BREAST IN FEMALE, UNSPECIFIED ESTROGEN RECEPTOR STATUS (HCC): Primary | ICD-10-CM

## 2023-10-27 LAB
ALBUMIN SERPL-MCNC: 4.5 G/DL (ref 3.5–5.2)
ALP SERPL-CCNC: 115 U/L (ref 35–104)
ALT SERPL-CCNC: 12 U/L (ref 9–52)
ANION GAP SERPL CALCULATED.3IONS-SCNC: 11 MMOL/L (ref 7–19)
AST SERPL-CCNC: 31 U/L (ref 14–36)
BASOPHILS # BLD: 0.01 K/UL (ref 0.01–0.08)
BASOPHILS NFR BLD: 0.1 % (ref 0.1–1.2)
BILIRUB SERPL-MCNC: 1.2 MG/DL (ref 0.2–1.3)
BUN SERPL-MCNC: 8 MG/DL (ref 7–17)
CA 15-3: 20 U/ML (ref 0–25)
CALCIUM SERPL-MCNC: 9.9 MG/DL (ref 8.4–10.2)
CEA SERPL-MCNC: 2 NG/ML (ref 0–4.7)
CHLORIDE SERPL-SCNC: 107 MMOL/L (ref 98–111)
CO2 SERPL-SCNC: 26 MMOL/L (ref 22–29)
CREAT SERPL-MCNC: 0.6 MG/DL (ref 0.5–1)
EOSINOPHIL # BLD: 0.03 K/UL (ref 0.04–0.54)
EOSINOPHIL NFR BLD: 0.4 % (ref 0.7–7)
ERYTHROCYTE [DISTWIDTH] IN BLOOD BY AUTOMATED COUNT: 13.1 % (ref 11.7–14.4)
GLOBULIN: 3.4 G/DL
GLUCOSE SERPL-MCNC: 98 MG/DL (ref 74–106)
HCT VFR BLD AUTO: 40 % (ref 34.1–44.9)
HGB BLD-MCNC: 13.6 G/DL (ref 11.2–15.7)
LYMPHOCYTES # BLD: 2.29 K/UL (ref 1.18–3.74)
LYMPHOCYTES NFR BLD: 31.7 % (ref 19.3–53.1)
MCH RBC QN AUTO: 29.4 PG (ref 25.6–32.2)
MCHC RBC AUTO-ENTMCNC: 34 G/DL (ref 32.3–35.5)
MCV RBC AUTO: 86.4 FL (ref 79.4–94.8)
MONOCYTES # BLD: 0.41 K/UL (ref 0.24–0.82)
MONOCYTES NFR BLD: 5.7 % (ref 4.7–12.5)
NEUTROPHILS # BLD: 4.47 K/UL (ref 1.56–6.13)
NEUTS SEG NFR BLD: 62 % (ref 34–71.1)
PLATELET # BLD AUTO: 287 K/UL (ref 182–369)
PMV BLD AUTO: 8.6 FL (ref 7.4–10.4)
POTASSIUM SERPL-SCNC: 3.8 MMOL/L (ref 3.5–5.1)
PROT SERPL-MCNC: 7.9 G/DL (ref 6.3–8.2)
RBC # BLD AUTO: 4.63 M/UL (ref 3.93–5.22)
SODIUM SERPL-SCNC: 144 MMOL/L (ref 137–145)
WBC # BLD AUTO: 7.22 K/UL (ref 3.98–10.04)

## 2023-10-27 PROCEDURE — 1036F TOBACCO NON-USER: CPT | Performed by: INTERNAL MEDICINE

## 2023-10-27 PROCEDURE — 6360000002 HC RX W HCPCS

## 2023-10-27 PROCEDURE — G8427 DOCREV CUR MEDS BY ELIG CLIN: HCPCS | Performed by: INTERNAL MEDICINE

## 2023-10-27 PROCEDURE — 99212 OFFICE O/P EST SF 10 MIN: CPT

## 2023-10-27 PROCEDURE — 36415 COLL VENOUS BLD VENIPUNCTURE: CPT

## 2023-10-27 PROCEDURE — 96372 THER/PROPH/DIAG INJ SC/IM: CPT

## 2023-10-27 PROCEDURE — G8417 CALC BMI ABV UP PARAM F/U: HCPCS | Performed by: INTERNAL MEDICINE

## 2023-10-27 PROCEDURE — 85025 COMPLETE CBC W/AUTO DIFF WBC: CPT

## 2023-10-27 PROCEDURE — 80053 COMPREHEN METABOLIC PANEL: CPT

## 2023-10-27 PROCEDURE — 3017F COLORECTAL CA SCREEN DOC REV: CPT | Performed by: INTERNAL MEDICINE

## 2023-10-27 PROCEDURE — 99214 OFFICE O/P EST MOD 30 MIN: CPT | Performed by: INTERNAL MEDICINE

## 2023-10-27 PROCEDURE — 96401 CHEMO ANTI-NEOPL SQ/IM: CPT

## 2023-10-27 PROCEDURE — 6360000002 HC RX W HCPCS: Performed by: INTERNAL MEDICINE

## 2023-10-27 PROCEDURE — G8484 FLU IMMUNIZE NO ADMIN: HCPCS | Performed by: INTERNAL MEDICINE

## 2023-10-27 RX ORDER — LAMOTRIGINE 25 MG/1
500 TABLET ORAL ONCE
Status: COMPLETED | OUTPATIENT
Start: 2023-10-27 | End: 2023-10-27

## 2023-10-27 RX ORDER — CYANOCOBALAMIN 1000 UG/ML
1000 INJECTION, SOLUTION INTRAMUSCULAR; SUBCUTANEOUS ONCE
OUTPATIENT
Start: 2023-11-24 | End: 2023-11-24

## 2023-10-27 RX ORDER — LAMOTRIGINE 25 MG/1
500 TABLET ORAL ONCE
Status: CANCELLED
Start: 2023-10-27 | End: 2023-10-27

## 2023-10-27 RX ORDER — CYANOCOBALAMIN 1000 UG/ML
1000 INJECTION, SOLUTION INTRAMUSCULAR; SUBCUTANEOUS ONCE
Status: COMPLETED | OUTPATIENT
Start: 2023-10-27 | End: 2023-10-27

## 2023-10-27 RX ADMIN — CYANOCOBALAMIN 1000 MCG: 1000 INJECTION, SOLUTION INTRAMUSCULAR; SUBCUTANEOUS at 10:04

## 2023-10-27 RX ADMIN — FULVESTRANT 500 MG: 50 INJECTION, SOLUTION INTRAMUSCULAR at 10:01

## 2023-10-29 LAB — CANCER AG27-29 SERPL-ACNC: 26.8 U/ML

## 2023-12-01 ENCOUNTER — HOSPITAL ENCOUNTER (OUTPATIENT)
Dept: INFUSION THERAPY | Age: 58
Discharge: HOME | End: 2023-12-01
Payer: MEDICARE

## 2023-12-01 VITALS — SYSTOLIC BLOOD PRESSURE: 110 MMHG | DIASTOLIC BLOOD PRESSURE: 66 MMHG | HEART RATE: 98 BPM

## 2023-12-01 DIAGNOSIS — C50.011 CARCINOMA OF AREOLA OF RIGHT BREAST IN FEMALE, UNSPECIFIED ESTROGEN RECEPTOR STATUS (HCC): ICD-10-CM

## 2023-12-01 DIAGNOSIS — C50.919 CARCINOMA OF FEMALE BREAST, UNSPECIFIED ESTROGEN RECEPTOR STATUS, UNSPECIFIED LATERALITY, UNSPECIFIED SITE OF BREAST (HCC): Primary | ICD-10-CM

## 2023-12-01 DIAGNOSIS — C50.919 CARCINOMA OF FEMALE BREAST, UNSPECIFIED ESTROGEN RECEPTOR STATUS, UNSPECIFIED LATERALITY, UNSPECIFIED SITE OF BREAST (HCC): ICD-10-CM

## 2023-12-01 DIAGNOSIS — D50.8 OTHER IRON DEFICIENCY ANEMIA: ICD-10-CM

## 2023-12-01 DIAGNOSIS — E53.8 VITAMIN B 12 DEFICIENCY: ICD-10-CM

## 2023-12-01 DIAGNOSIS — R91.1 PULMONARY NODULE 1 CM OR GREATER IN DIAMETER: Primary | ICD-10-CM

## 2023-12-01 DIAGNOSIS — C50.919 PRIMARY MALIGNANT NEOPLASM OF BREAST WITH METASTASIS (HCC): ICD-10-CM

## 2023-12-01 LAB
ALBUMIN SERPL-MCNC: 4.6 G/DL (ref 3.5–5.2)
ALP SERPL-CCNC: 110 U/L (ref 35–104)
ALT SERPL-CCNC: 19 U/L (ref 5–33)
ANION GAP SERPL CALCULATED.3IONS-SCNC: 12 MMOL/L (ref 7–19)
AST SERPL-CCNC: 27 U/L (ref 5–32)
BASOPHILS # BLD: 0.03 K/UL (ref 0.01–0.08)
BASOPHILS NFR BLD: 0.3 % (ref 0.1–1.2)
BILIRUB SERPL-MCNC: 0.9 MG/DL (ref 0.2–1.2)
BUN SERPL-MCNC: 17 MG/DL (ref 6–20)
CALCIUM SERPL-MCNC: 9.2 MG/DL (ref 8.6–10)
CEA SERPL-MCNC: 2.8 NG/ML (ref 0–4.7)
CHLORIDE SERPL-SCNC: 103 MMOL/L (ref 98–111)
CO2 SERPL-SCNC: 26 MMOL/L (ref 22–29)
CREAT SERPL-MCNC: 0.7 MG/DL (ref 0.5–0.9)
EOSINOPHIL # BLD: 0.05 K/UL (ref 0.04–0.54)
EOSINOPHIL NFR BLD: 0.5 % (ref 0.7–7)
ERYTHROCYTE [DISTWIDTH] IN BLOOD BY AUTOMATED COUNT: 14 % (ref 11.7–14.4)
GLUCOSE SERPL-MCNC: 92 MG/DL (ref 74–109)
HCT VFR BLD AUTO: 42.8 % (ref 34.1–44.9)
HGB BLD-MCNC: 14.1 G/DL (ref 11.2–15.7)
LYMPHOCYTES # BLD: 2.53 K/UL (ref 1.18–3.74)
LYMPHOCYTES NFR BLD: 25.2 % (ref 19.3–53.1)
MCH RBC QN AUTO: 29.3 PG (ref 25.6–32.2)
MCHC RBC AUTO-ENTMCNC: 32.9 G/DL (ref 32.3–35.5)
MCV RBC AUTO: 88.8 FL (ref 79.4–94.8)
MONOCYTES # BLD: 0.6 K/UL (ref 0.24–0.82)
MONOCYTES NFR BLD: 6 % (ref 4.7–12.5)
NEUTROPHILS # BLD: 6.79 K/UL (ref 1.56–6.13)
NEUTS SEG NFR BLD: 67.5 % (ref 34–71.1)
PLATELET # BLD AUTO: 251 K/UL (ref 182–369)
PMV BLD AUTO: 9.3 FL (ref 7.4–10.4)
POTASSIUM SERPL-SCNC: 4.5 MMOL/L (ref 3.5–5)
PROT SERPL-MCNC: 7.5 G/DL (ref 6.6–8.7)
RBC # BLD AUTO: 4.82 M/UL (ref 3.93–5.22)
SODIUM SERPL-SCNC: 141 MMOL/L (ref 136–145)
WBC # BLD AUTO: 10.05 K/UL (ref 3.98–10.04)

## 2023-12-01 PROCEDURE — 85025 COMPLETE CBC W/AUTO DIFF WBC: CPT

## 2023-12-01 PROCEDURE — 96401 CHEMO ANTI-NEOPL SQ/IM: CPT

## 2023-12-01 PROCEDURE — 6360000002 HC RX W HCPCS: Performed by: INTERNAL MEDICINE

## 2023-12-01 PROCEDURE — 80053 COMPREHEN METABOLIC PANEL: CPT | Performed by: INTERNAL MEDICINE

## 2023-12-01 PROCEDURE — 36415 COLL VENOUS BLD VENIPUNCTURE: CPT

## 2023-12-01 PROCEDURE — 36415 COLL VENOUS BLD VENIPUNCTURE: CPT | Performed by: INTERNAL MEDICINE

## 2023-12-01 PROCEDURE — 96402 CHEMO HORMON ANTINEOPL SQ/IM: CPT

## 2023-12-01 PROCEDURE — 96372 THER/PROPH/DIAG INJ SC/IM: CPT

## 2023-12-01 RX ORDER — LAMOTRIGINE 25 MG/1
500 TABLET ORAL ONCE
Status: CANCELLED
Start: 2023-12-01 | End: 2023-12-01

## 2023-12-01 RX ORDER — CYANOCOBALAMIN 1000 UG/ML
1000 INJECTION, SOLUTION INTRAMUSCULAR; SUBCUTANEOUS ONCE
Status: COMPLETED | OUTPATIENT
Start: 2023-12-01 | End: 2023-12-01

## 2023-12-01 RX ORDER — LAMOTRIGINE 25 MG/1
500 TABLET ORAL ONCE
Status: COMPLETED | OUTPATIENT
Start: 2023-12-01 | End: 2023-12-01

## 2023-12-01 RX ORDER — CYANOCOBALAMIN 1000 UG/ML
1000 INJECTION, SOLUTION INTRAMUSCULAR; SUBCUTANEOUS ONCE
OUTPATIENT
Start: 2023-12-22 | End: 2023-12-22

## 2023-12-01 RX ADMIN — CYANOCOBALAMIN 1000 MCG: 1000 INJECTION, SOLUTION INTRAMUSCULAR; SUBCUTANEOUS at 10:31

## 2023-12-01 RX ADMIN — FULVESTRANT 500 MG: 50 INJECTION, SOLUTION INTRAMUSCULAR at 10:31

## 2023-12-02 LAB — CANCER AG27-29 SERPL-ACNC: 23.5 U/ML

## 2023-12-08 ENCOUNTER — HOSPITAL ENCOUNTER (EMERGENCY)
Age: 58
Discharge: HOME | End: 2023-12-08
Attending: EMERGENCY MEDICINE
Payer: MEDICAID

## 2023-12-08 ENCOUNTER — APPOINTMENT (OUTPATIENT)
Dept: GENERAL RADIOLOGY | Age: 58
End: 2023-12-08
Payer: MEDICAID

## 2023-12-08 VITALS
TEMPERATURE: 98 F | OXYGEN SATURATION: 98 % | HEART RATE: 103 BPM | BODY MASS INDEX: 29.87 KG/M2 | RESPIRATION RATE: 16 BRPM | WEIGHT: 185 LBS | SYSTOLIC BLOOD PRESSURE: 159 MMHG | DIASTOLIC BLOOD PRESSURE: 87 MMHG

## 2023-12-08 DIAGNOSIS — M79.672 LEFT FOOT PAIN: Primary | ICD-10-CM

## 2023-12-08 PROCEDURE — 73630 X-RAY EXAM OF FOOT: CPT

## 2023-12-08 PROCEDURE — 99283 EMERGENCY DEPT VISIT LOW MDM: CPT

## 2023-12-08 PROCEDURE — 73610 X-RAY EXAM OF ANKLE: CPT

## 2023-12-08 PROCEDURE — 6370000000 HC RX 637 (ALT 250 FOR IP): Performed by: EMERGENCY MEDICINE

## 2023-12-08 RX ADMIN — IBUPROFEN 600 MG: 200 TABLET, FILM COATED ORAL at 21:12

## 2023-12-08 ASSESSMENT — ENCOUNTER SYMPTOMS
SHORTNESS OF BREATH: 0
ABDOMINAL PAIN: 0
BACK PAIN: 0

## 2023-12-08 ASSESSMENT — PAIN DESCRIPTION - LOCATION: LOCATION: TOE (COMMENT WHICH ONE)

## 2023-12-08 ASSESSMENT — PAIN DESCRIPTION - DESCRIPTORS: DESCRIPTORS: SHOOTING

## 2023-12-08 ASSESSMENT — PAIN DESCRIPTION - ORIENTATION: ORIENTATION: LEFT

## 2023-12-08 ASSESSMENT — PAIN SCALES - GENERAL: PAINLEVEL_OUTOF10: 7

## 2023-12-14 ENCOUNTER — HOSPITAL ENCOUNTER (OUTPATIENT)
Dept: CT IMAGING | Age: 58
Discharge: HOME | End: 2023-12-14
Payer: MEDICARE

## 2023-12-14 DIAGNOSIS — C50.011 CARCINOMA OF AREOLA OF RIGHT BREAST IN FEMALE, UNSPECIFIED ESTROGEN RECEPTOR STATUS (HCC): ICD-10-CM

## 2023-12-14 DIAGNOSIS — R91.1 LUNG NODULE: ICD-10-CM

## 2023-12-14 PROCEDURE — 6360000004 HC RX CONTRAST MEDICATION: Performed by: INTERNAL MEDICINE

## 2023-12-14 PROCEDURE — 71250 CT THORAX DX C-: CPT

## 2023-12-14 PROCEDURE — 74177 CT ABD & PELVIS W/CONTRAST: CPT

## 2023-12-14 RX ADMIN — IOPAMIDOL 75 ML: 755 INJECTION, SOLUTION INTRAVENOUS at 11:55

## 2023-12-22 ENCOUNTER — HOSPITAL ENCOUNTER (OUTPATIENT)
Dept: NUCLEAR MEDICINE | Age: 58
Discharge: HOME | End: 2023-12-24
Payer: MEDICARE

## 2023-12-22 DIAGNOSIS — R91.1 LUNG NODULE: ICD-10-CM

## 2023-12-22 LAB
GLUCOSE BLD-MCNC: 115 MG/DL (ref 70–99)
PERFORMED ON: ABNORMAL

## 2023-12-22 PROCEDURE — 78815 PET IMAGE W/CT SKULL-THIGH: CPT

## 2023-12-22 PROCEDURE — 3430000000 HC RX DIAGNOSTIC RADIOPHARMACEUTICAL: Performed by: INTERNAL MEDICINE

## 2023-12-22 PROCEDURE — 82962 GLUCOSE BLOOD TEST: CPT

## 2023-12-22 PROCEDURE — A9552 F18 FDG: HCPCS | Performed by: INTERNAL MEDICINE

## 2023-12-22 RX ORDER — FLUDEOXYGLUCOSE F 18 200 MCI/ML
10 INJECTION, SOLUTION INTRAVENOUS
Status: COMPLETED | OUTPATIENT
Start: 2023-12-22 | End: 2023-12-22

## 2023-12-22 RX ADMIN — FLUDEOXYGLUCOSE F 18 10 MILLICURIE: 200 INJECTION, SOLUTION INTRAVENOUS at 11:54

## 2023-12-26 ENCOUNTER — OFFICE VISIT (OUTPATIENT)
Dept: PULMONOLOGY | Age: 58
End: 2023-12-26
Payer: MEDICARE

## 2023-12-26 VITALS
HEART RATE: 88 BPM | BODY MASS INDEX: 30.31 KG/M2 | SYSTOLIC BLOOD PRESSURE: 140 MMHG | DIASTOLIC BLOOD PRESSURE: 90 MMHG | HEIGHT: 66 IN | WEIGHT: 188.6 LBS | OXYGEN SATURATION: 98 %

## 2023-12-26 DIAGNOSIS — Z90.13 STATUS POST BILATERAL MASTECTOMY: ICD-10-CM

## 2023-12-26 DIAGNOSIS — G47.33 OSA (OBSTRUCTIVE SLEEP APNEA): ICD-10-CM

## 2023-12-26 DIAGNOSIS — C50.919 CARCINOMA OF FEMALE BREAST, UNSPECIFIED ESTROGEN RECEPTOR STATUS, UNSPECIFIED LATERALITY, UNSPECIFIED SITE OF BREAST (HCC): ICD-10-CM

## 2023-12-26 DIAGNOSIS — R91.1 LUNG NODULE: Primary | ICD-10-CM

## 2023-12-26 PROCEDURE — 1036F TOBACCO NON-USER: CPT | Performed by: INTERNAL MEDICINE

## 2023-12-26 PROCEDURE — G8427 DOCREV CUR MEDS BY ELIG CLIN: HCPCS | Performed by: INTERNAL MEDICINE

## 2023-12-26 PROCEDURE — 99214 OFFICE O/P EST MOD 30 MIN: CPT | Performed by: INTERNAL MEDICINE

## 2023-12-26 PROCEDURE — G8417 CALC BMI ABV UP PARAM F/U: HCPCS | Performed by: INTERNAL MEDICINE

## 2023-12-26 PROCEDURE — 3017F COLORECTAL CA SCREEN DOC REV: CPT | Performed by: INTERNAL MEDICINE

## 2023-12-26 PROCEDURE — G8484 FLU IMMUNIZE NO ADMIN: HCPCS | Performed by: INTERNAL MEDICINE

## 2024-01-03 ENCOUNTER — HOSPITAL ENCOUNTER (OUTPATIENT)
Dept: CT IMAGING | Age: 59
Discharge: HOME | End: 2024-01-03
Attending: INTERNAL MEDICINE
Payer: MEDICARE

## 2024-01-03 DIAGNOSIS — R91.1 LUNG NODULE: ICD-10-CM

## 2024-01-03 PROCEDURE — 71250 CT THORAX DX C-: CPT

## 2024-01-04 ENCOUNTER — TELEPHONE (OUTPATIENT)
Dept: HEMATOLOGY | Age: 59
End: 2024-01-04

## 2024-01-04 ENCOUNTER — APPOINTMENT (OUTPATIENT)
Dept: GENERAL RADIOLOGY | Age: 59
End: 2024-01-04
Attending: INTERNAL MEDICINE
Payer: MEDICARE

## 2024-01-04 ENCOUNTER — ANESTHESIA (OUTPATIENT)
Dept: ENDOSCOPY | Age: 59
End: 2024-01-04
Payer: MEDICARE

## 2024-01-04 ENCOUNTER — ANESTHESIA EVENT (OUTPATIENT)
Dept: ENDOSCOPY | Age: 59
End: 2024-01-04
Payer: MEDICARE

## 2024-01-04 ENCOUNTER — PREP FOR PROCEDURE (OUTPATIENT)
Dept: PULMONOLOGY | Age: 59
End: 2024-01-04

## 2024-01-04 ENCOUNTER — HOSPITAL ENCOUNTER (OUTPATIENT)
Age: 59
Setting detail: OUTPATIENT SURGERY
Discharge: HOME | End: 2024-01-04
Attending: INTERNAL MEDICINE | Admitting: INTERNAL MEDICINE
Payer: MEDICARE

## 2024-01-04 VITALS
HEIGHT: 66 IN | HEART RATE: 87 BPM | DIASTOLIC BLOOD PRESSURE: 80 MMHG | BODY MASS INDEX: 30.22 KG/M2 | OXYGEN SATURATION: 97 % | TEMPERATURE: 97.8 F | SYSTOLIC BLOOD PRESSURE: 137 MMHG | RESPIRATION RATE: 16 BRPM | WEIGHT: 188 LBS

## 2024-01-04 DIAGNOSIS — J20.9 ACUTE BRONCHITIS, UNSPECIFIED ORGANISM: Primary | ICD-10-CM

## 2024-01-04 DIAGNOSIS — R91.1 LUNG NODULE: ICD-10-CM

## 2024-01-04 PROCEDURE — 93005 ELECTROCARDIOGRAM TRACING: CPT

## 2024-01-04 PROCEDURE — 31624 DX BRONCHOSCOPE/LAVAGE: CPT | Performed by: INTERNAL MEDICINE

## 2024-01-04 PROCEDURE — 3609011300 HC BRONCHOSCOPY BRONCHIAL/ENDOBRNCL BX 1+ SITES: Performed by: INTERNAL MEDICINE

## 2024-01-04 PROCEDURE — 7100000011 HC PHASE II RECOVERY - ADDTL 15 MIN: Performed by: INTERNAL MEDICINE

## 2024-01-04 PROCEDURE — 87205 SMEAR GRAM STAIN: CPT

## 2024-01-04 PROCEDURE — 2580000003 HC RX 258: Performed by: INTERNAL MEDICINE

## 2024-01-04 PROCEDURE — 87070 CULTURE OTHR SPECIMN AEROBIC: CPT

## 2024-01-04 PROCEDURE — C1601 HC SURG SUPPLY STERILE: HCPCS | Performed by: INTERNAL MEDICINE

## 2024-01-04 PROCEDURE — 3700000000 HC ANESTHESIA ATTENDED CARE: Performed by: INTERNAL MEDICINE

## 2024-01-04 PROCEDURE — 88305 TISSUE EXAM BY PATHOLOGIST: CPT

## 2024-01-04 PROCEDURE — 3700000001 HC ADD 15 MINUTES (ANESTHESIA): Performed by: INTERNAL MEDICINE

## 2024-01-04 PROCEDURE — 87116 MYCOBACTERIA CULTURE: CPT

## 2024-01-04 PROCEDURE — 31628 BRONCHOSCOPY/LUNG BX EACH: CPT | Performed by: INTERNAL MEDICINE

## 2024-01-04 PROCEDURE — 2500000003 HC RX 250 WO HCPCS

## 2024-01-04 PROCEDURE — 7100000010 HC PHASE II RECOVERY - FIRST 15 MIN: Performed by: INTERNAL MEDICINE

## 2024-01-04 PROCEDURE — 7100000001 HC PACU RECOVERY - ADDTL 15 MIN: Performed by: INTERNAL MEDICINE

## 2024-01-04 PROCEDURE — 6360000002 HC RX W HCPCS

## 2024-01-04 PROCEDURE — 87075 CULTR BACTERIA EXCEPT BLOOD: CPT

## 2024-01-04 PROCEDURE — 2709999900 HC NON-CHARGEABLE SUPPLY: Performed by: INTERNAL MEDICINE

## 2024-01-04 PROCEDURE — 71045 X-RAY EXAM CHEST 1 VIEW: CPT

## 2024-01-04 PROCEDURE — 87015 SPECIMEN INFECT AGNT CONCNTJ: CPT

## 2024-01-04 PROCEDURE — 3609011900 HC BRONCHOSCOPY NEEDLE BX TRACHEA MAIN STEM&/BRON: Performed by: INTERNAL MEDICINE

## 2024-01-04 PROCEDURE — 87176 TISSUE HOMOGENIZATION CULTR: CPT

## 2024-01-04 PROCEDURE — 31627 NAVIGATIONAL BRONCHOSCOPY: CPT | Performed by: INTERNAL MEDICINE

## 2024-01-04 PROCEDURE — 31629 BRONCHOSCOPY/NEEDLE BX EACH: CPT | Performed by: INTERNAL MEDICINE

## 2024-01-04 PROCEDURE — 88173 CYTOPATH EVAL FNA REPORT: CPT

## 2024-01-04 PROCEDURE — 7100000000 HC PACU RECOVERY - FIRST 15 MIN: Performed by: INTERNAL MEDICINE

## 2024-01-04 PROCEDURE — 87206 SMEAR FLUORESCENT/ACID STAI: CPT

## 2024-01-04 PROCEDURE — 2720000010 HC SURG SUPPLY STERILE: Performed by: INTERNAL MEDICINE

## 2024-01-04 PROCEDURE — 88112 CYTOPATH CELL ENHANCE TECH: CPT

## 2024-01-04 PROCEDURE — 31654 BRONCH EBUS IVNTJ PERPH LES: CPT | Performed by: INTERNAL MEDICINE

## 2024-01-04 RX ORDER — AZITHROMYCIN 250 MG/1
250 TABLET, FILM COATED ORAL DAILY
Qty: 10 TABLET | Refills: 0 | Status: SHIPPED | OUTPATIENT
Start: 2024-01-04 | End: 2024-01-14

## 2024-01-04 RX ORDER — EPHEDRINE SULFATE 50 MG/ML
INJECTION, SOLUTION INTRAVENOUS PRN
Status: DISCONTINUED | OUTPATIENT
Start: 2024-01-04 | End: 2024-01-04 | Stop reason: SDUPTHER

## 2024-01-04 RX ORDER — FENTANYL CITRATE 50 UG/ML
INJECTION, SOLUTION INTRAMUSCULAR; INTRAVENOUS PRN
Status: DISCONTINUED | OUTPATIENT
Start: 2024-01-04 | End: 2024-01-04 | Stop reason: SDUPTHER

## 2024-01-04 RX ORDER — PROPOFOL 10 MG/ML
INJECTION, EMULSION INTRAVENOUS PRN
Status: DISCONTINUED | OUTPATIENT
Start: 2024-01-04 | End: 2024-01-04 | Stop reason: SDUPTHER

## 2024-01-04 RX ORDER — FENTANYL CITRATE 50 UG/ML
50 INJECTION, SOLUTION INTRAMUSCULAR; INTRAVENOUS EVERY 5 MIN PRN
Status: DISCONTINUED | OUTPATIENT
Start: 2024-01-04 | End: 2024-01-04 | Stop reason: HOSPADM

## 2024-01-04 RX ORDER — ONDANSETRON 2 MG/ML
INJECTION INTRAMUSCULAR; INTRAVENOUS PRN
Status: DISCONTINUED | OUTPATIENT
Start: 2024-01-04 | End: 2024-01-04 | Stop reason: SDUPTHER

## 2024-01-04 RX ORDER — MIDAZOLAM HYDROCHLORIDE 1 MG/ML
INJECTION INTRAMUSCULAR; INTRAVENOUS PRN
Status: DISCONTINUED | OUTPATIENT
Start: 2024-01-04 | End: 2024-01-04 | Stop reason: SDUPTHER

## 2024-01-04 RX ORDER — SODIUM CHLORIDE, SODIUM LACTATE, POTASSIUM CHLORIDE, CALCIUM CHLORIDE 600; 310; 30; 20 MG/100ML; MG/100ML; MG/100ML; MG/100ML
INJECTION, SOLUTION INTRAVENOUS CONTINUOUS
Status: DISCONTINUED | OUTPATIENT
Start: 2024-01-04 | End: 2024-01-04 | Stop reason: HOSPADM

## 2024-01-04 RX ORDER — SODIUM CHLORIDE 0.9 % (FLUSH) 0.9 %
5-40 SYRINGE (ML) INJECTION EVERY 12 HOURS SCHEDULED
Status: DISCONTINUED | OUTPATIENT
Start: 2024-01-04 | End: 2024-01-04 | Stop reason: HOSPADM

## 2024-01-04 RX ORDER — SODIUM CHLORIDE 0.9 % (FLUSH) 0.9 %
5-40 SYRINGE (ML) INJECTION PRN
Status: DISCONTINUED | OUTPATIENT
Start: 2024-01-04 | End: 2024-01-04 | Stop reason: HOSPADM

## 2024-01-04 RX ORDER — ROCURONIUM BROMIDE 10 MG/ML
INJECTION, SOLUTION INTRAVENOUS PRN
Status: DISCONTINUED | OUTPATIENT
Start: 2024-01-04 | End: 2024-01-04 | Stop reason: SDUPTHER

## 2024-01-04 RX ORDER — PREDNISONE 10 MG/1
TABLET ORAL
Qty: 35 TABLET | Refills: 0 | Status: SHIPPED | OUTPATIENT
Start: 2024-01-04

## 2024-01-04 RX ORDER — FENTANYL CITRATE 50 UG/ML
25 INJECTION, SOLUTION INTRAMUSCULAR; INTRAVENOUS EVERY 5 MIN PRN
Status: DISCONTINUED | OUTPATIENT
Start: 2024-01-04 | End: 2024-01-04 | Stop reason: HOSPADM

## 2024-01-04 RX ORDER — SODIUM CHLORIDE 9 MG/ML
INJECTION, SOLUTION INTRAVENOUS PRN
Status: DISCONTINUED | OUTPATIENT
Start: 2024-01-04 | End: 2024-01-04 | Stop reason: HOSPADM

## 2024-01-04 RX ADMIN — FENTANYL CITRATE 50 MCG: 50 INJECTION INTRAMUSCULAR; INTRAVENOUS at 08:48

## 2024-01-04 RX ADMIN — FENTANYL CITRATE 50 MCG: 50 INJECTION INTRAMUSCULAR; INTRAVENOUS at 07:33

## 2024-01-04 RX ADMIN — MIDAZOLAM 2 MG: 1 INJECTION INTRAMUSCULAR; INTRAVENOUS at 07:05

## 2024-01-04 RX ADMIN — EPHEDRINE SULFATE 10 MG: 50 INJECTION INTRAMUSCULAR; INTRAVENOUS; SUBCUTANEOUS at 08:32

## 2024-01-04 RX ADMIN — SUGAMMADEX 150 MG: 100 INJECTION, SOLUTION INTRAVENOUS at 08:45

## 2024-01-04 RX ADMIN — PHENYLEPHRINE HYDROCHLORIDE 100 MCG: 10 INJECTION INTRAVENOUS at 08:00

## 2024-01-04 RX ADMIN — PHENYLEPHRINE HYDROCHLORIDE 100 MCG: 10 INJECTION INTRAVENOUS at 07:57

## 2024-01-04 RX ADMIN — PHENYLEPHRINE HYDROCHLORIDE 100 MCG: 10 INJECTION INTRAVENOUS at 08:09

## 2024-01-04 RX ADMIN — FENTANYL CITRATE 50 MCG: 50 INJECTION INTRAMUSCULAR; INTRAVENOUS at 09:46

## 2024-01-04 RX ADMIN — ROCURONIUM BROMIDE 30 MG: 10 INJECTION, SOLUTION INTRAVENOUS at 07:11

## 2024-01-04 RX ADMIN — SODIUM CHLORIDE, SODIUM LACTATE, POTASSIUM CHLORIDE, AND CALCIUM CHLORIDE: 600; 310; 30; 20 INJECTION, SOLUTION INTRAVENOUS at 06:51

## 2024-01-04 RX ADMIN — PHENYLEPHRINE HYDROCHLORIDE 100 MCG: 10 INJECTION INTRAVENOUS at 07:44

## 2024-01-04 RX ADMIN — PHENYLEPHRINE HYDROCHLORIDE 100 MCG: 10 INJECTION INTRAVENOUS at 07:47

## 2024-01-04 RX ADMIN — ROCURONIUM BROMIDE 10 MG: 10 INJECTION, SOLUTION INTRAVENOUS at 08:14

## 2024-01-04 RX ADMIN — ONDANSETRON 4 MG: 2 INJECTION INTRAMUSCULAR; INTRAVENOUS at 08:29

## 2024-01-04 RX ADMIN — PROPOFOL 200 MG: 10 INJECTION, EMULSION INTRAVENOUS at 07:10

## 2024-01-04 RX ADMIN — PHENYLEPHRINE HYDROCHLORIDE 100 MCG: 10 INJECTION INTRAVENOUS at 08:30

## 2024-01-04 RX ADMIN — SODIUM CHLORIDE, SODIUM LACTATE, POTASSIUM CHLORIDE, AND CALCIUM CHLORIDE: 600; 310; 30; 20 INJECTION, SOLUTION INTRAVENOUS at 08:40

## 2024-01-04 RX ADMIN — PHENYLEPHRINE HYDROCHLORIDE 100 MCG: 10 INJECTION INTRAVENOUS at 08:27

## 2024-01-04 ASSESSMENT — PAIN - FUNCTIONAL ASSESSMENT
PAIN_FUNCTIONAL_ASSESSMENT: NONE - DENIES PAIN
PAIN_FUNCTIONAL_ASSESSMENT: ADULT NONVERBAL PAIN SCALE (NPVS)
PAIN_FUNCTIONAL_ASSESSMENT: 0-10

## 2024-01-04 ASSESSMENT — ENCOUNTER SYMPTOMS
SHORTNESS OF BREATH: 0
COUGH: 1

## 2024-01-04 ASSESSMENT — PAIN DESCRIPTION - DESCRIPTORS: DESCRIPTORS: TIGHTNESS

## 2024-01-04 ASSESSMENT — PAIN SCALES - GENERAL
PAINLEVEL_OUTOF10: 9
PAINLEVEL_OUTOF10: 6

## 2024-01-04 NOTE — ANESTHESIA POSTPROCEDURE EVALUATION
Department of Anesthesiology  Postprocedure Note    Patient: Yandy Dickinson  MRN: [id number]  YOB: 1965  Date of evaluation: 1/4/2024    Procedure Summary       Date: 01/04/24 Room / Location: [place] [address] / [facility]    Anesthesia Start: 0705 Anesthesia Stop: 0903    Procedures:       MONARCH BRONCHOSCOPY/TRANSBRONCHIAL NEEDLE BIOPSY (Left: Chest)      BRONCHOSCOPY BIOPSY BRONCHUS Diagnosis:       Lung nodule      (Lung nodule [R91.1])    Surgeons: Ashley Morrison MD Responsible Provider: Batsheva Farrell APRN - CRNA    Anesthesia Type: general ASA Status: 3            Anesthesia Type: No value filed.    Dena Phase I: Dena Score: 9    Dena Phase II:      Anesthesia Post Evaluation    Patient location during evaluation: PACU  Patient participation: complete - patient participated  Comments: Called Dr. Mitchell with cardiology, came to eval patient. EKG not concerning for MI. Some chest pain with coughing seems muskuloskeletal pain. Plan to discharge home. Dr. Mitchell gave patient guidance for when to return to ER if any new, worsening or concerning symptoms. He plans to have the cardiology clinic reach out to her for follow up outpatient.         No notable events documented.

## 2024-01-04 NOTE — DISCHARGE INSTRUCTIONS
Bronchoscopy: What to Expect at Home  Your Recovery     Bronchoscopy lets your doctor look at your airway through a tube called a bronchoscope. Afterward, you may feel tired for 1 or 2 days. Your mouth may feel very dry for several hours after the procedure. You may also have a sore throat and a hoarse voice for a few days. Sucking on throat lozenges or gargling with warm salt water may help soothe your sore throat.  If a sample of tissue (biopsy) was taken, you may spit up a small amount of blood or have bloody saliva. This is normal.  Ask your doctor when you can drive again. Do not smoke for at least 24 hours.  This care sheet gives you a general idea about how long it will take for you to recover. But each person recovers at a different pace. Follow the steps below to get better as quickly as possible.  How can you care for yourself at home?  Activity    Do not eat anything for 2 hours after the procedure.     Rest when you feel tired. Getting enough sleep will help you recover.     Avoid strenuous activities, such as bicycle riding, jogging, weight lifting, or aerobic exercise, until your doctor says it is okay.     Ask your doctor when you can drive again.   Diet    You can eat your normal diet. If your stomach is upset, try bland, low-fat foods like plain rice, broiled chicken, toast, and yogurt.     If it is painful to swallow, start out with cold drinks, flavored ice pops, and ice cream. Next, try soft foods like pudding, yogurt, canned or cooked fruit, scrambled eggs, and mashed potatoes. Avoid eating hard or scratchy foods like chips or raw vegetables. Avoid orange or tomato juice and other acidic foods that can sting the throat.     Drink plenty of fluids to avoid becoming dehydrated (unless your doctor tells you not to).   Medicines    Take pain medicines exactly as directed.  If the doctor gave you a prescription medicine for pain, take it as prescribed.  If you are not taking a prescription pain

## 2024-01-04 NOTE — TELEPHONE ENCOUNTER
I have left voicemail for patient regarding appointment tomorrow.  would like to reschedule her for next week after her bronchoscopy biopsy. I have rescheduled her for 1/12/24 at 9:30 and have requested for patient to call back if she request an alternate time.

## 2024-01-04 NOTE — ANESTHESIA POSTPROCEDURE EVALUATION
Department of Anesthesiology  Postprocedure Note    Patient: Yandy Dickinson  MRN: [id number]  YOB: 1965  Date of evaluation: 1/4/2024    Procedure Summary       Date: 01/04/24 Room / Location: [place] [address] / [facility]    Anesthesia Start: 0705 Anesthesia Stop: 0903    Procedures:       MONARCH BRONCHOSCOPY/TRANSBRONCHIAL NEEDLE BIOPSY (Left: Chest)      BRONCHOSCOPY BIOPSY BRONCHUS Diagnosis:       Lung nodule      (Lung nodule [R91.1])    Surgeons: Ashley Morrison MD Responsible Provider: Batsheva Farrell APRN - CRNA    Anesthesia Type: general ASA Status: 3            Anesthesia Type: No value filed.    Dena Phase I: Dena Score: 10    Dena Phase II:      Anesthesia Post Evaluation    Patient location during evaluation: PACU  Patient participation: complete - patient participated  Level of consciousness: responsive to light touch  Pain score: 0  Airway patency: patent  Nausea & Vomiting: no vomiting  Cardiovascular status: hemodynamically stable  Respiratory status: room air  Hydration status: stable  Pain management: adequate    No notable events documented.

## 2024-01-04 NOTE — PROGRESS NOTES
Report was called but then Dr. Cowan wanted to have a cardiac eval before releasing her. Pt was placed back on the monitor.

## 2024-01-04 NOTE — ADDENDUM NOTE
Addendum  created 01/04/24 1208 by Batsheva Farrell APRN - JOHNATHAN    Intraprocedure Meds edited

## 2024-01-04 NOTE — ANESTHESIA PRE PROCEDURE
Department of Anesthesiology  Preprocedure Note       Name:  Yandy Dickinson   Age:  58 y.o.  :  1965                                          MRN:  [id number]         Date:  2024      Surgeon: Surgeon(s):  Ashley Morrison MD    Procedure: Procedure(s):  BRONCHOSCOPY COMPUTER ASSISTED ROBOTIC    Medications prior to admission:   Prior to Admission medications    Medication Sig Start Date End Date Taking? Authorizing Provider   HYDROcodone-acetaminophen (NORCO) 7.5-325 MG per tablet Take 1 tablet by mouth every 6 hours as needed for Pain.    ProviderArcenio MD   hydrOXYzine HCl (ATARAX) 10 MG tablet Take 1 tablet by mouth 3 times daily as needed for Itching    Arcenio Edouard MD   ELIQUIS 5 MG TABS tablet TAKE 1 TABLET BY MOUTH TWICE A DAY  Patient not taking: Reported on 2024 11/15/22   Elvira Bell APRN   cyclobenzaprine (FLEXERIL) 10 MG tablet Take 1 tablet by mouth 3 times daily as needed 22   Nahum Cowan   ketorolac (TORADOL) 10 MG tablet Take 1 tablet by mouth every 6 hours as needed for Pain 22   Deonte Ambrosio PA   gabapentin (NEURONTIN) 300 MG capsule Take 2 capsules by mouth 3 times daily for 30 days.  Patient taking differently: Take 2 capsules by mouth 3 times daily as needed. 22  Kassi Jim MD   VENTOLIN  (90 Base) MCG/ACT inhaler Inhale 2 puffs into the lungs 4 times daily as needed for Wheezing 21   Ashley Morrison MD   pantoprazole (PROTONIX) 40 MG tablet TAKE 1 TABLET BY MOUTH EVERY DAY  Patient taking differently: Take 1 tablet by mouth daily 21   Kassi Jim MD   NARCAN 4 MG/0.1ML LIQD nasal spray 1 spray as needed 21   Arcenio Edouard MD   ondansetron (ZOFRAN) 4 MG tablet Take 1 tablet by mouth every 8 hours as needed for Nausea or Vomiting    Arcenio Edouard MD       Current medications:    Current Facility-Administered Medications   Medication Dose Route Frequency

## 2024-01-04 NOTE — CONSULTS
hydronephrosis. GI TRACT: Narrowing of the gastric body is identified.  The small bowel is normal.  Colonic diverticulosis is identified, without evidence of diverticulitis. No bowel wall thickening or dilation. The appendix is normal. URINARY BLADDER: Normal. REPRODUCTIVE ORGANS: No abnormality identified.  LYMPH NODES: No lymphadenopathy. VASCULATURE:   No significant aortic aneurysm. The visualized portal and mesenteric veins appear within normal limits. The IVC is normal. OTHER: None.  OSSEOUS/SOFT TISSUES:  No suspicious osteolytic, or osteoblastic lesion. No acute fracture.      1. No evidence of metastatic disease in the abdomen or pelvis. 2. Focal narrowing of the gastric body is identified.  This is favored to represent peristalsis.  Attention on follow-up imaging is recommended.  All CT scans are performed using dose optimization techniques as appropriate to the performed exam and include at least one of the following: Automated exposure control, adjustment of the mA and/or kV according to size, and the use of iterative reconstruction technique.  ______________________________________ Electronically signed by: COREY DICK M.D. Date:     12/14/2023 Time:    16:59     XR ANKLE LEFT (MIN 3 VIEWS)    Result Date: 12/8/2023  EXAM:  THREE VIEWS LEFT ANKLE.  HISTORY:  Left ankle pain.  COMPARISON:  None.       Chondroid calcifications of the distal tibia probably represents an enchondroma.  Recommend follow-up radiographs in 6 months to ensure stability.  No fracture or dislocation.  Diffuse osseous demineralization.   ______________________________________ Electronically signed by: EVER GUEVARA M.D. Date:     12/08/2023 Time:    21:21     XR FOOT LEFT (MIN 3 VIEWS)    Result Date: 12/8/2023  EXAM: FOOT RADIOGRAPHS  HISTORY: Trauma.  TECHNIQUE: AP, oblique, and lateral views of the left foot.  COMPARISON: Ankle radiograph 12/08/2022  FINDINGS:  No fracture or dislocation.  Joint spaces are maintained.   Minimal hallux valgus deformity.  No soft tissue swelling.  Dorsal talar osteophyte.  Arterial vascular calcifications.  Partially imaged benign bone infarct and/or enchondroma in the distal tibia.        No acute osseous abnormality.    ______________________________________ Electronically signed by: CLAYTON GAMBINO D.O. Date:     12/08/2023 Time:    21:20       Assessment:  Atypical chest pain    Patient having throat and chest pain currently very mild and likely related to the bronchoscopy procedure itself.  They are currently improving.  I was unable to see the telemetry strip of ST elevation noted when she was temporarily hypotensive.  What ever changes were seen may have been related to the hemodynamic changes as they appear to have resolved .  Her  PACU EKG showing normal sinus rhythm with no evidence of ischemia.  At this time I would recommend outpatient cardiology follow-up as she does report some ongoing exertional symptoms for which echocardiogram and stress test can be considered.  As she is stable she does not need to be admitted to the hospital for these.  If she develops new or worsening chest discomfort she should come back to the emergency room

## 2024-01-04 NOTE — PROGRESS NOTES
Cardiologist at  and spoke with the patient. He reviewed the 12 lead EKG. With his findings he found it safe for the pt to go home.  She is to follow up in his office ( his office with reach out to her). She was given instructions if she has tightness of the chest, jaw pain etc she is to come to the ER.

## 2024-01-04 NOTE — ADDENDUM NOTE
Addendum  created 01/04/24 1153 by Batsheva Farrell APRN - JOHNATHAN    Intraprocedure Meds edited

## 2024-01-04 NOTE — H&P (VIEW-ONLY)
Pulmonary and Sleep Medicine    Yandy Dickinson (:  1965) is a 58 y.o. female,Established patient, here for evaluation of the following chief complaint(s):  Follow-up (Follow up lung nodules and results of pet scan)      Referring physician:  No referring provider defined for this encounter.     ASSESSMENT/PLAN:  1. Lung nodule  -     CT CHEST WO CONTRAST; Future  -     Case Request  2. Carcinoma of female breast, unspecified estrogen receptor status, unspecified laterality, unspecified site of breast (HCC)  3. RUDY (obstructive sleep apnea)  4. Status post bilateral mastectomy and reconstruction       Discussed options with the patient including biopsy and follow up imaging. She elects to proceed with biopsy. She is not on any anticoagulants at this time.        Ashley Morrison MD, [facility], [facility]    Return in about 4 weeks (around 2024).    SUBJECTIVE/OBJECTIVE:  She is here follow up on lung nodules. She had a PET CT  my interpretation of the PET CT is that it did show increased activity in both RUL and LLL nodules. She denies new complaints.        Prior to Visit Medications    Medication Sig Taking? Authorizing Provider   HYDROcodone-acetaminophen (NORCO) 7.5-325 MG per tablet Take 1 tablet by mouth every 6 hours as needed for Pain. Yes Arcenio Edouard MD   hydrOXYzine HCl (ATARAX) 10 MG tablet Take 1 tablet by mouth 3 times daily as needed for Itching Yes Arcenio Edouard MD   ELIQUIS 5 MG TABS tablet TAKE 1 TABLET BY MOUTH TWICE A DAY Yes Elvira Bell APRN   cyclobenzaprine (FLEXERIL) 10 MG tablet Take 1 tablet by mouth 3 times daily as needed Yes Nahum Cowan   ketorolac (TORADOL) 10 MG tablet Take 1 tablet by mouth every 6 hours as needed for Pain Yes Deonte Ambrosio PA   gabapentin (NEURONTIN) 300 MG capsule Take 2 capsules by mouth 3 times daily for 30 days.  Patient taking differently: Take 2 capsules by mouth 3 times daily as needed. Yes Kassi Jim MD  Effort: Pulmonary effort is normal. No respiratory distress.      Breath sounds: Normal breath sounds. No stridor. No wheezing, rhonchi or rales.   Abdominal:      General: There is no distension.      Palpations: There is no mass.      Tenderness: There is no abdominal tenderness. There is no guarding or rebound.   Musculoskeletal:      Cervical back: Normal range of motion and neck supple.   Neurological:      Mental Status: She is alert and oriented to person, place, and time.             This note was generated using a voice recognition software. Errors in voice recognition may have occurred.      An electronic signature was used to authenticate this note.    --Ashley Morrison MD

## 2024-01-04 NOTE — H&P
Pulmonary and Sleep Medicine    Yandy Dickinson (:  1965) is a 58 y.o. female,Established patient, here for evaluation of the following chief complaint(s):  Follow-up (Follow up lung nodules and results of pet scan)      Referring physician:  No referring provider defined for this encounter.     ASSESSMENT/PLAN:  1. Lung nodule  -     CT CHEST WO CONTRAST; Future  -     Case Request  2. Carcinoma of female breast, unspecified estrogen receptor status, unspecified laterality, unspecified site of breast (HCC)  3. RUDY (obstructive sleep apnea)  4. Status post bilateral mastectomy and reconstruction       Discussed options with the patient including biopsy and follow up imaging. She elects to proceed with biopsy. She is not on any anticoagulants at this time.        Ashley Morrison MD, [facility], [facility]    Return in about 4 weeks (around 2024).    SUBJECTIVE/OBJECTIVE:  She is here follow up on lung nodules. She had a PET CT  my interpretation of the PET CT is that it did show increased activity in both RUL and LLL nodules. She denies new complaints.        Prior to Visit Medications    Medication Sig Taking? Authorizing Provider   HYDROcodone-acetaminophen (NORCO) 7.5-325 MG per tablet Take 1 tablet by mouth every 6 hours as needed for Pain. Yes Arcenio Edouard MD   hydrOXYzine HCl (ATARAX) 10 MG tablet Take 1 tablet by mouth 3 times daily as needed for Itching Yes Arcenio Edouard MD   ELIQUIS 5 MG TABS tablet TAKE 1 TABLET BY MOUTH TWICE A DAY Yes Elvira Bell APRN   cyclobenzaprine (FLEXERIL) 10 MG tablet Take 1 tablet by mouth 3 times daily as needed Yes Nahum Cowan   ketorolac (TORADOL) 10 MG tablet Take 1 tablet by mouth every 6 hours as needed for Pain Yes Deonte Ambrosio PA   gabapentin (NEURONTIN) 300 MG capsule Take 2 capsules by mouth 3 times daily for 30 days.  Patient taking differently: Take 2 capsules by mouth 3 times daily as needed. Yes Kassi Jim MD

## 2024-01-04 NOTE — PROCEDURES
After consent and under general anesthesia patient underwent bronchoscopy through the endotracheal tube.  Trachea was normal.  Melisa was sharp and midline.  Right and left bronchial trees were explored.  There was no endobronchial disease bilaterally.  The [employer] navigational bronchoscopy platform was then used to navigate to the right upper lobe.  Navigation was unsuccessful due to technical issues with the scope.  Navigation was attempted to the left lower lobe superior segment lesion which was successful.  Placement of the scope was confirmed using radial EBUS which showed mostly concentric view of the lesion.  The lesion was biopsied transbronchial the under fluoroscopic guidance x 8.  Specimen submitted in formalin and saline for pathology and microbiology evaluation.  Transbronchial fine-needle aspirate was performed using the Olympus Periview needle bronchoalveolar lavage was obtained from the left lower lobe superior segment.  Patient tolerated the procedure well.  No immediate postoperative complications are noted.    Estimated blood loss: 5 cc.  Complications: None.  Specimen:  1.  Left lower lobe superior segment nodule transbronchial biopsy x 2-saline for culture and microbiology.  2.  Left lower lobe superior segment nodule transbronchial biopsy x 6 in formalin for pathology.  3.  Left lower lobe superior segment nodule fine-needle aspirate x 3 in CytoLyt for cytology.  4.  Bronchoalveolar lavage left lower lobe for cytology  Disposition: Post anesthesia recovery per anesthesia service

## 2024-01-04 NOTE — INTERVAL H&P NOTE
Update History & Physical    The patient's History and Physical of December 26, 2023 was reviewed with the patient and I examined the patient. There was no change. The surgical site was confirmed by the patient and me.     Plan: The risks, benefits, expected outcome, and alternative to the recommended procedure have been discussed with the patient. Patient understands and wants to proceed with the procedure.     Electronically signed by Ashley Morrison MD on 1/4/2024 at 6:59 AM

## 2024-01-05 ENCOUNTER — APPOINTMENT (OUTPATIENT)
Dept: INFUSION THERAPY | Age: 59
End: 2024-01-05
Payer: MEDICARE

## 2024-01-05 LAB — ACID FAST STN SPEC QL: NORMAL

## 2024-01-07 LAB
BACTERIA SPEC ANAEROBE CULT: NORMAL
BACTERIA SPEC ANAEROBE+AEROBE CULT: NORMAL
EKG P AXIS: 62 DEGREES
EKG P-R INTERVAL: 144 MS
EKG Q-T INTERVAL: 384 MS
EKG QRS DURATION: 74 MS
EKG QTC CALCULATION (BAZETT): 434 MS
EKG T AXIS: 75 DEGREES
GRAM STN SPEC: NORMAL

## 2024-01-08 LAB
BACTERIA SPEC ANAEROBE CULT: NORMAL
BACTERIA SPEC ANAEROBE+AEROBE CULT: NORMAL
GRAM STN SPEC: NORMAL

## 2024-01-09 ENCOUNTER — TELEPHONE (OUTPATIENT)
Dept: PULMONOLOGY | Age: 59
End: 2024-01-09

## 2024-01-09 NOTE — TELEPHONE ENCOUNTER
Left message requesting call back to reschedule appt with Dr Roberto.   is requesting to see patient sooner than 1/18/24

## 2024-01-10 ENCOUNTER — OFFICE VISIT (OUTPATIENT)
Dept: PULMONOLOGY | Age: 59
End: 2024-01-10

## 2024-01-10 VITALS
OXYGEN SATURATION: 99 % | HEART RATE: 92 BPM | HEIGHT: 66 IN | WEIGHT: 189 LBS | TEMPERATURE: 97.4 F | SYSTOLIC BLOOD PRESSURE: 161 MMHG | DIASTOLIC BLOOD PRESSURE: 90 MMHG | BODY MASS INDEX: 30.37 KG/M2

## 2024-01-10 DIAGNOSIS — R13.19 ESOPHAGEAL DYSPHAGIA: ICD-10-CM

## 2024-01-10 DIAGNOSIS — G47.33 OSA (OBSTRUCTIVE SLEEP APNEA): ICD-10-CM

## 2024-01-10 DIAGNOSIS — R05.1 ACUTE COUGH: ICD-10-CM

## 2024-01-10 DIAGNOSIS — Z90.13 STATUS POST BILATERAL MASTECTOMY: ICD-10-CM

## 2024-01-10 DIAGNOSIS — R91.1 LUNG NODULE: Primary | ICD-10-CM

## 2024-01-10 DIAGNOSIS — C50.919 CARCINOMA OF FEMALE BREAST, UNSPECIFIED ESTROGEN RECEPTOR STATUS, UNSPECIFIED LATERALITY, UNSPECIFIED SITE OF BREAST (HCC): ICD-10-CM

## 2024-01-10 RX ORDER — BENZONATATE 200 MG/1
200 CAPSULE ORAL 3 TIMES DAILY PRN
Qty: 90 CAPSULE | Refills: 2 | Status: SHIPPED | OUTPATIENT
Start: 2024-01-10 | End: 2024-02-09

## 2024-01-10 ASSESSMENT — ENCOUNTER SYMPTOMS
ABDOMINAL DISTENTION: 0
ABDOMINAL PAIN: 0
WHEEZING: 0
ANAL BLEEDING: 0
RHINORRHEA: 0
BACK PAIN: 0
APNEA: 1
SHORTNESS OF BREATH: 0
CHEST TIGHTNESS: 0
COUGH: 0

## 2024-01-10 NOTE — PROGRESS NOTES
Pulmonary and Sleep Medicine    Yandy Dickinson (:  1965) is a 58 y.o. female,Established patient, here for evaluation of the following chief complaint(s):  Follow-up ([place] follow up)      Referring physician:  No referring provider defined for this encounter.     ASSESSMENT/PLAN:  1. Lung nodule  2. Carcinoma of female breast, unspecified estrogen receptor status, unspecified laterality, unspecified site of breast (HCC)  3. RUDY (obstructive sleep apnea)  4. Status post bilateral mastectomy and reconstruction   5. Esophageal dysphagia  6. Acute cough  -     benzonatate (TESSALON) 200 MG capsule; Take 1 capsule by mouth 3 times daily as needed for Cough, Disp-90 capsule, R-2Normal        RUL nodule new and is PET positive. Will refer to Dr. Franco for further diagnostic workup the RUL nodule.   Start on Tessalon perles. She is on norco.            Ashley Morrison MD, [facility], [facility]    Return in about 4 weeks (around 2024).    SUBJECTIVE/OBJECTIVE:  She is here for follow up after he lung biopsy. The pathology showed anthracosis. We could  not engage the RUL bronchus leading to the RUL lesion with the monarch. She is coughing she is on antibiotics and steroids. She complains of cough.         Prior to Visit Medications    Medication Sig Taking? Authorizing Provider   predniSONE (DELTASONE) 10 MG tablet 40 mg a day and taper by 10 mg every third day to off Yes Ashley Morrison MD   azithromycin (ZITHROMAX) 250 MG tablet Take 1 tablet by mouth daily for 10 days Yes Ashley Morrison MD   HYDROcodone-acetaminophen (NORCO) 7.5-325 MG per tablet Take 1 tablet by mouth every 6 hours as needed for Pain. Yes ProviderArcenio MD   hydrOXYzine HCl (ATARAX) 10 MG tablet Take 1 tablet by mouth 3 times daily as needed for Itching Yes Arcenio Edouard MD   ELIQUIS 5 MG TABS tablet TAKE 1 TABLET BY MOUTH TWICE A DAY Yes Elvira Bell APRN   cyclobenzaprine (FLEXERIL) 10 MG

## 2024-01-11 ENCOUNTER — TELEPHONE (OUTPATIENT)
Dept: HEMATOLOGY | Age: 59
End: 2024-01-11

## 2024-01-11 DIAGNOSIS — C50.011 CARCINOMA OF AREOLA OF RIGHT BREAST IN FEMALE, UNSPECIFIED ESTROGEN RECEPTOR STATUS (HCC): Primary | ICD-10-CM

## 2024-01-11 NOTE — TELEPHONE ENCOUNTER
Called patient and reminded patient of their appointment on 1/12/2024and patient confirmed they would be here.

## 2024-01-11 NOTE — PROGRESS NOTES
MEDICAL ONCOLOGY PROGRESS NOTE    Pt Name: Yandy Dickinson  MRN: [id number]  YOB: 1965  Date of evaluation: 1/12/2024    HISTORY OF PRESENT ILLNESS:    Reason for MD visit: Disease/toxicity management  Yandy Dickinson is here today for monitoring for breast cancer and toxicity assessment.    She is currently on palliative treatment with Faslodex/Ibrance initiated in January 2017.  She has not been taking her Ibrance. She has been compliant with her Faslodex. She denies any new complaints.  She had CT scans that showed a left lower lobe pulmonary nodule.   A PET scan was performed that showed increased FDG uptake in the nodule.  No other evidence of metastatic disease.  Discussed pulmonary and she underwent a bronchoscopy and biopsy. Unfortunately, BAL/biopsy was nondiagnostic.  Overall, her pulmonary symptoms have improved significantly.  She had a repeat CT scan that showed stable left lower lobe pulmonary nodule.    Diagnosis  Grade III Adenocarcinoma of right breast diagnosed 2006  Stage IIIA, [id number]  ER/AZ Positive, HER-2 bruce/ihc 1+  April 2013-RECURRENCE in the right axillary region  Genetic testing- BRCA 1&2 Negative  2014 (?) RECURRENCE in the axillary skin  01/05/2017- METASTATIC DISEASE with lung, hilar, and axillary lymph node mets  Osteopenia  Mediport associated DVT, October 2021  DVT left upper extremity, November 2021    Treatment Summary  2006- Neoadjuvant chemotherapy with AC x 4 cycles followed by Taxol  2007- Lumpectomy with axillary lymph node dissection  2007- Adjuvant radiation therapy to whole breast and axillary region  Did not take tamoxifen due to cost  April 2013- RECURRENCE  Neoadjuvant chemotherapy with 2 cycles of Taxotere followed by Doxil  10/14/2013- Right Breast Mastectomy and Axillary Dissection  01/23/2014- Completion of adjuvant RT to chest wall and axillary lymph node with Dr. Box  02/14- Initiated adjuvant endocrine therapy with tamoxifen  2014 (?) RECURRENCE in

## 2024-01-12 ENCOUNTER — OFFICE VISIT (OUTPATIENT)
Dept: HEMATOLOGY | Age: 59
End: 2024-01-12
Payer: MEDICARE

## 2024-01-12 ENCOUNTER — HOSPITAL ENCOUNTER (OUTPATIENT)
Dept: INFUSION THERAPY | Age: 59
Discharge: HOME | End: 2024-01-12
Payer: MEDICARE

## 2024-01-12 VITALS
SYSTOLIC BLOOD PRESSURE: 148 MMHG | OXYGEN SATURATION: 99 % | DIASTOLIC BLOOD PRESSURE: 86 MMHG | TEMPERATURE: 97.8 F | HEART RATE: 94 BPM | BODY MASS INDEX: 30.53 KG/M2 | HEIGHT: 66 IN | WEIGHT: 190 LBS

## 2024-01-12 DIAGNOSIS — E53.8 VITAMIN B 12 DEFICIENCY: ICD-10-CM

## 2024-01-12 DIAGNOSIS — C50.919 PRIMARY MALIGNANT NEOPLASM OF BREAST WITH METASTASIS (HCC): ICD-10-CM

## 2024-01-12 DIAGNOSIS — C50.011 CARCINOMA OF AREOLA OF RIGHT BREAST IN FEMALE, UNSPECIFIED ESTROGEN RECEPTOR STATUS (HCC): ICD-10-CM

## 2024-01-12 DIAGNOSIS — D50.8 OTHER IRON DEFICIENCY ANEMIA: ICD-10-CM

## 2024-01-12 DIAGNOSIS — C50.919 CARCINOMA OF BREAST METASTATIC TO LUNG, UNSPECIFIED LATERALITY (HCC): ICD-10-CM

## 2024-01-12 DIAGNOSIS — R91.1 PULMONARY NODULE 1 CM OR GREATER IN DIAMETER: ICD-10-CM

## 2024-01-12 DIAGNOSIS — D50.8 OTHER IRON DEFICIENCY ANEMIA: Primary | ICD-10-CM

## 2024-01-12 DIAGNOSIS — Z71.89 CARE PLAN DISCUSSED WITH PATIENT: ICD-10-CM

## 2024-01-12 DIAGNOSIS — C50.919 CARCINOMA OF FEMALE BREAST, UNSPECIFIED ESTROGEN RECEPTOR STATUS, UNSPECIFIED LATERALITY, UNSPECIFIED SITE OF BREAST (HCC): ICD-10-CM

## 2024-01-12 DIAGNOSIS — Z79.01 CHRONIC ANTICOAGULATION: ICD-10-CM

## 2024-01-12 DIAGNOSIS — C78.00 CARCINOMA OF BREAST METASTATIC TO LUNG, UNSPECIFIED LATERALITY (HCC): ICD-10-CM

## 2024-01-12 DIAGNOSIS — C50.011 CARCINOMA OF AREOLA OF RIGHT BREAST IN FEMALE, UNSPECIFIED ESTROGEN RECEPTOR STATUS (HCC): Primary | ICD-10-CM

## 2024-01-12 LAB
ALBUMIN SERPL-MCNC: 4.2 G/DL (ref 3.5–5.2)
ALP SERPL-CCNC: 118 U/L (ref 35–104)
ALT SERPL-CCNC: 16 U/L (ref 9–52)
ANION GAP SERPL CALCULATED.3IONS-SCNC: 5 MMOL/L (ref 7–19)
AST SERPL-CCNC: 31 U/L (ref 14–36)
BASOPHILS # BLD: 0.02 K/UL (ref 0.01–0.08)
BASOPHILS NFR BLD: 0.1 % (ref 0.1–1.2)
BILIRUB SERPL-MCNC: 0.8 MG/DL (ref 0.2–1.3)
BUN SERPL-MCNC: 15 MG/DL (ref 7–17)
CA 15-3: 18 U/ML (ref 0–25)
CALCIUM SERPL-MCNC: 8.3 MG/DL (ref 8.4–10.2)
CEA SERPL-MCNC: 2.2 NG/ML (ref 0–4.7)
CHLORIDE SERPL-SCNC: 110 MMOL/L (ref 98–111)
CO2 SERPL-SCNC: 27 MMOL/L (ref 22–29)
CREAT SERPL-MCNC: 0.6 MG/DL (ref 0.5–1)
EOSINOPHIL # BLD: 0.08 K/UL (ref 0.04–0.54)
EOSINOPHIL NFR BLD: 0.6 % (ref 0.7–7)
ERYTHROCYTE [DISTWIDTH] IN BLOOD BY AUTOMATED COUNT: 15 % (ref 11.7–14.4)
GLOBULIN: 3.1 G/DL
GLUCOSE SERPL-MCNC: 105 MG/DL (ref 74–106)
HCT VFR BLD AUTO: 38.9 % (ref 34.1–44.9)
HGB BLD-MCNC: 12.4 G/DL (ref 11.2–15.7)
LYMPHOCYTES # BLD: 2.78 K/UL (ref 1.18–3.74)
LYMPHOCYTES NFR BLD: 19.6 % (ref 19.3–53.1)
MCH RBC QN AUTO: 29.2 PG (ref 25.6–32.2)
MCHC RBC AUTO-ENTMCNC: 31.9 G/DL (ref 32.3–35.5)
MCV RBC AUTO: 91.5 FL (ref 79.4–94.8)
MONOCYTES # BLD: 0.82 K/UL (ref 0.24–0.82)
MONOCYTES NFR BLD: 5.8 % (ref 4.7–12.5)
NEUTROPHILS # BLD: 10.36 K/UL (ref 1.56–6.13)
NEUTS SEG NFR BLD: 73.3 % (ref 34–71.1)
PLATELET # BLD AUTO: 339 K/UL (ref 182–369)
PMV BLD AUTO: 8.3 FL (ref 7.4–10.4)
POTASSIUM SERPL-SCNC: 4.4 MMOL/L (ref 3.5–5.1)
PROT SERPL-MCNC: 7.4 G/DL (ref 6.3–8.2)
RBC # BLD AUTO: 4.25 M/UL (ref 3.93–5.22)
SODIUM SERPL-SCNC: 142 MMOL/L (ref 137–145)
WBC # BLD AUTO: 14.15 K/UL (ref 3.98–10.04)

## 2024-01-12 PROCEDURE — 3017F COLORECTAL CA SCREEN DOC REV: CPT | Performed by: INTERNAL MEDICINE

## 2024-01-12 PROCEDURE — 36415 COLL VENOUS BLD VENIPUNCTURE: CPT

## 2024-01-12 PROCEDURE — G8417 CALC BMI ABV UP PARAM F/U: HCPCS | Performed by: INTERNAL MEDICINE

## 2024-01-12 PROCEDURE — 6360000002 HC RX W HCPCS

## 2024-01-12 PROCEDURE — 80053 COMPREHEN METABOLIC PANEL: CPT

## 2024-01-12 PROCEDURE — 99212 OFFICE O/P EST SF 10 MIN: CPT

## 2024-01-12 PROCEDURE — 6360000002 HC RX W HCPCS: Performed by: INTERNAL MEDICINE

## 2024-01-12 PROCEDURE — 96372 THER/PROPH/DIAG INJ SC/IM: CPT

## 2024-01-12 PROCEDURE — 85025 COMPLETE CBC W/AUTO DIFF WBC: CPT

## 2024-01-12 PROCEDURE — 1036F TOBACCO NON-USER: CPT | Performed by: INTERNAL MEDICINE

## 2024-01-12 PROCEDURE — 96402 CHEMO HORMON ANTINEOPL SQ/IM: CPT

## 2024-01-12 PROCEDURE — G8484 FLU IMMUNIZE NO ADMIN: HCPCS | Performed by: INTERNAL MEDICINE

## 2024-01-12 PROCEDURE — 99214 OFFICE O/P EST MOD 30 MIN: CPT | Performed by: INTERNAL MEDICINE

## 2024-01-12 PROCEDURE — G8427 DOCREV CUR MEDS BY ELIG CLIN: HCPCS | Performed by: INTERNAL MEDICINE

## 2024-01-12 RX ORDER — CYANOCOBALAMIN 1000 UG/ML
1000 INJECTION, SOLUTION INTRAMUSCULAR; SUBCUTANEOUS ONCE
Status: COMPLETED | OUTPATIENT
Start: 2024-01-12 | End: 2024-01-12

## 2024-01-12 RX ORDER — LAMOTRIGINE 25 MG/1
500 TABLET ORAL ONCE
Status: COMPLETED | OUTPATIENT
Start: 2024-01-12 | End: 2024-01-12

## 2024-01-12 RX ORDER — CYANOCOBALAMIN 1000 UG/ML
1000 INJECTION, SOLUTION INTRAMUSCULAR; SUBCUTANEOUS ONCE
OUTPATIENT
Start: 2024-01-26 | End: 2024-01-26

## 2024-01-12 RX ORDER — CYANOCOBALAMIN 1000 UG/ML
1000 INJECTION, SOLUTION INTRAMUSCULAR; SUBCUTANEOUS ONCE
Status: CANCELLED | OUTPATIENT
Start: 2024-01-26 | End: 2024-01-26

## 2024-01-12 RX ADMIN — FULVESTRANT 500 MG: 50 INJECTION, SOLUTION INTRAMUSCULAR at 11:00

## 2024-01-12 RX ADMIN — CYANOCOBALAMIN 1000 MCG: 1000 INJECTION, SOLUTION INTRAMUSCULAR; SUBCUTANEOUS at 11:01

## 2024-01-14 LAB — CANCER AG27-29 SERPL-ACNC: 23.1 U/ML

## 2024-01-17 LAB
ACID FAST STN SPEC QL: NORMAL
MYCOBACTERIUM SPEC CULT: NORMAL

## 2024-01-24 LAB
ACID FAST STN SPEC QL: NORMAL
MYCOBACTERIUM SPEC CULT: NORMAL

## 2024-01-26 ENCOUNTER — TELEPHONE (OUTPATIENT)
Dept: PULMONOLOGY | Age: 59
End: 2024-01-26

## 2024-01-26 NOTE — TELEPHONE ENCOUNTER
Yandy Dickinson   returned missed call to Gabby. Pt wanted to confirm her appt on 2/7. Per pt surgery is scheduled on 2/2 so she is fine to keep follow as scheduled. Please call pt with any concerns.

## 2024-01-26 NOTE — TELEPHONE ENCOUNTER
Left message requesting return call to reschedule follow up appointment with Dr Roberto until after procedure with Dr Franco is done.

## 2024-01-31 LAB
ACID FAST STN SPEC QL: NORMAL
MYCOBACTERIUM SPEC CULT: NORMAL

## 2024-02-07 ENCOUNTER — OFFICE VISIT (OUTPATIENT)
Dept: PULMONOLOGY | Age: 59
End: 2024-02-07
Payer: MEDICARE

## 2024-02-07 ENCOUNTER — HOSPITAL ENCOUNTER (OUTPATIENT)
Dept: GENERAL RADIOLOGY | Age: 59
Discharge: HOME | End: 2024-02-07
Payer: MEDICARE

## 2024-02-07 VITALS
BODY MASS INDEX: 31.4 KG/M2 | HEART RATE: 98 BPM | DIASTOLIC BLOOD PRESSURE: 97 MMHG | HEIGHT: 66 IN | WEIGHT: 195.4 LBS | OXYGEN SATURATION: 98 % | SYSTOLIC BLOOD PRESSURE: 166 MMHG

## 2024-02-07 DIAGNOSIS — C78.02 CARCINOMA OF LEFT BREAST METASTATIC TO LUNG (HCC): Primary | ICD-10-CM

## 2024-02-07 DIAGNOSIS — C50.919 CARCINOMA OF FEMALE BREAST, UNSPECIFIED ESTROGEN RECEPTOR STATUS, UNSPECIFIED LATERALITY, UNSPECIFIED SITE OF BREAST (HCC): ICD-10-CM

## 2024-02-07 DIAGNOSIS — R91.1 LUNG NODULE: ICD-10-CM

## 2024-02-07 DIAGNOSIS — C50.912 CARCINOMA OF LEFT BREAST METASTATIC TO LUNG (HCC): ICD-10-CM

## 2024-02-07 DIAGNOSIS — Z90.13 STATUS POST BILATERAL MASTECTOMY: ICD-10-CM

## 2024-02-07 DIAGNOSIS — C50.912 CARCINOMA OF LEFT BREAST METASTATIC TO LUNG (HCC): Primary | ICD-10-CM

## 2024-02-07 DIAGNOSIS — C78.02 CARCINOMA OF LEFT BREAST METASTATIC TO LUNG (HCC): ICD-10-CM

## 2024-02-07 LAB
ACID FAST STN SPEC QL: NORMAL
MYCOBACTERIUM SPEC CULT: NORMAL

## 2024-02-07 PROCEDURE — 3017F COLORECTAL CA SCREEN DOC REV: CPT | Performed by: INTERNAL MEDICINE

## 2024-02-07 PROCEDURE — 1036F TOBACCO NON-USER: CPT | Performed by: INTERNAL MEDICINE

## 2024-02-07 PROCEDURE — G8427 DOCREV CUR MEDS BY ELIG CLIN: HCPCS | Performed by: INTERNAL MEDICINE

## 2024-02-07 PROCEDURE — G8484 FLU IMMUNIZE NO ADMIN: HCPCS | Performed by: INTERNAL MEDICINE

## 2024-02-07 PROCEDURE — 71046 X-RAY EXAM CHEST 2 VIEWS: CPT

## 2024-02-07 PROCEDURE — G8417 CALC BMI ABV UP PARAM F/U: HCPCS | Performed by: INTERNAL MEDICINE

## 2024-02-07 PROCEDURE — 99214 OFFICE O/P EST MOD 30 MIN: CPT | Performed by: INTERNAL MEDICINE

## 2024-02-07 ASSESSMENT — ENCOUNTER SYMPTOMS
COUGH: 1
APNEA: 0
WHEEZING: 0
SHORTNESS OF BREATH: 0
BACK PAIN: 0
ANAL BLEEDING: 0
ABDOMINAL DISTENTION: 0
ABDOMINAL PAIN: 0
RHINORRHEA: 0
CHEST TIGHTNESS: 0

## 2024-02-07 NOTE — PROGRESS NOTES
Pulmonary and Sleep Medicine    Yandy Dickinson (:  1965) is a 58 y.o. female,Established patient, here for evaluation of the following chief complaint(s):  Follow-up (Follow up on lung nodules)      Referring physician:  No referring provider defined for this encounter.     ASSESSMENT/PLAN:  1. Carcinoma of left breast metastatic to lung (HCC)  -     XR CHEST (2 VW); Future  2. Carcinoma of female breast, unspecified estrogen receptor status, unspecified laterality, unspecified site of breast (HCC)  3. Lung nodule  4. Status post bilateral mastectomy and reconstruction         Will discuss with oncology.   Will do a CXR today. She was told she had effusion in [place]. ?       Ashley Morrison MD, [facility], [facility]    Return in about 3 months (around 2024).    SUBJECTIVE/OBJECTIVE:  She is here for follow up on lung nodule. Underwent navigational bronch in [place] and pathology was consistent with metastatic breast CA.         Prior to Visit Medications    Medication Sig Taking? Authorizing Provider   benzonatate (TESSALON) 200 MG capsule Take 1 capsule by mouth 3 times daily as needed for Cough Yes Ashley Morrison MD   predniSONE (DELTASONE) 10 MG tablet 40 mg a day and taper by 10 mg every third day to off Yes Ashley Morrison MD   HYDROcodone-acetaminophen (NORCO) 7.5-325 MG per tablet Take 1 tablet by mouth every 6 hours as needed for Pain. Yes Arcenio Edouard MD   hydrOXYzine HCl (ATARAX) 10 MG tablet Take 1 tablet by mouth 3 times daily as needed for Itching Yes Arcenio Edouard MD   cyclobenzaprine (FLEXERIL) 10 MG tablet Take 1 tablet by mouth 3 times daily as needed Yes Nahum Cowan   ketorolac (TORADOL) 10 MG tablet Take 1 tablet by mouth every 6 hours as needed for Pain Yes Deonte Ambrosio PA   gabapentin (NEURONTIN) 300 MG capsule Take 2 capsules by mouth 3 times daily for 30 days.  Patient taking differently: Take 2 capsules by mouth 3 times daily as

## 2024-02-13 ENCOUNTER — TELEPHONE (OUTPATIENT)
Dept: HEMATOLOGY | Age: 59
End: 2024-02-13

## 2024-02-13 NOTE — TELEPHONE ENCOUNTER
Called patient to remind of appointment on 02/16/2024 and was unable to leave a message or talk to patient due the phone kept ringing and no one ever answered or no option to leave voicemail.

## 2024-02-14 LAB
ACID FAST STN SPEC QL: NORMAL
MYCOBACTERIUM SPEC CULT: NORMAL

## 2024-02-14 NOTE — PROGRESS NOTES
Correcting for differences between the prior and current software package there has been an interval increase in activity.  The examination also demonstrates a new FDG avid nodule along the medial pleural margin of the right upper lobe with activity consistent with increased likelihood of malignancy.  No additional significantly FDG avid pulmonary parenchymal or pleural based lesions or hilar or mediastinal lymph nodes are identified.  Additional details are contained in the report.The examination demonstrates a generally stable, physiologic distribution of activity in the abdomen and pelvis, as described in the report. The examination demonstrates a generally stable, physiologic distribution of activity in the included portions of the head and neck, as described in the report. The examination demonstrates a generally stable, physiologic distribution of activity in the included portions of the skeleton and extremeties, as described in the report.  1/4/24 Lung, left lower lobe, transbronchial biopsy by /[facility] Pulmonology: Fragments of bronchial mucosa with submucosa exhibiting anthracotic pigment deposition.  No granulomata identified.  Fragments of clotted blood.  No histologic evidence of malignancy.   1/5/24 CT Chest WO Contrast Nodule in the right upper lobe anteriorly medially, slightly larger now measuring 1.5 x 2.0 cm. Noncalcified nodule in the left lower lobe superiorly medially measuring 2.5 cm, unchanged. Noncalcified nodule in the right lower lobe measuring 0.7 cm, unchanged. Atherosclerosis and coronary artery calcification. Mild degenerative changes of the spine. Bilateral breast implants.  Surgical clips in the right axilla. Diverticulosis of the colon.  Otherwise unremarkable noncontrast CT scan of the chest.   1/12/24 Tumor Markers: CEA 2.2, CA 15-3-18, CA 27.29-23.1  2/2/24 Robotic Navigational Bronchoscopy, endobronchial ultrasound by Greer Franco biopsy  2/2/24 Cytopathology ([facility]):

## 2024-02-16 ENCOUNTER — OFFICE VISIT (OUTPATIENT)
Dept: HEMATOLOGY | Age: 59
End: 2024-02-16

## 2024-02-16 ENCOUNTER — HOSPITAL ENCOUNTER (OUTPATIENT)
Dept: INFUSION THERAPY | Age: 59
Discharge: HOME | End: 2024-02-16
Payer: MEDICARE

## 2024-02-16 VITALS
OXYGEN SATURATION: 99 % | DIASTOLIC BLOOD PRESSURE: 92 MMHG | HEART RATE: 90 BPM | HEIGHT: 66 IN | BODY MASS INDEX: 31.02 KG/M2 | WEIGHT: 193 LBS | TEMPERATURE: 98 F | SYSTOLIC BLOOD PRESSURE: 142 MMHG

## 2024-02-16 DIAGNOSIS — E53.8 VITAMIN B 12 DEFICIENCY: ICD-10-CM

## 2024-02-16 DIAGNOSIS — C50.919 CARCINOMA OF BREAST METASTATIC TO LUNG, UNSPECIFIED LATERALITY (HCC): Primary | ICD-10-CM

## 2024-02-16 DIAGNOSIS — C50.919 MALIGNANT NEOPLASM OF BREAST METASTATIC TO LUNG (HCC): ICD-10-CM

## 2024-02-16 DIAGNOSIS — Z71.89 COORDINATION OF COMPLEX CARE: ICD-10-CM

## 2024-02-16 DIAGNOSIS — C78.00 CARCINOMA OF BREAST METASTATIC TO LUNG, UNSPECIFIED LATERALITY (HCC): ICD-10-CM

## 2024-02-16 DIAGNOSIS — C78.00 MALIGNANT NEOPLASM OF BREAST METASTATIC TO LUNG (HCC): ICD-10-CM

## 2024-02-16 DIAGNOSIS — C50.919 CARCINOMA OF BREAST METASTATIC TO LUNG, UNSPECIFIED LATERALITY (HCC): ICD-10-CM

## 2024-02-16 DIAGNOSIS — C50.011 CARCINOMA OF AREOLA OF RIGHT BREAST IN FEMALE, UNSPECIFIED ESTROGEN RECEPTOR STATUS (HCC): ICD-10-CM

## 2024-02-16 DIAGNOSIS — C78.00 CARCINOMA OF BREAST METASTATIC TO LUNG, UNSPECIFIED LATERALITY (HCC): Primary | ICD-10-CM

## 2024-02-16 DIAGNOSIS — D50.8 OTHER IRON DEFICIENCY ANEMIA: Primary | ICD-10-CM

## 2024-02-16 DIAGNOSIS — Z51.81 ANTICOAGULATION MANAGEMENT ENCOUNTER: ICD-10-CM

## 2024-02-16 DIAGNOSIS — C50.011 CARCINOMA OF AREOLA OF RIGHT BREAST IN FEMALE, UNSPECIFIED ESTROGEN RECEPTOR STATUS (HCC): Primary | ICD-10-CM

## 2024-02-16 DIAGNOSIS — Z79.01 ANTICOAGULATION MANAGEMENT ENCOUNTER: ICD-10-CM

## 2024-02-16 DIAGNOSIS — Z86.718 HISTORY OF DVT (DEEP VEIN THROMBOSIS): ICD-10-CM

## 2024-02-16 DIAGNOSIS — Z71.89 CARE PLAN DISCUSSED WITH PATIENT: ICD-10-CM

## 2024-02-16 LAB
ALBUMIN SERPL-MCNC: 4.9 G/DL (ref 3.5–5.2)
ALP SERPL-CCNC: 114 U/L (ref 35–104)
ALT SERPL-CCNC: 19 U/L (ref 9–52)
ANION GAP SERPL CALCULATED.3IONS-SCNC: 12 MMOL/L (ref 7–19)
AST SERPL-CCNC: 49 U/L (ref 14–36)
BASOPHILS # BLD: 0.02 K/UL (ref 0.01–0.08)
BASOPHILS NFR BLD: 0.2 % (ref 0.1–1.2)
BILIRUB SERPL-MCNC: 0.9 MG/DL (ref 0.2–1.3)
BUN SERPL-MCNC: 21 MG/DL (ref 7–17)
CA 15-3: 18 U/ML (ref 0–25)
CALCIUM SERPL-MCNC: 9.4 MG/DL (ref 8.4–10.2)
CEA SERPL-MCNC: 2 NG/ML (ref 0–4.7)
CHLORIDE SERPL-SCNC: 105 MMOL/L (ref 98–111)
CO2 SERPL-SCNC: 26 MMOL/L (ref 22–29)
CREAT SERPL-MCNC: 0.7 MG/DL (ref 0.5–1)
EOSINOPHIL # BLD: 0.02 K/UL (ref 0.04–0.54)
EOSINOPHIL NFR BLD: 0.2 % (ref 0.7–7)
ERYTHROCYTE [DISTWIDTH] IN BLOOD BY AUTOMATED COUNT: 15.6 % (ref 11.7–14.4)
GLOBULIN: 3.5 G/DL
GLUCOSE SERPL-MCNC: 103 MG/DL (ref 74–106)
HCT VFR BLD AUTO: 38.6 % (ref 34.1–44.9)
HGB BLD-MCNC: 12.6 G/DL (ref 11.2–15.7)
LYMPHOCYTES # BLD: 1.92 K/UL (ref 1.18–3.74)
LYMPHOCYTES NFR BLD: 23.9 % (ref 19.3–53.1)
MCH RBC QN AUTO: 29.7 PG (ref 25.6–32.2)
MCHC RBC AUTO-ENTMCNC: 32.6 G/DL (ref 32.3–35.5)
MCV RBC AUTO: 91 FL (ref 79.4–94.8)
MONOCYTES # BLD: 0.43 K/UL (ref 0.24–0.82)
MONOCYTES NFR BLD: 5.4 % (ref 4.7–12.5)
NEUTROPHILS # BLD: 5.62 K/UL (ref 1.56–6.13)
NEUTS SEG NFR BLD: 70.1 % (ref 34–71.1)
PLATELET # BLD AUTO: 306 K/UL (ref 182–369)
PMV BLD AUTO: 8.8 FL (ref 7.4–10.4)
POTASSIUM SERPL-SCNC: 3.9 MMOL/L (ref 3.5–5.1)
PROT SERPL-MCNC: 8.4 G/DL (ref 6.3–8.2)
RBC # BLD AUTO: 4.24 M/UL (ref 3.93–5.22)
SODIUM SERPL-SCNC: 143 MMOL/L (ref 137–145)
WBC # BLD AUTO: 8.03 K/UL (ref 3.98–10.04)

## 2024-02-16 PROCEDURE — 36415 COLL VENOUS BLD VENIPUNCTURE: CPT

## 2024-02-16 PROCEDURE — 85025 COMPLETE CBC W/AUTO DIFF WBC: CPT

## 2024-02-16 PROCEDURE — 80053 COMPREHEN METABOLIC PANEL: CPT

## 2024-02-16 PROCEDURE — 96372 THER/PROPH/DIAG INJ SC/IM: CPT

## 2024-02-16 PROCEDURE — 99212 OFFICE O/P EST SF 10 MIN: CPT

## 2024-02-16 PROCEDURE — 6360000002 HC RX W HCPCS: Performed by: INTERNAL MEDICINE

## 2024-02-16 RX ORDER — CYANOCOBALAMIN 1000 UG/ML
1000 INJECTION, SOLUTION INTRAMUSCULAR; SUBCUTANEOUS ONCE
OUTPATIENT
Start: 2024-03-08 | End: 2024-03-08

## 2024-02-16 RX ORDER — CYANOCOBALAMIN 1000 UG/ML
1000 INJECTION, SOLUTION INTRAMUSCULAR; SUBCUTANEOUS ONCE
Status: COMPLETED | OUTPATIENT
Start: 2024-02-16 | End: 2024-02-16

## 2024-02-16 RX ADMIN — CYANOCOBALAMIN 1000 MCG: 1000 INJECTION, SOLUTION INTRAMUSCULAR; SUBCUTANEOUS at 13:37

## 2024-02-19 LAB — CANCER AG27-29 SERPL-ACNC: 21.4 U/ML

## 2024-02-21 LAB
ACID FAST STN SPEC QL: NORMAL
MYCOBACTERIUM SPEC CULT: NORMAL

## 2024-02-26 ENCOUNTER — TELEPHONE (OUTPATIENT)
Dept: INFUSION THERAPY | Age: 59
End: 2024-02-26

## 2024-02-26 NOTE — TELEPHONE ENCOUNTER
Patient contacted clinic and left voicemail reporting cough, fever of 100.7-102 since Saturday, nausea, and a headache. Attempted to return phone call to patient to instruct her to see her PCP or go to urgent care for testing. No answer from patient, left voicemail.

## 2024-02-28 ENCOUNTER — SCHEDULED TELEPHONE ENCOUNTER (OUTPATIENT)
Dept: HEMATOLOGY | Age: 59
End: 2024-02-28
Payer: MEDICARE

## 2024-02-28 DIAGNOSIS — C50.011 CARCINOMA OF AREOLA OF RIGHT BREAST IN FEMALE, UNSPECIFIED ESTROGEN RECEPTOR STATUS (HCC): Primary | ICD-10-CM

## 2024-02-28 DIAGNOSIS — C78.00 CARCINOMA OF BREAST METASTATIC TO LUNG, UNSPECIFIED LATERALITY (HCC): ICD-10-CM

## 2024-02-28 DIAGNOSIS — C50.919 CARCINOMA OF BREAST METASTATIC TO LUNG, UNSPECIFIED LATERALITY (HCC): ICD-10-CM

## 2024-02-28 DIAGNOSIS — Z71.89 CARE PLAN DISCUSSED WITH PATIENT: ICD-10-CM

## 2024-02-28 PROCEDURE — 99442 PR PHYS/QHP TELEPHONE EVALUATION 11-20 MIN: CPT | Performed by: INTERNAL MEDICINE

## 2024-02-28 RX ORDER — PALBOCICLIB 100 MG/1
CAPSULE ORAL
Qty: 21 CAPSULE | Refills: 5 | Status: ACTIVE | OUTPATIENT
Start: 2024-02-28

## 2024-02-28 RX ORDER — EXEMESTANE 25 MG/1
25 TABLET ORAL DAILY
Qty: 30 TABLET | Refills: 3 | Status: SHIPPED | OUTPATIENT
Start: 2024-02-28

## 2024-02-28 NOTE — TELEPHONE ENCOUNTER
New prescriptions for Aromasin 25 mg daily, and Ibrance 100 mg po daily days 1-21 every 28 days sent to pharmacy at this time. Electronically signed by Lanny Valente RN on 2/28/2024 at 4:26 PM

## 2024-02-28 NOTE — PROGRESS NOTES
the breast cancer  patient without prior imaging for comparison, recommend MRI of the pelvis and bilateral hips without and with contrast.   6/19/2023-I reviewed results CT C/A/P.  Overall no evidence of disease progression.  Stable pulmonary nodules.  Left lower lobe pulmonary nodule in the upper segment is a stable.  Continue Faslodex.  Ibrance has been on hold as per patient request.  8/8/23 Consulted by Dr.Shiraz Esqueda at [facility]  Received a CT Pelvis that showed AVN, no collapse if either hip. Will observe patient on as needed basis.    8/21/23 Tumor Markers: CEA 1.9, CA 15-3-17, CA 27.29-21.7  10/27/23 Tumor Markers: CEA 2, CA 15-3-20, CA 27.29-26.8  12/1/23 Tumor Markers: CEA 2.8, CA 27.29-23.5  12/14/23 CT Chest WO Contrast Right upper lobe and left lower lobe nodules show significant interval increase in size.  Interval progression is suggestive of metastatic disease.  PET-CT or CT guided biopsy is recommended for further workup. 7 mm nodule in the right lower lobe appears grossly stable.   12/14/23 CT Abd/Pelvis WO Contrast (oral)  No evidence of metastatic disease in the abdomen or pelvis. Focal narrowing of the gastric body is identified.  This is favored to represent peristalsis.  Attention on follow-up imaging is recommended.  12/15/23 Bone Scan The thoracic and lumbar spine demonstrate age appropriate findings as discussed in the report. There are normal findings in the rib cage and pelvis. There are age appropriate findings in joints in the upper and lower extremities. There are normal findings in the head and neck.  No overtly pathologic appearing bone lesion is identified.  12/24/23 PET Scan The PET CT examination demonstrates continued increased activity in the 2.5 x 2.1 cm pulmonary nodular density seen in the medial left upper lobe on the patient's prior examination.  Correcting for differences between the prior and current software package there has been an interval increase in activity.  The

## 2024-02-29 ENCOUNTER — HOSPITAL ENCOUNTER (OUTPATIENT)
Dept: GENERAL RADIOLOGY | Age: 59
Discharge: HOME | End: 2024-02-29
Payer: MEDICARE

## 2024-02-29 ENCOUNTER — OFFICE VISIT (OUTPATIENT)
Dept: PULMONOLOGY | Age: 59
End: 2024-02-29
Payer: MEDICARE

## 2024-02-29 VITALS
WEIGHT: 191 LBS | HEIGHT: 66 IN | HEART RATE: 95 BPM | OXYGEN SATURATION: 97 % | DIASTOLIC BLOOD PRESSURE: 99 MMHG | BODY MASS INDEX: 30.7 KG/M2 | TEMPERATURE: 98.2 F | SYSTOLIC BLOOD PRESSURE: 173 MMHG

## 2024-02-29 DIAGNOSIS — A49.8 KLEBSIELLA PNEUMONIAE INFECTION: ICD-10-CM

## 2024-02-29 DIAGNOSIS — C50.912 CARCINOMA OF LEFT BREAST METASTATIC TO LUNG (HCC): ICD-10-CM

## 2024-02-29 DIAGNOSIS — Z90.13 STATUS POST BILATERAL MASTECTOMY: ICD-10-CM

## 2024-02-29 DIAGNOSIS — J20.9 ACUTE PURULENT BRONCHITIS: Primary | ICD-10-CM

## 2024-02-29 DIAGNOSIS — R05.9 COUGH, UNSPECIFIED TYPE: Primary | ICD-10-CM

## 2024-02-29 DIAGNOSIS — R05.9 COUGH, UNSPECIFIED TYPE: ICD-10-CM

## 2024-02-29 DIAGNOSIS — J41.1 BRONCHITIS, MUCOPURULENT RECURRENT (HCC): ICD-10-CM

## 2024-02-29 DIAGNOSIS — C78.02 CARCINOMA OF LEFT BREAST METASTATIC TO LUNG (HCC): ICD-10-CM

## 2024-02-29 DIAGNOSIS — C50.919 CARCINOMA OF FEMALE BREAST, UNSPECIFIED ESTROGEN RECEPTOR STATUS, UNSPECIFIED LATERALITY, UNSPECIFIED SITE OF BREAST (HCC): ICD-10-CM

## 2024-02-29 DIAGNOSIS — A48.8 SERRATIA MARCESCENS INFECTION: ICD-10-CM

## 2024-02-29 PROCEDURE — G8484 FLU IMMUNIZE NO ADMIN: HCPCS | Performed by: INTERNAL MEDICINE

## 2024-02-29 PROCEDURE — G8428 CUR MEDS NOT DOCUMENT: HCPCS | Performed by: INTERNAL MEDICINE

## 2024-02-29 PROCEDURE — 99214 OFFICE O/P EST MOD 30 MIN: CPT | Performed by: INTERNAL MEDICINE

## 2024-02-29 PROCEDURE — 1036F TOBACCO NON-USER: CPT | Performed by: INTERNAL MEDICINE

## 2024-02-29 PROCEDURE — G8417 CALC BMI ABV UP PARAM F/U: HCPCS | Performed by: INTERNAL MEDICINE

## 2024-02-29 PROCEDURE — 71046 X-RAY EXAM CHEST 2 VIEWS: CPT

## 2024-02-29 PROCEDURE — 3023F SPIROM DOC REV: CPT | Performed by: INTERNAL MEDICINE

## 2024-02-29 PROCEDURE — 3017F COLORECTAL CA SCREEN DOC REV: CPT | Performed by: INTERNAL MEDICINE

## 2024-02-29 RX ORDER — NEBULIZER ACCESSORIES
1 KIT MISCELLANEOUS DAILY PRN
Qty: 1 KIT | Refills: 2 | Status: SHIPPED | OUTPATIENT
Start: 2024-02-29

## 2024-02-29 RX ORDER — PREDNISONE 10 MG/1
TABLET ORAL
Qty: 35 TABLET | Refills: 0 | Status: SHIPPED | OUTPATIENT
Start: 2024-02-29

## 2024-02-29 RX ORDER — IPRATROPIUM BROMIDE AND ALBUTEROL SULFATE 2.5; .5 MG/3ML; MG/3ML
1 SOLUTION RESPIRATORY (INHALATION) EVERY 4 HOURS
Qty: 120 ML | Refills: 5 | Status: SHIPPED | OUTPATIENT
Start: 2024-02-29

## 2024-02-29 RX ORDER — LEVOFLOXACIN 750 MG/1
750 TABLET, FILM COATED ORAL DAILY
Qty: 7 TABLET | Refills: 0 | Status: SHIPPED | OUTPATIENT
Start: 2024-02-29 | End: 2024-03-10

## 2024-02-29 ASSESSMENT — ENCOUNTER SYMPTOMS
WHEEZING: 0
CHEST TIGHTNESS: 0
SHORTNESS OF BREATH: 1
COUGH: 1
BACK PAIN: 0
RHINORRHEA: 0
APNEA: 0
ABDOMINAL DISTENTION: 0
ANAL BLEEDING: 0
ABDOMINAL PAIN: 0

## 2024-02-29 NOTE — PROGRESS NOTES
She tested herself for COVID at home and it was negative.  Chest x-ray done today showed no acute infiltrates.        Prior to Visit Medications    Medication Sig Taking? Authorizing Provider   exemestane (AROMASIN) 25 MG tablet Take 1 tablet by mouth daily Yes Kassi Jim MD   palbociclib (IBRANCE) 100 MG capsule Take 100 mg daily, days 1-21 every 28 days Yes Kassi Jim MD   predniSONE (DELTASONE) 10 MG tablet 40 mg a day and taper by 10 mg every third day to off Yes Ashley Morrison MD   HYDROcodone-acetaminophen (NORCO) 7.5-325 MG per tablet Take 1 tablet by mouth every 6 hours as needed for Pain. Yes Arcenio Edouard MD   hydrOXYzine HCl (ATARAX) 10 MG tablet Take 1 tablet by mouth 3 times daily as needed for Itching Yes Arcenio Edouard MD   cyclobenzaprine (FLEXERIL) 10 MG tablet Take 1 tablet by mouth 3 times daily as needed Yes Nahum Cowan   ketorolac (TORADOL) 10 MG tablet Take 1 tablet by mouth every 6 hours as needed for Pain Yes Deonte Ambrosio PA   VENTOLIN  (90 Base) MCG/ACT inhaler Inhale 2 puffs into the lungs 4 times daily as needed for Wheezing Yes Ashley Morrison MD   pantoprazole (PROTONIX) 40 MG tablet TAKE 1 TABLET BY MOUTH EVERY DAY  Patient taking differently: Take 1 tablet by mouth daily Yes Kassi Jim MD   NARCAN 4 MG/0.1ML LIQD nasal spray 1 spray as needed Yes ProviderArcenio MD   ondansetron (ZOFRAN) 4 MG tablet Take 1 tablet by mouth every 8 hours as needed for Nausea or Vomiting Yes ProviderArcenio MD   gabapentin (NEURONTIN) 300 MG capsule Take 2 capsules by mouth 3 times daily for 30 days.  Patient taking differently: Take 2 capsules by mouth 3 times daily as needed.  Kassi Jim MD        Review of Systems   Constitutional:  Negative for activity change, appetite change, chills, diaphoresis and fatigue.   HENT:  Negative for congestion, dental problem, drooling, ear discharge, postnasal drip

## 2024-03-01 ENCOUNTER — TELEPHONE (OUTPATIENT)
Dept: PULMONOLOGY | Age: 59
End: 2024-03-01

## 2024-03-01 NOTE — TELEPHONE ENCOUNTER
I called the pharmacy and got the patient's medication covered.  I called the patient to let her know she could  her medication.  She verbalized understanding.

## 2024-03-01 NOTE — TELEPHONE ENCOUNTER
The pt called about her prescriptions.  The pt said the pharmacy told her to have the dx change to chronic instead of acute.The insurance will then cover the medications.

## 2024-03-27 ENCOUNTER — TELEPHONE (OUTPATIENT)
Dept: HEMATOLOGY | Age: 59
End: 2024-03-27

## 2024-03-27 DIAGNOSIS — C50.919 CARCINOMA OF BREAST METASTATIC TO LUNG, UNSPECIFIED LATERALITY (HCC): ICD-10-CM

## 2024-03-27 DIAGNOSIS — C78.00 CARCINOMA OF BREAST METASTATIC TO LUNG, UNSPECIFIED LATERALITY (HCC): ICD-10-CM

## 2024-03-27 DIAGNOSIS — C50.011 CARCINOMA OF AREOLA OF RIGHT BREAST IN FEMALE, UNSPECIFIED ESTROGEN RECEPTOR STATUS (HCC): Primary | ICD-10-CM

## 2024-03-27 NOTE — PROGRESS NOTES
with MD 4 weeks with MD, 3/29/2024  CBC CMP CA 15-3 CA 27.29 CEA next visit  Continue B12 today and every 4 weeks  Continue follow-up with PCP routinely  Continue Eliquis 5 mg BID for history of DVT left upper extremity  Continue follow-up with /Pulmonology,5/13/24  Continue follow-up with Dr. Alejandro Garsia/General Surgery  Recommend Aromasin and Ibrance.  Treatment location provided to the patient today.  Discussed results of [facility] liquid biopsy and recommendations  Ct C/A/P June 2024      Follow Up:  No follow-ups on file.   Data Unavailable     Miroslava CLARK am pre charting  as Medical Assistant for Kassi Jim MD. Electronically signed by Miroslava Caro MA on 3/29/2024 at 9:15 AM CDT.  I have seen, examined and reviewed this patient medication list, appropriate labs and imaging studies. I reviewed relevant medical records and others physician’s notes. I discussed the plans of care with the patient. I answered all the questions to the patient’s satisfaction. I have also reviewed the chief complaint (CC) and part of the history (History of Present Illness (HPI), Past Family Social History (PFSH), or Review of Systems (ROS) and made changes when appropriated.       (Please note that portions of this note were completed with a voice recognition program. Efforts were made to edit the dictations but occasionally words are mis-transcribed.)Electronically signed by Kassi Jim MD on 3/29/2024 at 11:41 AM

## 2024-03-27 NOTE — TELEPHONE ENCOUNTER
Called pt. to remind them of appointment on 3/29/2024 and had to leave a detailed voicemail with appointment date and time.

## 2024-03-29 ENCOUNTER — HOSPITAL ENCOUNTER (OUTPATIENT)
Dept: INFUSION THERAPY | Age: 59
Discharge: HOME | End: 2024-03-29
Payer: MEDICARE

## 2024-03-29 ENCOUNTER — OFFICE VISIT (OUTPATIENT)
Dept: HEMATOLOGY | Age: 59
End: 2024-03-29

## 2024-03-29 VITALS
OXYGEN SATURATION: 98 % | WEIGHT: 192.7 LBS | HEIGHT: 66 IN | HEART RATE: 88 BPM | DIASTOLIC BLOOD PRESSURE: 82 MMHG | TEMPERATURE: 97.8 F | BODY MASS INDEX: 30.97 KG/M2 | SYSTOLIC BLOOD PRESSURE: 140 MMHG

## 2024-03-29 DIAGNOSIS — Z87.59 HISTORY OF MATERNAL DEEP VEIN THROMBOSIS (DVT): ICD-10-CM

## 2024-03-29 DIAGNOSIS — K52.1 CHEMOTHERAPY INDUCED DIARRHEA: ICD-10-CM

## 2024-03-29 DIAGNOSIS — C78.00 CARCINOMA OF BREAST METASTATIC TO LUNG, UNSPECIFIED LATERALITY (HCC): ICD-10-CM

## 2024-03-29 DIAGNOSIS — R11.2 CHEMOTHERAPY INDUCED NAUSEA AND VOMITING: ICD-10-CM

## 2024-03-29 DIAGNOSIS — Z71.89 CARE PLAN DISCUSSED WITH PATIENT: ICD-10-CM

## 2024-03-29 DIAGNOSIS — C50.919 CARCINOMA OF BREAST METASTATIC TO LUNG, UNSPECIFIED LATERALITY (HCC): Primary | ICD-10-CM

## 2024-03-29 DIAGNOSIS — E53.8 VITAMIN B 12 DEFICIENCY: ICD-10-CM

## 2024-03-29 DIAGNOSIS — T45.1X5A CHEMOTHERAPY INDUCED DIARRHEA: ICD-10-CM

## 2024-03-29 DIAGNOSIS — R11.0 NAUSEA: ICD-10-CM

## 2024-03-29 DIAGNOSIS — C78.00 CARCINOMA OF BREAST METASTATIC TO LUNG, UNSPECIFIED LATERALITY (HCC): Primary | ICD-10-CM

## 2024-03-29 DIAGNOSIS — C50.011 CARCINOMA OF AREOLA OF RIGHT BREAST IN FEMALE, UNSPECIFIED ESTROGEN RECEPTOR STATUS (HCC): ICD-10-CM

## 2024-03-29 DIAGNOSIS — T45.1X5A CHEMOTHERAPY INDUCED NAUSEA AND VOMITING: ICD-10-CM

## 2024-03-29 DIAGNOSIS — Z86.718 HISTORY OF MATERNAL DEEP VEIN THROMBOSIS (DVT): ICD-10-CM

## 2024-03-29 DIAGNOSIS — C50.919 CARCINOMA OF BREAST METASTATIC TO LUNG, UNSPECIFIED LATERALITY (HCC): ICD-10-CM

## 2024-03-29 DIAGNOSIS — D50.8 OTHER IRON DEFICIENCY ANEMIA: Primary | ICD-10-CM

## 2024-03-29 LAB
ALBUMIN SERPL-MCNC: 4.6 G/DL (ref 3.5–5.2)
ALP SERPL-CCNC: 80 U/L (ref 35–104)
ALT SERPL-CCNC: 15 U/L (ref 9–52)
ANION GAP SERPL CALCULATED.3IONS-SCNC: 8 MMOL/L (ref 7–19)
AST SERPL-CCNC: 29 U/L (ref 14–36)
BASOPHILS # BLD: 0.01 K/UL (ref 0.01–0.08)
BASOPHILS NFR BLD: 0.3 % (ref 0.1–1.2)
BILIRUB SERPL-MCNC: 0.7 MG/DL (ref 0.2–1.3)
BUN SERPL-MCNC: 17 MG/DL (ref 7–17)
CA 15-3: 21 U/ML (ref 0–25)
CALCIUM SERPL-MCNC: 8.6 MG/DL (ref 8.4–10.2)
CEA SERPL-MCNC: 1.9 NG/ML (ref 0–4.7)
CHLORIDE SERPL-SCNC: 109 MMOL/L (ref 98–111)
CO2 SERPL-SCNC: 25 MMOL/L (ref 22–29)
CREAT SERPL-MCNC: 0.7 MG/DL (ref 0.5–1)
EOSINOPHIL # BLD: 0.01 K/UL (ref 0.04–0.54)
EOSINOPHIL NFR BLD: 0.3 % (ref 0.7–7)
ERYTHROCYTE [DISTWIDTH] IN BLOOD BY AUTOMATED COUNT: 15.6 % (ref 11.7–14.4)
GLOBULIN: 3.1 G/DL
GLUCOSE SERPL-MCNC: 84 MG/DL (ref 74–106)
HCT VFR BLD AUTO: 36.5 % (ref 34.1–44.9)
HGB BLD-MCNC: 11.6 G/DL (ref 11.2–15.7)
LYMPHOCYTES # BLD: 1.18 K/UL (ref 1.18–3.74)
LYMPHOCYTES NFR BLD: 30.5 % (ref 19.3–53.1)
MCH RBC QN AUTO: 30.1 PG (ref 25.6–32.2)
MCHC RBC AUTO-ENTMCNC: 31.8 G/DL (ref 32.3–35.5)
MCV RBC AUTO: 94.6 FL (ref 79.4–94.8)
MONOCYTES # BLD: 0.16 K/UL (ref 0.24–0.82)
MONOCYTES NFR BLD: 4.1 % (ref 4.7–12.5)
NEUTROPHILS # BLD: 2.5 K/UL (ref 1.56–6.13)
NEUTS SEG NFR BLD: 64.5 % (ref 34–71.1)
PLATELET # BLD AUTO: 263 K/UL (ref 182–369)
PMV BLD AUTO: 8.2 FL (ref 7.4–10.4)
POTASSIUM SERPL-SCNC: 4.7 MMOL/L (ref 3.5–5.1)
PROT SERPL-MCNC: 7.7 G/DL (ref 6.3–8.2)
RBC # BLD AUTO: 3.86 M/UL (ref 3.93–5.22)
SODIUM SERPL-SCNC: 142 MMOL/L (ref 137–145)
WBC # BLD AUTO: 3.87 K/UL (ref 3.98–10.04)

## 2024-03-29 PROCEDURE — 96372 THER/PROPH/DIAG INJ SC/IM: CPT

## 2024-03-29 PROCEDURE — 36415 COLL VENOUS BLD VENIPUNCTURE: CPT

## 2024-03-29 PROCEDURE — 99212 OFFICE O/P EST SF 10 MIN: CPT

## 2024-03-29 PROCEDURE — 80053 COMPREHEN METABOLIC PANEL: CPT

## 2024-03-29 PROCEDURE — 6360000002 HC RX W HCPCS: Performed by: INTERNAL MEDICINE

## 2024-03-29 PROCEDURE — 85025 COMPLETE CBC W/AUTO DIFF WBC: CPT

## 2024-03-29 RX ORDER — CYANOCOBALAMIN 1000 UG/ML
1000 INJECTION, SOLUTION INTRAMUSCULAR; SUBCUTANEOUS ONCE
Status: CANCELLED | OUTPATIENT
Start: 2024-04-12 | End: 2024-04-12

## 2024-03-29 RX ORDER — CYANOCOBALAMIN 1000 UG/ML
1000 INJECTION, SOLUTION INTRAMUSCULAR; SUBCUTANEOUS ONCE
Status: COMPLETED | OUTPATIENT
Start: 2024-03-29 | End: 2024-03-29

## 2024-03-29 RX ORDER — PROMETHAZINE HYDROCHLORIDE 25 MG/1
12.5 TABLET ORAL 4 TIMES DAILY PRN
Qty: 45 TABLET | Refills: 3 | Status: SHIPPED | OUTPATIENT
Start: 2024-03-29 | End: 2024-06-27

## 2024-03-29 RX ADMIN — CYANOCOBALAMIN 1000 MCG: 1000 INJECTION, SOLUTION INTRAMUSCULAR; SUBCUTANEOUS at 12:00

## 2024-03-31 LAB — CANCER AG27-29 SERPL-ACNC: 34.6 U/ML

## 2024-04-02 NOTE — PROGRESS NOTES
accumulation, or skin necrosis.   The left inferior breast is much better but could use additional fat.    IMPRESSION:    Doing well s/p Exploration repositioning of right nipple   Postmastectomy deformity inferior left reconstructed breast    PLAN: Fat grafting left inferior breast at her convenience    I have seen, examined and reviewed this patient medication list, appropriate labs and imaging studies. I reviewed relevant medical records and others physician’s notes. I discussed the plans of care with the patient. I answered all the questions to the patient’s satisfaction.  I, Dr Alejandro Garsia, personally performed the services described in this documentation as scribed by Jenna Moses MA in my presence and is both accurate and complete.     (Please note that portions of this note were completed with a voice recognition program. Efforts were made to edit the dictations but occasionally words are mis-transcribed.)  Over 50% of the total visit time of 20 minutes in face to face encounter with the patient, out of which more than 50% of the time was spent in counseling patient or family and coordination of care. Counseling included but was not limited to time spent reviewing labs, imaging studies/ treatment plan and answering questions.

## 2024-04-04 ENCOUNTER — OFFICE VISIT (OUTPATIENT)
Dept: PULMONOLOGY | Age: 59
End: 2024-04-04

## 2024-04-04 ENCOUNTER — OFFICE VISIT (OUTPATIENT)
Dept: SURGERY | Age: 59
End: 2024-04-04

## 2024-04-04 VITALS
SYSTOLIC BLOOD PRESSURE: 140 MMHG | BODY MASS INDEX: 31.5 KG/M2 | WEIGHT: 196 LBS | OXYGEN SATURATION: 95 % | HEIGHT: 66 IN | DIASTOLIC BLOOD PRESSURE: 80 MMHG | HEART RATE: 92 BPM | RESPIRATION RATE: 20 BRPM

## 2024-04-04 VITALS — WEIGHT: 196 LBS | BODY MASS INDEX: 31.5 KG/M2 | HEIGHT: 66 IN | HEART RATE: 80 BPM

## 2024-04-04 DIAGNOSIS — C50.912 CARCINOMA OF LEFT BREAST METASTATIC TO LUNG (HCC): ICD-10-CM

## 2024-04-04 DIAGNOSIS — Z98.890 STATUS POST BILATERAL BREAST RECONSTRUCTION: ICD-10-CM

## 2024-04-04 DIAGNOSIS — N65.0 DEFORMITY AND DISPROPORTION OF RECONSTRUCTED BREAST: ICD-10-CM

## 2024-04-04 DIAGNOSIS — Z90.13 STATUS POST BILATERAL MASTECTOMY: Primary | ICD-10-CM

## 2024-04-04 DIAGNOSIS — R06.2 WHEEZING: ICD-10-CM

## 2024-04-04 DIAGNOSIS — N65.1 DEFORMITY AND DISPROPORTION OF RECONSTRUCTED BREAST: ICD-10-CM

## 2024-04-04 DIAGNOSIS — J20.9 ACUTE PURULENT BRONCHITIS: Primary | ICD-10-CM

## 2024-04-04 DIAGNOSIS — Z98.890 S/P BREAST RECONSTRUCTION, LEFT: ICD-10-CM

## 2024-04-04 DIAGNOSIS — C78.02 CARCINOMA OF LEFT BREAST METASTATIC TO LUNG (HCC): ICD-10-CM

## 2024-04-04 DIAGNOSIS — R13.19 ESOPHAGEAL DYSPHAGIA: ICD-10-CM

## 2024-04-04 ASSESSMENT — ENCOUNTER SYMPTOMS
CHEST TIGHTNESS: 0
SHORTNESS OF BREATH: 0
ABDOMINAL DISTENTION: 0
BACK PAIN: 0
APNEA: 0
COUGH: 1
WHEEZING: 1
ABDOMINAL PAIN: 0
RHINORRHEA: 0
ANAL BLEEDING: 0

## 2024-04-04 NOTE — PROGRESS NOTES
Pulmonary and Sleep Medicine    Yandy Dickinson (:  1965) is a 59 y.o. female,Established patient, here for evaluation of the following chief complaint(s):  Follow-up (Pt advised her cough is better, breathing has improved. Pt advised when she walks she gets soa )      Referring physician:  No referring provider defined for this encounter.     ASSESSMENT/PLAN:  1. Acute purulent bronchitis  2. Carcinoma of left breast metastatic to lung (HCC)  3. Esophageal dysphagia  4. Wheezing        Continue current management with the inhalers.   Continue treatment for breast CA with mets to the lung per Dr. Jim.        Ashley Morrison MD, [facility], [facility]    Return in about 6 months (around 10/4/2024).    SUBJECTIVE/OBJECTIVE:        She is here for follow up on cough. She is feeling improved. Her cough is much better. She is under treatment for breast CA per oncology Dr. Jim.         Prior to Visit Medications    Medication Sig Taking? Authorizing Provider   promethazine (PHENERGAN) 25 MG tablet Take 0.5 tablets by mouth 4 times daily as needed for Nausea Yes Kassi Jim MD   Respiratory Therapy Supplies (NEBULIZER/TUBING/MOUTHPIECE) KIT 1 kit by Does not apply route daily as needed (wheezing) Yes Ashley Morrison MD   ipratropium 0.5 mg-albuterol 2.5 mg (DUONEB) 0.5-2.5 (3) MG/3ML SOLN nebulizer solution Inhale 3 mLs into the lungs every 4 hours Yes Ashley Morrison MD   exemestane (AROMASIN) 25 MG tablet Take 1 tablet by mouth daily Yes Kassi Jim MD   palbociclib (IBRANCE) 100 MG capsule Take 100 mg daily, days 1-21 every 28 days Yes Kassi Jim MD   HYDROcodone-acetaminophen (NORCO) 7.5-325 MG per tablet Take 1 tablet by mouth every 6 hours as needed for Pain. Yes ProviderArcenio MD   hydrOXYzine HCl (ATARAX) 10 MG tablet Take 1 tablet by mouth 3 times daily as needed for Itching Yes Arcenio Edouard MD   cyclobenzaprine (FLEXERIL) 10 MG

## 2024-04-22 ENCOUNTER — HOSPITAL ENCOUNTER (OUTPATIENT)
Dept: PREADMISSION TESTING | Age: 59
Discharge: HOME | End: 2024-04-26

## 2024-04-22 RX ORDER — CELECOXIB 200 MG/1
200 CAPSULE ORAL ONCE
OUTPATIENT
Start: 2024-04-30

## 2024-04-22 RX ORDER — ACETAMINOPHEN 325 MG/1
975 TABLET ORAL ONCE
OUTPATIENT
Start: 2024-04-30

## 2024-04-22 RX ORDER — GABAPENTIN 300 MG/1
300 CAPSULE ORAL ONCE
OUTPATIENT
Start: 2024-04-30

## 2024-04-22 NOTE — PROGRESS NOTES
Phone PAN complete.  Patient verbalizes good understanding of preoperative and postoperative instructions.

## 2024-04-22 NOTE — DISCHARGE INSTRUCTIONS
1. You may remove the dressing or Bandaid after 2 days. Leave steri strips on for 1-2 weeks.  2. Wearing a supportive/surgical bra, such as a sports bra can significantly reduce postoperative pain and swelling.  3. You may bathe or shower as desired 48 hours after surgery. Always just pat the incision dry, never rub.  4. If a small area of the incision becomes red and inflamed, clean the wound with peroxide 3-4 times daily and apply Neosporin. Call the office with a temperature of 101.1 or greater, [phone].  5. Firmness in the area of the incision will last 1-2 months.  6. You may drive 2 days after surgery.  7. If you become constipated you may use one of the following over-the-counter products, Fleets enema, 1 bottle Magnesium Citrate,Ducolax tablets or suppositories, If no results after using one or more of these call the office.  8. You may ordinarily return to work 2-3 days after surgery. No heavy lifting or pulling and tugging  9.Call the office with any questions during regular hours mon-fri 9:00-4:00 . In case of after hours emergencies the answering service will take your call.  10. Follow up as scheduled, call to schedule if it was not scheduled by hospital.   11. Call 1 hour before appointment to see if office is running on time.  12. Call the office for the pathology report in 3 days .

## 2024-04-23 ENCOUNTER — TELEPHONE (OUTPATIENT)
Dept: HEMATOLOGY | Age: 59
End: 2024-04-23

## 2024-04-23 NOTE — TELEPHONE ENCOUNTER
Called pt. to remind them of appointment on 4/26/2024 and had to leave a detailed voicemail with appointment date and time.

## 2024-04-24 DIAGNOSIS — C50.919 CARCINOMA OF BREAST METASTATIC TO LUNG, UNSPECIFIED LATERALITY (HCC): Primary | ICD-10-CM

## 2024-04-24 DIAGNOSIS — C50.011 CARCINOMA OF AREOLA OF RIGHT BREAST IN FEMALE, UNSPECIFIED ESTROGEN RECEPTOR STATUS (HCC): ICD-10-CM

## 2024-04-24 DIAGNOSIS — C78.00 CARCINOMA OF BREAST METASTATIC TO LUNG, UNSPECIFIED LATERALITY (HCC): Primary | ICD-10-CM

## 2024-04-24 NOTE — PROGRESS NOTES
Take 100 mg daily, days 1-21 every 28 days) 21 capsule 5    HYDROcodone-acetaminophen (NORCO) 7.5-325 MG per tablet Take 1 tablet by mouth every 6 hours as needed for Pain.      hydrOXYzine HCl (ATARAX) 10 MG tablet Take 1 tablet by mouth 3 times daily as needed for Itching      cyclobenzaprine (FLEXERIL) 10 MG tablet Take 1 tablet by mouth 3 times daily as needed for Muscle spasms      gabapentin (NEURONTIN) 300 MG capsule Take 2 capsules by mouth 3 times daily for 30 days. (Patient taking differently: Take 2 capsules by mouth 3 times daily as needed (neuropathy).) 180 capsule 0    VENTOLIN  (90 Base) MCG/ACT inhaler Inhale 2 puffs into the lungs 4 times daily as needed for Wheezing 18 g 5    NARCAN 4 MG/0.1ML LIQD nasal spray 1 spray as needed      pantoprazole (PROTONIX) 40 MG tablet TAKE 1 TABLET BY MOUTH EVERY DAY (Patient not taking: Reported on 4/22/2024) 90 tablet 0    ondansetron (ZOFRAN) 4 MG tablet Take 1 tablet by mouth every 8 hours as needed for Nausea or Vomiting (Patient not taking: Reported on 4/26/2024)       No current facility-administered medications for this visit.     Facility-Administered Medications Ordered in Other Visits   Medication Dose Route Frequency Provider Last Rate Last Admin    cyanocobalamin injection 1,000 mcg  1,000 mcg IntraMUSCular Once Kassi Jim MD         Allergies:   Allergies   Allergen Reactions    Clindamycin/Lincomycin Hives, Itching and Rash       Subjective   REVIEW OF SYSTEMS:   CONSTITUTIONAL: no fever, no night sweats, no fatigue;  HEENT: no blurring of vision, no double vision, no hearing difficulty, no tinnitus, no ulceration, no dysplasia, no epistaxis;   LUNGS: no cough, no hemoptysis, no wheeze,  no shortness of breath;  CARDIOVASCULAR: no palpitation, no chest pain, no shortness of breath;  GI: no abdominal pain, no nausea, no vomiting, no diarrhea, no constipation;  PATRICK: no dysuria, no hematuria, no frequency or urgency, no

## 2024-04-26 ENCOUNTER — HOSPITAL ENCOUNTER (OUTPATIENT)
Dept: INFUSION THERAPY | Age: 59
Discharge: HOME | End: 2024-04-26
Payer: MEDICARE

## 2024-04-26 ENCOUNTER — HOSPITAL ENCOUNTER (OUTPATIENT)
Dept: GENERAL RADIOLOGY | Age: 59
Discharge: HOME | End: 2024-04-26
Payer: MEDICARE

## 2024-04-26 ENCOUNTER — OFFICE VISIT (OUTPATIENT)
Dept: HEMATOLOGY | Age: 59
End: 2024-04-26

## 2024-04-26 VITALS
WEIGHT: 189.6 LBS | HEIGHT: 66 IN | SYSTOLIC BLOOD PRESSURE: 138 MMHG | TEMPERATURE: 97 F | HEART RATE: 96 BPM | DIASTOLIC BLOOD PRESSURE: 78 MMHG | OXYGEN SATURATION: 94 % | BODY MASS INDEX: 30.47 KG/M2

## 2024-04-26 DIAGNOSIS — M25.59 PAIN IN OTHER JOINT: ICD-10-CM

## 2024-04-26 DIAGNOSIS — C50.919 CARCINOMA OF BREAST METASTATIC TO LUNG, UNSPECIFIED LATERALITY (HCC): ICD-10-CM

## 2024-04-26 DIAGNOSIS — C50.011 CARCINOMA OF AREOLA OF RIGHT BREAST IN FEMALE, UNSPECIFIED ESTROGEN RECEPTOR STATUS (HCC): ICD-10-CM

## 2024-04-26 DIAGNOSIS — T45.1X5A AROMATASE INHIBITOR-ASSOCIATED ARTHRALGIA: ICD-10-CM

## 2024-04-26 DIAGNOSIS — C78.00 CARCINOMA OF BREAST METASTATIC TO LUNG, UNSPECIFIED LATERALITY (HCC): ICD-10-CM

## 2024-04-26 DIAGNOSIS — Z71.89 CARE PLAN DISCUSSED WITH PATIENT: ICD-10-CM

## 2024-04-26 DIAGNOSIS — T45.1X5D ADVERSE EFFECT OF CHEMOTHERAPY, SUBSEQUENT ENCOUNTER: Primary | ICD-10-CM

## 2024-04-26 DIAGNOSIS — E53.8 VITAMIN B 12 DEFICIENCY: ICD-10-CM

## 2024-04-26 DIAGNOSIS — M25.50 AROMATASE INHIBITOR-ASSOCIATED ARTHRALGIA: ICD-10-CM

## 2024-04-26 DIAGNOSIS — D50.8 OTHER IRON DEFICIENCY ANEMIA: Primary | ICD-10-CM

## 2024-04-26 DIAGNOSIS — T45.1X5D ADVERSE EFFECT OF ANTINEOPLASTIC ANTIBIOTIC, SUBSEQUENT ENCOUNTER: ICD-10-CM

## 2024-04-26 DIAGNOSIS — R11.0 NAUSEA: ICD-10-CM

## 2024-04-26 LAB
ALBUMIN SERPL-MCNC: 4.6 G/DL (ref 3.5–5.2)
ALP SERPL-CCNC: 72 U/L (ref 35–104)
ALT SERPL-CCNC: 18 U/L (ref 9–52)
ANION GAP SERPL CALCULATED.3IONS-SCNC: 15 MMOL/L (ref 7–19)
AST SERPL-CCNC: 30 U/L (ref 14–36)
BASOPHILS # BLD: 0.01 K/UL (ref 0.01–0.08)
BASOPHILS NFR BLD: 0.3 % (ref 0.1–1.2)
BILIRUB SERPL-MCNC: 0.7 MG/DL (ref 0.2–1.3)
BUN SERPL-MCNC: 12 MG/DL (ref 7–17)
CA 15-3: 25 U/ML (ref 0–25)
CALCIUM SERPL-MCNC: 8.9 MG/DL (ref 8.4–10.2)
CEA SERPL-MCNC: 2.4 NG/ML (ref 0–4.7)
CHLORIDE SERPL-SCNC: 101 MMOL/L (ref 98–111)
CO2 SERPL-SCNC: 24 MMOL/L (ref 22–29)
CREAT SERPL-MCNC: 0.6 MG/DL (ref 0.5–1)
EOSINOPHIL # BLD: 0.01 K/UL (ref 0.04–0.54)
EOSINOPHIL NFR BLD: 0.3 % (ref 0.7–7)
ERYTHROCYTE [DISTWIDTH] IN BLOOD BY AUTOMATED COUNT: 17 % (ref 11.7–14.4)
GLOBULIN: 2.8 G/DL
GLUCOSE SERPL-MCNC: 86 MG/DL (ref 74–106)
HCT VFR BLD AUTO: 36.3 % (ref 34.1–44.9)
HGB BLD-MCNC: 12.3 G/DL (ref 11.2–15.7)
LYMPHOCYTES # BLD: 1.19 K/UL (ref 1.18–3.74)
LYMPHOCYTES NFR BLD: 35.4 % (ref 19.3–53.1)
MCH RBC QN AUTO: 31.9 PG (ref 25.6–32.2)
MCHC RBC AUTO-ENTMCNC: 33.9 G/DL (ref 32.3–35.5)
MCV RBC AUTO: 94.3 FL (ref 79.4–94.8)
MONOCYTES # BLD: 0.17 K/UL (ref 0.24–0.82)
MONOCYTES NFR BLD: 5.1 % (ref 4.7–12.5)
NEUTROPHILS # BLD: 1.97 K/UL (ref 1.56–6.13)
NEUTS SEG NFR BLD: 58.6 % (ref 34–71.1)
PLATELET # BLD AUTO: 324 K/UL (ref 182–369)
PMV BLD AUTO: 8.7 FL (ref 7.4–10.4)
POTASSIUM SERPL-SCNC: 4 MMOL/L (ref 3.5–5.1)
PROT SERPL-MCNC: 7.4 G/DL (ref 6.3–8.2)
RBC # BLD AUTO: 3.85 M/UL (ref 3.93–5.22)
SODIUM SERPL-SCNC: 140 MMOL/L (ref 137–145)
URATE SERPL-MCNC: 6.7 MG/DL (ref 2.5–5.2)
WBC # BLD AUTO: 3.36 K/UL (ref 3.98–10.04)

## 2024-04-26 PROCEDURE — 85025 COMPLETE CBC W/AUTO DIFF WBC: CPT

## 2024-04-26 PROCEDURE — 80053 COMPREHEN METABOLIC PANEL: CPT

## 2024-04-26 PROCEDURE — 84550 ASSAY OF BLOOD/URIC ACID: CPT

## 2024-04-26 PROCEDURE — 96372 THER/PROPH/DIAG INJ SC/IM: CPT

## 2024-04-26 PROCEDURE — 99212 OFFICE O/P EST SF 10 MIN: CPT

## 2024-04-26 PROCEDURE — 73610 X-RAY EXAM OF ANKLE: CPT

## 2024-04-26 PROCEDURE — 6360000002 HC RX W HCPCS: Performed by: INTERNAL MEDICINE

## 2024-04-26 PROCEDURE — 36415 COLL VENOUS BLD VENIPUNCTURE: CPT

## 2024-04-26 RX ORDER — CYANOCOBALAMIN 1000 UG/ML
1000 INJECTION, SOLUTION INTRAMUSCULAR; SUBCUTANEOUS ONCE
Status: COMPLETED | OUTPATIENT
Start: 2024-04-26 | End: 2024-04-26

## 2024-04-26 RX ORDER — PROMETHAZINE HYDROCHLORIDE 25 MG/1
25 TABLET ORAL EVERY 6 HOURS PRN
Qty: 100 TABLET | Refills: 5 | Status: SHIPPED | OUTPATIENT
Start: 2024-04-26

## 2024-04-26 RX ORDER — CYANOCOBALAMIN 1000 UG/ML
1000 INJECTION, SOLUTION INTRAMUSCULAR; SUBCUTANEOUS ONCE
OUTPATIENT
Start: 2024-05-24 | End: 2024-05-24

## 2024-04-26 RX ADMIN — CYANOCOBALAMIN 1000 MCG: 1000 INJECTION, SOLUTION INTRAMUSCULAR; SUBCUTANEOUS at 08:45

## 2024-04-27 LAB — CANCER AG27-29 SERPL-ACNC: 35.3 U/ML

## 2024-04-29 ENCOUNTER — TELEPHONE (OUTPATIENT)
Dept: HEMATOLOGY | Age: 59
End: 2024-04-29

## 2024-04-29 DIAGNOSIS — C50.919 CARCINOMA OF BREAST METASTATIC TO LUNG, UNSPECIFIED LATERALITY (HCC): ICD-10-CM

## 2024-04-29 DIAGNOSIS — M25.59 PAIN IN OTHER JOINT: ICD-10-CM

## 2024-04-29 DIAGNOSIS — E79.0 ELEVATED URIC ACID IN BLOOD: Primary | ICD-10-CM

## 2024-04-29 DIAGNOSIS — C78.00 CARCINOMA OF BREAST METASTATIC TO LUNG, UNSPECIFIED LATERALITY (HCC): ICD-10-CM

## 2024-04-29 RX ORDER — PREDNISONE 20 MG/1
40 TABLET ORAL DAILY
Qty: 8 TABLET | Refills: 0 | Status: SHIPPED | OUTPATIENT
Start: 2024-04-29 | End: 2024-05-03

## 2024-04-29 NOTE — TELEPHONE ENCOUNTER
Patient returned phone call regarding recommendations per . I have verified pharmacy with patient.

## 2024-04-29 NOTE — TELEPHONE ENCOUNTER
I have attempted to call patient regarding Uric Acid being elevated. Per  he has recommended for patient initiate Prednisone 40mg for 4 days. I have requested for patient to return phone call to verify pharmacy with patient.

## 2024-04-30 ENCOUNTER — HOSPITAL ENCOUNTER (OUTPATIENT)
Age: 59
Setting detail: OUTPATIENT SURGERY
Discharge: HOME | End: 2024-04-30
Attending: SURGERY | Admitting: SURGERY
Payer: MEDICARE

## 2024-04-30 ENCOUNTER — ANESTHESIA EVENT (OUTPATIENT)
Dept: OPERATING ROOM | Age: 59
End: 2024-04-30
Payer: MEDICARE

## 2024-04-30 ENCOUNTER — ANESTHESIA (OUTPATIENT)
Dept: OPERATING ROOM | Age: 59
End: 2024-04-30
Payer: MEDICARE

## 2024-04-30 ENCOUNTER — TELEPHONE (OUTPATIENT)
Dept: HEMATOLOGY | Age: 59
End: 2024-04-30

## 2024-04-30 VITALS
BODY MASS INDEX: 29.73 KG/M2 | TEMPERATURE: 97.8 F | RESPIRATION RATE: 20 BRPM | HEART RATE: 80 BPM | HEIGHT: 66 IN | WEIGHT: 185 LBS | SYSTOLIC BLOOD PRESSURE: 136 MMHG | DIASTOLIC BLOOD PRESSURE: 82 MMHG | OXYGEN SATURATION: 100 %

## 2024-04-30 DIAGNOSIS — N65.0 DEFORMITY AND DISPROPORTION OF RECONSTRUCTED BREAST: ICD-10-CM

## 2024-04-30 DIAGNOSIS — C50.912 CARCINOMA OF LEFT BREAST METASTATIC TO LUNG (HCC): Primary | ICD-10-CM

## 2024-04-30 DIAGNOSIS — Z98.890 S/P BREAST RECONSTRUCTION, LEFT: ICD-10-CM

## 2024-04-30 DIAGNOSIS — C78.02 CARCINOMA OF LEFT BREAST METASTATIC TO LUNG (HCC): Primary | ICD-10-CM

## 2024-04-30 DIAGNOSIS — N65.1 DEFORMITY AND DISPROPORTION OF RECONSTRUCTED BREAST: ICD-10-CM

## 2024-04-30 PROCEDURE — 7100000001 HC PACU RECOVERY - ADDTL 15 MIN: Performed by: SURGERY

## 2024-04-30 PROCEDURE — 7100000010 HC PHASE II RECOVERY - FIRST 15 MIN: Performed by: SURGERY

## 2024-04-30 PROCEDURE — 6370000000 HC RX 637 (ALT 250 FOR IP): Performed by: SURGERY

## 2024-04-30 PROCEDURE — 2580000003 HC RX 258: Performed by: SURGERY

## 2024-04-30 PROCEDURE — 6370000000 HC RX 637 (ALT 250 FOR IP): Performed by: NURSE ANESTHETIST, CERTIFIED REGISTERED

## 2024-04-30 PROCEDURE — 2500000003 HC RX 250 WO HCPCS: Performed by: NURSE ANESTHETIST, CERTIFIED REGISTERED

## 2024-04-30 PROCEDURE — 3600000014 HC SURGERY LEVEL 4 ADDTL 15MIN: Performed by: SURGERY

## 2024-04-30 PROCEDURE — 6360000002 HC RX W HCPCS: Performed by: SURGERY

## 2024-04-30 PROCEDURE — 7100000000 HC PACU RECOVERY - FIRST 15 MIN: Performed by: SURGERY

## 2024-04-30 PROCEDURE — 3600000004 HC SURGERY LEVEL 4 BASE: Performed by: SURGERY

## 2024-04-30 PROCEDURE — 2709999900 HC NON-CHARGEABLE SUPPLY: Performed by: SURGERY

## 2024-04-30 PROCEDURE — 6360000002 HC RX W HCPCS: Performed by: NURSE ANESTHETIST, CERTIFIED REGISTERED

## 2024-04-30 PROCEDURE — 2500000003 HC RX 250 WO HCPCS: Performed by: SURGERY

## 2024-04-30 PROCEDURE — 2580000003 HC RX 258: Performed by: NURSE ANESTHETIST, CERTIFIED REGISTERED

## 2024-04-30 PROCEDURE — 7100000011 HC PHASE II RECOVERY - ADDTL 15 MIN: Performed by: SURGERY

## 2024-04-30 PROCEDURE — 2720000010 HC SURG SUPPLY STERILE: Performed by: SURGERY

## 2024-04-30 PROCEDURE — 3700000001 HC ADD 15 MINUTES (ANESTHESIA): Performed by: SURGERY

## 2024-04-30 PROCEDURE — 3700000000 HC ANESTHESIA ATTENDED CARE: Performed by: SURGERY

## 2024-04-30 PROCEDURE — 2580000003 HC RX 258: Performed by: ANESTHESIOLOGY

## 2024-04-30 RX ORDER — MEPERIDINE HYDROCHLORIDE 25 MG/ML
12.5 INJECTION INTRAMUSCULAR; INTRAVENOUS; SUBCUTANEOUS EVERY 5 MIN PRN
Status: DISCONTINUED | OUTPATIENT
Start: 2024-04-30 | End: 2024-04-30 | Stop reason: HOSPADM

## 2024-04-30 RX ORDER — FENTANYL CITRATE 50 UG/ML
INJECTION, SOLUTION INTRAMUSCULAR; INTRAVENOUS PRN
Status: DISCONTINUED | OUTPATIENT
Start: 2024-04-30 | End: 2024-04-30 | Stop reason: SDUPTHER

## 2024-04-30 RX ORDER — SODIUM CHLORIDE, SODIUM LACTATE, POTASSIUM CHLORIDE, CALCIUM CHLORIDE 600; 310; 30; 20 MG/100ML; MG/100ML; MG/100ML; MG/100ML
INJECTION, SOLUTION INTRAVENOUS CONTINUOUS
Status: DISCONTINUED | OUTPATIENT
Start: 2024-04-30 | End: 2024-04-30 | Stop reason: HOSPADM

## 2024-04-30 RX ORDER — METOCLOPRAMIDE HYDROCHLORIDE 5 MG/ML
10 INJECTION INTRAMUSCULAR; INTRAVENOUS
Status: DISCONTINUED | OUTPATIENT
Start: 2024-04-30 | End: 2024-04-30 | Stop reason: HOSPADM

## 2024-04-30 RX ORDER — GABAPENTIN 300 MG/1
300 CAPSULE ORAL ONCE
Status: COMPLETED | OUTPATIENT
Start: 2024-04-30 | End: 2024-04-30

## 2024-04-30 RX ORDER — HYDROMORPHONE HYDROCHLORIDE 1 MG/ML
0.5 INJECTION, SOLUTION INTRAMUSCULAR; INTRAVENOUS; SUBCUTANEOUS EVERY 5 MIN PRN
Status: DISCONTINUED | OUTPATIENT
Start: 2024-04-30 | End: 2024-04-30 | Stop reason: HOSPADM

## 2024-04-30 RX ORDER — HYDROMORPHONE HYDROCHLORIDE 1 MG/ML
0.25 INJECTION, SOLUTION INTRAMUSCULAR; INTRAVENOUS; SUBCUTANEOUS EVERY 5 MIN PRN
Status: DISCONTINUED | OUTPATIENT
Start: 2024-04-30 | End: 2024-04-30 | Stop reason: HOSPADM

## 2024-04-30 RX ORDER — SODIUM CHLORIDE 0.9 % (FLUSH) 0.9 %
5-40 SYRINGE (ML) INJECTION EVERY 12 HOURS SCHEDULED
Status: DISCONTINUED | OUTPATIENT
Start: 2024-04-30 | End: 2024-04-30 | Stop reason: HOSPADM

## 2024-04-30 RX ORDER — NALOXONE HYDROCHLORIDE 0.4 MG/ML
INJECTION, SOLUTION INTRAMUSCULAR; INTRAVENOUS; SUBCUTANEOUS PRN
Status: DISCONTINUED | OUTPATIENT
Start: 2024-04-30 | End: 2024-04-30 | Stop reason: HOSPADM

## 2024-04-30 RX ORDER — ROCURONIUM BROMIDE 10 MG/ML
INJECTION, SOLUTION INTRAVENOUS PRN
Status: DISCONTINUED | OUTPATIENT
Start: 2024-04-30 | End: 2024-04-30 | Stop reason: SDUPTHER

## 2024-04-30 RX ORDER — SODIUM CHLORIDE 9 MG/ML
INJECTION, SOLUTION INTRAVENOUS PRN
Status: DISCONTINUED | OUTPATIENT
Start: 2024-04-30 | End: 2024-04-30 | Stop reason: HOSPADM

## 2024-04-30 RX ORDER — PROPOFOL 10 MG/ML
INJECTION, EMULSION INTRAVENOUS PRN
Status: DISCONTINUED | OUTPATIENT
Start: 2024-04-30 | End: 2024-04-30 | Stop reason: SDUPTHER

## 2024-04-30 RX ORDER — CEFAZOLIN SODIUM 1 G/3ML
INJECTION, POWDER, FOR SOLUTION INTRAMUSCULAR; INTRAVENOUS PRN
Status: DISCONTINUED | OUTPATIENT
Start: 2024-04-30 | End: 2024-04-30 | Stop reason: SDUPTHER

## 2024-04-30 RX ORDER — HYDROCODONE BITARTRATE AND ACETAMINOPHEN 7.5; 325 MG/1; MG/1
1 TABLET ORAL EVERY 6 HOURS PRN
Qty: 12 TABLET | Refills: 0 | Status: SHIPPED | OUTPATIENT
Start: 2024-04-30 | End: 2024-05-03

## 2024-04-30 RX ORDER — LIDOCAINE HYDROCHLORIDE 10 MG/ML
INJECTION, SOLUTION INFILTRATION; PERINEURAL PRN
Status: DISCONTINUED | OUTPATIENT
Start: 2024-04-30 | End: 2024-04-30 | Stop reason: SDUPTHER

## 2024-04-30 RX ORDER — ONDANSETRON 2 MG/ML
INJECTION INTRAMUSCULAR; INTRAVENOUS PRN
Status: DISCONTINUED | OUTPATIENT
Start: 2024-04-30 | End: 2024-04-30 | Stop reason: SDUPTHER

## 2024-04-30 RX ORDER — SODIUM CHLORIDE 0.9 % (FLUSH) 0.9 %
5-40 SYRINGE (ML) INJECTION PRN
Status: DISCONTINUED | OUTPATIENT
Start: 2024-04-30 | End: 2024-04-30 | Stop reason: HOSPADM

## 2024-04-30 RX ORDER — CELECOXIB 200 MG/1
200 CAPSULE ORAL ONCE
Status: COMPLETED | OUTPATIENT
Start: 2024-04-30 | End: 2024-04-30

## 2024-04-30 RX ORDER — SUCCINYLCHOLINE CHLORIDE 20 MG/ML
INJECTION INTRAMUSCULAR; INTRAVENOUS PRN
Status: DISCONTINUED | OUTPATIENT
Start: 2024-04-30 | End: 2024-04-30 | Stop reason: SDUPTHER

## 2024-04-30 RX ORDER — DIPHENHYDRAMINE HYDROCHLORIDE 50 MG/ML
12.5 INJECTION INTRAMUSCULAR; INTRAVENOUS
Status: DISCONTINUED | OUTPATIENT
Start: 2024-04-30 | End: 2024-04-30 | Stop reason: HOSPADM

## 2024-04-30 RX ORDER — SODIUM CHLORIDE, SODIUM LACTATE, POTASSIUM CHLORIDE, CALCIUM CHLORIDE 600; 310; 30; 20 MG/100ML; MG/100ML; MG/100ML; MG/100ML
INJECTION, SOLUTION INTRAVENOUS CONTINUOUS PRN
Status: DISCONTINUED | OUTPATIENT
Start: 2024-04-30 | End: 2024-04-30 | Stop reason: SDUPTHER

## 2024-04-30 RX ORDER — ALBUTEROL SULFATE 90 UG/1
AEROSOL, METERED RESPIRATORY (INHALATION) PRN
Status: DISCONTINUED | OUTPATIENT
Start: 2024-04-30 | End: 2024-04-30 | Stop reason: SDUPTHER

## 2024-04-30 RX ORDER — ACETAMINOPHEN 325 MG/1
975 TABLET ORAL ONCE
Status: COMPLETED | OUTPATIENT
Start: 2024-04-30 | End: 2024-04-30

## 2024-04-30 RX ADMIN — LIDOCAINE HYDROCHLORIDE 50 MG: 10 INJECTION, SOLUTION INFILTRATION; PERINEURAL at 15:05

## 2024-04-30 RX ADMIN — PHENYLEPHRINE HYDROCHLORIDE 100 MCG: 10 INJECTION INTRAVENOUS at 15:39

## 2024-04-30 RX ADMIN — ACETAMINOPHEN 975 MG: 325 TABLET ORAL at 13:42

## 2024-04-30 RX ADMIN — SUGAMMADEX 500 MG: 100 INJECTION, SOLUTION INTRAVENOUS at 15:59

## 2024-04-30 RX ADMIN — GABAPENTIN 300 MG: 300 CAPSULE ORAL at 13:42

## 2024-04-30 RX ADMIN — ROCURONIUM BROMIDE 50 MG: 10 INJECTION, SOLUTION INTRAVENOUS at 15:39

## 2024-04-30 RX ADMIN — PHENYLEPHRINE HYDROCHLORIDE 100 MCG: 10 INJECTION INTRAVENOUS at 15:54

## 2024-04-30 RX ADMIN — PHENYLEPHRINE HYDROCHLORIDE 100 MCG: 10 INJECTION INTRAVENOUS at 15:47

## 2024-04-30 RX ADMIN — PROPOFOL 20 MG: 10 INJECTION, EMULSION INTRAVENOUS at 15:08

## 2024-04-30 RX ADMIN — SODIUM CHLORIDE, SODIUM LACTATE, POTASSIUM CHLORIDE, AND CALCIUM CHLORIDE: 600; 310; 30; 20 INJECTION, SOLUTION INTRAVENOUS at 15:02

## 2024-04-30 RX ADMIN — SUCCINYLCHOLINE CHLORIDE 120 MG: 20 INJECTION, SOLUTION INTRAMUSCULAR; INTRAVENOUS at 15:22

## 2024-04-30 RX ADMIN — FENTANYL CITRATE 75 MCG: 0.05 INJECTION, SOLUTION INTRAMUSCULAR; INTRAVENOUS at 15:30

## 2024-04-30 RX ADMIN — CEFAZOLIN 1 G: 1 INJECTION, POWDER, FOR SOLUTION INTRAMUSCULAR; INTRAVENOUS at 15:31

## 2024-04-30 RX ADMIN — Medication 6 PUFF: at 16:12

## 2024-04-30 RX ADMIN — ROCURONIUM BROMIDE 20 MG: 10 INJECTION, SOLUTION INTRAVENOUS at 15:47

## 2024-04-30 RX ADMIN — ONDANSETRON 4 MG: 2 INJECTION INTRAMUSCULAR; INTRAVENOUS at 15:54

## 2024-04-30 RX ADMIN — PROPOFOL 40 MG: 10 INJECTION, EMULSION INTRAVENOUS at 15:12

## 2024-04-30 RX ADMIN — FENTANYL CITRATE 25 MCG: 0.05 INJECTION, SOLUTION INTRAMUSCULAR; INTRAVENOUS at 15:09

## 2024-04-30 RX ADMIN — PROPOFOL 160 MCG/KG/MIN: 10 INJECTION, EMULSION INTRAVENOUS at 15:07

## 2024-04-30 RX ADMIN — PROPOFOL 20 MG: 10 INJECTION, EMULSION INTRAVENOUS at 15:09

## 2024-04-30 RX ADMIN — PROPOFOL 200 MG: 10 INJECTION, EMULSION INTRAVENOUS at 15:22

## 2024-04-30 RX ADMIN — PHENYLEPHRINE HYDROCHLORIDE 100 MCG: 10 INJECTION INTRAVENOUS at 15:48

## 2024-04-30 RX ADMIN — SODIUM CHLORIDE, SODIUM LACTATE, POTASSIUM CHLORIDE, AND CALCIUM CHLORIDE: 600; 310; 30; 20 INJECTION, SOLUTION INTRAVENOUS at 13:37

## 2024-04-30 RX ADMIN — PROPOFOL 80 MG: 10 INJECTION, EMULSION INTRAVENOUS at 15:06

## 2024-04-30 RX ADMIN — PHENYLEPHRINE HYDROCHLORIDE 100 MCG: 10 INJECTION INTRAVENOUS at 15:36

## 2024-04-30 RX ADMIN — CELECOXIB 200 MG: 200 CAPSULE ORAL at 13:42

## 2024-04-30 RX ADMIN — PROPOFOL 200 MG: 10 INJECTION, EMULSION INTRAVENOUS at 15:18

## 2024-04-30 ASSESSMENT — ENCOUNTER SYMPTOMS: SHORTNESS OF BREATH: 1

## 2024-04-30 ASSESSMENT — PAIN - FUNCTIONAL ASSESSMENT
PAIN_FUNCTIONAL_ASSESSMENT: 0-10
PAIN_FUNCTIONAL_ASSESSMENT: NONE - DENIES PAIN
PAIN_FUNCTIONAL_ASSESSMENT: NONE - DENIES PAIN

## 2024-04-30 NOTE — ANESTHESIA PRE PROCEDURE
Department of Anesthesiology  Preprocedure Note       Name:  Yandy iDckinson   Age:  59 y.o.  :  1965                                          MRN:  [id number]         Date:  2024      Surgeon: Surgeon(s):  Alejandro Garsia MD    Procedure: Procedure(s):  LEFT INFERIOR BREAST FAT GRAFTING    Medications prior to admission:   Prior to Admission medications    Medication Sig Start Date End Date Taking? Authorizing Provider   predniSONE (DELTASONE) 20 MG tablet Take 2 tablets by mouth daily for 4 days 4/29/24 5/3/24  Kassi Jim MD   promethazine (PHENERGAN) 25 MG tablet Take 1 tablet by mouth every 6 hours as needed for Nausea 24   Kassi Jim MD   Respiratory Therapy Supplies (NEBULIZER/TUBING/MOUTHPIECE) KIT 1 kit by Does not apply route daily as needed (wheezing) 24   Ashley Morrison MD   ipratropium 0.5 mg-albuterol 2.5 mg (DUONEB) 0.5-2.5 (3) MG/3ML SOLN nebulizer solution Inhale 3 mLs into the lungs every 4 hours 24   Ashley Morrison MD   exemestane (AROMASIN) 25 MG tablet Take 1 tablet by mouth daily 24   Kassi Jim MD   palbociclib (IBRANCE) 100 MG capsule Take 100 mg daily, days 1-21 every 28 days  Patient taking differently: Take 100 mg by mouth daily Take 100 mg daily, days 1-21 every 28 days 24   Kassi Jim MD   HYDROcodone-acetaminophen (NORCO) 7.5-325 MG per tablet Take 1 tablet by mouth every 6 hours as needed for Pain.    ProviderArcenio MD   hydrOXYzine HCl (ATARAX) 10 MG tablet Take 1 tablet by mouth 3 times daily as needed for Itching    ProviderArcenio MD   cyclobenzaprine (FLEXERIL) 10 MG tablet Take 1 tablet by mouth 3 times daily as needed for Muscle spasms 22   Nahum Cowan APRN - CNP   gabapentin (NEURONTIN) 300 MG capsule Take 2 capsules by mouth 3 times daily for 30 days.  Patient taking differently: Take 2 capsules by mouth 3 times daily as needed (neuropathy). 22

## 2024-04-30 NOTE — ANESTHESIA POSTPROCEDURE EVALUATION
Department of Anesthesiology  Postprocedure Note    Patient: Yandy Dickinson  MRN: [id number]  YOB: 1965  Date of evaluation: 4/30/2024    Procedure Summary       Date: 04/30/24 Room / Location: [address]    Anesthesia Start: 1502 Anesthesia Stop: 1621    Procedure: LEFT INFERIOR BREAST FAT GRAFTING Diagnosis:       Deformity of reconstructed breast      Disproportion of reconstructed breast      (Deformity of reconstructed breast [N65.0])      (Disproportion of reconstructed breast [N65.1])    Surgeons: Alejandro Garsia MD Responsible Provider: Armen Estrada APRN - CRNA    Anesthesia Type: general, TIVA ASA Status: 3            Anesthesia Type: No value filed.    Dena Phase I: Dena Score: 10    Dena Phase II:      Anesthesia Post Evaluation    Patient location during evaluation: PACU  Patient participation: complete - patient participated  Level of consciousness: sleepy but conscious  Pain score: 0  Airway patency: patent  Nausea & Vomiting: no nausea and no vomiting  Cardiovascular status: hemodynamically stable and blood pressure returned to baseline  Respiratory status: acceptable, spontaneous ventilation, nonlabored ventilation and room air  Hydration status: stable  Comments: BP (!) 143/89   Pulse 82   Temp 97 °F (36.1 °C)   Resp 16   Ht 1.676 m (5' 6\")   Wt 83.9 kg (185 lb)   SpO2 100%   BMI 29.86 kg/m²     Pain management: adequate    No notable events documented.

## 2024-04-30 NOTE — TELEPHONE ENCOUNTER
Called patient and spoke with her about ankle xray. Patient notified that MD read her report and her xray was normal. Patient reports understanding. Electronically signed by Lanny Valente RN on 4/30/2024 at 8:18 AM

## 2024-04-30 NOTE — PROGRESS NOTES
Patient arrived to room alert, oriented and able to make her needs know. VSS. Friend at bedside. Only complaint is nasal congestion.

## 2024-04-30 NOTE — BRIEF OP NOTE
Brief Postoperative Note      DATE OF PROCEDURE: 4/30/2024     SURGEON: Alejandro Garsia MD    PREOPERATIVE DIAGNOSIS:  Deformity of reconstructed breast [N65.0]  Disproportion of reconstructed breast [N65.1]    POSTOPERATIVE DIAGNOSIS: Same     OPERATION: Procedure(s):  LEFT INFERIOR BREAST FAT GRAFTING    ANESTHESIA: Monitor Anesthesia Care    ESTIMATED BLOOD LOSS: Minimal    COMPLICATIONS: None.     SPECIMENS: * No specimens in log *    DRAINS: None    The patient tolerated the procedure well.    Electronically signed by Alejandro Garsia MD  on 4/30/2024 at 4:05 PM

## 2024-04-30 NOTE — PROGRESS NOTES
HISTORY OF PRESENT ILLNESS:    Ms. Dickinson is a 59 y.o. black female who presents today for a post op following Left breast fat grafting on 4/30/2024    She is status post Exploration repositioning of right nipple with Bilateral fat grafting on 8/11/2023.     She has now developed pulmonary mets.  She is on Ibrance and exemestane.    She is now reasonably happy with her right nipple.  She would like some more fullness in the inferior left breast.  She has gotten very good take on her prior fat grafting but it clearly could use some more.    This is following a left mastectomy reconstruction due to lateral drift on 9/17/2021.      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  It was ER/OR positive and she underwent neoadjuvant chemotherapy with AC followed by Taxol.  In 2007 she underwent a right lumpectomy with axillary node dissection and received adjuvant radiation therapy to the breast and axilla.  She did not take antihormonal therapy due to cost.    She recurred in 2013 and underwent 2 cycles of Taxotere followed by Doxil and then underwent right mastectomy and axillary node dissection.  This was followed by completion of radiation therapy to her chest wall and axillary nodes.    In 2016 she underwent implant exchange and a prophylactic mastectomy on the left.  She was also switched to aromatase inhibitor.    She subsequent developed metastatic disease with lung, hilar, and axillary metastases and has been on Abraxane, Gemzar, and is now doing monthly Faslodex with palbociclib    She originally had [place] implants.  590 cc on the right.  She has an 800 cc implant on the left.    She is now status post revision of left mastectomy reconstruction due to lateral drift on 9/17/2021.  She had placement of a 700 cc Natrelle soft touch implant and a crescent mastopexy on the left.  She also had liposuction of an excessive fat pad adjacent to her reconstruction.    PHYSICAL EXAM:  The

## 2024-04-30 NOTE — H&P
HISTORY OF PRESENT ILLNESS:     Ms. Dickinson is a 59 y.o. black female who presents today for a 6 month exam following Exploration repositioning of right nipple with Bilateral fat grafting on 8/11/2023.      She has now developed pulmonary mets.  She is on Ibrance and exemestane.     She is now reasonably happy with her right nipple.  She would like some more fullness in the inferior left breast.  She has gotten very good take on her prior fat grafting but it clearly could use some more.     This is following a left mastectomy reconstruction due to lateral drift on 9/17/2021.        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  It was ER/NC positive and she underwent neoadjuvant chemotherapy with AC followed by Taxol.  In 2007 she underwent a right lumpectomy with axillary node dissection and received adjuvant radiation therapy to the breast and axilla.  She did not take antihormonal therapy due to cost.     She recurred in 2013 and underwent 2 cycles of Taxotere followed by Doxil and then underwent right mastectomy and axillary node dissection.  This was followed by completion of radiation therapy to her chest wall and axillary nodes.     In 2016 she underwent implant exchange and a prophylactic mastectomy on the left.  She was also switched to aromatase inhibitor.     She subsequent developed metastatic disease with lung, hilar, and axillary metastases and has been on Abraxane, Gemzar, and is now doing monthly Faslodex with palbociclib     She currently has [place] implants.  590 cc on the right.  She has an 800 cc implant on the left.     She is now status post revision of left mastectomy reconstruction due to lateral drift on 9/17/2021.  She had placement of a 700 cc Natrelle soft touch implant and a crescent mastopexy on the left.  She also had liposuction of an excessive fat pad adjacent to her reconstruction.    Yandy Dickinson is a 59 y.o. female with the following history as

## 2024-05-01 NOTE — OP NOTE
[id number] [place] [address]                            OPERATIVE REPORT      PATIENT NAME: GENNARO HAWTHORNE              : 1965  MED REC NO: [id number]                          ROOM: [facility]  ACCOUNT NO: [id number]                       ADMIT DATE: 2024  PROVIDER: Alejandro Garsia MD      DATE OF PROCEDURE:  2024    SURGEON:  Alejandro Garsia MD    PREOPERATIVE DIAGNOSIS:  Status post bilateral mastectomy with left-sided deformity post reconstruction.    POSTOPERATIVE DIAGNOSIS:  Status post bilateral mastectomy with left-sided deformity post reconstruction.    PROCEDURE:  Left breast fat grafting less than 50 mL.    ANESTHESIA:  Local with MAC.    INDICATIONS:  The patient is a 59-year-old lady who is many years status post bilateral mastectomy and reconstruction.  She has metastatic disease, but is stable on hormonal management.  She has had bilateral reconstructions and has a deformity in the inferior aspect of her left reconstructed breast.  We discussed risks and benefits of doing some fat grafting in this area to smoothe it out and she understands and is agreeable.  We have done previous fat grafting in this area and it has improved it dramatically, but there is still a significant defect.  We discussed this at some length.  She understands and is agreeable.    DESCRIPTION OF PROCEDURE:  She was brought to the operating room, adequately sedated and prepped and draped in sterile fashion.  We then anesthetized her abdominal wall with 1/16% Xylocaine with epinephrine and then harvested about 75 mL of fat.  It was washed and decanted and ended up being about 50 mL total.  We then made a skin nick in the inferior left breast and injected it very carefully and it was filled in quite nicely.  The scar seemed somewhat tethered, so we utilized a tenolysis to free this up as well and then we injected approximately 45 mL of actual

## 2024-05-06 RX ORDER — ALLOPURINOL 200 MG/1
1 TABLET ORAL DAILY
Qty: 30 TABLET | Refills: 3 | Status: SHIPPED | OUTPATIENT
Start: 2024-05-06

## 2024-05-08 ENCOUNTER — OFFICE VISIT (OUTPATIENT)
Dept: SURGERY | Age: 59
End: 2024-05-08

## 2024-05-08 VITALS — HEART RATE: 72 BPM | BODY MASS INDEX: 30.22 KG/M2 | WEIGHT: 188 LBS | HEIGHT: 66 IN

## 2024-05-08 DIAGNOSIS — N65.1 DEFORMITY AND DISPROPORTION OF RECONSTRUCTED BREAST: ICD-10-CM

## 2024-05-08 DIAGNOSIS — N65.0 DEFORMITY AND DISPROPORTION OF RECONSTRUCTED BREAST: ICD-10-CM

## 2024-05-08 DIAGNOSIS — C50.912 CARCINOMA OF LEFT BREAST METASTATIC TO LUNG (HCC): ICD-10-CM

## 2024-05-08 DIAGNOSIS — C78.02 CARCINOMA OF LEFT BREAST METASTATIC TO LUNG (HCC): ICD-10-CM

## 2024-05-08 DIAGNOSIS — Z98.890 STATUS POST BILATERAL BREAST RECONSTRUCTION: ICD-10-CM

## 2024-05-08 DIAGNOSIS — Z90.13 STATUS POST BILATERAL MASTECTOMY: Primary | ICD-10-CM

## 2024-05-08 PROCEDURE — 99024 POSTOP FOLLOW-UP VISIT: CPT | Performed by: SURGERY

## 2024-05-22 ENCOUNTER — TELEPHONE (OUTPATIENT)
Dept: HEMATOLOGY | Age: 59
End: 2024-05-22

## 2024-05-22 NOTE — TELEPHONE ENCOUNTER
I called to remind patient of appt on 05/29/24. I left detailed voicemail with appt date and time. I also made pt aware of our call back number in case they needed to call back and reschedule.

## 2024-05-25 DIAGNOSIS — C50.011 CARCINOMA OF AREOLA OF RIGHT BREAST IN FEMALE, UNSPECIFIED ESTROGEN RECEPTOR STATUS (HCC): ICD-10-CM

## 2024-05-28 DIAGNOSIS — C50.011 CARCINOMA OF AREOLA OF RIGHT BREAST IN FEMALE, UNSPECIFIED ESTROGEN RECEPTOR STATUS (HCC): Primary | ICD-10-CM

## 2024-05-28 RX ORDER — EXEMESTANE 25 MG/1
25 TABLET ORAL DAILY
Qty: 90 TABLET | Refills: 1 | Status: SHIPPED | OUTPATIENT
Start: 2024-05-28

## 2024-05-28 NOTE — PROGRESS NOTES
MEDICAL ONCOLOGY PROGRESS NOTE    Pt Name: Yandy Dickinson  MRN: [id number]  YOB: 1965  Date of evaluation: 5/29/2024    HISTORY OF PRESENT ILLNESS:    Reason for MD visit: Disease/toxicity management  Yandy Dickinson is here today for monitoring for breast cancer and toxicity assessment.    The patient is a very pleasant 59 years old female who has metastatic ER/ND positive breast cancer.  She has pulmonary metastasis.  She is currently on treatment with exemestane and Ibrance.  She has complaints of significant arthralgias.  Chest x-ray right ankle during last visit was unremarkable.  Arthralgia has improved.  She is compliant with her Ibrance and exemestane.    Diagnosis    Grade III Adenocarcinoma of right breast diagnosed 2006  Stage IIIA, [id number]  ER/ND Positive, HER-2 bruce/ihc 1+  April 2013-RECURRENCE in the right axillary region  Genetic testing- BRCA 1&2 Negative  2014 (?) RECURRENCE in the axillary skin  01/05/2017- METASTATIC DISEASE with lung, hilar, and axillary lymph node mets  Osteopenia  Mediport associated DVT, October 2021  DVT left upper extremity, November 2021 February 2016, recurrence breast cancer with lung metastasis  [place]: TP53: Detected  MSI High; Not detected  Pulmonary metastasis breast cancer: ER 70%, ND 5%, HER2 IHC 0-    Treatment Summary  2006- Neoadjuvant chemotherapy with AC x 4 cycles followed by Taxol  2007- Lumpectomy with axillary lymph node dissection  2007- Adjuvant radiation therapy to whole breast and axillary region  Did not take tamoxifen due to cost  April 2013- RECURRENCE  Neoadjuvant chemotherapy with 2 cycles of Taxotere followed by Doxil  10/14/2013- Right Breast Mastectomy and Axillary Dissection  01/23/2014- Completion of adjuvant RT to chest wall and axillary lymph node with Dr. Box  02/14- Initiated adjuvant endocrine therapy with tamoxifen  2014 (?) RECURRENCE in the axillary skin  2014 (?) Surgical resection of the axillary skin  2015 (?)

## 2024-05-29 ENCOUNTER — HOSPITAL ENCOUNTER (OUTPATIENT)
Dept: INFUSION THERAPY | Age: 59
Discharge: HOME | End: 2024-05-29
Payer: MEDICARE

## 2024-05-29 ENCOUNTER — TELEPHONE (OUTPATIENT)
Dept: HEMATOLOGY | Age: 59
End: 2024-05-29

## 2024-05-29 ENCOUNTER — OFFICE VISIT (OUTPATIENT)
Dept: HEMATOLOGY | Age: 59
End: 2024-05-29
Payer: MEDICARE

## 2024-05-29 VITALS
HEART RATE: 100 BPM | OXYGEN SATURATION: 95 % | HEIGHT: 66 IN | WEIGHT: 190.5 LBS | TEMPERATURE: 97.2 F | BODY MASS INDEX: 30.62 KG/M2 | SYSTOLIC BLOOD PRESSURE: 130 MMHG | DIASTOLIC BLOOD PRESSURE: 72 MMHG

## 2024-05-29 DIAGNOSIS — C50.011 CARCINOMA OF AREOLA OF RIGHT BREAST IN FEMALE, UNSPECIFIED ESTROGEN RECEPTOR STATUS (HCC): ICD-10-CM

## 2024-05-29 DIAGNOSIS — C78.00 CARCINOMA OF BREAST METASTATIC TO LUNG, UNSPECIFIED LATERALITY (HCC): ICD-10-CM

## 2024-05-29 DIAGNOSIS — T45.1X5A CHEMOTHERAPY-INDUCED NAUSEA: ICD-10-CM

## 2024-05-29 DIAGNOSIS — T45.1X5A CHEMOTHERAPY-INDUCED NEUROPATHY (HCC): ICD-10-CM

## 2024-05-29 DIAGNOSIS — Z71.89 CARE PLAN DISCUSSED WITH PATIENT: ICD-10-CM

## 2024-05-29 DIAGNOSIS — C50.011 CARCINOMA OF AREOLA OF RIGHT BREAST IN FEMALE, UNSPECIFIED ESTROGEN RECEPTOR STATUS (HCC): Primary | ICD-10-CM

## 2024-05-29 DIAGNOSIS — R53.1 WEAKNESS: ICD-10-CM

## 2024-05-29 DIAGNOSIS — T45.1X5A CHEMOTHERAPY INDUCED NEUTROPENIA (HCC): ICD-10-CM

## 2024-05-29 DIAGNOSIS — D50.8 OTHER IRON DEFICIENCY ANEMIA: Primary | ICD-10-CM

## 2024-05-29 DIAGNOSIS — R53.83 OTHER FATIGUE: ICD-10-CM

## 2024-05-29 DIAGNOSIS — D70.1 CHEMOTHERAPY INDUCED NEUTROPENIA (HCC): ICD-10-CM

## 2024-05-29 DIAGNOSIS — E53.8 VITAMIN B 12 DEFICIENCY: ICD-10-CM

## 2024-05-29 DIAGNOSIS — R11.0 CHEMOTHERAPY-INDUCED NAUSEA: ICD-10-CM

## 2024-05-29 DIAGNOSIS — C50.919 CARCINOMA OF BREAST METASTATIC TO LUNG, UNSPECIFIED LATERALITY (HCC): ICD-10-CM

## 2024-05-29 DIAGNOSIS — G62.0 CHEMOTHERAPY-INDUCED NEUROPATHY (HCC): ICD-10-CM

## 2024-05-29 DIAGNOSIS — M10.071 ACUTE IDIOPATHIC GOUT OF RIGHT ANKLE: Primary | ICD-10-CM

## 2024-05-29 LAB
ALBUMIN SERPL-MCNC: 4.4 G/DL (ref 3.5–5.2)
ALP SERPL-CCNC: 84 U/L (ref 35–104)
ALT SERPL-CCNC: 11 U/L (ref 9–52)
ANION GAP SERPL CALCULATED.3IONS-SCNC: 10 MMOL/L (ref 7–19)
AST SERPL-CCNC: 25 U/L (ref 14–36)
BASOPHILS # BLD: 0.01 K/UL (ref 0.01–0.08)
BASOPHILS NFR BLD: 0.3 % (ref 0.1–1.2)
BILIRUB SERPL-MCNC: 0.9 MG/DL (ref 0.2–1.3)
BUN SERPL-MCNC: 18 MG/DL (ref 7–17)
CA 15-3: 27 U/ML (ref 0–25)
CALCIUM SERPL-MCNC: 8.9 MG/DL (ref 8.4–10.2)
CEA SERPL-MCNC: 2 NG/ML (ref 0–4.7)
CHLORIDE SERPL-SCNC: 99 MMOL/L (ref 98–111)
CO2 SERPL-SCNC: 29 MMOL/L (ref 22–29)
CREAT SERPL-MCNC: 1 MG/DL (ref 0.5–1)
EOSINOPHIL # BLD: 0.04 K/UL (ref 0.04–0.54)
EOSINOPHIL NFR BLD: 1.1 % (ref 0.7–7)
ERYTHROCYTE [DISTWIDTH] IN BLOOD BY AUTOMATED COUNT: 17.1 % (ref 11.7–14.4)
GLOBULIN: 2.9 G/DL
GLUCOSE SERPL-MCNC: 99 MG/DL (ref 74–106)
HCT VFR BLD AUTO: 34 % (ref 34.1–44.9)
HGB BLD-MCNC: 11.6 G/DL (ref 11.2–15.7)
LYMPHOCYTES # BLD: 1.98 K/UL (ref 1.18–3.74)
LYMPHOCYTES NFR BLD: 54.1 % (ref 19.3–53.1)
MCH RBC QN AUTO: 33 PG (ref 25.6–32.2)
MCHC RBC AUTO-ENTMCNC: 34.1 G/DL (ref 32.3–35.5)
MCV RBC AUTO: 96.9 FL (ref 79.4–94.8)
MONOCYTES # BLD: 0.24 K/UL (ref 0.24–0.82)
MONOCYTES NFR BLD: 6.6 % (ref 4.7–12.5)
NEUTROPHILS # BLD: 1.38 K/UL (ref 1.56–6.13)
NEUTS SEG NFR BLD: 37.6 % (ref 34–71.1)
PLATELET # BLD AUTO: 313 K/UL (ref 182–369)
PMV BLD AUTO: 8.6 FL (ref 7.4–10.4)
POTASSIUM SERPL-SCNC: 3.6 MMOL/L (ref 3.5–5.1)
PROT SERPL-MCNC: 7.2 G/DL (ref 6.3–8.2)
RBC # BLD AUTO: 3.51 M/UL (ref 3.93–5.22)
SODIUM SERPL-SCNC: 138 MMOL/L (ref 137–145)
WBC # BLD AUTO: 3.66 K/UL (ref 3.98–10.04)

## 2024-05-29 PROCEDURE — G2211 COMPLEX E/M VISIT ADD ON: HCPCS | Performed by: INTERNAL MEDICINE

## 2024-05-29 PROCEDURE — 99213 OFFICE O/P EST LOW 20 MIN: CPT

## 2024-05-29 PROCEDURE — G8427 DOCREV CUR MEDS BY ELIG CLIN: HCPCS | Performed by: INTERNAL MEDICINE

## 2024-05-29 PROCEDURE — 6360000002 HC RX W HCPCS: Performed by: INTERNAL MEDICINE

## 2024-05-29 PROCEDURE — 85025 COMPLETE CBC W/AUTO DIFF WBC: CPT

## 2024-05-29 PROCEDURE — 1036F TOBACCO NON-USER: CPT | Performed by: INTERNAL MEDICINE

## 2024-05-29 PROCEDURE — 36415 COLL VENOUS BLD VENIPUNCTURE: CPT

## 2024-05-29 PROCEDURE — G8417 CALC BMI ABV UP PARAM F/U: HCPCS | Performed by: INTERNAL MEDICINE

## 2024-05-29 PROCEDURE — 80053 COMPREHEN METABOLIC PANEL: CPT

## 2024-05-29 PROCEDURE — 96372 THER/PROPH/DIAG INJ SC/IM: CPT

## 2024-05-29 PROCEDURE — 3017F COLORECTAL CA SCREEN DOC REV: CPT | Performed by: INTERNAL MEDICINE

## 2024-05-29 PROCEDURE — 99214 OFFICE O/P EST MOD 30 MIN: CPT | Performed by: INTERNAL MEDICINE

## 2024-05-29 RX ORDER — CYANOCOBALAMIN 1000 UG/ML
1000 INJECTION, SOLUTION INTRAMUSCULAR; SUBCUTANEOUS ONCE
Status: COMPLETED | OUTPATIENT
Start: 2024-05-29 | End: 2024-05-29

## 2024-05-29 RX ORDER — CYANOCOBALAMIN 1000 UG/ML
1000 INJECTION, SOLUTION INTRAMUSCULAR; SUBCUTANEOUS ONCE
OUTPATIENT
Start: 2024-06-19 | End: 2024-06-19

## 2024-05-29 RX ORDER — ALLOPURINOL 100 MG/1
200 TABLET ORAL DAILY
Qty: 60 TABLET | Refills: 3 | Status: SHIPPED | OUTPATIENT
Start: 2024-05-29

## 2024-05-29 RX ADMIN — CYANOCOBALAMIN 1000 MCG: 1000 INJECTION, SOLUTION INTRAMUSCULAR; SUBCUTANEOUS at 08:31

## 2024-05-29 NOTE — TELEPHONE ENCOUNTER
Called and spoke with pharmacist about the cost of allopurinol medication. Pharmacist states that the insurance would not cover the 200 mg tablets but would cover the 100 mg tablet. I sent new prescription to the pharmacy at this time. I called and left patient a detailed message about new prescription and that insurance should cover the medication this time and that if she had any further questions to call me back. Electronically signed by Lanny Valente RN on 5/29/2024 at 10:48 AM

## 2024-05-30 LAB — CANCER AG27-29 SERPL-ACNC: 37.5 U/ML

## 2024-06-20 ENCOUNTER — HOSPITAL ENCOUNTER (OUTPATIENT)
Dept: CT IMAGING | Age: 59
Discharge: HOME | End: 2024-06-20
Payer: MEDICARE

## 2024-06-20 DIAGNOSIS — C50.011 CARCINOMA OF AREOLA OF RIGHT BREAST IN FEMALE, UNSPECIFIED ESTROGEN RECEPTOR STATUS (HCC): ICD-10-CM

## 2024-06-20 PROCEDURE — 74177 CT ABD & PELVIS W/CONTRAST: CPT

## 2024-06-20 PROCEDURE — 71260 CT THORAX DX C+: CPT

## 2024-06-20 PROCEDURE — 6360000004 HC RX CONTRAST MEDICATION: Performed by: INTERNAL MEDICINE

## 2024-06-20 RX ADMIN — IOPAMIDOL 75 ML: 755 INJECTION, SOLUTION INTRAVENOUS at 12:07

## 2024-06-24 ENCOUNTER — TELEPHONE (OUTPATIENT)
Dept: HEMATOLOGY | Age: 59
End: 2024-06-24

## 2024-06-24 DIAGNOSIS — C78.00 CARCINOMA OF BREAST METASTATIC TO LUNG, UNSPECIFIED LATERALITY (HCC): ICD-10-CM

## 2024-06-24 DIAGNOSIS — C50.919 CARCINOMA OF BREAST METASTATIC TO LUNG, UNSPECIFIED LATERALITY (HCC): ICD-10-CM

## 2024-06-24 DIAGNOSIS — C50.011 CARCINOMA OF AREOLA OF RIGHT BREAST IN FEMALE, UNSPECIFIED ESTROGEN RECEPTOR STATUS (HCC): Primary | ICD-10-CM

## 2024-06-24 NOTE — TELEPHONE ENCOUNTER
I called and reminded pt. of their appt. on 06/26/2024. Made pt. aware to arrive at appt. time & not lab appt. time. I also advised pt. to arrive no sooner than appt time. Pt voiced understanding and confirmed appt. date and time.

## 2024-06-24 NOTE — PROGRESS NOTES
pelvis  06/27/2018-09/17/2018- Gemzar  09/28/2018- CT Pulmonary Multiple lung mets, mild bilateral hilar and mediastinal lymph nodes  10/10/2018-12/21/2020- Faslodex every 4 weeks  12/14/2018- CT Chest showed definite decrease in the maximal diameter and volume of all left and right metastatic nodules. Somewhat enlarged paratracheal and left axillary lymph nodes are relatively unchanged.   04/22/2019- MRI Cervical Spine W WO Contrast Unremarkable  04/22/2019- MRI Brain W WO Contrast No enhancing lesions in the brain and no evidence of metastatic disease.  07/27/2019- CTA Pulmonary showed essentially complete disappearance of the pulmonary metastatic disease. Several tiny flat peripheral nodules are the only sequela. The tiny ones on the right are unchanged, the tiny ones on the left are even smaller. There is no longer any active metastatic disease in either lung.   12/19/2019- CT Chest/Abdomen/Pelvis Small, tiny subpleural nodules right upper and right midlung field as well as left lung base has remained unchanged. No new focal parenchymal abnormality seen. No adenopathy seen. There is no abdominal or pelvic mass, adenopathy or fluid collection. No lytic or sclerotic bony lesions, but there is a possibility of osteopenia and/or osteoporosis.   02/17/2020- DEXA Bone Density showed osteopenia of lumbar spine, T-score -1.8, and left femoral neck, T-score -2.0  2/25/21 Ct Abdomen Pelvis W Iv Contrast  No evidence of metastatic disease in the abdomen or pelvis.   2/26/21 Ct Chest W Contrast Tiny nodules in the right lung described above probably represent small foci of pleural thickening due to chronic inflammatory process or small noncalcified granulomas. No features to suggest malignancy or metastatic disease. Bilateral breast implants without complication.   3/1/2021-discussed the results CT chest abdomen pelvis.  Essentially no clear evidence of metastatic disease.  This is consistent with excellent response to

## 2024-06-26 ENCOUNTER — OFFICE VISIT (OUTPATIENT)
Dept: HEMATOLOGY | Age: 59
End: 2024-06-26
Payer: MEDICARE

## 2024-06-26 ENCOUNTER — HOSPITAL ENCOUNTER (OUTPATIENT)
Dept: INFUSION THERAPY | Age: 59
Discharge: HOME | End: 2024-06-26
Payer: MEDICARE

## 2024-06-26 VITALS
WEIGHT: 183.5 LBS | OXYGEN SATURATION: 98 % | HEART RATE: 87 BPM | TEMPERATURE: 98.1 F | BODY MASS INDEX: 29.49 KG/M2 | HEIGHT: 66 IN | DIASTOLIC BLOOD PRESSURE: 78 MMHG | SYSTOLIC BLOOD PRESSURE: 118 MMHG

## 2024-06-26 DIAGNOSIS — C78.00 CARCINOMA OF BREAST METASTATIC TO LUNG, UNSPECIFIED LATERALITY (HCC): ICD-10-CM

## 2024-06-26 DIAGNOSIS — C50.011 CARCINOMA OF AREOLA OF RIGHT BREAST IN FEMALE, UNSPECIFIED ESTROGEN RECEPTOR STATUS (HCC): ICD-10-CM

## 2024-06-26 DIAGNOSIS — C50.919 CARCINOMA OF BREAST METASTATIC TO LUNG, UNSPECIFIED LATERALITY (HCC): ICD-10-CM

## 2024-06-26 DIAGNOSIS — Z71.89 CARE PLAN DISCUSSED WITH PATIENT: ICD-10-CM

## 2024-06-26 DIAGNOSIS — T45.1X5A CHEMOTHERAPY INDUCED NAUSEA AND VOMITING: ICD-10-CM

## 2024-06-26 DIAGNOSIS — D50.8 OTHER IRON DEFICIENCY ANEMIA: Primary | ICD-10-CM

## 2024-06-26 DIAGNOSIS — M10.071 ACUTE IDIOPATHIC GOUT OF RIGHT ANKLE: Primary | ICD-10-CM

## 2024-06-26 DIAGNOSIS — K52.1 CHEMOTHERAPY INDUCED DIARRHEA: ICD-10-CM

## 2024-06-26 DIAGNOSIS — G62.0 CHEMOTHERAPY-INDUCED NEUROPATHY (HCC): ICD-10-CM

## 2024-06-26 DIAGNOSIS — K52.1 DRUG-INDUCED DIARRHEA: ICD-10-CM

## 2024-06-26 DIAGNOSIS — R63.4 WEIGHT LOSS: ICD-10-CM

## 2024-06-26 DIAGNOSIS — T45.1X5A CHEMOTHERAPY-INDUCED NEUROPATHY (HCC): ICD-10-CM

## 2024-06-26 DIAGNOSIS — R53.1 WEAKNESS: ICD-10-CM

## 2024-06-26 DIAGNOSIS — T45.1X5A CHEMOTHERAPY INDUCED DIARRHEA: ICD-10-CM

## 2024-06-26 DIAGNOSIS — E53.8 VITAMIN B 12 DEFICIENCY: ICD-10-CM

## 2024-06-26 DIAGNOSIS — R53.83 OTHER FATIGUE: ICD-10-CM

## 2024-06-26 DIAGNOSIS — Z79.811 ENCOUNTER FOR MONITORING AROMATASE INHIBITOR THERAPY: ICD-10-CM

## 2024-06-26 DIAGNOSIS — R11.2 CHEMOTHERAPY INDUCED NAUSEA AND VOMITING: ICD-10-CM

## 2024-06-26 DIAGNOSIS — Z51.81 ENCOUNTER FOR MONITORING AROMATASE INHIBITOR THERAPY: ICD-10-CM

## 2024-06-26 LAB
ALBUMIN SERPL-MCNC: 4.2 G/DL (ref 3.5–5.2)
ALP SERPL-CCNC: 80 U/L (ref 35–104)
ALT SERPL-CCNC: 13 U/L (ref 9–52)
ANION GAP SERPL CALCULATED.3IONS-SCNC: 9 MMOL/L (ref 7–19)
AST SERPL-CCNC: 25 U/L (ref 14–36)
BASOPHILS # BLD: 0.01 K/UL (ref 0.01–0.08)
BASOPHILS NFR BLD: 0.3 % (ref 0.1–1.2)
BILIRUB SERPL-MCNC: 0.8 MG/DL (ref 0.2–1.3)
BUN SERPL-MCNC: 13 MG/DL (ref 7–17)
CA 15-3: 27 U/ML (ref 0–25)
CALCIUM SERPL-MCNC: 8.8 MG/DL (ref 8.4–10.2)
CEA SERPL-MCNC: 2.2 NG/ML (ref 0–4.7)
CHLORIDE SERPL-SCNC: 109 MMOL/L (ref 98–111)
CO2 SERPL-SCNC: 28 MMOL/L (ref 22–29)
CREAT SERPL-MCNC: 0.8 MG/DL (ref 0.5–1)
EOSINOPHIL # BLD: 0.02 K/UL (ref 0.04–0.54)
EOSINOPHIL NFR BLD: 0.5 % (ref 0.7–7)
ERYTHROCYTE [DISTWIDTH] IN BLOOD BY AUTOMATED COUNT: 16.5 % (ref 11.7–14.4)
GLOBULIN: 2.7 G/DL
GLUCOSE SERPL-MCNC: 106 MG/DL (ref 74–106)
HCT VFR BLD AUTO: 35.4 % (ref 34.1–44.9)
HGB BLD-MCNC: 11.8 G/DL (ref 11.2–15.7)
LYMPHOCYTES # BLD: 1.12 K/UL (ref 1.18–3.74)
LYMPHOCYTES NFR BLD: 30.4 % (ref 19.3–53.1)
MCH RBC QN AUTO: 33.9 PG (ref 25.6–32.2)
MCHC RBC AUTO-ENTMCNC: 33.3 G/DL (ref 32.3–35.5)
MCV RBC AUTO: 101.7 FL (ref 79.4–94.8)
MONOCYTES # BLD: 0.39 K/UL (ref 0.24–0.82)
MONOCYTES NFR BLD: 10.6 % (ref 4.7–12.5)
NEUTROPHILS # BLD: 2.14 K/UL (ref 1.56–6.13)
NEUTS SEG NFR BLD: 57.9 % (ref 34–71.1)
PLATELET # BLD AUTO: 290 K/UL (ref 182–369)
PMV BLD AUTO: 8.6 FL (ref 7.4–10.4)
POTASSIUM SERPL-SCNC: 3.8 MMOL/L (ref 3.5–5.1)
PROT SERPL-MCNC: 7 G/DL (ref 6.3–8.2)
RBC # BLD AUTO: 3.48 M/UL (ref 3.93–5.22)
SODIUM SERPL-SCNC: 146 MMOL/L (ref 137–145)
WBC # BLD AUTO: 3.69 K/UL (ref 3.98–10.04)

## 2024-06-26 PROCEDURE — G2211 COMPLEX E/M VISIT ADD ON: HCPCS | Performed by: INTERNAL MEDICINE

## 2024-06-26 PROCEDURE — 3017F COLORECTAL CA SCREEN DOC REV: CPT | Performed by: INTERNAL MEDICINE

## 2024-06-26 PROCEDURE — 85025 COMPLETE CBC W/AUTO DIFF WBC: CPT

## 2024-06-26 PROCEDURE — G8427 DOCREV CUR MEDS BY ELIG CLIN: HCPCS | Performed by: INTERNAL MEDICINE

## 2024-06-26 PROCEDURE — 1036F TOBACCO NON-USER: CPT | Performed by: INTERNAL MEDICINE

## 2024-06-26 PROCEDURE — 99212 OFFICE O/P EST SF 10 MIN: CPT

## 2024-06-26 PROCEDURE — 6360000002 HC RX W HCPCS: Performed by: INTERNAL MEDICINE

## 2024-06-26 PROCEDURE — 36415 COLL VENOUS BLD VENIPUNCTURE: CPT

## 2024-06-26 PROCEDURE — 99214 OFFICE O/P EST MOD 30 MIN: CPT | Performed by: INTERNAL MEDICINE

## 2024-06-26 PROCEDURE — G8417 CALC BMI ABV UP PARAM F/U: HCPCS | Performed by: INTERNAL MEDICINE

## 2024-06-26 PROCEDURE — 80053 COMPREHEN METABOLIC PANEL: CPT

## 2024-06-26 PROCEDURE — 96372 THER/PROPH/DIAG INJ SC/IM: CPT

## 2024-06-26 RX ORDER — DIPHENOXYLATE HYDROCHLORIDE AND ATROPINE SULFATE 2.5; .025 MG/1; MG/1
1 TABLET ORAL EVERY 6 HOURS PRN
Qty: 120 TABLET | Refills: 0 | Status: SHIPPED | OUTPATIENT
Start: 2024-06-26 | End: 2024-07-26

## 2024-06-26 RX ORDER — CYANOCOBALAMIN 1000 UG/ML
1000 INJECTION, SOLUTION INTRAMUSCULAR; SUBCUTANEOUS ONCE
OUTPATIENT
Start: 2024-07-24 | End: 2024-07-24

## 2024-06-26 RX ORDER — CYANOCOBALAMIN 1000 UG/ML
1000 INJECTION, SOLUTION INTRAMUSCULAR; SUBCUTANEOUS ONCE
Status: COMPLETED | OUTPATIENT
Start: 2024-06-26 | End: 2024-06-26

## 2024-06-26 RX ADMIN — CYANOCOBALAMIN 1000 MCG: 1000 INJECTION INTRAMUSCULAR; SUBCUTANEOUS at 09:32

## 2024-06-27 LAB — CANCER AG27-29 SERPL-ACNC: 35.3 U/ML

## 2024-07-30 ENCOUNTER — HOSPITAL ENCOUNTER (EMERGENCY)
Age: 59
Discharge: HOME | End: 2024-07-30
Attending: EMERGENCY MEDICINE
Payer: MEDICARE

## 2024-07-30 ENCOUNTER — TELEPHONE (OUTPATIENT)
Dept: INFUSION THERAPY | Age: 59
End: 2024-07-30

## 2024-07-30 VITALS
DIASTOLIC BLOOD PRESSURE: 93 MMHG | TEMPERATURE: 98 F | HEART RATE: 86 BPM | OXYGEN SATURATION: 98 % | RESPIRATION RATE: 20 BRPM | SYSTOLIC BLOOD PRESSURE: 140 MMHG

## 2024-07-30 DIAGNOSIS — L03.124 ACUTE LYMPHANGITIS OF LEFT UPPER EXTREMITY: Primary | ICD-10-CM

## 2024-07-30 DIAGNOSIS — T63.304A SPIDER BITE WOUND, UNDETERMINED INTENT, INITIAL ENCOUNTER: ICD-10-CM

## 2024-07-30 LAB
ALBUMIN SERPL-MCNC: 4.4 G/DL (ref 3.5–5.2)
ALP SERPL-CCNC: 83 U/L (ref 35–104)
ALT SERPL-CCNC: 7 U/L (ref 5–33)
ANION GAP SERPL CALCULATED.3IONS-SCNC: 19 MMOL/L (ref 7–19)
AST SERPL-CCNC: 15 U/L (ref 5–32)
BASOPHILS # BLD: 0 K/UL (ref 0–0.2)
BASOPHILS NFR BLD: 0.7 % (ref 0–1)
BILIRUB SERPL-MCNC: 0.5 MG/DL (ref 0.2–1.2)
BUN SERPL-MCNC: 10 MG/DL (ref 6–20)
CALCIUM SERPL-MCNC: 9.2 MG/DL (ref 8.6–10)
CHLORIDE SERPL-SCNC: 100 MMOL/L (ref 98–111)
CO2 SERPL-SCNC: 22 MMOL/L (ref 22–29)
CREAT SERPL-MCNC: 0.6 MG/DL (ref 0.5–0.9)
CRP SERPL HS-MCNC: 2.88 MG/DL (ref 0–0.5)
EOSINOPHIL # BLD: 0 K/UL (ref 0–0.6)
EOSINOPHIL NFR BLD: 0.5 % (ref 0–5)
ERYTHROCYTE [DISTWIDTH] IN BLOOD BY AUTOMATED COUNT: 14.3 % (ref 11.5–14.5)
ERYTHROCYTE [SEDIMENTATION RATE] IN BLOOD BY WESTERGREN METHOD: 58 MM/HR (ref 0–25)
GLUCOSE SERPL-MCNC: 76 MG/DL (ref 74–109)
HCT VFR BLD AUTO: 34.7 % (ref 37–47)
HGB BLD-MCNC: 11.3 G/DL (ref 12–16)
IMM GRANULOCYTES # BLD: 0 K/UL
LACTATE BLDV-SCNC: 0.8 MG/DL (ref 0.5–1.9)
LYMPHOCYTES # BLD: 1.2 K/UL (ref 1.1–4.5)
LYMPHOCYTES NFR BLD: 28.5 % (ref 20–40)
MCH RBC QN AUTO: 33.4 PG (ref 27–31)
MCHC RBC AUTO-ENTMCNC: 32.6 G/DL (ref 33–37)
MCV RBC AUTO: 102.7 FL (ref 81–99)
MONOCYTES # BLD: 0.2 K/UL (ref 0–0.9)
MONOCYTES NFR BLD: 5.6 % (ref 0–10)
NEUTROPHILS # BLD: 2.7 K/UL (ref 1.5–7.5)
NEUTS SEG NFR BLD: 64.5 % (ref 50–65)
PLATELET # BLD AUTO: 195 K/UL (ref 130–400)
PMV BLD AUTO: 9.1 FL (ref 9.4–12.3)
POTASSIUM SERPL-SCNC: 3.5 MMOL/L (ref 3.5–5)
PROT SERPL-MCNC: 7.6 G/DL (ref 6.6–8.7)
RBC # BLD AUTO: 3.38 M/UL (ref 4.2–5.4)
SODIUM SERPL-SCNC: 141 MMOL/L (ref 136–145)
WBC # BLD AUTO: 4.1 K/UL (ref 4.8–10.8)

## 2024-07-30 PROCEDURE — 96365 THER/PROPH/DIAG IV INF INIT: CPT

## 2024-07-30 PROCEDURE — 2580000003 HC RX 258

## 2024-07-30 PROCEDURE — 99284 EMERGENCY DEPT VISIT MOD MDM: CPT

## 2024-07-30 PROCEDURE — 85652 RBC SED RATE AUTOMATED: CPT

## 2024-07-30 PROCEDURE — 87040 BLOOD CULTURE FOR BACTERIA: CPT

## 2024-07-30 PROCEDURE — 2500000003 HC RX 250 WO HCPCS

## 2024-07-30 PROCEDURE — 80053 COMPREHEN METABOLIC PANEL: CPT

## 2024-07-30 PROCEDURE — 96375 TX/PRO/DX INJ NEW DRUG ADDON: CPT

## 2024-07-30 PROCEDURE — 83605 ASSAY OF LACTIC ACID: CPT

## 2024-07-30 PROCEDURE — 36415 COLL VENOUS BLD VENIPUNCTURE: CPT

## 2024-07-30 PROCEDURE — 85025 COMPLETE CBC W/AUTO DIFF WBC: CPT

## 2024-07-30 PROCEDURE — 6360000002 HC RX W HCPCS

## 2024-07-30 PROCEDURE — 86140 C-REACTIVE PROTEIN: CPT

## 2024-07-30 RX ORDER — DIPHENHYDRAMINE HYDROCHLORIDE 50 MG/ML
12.5 INJECTION INTRAMUSCULAR; INTRAVENOUS ONCE
Status: COMPLETED | OUTPATIENT
Start: 2024-07-30 | End: 2024-07-30

## 2024-07-30 RX ORDER — METHYLPREDNISOLONE 4 MG/1
TABLET ORAL
Qty: 1 KIT | Refills: 0 | Status: SHIPPED | OUTPATIENT
Start: 2024-07-30 | End: 2024-08-05

## 2024-07-30 RX ORDER — DOXYCYCLINE HYCLATE 100 MG
100 TABLET ORAL 2 TIMES DAILY
Qty: 14 TABLET | Refills: 0 | Status: SHIPPED | OUTPATIENT
Start: 2024-07-30 | End: 2024-08-06

## 2024-07-30 RX ADMIN — WATER 1000 MG: 1 INJECTION INTRAMUSCULAR; INTRAVENOUS; SUBCUTANEOUS at 19:29

## 2024-07-30 RX ADMIN — DIPHENHYDRAMINE HYDROCHLORIDE 12.5 MG: 50 INJECTION INTRAMUSCULAR; INTRAVENOUS at 19:27

## 2024-07-30 RX ADMIN — DOXYCYCLINE 100 MG: 100 INJECTION, POWDER, LYOPHILIZED, FOR SOLUTION INTRAVENOUS at 19:36

## 2024-07-30 ASSESSMENT — ENCOUNTER SYMPTOMS: SHORTNESS OF BREATH: 0

## 2024-07-30 NOTE — ED PROVIDER NOTES
[facility] EMERGENCY DEPT  EMERGENCY DEPARTMENT ENCOUNTER      Pt Name: Yandy Dickinson  MRN: [id number]  Birthdate 1965  Date of evaluation: 7/30/2024  Provider: Lance Velasquez Jr, MD    CHIEF COMPLAINT       Chief Complaint   Patient presents with    Abscess     Pt notes wound with redness and swelling on left arm         PHYSICAL EXAM    (up to 7 for level 4, 8 or more for level 5)     ED Triage Vitals   BP Temp Temp src Pulse Respirations SpO2 Height Weight   07/30/24 1815 07/30/24 1813 -- 07/30/24 1813 07/30/24 1813 07/30/24 1813 -- --   (!) 127/103 98.1 °F (36.7 °C)  95 14 98 %         Physical Exam    DIAGNOSTIC RESULTS     EKG: All EKG's are interpreted by the Emergency Department Physician who either signs or Co-signs this chart in the absence of a cardiologist.        RADIOLOGY:   Non-plain film images such as CT, Ultrasound and MRI are read by the radiologist. Plain radiographicimages are visualized and preliminarily interpreted by the emergency physician with the below findings:        No orders to display           LABS:  Labs Reviewed - No data to display    All other labs were within normal range or not returned as of this dictation.    EMERGENCY DEPARTMENT COURSE and DIFFERENTIALDIAGNOSIS/MDM:   Vitals:    Vitals:    07/30/24 1813 07/30/24 1815   BP:  (!) 127/103   Pulse: 95    Resp: 14    Temp: 98.1 °F (36.7 °C)    SpO2: 98%        MDM      Reassessment    ED Course as of 07/30/24 1829 Tue Jul 30, 2024 1827 Patient seen along with nurse practitioner for evaluation after suspected spider bite.  Says she noticed this spot 2 days ago after she woke up.  Initially had a spot on the forearm, now spreading up to the left axilla with streaking consistent with lymphangitis.  Patient also on hemotherapy treatment for cancer, followed by Dr. Jim.  Plan for labs and antibiotics along with steroids for cellulitis versus inflammatory response.  On evaluation, there is no underlying abscess or drainable fluid

## 2024-07-30 NOTE — TELEPHONE ENCOUNTER
Pt called stating that she thinks she has a brown recluse spider bite.  Her arm is swollen and the redness is spreading, it is sore to the touch and it has a black center.  She is at  with a friend that is going to the recovery room after surgery.  Yandy will go to the ER as soon as she can.

## 2024-07-30 NOTE — ED PROVIDER NOTES
[facility] EMERGENCY DEPT  EMERGENCY DEPARTMENT ENCOUNTER      Pt Name: Yandy Dickinson  MRN: [id number]  Birthdate 1965  Date of evaluation: 7/30/2024  Provider: BRITANY Hamlin CNP    CHIEF COMPLAINT       Chief Complaint   Patient presents with    Abscess     Pt notes wound with redness and swelling on left arm     HISTORY OF PRESENT ILLNESS   (Location/Symptom, Timing/Onset,Context/Setting, Quality, Duration, Modifying Factors, Severity)  Note limiting factors.     Yandy Dickinson is a 59 y.o. female with breast cancer with metastasis to lung currently undergoing treatment followed by Dr. Jim, RA, DVT, RUDY who presents to the emergency department for evaluation of possible spider bite.  Patient states that she woke up 2 days ago with a bite on her left forearm.  She has noticed spiders in her apartment previously.  States that yesterday, the swelling began to worsen.  When she woke up today, she noticed erythema and pain/itching to the site.  Erythema appears to be spreading proximally.  She also endorses subjective fever, chills, and malaise.    The history is provided by the patient.     NursingNotes were reviewed.    REVIEW OF SYSTEMS    (2-9 systems for level 4, 10 or more for level 5)     Review of Systems   Constitutional:  Positive for chills, fatigue and fever (Subjective).   Respiratory:  Negative for shortness of breath.    Cardiovascular:  Negative for chest pain.   Musculoskeletal:  Positive for myalgias.   Skin:  Positive for wound.       A complete review of systems was performed and is negative except as noted above in the HPI.       PAST MEDICAL HISTORY     Past Medical History:   Diagnosis Date    Asthma     Breast cancer (HCC)     Breast cancer with lung mets     Chronic back pain     GERD (gastroesophageal reflux disease)     Hx of blood clots     Hypertension     Lung cancer (HCC)     Neuropathy     Post chemo treatment neuropathy         SURGICAL HISTORY       Past Surgical History:

## 2024-08-01 ENCOUNTER — TELEPHONE (OUTPATIENT)
Dept: HEMATOLOGY | Age: 59
End: 2024-08-01

## 2024-08-01 ENCOUNTER — OFFICE VISIT (OUTPATIENT)
Dept: INTERNAL MEDICINE | Age: 59
End: 2024-08-01
Payer: MEDICARE

## 2024-08-01 VITALS
BODY MASS INDEX: 27.8 KG/M2 | WEIGHT: 173 LBS | HEART RATE: 94 BPM | OXYGEN SATURATION: 96 % | SYSTOLIC BLOOD PRESSURE: 120 MMHG | HEIGHT: 66 IN | DIASTOLIC BLOOD PRESSURE: 78 MMHG

## 2024-08-01 DIAGNOSIS — G89.4 CHRONIC PAIN SYNDROME: Primary | ICD-10-CM

## 2024-08-01 DIAGNOSIS — M06.061 RHEUMATOID ARTHRITIS INVOLVING BOTH KNEES WITH NEGATIVE RHEUMATOID FACTOR (HCC): ICD-10-CM

## 2024-08-01 DIAGNOSIS — M06.062 RHEUMATOID ARTHRITIS INVOLVING BOTH KNEES WITH NEGATIVE RHEUMATOID FACTOR (HCC): ICD-10-CM

## 2024-08-01 DIAGNOSIS — L98.492 CHEST WALL ULCER, WITH FAT LAYER EXPOSED (HCC): ICD-10-CM

## 2024-08-01 DIAGNOSIS — I82.722 CHRONIC DEEP VEIN THROMBOSIS (DVT) OF LEFT UPPER EXTREMITY, UNSPECIFIED VEIN (HCC): ICD-10-CM

## 2024-08-01 DIAGNOSIS — T63.304D SPIDER BITE WOUND, UNDETERMINED INTENT, SUBSEQUENT ENCOUNTER: ICD-10-CM

## 2024-08-01 PROBLEM — T63.301A SPIDER BITE: Status: ACTIVE | Noted: 2024-08-01

## 2024-08-01 PROCEDURE — G8417 CALC BMI ABV UP PARAM F/U: HCPCS | Performed by: INTERNAL MEDICINE

## 2024-08-01 PROCEDURE — 3017F COLORECTAL CA SCREEN DOC REV: CPT | Performed by: INTERNAL MEDICINE

## 2024-08-01 PROCEDURE — 1036F TOBACCO NON-USER: CPT | Performed by: INTERNAL MEDICINE

## 2024-08-01 PROCEDURE — 99204 OFFICE O/P NEW MOD 45 MIN: CPT | Performed by: INTERNAL MEDICINE

## 2024-08-01 PROCEDURE — G8427 DOCREV CUR MEDS BY ELIG CLIN: HCPCS | Performed by: INTERNAL MEDICINE

## 2024-08-01 RX ORDER — DOXYCYCLINE HYCLATE 100 MG
100 TABLET ORAL 2 TIMES DAILY
Qty: 8 TABLET | Refills: 0 | Status: SHIPPED | OUTPATIENT
Start: 2024-08-01 | End: 2024-08-05

## 2024-08-01 SDOH — HEALTH STABILITY: PHYSICAL HEALTH: ON AVERAGE, HOW MANY DAYS PER WEEK DO YOU ENGAGE IN MODERATE TO STRENUOUS EXERCISE (LIKE A BRISK WALK)?: 0 DAYS

## 2024-08-01 ASSESSMENT — ENCOUNTER SYMPTOMS
EYE ITCHING: 0
EYE DISCHARGE: 0
TROUBLE SWALLOWING: 0
SORE THROAT: 0
SINUS PRESSURE: 0
BACK PAIN: 1
NAUSEA: 0
ABDOMINAL DISTENTION: 0
VOMITING: 0
SHORTNESS OF BREATH: 0
BLOOD IN STOOL: 0
ABDOMINAL PAIN: 0
WHEEZING: 0

## 2024-08-01 NOTE — TELEPHONE ENCOUNTER
Called Patient and reminded patient of their appointment on 08/05/2024 and patient confirmed they would be here. Reminded patient to just come at appointment time, and to not come at the lab appointment time. Reminded patient that we will not check them in any more than 30 minutes before appointment time.  We have now moved to the [facility] [place] that is located between our old office and the ER at the [facility]

## 2024-08-01 NOTE — PROGRESS NOTES
Endoscopy    COSMETIC SURGERY  2016    Bilateral breast implants    EMBOLECTOMY Left 9/26/2021    LEFT UPPER EXTREMITY  MECHANICAL SUCTION THROMBECTOMY performed by Zully Tovar DO at [facility] OR    HYSTERECTOMY (CERVIX STATUS UNKNOWN)      HYSTERECTOMY, TOTAL ABDOMINAL (CERVIX REMOVED)  2015    MASTECTOMY, BILATERAL      Right 2013 & Left 2016    SCAR REVISION Left 5/10/2022    SCAR REVISION LEFT PORT SITE performed by Alejandro Garsia MD at [facility] OR    TUNNELED VENOUS PORT PLACEMENT      VASCULAR SURGERY N/A 10/6/2021    REMOVAL OF MEDIPORT performed by Zully Tovar DO at [facility] OR       Family History   Problem Relation Age of Onset    High Blood Pressure Mother     Hypertension Mother     Obesity Mother     High Blood Pressure Father     Prostate Cancer Father     No Known Problems Sister     No Known Problems Daughter     No Known Problems Daughter        Social History     Tobacco Use    Smoking status: Never    Smokeless tobacco: Never   Substance Use Topics    Alcohol use: Yes     Comment: occ      Current Outpatient Medications   Medication Sig Dispense Refill    doxycycline hyclate (VIBRA-TABS) 100 MG tablet Take 1 tablet by mouth 2 times daily for 7 days 14 tablet 0    methylPREDNISolone (MEDROL DOSEPACK) 4 MG tablet Take by mouth as directed. 1 kit 0    allopurinol (ZYLOPRIM) 100 MG tablet Take 2 tablets by mouth daily 60 tablet 3    exemestane (AROMASIN) 25 MG tablet TAKE 1 TABLET BY MOUTH EVERY DAY 90 tablet 1    promethazine (PHENERGAN) 25 MG tablet Take 1 tablet by mouth every 6 hours as needed for Nausea 100 tablet 5    Respiratory Therapy Supplies (NEBULIZER/TUBING/MOUTHPIECE) KIT 1 kit by Does not apply route daily as needed (wheezing) 1 kit 2    ipratropium 0.5 mg-albuterol 2.5 mg (DUONEB) 0.5-2.5 (3) MG/3ML SOLN nebulizer solution Inhale 3 mLs into the lungs every 4 hours 120 mL 5    palbociclib (IBRANCE) 100 MG capsule Take 100 mg daily, days 1-21 every 28 days (Patient taking differently:

## 2024-08-02 DIAGNOSIS — C50.919 CARCINOMA OF BREAST METASTATIC TO LUNG, UNSPECIFIED LATERALITY (HCC): Primary | ICD-10-CM

## 2024-08-02 DIAGNOSIS — C78.00 CARCINOMA OF BREAST METASTATIC TO LUNG, UNSPECIFIED LATERALITY (HCC): Primary | ICD-10-CM

## 2024-08-02 NOTE — PROGRESS NOTES
injections today 1000mcg with Faslodex.  3/1/2021- Methylmalonic Acid 171, Vitamin B12 >2000. B12 replaced monthly.  10/10/2021-vitamin B12 154.  She is currently receiving B12 replacement.    Chemotherapy-induced neuropathy-continue gabapentin.    COVID-19 vaccination response-patient had covid 19 Jan 2022.    History of left upper extremity DVT/left upper chest port, October 2021-US upper extremity showed acute DVT likely related to port Oct 2021.   -Currently on Eliquis. S/p Mediport removal.  -US Jan 2022,LUE: Chronic appearing deep vein thrombosis (DVT) is seen in the axillary  brachial , left upper extremity(ies).  Subacute appearing superficial thrombophlebitis (SVT) is seen in the  basilic vein, left upper extremity(ies  Off Eliquis.    Left arm cellulitis-Brown recluse  =>08/05/24  C/w doxycycline x 7 days       PLAN:  RTC with MD in 7 weeks   CBC CMP CA 15-3 CA 27.29 CEA today  Continue B12 today and every 4 weeks  Repeat CT Chest,Abdomen,Pelvis Scans in 6 weeks   Continue Lomotil 2.5 mg every 6 hours as needed  Continue follow-up with PCP routinely  Continue Eliquis 5 mg BID for history of DVT left upper extremity  Continue follow-up with /Pulmonology, 10/7/24  Continue follow-up with Dr. Alejandro Garsia/General Surgery, 11/16/24  Continue Aromasin 25mg  daily   Continue Ibrance 100mg D1-21 every 28 days  Continue Phenergan 25mg every 6 hrs as needed  Continue Allopurinol 200 mg daily      Follow Up:  Return in about 7 weeks (around 9/20/2024) for CBC, CMP, Appointment with Dr. Jim/after scans.   Sched CT C/A/P in Sept      I, Miroslava Caro am pre charting  as Medical Assistant for Kassi Jim MD. Electronically signed by Miroslava Caro MA on 8/5/2024 at 11:43 AM CDT.    I have seen, examined and reviewed this patient medication list, appropriate labs and imaging studies. I reviewed relevant medical records and others physician’s notes. I discussed the plans of care with the

## 2024-08-04 LAB
BACTERIA BLD CULT ORG #2: NORMAL
BACTERIA BLD CULT: NORMAL

## 2024-08-05 ENCOUNTER — OFFICE VISIT (OUTPATIENT)
Dept: HEMATOLOGY | Age: 59
End: 2024-08-05
Payer: MEDICARE

## 2024-08-05 ENCOUNTER — HOSPITAL ENCOUNTER (OUTPATIENT)
Dept: INFUSION THERAPY | Age: 59
Discharge: HOME | End: 2024-08-05
Payer: MEDICARE

## 2024-08-05 VITALS
SYSTOLIC BLOOD PRESSURE: 118 MMHG | HEART RATE: 71 BPM | OXYGEN SATURATION: 98 % | BODY MASS INDEX: 27.48 KG/M2 | WEIGHT: 171 LBS | DIASTOLIC BLOOD PRESSURE: 68 MMHG | HEIGHT: 66 IN | TEMPERATURE: 97.9 F

## 2024-08-05 DIAGNOSIS — C78.00 CARCINOMA OF BREAST METASTATIC TO LUNG, UNSPECIFIED LATERALITY (HCC): ICD-10-CM

## 2024-08-05 DIAGNOSIS — Z71.89 CARE PLAN DISCUSSED WITH PATIENT: Primary | ICD-10-CM

## 2024-08-05 DIAGNOSIS — E53.8 VITAMIN B 12 DEFICIENCY: ICD-10-CM

## 2024-08-05 DIAGNOSIS — L03.114 CELLULITIS OF LEFT ARM: ICD-10-CM

## 2024-08-05 DIAGNOSIS — C50.919 CARCINOMA OF BREAST METASTATIC TO LUNG, UNSPECIFIED LATERALITY (HCC): ICD-10-CM

## 2024-08-05 DIAGNOSIS — D50.8 OTHER IRON DEFICIENCY ANEMIA: Primary | ICD-10-CM

## 2024-08-05 DIAGNOSIS — T45.1X5S ANTINEOPLASTIC DRUGS CAUSING ADVERSE EFFECT, SEQUELA: ICD-10-CM

## 2024-08-05 DIAGNOSIS — T63.331D BROWN RECLUSE SPIDER BITE OR STING, ACCIDENTAL OR UNINTENTIONAL, SUBSEQUENT ENCOUNTER: ICD-10-CM

## 2024-08-05 LAB
ALBUMIN SERPL-MCNC: 3.9 G/DL (ref 3.5–5.2)
ALP SERPL-CCNC: 73 U/L (ref 35–104)
ALT SERPL-CCNC: 10 U/L (ref 5–33)
ANION GAP SERPL CALCULATED.3IONS-SCNC: 15 MMOL/L (ref 7–19)
AST SERPL-CCNC: 21 U/L (ref 5–32)
BASOPHILS # BLD: 0.02 K/UL (ref 0.01–0.08)
BASOPHILS NFR BLD: 0.4 % (ref 0.1–1.2)
BILIRUB SERPL-MCNC: 0.6 MG/DL (ref 0–1.2)
BUN SERPL-MCNC: 21 MG/DL (ref 6–20)
CA 15-3: 23 U/ML (ref 0–25)
CALCIUM SERPL-MCNC: 8.5 MG/DL (ref 8.6–10)
CEA SERPL-MCNC: 1.8 NG/ML (ref 0–4.7)
CHLORIDE SERPL-SCNC: 103 MMOL/L (ref 98–107)
CO2 SERPL-SCNC: 23 MMOL/L (ref 22–29)
CREAT SERPL-MCNC: 0.8 MG/DL (ref 0.5–0.9)
EOSINOPHIL # BLD: 0 K/UL (ref 0.04–0.54)
EOSINOPHIL NFR BLD: 0 % (ref 0.7–7)
ERYTHROCYTE [DISTWIDTH] IN BLOOD BY AUTOMATED COUNT: 13.7 % (ref 11.7–14.4)
GLUCOSE SERPL-MCNC: 91 MG/DL (ref 70–99)
HCT VFR BLD AUTO: 33 % (ref 34.1–44.9)
HGB BLD-MCNC: 11.4 G/DL (ref 11.2–15.7)
LYMPHOCYTES # BLD: 1.47 K/UL (ref 1.18–3.74)
LYMPHOCYTES NFR BLD: 31.2 % (ref 19.3–53.1)
MCH RBC QN AUTO: 34.2 PG (ref 25.6–32.2)
MCHC RBC AUTO-ENTMCNC: 34.5 G/DL (ref 32.3–35.5)
MCV RBC AUTO: 99.1 FL (ref 79.4–94.8)
MONOCYTES # BLD: 0.5 K/UL (ref 0.24–0.82)
MONOCYTES NFR BLD: 10.6 % (ref 4.7–12.5)
NEUTROPHILS # BLD: 2.71 K/UL (ref 1.56–6.13)
NEUTS SEG NFR BLD: 57.6 % (ref 34–71.1)
PLATELET # BLD AUTO: 200 K/UL (ref 182–369)
PMV BLD AUTO: 8.7 FL (ref 7.4–10.4)
POTASSIUM SERPL-SCNC: 3.6 MMOL/L (ref 3.5–5.1)
PROT SERPL-MCNC: 7 G/DL (ref 6.4–8.3)
RBC # BLD AUTO: 3.33 M/UL (ref 3.93–5.22)
SODIUM SERPL-SCNC: 141 MMOL/L (ref 136–145)
WBC # BLD AUTO: 4.71 K/UL (ref 3.98–10.04)

## 2024-08-05 PROCEDURE — 3017F COLORECTAL CA SCREEN DOC REV: CPT | Performed by: INTERNAL MEDICINE

## 2024-08-05 PROCEDURE — 85025 COMPLETE CBC W/AUTO DIFF WBC: CPT

## 2024-08-05 PROCEDURE — 96372 THER/PROPH/DIAG INJ SC/IM: CPT

## 2024-08-05 PROCEDURE — 99213 OFFICE O/P EST LOW 20 MIN: CPT

## 2024-08-05 PROCEDURE — 1036F TOBACCO NON-USER: CPT | Performed by: INTERNAL MEDICINE

## 2024-08-05 PROCEDURE — G2211 COMPLEX E/M VISIT ADD ON: HCPCS | Performed by: INTERNAL MEDICINE

## 2024-08-05 PROCEDURE — G8427 DOCREV CUR MEDS BY ELIG CLIN: HCPCS | Performed by: INTERNAL MEDICINE

## 2024-08-05 PROCEDURE — 99214 OFFICE O/P EST MOD 30 MIN: CPT | Performed by: INTERNAL MEDICINE

## 2024-08-05 PROCEDURE — 36415 COLL VENOUS BLD VENIPUNCTURE: CPT

## 2024-08-05 PROCEDURE — 6360000002 HC RX W HCPCS: Performed by: INTERNAL MEDICINE

## 2024-08-05 PROCEDURE — G8417 CALC BMI ABV UP PARAM F/U: HCPCS | Performed by: INTERNAL MEDICINE

## 2024-08-05 PROCEDURE — 80053 COMPREHEN METABOLIC PANEL: CPT

## 2024-08-05 RX ORDER — CYANOCOBALAMIN 1000 UG/ML
1000 INJECTION, SOLUTION INTRAMUSCULAR; SUBCUTANEOUS ONCE
OUTPATIENT
Start: 2024-08-19 | End: 2024-08-19

## 2024-08-05 RX ORDER — CYANOCOBALAMIN 1000 UG/ML
1000 INJECTION, SOLUTION INTRAMUSCULAR; SUBCUTANEOUS ONCE
Status: COMPLETED | OUTPATIENT
Start: 2024-08-05 | End: 2024-08-05

## 2024-08-05 RX ADMIN — CYANOCOBALAMIN 1000 MCG: 1000 INJECTION INTRAMUSCULAR; SUBCUTANEOUS at 09:24

## 2024-08-06 ENCOUNTER — HOSPITAL ENCOUNTER (EMERGENCY)
Age: 59
Discharge: HOME | End: 2024-08-07
Payer: MEDICARE

## 2024-08-06 VITALS
RESPIRATION RATE: 17 BRPM | HEART RATE: 90 BPM | SYSTOLIC BLOOD PRESSURE: 148 MMHG | DIASTOLIC BLOOD PRESSURE: 75 MMHG | TEMPERATURE: 98.2 F | OXYGEN SATURATION: 97 %

## 2024-08-06 DIAGNOSIS — T63.301D SPIDER BITE WOUND, ACCIDENTAL OR UNINTENTIONAL, SUBSEQUENT ENCOUNTER: Primary | ICD-10-CM

## 2024-08-06 LAB — CANCER AG27-29 SERPL-ACNC: 33.7 U/ML

## 2024-08-06 PROCEDURE — 96374 THER/PROPH/DIAG INJ IV PUSH: CPT

## 2024-08-06 PROCEDURE — 99284 EMERGENCY DEPT VISIT MOD MDM: CPT

## 2024-08-07 LAB
ALBUMIN SERPL-MCNC: 3.8 G/DL (ref 3.5–5.2)
ALP SERPL-CCNC: 85 U/L (ref 35–104)
ALT SERPL-CCNC: 10 U/L (ref 5–33)
ANION GAP SERPL CALCULATED.3IONS-SCNC: 15 MMOL/L (ref 7–19)
AST SERPL-CCNC: 15 U/L (ref 5–32)
BASOPHILS # BLD: 0 K/UL (ref 0–0.2)
BASOPHILS NFR BLD: 0.3 % (ref 0–1)
BILIRUB SERPL-MCNC: 0.3 MG/DL (ref 0.2–1.2)
BUN SERPL-MCNC: 28 MG/DL (ref 6–20)
CALCIUM SERPL-MCNC: 9.3 MG/DL (ref 8.6–10)
CHLORIDE SERPL-SCNC: 104 MMOL/L (ref 98–111)
CO2 SERPL-SCNC: 25 MMOL/L (ref 22–29)
CREAT SERPL-MCNC: 0.9 MG/DL (ref 0.5–0.9)
CRP SERPL HS-MCNC: 0.63 MG/DL (ref 0–0.5)
EOSINOPHIL # BLD: 0 K/UL (ref 0–0.6)
EOSINOPHIL NFR BLD: 0.3 % (ref 0–5)
ERYTHROCYTE [DISTWIDTH] IN BLOOD BY AUTOMATED COUNT: 14.1 % (ref 11.5–14.5)
ERYTHROCYTE [SEDIMENTATION RATE] IN BLOOD BY WESTERGREN METHOD: 23 MM/HR (ref 0–25)
GLUCOSE SERPL-MCNC: 104 MG/DL (ref 74–109)
HCT VFR BLD AUTO: 34.2 % (ref 37–47)
HGB BLD-MCNC: 10.9 G/DL (ref 12–16)
IMM GRANULOCYTES # BLD: 0 K/UL
LYMPHOCYTES # BLD: 1.9 K/UL (ref 1.1–4.5)
LYMPHOCYTES NFR BLD: 29.8 % (ref 20–40)
MCH RBC QN AUTO: 33 PG (ref 27–31)
MCHC RBC AUTO-ENTMCNC: 31.9 G/DL (ref 33–37)
MCV RBC AUTO: 103.6 FL (ref 81–99)
MONOCYTES # BLD: 0.6 K/UL (ref 0–0.9)
MONOCYTES NFR BLD: 9.6 % (ref 0–10)
NEUTROPHILS # BLD: 3.7 K/UL (ref 1.5–7.5)
NEUTS SEG NFR BLD: 59.8 % (ref 50–65)
PLATELET # BLD AUTO: 229 K/UL (ref 130–400)
PMV BLD AUTO: 8.8 FL (ref 9.4–12.3)
POTASSIUM SERPL-SCNC: 3.8 MMOL/L (ref 3.5–5)
PROT SERPL-MCNC: 6.7 G/DL (ref 6.6–8.7)
RBC # BLD AUTO: 3.3 M/UL (ref 4.2–5.4)
SODIUM SERPL-SCNC: 144 MMOL/L (ref 136–145)
WBC # BLD AUTO: 6.3 K/UL (ref 4.8–10.8)

## 2024-08-07 PROCEDURE — 6370000000 HC RX 637 (ALT 250 FOR IP): Performed by: NURSE PRACTITIONER

## 2024-08-07 PROCEDURE — 85652 RBC SED RATE AUTOMATED: CPT

## 2024-08-07 PROCEDURE — 6360000002 HC RX W HCPCS: Performed by: NURSE PRACTITIONER

## 2024-08-07 PROCEDURE — 85025 COMPLETE CBC W/AUTO DIFF WBC: CPT

## 2024-08-07 PROCEDURE — 36415 COLL VENOUS BLD VENIPUNCTURE: CPT

## 2024-08-07 PROCEDURE — 80053 COMPREHEN METABOLIC PANEL: CPT

## 2024-08-07 PROCEDURE — 86140 C-REACTIVE PROTEIN: CPT

## 2024-08-07 PROCEDURE — 96374 THER/PROPH/DIAG INJ IV PUSH: CPT

## 2024-08-07 RX ORDER — DIPHENHYDRAMINE HYDROCHLORIDE 50 MG/ML
50 INJECTION INTRAMUSCULAR; INTRAVENOUS ONCE
Status: DISCONTINUED | OUTPATIENT
Start: 2024-08-07 | End: 2024-08-07

## 2024-08-07 RX ORDER — DEXAMETHASONE SODIUM PHOSPHATE 10 MG/ML
8 INJECTION, SOLUTION INTRAMUSCULAR; INTRAVENOUS ONCE
Status: COMPLETED | OUTPATIENT
Start: 2024-08-07 | End: 2024-08-07

## 2024-08-07 RX ORDER — DIPHENHYDRAMINE HCL 25 MG
50 TABLET ORAL ONCE
Status: COMPLETED | OUTPATIENT
Start: 2024-08-07 | End: 2024-08-07

## 2024-08-07 RX ADMIN — DIPHENHYDRAMINE HYDROCHLORIDE 50 MG: 25 TABLET ORAL at 01:14

## 2024-08-07 RX ADMIN — DEXAMETHASONE SODIUM PHOSPHATE 8 MG: 10 INJECTION, SOLUTION INTRAMUSCULAR; INTRAVENOUS at 01:14

## 2024-08-07 NOTE — ED PROVIDER NOTES
use: Yes     Comment: occ    Drug use: Not Currently    Sexual activity: Not Currently     Comment: has 2 kids   Social History Narrative    CODE STATUS: Full Code    HEALTH CARE PROXY: her Father, Mr. Jony Savage, [phone]    AMBULATES: uses a cane sometimes, usually independent    DOMICILED: stays with friends at this time, has no stairs that she uses, no pets there     Social Determinants of Health     Physical Activity: Unknown (8/1/2024)    Exercise Vital Sign     Days of Exercise per Week: 0 days       SCREENINGS    Shari Coma Scale  Eye Opening: Spontaneous  Best Verbal Response: Oriented  Best Motor Response: Obeys commands  [place] Coma Scale Score: 15        PHYSICAL EXAM    (up to 7 for level 4, 8 or more for level 5)     ED Triage Vitals [08/06/24 2115]   BP Temp Temp Source Pulse Respirations SpO2 Height Weight   (!) 148/75 98.2 °F (36.8 °C) Temporal 90 17 97 % -- --       Physical Exam  Vitals and nursing note reviewed.   Constitutional:       Appearance: Normal appearance. She is well-developed.   HENT:      Head: Normocephalic and atraumatic.   Eyes:      General: No scleral icterus.        Right eye: No discharge.         Left eye: No discharge.   Cardiovascular:      Rate and Rhythm: Normal rate.   Pulmonary:      Effort: No respiratory distress.   Musculoskeletal:      Cervical back: Normal range of motion and neck supple.   Skin:            Comments: Redness has improved.  Mild papules to edges of erythema   Neurological:      Mental Status: She is alert and oriented to person, place, and time.   Psychiatric:         Behavior: Behavior normal.         RESULTS     EKG: All EKG's are interpreted by the Emergency Department Physician who either signs or Co-signsthis chart in the absence of a cardiologist.        RADIOLOGY:   Non-plain filmimages such as CT, Ultrasound and MRI are read by the radiologist. Plain radiographic images are visualized and preliminarily interpreted by the emergency

## 2024-08-10 ENCOUNTER — HOSPITAL ENCOUNTER (EMERGENCY)
Age: 59
Discharge: HOME | End: 2024-08-10
Attending: PEDIATRICS
Payer: MEDICARE

## 2024-08-10 VITALS
DIASTOLIC BLOOD PRESSURE: 70 MMHG | RESPIRATION RATE: 16 BRPM | TEMPERATURE: 98.6 F | BODY MASS INDEX: 26.84 KG/M2 | OXYGEN SATURATION: 97 % | WEIGHT: 167 LBS | HEART RATE: 107 BPM | SYSTOLIC BLOOD PRESSURE: 114 MMHG | HEIGHT: 66 IN

## 2024-08-10 DIAGNOSIS — L25.9 CONTACT DERMATITIS, UNSPECIFIED CONTACT DERMATITIS TYPE, UNSPECIFIED TRIGGER: Primary | ICD-10-CM

## 2024-08-10 PROCEDURE — 96372 THER/PROPH/DIAG INJ SC/IM: CPT

## 2024-08-10 PROCEDURE — 99284 EMERGENCY DEPT VISIT MOD MDM: CPT

## 2024-08-10 PROCEDURE — 2580000003 HC RX 258: Performed by: PEDIATRICS

## 2024-08-10 PROCEDURE — 6360000002 HC RX W HCPCS: Performed by: PEDIATRICS

## 2024-08-10 RX ORDER — PREDNISONE 20 MG/1
TABLET ORAL
Qty: 30 TABLET | Refills: 0 | Status: SHIPPED | OUTPATIENT
Start: 2024-08-10 | End: 2024-08-25

## 2024-08-10 RX ADMIN — METHYLPREDNISOLONE SODIUM SUCCINATE 60 MG: 125 INJECTION INTRAMUSCULAR; INTRAVENOUS at 15:19

## 2024-08-10 ASSESSMENT — PAIN DESCRIPTION - PAIN TYPE: TYPE: ACUTE PAIN

## 2024-08-10 ASSESSMENT — PAIN DESCRIPTION - ORIENTATION: ORIENTATION: LEFT

## 2024-08-10 ASSESSMENT — PAIN - FUNCTIONAL ASSESSMENT: PAIN_FUNCTIONAL_ASSESSMENT: 0-10

## 2024-08-10 ASSESSMENT — PAIN DESCRIPTION - LOCATION: LOCATION: ARM

## 2024-08-10 ASSESSMENT — PAIN SCALES - GENERAL: PAINLEVEL_OUTOF10: 2

## 2024-08-10 ASSESSMENT — PAIN DESCRIPTION - DESCRIPTORS: DESCRIPTORS: DISCOMFORT

## 2024-08-10 NOTE — DISCHARGE INSTRUCTIONS
Follow-up with your primary care provider or with [facility].  Return with increasing redness, increasing swelling, increasing or severe pain, fever greater than 100.4, or other concerns.

## 2024-08-12 DIAGNOSIS — T63.304A SPIDER BITE WOUND, UNDETERMINED INTENT, INITIAL ENCOUNTER: Primary | ICD-10-CM

## 2024-08-12 RX ORDER — DOXYCYCLINE HYCLATE 100 MG
100 TABLET ORAL 2 TIMES DAILY
Qty: 14 TABLET | Refills: 0 | Status: SHIPPED | OUTPATIENT
Start: 2024-08-12 | End: 2024-08-19

## 2024-08-12 ASSESSMENT — ENCOUNTER SYMPTOMS
NAUSEA: 0
VOMITING: 0

## 2024-08-12 NOTE — TELEPHONE ENCOUNTER
Last OV 8/1/2024  Next OV 11/1/2024      Requested Prescriptions     Pending Prescriptions Disp Refills    doxycycline hyclate (VIBRA-TABS) 100 MG tablet 14 tablet 0     Sig: Take 1 tablet by mouth 2 times daily for 7 days

## 2024-08-12 NOTE — ED PROVIDER NOTES
[facility] EMERGENCY DEPT  eMERGENCY dEPARTMENT eNCOUnter      Pt Name: Yandy Dickinson  MRN: [id number]  Birthdate 1965  Date of evaluation: 8/10/2024  Provider: Era Latif MD    CHIEF COMPLAINT       Chief Complaint   Patient presents with    Insect Bite     Brown recluse bite 2 weeks ago    Rash     New rash developed up L arm around spider bite         HISTORY OF PRESENT ILLNESS   (Location/Symptom, Timing/Onset,Context/Setting, Quality, Duration, Modifying Factors, Severity)  Note limiting factors.   Yandy Dickinson is a 59 y.o. female who presents to the emergency department with rash.  Patient states that she was bit by a brown recluse on her left forearm 2 weeks ago.  Patient states that the wound became infected.  Patient was seen in clinic and started on antibiotics.  Patient developed a rash around area of redness approximately 11 days ago.  Patient was started on Benadryl and steroids.  Patient states that the steroids initially improved the rash but that it reappeared the day after the steroids discontinued.  Patient states that she was recently switched to doxycycline for a 10-day course.  Patient denies rash in other areas.  Rash is itchy and red.  Rash extends from patient's upper arm to her forearm.  Patient denies fever or blisters.  Patient denies painful rash.  Patient states that she has experienced shingles in the past and this does not feel like her shingles felt.  Patient is currently on acyclovir as a preventative from shingles outbreak as she is currently taking chemotherapy.  Patient states that area of redness and spider bite are almost completely resolved.    HPI    NursingNotes were reviewed.    REVIEW OF SYSTEMS    (2-9 systems for level 4, 10 or more for level 5)     Review of Systems   Constitutional:  Negative for chills and fever.   Gastrointestinal:  Negative for nausea and vomiting.   Skin:  Positive for rash.            PAST MEDICALHISTORY     Past Medical History:

## 2024-08-16 ENCOUNTER — OFFICE VISIT (OUTPATIENT)
Dept: INTERNAL MEDICINE | Age: 59
End: 2024-08-16

## 2024-08-16 VITALS
DIASTOLIC BLOOD PRESSURE: 72 MMHG | BODY MASS INDEX: 26.84 KG/M2 | SYSTOLIC BLOOD PRESSURE: 130 MMHG | OXYGEN SATURATION: 98 % | HEIGHT: 66 IN | WEIGHT: 167 LBS | HEART RATE: 90 BPM

## 2024-08-16 DIAGNOSIS — G89.4 CHRONIC PAIN SYNDROME: Primary | ICD-10-CM

## 2024-08-16 DIAGNOSIS — M54.16 LUMBAR RADICULOPATHY: ICD-10-CM

## 2024-08-16 RX ORDER — METHYLPREDNISOLONE 4 MG/1
TABLET ORAL
Qty: 1 KIT | Refills: 0 | Status: SHIPPED | OUTPATIENT
Start: 2024-08-16 | End: 2024-08-22

## 2024-08-16 RX ORDER — METAXALONE 800 MG/1
800 TABLET ORAL 3 TIMES DAILY
Qty: 30 TABLET | Refills: 0 | Status: SHIPPED | OUTPATIENT
Start: 2024-08-16 | End: 2024-08-26

## 2024-08-16 SDOH — ECONOMIC STABILITY: FOOD INSECURITY: WITHIN THE PAST 12 MONTHS, THE FOOD YOU BOUGHT JUST DIDN'T LAST AND YOU DIDN'T HAVE MONEY TO GET MORE.: NEVER TRUE

## 2024-08-16 SDOH — ECONOMIC STABILITY: FOOD INSECURITY: WITHIN THE PAST 12 MONTHS, YOU WORRIED THAT YOUR FOOD WOULD RUN OUT BEFORE YOU GOT MONEY TO BUY MORE.: NEVER TRUE

## 2024-08-16 SDOH — ECONOMIC STABILITY: INCOME INSECURITY: HOW HARD IS IT FOR YOU TO PAY FOR THE VERY BASICS LIKE FOOD, HOUSING, MEDICAL CARE, AND HEATING?: NOT HARD AT ALL

## 2024-08-16 ASSESSMENT — PATIENT HEALTH QUESTIONNAIRE - PHQ9
SUM OF ALL RESPONSES TO PHQ QUESTIONS 1-9: 0
SUM OF ALL RESPONSES TO PHQ QUESTIONS 1-9: 0
SUM OF ALL RESPONSES TO PHQ9 QUESTIONS 1 & 2: 0
SUM OF ALL RESPONSES TO PHQ QUESTIONS 1-9: 0
1. LITTLE INTEREST OR PLEASURE IN DOING THINGS: NOT AT ALL
2. FEELING DOWN, DEPRESSED OR HOPELESS: NOT AT ALL
SUM OF ALL RESPONSES TO PHQ QUESTIONS 1-9: 0

## 2024-08-16 NOTE — PROGRESS NOTES
[facility]  [facility] INTERNAL MEDICINE  [address] [place] [address] [address]IVE  [address]  [place] [address]  Dept: [phone]  Dept Fax: [phone]  Loc: [phone]      Visit Date: 8/16/2024    Yandy Hobbs a 59 y.o. female who presents today for:  Chief Complaint   Patient presents with    Other         HPI:     Lumbar radiculopathy symptoms.  She wanted to be seen    Past Medical History:   Diagnosis Date    Asthma     Breast cancer (HCC)     Breast cancer with lung mets     Chronic back pain     GERD (gastroesophageal reflux disease)     Hx of blood clots     Hypertension     Lung cancer (HCC)     Neuropathy     Post chemo treatment neuropathy      Past Surgical History:   Procedure Laterality Date    BREAST LUMPECTOMY Right     2006    BREAST RECONSTRUCTION Left 09/17/2021    Revision left reconstructed breast, implant performed by Alejandro Garsia MD at [facility] OR    BREAST RECONSTRUCTION Bilateral 12/9/2022    CRESCENT LEFT MASTOPEXY, FAT GRAFTING RIGHT LATERAL NIPPLE & FAT GRAFTING INFERIOR LEFT MIDLINE performed by Alejandro Garsia MD at [facility] OR    BREAST RECONSTRUCTION N/A 4/30/2024    LEFT INFERIOR BREAST FAT GRAFTING performed by Alejandro Garsia MD at [facility] OR    BREAST SURGERY N/A 8/11/2023    FAT GRAFTING LEFT BREAST & REPOSITIONING RIGHT NIPPLE performed by Alejandro Garsia MD at [facility] OR    BRONCHOSCOPY Left 2/23/2023    BRONCHOSCOPY ADD ON COMPUTER ASSISTED ROBOTIC performed by Ashley Morrison MD at [facility] Endoscopy    BRONCHOSCOPY Left 1/4/2024    MONARCH BRONCHOSCOPY/TRANSBRONCHIAL NEEDLE BIOPSY performed by Ashley Morrison MD at [facility] Endoscopy    BRONCHOSCOPY  1/4/2024    BRONCHOSCOPY BIOPSY BRONCHUS performed by Ashley Morrison MD at [facility] Endoscopy    COSMETIC SURGERY  2016    Bilateral breast implants    EMBOLECTOMY Left 9/26/2021    LEFT UPPER EXTREMITY  MECHANICAL SUCTION THROMBECTOMY performed by Zully Tovar DO at [facility] OR    HYSTERECTOMY (CERVIX STATUS

## 2024-08-23 DIAGNOSIS — C50.011 CARCINOMA OF AREOLA OF RIGHT BREAST IN FEMALE, UNSPECIFIED ESTROGEN RECEPTOR STATUS (HCC): ICD-10-CM

## 2024-08-23 RX ORDER — PALBOCICLIB 100 MG/1
CAPSULE ORAL
Qty: 21 CAPSULE | Refills: 5 | Status: ACTIVE | OUTPATIENT
Start: 2024-08-23

## 2024-09-18 ENCOUNTER — HOSPITAL ENCOUNTER (OUTPATIENT)
Dept: CT IMAGING | Age: 59
Discharge: HOME | End: 2024-09-18
Attending: INTERNAL MEDICINE
Payer: MEDICARE

## 2024-09-18 ENCOUNTER — TELEPHONE (OUTPATIENT)
Dept: HEMATOLOGY | Age: 59
End: 2024-09-18

## 2024-09-18 DIAGNOSIS — C50.919 CARCINOMA OF BREAST METASTATIC TO LUNG, UNSPECIFIED LATERALITY (HCC): ICD-10-CM

## 2024-09-18 DIAGNOSIS — C50.011 CARCINOMA OF AREOLA OF RIGHT BREAST IN FEMALE, UNSPECIFIED ESTROGEN RECEPTOR STATUS (HCC): Primary | ICD-10-CM

## 2024-09-18 DIAGNOSIS — C78.00 CARCINOMA OF BREAST METASTATIC TO LUNG, UNSPECIFIED LATERALITY (HCC): ICD-10-CM

## 2024-09-18 LAB
CREAT SERPL-MCNC: 0.6 MG/DL (ref 0.3–1.3)
PERFORMED ON: NORMAL

## 2024-09-18 PROCEDURE — 71260 CT THORAX DX C+: CPT

## 2024-09-18 PROCEDURE — 74177 CT ABD & PELVIS W/CONTRAST: CPT

## 2024-09-18 PROCEDURE — 82565 ASSAY OF CREATININE: CPT

## 2024-09-18 PROCEDURE — 6360000004 HC RX CONTRAST MEDICATION: Performed by: INTERNAL MEDICINE

## 2024-09-18 RX ORDER — IOPAMIDOL 755 MG/ML
75 INJECTION, SOLUTION INTRAVASCULAR
Status: COMPLETED | OUTPATIENT
Start: 2024-09-18 | End: 2024-09-18

## 2024-09-18 RX ADMIN — IOPAMIDOL 75 ML: 755 INJECTION, SOLUTION INTRAVENOUS at 11:08

## 2024-09-20 ENCOUNTER — HOSPITAL ENCOUNTER (OUTPATIENT)
Dept: INFUSION THERAPY | Age: 59
Discharge: HOME | End: 2024-09-20
Payer: MEDICARE

## 2024-09-20 ENCOUNTER — OFFICE VISIT (OUTPATIENT)
Dept: HEMATOLOGY | Age: 59
End: 2024-09-20

## 2024-09-20 VITALS
SYSTOLIC BLOOD PRESSURE: 130 MMHG | HEART RATE: 100 BPM | HEIGHT: 66 IN | DIASTOLIC BLOOD PRESSURE: 84 MMHG | BODY MASS INDEX: 26.82 KG/M2 | TEMPERATURE: 97.8 F | OXYGEN SATURATION: 98 % | WEIGHT: 166.9 LBS

## 2024-09-20 DIAGNOSIS — C78.00 CARCINOMA OF BREAST METASTATIC TO LUNG, UNSPECIFIED LATERALITY (HCC): ICD-10-CM

## 2024-09-20 DIAGNOSIS — D50.8 OTHER IRON DEFICIENCY ANEMIA: Primary | ICD-10-CM

## 2024-09-20 DIAGNOSIS — E53.8 VITAMIN B 12 DEFICIENCY: ICD-10-CM

## 2024-09-20 DIAGNOSIS — Z79.811 ENCOUNTER FOR MONITORING AROMATASE INHIBITOR THERAPY: ICD-10-CM

## 2024-09-20 DIAGNOSIS — C50.011 CARCINOMA OF AREOLA OF RIGHT BREAST IN FEMALE, UNSPECIFIED ESTROGEN RECEPTOR STATUS (HCC): ICD-10-CM

## 2024-09-20 DIAGNOSIS — Z51.81 ENCOUNTER FOR MONITORING AROMATASE INHIBITOR THERAPY: ICD-10-CM

## 2024-09-20 DIAGNOSIS — R53.1 WEAKNESS: ICD-10-CM

## 2024-09-20 DIAGNOSIS — C50.919 CARCINOMA OF BREAST METASTATIC TO LUNG, UNSPECIFIED LATERALITY (HCC): ICD-10-CM

## 2024-09-20 DIAGNOSIS — R53.83 OTHER FATIGUE: Primary | ICD-10-CM

## 2024-09-20 DIAGNOSIS — Z71.89 CARE PLAN DISCUSSED WITH PATIENT: ICD-10-CM

## 2024-09-20 DIAGNOSIS — T45.1X5S ANTINEOPLASTIC DRUGS CAUSING ADVERSE EFFECT, SEQUELA: ICD-10-CM

## 2024-09-20 LAB
ALBUMIN SERPL-MCNC: 4.1 G/DL (ref 3.5–5.2)
ALP SERPL-CCNC: 91 U/L (ref 35–104)
ALT SERPL-CCNC: 7 U/L (ref 5–33)
ANION GAP SERPL CALCULATED.3IONS-SCNC: 12 MMOL/L (ref 7–19)
AST SERPL-CCNC: 21 U/L (ref 5–32)
BASOPHILS # BLD: 0.03 K/UL (ref 0.01–0.08)
BASOPHILS NFR BLD: 0.3 % (ref 0.1–1.2)
BILIRUB SERPL-MCNC: 0.7 MG/DL (ref 0–1.2)
BUN SERPL-MCNC: 11 MG/DL (ref 6–20)
CA 15-3: 20 U/ML (ref 0–25)
CALCIUM SERPL-MCNC: 8.7 MG/DL (ref 8.6–10)
CEA SERPL-MCNC: 2 NG/ML (ref 0–4.7)
CHLORIDE SERPL-SCNC: 107 MMOL/L (ref 98–107)
CO2 SERPL-SCNC: 23 MMOL/L (ref 22–29)
CREAT SERPL-MCNC: 0.7 MG/DL (ref 0.5–0.9)
EOSINOPHIL # BLD: 0.03 K/UL (ref 0.04–0.54)
EOSINOPHIL NFR BLD: 0.3 % (ref 0.7–7)
ERYTHROCYTE [DISTWIDTH] IN BLOOD BY AUTOMATED COUNT: 14 % (ref 11.7–14.4)
GLUCOSE SERPL-MCNC: 83 MG/DL (ref 70–99)
HCT VFR BLD AUTO: 35.9 % (ref 34.1–44.9)
HGB BLD-MCNC: 11.8 G/DL (ref 11.2–15.7)
LYMPHOCYTES # BLD: 2.05 K/UL (ref 1.18–3.74)
LYMPHOCYTES NFR BLD: 22.8 % (ref 19.3–53.1)
MCH RBC QN AUTO: 32.2 PG (ref 25.6–32.2)
MCHC RBC AUTO-ENTMCNC: 32.9 G/DL (ref 32.3–35.5)
MCV RBC AUTO: 98.1 FL (ref 79.4–94.8)
MONOCYTES # BLD: 0.44 K/UL (ref 0.24–0.82)
MONOCYTES NFR BLD: 4.9 % (ref 4.7–12.5)
NEUTROPHILS # BLD: 6.4 K/UL (ref 1.56–6.13)
NEUTS SEG NFR BLD: 71.4 % (ref 34–71.1)
PLATELET # BLD AUTO: 336 K/UL (ref 182–369)
PMV BLD AUTO: 8.7 FL (ref 7.4–10.4)
POTASSIUM SERPL-SCNC: 3.7 MMOL/L (ref 3.5–5.1)
PROT SERPL-MCNC: 7.1 G/DL (ref 6.4–8.3)
RBC # BLD AUTO: 3.66 M/UL (ref 3.93–5.22)
SODIUM SERPL-SCNC: 142 MMOL/L (ref 136–145)
WBC # BLD AUTO: 8.98 K/UL (ref 3.98–10.04)

## 2024-09-20 PROCEDURE — 80053 COMPREHEN METABOLIC PANEL: CPT

## 2024-09-20 PROCEDURE — 36415 COLL VENOUS BLD VENIPUNCTURE: CPT

## 2024-09-20 PROCEDURE — 6360000002 HC RX W HCPCS: Performed by: INTERNAL MEDICINE

## 2024-09-20 PROCEDURE — 96372 THER/PROPH/DIAG INJ SC/IM: CPT

## 2024-09-20 PROCEDURE — 85025 COMPLETE CBC W/AUTO DIFF WBC: CPT

## 2024-09-20 PROCEDURE — 99213 OFFICE O/P EST LOW 20 MIN: CPT

## 2024-09-20 RX ORDER — CYANOCOBALAMIN 1000 UG/ML
1000 INJECTION, SOLUTION INTRAMUSCULAR; SUBCUTANEOUS ONCE
Status: COMPLETED | OUTPATIENT
Start: 2024-09-20 | End: 2024-09-20

## 2024-09-20 RX ORDER — CYANOCOBALAMIN 1000 UG/ML
1000 INJECTION, SOLUTION INTRAMUSCULAR; SUBCUTANEOUS ONCE
OUTPATIENT
Start: 2024-10-04 | End: 2024-10-04

## 2024-09-20 RX ADMIN — CYANOCOBALAMIN 1000 MCG: 1000 INJECTION, SOLUTION INTRAMUSCULAR; SUBCUTANEOUS at 11:36

## 2024-09-23 LAB — CANCER AG27-29 SERPL-ACNC: 29.5 U/ML

## 2024-09-27 PROBLEM — C78.02 CARCINOMA OF LEFT BREAST METASTATIC TO LUNG: Status: ACTIVE | Noted: 2024-02-07

## 2024-09-27 PROBLEM — C50.912 CARCINOMA OF LEFT BREAST METASTATIC TO LUNG: Status: ACTIVE | Noted: 2024-02-07

## 2024-10-01 ENCOUNTER — TELEPHONE (OUTPATIENT)
Age: 59
End: 2024-10-01
Payer: MEDICARE

## 2024-10-01 NOTE — TELEPHONE ENCOUNTER
Have attempted phone call x 2 in order to speak to someone re: patient's previous radiation treatment records.

## 2024-10-02 ENCOUNTER — HOSPITAL ENCOUNTER (OUTPATIENT)
Dept: RADIATION ONCOLOGY | Facility: HOSPITAL | Age: 59
Setting detail: RADIATION/ONCOLOGY SERIES
End: 2024-10-02
Payer: MEDICARE

## 2024-10-02 NOTE — TELEPHONE ENCOUNTER
Attempted another phone call to [facility].   states that the dosimetrist does have my previous message, but suggested that I call back at 1:00 when he would be back in the office.

## 2024-10-02 NOTE — TELEPHONE ENCOUNTER
Spoke with  that states dosimetrist is still not in the office, but should be in later per her manager.   gave me fax number to fax request for dosimetry records to be sent to us via email.  She states that she will put the request on the dosimetrist's door.

## 2024-10-03 ENCOUNTER — HOSPITAL ENCOUNTER (OUTPATIENT)
Dept: RADIATION ONCOLOGY | Facility: HOSPITAL | Age: 59
Discharge: HOME | End: 2024-10-03

## 2024-10-03 ENCOUNTER — CONSULT (OUTPATIENT)
Age: 59
End: 2024-10-03
Payer: MEDICARE

## 2024-10-03 ENCOUNTER — TELEPHONE (OUTPATIENT)
Dept: HEMATOLOGY | Age: 59
End: 2024-10-03

## 2024-10-03 VITALS
DIASTOLIC BLOOD PRESSURE: 79 MMHG | HEIGHT: 66 IN | SYSTOLIC BLOOD PRESSURE: 125 MMHG | WEIGHT: 167 LBS | BODY MASS INDEX: 26.84 KG/M2

## 2024-10-03 DIAGNOSIS — C78.02 CARCINOMA OF LEFT BREAST METASTATIC TO LUNG: Primary | ICD-10-CM

## 2024-10-03 DIAGNOSIS — C50.912 CARCINOMA OF LEFT BREAST METASTATIC TO LUNG: Primary | ICD-10-CM

## 2024-10-03 PROCEDURE — G0463 HOSPITAL OUTPT CLINIC VISIT: HCPCS

## 2024-10-03 NOTE — PROGRESS NOTES
[facility]  Radiation Oncology Clinic   Arnoldo Alas MD, FACR  Ronnie CRISOSTOMO  _______________________________________________  [facility]  Department of Radiation Oncology  [address] [place] [id number]  Office: [phone]  Fax: [phone]    DATE: 10/03/2024  PATIENT: Susanna Wood  1965                         MEDICAL RECORD #: [id number]    REASON FOR VISIT:    Chief Complaint   Patient presents with    Breast Cancer     1. Carcinoma of left breast metastatic to lung                                       REASON FOR VISIT:    Chief Complaint   Patient presents with    Breast Cancer     Susanna Wood is a 59 y.o. female that has been referred to our clinic to be evaluated for consideration of radiotherapy to the lung.     On presentation today she reports SOB and back pain. Denies appetite change, unexpected weight change, nasuea/vomiting, diarrhea, light-headedness, weakness, dizziness, and headaches. She follows .            HISTORY OF PRESENT ILLNESS:    2006 - Neoadjuvant chemotherapy with AC x 4 followed by Taxol in [place]    2007 - Lumpectomy with axillary lymph node dissection  1.9 cm grade 2 with no lymphovascular space invasion  1/14 lymph nodes positive for malignancy  ER, ND +  (patient did not take hormonal therapy due to cost)  Her-2 neg    2007 -  Completed Radiation course:  Adjuvant radiation to whole breast/axillary region    Recurrence:  04/2013 - Noted a mass in right axillary region, slightly tender to palpation.    04/2013 - Lymph node biopsy:  Adenocarcinoma     2013 - Neoadjuvant Taxotere x 2, followed by Doxil    10/2013 - PET Scan:  2.8 cm right axillary lymph node with uptake    10/14/2013 - Mastectomy and axillary dissection:  Right breast:  benign with fibrosis, no malignancy  Axillary contents demonstrated invasive ductal carcinoma involving fibroadipose tissue and  tendinous tissues, 3 benign lymph nodes  Vik grade 3  3.0 cm in greatest gross dimensions  Carcinoma permeates among soft tissues in the right axilla with no apparent involvement of lymph nodes    11/04/2013 - Appointment with Dr. Azeem Watts/Onc - [place], [place]:  She will see Dr. Butler for chest wall and axillary LN XRT. She will start tamoxifen after radiation.  Will also check PET/CT scan.  She will follow-up in 2 weeks.    11/11/2013 - PET Scan:  Unremarkable    12/2013 - 01/23/2014 - Completed Radiation course:  Adjuvant radiation to chest wall and axillary lymph nodes    02/2014 - Hormonal Therapy course:  Tamoxifen    Recurrence:  2014 - Surgical resection of the axillary skin pathology demonstrating tumor at cauterized margin    09/19/2016 - Bilateral exchange, nipple reconstruction and fat grafting and removal of PAC    2016 - Hormonal Therapy - Switched to aromatase inhibitor, patient was non-compliant    01/11/2017 - 06/17/2018 - Chemotherapy course:  Single agent Abraxane x 13 cycles    03/15/2017 - CT Chest:  The main finding is interval decrease in maximal diameter of essentially all of the multiple metastatic pulmonary nodules.   The average difference in approximately 25%. Some of the much smaller ones have disappeared.   There are no airspace infiltrates. There are no effusions.   Bilateral hilar and aortopulmonary window adenopathy is also similarly smaller.    06/07/2017 - CT Chest:  Multiple lung nodules seen bilaterally.  Most of the lung nodules show interval improvement with decreased size by 1-3 mm.   Mediastinal and bilateral hilar adenopathy seen with improvement compared to prior examination as well.  No pleural or pericardial fluid seen. No airspace filling opacities seen. No infiltrates seen with no acute inflammatory processes seen. Correlate clinically. Consider further workup depending on clinical findings.    07/24/2017 - PET Scan:  Marked improvement   Minimally  abnormal  Has excellent response to chemo    02/01/2018 - PET Scan:  Numerous bilateral lung masses and nodules, the majority of which do not have appreciable abnormal FDG uptake.   The largest mass in the left infrahilar region of the left lower lobe demonstrates a maximum SUV of 3.3.   Mediastinal lymphadenopathy.   No evidence of metastatic disease in the abdomen or pelvis    06/21/2018 - CT Chest:  Compared to previous exam of 6/7/17, there is a interval increase in number of nodules, size of many lesions, interval development of bilateral small pleural effusions. Also increase in size of mediastinal lymph nodes.    06/21/2018 - CT Abdomen/Pelvis:  There are no masses in the liver, pancreas, or either kidney. There are no adrenal nodules. There is no inflammatory process in the abdomen or pelvis.   There is no ascites. A normal appendix was seen.   There is no bowel obstruction.   There are left-sided colonic diverticula but diverticulitis.    06/27/2018 - 09/17/2018 - Chemotherapy course:  Gemzar x 3, dc'd due to progression of disease    09/28/2018 - CT Angio Chest:  Multiple lung mets, mild bilateral hilar and mediastinal LN mets    10/10/2018 -  Chemotherapy course:  Ibrance/Faslodex    12/14/2018 - CT Chest:  Definite decrease in the maximal diameter and volume of all of the left and right metastatic nodules. No interval development of any new nodules.   There are no pulmonary infiltrates at this time. There are no effusions.   Somewhat enlarged paratracheal and left axillary lymph nodes are relatively unchanged.    04/22/2019 - MRI Brain:  There is no acute hemorrhage or infarct or midline shift. No enhancing lesions in the brain and there is no evidence of metastatic disease.  Periventricular and subcortical hyperintensity white matter changes. Findings could be due to chronic small vessel disease although multiple sclerosis is not excluded.  Overall there is no significant change from prior  study.    04/22/20219 - MRI Cervical Spine:  Unremarkable     07/27/2019 - CT Neck:  Mild degenerative disc disease C6-C7, C7-T1  Vertebral bodies are normal height  No compression deformity seen  No spondylolisthesis is seen  No focal bony lesions seen  Consider follow-up depending on clinical findings    07/27/2019 - CT Angio Chest:  There is no pulmonary embolism or lower extremity DVT  There is no aortic dissection  There are no pulmonary infiltrates or effusions  There are no findings of acute congestive heart failure  When compared to the study of 1/5/17 there has been essentially complete disappearance of the pulmonary metastatic disease  Several tiny, flat peripheral nodules are the only sequela.  The tiny ones on the right are unchanged.  The tiny ones on the left are even smaller than on the study of 12/14/18  There is no longer any active metastatic disease in either lung    12/19/2019 - CT Chest:  Small tiny subpleural nodules right upper and right midlung field as well as left lung base has remained unchanged compared to last CT pulmonary angiogram. No new focal parenchymal abnormality seen read no pleural effusion seen. No adenopathy seen.    12/19/2019 - CT Abdomen/Pelvis:  There is no abdominal or pelvic mass, adenopathy, or fluid collection.  While there are no lytic or sclerotic bony lesion, possibility of osteopenia and/or osteoporosis is raised. Additional evaluation by DEXA bone densitometry study would be of benefit.    02/25/2021 - CT Chest with contrast:  Tiny nodules in the right lung described above probably represent small foci of pleural thickening due to chronic inflammatory process or small noncalcified granulomas. No features to suggest malignancy or metastatic disease.   Bilateral breast implants without complication.     02/25/2021 - CT Abdomen/Pelvis with contrast:  No evidence of metastatic disease in the abdomen or pelvis.     08/19/2021 - CT Abdomen/Pelvis with contrast:  A  stable CT scan of the abdomen and pelvis. No finding to suggest neoplastic/metastatic disease.     08/19/2021 - CT Chest with contrast:  Left lower lobe pleural-based nodular 1.7 x 0.9 cm is indeterminate. PET/CT recommended.   Feeding defect in the left lower lobe bronchus versus a postobstructive airspace disease. This too may be further characterized with PET CT versus direct visualization.     09/30/2021 - CT Chest with contrast:  Similar left lower lobe pleural-based nodule or opacity. This appears less rounded than prior.   Left chest wall collaterals suggest chronic left subclavian vein stenosis.   Coronary artery calcifications.     11/19/2021 - CT Angiogram Chest:  No evidence of pulmonary embolism.   The lungs are poorly expanded but unremarkable.     12/08/2021 - MRI Lumbar Spine without contrast:  No significant change since the previous study.   Chronic degenerative changes.   A persistent mild degenerative retrolisthesis is of L5 over S1.   Prominent disc osteophyte complexes and facetal arthropathy and resultant mild spinal stenosis at L4-5 and neural foraminal stenosis at L4-5 and L5-S1 as detailed above.     01/06/2022 - CT Chest without contrast:  Stable chest CT with an unchanged tiny subpleural nodule within the right upper lobe.     02/25/2022 - CT Abdomen/Pelvis without contrast:  Gastric wall thickening is nonspecific. It could be an artifact of under distention. Gastritis and other etiologies are not ruled out.   The unenhanced solid organs demonstrate no focal abnormality.   Diverticulosis of the colon. Under distention of portions of the colon.   Prior hysterectomy.   Mild sclerotic changes in the subarticular femoral heads bilaterally is stable. Early AVN is possible. MRI would be more definitive. The signal abnormality is greater on the right side.     05/10/2022 -  Skin, revision of left chest scar:   Cicatrix identified with foreign body type response, negative for evidence of  malignancy.     07/12/2022 - CT Chest without contrast:  Apical fibrosis.   Focal fibrosis in subpleural location in right lower lobe due to thoracic osteophytes.   Atelectatic bands in bilateral lower lobes.   Atherosclerotic wall calcifications in arch of aorta and coronaries.   Degenerative changes in spine.     07/18/2022 - CT Chest with contrast:  Bibasilar subsegmental atelectasis, stable and unchanged   Prominent osteophyte formation out in the lower thoracic spine with associated subsegmental atelectasis   No pulmonary nodules, masses, pleural effusion or pneumothorax   Bilateral breast implants   Stable interval appearance since recent study 7/12/22 07/18/2022 - CT Abdomen/Pelvis with contrast:  Fecal loaded colon possible constipation.   Scattered colon diverticulosis. No acute diverticulitis.   No acute abdominopelvic abnormality .     09/30/2022 - CT Head without contrast:  No acute hemorrhage     02/01/2023 - CT Chest with contrast:  A 2.2 cm solid pulmonary nodule in the inferomedial left lower lobe. PET/CT versus tissue sampling is recommended for further characterization. Metastatic disease cannot be excluded.     02/01/2023 - CT Abdomen/Pelvis with contrast:  No evidence of metastatic disease in the abdomen or pelvis.     02/01/2023 - Bone Scan:  No findings to suggest osteoblastic metastatic disease.     02/06/2023 - PET Scan:   Left medial lower lobe nodule in the superior segment of the left lower lobe has increased FDG uptake max SUV 3.9 measures 1.5 x 1.1 cm.  No additional suspicious focal hypermetabolic uptake or pathologic appearing adenopathy otherwise noted.     02/22/2023 - CT Chest without contrast:  No suspicious masses or nodules.  Lungs are clear.   Follow up as clinically indicated.     02/23/2023 - Bronchoscopy/EBUS:  Mass, left lower lobe superior segment, EBUS (ThinPrep and cell block section):   No malignant cells identified.   Benign bronchial epithelial cells and alveolar  macrophages in a background of fibrin.     06/08/2023 - CT Chest without contrast:  Stable bilateral lung nodules measuring up to 1.9 x 1.5 cm. No new or enlarging lung nodule identified.   Stable mildly prominent left subpectoral lymph nodes.   Additional chronic and incidental findings is also stable, as detailed above.     06/08/2023 - CT Abdomen/Pelvis with contrast:  No evidence of visceral metastatic disease abdomen or pelvis.   Areas of sclerosis in the bilateral femoral heads may suggest avascular necrosis.  Several scattered sclerotic foci in the pelvis bilaterally.  Given findings of suspected avascular necrosis and bone islands versus bone metastases in the breast cancer patient without prior imaging for comparison, recommend MRI of the pelvis and bilateral hips without and with contrast.     12/14/2023 - CT Chest without contrast:  Right upper lobe and left lower lobe nodules show significant interval increase in size.  Interval progression is suggestive of metastatic disease.  PET-CT or CT guided biopsy is recommended for further workup.   7 mm nodule in the right lower lobe appears grossly stable.     12/14/2023 - CT Abdomen/Pelvis with contrast:  No evidence of metastatic disease in the abdomen or pelvis.   Focal narrowing of the gastric body is identified.  This is favored to represent peristalsis.  Attention on follow-up imaging is recommended.     12/14/2023 - Bone Scan:  The thoracic and lumbar spine demonstrate age appropriate findings as discussed in the report.   There are normal findings in the rib cage and pelvis.   There are age appropriate findings in joints in the upper and lower extremities.   There are normal findings in the head and neck.   No overtly pathologic appearing bone lesion is identified.     12/22/2023 - PET Scan:  The PET CT examination demonstrates continued increased activity in the 2.5 x 2.1 cm pulmonary nodular density seen in the medial left upper lobe on the patient's  prior examination.  Correcting for differences between the prior and current software package there has been an interval increase in activity.  The examination also demonstrates a new FDG avid nodule along the medial pleural margin of the right upper lobe with activity consistent with increased likelihood of malignancy.  No additional significantly FDG avid pulmonary parenchymal or pleural based lesions or hilar or mediastinal lymph nodes are identified.  Additional details are contained in the report.   The examination demonstrates a generally stable, physiologic distribution of activity in the abdomen and pelvis, as described in the report.   The examination demonstrates a generally stable, physiologic distribution of activity in the included portions of the head and neck, as described in the report.   The examination demonstrates a generally stable, physiologic distribution of activity in the included portions of the skeleton and extremeties, as described in the report.     01/03/2024 - CT Chest without contrast:  Nodule in the right upper lobe anteriorly medially, slightly larger now measuring 1.5 x 2.0 cm.   Noncalcified nodule in the left lower lobe superiorly medially measuring 2.5 cm, unchanged.   Noncalcified nodule in the right lower lobe measuring 0.7 cm, unchanged.   Atherosclerosis and coronary artery calcification.   Mild degenerative changes of the spine.   Bilateral breast implants.  Surgical clips in the right axilla.   Diverticulosis of the colon.   Otherwise unremarkable noncontrast CT scan of the chest.     01/04/2024 - Bronchoscopy with biopsy:   Lung, left lower lobe, transbronchial biopsy:   Fragments of bronchial mucosa with submucosa exhibiting anthracotic pigment deposition.   No granulomata identified.   Fragments of clotted blood.   No histologic evidence of malignancy.   Lung, left lower lobe, fine-needle aspiration, ThinPrep preparation (1):   Background blood.   Scattered small, mature  lymphocytes, scattered neutrophils and scant macrophages.   Scant ciliated columnar bronchial epithelial cells.   Negative for malignant cells.   Lung, bronchial washings, ThinPrep preparation (1) and cell block:   Abundant blood.   Ciliated columnar bronchial epithelial cells.   Negative for malignant cells.     02/02/2024 - Robotic Navigational Bronchoscopy - Dr. Price:  Lung Left lower lobe:  Bronchial epithelial cells and mixed inflammation, no malignant cells identified.   Left lower lobe;   Adenocarcinoma, consistent with metastasis from patient's known breast primary.   ER 70%, NM 5%, HER2 IHC 0     06/20/2024 - CT Chest with contrast:  Image 29 1.5 x 2 cm nodule stable and unchanged right upper lobe   Image 45 left infrahilar nodule 1.6 x 2.1 cm previously was 2.1 x 2.5 cm.   Previous  0.7 cm nodule right lower lobe is not present on this exam     06/20/2024 - CT Abdomen/Pelvis with contrast:  No evidence of metastatic disease within the abdomen or pelvis   Osteonecrosis suspected in the hips bilaterally.   Mild hepatic steatosis.   Diverticulosis without evidence of diverticulitis.     06/20/2024 - Bone Scan:  The thoracic and lumbar spine demonstrate generally stable findings as described in the report.   There are normal findings in the rib cage and pelvis.   There are age appropriate findings in joints in the upper lower extremities.   There are normal findings in the head and neck.   No new overtly pathologic appearing bone lesion is identified.    09/18/2024 - CT Chest with contrast:  Abnormal exam.  Progression of neoplastic disease in the thorax with enlarging pulmonary parenchymal metastases bilaterally as above and enlarging mediastinal and left retro hilar nodes.  Left lower lobe segmental airway narrowing.     09/18/2024 - CT Abdomen/Pelvis with contrast:  No evidence of metastatic disease in the abdomen or pelvis.   No acute abnormality in the abdomen or pelvis.     09/20/2024 - Appointment  with Dr. Gross:  ASSESSMENT:  Metastatic breast cancer, hormone sensitive, stage IV ER 70%, MO 5%, HER2 IHC 0  March 2024-  Essentially, pulmonary nodules with biopsy-proven breast cancer ER 70%, MO 5%, HER2 IHC 0. Tempus liquid biopsy showed no mutation.  PET scan showed evidence of 2 nodules with high SUV uptake. No other evidence of metastatic disease.  Patient has received several lines of treatment in the past to include chemotherapy and endocrine therapy.  Most recent line of therapy include Faslodex and Ibrance. She has stopped Ibrance more than a year ago. I recommended to resume Ibrance and Aromasin.  September 2024-  9/18/24 CT Chest W Contrast Narrowing of the left lower lobe posterior segmental airway secondary to enlarging left retro hilar mass lesion today measuring 2.9 x 2.1 cm. Stable 4 mm nodule anterolateral right upper lobe axial image 25. Interval increase in size of mesially right lung apical lobulated mass measuring 2.1 x 1.8 cm. 3 mm subpleural stable nodule right middle lobe stable 6 x 4 mm nodule right lower lobe. Impression: Progression of neoplastic disease in the thorax with enlarging pulmonary parenchymal metastases bilaterally as above and enlarging mediastinal and left retro hilar nodes. Left lower lobe segmental airway narrowing.   9/18/24 CT Abd/Pelvis W IV Contrast (oral) No evidence of metastatic disease in the abdomen or pelvis. No acute abnormality in the abdomen or pelvis. Please see dedicated CT chest for findings above the diaphragm.Incidental findings as above.   9/20/2024-I had a lengthy discussion with the patient regarding results of CT scans.  Plan:  Continue Aromasin and Ibrance 100mg PO days 3 weeks on/1 week off  Repeat PET scan.  Discussed with radiation oncology if the sites are targetable to SBRT and no other evidence of disease. If so, she will continue exemestane and palbociclib.  PLAN:  RTC with MD in 2 weeks   CBC CMP CA 15-3 CA 27.29 CEA today  Continue  Vitamin B12 inj today and every 4 weeks  Recommend PET Scan next available  Referral to Dr. Felix after PET scan  Repeat CT Chest,Abdomen,Pelvis Scans in 6 weeks   Continue Lomotil 2.5 mg every 6 hours as needed  Continue follow-up with PCP routinely  Continue Eliquis 5 mg BID for history of DVT left upper extremity  Continue follow-up with /Pulmonology, 10/7/24  Continue follow-up with Dr. Manuel Ballesteros/General Surgery, 11/16/24  Continue Aromasin 25mg daily   Continue Ibrance 100mg D1-21 every 28 days  Continue Phenergan 25mg every 6 hrs as needed  Continue Allopurinol 200 mg daily  Follow Up:  Return in about 2 weeks (around 10/4/2024).   Sched PET Scan next available  Then, Referral to Dr. Felix     09/26/2024 - PET Scan:  The PET CT examination demonstrates an increase in the level of activity in the right lung apical lobulated mass seen on the recent CT and prior PET CT exams from SUV max of 3.59 to 6.73. The 2.9 x 2.1 cm left retro hilar nodular mass/adenopathy seen on both exams demonstrates SUV max of 7.03 compared to 3.94 previously.  No additional significantly FDG avid pulmonary parenchymal lesions are seen elsewhere. There are additional modestly positive nodes in the upper mediastinum and subcarinal space listed in the report.  a physiologic distribution of activity in the thorax, as described in the report.   The PET CT examination demonstrates a generally stable, physiologic distribution of activity in the abdomen and pelvis, as described in the report.   The PET CT examination demonstrates a generally stable, physiologic distribution of activity in the included portions of the head and neck, as described in the report.   The PET CT examination demonstrates a generally stable, physiologic distribution of activity in the included portions of the skeleton and extremities,  as described in the report.             History of Grade III Adenocarcinoma of right breast diagnosed 2006  Stage  IIIA, [id number]  ER/LA Positive, HER-2 dell/ihc 1+  Recurrence  April 2013 in the right axillary region  Recurrence:  2014 in the axillary skin  Metastatic disease:  01/05/2017- Metastatic disease with lung, hilar, and axillary lymph node mets      History obtained from  PATIENT, FAMILY, and CHART    PAST MEDICAL HISTORY  Past Medical History:   Diagnosis Date    Breast cancer       PAST SURGICAL HISTORY  Past Surgical History:   Procedure Laterality Date    BREAST LUMPECTOMY      HYSTERECTOMY      MASTECTOMY        FAMILY HISTORY  family history includes Prostate cancer in her father.    SOCIAL HISTORY  Social History     Tobacco Use    Smoking status: Never    Smokeless tobacco: Never   Substance Use Topics    Alcohol use: Not Currently    Drug use: Never     ALLERGIES  Clindamycin/lincomycin     MEDICATIONS  No current outpatient medications on file.  From Pierceo's notes:   doxycycline hyclate (VIBRA-TABS) 100 MG tablet Take 1 tablet by mouth 2 times daily for 4 days 8 tablet 0    doxycycline hyclate (VIBRA-TABS) 100 MG tablet Take 1 tablet by mouth 2 times daily for 7 days 14 tablet 0    methylPREDNISolone (MEDROL DOSEPACK) 4 MG tablet Take by mouth as directed. 1 kit 0    exemestane (AROMASIN) 25 MG tablet TAKE 1 TABLET BY MOUTH EVERY DAY 90 tablet 1    promethazine (PHENERGAN) 25 MG tablet Take 1 tablet by mouth every 6 hours as needed for Nausea 100 tablet 5    Respiratory Therapy Supplies (NEBULIZER/TUBING/MOUTHPIECE) KIT 1 kit by Does not apply route daily as needed (wheezing) 1 kit 2    ipratropium 0.5 mg-albuterol 2.5 mg (DUONEB) 0.5-2.5 (3) MG/3ML SOLN nebulizer solution Inhale 3 mLs into the lungs every 4 hours 120 mL 5    palbociclib (IBRANCE) 100 MG capsule Take 100 mg daily, days 1-21 every 28 days (Patient taking differently: Take 100 mg by mouth daily Take 100 mg daily, days 1-21 every 28 days) 21 capsule 5    HYDROcodone-acetaminophen (NORCO) 7.5-325 MG per tablet Take 1 tablet by mouth every 6  "hours as needed for Pain.        hydrOXYzine HCl (ATARAX) 10 MG tablet Take 1 tablet by mouth 3 times daily as needed for Itching        cyclobenzaprine (FLEXERIL) 10 MG tablet Take 1 tablet by mouth 3 times daily as needed for Muscle spasms        gabapentin (NEURONTIN) 300 MG capsule Take 2 capsules by mouth 3 times daily for 30 days. (Patient taking differently: Take 2 capsules by mouth 3 times daily as needed (neuropathy).) 180 capsule 0    VENTOLIN  (90 Base) MCG/ACT inhaler Inhale 2 puffs into the lungs 4 times daily as needed for Wheezing 18 g 5    pantoprazole (PROTONIX) 40 MG tablet TAKE 1 TABLET BY MOUTH EVERY DAY 90 tablet 0    NARCAN 4 MG/0.1ML LIQD nasal spray 1 spray as needed        ondansetron (ZOFRAN) 4 MG tablet Take 1 tablet by mouth every 8 hours as needed for Nausea or Vomiting        allopurinol (ZYLOPRIM) 100 MG tablet Take 2 tablets by mouth daily (Patient not taking: Reported on 8/5/2024) 60 tablet 3        The following portions of the patient's history were reviewed and updated as appropriate: allergies, current medications, past family history, past medical history, past social history, past surgical history and problem list.    REVIEW OF SYSTEMS  Review of Systems   Constitutional: Negative.    HENT:  Negative.     Eyes: Negative.    Respiratory:  Positive for shortness of breath (with exertion).    Cardiovascular: Negative.    Gastrointestinal: Negative.    Endocrine: Negative.    Genitourinary: Negative.     Musculoskeletal:  Positive for back pain (chronic low back, degenerative disc disease).   Skin: Negative.    Neurological:  Negative for dizziness, headaches and light-headedness.   Hematological: Negative.    Psychiatric/Behavioral: Negative.       I have reviewed and confirmed the accuracy of the ROS as documented by the MA/LPN/RN Lavon Alas MD    PHYSICAL EXAM  VITAL SIGNS:   Vitals:    10/03/24 1450   BP: 125/79   Weight: 75.8 kg (167 lb)   Height: 167.6 cm (66\") "   PainSc:   6   PainLoc: Back  Comment: low back pain d/t degnerative disc disease     Physical Exam  Vitals and nursing note reviewed. Exam conducted with a chaperone present.   Constitutional:       Appearance: Normal appearance.   HENT:      Head: Normocephalic.      Mouth/Throat:      Mouth: Mucous membranes are moist.      Pharynx: Oropharynx is clear.   Eyes:      Extraocular Movements: Extraocular movements intact.      Pupils: Pupils are equal, round, and reactive to light.   Cardiovascular:      Rate and Rhythm: Normal rate and regular rhythm.      Heart sounds: Normal heart sounds.   Pulmonary:      Effort: Pulmonary effort is normal. No respiratory distress.      Breath sounds: Normal breath sounds. No stridor. No wheezing, rhonchi or rales.   Chest:      Chest wall: No tenderness.   Breasts:     Breasts are symmetrical.      Right: No mass.      Left: No mass.      Comments: The breasts are surgically absent bilaterally with bilateral reconstruction with implants.  Abdominal:      Palpations: Abdomen is soft. There is no mass.      Tenderness: There is no abdominal tenderness. There is no guarding.   Musculoskeletal:      Right shoulder: Decreased range of motion.      Left shoulder: Normal.      Cervical back: Normal range of motion. No bony tenderness.      Thoracic back: No bony tenderness.      Lumbar back: No bony tenderness.      Right lower leg: Normal. No edema.      Left lower leg: Normal. No edema.      Comments: Patient has severely restricted range of motion at the right shoulder joint with associated pain tenderness and fibrosis in the right axillary region.   Lymphadenopathy:      Cervical: No cervical adenopathy.      Upper Body:      Right upper body: No supraclavicular or axillary adenopathy.      Left upper body: No supraclavicular or axillary adenopathy.   Neurological:      General: No focal deficit present.      Mental Status: She is alert and oriented to person, place, and time.       Cranial Nerves: No cranial nerve deficit.   Psychiatric:         Mood and Affect: Mood normal.         Behavior: Behavior normal.         Thought Content: Thought content normal.         Judgment: Judgment normal.      Performance Status: ECOG (1) Restricted in physically strenuous activity, ambulatory and able to do work of light nature    Clinical Quality Measures  - Pain Documented by Standardized Tool, FPS Susanna Wood reports a pain score of 6.  Given her pain assessment as noted, treatment options were discussed and the following options were decided upon as a follow-up plan to address the patient's pain: continuation of current treatment plan for pain and use of non-medical modalities (ice, heat, stretching and/or behavior modifications).    - Body Mass Index Screening and Follow-Up Plan  BMI is >= 25 and <30. (Overweight) The following options were offered after discussion;: none (medical contraindication)    - Tobacco Use: Screening and Cessation Intervention  Social History    Tobacco Use      Smoking status: Never      Smokeless tobacco: Never    - Advanced Care Planning Advance Care Planning   ACP discussion was held with the patient during this visit. Patient does not have an advance directive, information provided.    - Depression screening - PHQ-9 Total Score: 0    ASSESSMENT AND PLAN  1. 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    RECOMMENDATIONS: Susanna Wood is a 59-year-old female with a long standing history of right breast cancer status post multimodality therapies that included radiation to the intact right breast and then subsequently radiation to the chest wall and draining lymph nodes following recurrence and mastectomy.  She has known and biopsy-proven lung metastasis identified in January of this year.  She has been treated with systemic therapy to date.  She now has what appears to be solitary  bilateral isolated lung masses that are progressing with no clear evidence of other metastatic masses or lesions.  She has a very good performance status given her longstanding diagnosis and treatments.    Despite her previous radiation therapy, she is a candidate for stereotactic ablative radiation therapy (SABR) to each of the lung masses in a best effort for local control.  The patient will continue systemic therapy with Ibrance and Abraxane per my discussion with Dr. Gross.    The indications and rationale of lung stereotactic radiation therapy according to the NCCN Guidelines has been discussed today. I have extensively reviewed the risks, benefits and alternatives of therapy with this diagnosis. The risks of radiation therapy includes but is not limited to radiation induced pulmonary fibrosis, progression of disease in spite of therapy with either local or systemic failure. I have seen, examined and reviewed this patient's medication list, appropriate labs and imaging studies as well as other physician notes. We discussed the goals and plans of care with the patient and family and answered all questions.     Following this discussion and in consideration of the diagnostic data/evaluation of the patient, I recommended a course of stereotactic radiosurgery to the isolated bilateral lung lesions described above. I anticipate 5000 cGy over 4 treatments each. Will simulate treatment fields with 4D CT with MIPS to begin the planning process, final course pending. Continue ongoing management per primary care physician and other specialists.     Thank you for allowing me to assist in this patients care.     There are no Patient Instructions on file for this visit.    Return in 4 days (on 10/7/2024) for CT simulation for bilateral lung lesions.     Time Spent: I spent over 60 minutes caring for Susanna on this date of service. This time includes time spent by me in the following activities: preparing for the visit,  reviewing tests, obtaining and/or reviewing a separately obtained history, performing a medically appropriate examination and/or evaluation, counseling and educating the patient/family/caregiver, ordering medications, tests, or procedures, documenting information in the medical record, independently interpreting results and communicating that information with the patient/family/caregiver, and care coordination.   Lavon Alas MD  10/03/2024

## 2024-10-03 NOTE — PROGRESS NOTES
Jan 2022,LUE: Chronic appearing deep vein thrombosis (DVT) is seen in the axillary  brachial , left upper extremity(ies).  Subacute appearing superficial thrombophlebitis (SVT) is seen in the  basilic vein, left upper extremity(ies  Off Eliquis.      PLAN:  RTC with MD in  weeks   CBC CMP CA 15-3 CA 27.29 CEA today  Continue Aromasin 25mg  daily   Continue Ibrance 100mg D1-21 every 28 days  Continue Vitamin B12 inj every 4 weeks, next due 10/18/24  Repeat CT Chest, Abdomen, Pelvis Scans in Dec 2024 to assess disease response  Continue Lomotil 2.5 mg every 6 hours as needed  Continue follow-up with PCP routinely  Continue follow-up with /Pulmonology, 10/7/24  Continue follow-up with Dr. Alejandro Garsia/General Surgery, 11/16/24  Continue follow-up with Dr. Kelby Velasco at [facility] for SBRT radiation therapy, 10/7/24  Continue Phenergan 25mg every 6 hrs as needed  Care plan discussed with Dr. Kelby Velasco on 10/4/2024 for coordination of care  Proceed with SBRT treatment    Follow Up:  Return in about 7 weeks (around 11/22/2024) for CBC, CMP, cEA, CA 15-3, CA 27-29 , Appointment with Dr. Jim.   Data Unavailable     IMiroslava am pre charting  as Medical Assistant for Kassi Jim MD. Electronically signed by Miroslava Caro MA on 10/4/2024 at 5:05 PM CDT.    I have seen, examined and reviewed this patient medication list, appropriate labs and imaging studies. I reviewed relevant medical records and others physician’s notes. I discussed the plans of care with the patient. I answered all the questions to the patient’s satisfaction. I have also reviewed the chief complaint (CC) and part of the history (History of Present Illness (HPI), Past Family Social History (PFSH), or Review of Systems (ROS) and made changes when appropriated.       (Please note that portions of this note were completed with a voice recognition program. Efforts were made to edit the dictations but occasionally words are

## 2024-10-03 NOTE — TELEPHONE ENCOUNTER
I called and left patient a detailed voicemail with their appointment date and time for 10/04/24 and to come at the follow up appointment time and not the lab appointment time. I also made them aware of what that time was.  I made patient aware that we are in the [facility] and where it is located and gave the address in case, they use GPS. Left message for patient to call our office if they could not keep this appointment or if they did not know where our new building is located.

## 2024-10-04 ENCOUNTER — HOSPITAL ENCOUNTER (OUTPATIENT)
Dept: INFUSION THERAPY | Age: 59
Discharge: HOME | End: 2024-10-04
Payer: MEDICARE

## 2024-10-04 ENCOUNTER — OFFICE VISIT (OUTPATIENT)
Dept: HEMATOLOGY | Age: 59
End: 2024-10-04

## 2024-10-04 VITALS
TEMPERATURE: 97.5 F | DIASTOLIC BLOOD PRESSURE: 80 MMHG | OXYGEN SATURATION: 97 % | HEART RATE: 86 BPM | HEIGHT: 66 IN | BODY MASS INDEX: 27 KG/M2 | WEIGHT: 168 LBS | SYSTOLIC BLOOD PRESSURE: 120 MMHG

## 2024-10-04 DIAGNOSIS — R53.83 OTHER FATIGUE: ICD-10-CM

## 2024-10-04 DIAGNOSIS — R53.1 WEAKNESS: ICD-10-CM

## 2024-10-04 DIAGNOSIS — R06.02 EXERTIONAL SHORTNESS OF BREATH: ICD-10-CM

## 2024-10-04 DIAGNOSIS — Z51.81 ENCOUNTER FOR MONITORING AROMATASE INHIBITOR THERAPY: ICD-10-CM

## 2024-10-04 DIAGNOSIS — Z71.89 COORDINATION OF COMPLEX CARE: ICD-10-CM

## 2024-10-04 DIAGNOSIS — Z79.811 ENCOUNTER FOR MONITORING AROMATASE INHIBITOR THERAPY: ICD-10-CM

## 2024-10-04 DIAGNOSIS — T45.1X5A CHEMOTHERAPY-INDUCED NEUROPATHY (HCC): ICD-10-CM

## 2024-10-04 DIAGNOSIS — Z71.89 CARE PLAN DISCUSSED WITH PATIENT: ICD-10-CM

## 2024-10-04 DIAGNOSIS — G62.0 CHEMOTHERAPY-INDUCED NEUROPATHY (HCC): ICD-10-CM

## 2024-10-04 DIAGNOSIS — C50.919 CARCINOMA OF BREAST METASTATIC TO LUNG, UNSPECIFIED LATERALITY (HCC): Primary | ICD-10-CM

## 2024-10-04 DIAGNOSIS — C78.00 CARCINOMA OF BREAST METASTATIC TO LUNG, UNSPECIFIED LATERALITY (HCC): Primary | ICD-10-CM

## 2024-10-04 PROCEDURE — 99212 OFFICE O/P EST SF 10 MIN: CPT

## 2024-10-07 ENCOUNTER — HOSPITAL ENCOUNTER (OUTPATIENT)
Dept: RADIATION ONCOLOGY | Facility: HOSPITAL | Age: 59
Setting detail: RADIATION/ONCOLOGY SERIES
Discharge: HOME | End: 2024-10-07
Payer: MEDICARE

## 2024-10-07 ENCOUNTER — OFFICE VISIT (OUTPATIENT)
Dept: PULMONOLOGY | Age: 59
End: 2024-10-07
Payer: MEDICARE

## 2024-10-07 VITALS
SYSTOLIC BLOOD PRESSURE: 154 MMHG | HEIGHT: 66 IN | RESPIRATION RATE: 20 BRPM | WEIGHT: 167 LBS | HEART RATE: 93 BPM | DIASTOLIC BLOOD PRESSURE: 80 MMHG | OXYGEN SATURATION: 98 % | TEMPERATURE: 97 F | BODY MASS INDEX: 26.84 KG/M2

## 2024-10-07 DIAGNOSIS — C78.01 MALIGNANT NEOPLASM METASTATIC TO BOTH LUNGS (HCC): Primary | ICD-10-CM

## 2024-10-07 DIAGNOSIS — C78.02 MALIGNANT NEOPLASM METASTATIC TO BOTH LUNGS (HCC): Primary | ICD-10-CM

## 2024-10-07 DIAGNOSIS — J20.9 ACUTE PURULENT BRONCHITIS: ICD-10-CM

## 2024-10-07 DIAGNOSIS — C50.919 CARCINOMA OF FEMALE BREAST, UNSPECIFIED ESTROGEN RECEPTOR STATUS, UNSPECIFIED LATERALITY, UNSPECIFIED SITE OF BREAST (HCC): ICD-10-CM

## 2024-10-07 DIAGNOSIS — R13.19 ESOPHAGEAL DYSPHAGIA: ICD-10-CM

## 2024-10-07 DIAGNOSIS — C50.912 CARCINOMA OF LEFT BREAST METASTATIC TO LUNG (HCC): ICD-10-CM

## 2024-10-07 DIAGNOSIS — C78.02 CARCINOMA OF LEFT BREAST METASTATIC TO LUNG (HCC): ICD-10-CM

## 2024-10-07 PROCEDURE — 1036F TOBACCO NON-USER: CPT | Performed by: INTERNAL MEDICINE

## 2024-10-07 PROCEDURE — 77263 THER RADIOLOGY TX PLNG CPLX: CPT | Performed by: RADIOLOGY

## 2024-10-07 PROCEDURE — G8427 DOCREV CUR MEDS BY ELIG CLIN: HCPCS | Performed by: INTERNAL MEDICINE

## 2024-10-07 PROCEDURE — 3017F COLORECTAL CA SCREEN DOC REV: CPT | Performed by: INTERNAL MEDICINE

## 2024-10-07 PROCEDURE — G8417 CALC BMI ABV UP PARAM F/U: HCPCS | Performed by: INTERNAL MEDICINE

## 2024-10-07 PROCEDURE — 77334 RADIATION TREATMENT AID(S): CPT | Performed by: RADIOLOGY

## 2024-10-07 PROCEDURE — G8484 FLU IMMUNIZE NO ADMIN: HCPCS | Performed by: INTERNAL MEDICINE

## 2024-10-07 PROCEDURE — 99214 OFFICE O/P EST MOD 30 MIN: CPT | Performed by: INTERNAL MEDICINE

## 2024-10-07 ASSESSMENT — ENCOUNTER SYMPTOMS
SHORTNESS OF BREATH: 0
ABDOMINAL DISTENTION: 0
ABDOMINAL PAIN: 0
APNEA: 0
CHEST TIGHTNESS: 0
ANAL BLEEDING: 0
COUGH: 0
BACK PAIN: 0
RHINORRHEA: 0
WHEEZING: 1

## 2024-10-15 PROCEDURE — 77338 DESIGN MLC DEVICE FOR IMRT: CPT | Performed by: RADIOLOGY

## 2024-10-15 PROCEDURE — 77300 RADIATION THERAPY DOSE PLAN: CPT | Performed by: RADIOLOGY

## 2024-10-15 PROCEDURE — 77293 RESPIRATOR MOTION MGMT SIMUL: CPT | Performed by: RADIOLOGY

## 2024-10-15 PROCEDURE — 77301 RADIOTHERAPY DOSE PLAN IMRT: CPT | Performed by: RADIOLOGY

## 2024-10-15 PROCEDURE — 77470 SPECIAL RADIATION TREATMENT: CPT | Performed by: RADIOLOGY

## 2024-10-16 PROCEDURE — 77470 SPECIAL RADIATION TREATMENT: CPT | Performed by: RADIOLOGY

## 2024-10-21 ENCOUNTER — HOSPITAL ENCOUNTER (OUTPATIENT)
Dept: RADIATION ONCOLOGY | Facility: HOSPITAL | Age: 59
Setting detail: RADIATION/ONCOLOGY SERIES
Discharge: HOME | End: 2024-10-21
Payer: MEDICARE

## 2024-10-21 ENCOUNTER — DOCUMENTATION (OUTPATIENT)
Dept: RADIATION ONCOLOGY | Facility: HOSPITAL | Age: 59
End: 2024-10-21
Payer: MEDICARE

## 2024-10-21 LAB
RAD ONC ARIA COURSE ID: NORMAL
RAD ONC ARIA COURSE INTENT: NORMAL
RAD ONC ARIA COURSE LAST TREATMENT DATE: NORMAL
RAD ONC ARIA COURSE START DATE: NORMAL
RAD ONC ARIA COURSE TREATMENT ELAPSED DAYS: 0
RAD ONC ARIA FIRST TREATMENT DATE: NORMAL
RAD ONC ARIA PLAN FRACTIONS TREATED TO DATE: 1
RAD ONC ARIA PLAN ID: NORMAL
RAD ONC ARIA PLAN PRESCRIBED DOSE PER FRACTION: 7.5 GY
RAD ONC ARIA PLAN PRIMARY REFERENCE POINT: NORMAL
RAD ONC ARIA PLAN TOTAL FRACTIONS PRESCRIBED: 8
RAD ONC ARIA PLAN TOTAL PRESCRIBED DOSE: 6000 CGY
RAD ONC ARIA REFERENCE POINT DOSAGE GIVEN TO DATE: 7.5 GY
RAD ONC ARIA REFERENCE POINT ID: NORMAL
RAD ONC ARIA REFERENCE POINT SESSION DOSAGE GIVEN: 7.5 GY

## 2024-10-21 PROCEDURE — 77014 CHG CT GUIDANCE RADIATION THERAPY FLDS PLACEMENT: CPT | Performed by: RADIOLOGY

## 2024-10-21 PROCEDURE — 77427 RADIATION TX MANAGEMENT X5: CPT | Performed by: RADIOLOGY

## 2024-10-21 PROCEDURE — 77386: CPT | Performed by: RADIOLOGY

## 2024-10-21 NOTE — PROGRESS NOTES
ROVERTO met with Ms. Wood who is here to start radiation. ROVERTO introduced self and explained role and source of support. She is 59 years old and lives alone. She has a strong support system which includes her family, boyfriend, and her Christianity family. She attends [employer]. She does not have transportation concerns. ROVERTO did speak to her regarding gas assistance and looking to apply for a non-medical angelo through the [employer]. Ms. Wood states she does take gabapentin for depression. She is independent with her ADLs. She enjoys spending time with her family, going out to dinner, and going to sporting events. ROVERTO will remain available.

## 2024-10-22 ENCOUNTER — HOSPITAL ENCOUNTER (OUTPATIENT)
Dept: RADIATION ONCOLOGY | Facility: HOSPITAL | Age: 59
Setting detail: RADIATION/ONCOLOGY SERIES
Discharge: HOME | End: 2024-10-22
Payer: MEDICARE

## 2024-10-22 LAB
RAD ONC ARIA COURSE ID: NORMAL
RAD ONC ARIA COURSE INTENT: NORMAL
RAD ONC ARIA COURSE LAST TREATMENT DATE: NORMAL
RAD ONC ARIA COURSE START DATE: NORMAL
RAD ONC ARIA COURSE TREATMENT ELAPSED DAYS: 1
RAD ONC ARIA FIRST TREATMENT DATE: NORMAL
RAD ONC ARIA PLAN FRACTIONS TREATED TO DATE: 1
RAD ONC ARIA PLAN ID: NORMAL
RAD ONC ARIA PLAN PRESCRIBED DOSE PER FRACTION: 7.5 GY
RAD ONC ARIA PLAN PRIMARY REFERENCE POINT: NORMAL
RAD ONC ARIA PLAN TOTAL FRACTIONS PRESCRIBED: 8
RAD ONC ARIA PLAN TOTAL PRESCRIBED DOSE: 6000 CGY
RAD ONC ARIA REFERENCE POINT DOSAGE GIVEN TO DATE: 7.5 GY
RAD ONC ARIA REFERENCE POINT ID: NORMAL
RAD ONC ARIA REFERENCE POINT SESSION DOSAGE GIVEN: 7.5 GY

## 2024-10-22 PROCEDURE — 77014 CHG CT GUIDANCE RADIATION THERAPY FLDS PLACEMENT: CPT | Performed by: RADIOLOGY

## 2024-10-22 PROCEDURE — 77386: CPT | Performed by: RADIOLOGY

## 2024-10-23 ENCOUNTER — APPOINTMENT (OUTPATIENT)
Dept: RADIATION ONCOLOGY | Facility: HOSPITAL | Age: 59
End: 2024-10-23
Payer: MEDICARE

## 2024-10-23 ENCOUNTER — HOSPITAL ENCOUNTER (OUTPATIENT)
Dept: RADIATION ONCOLOGY | Facility: HOSPITAL | Age: 59
Setting detail: RADIATION/ONCOLOGY SERIES
Discharge: HOME | End: 2024-10-23
Payer: MEDICARE

## 2024-10-23 ENCOUNTER — DOCUMENTATION (OUTPATIENT)
Dept: RADIATION ONCOLOGY | Facility: HOSPITAL | Age: 59
End: 2024-10-23
Payer: MEDICARE

## 2024-10-23 LAB
RAD ONC ARIA COURSE ID: NORMAL
RAD ONC ARIA COURSE INTENT: NORMAL
RAD ONC ARIA COURSE LAST TREATMENT DATE: NORMAL
RAD ONC ARIA COURSE START DATE: NORMAL
RAD ONC ARIA COURSE TREATMENT ELAPSED DAYS: 2
RAD ONC ARIA FIRST TREATMENT DATE: NORMAL
RAD ONC ARIA PLAN FRACTIONS TREATED TO DATE: 2
RAD ONC ARIA PLAN ID: NORMAL
RAD ONC ARIA PLAN PRESCRIBED DOSE PER FRACTION: 7.5 GY
RAD ONC ARIA PLAN PRIMARY REFERENCE POINT: NORMAL
RAD ONC ARIA PLAN TOTAL FRACTIONS PRESCRIBED: 8
RAD ONC ARIA PLAN TOTAL PRESCRIBED DOSE: 6000 CGY
RAD ONC ARIA REFERENCE POINT DOSAGE GIVEN TO DATE: 15 GY
RAD ONC ARIA REFERENCE POINT ID: NORMAL
RAD ONC ARIA REFERENCE POINT SESSION DOSAGE GIVEN: 7.5 GY

## 2024-10-23 PROCEDURE — 77386: CPT | Performed by: RADIOLOGY

## 2024-10-23 PROCEDURE — 77014 CHG CT GUIDANCE RADIATION THERAPY FLDS PLACEMENT: CPT | Performed by: RADIOLOGY

## 2024-10-24 ENCOUNTER — HOSPITAL ENCOUNTER (OUTPATIENT)
Dept: RADIATION ONCOLOGY | Facility: HOSPITAL | Age: 59
Setting detail: RADIATION/ONCOLOGY SERIES
Discharge: HOME | End: 2024-10-24
Payer: MEDICARE

## 2024-10-24 LAB
RAD ONC ARIA COURSE ID: NORMAL
RAD ONC ARIA COURSE INTENT: NORMAL
RAD ONC ARIA COURSE LAST TREATMENT DATE: NORMAL
RAD ONC ARIA COURSE START DATE: NORMAL
RAD ONC ARIA COURSE TREATMENT ELAPSED DAYS: 3
RAD ONC ARIA FIRST TREATMENT DATE: NORMAL
RAD ONC ARIA PLAN FRACTIONS TREATED TO DATE: 2
RAD ONC ARIA PLAN ID: NORMAL
RAD ONC ARIA PLAN PRESCRIBED DOSE PER FRACTION: 7.5 GY
RAD ONC ARIA PLAN PRIMARY REFERENCE POINT: NORMAL
RAD ONC ARIA PLAN TOTAL FRACTIONS PRESCRIBED: 8
RAD ONC ARIA PLAN TOTAL PRESCRIBED DOSE: 6000 CGY
RAD ONC ARIA REFERENCE POINT DOSAGE GIVEN TO DATE: 15 GY
RAD ONC ARIA REFERENCE POINT ID: NORMAL
RAD ONC ARIA REFERENCE POINT SESSION DOSAGE GIVEN: 7.5 GY

## 2024-10-24 PROCEDURE — 77386: CPT | Performed by: RADIOLOGY

## 2024-10-24 PROCEDURE — 77014 CHG CT GUIDANCE RADIATION THERAPY FLDS PLACEMENT: CPT | Performed by: RADIOLOGY

## 2024-10-25 ENCOUNTER — HOSPITAL ENCOUNTER (OUTPATIENT)
Dept: RADIATION ONCOLOGY | Facility: HOSPITAL | Age: 59
Setting detail: RADIATION/ONCOLOGY SERIES
Discharge: HOME | End: 2024-10-25
Payer: MEDICARE

## 2024-10-25 LAB
RAD ONC ARIA COURSE ID: NORMAL
RAD ONC ARIA COURSE INTENT: NORMAL
RAD ONC ARIA COURSE LAST TREATMENT DATE: NORMAL
RAD ONC ARIA COURSE START DATE: NORMAL
RAD ONC ARIA COURSE TREATMENT ELAPSED DAYS: 4
RAD ONC ARIA FIRST TREATMENT DATE: NORMAL
RAD ONC ARIA PLAN FRACTIONS TREATED TO DATE: 3
RAD ONC ARIA PLAN ID: NORMAL
RAD ONC ARIA PLAN PRESCRIBED DOSE PER FRACTION: 7.5 GY
RAD ONC ARIA PLAN PRIMARY REFERENCE POINT: NORMAL
RAD ONC ARIA PLAN TOTAL FRACTIONS PRESCRIBED: 8
RAD ONC ARIA PLAN TOTAL PRESCRIBED DOSE: 6000 CGY
RAD ONC ARIA REFERENCE POINT DOSAGE GIVEN TO DATE: 22.5 GY
RAD ONC ARIA REFERENCE POINT ID: NORMAL
RAD ONC ARIA REFERENCE POINT SESSION DOSAGE GIVEN: 7.5 GY

## 2024-10-25 PROCEDURE — 77386: CPT | Performed by: RADIOLOGY

## 2024-10-25 PROCEDURE — 77336 RADIATION PHYSICS CONSULT: CPT | Performed by: RADIOLOGY

## 2024-10-25 PROCEDURE — 77014 CHG CT GUIDANCE RADIATION THERAPY FLDS PLACEMENT: CPT | Performed by: RADIOLOGY

## 2024-10-28 ENCOUNTER — HOSPITAL ENCOUNTER (OUTPATIENT)
Dept: RADIATION ONCOLOGY | Facility: HOSPITAL | Age: 59
Discharge: HOME | End: 2024-10-28
Payer: MEDICARE

## 2024-10-28 LAB
RAD ONC ARIA COURSE ID: NORMAL
RAD ONC ARIA COURSE INTENT: NORMAL
RAD ONC ARIA COURSE LAST TREATMENT DATE: NORMAL
RAD ONC ARIA COURSE START DATE: NORMAL
RAD ONC ARIA COURSE TREATMENT ELAPSED DAYS: 7
RAD ONC ARIA FIRST TREATMENT DATE: NORMAL
RAD ONC ARIA PLAN FRACTIONS TREATED TO DATE: 3
RAD ONC ARIA PLAN ID: NORMAL
RAD ONC ARIA PLAN PRESCRIBED DOSE PER FRACTION: 7.5 GY
RAD ONC ARIA PLAN PRIMARY REFERENCE POINT: NORMAL
RAD ONC ARIA PLAN TOTAL FRACTIONS PRESCRIBED: 8
RAD ONC ARIA PLAN TOTAL PRESCRIBED DOSE: 6000 CGY
RAD ONC ARIA REFERENCE POINT DOSAGE GIVEN TO DATE: 22.5 GY
RAD ONC ARIA REFERENCE POINT ID: NORMAL
RAD ONC ARIA REFERENCE POINT SESSION DOSAGE GIVEN: 7.5 GY

## 2024-10-28 PROCEDURE — 77014 CHG CT GUIDANCE RADIATION THERAPY FLDS PLACEMENT: CPT | Performed by: RADIOLOGY

## 2024-10-28 PROCEDURE — 77386: CPT | Performed by: RADIOLOGY

## 2024-10-28 PROCEDURE — 77427 RADIATION TX MANAGEMENT X5: CPT | Performed by: RADIOLOGY

## 2024-10-29 ENCOUNTER — HOSPITAL ENCOUNTER (OUTPATIENT)
Dept: RADIATION ONCOLOGY | Facility: HOSPITAL | Age: 59
Setting detail: RADIATION/ONCOLOGY SERIES
Discharge: HOME | End: 2024-10-29
Payer: MEDICARE

## 2024-10-29 LAB
RAD ONC ARIA COURSE ID: NORMAL
RAD ONC ARIA COURSE INTENT: NORMAL
RAD ONC ARIA COURSE LAST TREATMENT DATE: NORMAL
RAD ONC ARIA COURSE START DATE: NORMAL
RAD ONC ARIA COURSE TREATMENT ELAPSED DAYS: 8
RAD ONC ARIA FIRST TREATMENT DATE: NORMAL
RAD ONC ARIA PLAN FRACTIONS TREATED TO DATE: 4
RAD ONC ARIA PLAN ID: NORMAL
RAD ONC ARIA PLAN PRESCRIBED DOSE PER FRACTION: 7.5 GY
RAD ONC ARIA PLAN PRIMARY REFERENCE POINT: NORMAL
RAD ONC ARIA PLAN TOTAL FRACTIONS PRESCRIBED: 8
RAD ONC ARIA PLAN TOTAL PRESCRIBED DOSE: 6000 CGY
RAD ONC ARIA REFERENCE POINT DOSAGE GIVEN TO DATE: 30 GY
RAD ONC ARIA REFERENCE POINT ID: NORMAL
RAD ONC ARIA REFERENCE POINT SESSION DOSAGE GIVEN: 7.5 GY

## 2024-10-29 PROCEDURE — 77014 CHG CT GUIDANCE RADIATION THERAPY FLDS PLACEMENT: CPT | Performed by: RADIOLOGY

## 2024-10-29 PROCEDURE — 77386: CPT | Performed by: RADIOLOGY

## 2024-10-30 ENCOUNTER — APPOINTMENT (OUTPATIENT)
Dept: RADIATION ONCOLOGY | Facility: HOSPITAL | Age: 59
End: 2024-10-30
Payer: MEDICARE

## 2024-10-30 ENCOUNTER — HOSPITAL ENCOUNTER (OUTPATIENT)
Dept: RADIATION ONCOLOGY | Facility: HOSPITAL | Age: 59
Setting detail: RADIATION/ONCOLOGY SERIES
Discharge: HOME | End: 2024-10-30
Payer: MEDICARE

## 2024-10-30 LAB
RAD ONC ARIA COURSE ID: NORMAL
RAD ONC ARIA COURSE INTENT: NORMAL
RAD ONC ARIA COURSE LAST TREATMENT DATE: NORMAL
RAD ONC ARIA COURSE START DATE: NORMAL
RAD ONC ARIA COURSE TREATMENT ELAPSED DAYS: 9
RAD ONC ARIA FIRST TREATMENT DATE: NORMAL
RAD ONC ARIA PLAN FRACTIONS TREATED TO DATE: 4
RAD ONC ARIA PLAN ID: NORMAL
RAD ONC ARIA PLAN PRESCRIBED DOSE PER FRACTION: 7.5 GY
RAD ONC ARIA PLAN PRIMARY REFERENCE POINT: NORMAL
RAD ONC ARIA PLAN TOTAL FRACTIONS PRESCRIBED: 8
RAD ONC ARIA PLAN TOTAL PRESCRIBED DOSE: 6000 CGY
RAD ONC ARIA REFERENCE POINT DOSAGE GIVEN TO DATE: 30 GY
RAD ONC ARIA REFERENCE POINT ID: NORMAL
RAD ONC ARIA REFERENCE POINT SESSION DOSAGE GIVEN: 7.5 GY

## 2024-10-30 PROCEDURE — 77386: CPT | Performed by: RADIOLOGY

## 2024-10-30 PROCEDURE — 77014 CHG CT GUIDANCE RADIATION THERAPY FLDS PLACEMENT: CPT | Performed by: RADIOLOGY

## 2024-10-31 ENCOUNTER — HOSPITAL ENCOUNTER (OUTPATIENT)
Dept: RADIATION ONCOLOGY | Facility: HOSPITAL | Age: 59
Discharge: HOME | End: 2024-10-31

## 2024-10-31 LAB
RAD ONC ARIA COURSE ID: NORMAL
RAD ONC ARIA COURSE INTENT: NORMAL
RAD ONC ARIA COURSE LAST TREATMENT DATE: NORMAL
RAD ONC ARIA COURSE START DATE: NORMAL
RAD ONC ARIA COURSE TREATMENT ELAPSED DAYS: 10
RAD ONC ARIA FIRST TREATMENT DATE: NORMAL
RAD ONC ARIA PLAN FRACTIONS TREATED TO DATE: 5
RAD ONC ARIA PLAN ID: NORMAL
RAD ONC ARIA PLAN PRESCRIBED DOSE PER FRACTION: 7.5 GY
RAD ONC ARIA PLAN PRIMARY REFERENCE POINT: NORMAL
RAD ONC ARIA PLAN TOTAL FRACTIONS PRESCRIBED: 8
RAD ONC ARIA PLAN TOTAL PRESCRIBED DOSE: 6000 CGY
RAD ONC ARIA REFERENCE POINT DOSAGE GIVEN TO DATE: 37.5 GY
RAD ONC ARIA REFERENCE POINT ID: NORMAL
RAD ONC ARIA REFERENCE POINT SESSION DOSAGE GIVEN: 7.5 GY

## 2024-10-31 PROCEDURE — 77014 CHG CT GUIDANCE RADIATION THERAPY FLDS PLACEMENT: CPT | Performed by: RADIOLOGY

## 2024-10-31 PROCEDURE — 77336 RADIATION PHYSICS CONSULT: CPT | Performed by: RADIOLOGY

## 2024-10-31 PROCEDURE — 77386: CPT | Performed by: RADIOLOGY

## 2024-11-01 ENCOUNTER — HOSPITAL ENCOUNTER (OUTPATIENT)
Dept: RADIATION ONCOLOGY | Facility: HOSPITAL | Age: 59
Setting detail: RADIATION/ONCOLOGY SERIES
End: 2024-11-01
Payer: MEDICARE

## 2024-11-01 ENCOUNTER — HOSPITAL ENCOUNTER (OUTPATIENT)
Dept: RADIATION ONCOLOGY | Facility: HOSPITAL | Age: 59
Discharge: HOME | End: 2024-11-01

## 2024-11-01 LAB
RAD ONC ARIA COURSE ID: NORMAL
RAD ONC ARIA COURSE INTENT: NORMAL
RAD ONC ARIA COURSE LAST TREATMENT DATE: NORMAL
RAD ONC ARIA COURSE START DATE: NORMAL
RAD ONC ARIA COURSE TREATMENT ELAPSED DAYS: 11
RAD ONC ARIA FIRST TREATMENT DATE: NORMAL
RAD ONC ARIA PLAN FRACTIONS TREATED TO DATE: 5
RAD ONC ARIA PLAN ID: NORMAL
RAD ONC ARIA PLAN PRESCRIBED DOSE PER FRACTION: 7.5 GY
RAD ONC ARIA PLAN PRIMARY REFERENCE POINT: NORMAL
RAD ONC ARIA PLAN TOTAL FRACTIONS PRESCRIBED: 8
RAD ONC ARIA PLAN TOTAL PRESCRIBED DOSE: 6000 CGY
RAD ONC ARIA REFERENCE POINT DOSAGE GIVEN TO DATE: 37.5 GY
RAD ONC ARIA REFERENCE POINT ID: NORMAL
RAD ONC ARIA REFERENCE POINT SESSION DOSAGE GIVEN: 7.5 GY

## 2024-11-01 PROCEDURE — 77014 CHG CT GUIDANCE RADIATION THERAPY FLDS PLACEMENT: CPT | Performed by: RADIOLOGY

## 2024-11-01 PROCEDURE — 77386: CPT | Performed by: RADIOLOGY

## 2024-11-04 ENCOUNTER — HOSPITAL ENCOUNTER (OUTPATIENT)
Dept: RADIATION ONCOLOGY | Facility: HOSPITAL | Age: 59
Setting detail: RADIATION/ONCOLOGY SERIES
Discharge: HOME | End: 2024-11-04
Payer: MEDICARE

## 2024-11-04 LAB
RAD ONC ARIA COURSE ID: NORMAL
RAD ONC ARIA COURSE INTENT: NORMAL
RAD ONC ARIA COURSE LAST TREATMENT DATE: NORMAL
RAD ONC ARIA COURSE START DATE: NORMAL
RAD ONC ARIA COURSE TREATMENT ELAPSED DAYS: 14
RAD ONC ARIA FIRST TREATMENT DATE: NORMAL
RAD ONC ARIA PLAN FRACTIONS TREATED TO DATE: 6
RAD ONC ARIA PLAN ID: NORMAL
RAD ONC ARIA PLAN PRESCRIBED DOSE PER FRACTION: 7.5 GY
RAD ONC ARIA PLAN PRIMARY REFERENCE POINT: NORMAL
RAD ONC ARIA PLAN TOTAL FRACTIONS PRESCRIBED: 8
RAD ONC ARIA PLAN TOTAL PRESCRIBED DOSE: 6000 CGY
RAD ONC ARIA REFERENCE POINT DOSAGE GIVEN TO DATE: 45 GY
RAD ONC ARIA REFERENCE POINT ID: NORMAL
RAD ONC ARIA REFERENCE POINT SESSION DOSAGE GIVEN: 7.5 GY

## 2024-11-04 PROCEDURE — 77386: CPT | Performed by: RADIOLOGY

## 2024-11-04 PROCEDURE — 77014 CHG CT GUIDANCE RADIATION THERAPY FLDS PLACEMENT: CPT | Performed by: RADIOLOGY

## 2024-11-05 ENCOUNTER — HOSPITAL ENCOUNTER (OUTPATIENT)
Dept: RADIATION ONCOLOGY | Facility: HOSPITAL | Age: 59
Setting detail: RADIATION/ONCOLOGY SERIES
Discharge: HOME | End: 2024-11-05
Payer: MEDICARE

## 2024-11-05 LAB
RAD ONC ARIA COURSE ID: NORMAL
RAD ONC ARIA COURSE INTENT: NORMAL
RAD ONC ARIA COURSE LAST TREATMENT DATE: NORMAL
RAD ONC ARIA COURSE START DATE: NORMAL
RAD ONC ARIA COURSE TREATMENT ELAPSED DAYS: 15
RAD ONC ARIA FIRST TREATMENT DATE: NORMAL
RAD ONC ARIA PLAN FRACTIONS TREATED TO DATE: 6
RAD ONC ARIA PLAN ID: NORMAL
RAD ONC ARIA PLAN PRESCRIBED DOSE PER FRACTION: 7.5 GY
RAD ONC ARIA PLAN PRIMARY REFERENCE POINT: NORMAL
RAD ONC ARIA PLAN TOTAL FRACTIONS PRESCRIBED: 8
RAD ONC ARIA PLAN TOTAL PRESCRIBED DOSE: 6000 CGY
RAD ONC ARIA REFERENCE POINT DOSAGE GIVEN TO DATE: 45 GY
RAD ONC ARIA REFERENCE POINT ID: NORMAL
RAD ONC ARIA REFERENCE POINT SESSION DOSAGE GIVEN: 7.5 GY

## 2024-11-05 PROCEDURE — 77386: CPT | Performed by: RADIOLOGY

## 2024-11-05 PROCEDURE — 77014 CHG CT GUIDANCE RADIATION THERAPY FLDS PLACEMENT: CPT | Performed by: RADIOLOGY

## 2024-11-06 ENCOUNTER — TELEPHONE (OUTPATIENT)
Dept: INTERNAL MEDICINE | Age: 59
End: 2024-11-06

## 2024-11-06 ENCOUNTER — HOSPITAL ENCOUNTER (OUTPATIENT)
Dept: RADIATION ONCOLOGY | Facility: HOSPITAL | Age: 59
Setting detail: RADIATION/ONCOLOGY SERIES
Discharge: HOME | End: 2024-11-06
Payer: MEDICARE

## 2024-11-06 ENCOUNTER — OFFICE VISIT (OUTPATIENT)
Dept: SURGERY | Age: 59
End: 2024-11-06

## 2024-11-06 VITALS — HEART RATE: 72 BPM | BODY MASS INDEX: 26.52 KG/M2 | OXYGEN SATURATION: 93 % | HEIGHT: 66 IN | WEIGHT: 165 LBS

## 2024-11-06 DIAGNOSIS — N65.0 DEFORMITY AND DISPROPORTION OF RECONSTRUCTED BREAST: ICD-10-CM

## 2024-11-06 DIAGNOSIS — C78.02 MALIGNANT NEOPLASM METASTATIC TO BOTH LUNGS (HCC): ICD-10-CM

## 2024-11-06 DIAGNOSIS — Z98.890 STATUS POST BILATERAL BREAST RECONSTRUCTION: ICD-10-CM

## 2024-11-06 DIAGNOSIS — N65.1 DEFORMITY AND DISPROPORTION OF RECONSTRUCTED BREAST: ICD-10-CM

## 2024-11-06 DIAGNOSIS — Z90.13 STATUS POST BILATERAL MASTECTOMY: Primary | ICD-10-CM

## 2024-11-06 DIAGNOSIS — Z98.890 S/P BREAST RECONSTRUCTION, LEFT: ICD-10-CM

## 2024-11-06 DIAGNOSIS — C78.01 MALIGNANT NEOPLASM METASTATIC TO BOTH LUNGS (HCC): ICD-10-CM

## 2024-11-06 LAB
RAD ONC ARIA COURSE ID: NORMAL
RAD ONC ARIA COURSE INTENT: NORMAL
RAD ONC ARIA COURSE LAST TREATMENT DATE: NORMAL
RAD ONC ARIA COURSE START DATE: NORMAL
RAD ONC ARIA COURSE TREATMENT ELAPSED DAYS: 16
RAD ONC ARIA FIRST TREATMENT DATE: NORMAL
RAD ONC ARIA PLAN FRACTIONS TREATED TO DATE: 7
RAD ONC ARIA PLAN ID: NORMAL
RAD ONC ARIA PLAN PRESCRIBED DOSE PER FRACTION: 7.5 GY
RAD ONC ARIA PLAN PRIMARY REFERENCE POINT: NORMAL
RAD ONC ARIA PLAN TOTAL FRACTIONS PRESCRIBED: 8
RAD ONC ARIA PLAN TOTAL PRESCRIBED DOSE: 6000 CGY
RAD ONC ARIA REFERENCE POINT DOSAGE GIVEN TO DATE: 52.5 GY
RAD ONC ARIA REFERENCE POINT ID: NORMAL
RAD ONC ARIA REFERENCE POINT SESSION DOSAGE GIVEN: 7.5 GY

## 2024-11-06 PROCEDURE — 77386: CPT | Performed by: RADIOLOGY

## 2024-11-06 PROCEDURE — 77014 CHG CT GUIDANCE RADIATION THERAPY FLDS PLACEMENT: CPT | Performed by: RADIOLOGY

## 2024-11-06 RX ORDER — FLUCONAZOLE 150 MG/1
150 TABLET ORAL DAILY
Qty: 3 TABLET | Refills: 0 | Status: SHIPPED | OUTPATIENT
Start: 2024-11-06 | End: 2024-11-09

## 2024-11-06 NOTE — TELEPHONE ENCOUNTER
Zahida is out, Charlton is on call.     Pt states she is having vaginal itching and was wanting something called in, pt states she has a possible yeast infection     Please advise

## 2024-11-06 NOTE — PROGRESS NOTES
revision of left mastectomy reconstruction due to lateral drift on 9/17/2021.  She had placement of a 700 cc Natrelle soft touch implant and a crescent mastopexy on the left.  She also had liposuction of an excessive fat pad adjacent to her reconstruction.    PHYSICAL EXAM:  The  wounds look good with no evidence of infection, fluid accumulation, or skin necrosis.   The left inferior breast is much better with the additional fat.    IMPRESSION:    Doing well s/p Exploration repositioning of right nipple   Postmastectomy deformity inferior left reconstructed breast    PLAN: Follow-up in 6 months for physical exam  Proceed with stereotactic radiation to her lung metastases.  We will discuss further fat grafting and other interventions in 6 months    I have seen, examined and reviewed this patient medication list, appropriate labs and imaging studies. I reviewed relevant medical records and others physician’s notes. I discussed the plans of care with the patient. I answered all the questions to the patient’s satisfaction.  I, Dr Alejandro Garsia, personally performed the services described in this documentation as scribed by Jenna Moses MA in my presence and is both accurate and complete.     (Please note that portions of this note were completed with a voice recognition program. Efforts were made to edit the dictations but occasionally words are mis-transcribed.)  Over 50% of the total visit time of 25 minutes in face to face encounter with the patient, out of which more than 50% of the time was spent in counseling patient or family and coordination of care. Counseling included but was not limited to time spent reviewing labs, imaging studies/ treatment plan and answering questions.

## 2024-11-07 ENCOUNTER — HOSPITAL ENCOUNTER (OUTPATIENT)
Dept: RADIATION ONCOLOGY | Facility: HOSPITAL | Age: 59
Discharge: HOME | End: 2024-11-07

## 2024-11-07 LAB
RAD ONC ARIA COURSE ID: NORMAL
RAD ONC ARIA COURSE INTENT: NORMAL
RAD ONC ARIA COURSE LAST TREATMENT DATE: NORMAL
RAD ONC ARIA COURSE START DATE: NORMAL
RAD ONC ARIA COURSE TREATMENT ELAPSED DAYS: 17
RAD ONC ARIA FIRST TREATMENT DATE: NORMAL
RAD ONC ARIA PLAN FRACTIONS TREATED TO DATE: 7
RAD ONC ARIA PLAN ID: NORMAL
RAD ONC ARIA PLAN PRESCRIBED DOSE PER FRACTION: 7.5 GY
RAD ONC ARIA PLAN PRIMARY REFERENCE POINT: NORMAL
RAD ONC ARIA PLAN TOTAL FRACTIONS PRESCRIBED: 8
RAD ONC ARIA PLAN TOTAL PRESCRIBED DOSE: 6000 CGY
RAD ONC ARIA REFERENCE POINT DOSAGE GIVEN TO DATE: 52.5 GY
RAD ONC ARIA REFERENCE POINT ID: NORMAL
RAD ONC ARIA REFERENCE POINT SESSION DOSAGE GIVEN: 7.5 GY

## 2024-11-07 PROCEDURE — 77386: CPT | Performed by: RADIOLOGY

## 2024-11-07 PROCEDURE — 77014 CHG CT GUIDANCE RADIATION THERAPY FLDS PLACEMENT: CPT | Performed by: RADIOLOGY

## 2024-11-07 PROCEDURE — 77336 RADIATION PHYSICS CONSULT: CPT | Performed by: RADIOLOGY

## 2024-11-07 NOTE — TELEPHONE ENCOUNTER
Received VM from patient voicing that the medication was not in yesterday. I called and LVM on secure VM informing patient that medication was sent in to [facility] on [address] yesterday afternoon

## 2024-11-08 ENCOUNTER — HOSPITAL ENCOUNTER (OUTPATIENT)
Dept: RADIATION ONCOLOGY | Facility: HOSPITAL | Age: 59
Setting detail: RADIATION/ONCOLOGY SERIES
Discharge: HOME | End: 2024-11-08
Payer: MEDICARE

## 2024-11-08 LAB
RAD ONC ARIA COURSE ID: NORMAL
RAD ONC ARIA COURSE INTENT: NORMAL
RAD ONC ARIA COURSE LAST TREATMENT DATE: NORMAL
RAD ONC ARIA COURSE START DATE: NORMAL
RAD ONC ARIA COURSE TREATMENT ELAPSED DAYS: 18
RAD ONC ARIA FIRST TREATMENT DATE: NORMAL
RAD ONC ARIA PLAN FRACTIONS TREATED TO DATE: 8
RAD ONC ARIA PLAN ID: NORMAL
RAD ONC ARIA PLAN PRESCRIBED DOSE PER FRACTION: 7.5 GY
RAD ONC ARIA PLAN PRIMARY REFERENCE POINT: NORMAL
RAD ONC ARIA PLAN TOTAL FRACTIONS PRESCRIBED: 8
RAD ONC ARIA PLAN TOTAL PRESCRIBED DOSE: 6000 CGY
RAD ONC ARIA REFERENCE POINT DOSAGE GIVEN TO DATE: 60 GY
RAD ONC ARIA REFERENCE POINT ID: NORMAL
RAD ONC ARIA REFERENCE POINT SESSION DOSAGE GIVEN: 7.5 GY

## 2024-11-08 PROCEDURE — 77014 CHG CT GUIDANCE RADIATION THERAPY FLDS PLACEMENT: CPT | Performed by: RADIOLOGY

## 2024-11-08 PROCEDURE — 77386: CPT | Performed by: RADIOLOGY

## 2024-11-11 ENCOUNTER — HOSPITAL ENCOUNTER (OUTPATIENT)
Dept: RADIATION ONCOLOGY | Facility: HOSPITAL | Age: 59
Setting detail: RADIATION/ONCOLOGY SERIES
Discharge: HOME | End: 2024-11-11
Payer: MEDICARE

## 2024-11-11 LAB
RAD ONC ARIA COURSE ID: NORMAL
RAD ONC ARIA COURSE INTENT: NORMAL
RAD ONC ARIA COURSE LAST TREATMENT DATE: NORMAL
RAD ONC ARIA COURSE START DATE: NORMAL
RAD ONC ARIA COURSE TREATMENT ELAPSED DAYS: 21
RAD ONC ARIA FIRST TREATMENT DATE: NORMAL
RAD ONC ARIA PLAN FRACTIONS TREATED TO DATE: 8
RAD ONC ARIA PLAN ID: NORMAL
RAD ONC ARIA PLAN PRESCRIBED DOSE PER FRACTION: 7.5 GY
RAD ONC ARIA PLAN PRIMARY REFERENCE POINT: NORMAL
RAD ONC ARIA PLAN TOTAL FRACTIONS PRESCRIBED: 8
RAD ONC ARIA PLAN TOTAL PRESCRIBED DOSE: 6000 CGY
RAD ONC ARIA REFERENCE POINT DOSAGE GIVEN TO DATE: 60 GY
RAD ONC ARIA REFERENCE POINT ID: NORMAL
RAD ONC ARIA REFERENCE POINT SESSION DOSAGE GIVEN: 7.5 GY

## 2024-11-11 PROCEDURE — 77336 RADIATION PHYSICS CONSULT: CPT | Performed by: RADIOLOGY

## 2024-11-11 PROCEDURE — 77386: CPT | Performed by: RADIOLOGY

## 2024-11-11 PROCEDURE — 77014 CHG CT GUIDANCE RADIATION THERAPY FLDS PLACEMENT: CPT | Performed by: RADIOLOGY

## 2024-11-12 ENCOUNTER — APPOINTMENT (OUTPATIENT)
Dept: RADIATION ONCOLOGY | Facility: HOSPITAL | Age: 59
End: 2024-11-12
Payer: MEDICARE

## 2024-11-21 LAB
RAD ONC ARIA COURSE END DATE: NORMAL
RAD ONC ARIA COURSE ID: NORMAL
RAD ONC ARIA COURSE INTENT: NORMAL
RAD ONC ARIA COURSE LAST TREATMENT DATE: NORMAL
RAD ONC ARIA COURSE START DATE: NORMAL
RAD ONC ARIA COURSE TREATMENT ELAPSED DAYS: 21
RAD ONC ARIA FIRST TREATMENT DATE: NORMAL
RAD ONC ARIA PLAN FRACTIONS TREATED TO DATE: 8
RAD ONC ARIA PLAN FRACTIONS TREATED TO DATE: 8
RAD ONC ARIA PLAN ID: NORMAL
RAD ONC ARIA PLAN ID: NORMAL
RAD ONC ARIA PLAN NAME: NORMAL
RAD ONC ARIA PLAN NAME: NORMAL
RAD ONC ARIA PLAN PRESCRIBED DOSE PER FRACTION: 7.5 GY
RAD ONC ARIA PLAN PRESCRIBED DOSE PER FRACTION: 7.5 GY
RAD ONC ARIA PLAN PRIMARY REFERENCE POINT: NORMAL
RAD ONC ARIA PLAN PRIMARY REFERENCE POINT: NORMAL
RAD ONC ARIA PLAN TOTAL FRACTIONS PRESCRIBED: 8
RAD ONC ARIA PLAN TOTAL FRACTIONS PRESCRIBED: 8
RAD ONC ARIA PLAN TOTAL PRESCRIBED DOSE: 6000 CGY
RAD ONC ARIA PLAN TOTAL PRESCRIBED DOSE: 6000 CGY
RAD ONC ARIA REFERENCE POINT DOSAGE GIVEN TO DATE: 60 GY
RAD ONC ARIA REFERENCE POINT DOSAGE GIVEN TO DATE: 60 GY
RAD ONC ARIA REFERENCE POINT ID: NORMAL
RAD ONC ARIA REFERENCE POINT ID: NORMAL

## 2024-11-27 ENCOUNTER — TELEPHONE (OUTPATIENT)
Dept: INFUSION THERAPY | Age: 59
End: 2024-11-27

## 2024-11-27 DIAGNOSIS — C78.00 MALIGNANT NEOPLASM OF BREAST METASTATIC TO LUNG (HCC): Primary | ICD-10-CM

## 2024-11-27 DIAGNOSIS — C50.919 MALIGNANT NEOPLASM OF BREAST METASTATIC TO LUNG (HCC): Primary | ICD-10-CM

## 2024-11-27 NOTE — TELEPHONE ENCOUNTER
Pt called today stating that she sees Dr Jim in Dec and thought she was supposed to have scans prior.   Pt was supposed to be seen in November but did not have that appointment.  BERNARD/w KAMI Tapia RN.  She will order scans and the  will notify her of date/time.   Patient aware.   Voiced understanding.    
yes

## 2024-12-12 ENCOUNTER — TELEPHONE (OUTPATIENT)
Dept: HEMATOLOGY | Age: 59
End: 2024-12-12

## 2024-12-12 PROBLEM — Z92.3 HISTORY OF RADIATION THERAPY: Status: ACTIVE | Noted: 2024-12-12

## 2024-12-12 PROBLEM — Z78.9 NON-SMOKER: Status: ACTIVE | Noted: 2024-12-12

## 2024-12-12 NOTE — PROGRESS NOTES
[facility]  Radiation Oncology Clinic   Arnoldo Alas MD, FACR  Ronnie Ruslan CRISOSTOMO  _______________________________________________  [facility]  Department of Radiation Oncology  [address] [place] [id number]  Office: [phone]  Fax: [phone]    DATE: 12/17/2024  PATIENT: Susanna Wood  1965                         MEDICAL RECORD #: [id number]    1. Carcinoma of left breast metastatic to lung    2. History of radiation therapy    3. Non-smoker                                           REASON FOR VISIT:    Chief Complaint   Patient presents with    Breast Cancer     Complete 6000 cGy in 8 fx to left and right lung on 11-     Reason for Follow up Visit:  Susanna Wood is a very pleasant 59 y.o. patient that completed radiation to the lung and returns to the clinic today for inital follow up exam. Reports cough, SOB, and wheezing. Denies appetite change, unexpected weight change, nasuea/vomiting, diarrhea, light-headedness, weakness, and headaches. She continues to follow , , and Juan Simpson MD.    History of Present Illness:  Diagnosed with metastatic lung nodules. She completed 6000 cGy in 8 fractions to the left/right lung on 11/11/2024.     History of Grade III Adenocarcinoma of right breast diagnosed 2006  Stage IIIA, [id number]  ER/HI Positive, HER-2 dell/ihc 1+  Recurrence  April 2013 in the right axillary region  Recurrence:  2014 in the axillary skin  Metastatic disease:  01/05/2017- Metastatic disease with lung, hilar, and axillary lymph node mets        2006 - Neoadjuvant chemotherapy with AC x 4 followed by Taxol in [place]    2007 - Lumpectomy with axillary lymph node dissection  1.9 cm grade 2 with no lymphovascular space invasion  1/14 lymph nodes positive for malignancy  ER, HI +  (patient did not take hormonal therapy due to cost)  Her-2 neg    2007 -   Completed Radiation course:  Adjuvant radiation to whole breast/axillary region    Recurrence:  04/2013 - Noted a mass in right axillary region, slightly tender to palpation.    04/2013 - Lymph node biopsy:  Adenocarcinoma     2013 - Neoadjuvant Taxotere x 2, followed by Doxil    10/2013 - PET Scan:  2.8 cm right axillary lymph node with uptake    10/14/2013 - Mastectomy and axillary dissection:  Right breast:  benign with fibrosis, no malignancy  Axillary contents demonstrated invasive ductal carcinoma involving fibroadipose tissue and tendinous tissues, 3 benign lymph nodes  Fulton grade 3  3.0 cm in greatest gross dimensions  Carcinoma permeates among soft tissues in the right axilla with no apparent involvement of lymph nodes    11/04/2013 - Appointment with Dr. Azeem Watts/Onc - [facility] [place]:  She will see Dr. Butler for chest wall and axillary LN XRT. She will start tamoxifen after radiation.  Will also check PET/CT scan.  She will follow-up in 2 weeks.    11/11/2013 - PET Scan:  Unremarkable    12/2013 - 01/23/2014 - Completed Radiation course:  Adjuvant radiation to chest wall and axillary lymph nodes    02/2014 - Hormonal Therapy course:  Tamoxifen    Recurrence:  2014 - Surgical resection of the axillary skin pathology demonstrating tumor at cauterized margin    09/19/2016 - Bilateral exchange, nipple reconstruction and fat grafting and removal of PAC    2016 - Hormonal Therapy - Switched to aromatase inhibitor, patient was non-compliant    01/11/2017 - 06/17/2018 - Chemotherapy course:  Single agent Abraxane x 13 cycles    03/15/2017 - CT Chest:  The main finding is interval decrease in maximal diameter of essentially all of the multiple metastatic pulmonary nodules.   The average difference in approximately 25%. Some of the much smaller ones have disappeared.   There are no airspace infiltrates. There are no effusions.   Bilateral hilar and aortopulmonary window adenopathy is also similarly  smaller.    06/07/2017 - CT Chest:  Multiple lung nodules seen bilaterally.  Most of the lung nodules show interval improvement with decreased size by 1-3 mm.   Mediastinal and bilateral hilar adenopathy seen with improvement compared to prior examination as well.  No pleural or pericardial fluid seen. No airspace filling opacities seen. No infiltrates seen with no acute inflammatory processes seen. Correlate clinically. Consider further workup depending on clinical findings.    07/24/2017 - PET Scan:  Marked improvement   Minimally abnormal  Has excellent response to chemo    02/01/2018 - PET Scan:  Numerous bilateral lung masses and nodules, the majority of which do not have appreciable abnormal FDG uptake.   The largest mass in the left infrahilar region of the left lower lobe demonstrates a maximum SUV of 3.3.   Mediastinal lymphadenopathy.   No evidence of metastatic disease in the abdomen or pelvis    06/21/2018 - CT Chest:  Compared to previous exam of 6/7/17, there is a interval increase in number of nodules, size of many lesions, interval development of bilateral small pleural effusions. Also increase in size of mediastinal lymph nodes.    06/21/2018 - CT Abdomen/Pelvis:  There are no masses in the liver, pancreas, or either kidney. There are no adrenal nodules. There is no inflammatory process in the abdomen or pelvis.   There is no ascites. A normal appendix was seen.   There is no bowel obstruction.   There are left-sided colonic diverticula but diverticulitis.    06/27/2018 - 09/17/2018 - Chemotherapy course:  Gemzar x 3, dc'd due to progression of disease    09/28/2018 - CT Angio Chest:  Multiple lung mets, mild bilateral hilar and mediastinal LN mets    10/10/2018 -  Chemotherapy course:  Ibrance/Faslodex    12/14/2018 - CT Chest:  Definite decrease in the maximal diameter and volume of all of the left and right metastatic nodules. No interval development of any new nodules.   There are no pulmonary  infiltrates at this time. There are no effusions.   Somewhat enlarged paratracheal and left axillary lymph nodes are relatively unchanged.    04/22/2019 - MRI Brain:  There is no acute hemorrhage or infarct or midline shift. No enhancing lesions in the brain and there is no evidence of metastatic disease.  Periventricular and subcortical hyperintensity white matter changes. Findings could be due to chronic small vessel disease although multiple sclerosis is not excluded.  Overall there is no significant change from prior study.    04/22/20219 - MRI Cervical Spine:  Unremarkable     07/27/2019 - CT Neck:  Mild degenerative disc disease C6-C7, C7-T1  Vertebral bodies are normal height  No compression deformity seen  No spondylolisthesis is seen  No focal bony lesions seen  Consider follow-up depending on clinical findings    07/27/2019 - CT Angio Chest:  There is no pulmonary embolism or lower extremity DVT  There is no aortic dissection  There are no pulmonary infiltrates or effusions  There are no findings of acute congestive heart failure  When compared to the study of 1/5/17 there has been essentially complete disappearance of the pulmonary metastatic disease  Several tiny, flat peripheral nodules are the only sequela.  The tiny ones on the right are unchanged.  The tiny ones on the left are even smaller than on the study of 12/14/18  There is no longer any active metastatic disease in either lung    12/19/2019 - CT Chest:  Small tiny subpleural nodules right upper and right midlung field as well as left lung base has remained unchanged compared to last CT pulmonary angiogram. No new focal parenchymal abnormality seen read no pleural effusion seen. No adenopathy seen.    12/19/2019 - CT Abdomen/Pelvis:  There is no abdominal or pelvic mass, adenopathy, or fluid collection.  While there are no lytic or sclerotic bony lesion, possibility of osteopenia and/or osteoporosis is raised. Additional evaluation by DEXA bone  densitometry study would be of benefit.    02/25/2021 - CT Chest with contrast:  Tiny nodules in the right lung described above probably represent small foci of pleural thickening due to chronic inflammatory process or small noncalcified granulomas. No features to suggest malignancy or metastatic disease.   Bilateral breast implants without complication.     02/25/2021 - CT Abdomen/Pelvis with contrast:  No evidence of metastatic disease in the abdomen or pelvis.     08/19/2021 - CT Abdomen/Pelvis with contrast:  A stable CT scan of the abdomen and pelvis. No finding to suggest neoplastic/metastatic disease.     08/19/2021 - CT Chest with contrast:  Left lower lobe pleural-based nodular 1.7 x 0.9 cm is indeterminate. PET/CT recommended.   Feeding defect in the left lower lobe bronchus versus a postobstructive airspace disease. This too may be further characterized with PET CT versus direct visualization.     09/30/2021 - CT Chest with contrast:  Similar left lower lobe pleural-based nodule or opacity. This appears less rounded than prior.   Left chest wall collaterals suggest chronic left subclavian vein stenosis.   Coronary artery calcifications.     11/19/2021 - CT Angiogram Chest:  No evidence of pulmonary embolism.   The lungs are poorly expanded but unremarkable.     12/08/2021 - MRI Lumbar Spine without contrast:  No significant change since the previous study.   Chronic degenerative changes.   A persistent mild degenerative retrolisthesis is of L5 over S1.   Prominent disc osteophyte complexes and facetal arthropathy and resultant mild spinal stenosis at L4-5 and neural foraminal stenosis at L4-5 and L5-S1 as detailed above.     01/06/2022 - CT Chest without contrast:  Stable chest CT with an unchanged tiny subpleural nodule within the right upper lobe.     02/25/2022 - CT Abdomen/Pelvis without contrast:  Gastric wall thickening is nonspecific. It could be an artifact of under distention. Gastritis and other  etiologies are not ruled out.   The unenhanced solid organs demonstrate no focal abnormality.   Diverticulosis of the colon. Under distention of portions of the colon.   Prior hysterectomy.   Mild sclerotic changes in the subarticular femoral heads bilaterally is stable. Early AVN is possible. MRI would be more definitive. The signal abnormality is greater on the right side.     05/10/2022 -  Skin, revision of left chest scar:   Cicatrix identified with foreign body type response, negative for evidence of malignancy.     07/12/2022 - CT Chest without contrast:  Apical fibrosis.   Focal fibrosis in subpleural location in right lower lobe due to thoracic osteophytes.   Atelectatic bands in bilateral lower lobes.   Atherosclerotic wall calcifications in arch of aorta and coronaries.   Degenerative changes in spine.     07/18/2022 - CT Chest with contrast:  Bibasilar subsegmental atelectasis, stable and unchanged   Prominent osteophyte formation out in the lower thoracic spine with associated subsegmental atelectasis   No pulmonary nodules, masses, pleural effusion or pneumothorax   Bilateral breast implants   Stable interval appearance since recent study 7/12/22 07/18/2022 - CT Abdomen/Pelvis with contrast:  Fecal loaded colon possible constipation.   Scattered colon diverticulosis. No acute diverticulitis.   No acute abdominopelvic abnormality .     09/30/2022 - CT Head without contrast:  No acute hemorrhage     02/01/2023 - CT Chest with contrast:  A 2.2 cm solid pulmonary nodule in the inferomedial left lower lobe. PET/CT versus tissue sampling is recommended for further characterization. Metastatic disease cannot be excluded.     02/01/2023 - CT Abdomen/Pelvis with contrast:  No evidence of metastatic disease in the abdomen or pelvis.     02/01/2023 - Bone Scan:  No findings to suggest osteoblastic metastatic disease.     02/06/2023 - PET Scan:   Left medial lower lobe nodule in the superior segment of the left  lower lobe has increased FDG uptake max SUV 3.9 measures 1.5 x 1.1 cm.  No additional suspicious focal hypermetabolic uptake or pathologic appearing adenopathy otherwise noted.     02/22/2023 - CT Chest without contrast:  No suspicious masses or nodules.  Lungs are clear.   Follow up as clinically indicated.     02/23/2023 - Bronchoscopy/EBUS:  Mass, left lower lobe superior segment, EBUS (ThinPrep and cell block section):   No malignant cells identified.   Benign bronchial epithelial cells and alveolar macrophages in a background of fibrin.     06/08/2023 - CT Chest without contrast:  Stable bilateral lung nodules measuring up to 1.9 x 1.5 cm. No new or enlarging lung nodule identified.   Stable mildly prominent left subpectoral lymph nodes.   Additional chronic and incidental findings is also stable, as detailed above.     06/08/2023 - CT Abdomen/Pelvis with contrast:  No evidence of visceral metastatic disease abdomen or pelvis.   Areas of sclerosis in the bilateral femoral heads may suggest avascular necrosis.  Several scattered sclerotic foci in the pelvis bilaterally.  Given findings of suspected avascular necrosis and bone islands versus bone metastases in the breast cancer patient without prior imaging for comparison, recommend MRI of the pelvis and bilateral hips without and with contrast.     12/14/2023 - CT Chest without contrast:  Right upper lobe and left lower lobe nodules show significant interval increase in size.  Interval progression is suggestive of metastatic disease.  PET-CT or CT guided biopsy is recommended for further workup.   7 mm nodule in the right lower lobe appears grossly stable.     12/14/2023 - CT Abdomen/Pelvis with contrast:  No evidence of metastatic disease in the abdomen or pelvis.   Focal narrowing of the gastric body is identified.  This is favored to represent peristalsis.  Attention on follow-up imaging is recommended.     12/14/2023 - Bone Scan:  The thoracic and lumbar  spine demonstrate age appropriate findings as discussed in the report.   There are normal findings in the rib cage and pelvis.   There are age appropriate findings in joints in the upper and lower extremities.   There are normal findings in the head and neck.   No overtly pathologic appearing bone lesion is identified.     12/22/2023 - PET Scan:  The PET CT examination demonstrates continued increased activity in the 2.5 x 2.1 cm pulmonary nodular density seen in the medial left upper lobe on the patient's prior examination.  Correcting for differences between the prior and current software package there has been an interval increase in activity.  The examination also demonstrates a new FDG avid nodule along the medial pleural margin of the right upper lobe with activity consistent with increased likelihood of malignancy.  No additional significantly FDG avid pulmonary parenchymal or pleural based lesions or hilar or mediastinal lymph nodes are identified.  Additional details are contained in the report.   The examination demonstrates a generally stable, physiologic distribution of activity in the abdomen and pelvis, as described in the report.   The examination demonstrates a generally stable, physiologic distribution of activity in the included portions of the head and neck, as described in the report.   The examination demonstrates a generally stable, physiologic distribution of activity in the included portions of the skeleton and extremeties, as described in the report.     01/03/2024 - CT Chest without contrast:  Nodule in the right upper lobe anteriorly medially, slightly larger now measuring 1.5 x 2.0 cm.   Noncalcified nodule in the left lower lobe superiorly medially measuring 2.5 cm, unchanged.   Noncalcified nodule in the right lower lobe measuring 0.7 cm, unchanged.   Atherosclerosis and coronary artery calcification.   Mild degenerative changes of the spine.   Bilateral breast implants.  Surgical clips  in the right axilla.   Diverticulosis of the colon.   Otherwise unremarkable noncontrast CT scan of the chest.     01/04/2024 - Bronchoscopy with biopsy:   Lung, left lower lobe, transbronchial biopsy:   Fragments of bronchial mucosa with submucosa exhibiting anthracotic pigment deposition.   No granulomata identified.   Fragments of clotted blood.   No histologic evidence of malignancy.   Lung, left lower lobe, fine-needle aspiration, ThinPrep preparation (1):   Background blood.   Scattered small, mature lymphocytes, scattered neutrophils and scant macrophages.   Scant ciliated columnar bronchial epithelial cells.   Negative for malignant cells.   Lung, bronchial washings, ThinPrep preparation (1) and cell block:   Abundant blood.   Ciliated columnar bronchial epithelial cells.   Negative for malignant cells.     02/02/2024 - Robotic Navigational Bronchoscopy - Dr. Price:  Lung Left lower lobe:  Bronchial epithelial cells and mixed inflammation, no malignant cells identified.   Left lower lobe;   Adenocarcinoma, consistent with metastasis from patient's known breast primary.   ER 70%, MA 5%, HER2 IHC 0     06/20/2024 - CT Chest with contrast:  Image 29 1.5 x 2 cm nodule stable and unchanged right upper lobe   Image 45 left infrahilar nodule 1.6 x 2.1 cm previously was 2.1 x 2.5 cm.   Previous  0.7 cm nodule right lower lobe is not present on this exam     06/20/2024 - CT Abdomen/Pelvis with contrast:  No evidence of metastatic disease within the abdomen or pelvis   Osteonecrosis suspected in the hips bilaterally.   Mild hepatic steatosis.   Diverticulosis without evidence of diverticulitis.     06/20/2024 - Bone Scan:  The thoracic and lumbar spine demonstrate generally stable findings as described in the report.   There are normal findings in the rib cage and pelvis.   There are age appropriate findings in joints in the upper lower extremities.   There are normal findings in the head and neck.   No new overtly  pathologic appearing bone lesion is identified.    09/18/2024 - CT Chest with contrast:  Abnormal exam.  Progression of neoplastic disease in the thorax with enlarging pulmonary parenchymal metastases bilaterally as above and enlarging mediastinal and left retro hilar nodes.  Left lower lobe segmental airway narrowing.     09/18/2024 - CT Abdomen/Pelvis with contrast:  No evidence of metastatic disease in the abdomen or pelvis.   No acute abnormality in the abdomen or pelvis.     09/20/2024 - Appointment with Dr. Gross:  ASSESSMENT:  Metastatic breast cancer, hormone sensitive, stage IV ER 70%, ND 5%, HER2 IHC 0  March 2024-  Essentially, pulmonary nodules with biopsy-proven breast cancer ER 70%, ND 5%, HER2 IHC 0. Tempus liquid biopsy showed no mutation.  PET scan showed evidence of 2 nodules with high SUV uptake. No other evidence of metastatic disease.  Patient has received several lines of treatment in the past to include chemotherapy and endocrine therapy.  Most recent line of therapy include Faslodex and Ibrance. She has stopped Ibrance more than a year ago. I recommended to resume Ibrance and Aromasin.  September 2024-  9/18/24 CT Chest W Contrast Narrowing of the left lower lobe posterior segmental airway secondary to enlarging left retro hilar mass lesion today measuring 2.9 x 2.1 cm. Stable 4 mm nodule anterolateral right upper lobe axial image 25. Interval increase in size of mesially right lung apical lobulated mass measuring 2.1 x 1.8 cm. 3 mm subpleural stable nodule right middle lobe stable 6 x 4 mm nodule right lower lobe. Impression: Progression of neoplastic disease in the thorax with enlarging pulmonary parenchymal metastases bilaterally as above and enlarging mediastinal and left retro hilar nodes. Left lower lobe segmental airway narrowing.   9/18/24 CT Abd/Pelvis W IV Contrast (oral) No evidence of metastatic disease in the abdomen or pelvis. No acute abnormality in the abdomen or pelvis.  Please see dedicated CT chest for findings above the diaphragm.Incidental findings as above.   9/20/2024-I had a lengthy discussion with the patient regarding results of CT scans.  Plan:  Continue Aromasin and Ibrance 100mg PO days 3 weeks on/1 week off  Repeat PET scan.  Discussed with radiation oncology if the sites are targetable to SBRT and no other evidence of disease. If so, she will continue exemestane and palbociclib.  PLAN:  RTC with MD in 2 weeks   CBC CMP CA 15-3 CA 27.29 CEA today  Continue Vitamin B12 inj today and every 4 weeks  Recommend PET Scan next available  Referral to Dr. Felix after PET scan  Repeat CT Chest,Abdomen,Pelvis Scans in 6 weeks   Continue Lomotil 2.5 mg every 6 hours as needed  Continue follow-up with PCP routinely  Continue Eliquis 5 mg BID for history of DVT left upper extremity  Continue follow-up with /Pulmonology, 10/7/24  Continue follow-up with Dr. Manuel Ballesteros/General Surgery, 11/16/24  Continue Aromasin 25mg daily   Continue Ibrance 100mg D1-21 every 28 days  Continue Phenergan 25mg every 6 hrs as needed  Continue Allopurinol 200 mg daily  Follow Up:  Return in about 2 weeks (around 10/4/2024).   Sched PET Scan next available  Then, Referral to Dr. Felix     09/26/2024 - PET Scan:  The PET CT examination demonstrates an increase in the level of activity in the right lung apical lobulated mass seen on the recent CT and prior PET CT exams from SUV max of 3.59 to 6.73. The 2.9 x 2.1 cm left retro hilar nodular mass/adenopathy seen on both exams demonstrates SUV max of 7.03 compared to 3.94 previously.  No additional significantly FDG avid pulmonary parenchymal lesions are seen elsewhere. There are additional modestly positive nodes in the upper mediastinum and subcarinal space listed in the report.  a physiologic distribution of activity in the thorax, as described in the report.   The PET CT examination demonstrates a generally stable, physiologic distribution  of activity in the abdomen and pelvis, as described in the report.   The PET CT examination demonstrates a generally stable, physiologic distribution of activity in the included portions of the head and neck, as described in the report.   The PET CT examination demonstrates a generally stable, physiologic distribution of activity in the included portions of the skeleton and extremities,  as described in the report.     10/03/2024 - Appointment with :  Despite her previous radiation therapy, she is a candidate for stereotactic ablative radiation therapy (SABR) to each of the lung masses in a best effort for local control.  The patient will continue systemic therapy with Ibrance and Abraxane per my discussion with Dr. Gross.  Following this discussion and in consideration of the diagnostic data/evaluation of the patient, I recommended a course of stereotactic radiosurgery to the isolated bilateral lung lesions described above. I anticipate 5000 cGy over 4 treatments each. Will simulate treatment fields with 4D CT with MIPS to begin the planning process, final course pending. Continue ongoing management per primary care physician and other specialists.     10/04/2024 - Appointment with :  Metastatic breast cancer, hormone sensitive, stage IV ER 70%, NE 5%, HER2 IHC 0  March 2024-  Essentially, pulmonary nodules with biopsy-proven breast cancer ER 70%, NE 5%, HER2 IHC 0.  Tempus liquid biopsy showed no mutation.  PET scan showed evidence of 2 nodules with high SUV uptake. No other evidence of metastatic disease.  Patient has received several lines of treatment in the past to include chemotherapy and endocrine therapy.  Most recent line of therapy include Faslodex and Ibrance. She has stopped Ibrance more than a year ago. I recommended to resume Ibrance and Aromasin.  September 2024-  9/18/24 CT Chest W Contrast Narrowing of the left lower lobe posterior segmental airway secondary to enlarging left  retro hilar mass lesion today measuring 2.9 x 2.1 cm. Stable 4 mm nodule anterolateral right upper lobe axial image 25. Interval increase in size of mesially right lung apical lobulated mass measuring 2.1 x 1.8 cm. 3 mm subpleural stable nodule right middle lobe stable 6 x 4 mm nodule right lower lobe. Impression: Progression of neoplastic disease in the thorax with enlarging pulmonary parenchymal metastases bilaterally as above and enlarging mediastinal and left retro hilar nodes. Left lower lobe segmental airway narrowing.   9/18/24 CT Abd/Pelvis W IV Contrast (oral) No evidence of metastatic disease in the abdomen or pelvis. No acute abnormality in the abdomen or pelvis. Please see dedicated CT chest for findings above the diaphragm.Incidental findings as above.   9/26/24 PET Scan The PET CT examination demonstrates an increase in the level of activity in the right lung apical lobulated mass seen on the recent CT and prior PET CT exams from SUV max of 3.59 to 6.73. The 2.9 x 2.1 cm left retro hilar nodular mass/adenopathy seen on both exams demonstrates SUV max of 7.03 compared to 3.94 previously. No additional significantly FDG avid pulmonary parenchymal lesions are seen elsewhere. There are additional modestly positive nodes in the upper mediastinum and subcarinal space listed in the report. a physiologic distribution of activity in the thorax, as described in the report.The PET CT examination demonstrates a generally stable, physiologic distribution of activity in the abdomen and pelvis, as described in the report.The PET CT examination demonstrates a generally stable, physiologic distribution of activity in the included portions of the head and neck, as described in the report.The PET CT examination demonstrates a generally stable, physiologic distribution of activity in the included portions of the skeleton and extremities, as described in the report.  My interpretation-evidence of abnormal SUV uptake in the  right upper lobe lung lesion as well as left hilar lesion. This is biopsy-proven metastatic breast cancer. No other evidence of abnormal metastatic disease.  9/20/2024-I had a lengthy discussion with the patient regarding results of CT scans.  Plan:  -Continue Aromasin and Ibrance 100mg PO days 3 weeks on/1 week off  -Discussed with radiation oncology, Lavon Alas on 10/4/2024-proceed with SBRT   PLAN:  RTC with MD in weeks   CBC CMP CA 15-3 CA 27.29 CEA today  Continue Aromasin 25mg daily   Continue Ibrance 100mg D1-21 every 28 days  Continue Vitamin B12 inj every 4 weeks, next due 10/18/24  Repeat CT Chest, Abdomen, Pelvis Scans in Dec 2024 to assess disease response  Continue Lomotil 2.5 mg every 6 hours as needed  Continue follow-up with PCP routinely  Continue follow-up with /Pulmonology, 10/7/24  Continue follow-up with Dr. Manuel Ballesteros/General Surgery, 11/16/24  Continue follow-up with Dr. Lavon Alas at [facility] for SBRT radiation therapy, 10/7/24  Continue Phenergan 25mg every 6 hrs as needed  Care plan discussed with Dr. Lavon Alas on 10/4/2024 for coordination of care  Proceed with SBRT treatment  Follow Up:  Return in about 7 weeks (around 11/22/2024) for CBC, CMP, cEA, CA 15-3, CA 27-29 , Appointment with Dr. Gross.     10/07/2024 - Appointment with Juan Simpson MD:  Continue current management of metastatic disease to the lung per oncology. Agree with SBRT to both lesions.  Continue as needed use of albuterol.  Return in about 6 months (around 4/7/2025).    10/21/2024 - 11/11/2024 - Completed radiation course:  Received 6000 cGy in 8 fractions to the LEFT and RIGHT lung.     11/06/2024 - Appointment with :  PLAN:   Follow-up in 6 months for physical exam  Proceed with stereotactic radiation to her lung metastases.  We will discuss further fat grafting and other interventions in 6 months              History obtained from  PATIENT and CHART    PAST MEDICAL  "HISTORY  Past Medical History:   Diagnosis Date    Breast cancer       PAST SURGICAL HISTORY  Past Surgical History:   Procedure Laterality Date    BREAST LUMPECTOMY      HYSTERECTOMY      MASTECTOMY        FAMILY HISTORY  family history includes Prostate cancer in her father.    SOCIAL HISTORY  Social History     Tobacco Use    Smoking status: Never    Smokeless tobacco: Never   Substance Use Topics    Alcohol use: Not Currently    Drug use: Never     ALLERGIES  Clindamycin/lincomycin     MEDICATIONS    Current Outpatient Medications:     Ibrance 100 MG capsule capsule, Take 1 capsule by mouth Daily., Disp: , Rfl:     Current outpatient and discharge medications have been reconciled for the patient.  Reviewed by: Keyla Macedo LPN    The following portions of the patient's history were reviewed and updated as appropriate: allergies, current medications, past family history, past medical history, past social history, past surgical history and problem list.    REVIEW OF SYSTEMS  Review of Systems   Constitutional: Negative.    HENT: Negative.     Eyes: Negative.    Respiratory:  Positive for cough, shortness of breath and wheezing. Negative for apnea, choking, chest tightness and stridor.    Cardiovascular:         No pacemaker or defibrillator   Gastrointestinal: Negative.    Endocrine: Negative.         No cgm   Genitourinary: Negative.    Musculoskeletal: Negative.    Skin: Negative.    Allergic/Immunologic: Negative.    Neurological: Negative.    Hematological: Negative.    Psychiatric/Behavioral: Negative.       PHYSICAL EXAM  VITAL SIGNS:   Vitals:    12/17/24 0904   BP: 130/82   Pulse: 86   Resp: 18   SpO2: 97%   Weight: 77.6 kg (171 lb)   Height: 167.6 cm (66\")   PainSc: 0-No pain     Physical Exam  Vitals and nursing note reviewed.   Constitutional:       Appearance: Normal appearance.   Cardiovascular:      Rate and Rhythm: Normal rate and regular rhythm.      Heart sounds: Normal heart sounds.   Pulmonary: "      Effort: Pulmonary effort is normal.      Breath sounds: Normal breath sounds.   Chest:      Chest wall: No mass.   Breasts:     Breasts are symmetrical.   Abdominal:      General: There is no distension.      Palpations: Abdomen is soft. There is no mass.      Tenderness: There is no abdominal tenderness.   Musculoskeletal:      Cervical back: Normal range of motion.   Lymphadenopathy:      Cervical: No cervical adenopathy.      Upper Body:      Right upper body: No supraclavicular or axillary adenopathy.      Left upper body: No supraclavicular or axillary adenopathy.   Skin:     General: Skin is warm and moist.      Comments: Skin of the chest and back revealed no residual hyperpigmentation or lesions, is moist and well-healed.   Neurological:      General: No focal deficit present.      Mental Status: She is alert and oriented to person, place, and time.      Cranial Nerves: Cranial nerves 2-12 are intact.   Psychiatric:         Mood and Affect: Mood normal.         Behavior: Behavior normal.         Thought Content: Thought content normal.         Judgment: Judgment normal.          Performance Status: ECOG (2) Ambulatory and capable of self care, unable to carry out work activity, up and about > 50% or waking hours    Clinical Quality Measures  - Pain Documented by Standardized Tool, FPS Susanna Montelongo Israel reports a pain score of 0. Given her pain assessment as noted, treatment options were discussed and the following options were decided upon as a follow-up plan to address the patient's pain:  No pain, no plan given .    - Body Mass Index Screening and Follow-Up Plan  BMI is >= 25 and <30. (Overweight) The following options were offered after discussion;: none (medical contraindication)    - Tobacco Use: Screening and Cessation Intervention  Social History    Tobacco Use      Smoking status: Never      Smokeless tobacco: Never    - Advanced Care Planning Advance Care Planning   ACP discussion was held  with the patient during this visit. Patient does not have an advance directive, information provided.    - PHQ-2 Depression Screening  Little interest or pleasure in doing things? Not at all   Feeling down, depressed, or hopeless? Not at all   PHQ-2 Total Score 0     ASSESSMENT AND PLAN  1. Carcinoma of left breast metastatic to lung    2. History of radiation therapy    3. Non-smoker      Orders Placed This Encounter   Procedures    NM PET/CT Skull Base to Mid Thigh     Standing Status:   Future     Standing Expiration Date:   12/17/2025     Order Specific Question:   Reason for Exam:     Answer:   Evaluate Tx Response of SBRT to bilateral Metastatic lung masses     Order Specific Question:   What radiopharmaceutical is preferred for this exam?     Answer:   FDG  (offered at all sites)     Order Specific Question:   Release to patient     Answer:   Routine Release [[id number]]     RECOMMENDATIONS: Susanna Wood is status post completion of radiation therapy to the lung and presents to our clinic today for surveillance exam and to review imaging. Diagnosed with metastatic lung nodules. She completed 6000 cGy in 8 fractions to the left/right lung on 11/11/2024 with a good early response to treatment.  Continues on Ibrance and Aromasin under the direction of Dr. Gross.     CT-scan of the chest on 12/13/2024 revealed:   Compared with 09/18/2024.  There are new left retropectoral lymph nodes ranging in size up to 1.3 cm.  Consider a PET / CT scan to evaluate for recurrence.  The previous 2.9 x 2.1 cm left lower lobe mass along the margin of the descending thoracic aorta now measures 2.0 x 1.5 cm with improved patency of the previously narrowed left lower lobe bronchus.   Stable 1.7 x 2.0 cm right anterior paramediastinal soft tissue mass.    On exam, I do not see evidence for recurrent disease at this time, but there is radiographic evidence that warrants further investigation and follow-up with PET/CT scan  is recommended.  I have discussed case directly with Dr. Gross who agrees with the following plan:  We will continue routine follow-up/surveillance as discussed in this visit, with follow up PET/CT scan before visit and I have instructed her to continue to see the other health care providers as per their scheduling.    Patient Instructions   Continue systemic therapy as prescribed by Dr. Gross.  Follow-up with Dr. Gross in January 2025.    Return in about 3 months (around 3/24/2025) for Office Visit with Ronnie - review PET/CT results.    Time Spent: I spent 30 minutes caring for Susanna on this date of service. This time includes time spent by me in the following activities: preparing for the visit, reviewing tests, obtaining and/or reviewing a separately obtained history, performing a medically appropriate examination and/or evaluation, counseling and educating the patient/family/caregiver, ordering medications, tests, or procedures, documenting information in the medical record, independently interpreting results and communicating that information with the patient/family/caregiver, and care coordination.   Keyla Macedo LPN  12/17/2024

## 2024-12-13 ENCOUNTER — HOSPITAL ENCOUNTER (OUTPATIENT)
Dept: NUCLEAR MEDICINE | Age: 59
Discharge: HOME | End: 2024-12-15
Payer: MEDICARE

## 2024-12-13 ENCOUNTER — HOSPITAL ENCOUNTER (OUTPATIENT)
Dept: CT IMAGING | Age: 59
Discharge: HOME | End: 2024-12-13
Attending: INTERNAL MEDICINE
Payer: MEDICARE

## 2024-12-13 DIAGNOSIS — C78.00 MALIGNANT NEOPLASM OF BREAST METASTATIC TO LUNG (HCC): ICD-10-CM

## 2024-12-13 DIAGNOSIS — C50.919 MALIGNANT NEOPLASM OF BREAST METASTATIC TO LUNG (HCC): ICD-10-CM

## 2024-12-13 DIAGNOSIS — C50.919 CARCINOMA OF BREAST METASTATIC TO LUNG, UNSPECIFIED LATERALITY (HCC): Primary | ICD-10-CM

## 2024-12-13 DIAGNOSIS — C78.00 CARCINOMA OF BREAST METASTATIC TO LUNG, UNSPECIFIED LATERALITY (HCC): Primary | ICD-10-CM

## 2024-12-13 PROCEDURE — A9503 TC99M MEDRONATE: HCPCS | Performed by: INTERNAL MEDICINE

## 2024-12-13 PROCEDURE — 78306 BONE IMAGING WHOLE BODY: CPT | Performed by: INTERNAL MEDICINE

## 2024-12-13 PROCEDURE — 74177 CT ABD & PELVIS W/CONTRAST: CPT

## 2024-12-13 PROCEDURE — 6360000004 HC RX CONTRAST MEDICATION: Performed by: INTERNAL MEDICINE

## 2024-12-13 PROCEDURE — 71260 CT THORAX DX C+: CPT

## 2024-12-13 PROCEDURE — 3430000000 HC RX DIAGNOSTIC RADIOPHARMACEUTICAL: Performed by: INTERNAL MEDICINE

## 2024-12-13 RX ORDER — IOPAMIDOL 755 MG/ML
70 INJECTION, SOLUTION INTRAVASCULAR
Status: COMPLETED | OUTPATIENT
Start: 2024-12-13 | End: 2024-12-13

## 2024-12-13 RX ORDER — TC 99M MEDRONATE 20 MG/10ML
20 INJECTION, POWDER, LYOPHILIZED, FOR SOLUTION INTRAVENOUS
Status: COMPLETED | OUTPATIENT
Start: 2024-12-13 | End: 2024-12-13

## 2024-12-13 RX ADMIN — TC 99M MEDRONATE 20 MILLICURIE: 20 INJECTION, POWDER, LYOPHILIZED, FOR SOLUTION INTRAVENOUS at 15:09

## 2024-12-13 RX ADMIN — IOPAMIDOL 70 ML: 755 INJECTION, SOLUTION INTRAVENOUS at 12:36

## 2024-12-13 NOTE — PROGRESS NOTES
MEDICAL ONCOLOGY PROGRESS NOTE                                                          Yandy Dickinson   1965 12/16/2024     Chief Complaint   Patient presents with    Follow-up     Carcinoma of breast metastatic to lung, unspecified laterality (HCC)        INTERVAL HISTORY/HISTORY OF PRESENT ILLNESS:  Reason for MD visit: Disease/toxicity management/compliance  History of Present Illness  The patient is a very pleasant 59-year-old female who has an unfortunate diagnosis of metastatic breast cancer. She has evidence of pulmonary metastasis. Her molecular profile is estrogen receptor positive at 70%, GA at 5%, and HER-2 IHC zero on the most recent biopsy of the lung lesion performed by Dr. Greer King. The patient is currently on treatment with Aromasin and palbociclib. The treatment was started March 2024. She had evidence of disease progression in the lung with oligometastatic progression. She was seen by Dr. Kelby Velasco, radiation oncology, [facility] and recommended SBRT to the isolated bilateral lung lesions, 5000 Gy over four treatments. She is status post completion of treatment on 11/11/2024. She is here today after repeat CT scans to assess disease response and further recommendation.    She is overall doing well. She does not report any new complaints. She is compliant with her palbociclib and Aromasin, although she may have missed a day or two of the medication. She does not report any respiratory symptoms or weight loss. Overall, she is feeling well.    MEDICATIONS  Current: Aromasin, palbociclib, gabapentin, B12      Diagnosis    Grade III Adenocarcinoma of right breast diagnosed 2006  Stage IIIA, [id number]  ER/GA Positive, HER-2 bruce/ihc 1+  April 2013-RECURRENCE in the right axillary region  Genetic testing- BRCA 1&2 Negative  2014 (?) RECURRENCE in the axillary skin  01/05/2017- METASTATIC DISEASE with lung, hilar, and axillary lymph node mets  Osteopenia  Mediport associated DVT, October

## 2024-12-16 ENCOUNTER — HOSPITAL ENCOUNTER (OUTPATIENT)
Dept: INFUSION THERAPY | Age: 59
Discharge: HOME | End: 2024-12-16
Payer: MEDICARE

## 2024-12-16 ENCOUNTER — HOSPITAL ENCOUNTER (OUTPATIENT)
Dept: RADIATION ONCOLOGY | Facility: HOSPITAL | Age: 59
Setting detail: RADIATION/ONCOLOGY SERIES
End: 2024-12-16
Payer: MEDICARE

## 2024-12-16 ENCOUNTER — OFFICE VISIT (OUTPATIENT)
Dept: HEMATOLOGY | Age: 59
End: 2024-12-16
Payer: MEDICARE

## 2024-12-16 VITALS
OXYGEN SATURATION: 100 % | BODY MASS INDEX: 27 KG/M2 | TEMPERATURE: 97.7 F | WEIGHT: 168 LBS | HEART RATE: 95 BPM | SYSTOLIC BLOOD PRESSURE: 134 MMHG | HEIGHT: 66 IN | DIASTOLIC BLOOD PRESSURE: 86 MMHG

## 2024-12-16 DIAGNOSIS — E53.8 VITAMIN B 12 DEFICIENCY: ICD-10-CM

## 2024-12-16 DIAGNOSIS — R06.02 EXERTIONAL SHORTNESS OF BREATH: ICD-10-CM

## 2024-12-16 DIAGNOSIS — T45.1X5A CHEMOTHERAPY-INDUCED NEUROPATHY (HCC): ICD-10-CM

## 2024-12-16 DIAGNOSIS — R23.2 HOT FLASHES RELATED TO AROMATASE INHIBITOR THERAPY: ICD-10-CM

## 2024-12-16 DIAGNOSIS — R53.1 WEAKNESS: ICD-10-CM

## 2024-12-16 DIAGNOSIS — R61 NIGHT SWEATS: ICD-10-CM

## 2024-12-16 DIAGNOSIS — Z71.89 CARE PLAN DISCUSSED WITH PATIENT: ICD-10-CM

## 2024-12-16 DIAGNOSIS — D50.8 OTHER IRON DEFICIENCY ANEMIA: Primary | ICD-10-CM

## 2024-12-16 DIAGNOSIS — C78.00 CARCINOMA OF BREAST METASTATIC TO LUNG, UNSPECIFIED LATERALITY (HCC): ICD-10-CM

## 2024-12-16 DIAGNOSIS — T45.1X5A HOT FLASHES RELATED TO AROMATASE INHIBITOR THERAPY: ICD-10-CM

## 2024-12-16 DIAGNOSIS — R53.83 OTHER FATIGUE: Primary | ICD-10-CM

## 2024-12-16 DIAGNOSIS — G62.0 CHEMOTHERAPY-INDUCED NEUROPATHY (HCC): ICD-10-CM

## 2024-12-16 DIAGNOSIS — C50.919 CARCINOMA OF BREAST METASTATIC TO LUNG, UNSPECIFIED LATERALITY (HCC): ICD-10-CM

## 2024-12-16 LAB
ALBUMIN SERPL-MCNC: 4.1 G/DL (ref 3.5–5.2)
ALP SERPL-CCNC: 89 U/L (ref 35–104)
ALT SERPL-CCNC: 10 U/L (ref 5–33)
ANION GAP SERPL CALCULATED.3IONS-SCNC: 7 MMOL/L (ref 7–19)
AST SERPL-CCNC: 22 U/L (ref 5–32)
BASOPHILS # BLD: 0.01 K/UL (ref 0.01–0.08)
BASOPHILS NFR BLD: 0.2 % (ref 0.1–1.2)
BILIRUB SERPL-MCNC: 0.5 MG/DL (ref 0–1.2)
BUN SERPL-MCNC: 14 MG/DL (ref 6–20)
CA 15-3: 14 U/ML (ref 0–25)
CALCIUM SERPL-MCNC: 8.5 MG/DL (ref 8.6–10)
CEA SERPL-MCNC: 2.2 NG/ML (ref 0–4.7)
CHLORIDE SERPL-SCNC: 106 MMOL/L (ref 98–107)
CO2 SERPL-SCNC: 26 MMOL/L (ref 22–29)
CREAT SERPL-MCNC: 0.8 MG/DL (ref 0.5–0.9)
EOSINOPHIL # BLD: 0.02 K/UL (ref 0.04–0.54)
EOSINOPHIL NFR BLD: 0.4 % (ref 0.7–7)
ERYTHROCYTE [DISTWIDTH] IN BLOOD BY AUTOMATED COUNT: 14.3 % (ref 11.7–14.4)
GLUCOSE SERPL-MCNC: 93 MG/DL (ref 70–99)
HCT VFR BLD AUTO: 37.4 % (ref 34.1–44.9)
HGB BLD-MCNC: 12.6 G/DL (ref 11.2–15.7)
LYMPHOCYTES # BLD: 1.16 K/UL (ref 1.18–3.74)
LYMPHOCYTES NFR BLD: 20.9 % (ref 19.3–53.1)
MCH RBC QN AUTO: 30.9 PG (ref 25.6–32.2)
MCHC RBC AUTO-ENTMCNC: 33.7 G/DL (ref 32.3–35.5)
MCV RBC AUTO: 91.7 FL (ref 79.4–94.8)
MONOCYTES # BLD: 0.52 K/UL (ref 0.24–0.82)
MONOCYTES NFR BLD: 9.4 % (ref 4.7–12.5)
NEUTROPHILS # BLD: 3.84 K/UL (ref 1.56–6.13)
NEUTS SEG NFR BLD: 68.9 % (ref 34–71.1)
PLATELET # BLD AUTO: 255 K/UL (ref 182–369)
PMV BLD AUTO: 8.2 FL (ref 7.4–10.4)
POTASSIUM SERPL-SCNC: 4 MMOL/L (ref 3.5–5.1)
PROT SERPL-MCNC: 7.1 G/DL (ref 6.4–8.3)
RBC # BLD AUTO: 4.08 M/UL (ref 3.93–5.22)
SODIUM SERPL-SCNC: 139 MMOL/L (ref 136–145)
WBC # BLD AUTO: 5.56 K/UL (ref 3.98–10.04)

## 2024-12-16 PROCEDURE — G8427 DOCREV CUR MEDS BY ELIG CLIN: HCPCS | Performed by: INTERNAL MEDICINE

## 2024-12-16 PROCEDURE — 36415 COLL VENOUS BLD VENIPUNCTURE: CPT

## 2024-12-16 PROCEDURE — 1036F TOBACCO NON-USER: CPT | Performed by: INTERNAL MEDICINE

## 2024-12-16 PROCEDURE — G2211 COMPLEX E/M VISIT ADD ON: HCPCS | Performed by: INTERNAL MEDICINE

## 2024-12-16 PROCEDURE — 85025 COMPLETE CBC W/AUTO DIFF WBC: CPT

## 2024-12-16 PROCEDURE — 3017F COLORECTAL CA SCREEN DOC REV: CPT | Performed by: INTERNAL MEDICINE

## 2024-12-16 PROCEDURE — G8417 CALC BMI ABV UP PARAM F/U: HCPCS | Performed by: INTERNAL MEDICINE

## 2024-12-16 PROCEDURE — 96372 THER/PROPH/DIAG INJ SC/IM: CPT

## 2024-12-16 PROCEDURE — G8484 FLU IMMUNIZE NO ADMIN: HCPCS | Performed by: INTERNAL MEDICINE

## 2024-12-16 PROCEDURE — 99214 OFFICE O/P EST MOD 30 MIN: CPT | Performed by: INTERNAL MEDICINE

## 2024-12-16 PROCEDURE — 99213 OFFICE O/P EST LOW 20 MIN: CPT

## 2024-12-16 PROCEDURE — 6360000002 HC RX W HCPCS: Performed by: INTERNAL MEDICINE

## 2024-12-16 PROCEDURE — 80053 COMPREHEN METABOLIC PANEL: CPT

## 2024-12-16 RX ORDER — CYANOCOBALAMIN 1000 UG/ML
1000 INJECTION, SOLUTION INTRAMUSCULAR; SUBCUTANEOUS ONCE
OUTPATIENT
Start: 2025-01-06 | End: 2025-01-06

## 2024-12-16 RX ORDER — CYANOCOBALAMIN 1000 UG/ML
1000 INJECTION, SOLUTION INTRAMUSCULAR; SUBCUTANEOUS ONCE
Status: COMPLETED | OUTPATIENT
Start: 2024-12-16 | End: 2024-12-16

## 2024-12-16 RX ADMIN — CYANOCOBALAMIN 1000 MCG: 1000 INJECTION, SOLUTION INTRAMUSCULAR; SUBCUTANEOUS at 12:54

## 2024-12-17 ENCOUNTER — OFFICE VISIT (OUTPATIENT)
Age: 59
End: 2024-12-17
Payer: MEDICARE

## 2024-12-17 VITALS
OXYGEN SATURATION: 97 % | HEART RATE: 86 BPM | BODY MASS INDEX: 27.48 KG/M2 | RESPIRATION RATE: 18 BRPM | HEIGHT: 66 IN | WEIGHT: 171 LBS | DIASTOLIC BLOOD PRESSURE: 82 MMHG | SYSTOLIC BLOOD PRESSURE: 130 MMHG

## 2024-12-17 DIAGNOSIS — C50.912 CARCINOMA OF LEFT BREAST METASTATIC TO LUNG: Primary | ICD-10-CM

## 2024-12-17 DIAGNOSIS — C78.02 CARCINOMA OF LEFT BREAST METASTATIC TO LUNG: Primary | ICD-10-CM

## 2024-12-17 DIAGNOSIS — Z92.3 HISTORY OF RADIATION THERAPY: ICD-10-CM

## 2024-12-17 DIAGNOSIS — Z78.9 NON-SMOKER: ICD-10-CM

## 2024-12-17 PROCEDURE — G0463 HOSPITAL OUTPT CLINIC VISIT: HCPCS | Performed by: RADIOLOGY

## 2024-12-17 RX ORDER — PALBOCICLIB 100 MG/1
100 CAPSULE ORAL DAILY
COMMUNITY
Start: 2024-12-16

## 2024-12-19 LAB — CANCER AG27-29 SERPL-ACNC: 21.9 U/ML

## 2024-12-26 DIAGNOSIS — C50.011 CARCINOMA OF AREOLA OF RIGHT BREAST IN FEMALE, UNSPECIFIED ESTROGEN RECEPTOR STATUS (HCC): ICD-10-CM

## 2024-12-26 RX ORDER — EXEMESTANE 25 MG/1
25 TABLET ORAL DAILY
Qty: 90 TABLET | Refills: 1 | Status: SHIPPED | OUTPATIENT
Start: 2024-12-26

## 2024-12-26 RX ORDER — SCOLOPAMINE TRANSDERMAL SYSTEM 1 MG/1
1 PATCH, EXTENDED RELEASE TRANSDERMAL
Qty: 4 PATCH | Refills: 0 | Status: SHIPPED | OUTPATIENT
Start: 2024-12-26

## 2025-01-19 ENCOUNTER — APPOINTMENT (OUTPATIENT)
Dept: CT IMAGING | Age: 60
End: 2025-01-19
Payer: MEDICARE

## 2025-01-19 ENCOUNTER — APPOINTMENT (OUTPATIENT)
Dept: GENERAL RADIOLOGY | Age: 60
End: 2025-01-19
Payer: MEDICARE

## 2025-01-19 ENCOUNTER — HOSPITAL ENCOUNTER (EMERGENCY)
Dept: VASCULAR LAB | Age: 60
Discharge: HOME | End: 2025-01-21
Attending: STUDENT IN AN ORGANIZED HEALTH CARE EDUCATION/TRAINING PROGRAM
Payer: MEDICARE

## 2025-01-19 ENCOUNTER — HOSPITAL ENCOUNTER (EMERGENCY)
Age: 60
Discharge: HOME | End: 2025-01-19
Attending: STUDENT IN AN ORGANIZED HEALTH CARE EDUCATION/TRAINING PROGRAM
Payer: MEDICARE

## 2025-01-19 VITALS
RESPIRATION RATE: 16 BRPM | WEIGHT: 168 LBS | SYSTOLIC BLOOD PRESSURE: 148 MMHG | DIASTOLIC BLOOD PRESSURE: 84 MMHG | HEART RATE: 88 BPM | TEMPERATURE: 97.8 F | OXYGEN SATURATION: 98 % | BODY MASS INDEX: 27.12 KG/M2

## 2025-01-19 DIAGNOSIS — M79.604 RIGHT LEG PAIN: Primary | ICD-10-CM

## 2025-01-19 DIAGNOSIS — M54.31 SCIATICA OF RIGHT SIDE: ICD-10-CM

## 2025-01-19 PROCEDURE — 73502 X-RAY EXAM HIP UNI 2-3 VIEWS: CPT

## 2025-01-19 PROCEDURE — 96372 THER/PROPH/DIAG INJ SC/IM: CPT

## 2025-01-19 PROCEDURE — 72131 CT LUMBAR SPINE W/O DYE: CPT

## 2025-01-19 PROCEDURE — 6370000000 HC RX 637 (ALT 250 FOR IP): Performed by: STUDENT IN AN ORGANIZED HEALTH CARE EDUCATION/TRAINING PROGRAM

## 2025-01-19 PROCEDURE — 99284 EMERGENCY DEPT VISIT MOD MDM: CPT

## 2025-01-19 PROCEDURE — 93971 EXTREMITY STUDY: CPT

## 2025-01-19 PROCEDURE — 6360000002 HC RX W HCPCS: Performed by: STUDENT IN AN ORGANIZED HEALTH CARE EDUCATION/TRAINING PROGRAM

## 2025-01-19 RX ORDER — HYDROCODONE BITARTRATE AND ACETAMINOPHEN 10; 325 MG/1; MG/1
1 TABLET ORAL ONCE
Status: COMPLETED | OUTPATIENT
Start: 2025-01-19 | End: 2025-01-19

## 2025-01-19 RX ORDER — KETOROLAC TROMETHAMINE 30 MG/ML
30 INJECTION, SOLUTION INTRAMUSCULAR; INTRAVENOUS ONCE
Status: COMPLETED | OUTPATIENT
Start: 2025-01-19 | End: 2025-01-19

## 2025-01-19 RX ADMIN — HYDROCODONE BITARTRATE AND ACETAMINOPHEN 1 TABLET: 10; 325 TABLET ORAL at 07:55

## 2025-01-19 RX ADMIN — KETOROLAC TROMETHAMINE 30 MG: 30 INJECTION, SOLUTION INTRAMUSCULAR at 09:41

## 2025-01-19 ASSESSMENT — PAIN SCALES - GENERAL
PAINLEVEL_OUTOF10: 5
PAINLEVEL_OUTOF10: 10

## 2025-01-19 NOTE — PROGRESS NOTES
Vascular lab preliminary notes.  Call back right lower extremity venous duplex scan completed.  No evidence for DVT, SVT, or reflux noted at this time.  Final report pending.

## 2025-01-19 NOTE — DISCHARGE INSTRUCTIONS
You can take 1000mg tylenol (two extra strength pills) every 6 hours as needed for pain.  You can take 600mg ibuprofen (three regular strength pills) every 6 hours as needed for pain.  These medicines can be alternated every 3 hours.  You may also use your home Flexeril (muscle relaxer) 3 times daily as needed for pain.

## 2025-01-19 NOTE — ED PROVIDER NOTES
Pt discharged per MD instructions. Reviewed discharge paperwork and fever dosing guide. Reviewed medication education. Pt breathing easy and unlabored.   [facility] EMERGENCY DEPARTMENT  eMERGENCY dEPARTMENT eNCOUnter      Pt Name: Yandy Dickinson  MRN: [id number]  Birthdate 1965  Date of evaluation: 1/19/2025  Provider: Camryn Amaya MD    CHIEF COMPLAINT       Chief Complaint   Patient presents with    Leg Pain     Pt states the right leg started hurting approx 3 days ago.  She states that no injury occurred. Pt has hx of clots         HISTORY OF PRESENT ILLNESS   (Location/Symptom, Timing/Onset,Context/Setting, Quality, Duration, Modifying Factors, Severity)  Note limiting factors.     HPI    Yandy Dickinson is a 59 y.o. female with PMH of stage IIIa adenocarcinoma of the right breast currently on chemotherapy with recent radiation, known lung metastasis, history of DVT not on anticoagulation who presents to the emergency department with CC of atraumatic right leg pain ongoing for the last 4 days.  She states she has also had some back problems recently, has gotten some injections in her upper back, and has been having some pain in her lower back.  She reports pain in the right hip which radiates down the right thigh all the way to the calf, exacerbated by moving.  She states she has been walking with a limp, and has been trying to push through it at home.  She has not noted any swelling or redness of the thigh or calf.  She has a history of a DVT in her upper extremity which also happened while she has been battling cancer.        NursingNotes were reviewed.    REVIEW OF SYSTEMS    (2-9 systems for level 4, 10 or more for level 5)     Review of Systems   Constitutional:  Negative for chills, fatigue and fever.   HENT:  Negative for congestion and sore throat.    Eyes:  Negative for pain and redness.   Respiratory:  Negative for cough, chest tightness and shortness of breath.    Cardiovascular:  Negative for chest pain and leg swelling.   Gastrointestinal:  Negative for abdominal pain, diarrhea, nausea and vomiting.   Genitourinary:  Negative for dysuria and

## 2025-01-22 DIAGNOSIS — C78.00 CARCINOMA OF BREAST METASTATIC TO LUNG, UNSPECIFIED LATERALITY (HCC): Primary | ICD-10-CM

## 2025-01-22 DIAGNOSIS — C50.919 CARCINOMA OF BREAST METASTATIC TO LUNG, UNSPECIFIED LATERALITY (HCC): Primary | ICD-10-CM

## 2025-01-22 PROCEDURE — 93971 EXTREMITY STUDY: CPT | Performed by: SURGERY

## 2025-01-23 ENCOUNTER — TELEPHONE (OUTPATIENT)
Dept: HEMATOLOGY | Age: 60
End: 2025-01-23

## 2025-01-23 NOTE — TELEPHONE ENCOUNTER

## 2025-01-23 NOTE — PROGRESS NOTES
lesions.  10/21/24-11/11/24 Completed SBRT 6000 cGy over 8 fractions to left/right lung by Dr. Kelby Velasco  12/13/24 CT Chest W Contrast  There is a 1.3 cm left retropectoral lymph node on image 37 with adjacent smaller nodes also present. There is an additional 0.9 cm left retropectoral node also present on image 30. At lung window settings, there is redemonstration of a 1.7 x 2.0 cm anterior paramediastinal soft tissue mass on image 36. There is redemonstration of a 0.3 cm right upper lobe nodule on image 25. There is a 0.3 cm right upper lobe nodule image 43. The previous 2.9 x 2.1 cm mass along the margin of the descending thoracic aorta now measures 2.0 x 1.5 cm on image 64 with improved patency of the left lower lobe bronchus.  Compared with 09/18/2024.  There are new left retropectoral lymph nodes ranging in size up to 1.3 cm.  Consider a PET / CT scan to evaluate for recurrence.The previous 2.9 x 2.1 cm left lower lobe mass along the margin of the descending thoracic aorta now measures 2.0 x 1.5 cm with improved patency of the previously narrowed left lower lobe bronchus.Stable 1.7 x 2.0 cm right anterior paramediastinal soft tissue mass.Stable additional sub 0.5 cm nodules as noted above. Stable additional sub 0.5 cm nodules as noted above.   12/13/24 CT Abd/Pelvis W IV Contrast (oral) The kidneys have a normal enhancement morphology. No calculi or hydronephrosis. The aorta does not exceed 3 cm. Compared with 09/18/2024.  No evidence of metastatic disease to the abdomen/pelvis.Colonic diverticulosis without diverticulitis. Increased fecal burden that may indicate constipation.Sclerosis in the right femoral head but without collapse.  This may represent avascular necrosis.   12/13/24 Bone Scan The thoracic and lumbar spine demonstrate generally stable findings with good correlation with available CT images demonstrating age related degenerative changes including activity in the region of the right lateral

## 2025-01-27 ENCOUNTER — TRANSCRIBE ORDERS (OUTPATIENT)
Dept: ADMINISTRATIVE | Facility: HOSPITAL | Age: 60
End: 2025-01-27
Payer: MEDICARE

## 2025-01-27 ENCOUNTER — CARE COORDINATION (OUTPATIENT)
Dept: CARE COORDINATION | Age: 60
End: 2025-01-27

## 2025-01-27 ENCOUNTER — OFFICE VISIT (OUTPATIENT)
Dept: HEMATOLOGY | Age: 60
End: 2025-01-27
Payer: MEDICARE

## 2025-01-27 ENCOUNTER — HOSPITAL ENCOUNTER (OUTPATIENT)
Dept: INFUSION THERAPY | Age: 60
Discharge: HOME | End: 2025-01-27
Payer: MEDICARE

## 2025-01-27 VITALS
HEIGHT: 66 IN | BODY MASS INDEX: 27.71 KG/M2 | RESPIRATION RATE: 18 BRPM | HEART RATE: 107 BPM | OXYGEN SATURATION: 96 % | TEMPERATURE: 97.8 F | SYSTOLIC BLOOD PRESSURE: 147 MMHG | DIASTOLIC BLOOD PRESSURE: 89 MMHG | WEIGHT: 172.4 LBS

## 2025-01-27 DIAGNOSIS — E53.8 VITAMIN B 12 DEFICIENCY: ICD-10-CM

## 2025-01-27 DIAGNOSIS — C50.011 CARCINOMA OF AREOLA OF RIGHT BREAST IN FEMALE, UNSPECIFIED ESTROGEN RECEPTOR STATUS (HCC): ICD-10-CM

## 2025-01-27 DIAGNOSIS — C50.919 CARCINOMA OF BREAST METASTATIC TO LUNG, UNSPECIFIED LATERALITY (HCC): ICD-10-CM

## 2025-01-27 DIAGNOSIS — D50.8 OTHER IRON DEFICIENCY ANEMIA: Primary | ICD-10-CM

## 2025-01-27 DIAGNOSIS — Z51.81 ENCOUNTER FOR MONITORING AROMATASE INHIBITOR THERAPY: ICD-10-CM

## 2025-01-27 DIAGNOSIS — C50.011 CARCINOMA OF AREOLA OF RIGHT BREAST IN FEMALE, UNSPECIFIED ESTROGEN RECEPTOR STATUS (HCC): Primary | ICD-10-CM

## 2025-01-27 DIAGNOSIS — Z79.811 ENCOUNTER FOR MONITORING AROMATASE INHIBITOR THERAPY: ICD-10-CM

## 2025-01-27 DIAGNOSIS — R23.2 HOT FLASHES RELATED TO AROMATASE INHIBITOR THERAPY: ICD-10-CM

## 2025-01-27 DIAGNOSIS — M47.816 LUMBAR SPONDYLOSIS: Primary | ICD-10-CM

## 2025-01-27 DIAGNOSIS — C78.00 CARCINOMA OF BREAST METASTATIC TO LUNG, UNSPECIFIED LATERALITY (HCC): ICD-10-CM

## 2025-01-27 DIAGNOSIS — Z71.89 CARE PLAN DISCUSSED WITH PATIENT: ICD-10-CM

## 2025-01-27 DIAGNOSIS — T45.1X5A HOT FLASHES RELATED TO AROMATASE INHIBITOR THERAPY: ICD-10-CM

## 2025-01-27 LAB
ALBUMIN SERPL-MCNC: 3.9 G/DL (ref 3.5–5.2)
ALP SERPL-CCNC: 103 U/L (ref 35–104)
ALT SERPL-CCNC: 26 U/L (ref 5–33)
ANION GAP SERPL CALCULATED.3IONS-SCNC: 9 MMOL/L (ref 7–19)
AST SERPL-CCNC: 36 U/L (ref 5–32)
BASOPHILS # BLD: 0.01 K/UL (ref 0–0.2)
BASOPHILS NFR BLD: 0.2 % (ref 0–1)
BILIRUB SERPL-MCNC: 0.6 MG/DL (ref 0–1.2)
BUN SERPL-MCNC: 10 MG/DL (ref 6–20)
CA 15-3: 16 U/ML (ref 0–25)
CALCIUM SERPL-MCNC: 8.6 MG/DL (ref 8.6–10)
CEA SERPL-MCNC: 2 NG/ML (ref 0–4.7)
CHLORIDE SERPL-SCNC: 103 MMOL/L (ref 98–107)
CO2 SERPL-SCNC: 26 MMOL/L (ref 22–29)
CREAT SERPL-MCNC: 0.7 MG/DL (ref 0.5–0.9)
EOSINOPHIL # BLD: 0.05 K/UL (ref 0–0.6)
EOSINOPHIL NFR BLD: 0.8 % (ref 0–5)
ERYTHROCYTE [DISTWIDTH] IN BLOOD BY AUTOMATED COUNT: 13.9 % (ref 11.5–14.5)
GLUCOSE SERPL-MCNC: 98 MG/DL (ref 70–99)
HCT VFR BLD AUTO: 36.1 % (ref 37–47)
HGB BLD-MCNC: 12 G/DL (ref 12–16)
LYMPHOCYTES # BLD: 1.17 K/UL (ref 1.1–4.5)
LYMPHOCYTES NFR BLD: 19.7 % (ref 20–40)
MCH RBC QN AUTO: 30.6 PG (ref 27–31)
MCHC RBC AUTO-ENTMCNC: 33.2 G/DL (ref 33–37)
MCV RBC AUTO: 92.1 FL (ref 81–99)
MONOCYTES # BLD: 0.66 K/UL (ref 0–0.9)
MONOCYTES NFR BLD: 11.1 % (ref 1–10)
NEUTROPHILS # BLD: 4.03 K/UL (ref 1.5–7.5)
NEUTS SEG NFR BLD: 68 % (ref 50–65)
PLATELET # BLD AUTO: 268 K/UL (ref 130–400)
PMV BLD AUTO: 8.1 FL (ref 9.4–12.3)
POTASSIUM SERPL-SCNC: 3.8 MMOL/L (ref 3.5–5.1)
PROT SERPL-MCNC: 7.2 G/DL (ref 6.4–8.3)
RBC # BLD AUTO: 3.92 M/UL (ref 4.2–5.4)
SODIUM SERPL-SCNC: 138 MMOL/L (ref 136–145)
WBC # BLD AUTO: 5.93 K/UL (ref 4.8–10.8)

## 2025-01-27 PROCEDURE — 80053 COMPREHEN METABOLIC PANEL: CPT

## 2025-01-27 PROCEDURE — G8427 DOCREV CUR MEDS BY ELIG CLIN: HCPCS | Performed by: INTERNAL MEDICINE

## 2025-01-27 PROCEDURE — 99214 OFFICE O/P EST MOD 30 MIN: CPT

## 2025-01-27 PROCEDURE — G2211 COMPLEX E/M VISIT ADD ON: HCPCS | Performed by: INTERNAL MEDICINE

## 2025-01-27 PROCEDURE — 99214 OFFICE O/P EST MOD 30 MIN: CPT | Performed by: INTERNAL MEDICINE

## 2025-01-27 PROCEDURE — 3017F COLORECTAL CA SCREEN DOC REV: CPT | Performed by: INTERNAL MEDICINE

## 2025-01-27 PROCEDURE — 6360000002 HC RX W HCPCS

## 2025-01-27 PROCEDURE — 86300 IMMUNOASSAY TUMOR CA 15-3: CPT

## 2025-01-27 PROCEDURE — G8417 CALC BMI ABV UP PARAM F/U: HCPCS | Performed by: INTERNAL MEDICINE

## 2025-01-27 PROCEDURE — 82378 CARCINOEMBRYONIC ANTIGEN: CPT

## 2025-01-27 PROCEDURE — 85025 COMPLETE CBC W/AUTO DIFF WBC: CPT

## 2025-01-27 PROCEDURE — 96372 THER/PROPH/DIAG INJ SC/IM: CPT

## 2025-01-27 PROCEDURE — 1036F TOBACCO NON-USER: CPT | Performed by: INTERNAL MEDICINE

## 2025-01-27 PROCEDURE — 36415 COLL VENOUS BLD VENIPUNCTURE: CPT

## 2025-01-27 RX ORDER — CYANOCOBALAMIN 1000 UG/ML
INJECTION, SOLUTION INTRAMUSCULAR; SUBCUTANEOUS
Status: COMPLETED
Start: 2025-01-27 | End: 2025-01-27

## 2025-01-27 RX ORDER — PALBOCICLIB 100 MG/1
CAPSULE ORAL
Qty: 21 CAPSULE | Refills: 5 | Status: ACTIVE | OUTPATIENT
Start: 2025-01-27

## 2025-01-27 RX ORDER — CYANOCOBALAMIN 1000 UG/ML
1000 INJECTION, SOLUTION INTRAMUSCULAR; SUBCUTANEOUS ONCE
Status: DISCONTINUED | OUTPATIENT
Start: 2025-01-27 | End: 2025-01-29 | Stop reason: HOSPADM

## 2025-01-27 RX ORDER — CYANOCOBALAMIN 1000 UG/ML
1000 INJECTION, SOLUTION INTRAMUSCULAR; SUBCUTANEOUS ONCE
OUTPATIENT
Start: 2025-02-10 | End: 2025-02-10

## 2025-01-27 RX ADMIN — CYANOCOBALAMIN 1000 MCG: 1000 INJECTION, SOLUTION INTRAMUSCULAR; SUBCUTANEOUS at 11:18

## 2025-01-27 NOTE — TELEPHONE ENCOUNTER
Patient called she is sick, congestion, low fever, patient was at oncology and they said to reach out to PCP to get script called in     Oncology note  from today is in there about full symptoms going on please advise

## 2025-01-27 NOTE — CARE COORDINATION
other services you are now recommendation): Required care/services in place and adequately coordinated   Suggested Interventions and Community Resources  Fall Risk Prevention: In Process Disease Specific Clinic: In Process (Comment: HEM/ONC)   Meals on Wheels: In Process   Transportation Services: In Process   Zone Management Tools: In Process                   Medications Reviewed:   Completed during this call    Advance Care Planning:   Patient declined education     Care Planning:   Education Documentation  Explain information regarding habits that influence health, taught by Yonathan Steele RN at 1/27/2025  2:32 PM.  Learner: Patient  Readiness: Acceptance  Method: Explanation  Response: Verbalizes Understanding    Discuss behavior to reduce risk, taught by Yonathan Steele RN at 1/27/2025  2:32 PM.  Learner: Patient  Readiness: Acceptance  Method: Explanation  Response: Verbalizes Understanding    Teach reporting changes in condition, taught by Yonathan Steele RN at 1/27/2025  2:32 PM.  Learner: Patient  Readiness: Acceptance  Method: Explanation  Response: Verbalizes Understanding    General medication information, taught by Yonathan Steele RN at 1/27/2025  2:32 PM.  Learner: Patient  Readiness: Acceptance  Method: Explanation  Response: Verbalizes Understanding    Educate Patient on When to Call for Symptoms, taught by Yonathan Steele RN at 1/27/2025  2:32 PM.  Learner: Patient  Readiness: Acceptance  Method: Explanation  Response: Verbalizes Understanding    Educate limitations or barriers to activity and/or exercise on discharge, taught by Yonathan Steele RN at 1/27/2025  2:32 PM.  Learner: Patient  Readiness: Acceptance  Method: Explanation  Response: Verbalizes Understanding    Adaptive Equipment, taught by Yonathan Steele RN at 1/27/2025  2:32 PM.  Learner: Patient  Readiness: Acceptance  Method: Explanation  Response: Verbalizes Understanding    Education

## 2025-01-29 LAB — CANCER AG27-29 SERPL-ACNC: 25.6 U/ML

## 2025-01-30 ENCOUNTER — CARE COORDINATION (OUTPATIENT)
Dept: CARE COORDINATION | Age: 60
End: 2025-01-30

## 2025-01-30 NOTE — CARE COORDINATION
Ambulatory Care Coordination Note     1/30/2025 9:09 AM     Patient outreach attempt by this ACM today to perform care management follow up . ACM was unable to reach the patient by telephone today;   left voice message requesting a return phone call to this ACM.     ACM: Yonathan Steele RN     Care Summary Note:     PCP/Specialist follow up:   Future Appointments         Provider Specialty Dept Phone    2/24/2025 10:00 AM SCHEDULE, [facility] MED ONC Infusion Therapy [phone]    3/24/2025 9:30 AM SCHEDULE, [facility] MED ONC Infusion Therapy [phone]    3/24/2025 10:00 AM Kassi Jim MD Hematology and Oncology [phone]    4/7/2025 1:30 PM Ashley Morrison MD Pulmonology [phone]    5/7/2025 1:00 PM Alejadnro Garsia MD General Surgery [phone]            Follow Up:   Plan for next AC outreach in approximately 1 week to complete:  - disease specific assessments  - education   - outreach attempt to offer care management services.               Yonathan Steele RN  Ambulatory Care Manager   C. [phone]

## 2025-02-03 ENCOUNTER — CARE COORDINATION (OUTPATIENT)
Dept: CARE COORDINATION | Age: 60
End: 2025-02-03

## 2025-02-03 NOTE — CARE COORDINATION
Ambulatory Care Coordination Note     2/3/2025 10:10 AM     ACM outreach attempt by this ACM today to perform care management follow up . ACM was unable to reach the patient by telephone today;   left voice message requesting a return phone call to this ACM.     ACM: Yonathan Steele RN     Care Summary Note:     PCP/Specialist follow up:   Future Appointments         Provider Specialty Dept Phone    2/24/2025 10:00 AM SCHEDULE, [facility] MED ONC Infusion Therapy [phone]    3/24/2025 9:30 AM SCHEDULE, [facility] MED ONC Infusion Therapy [phone]    3/24/2025 10:00 AM Kassi Jim MD Hematology and Oncology [phone]    4/7/2025 1:30 PM Ashley Morrison MD Pulmonology [phone]    5/7/2025 1:00 PM Alejandro Garsia MD General Surgery [phone]            Follow Up:   Plan for next ACM outreach in approximately 1 week to complete:  - SDOH assessments  - goal progression  - education .             Yonathan Steele RN  Ambulatory Care Manager   C. [phone]

## 2025-02-12 ENCOUNTER — CARE COORDINATION (OUTPATIENT)
Dept: CARE COORDINATION | Age: 60
End: 2025-02-12

## 2025-02-12 NOTE — CARE COORDINATION
Ambulatory Care Coordination Note     2/12/2025 12:25 PM     ACM outreach attempt by this ACM today to perform care management follow up . ACM was unable to reach the patient by telephone today;   left voice message requesting a return phone call to this ACM.     ACM: Yonathan Steele RN     Care Summary Note:     PCP/Specialist follow up:   Future Appointments         Provider Specialty Dept Phone    2/24/2025 10:00 AM SCHEDULE, [facility] MED ONC Infusion Therapy [phone]    3/24/2025 9:30 AM SCHEDULE, [facility] MED ONC Infusion Therapy [phone]    3/24/2025 10:00 AM Kassi Jim MD Hematology and Oncology [phone]    4/7/2025 1:30 PM Ashley Morrison MD Pulmonology [phone]    5/7/2025 1:00 PM Alejandro Garsia MD General Surgery [phone]            Follow Up:   Plan for next ACM outreach in approximately 1 week to complete:  - disease specific assessments  - goal progression  - education .         Yonathan Steele RN  Ambulatory Care Manager   C. [phone]

## 2025-02-17 ENCOUNTER — CARE COORDINATION (OUTPATIENT)
Dept: CASE MANAGEMENT | Age: 60
End: 2025-02-17

## 2025-02-17 NOTE — CARE COORDINATION
Ambulatory Care Coordination Note     2/17/2025 3:32 PM     Patient outreach attempt by this ACM today to perform care management follow up . ACM was unable to reach the patient by telephone today;   left voice message requesting a return phone call to this ACM.     ACM: Belinda Loya LPN    PCP/Specialist follow up:   Future Appointments         Provider Specialty Dept Phone    2/24/2025 10:00 AM SCHEDULE, [facility] MED ONC Infusion Therapy [phone]    3/24/2025 9:30 AM SCHEDULE, [facility] MED ONC Infusion Therapy [phone]    3/24/2025 10:00 AM Kassi Jim MD Hematology and Oncology [phone]    4/7/2025 1:30 PM Ashley Morrison MD Pulmonology [phone]    5/7/2025 1:00 PM Alejandro Garsia MD General Surgery [phone]            Follow Up:   Plan for next ACM outreach in approximately 1-2 days  to complete:  - disease specific assessments  - goal progression  - education .

## 2025-02-19 ENCOUNTER — CARE COORDINATION (OUTPATIENT)
Dept: CARE COORDINATION | Age: 60
End: 2025-02-19

## 2025-02-19 NOTE — CARE COORDINATION
Ambulatory Care Coordination Note     2/19/2025 12:33 PM     patient outreach attempt by this AC today to perform care management follow up . ACM was unable to reach the patient by telephone today;   left voice message requesting a return phone call to this ACM.     Patient closed (unable to reach patient) from the High Risk Care Management program on 2/19/2025.  Patient unable to progress towards self management. .  Care management goals have been completed. No further Ambulatory Care Manager follow up scheduled.              Yonathan Steele RN  Ambulatory Care Manager   C. [phone]

## 2025-02-24 ENCOUNTER — HOSPITAL ENCOUNTER (OUTPATIENT)
Dept: INFUSION THERAPY | Age: 60
Discharge: HOME | End: 2025-02-24
Payer: MEDICARE

## 2025-02-24 DIAGNOSIS — E53.8 VITAMIN B 12 DEFICIENCY: ICD-10-CM

## 2025-02-24 DIAGNOSIS — D50.8 OTHER IRON DEFICIENCY ANEMIA: Primary | ICD-10-CM

## 2025-02-24 PROCEDURE — 96372 THER/PROPH/DIAG INJ SC/IM: CPT

## 2025-02-24 PROCEDURE — 6360000002 HC RX W HCPCS: Performed by: INTERNAL MEDICINE

## 2025-02-24 RX ORDER — CYANOCOBALAMIN 1000 UG/ML
1000 INJECTION, SOLUTION INTRAMUSCULAR; SUBCUTANEOUS ONCE
OUTPATIENT
Start: 2025-03-24 | End: 2025-03-24

## 2025-02-24 RX ORDER — CYANOCOBALAMIN 1000 UG/ML
1000 INJECTION, SOLUTION INTRAMUSCULAR; SUBCUTANEOUS ONCE
Status: COMPLETED | OUTPATIENT
Start: 2025-02-24 | End: 2025-02-24

## 2025-02-24 RX ADMIN — CYANOCOBALAMIN 1000 MCG: 1000 INJECTION, SOLUTION INTRAMUSCULAR; SUBCUTANEOUS at 10:15

## 2025-02-27 ENCOUNTER — TRANSCRIBE ORDERS (OUTPATIENT)
Dept: ADMINISTRATIVE | Age: 60
End: 2025-02-27

## 2025-02-27 DIAGNOSIS — M47.816 LUMBAR SPONDYLOSIS: Primary | ICD-10-CM

## 2025-03-06 ENCOUNTER — HOSPITAL ENCOUNTER (OUTPATIENT)
Dept: MRI IMAGING | Age: 60
Discharge: HOME | End: 2025-03-06
Payer: MEDICARE

## 2025-03-06 DIAGNOSIS — M47.816 LUMBAR SPONDYLOSIS: ICD-10-CM

## 2025-03-06 PROCEDURE — 72148 MRI LUMBAR SPINE W/O DYE: CPT

## 2025-03-17 ENCOUNTER — HOSPITAL ENCOUNTER (OUTPATIENT)
Dept: CT IMAGING | Facility: HOSPITAL | Age: 60
Discharge: HOME | End: 2025-03-17
Payer: MEDICARE

## 2025-03-17 DIAGNOSIS — Z92.3 HISTORY OF RADIATION THERAPY: ICD-10-CM

## 2025-03-17 DIAGNOSIS — C78.02 CARCINOMA OF LEFT BREAST METASTATIC TO LUNG: ICD-10-CM

## 2025-03-17 DIAGNOSIS — C50.912 CARCINOMA OF LEFT BREAST METASTATIC TO LUNG: ICD-10-CM

## 2025-03-17 DIAGNOSIS — Z78.9 NON-SMOKER: ICD-10-CM

## 2025-03-17 PROCEDURE — 34310000005 FLUDEOXYGLUCOSE F18 SOLUTION: Performed by: RADIOLOGY

## 2025-03-17 PROCEDURE — A9552 F18 FDG: HCPCS | Performed by: RADIOLOGY

## 2025-03-17 PROCEDURE — 78815 PET IMAGE W/CT SKULL-THIGH: CPT

## 2025-03-17 RX ADMIN — FLUDEOXYGLUCOSE F18 1 DOSE: 300 INJECTION INTRAVENOUS at 09:35

## 2025-03-19 ENCOUNTER — TELEPHONE (OUTPATIENT)
Dept: HEMATOLOGY | Age: 60
End: 2025-03-19

## 2025-03-19 NOTE — TELEPHONE ENCOUNTER
I called patient and left detailed voicemail about their appointment on 03/24/2025. I made patient aware not to arrive any earlier than the appointment time and to come at the time of the follow up not the time of the lab appointment if it is different than the follow up appt time. I also made patient aware to eat and drink plenty of water to hydrate properly before coming to these appointments because this will make their lab draw much easier. Made patient aware that we are now located at the [facility] at [address] [place] [address]. Located between [facility] and the [facility]

## 2025-03-20 ENCOUNTER — HOSPITAL ENCOUNTER (OUTPATIENT)
Dept: RADIATION ONCOLOGY | Facility: HOSPITAL | Age: 60
Setting detail: RADIATION/ONCOLOGY SERIES
End: 2025-03-20
Payer: MEDICARE

## 2025-03-21 ENCOUNTER — TELEPHONE (OUTPATIENT)
Age: 60
End: 2025-03-21
Payer: MEDICARE

## 2025-03-21 ENCOUNTER — OFFICE VISIT (OUTPATIENT)
Age: 60
End: 2025-03-21
Payer: MEDICARE

## 2025-03-21 VITALS
DIASTOLIC BLOOD PRESSURE: 79 MMHG | HEART RATE: 81 BPM | BODY MASS INDEX: 26.93 KG/M2 | WEIGHT: 167.6 LBS | SYSTOLIC BLOOD PRESSURE: 119 MMHG | HEIGHT: 66 IN | OXYGEN SATURATION: 99 %

## 2025-03-21 DIAGNOSIS — C50.011 CARCINOMA OF AREOLA OF RIGHT BREAST IN FEMALE, UNSPECIFIED ESTROGEN RECEPTOR STATUS: Primary | ICD-10-CM

## 2025-03-21 DIAGNOSIS — J20.9 ACUTE PURULENT BRONCHITIS: ICD-10-CM

## 2025-03-21 DIAGNOSIS — C78.02 CARCINOMA OF LEFT BREAST METASTATIC TO LUNG: Primary | ICD-10-CM

## 2025-03-21 DIAGNOSIS — C50.912 CARCINOMA OF LEFT BREAST METASTATIC TO LUNG: Primary | ICD-10-CM

## 2025-03-21 DIAGNOSIS — Z78.9 NON-SMOKER: ICD-10-CM

## 2025-03-21 DIAGNOSIS — Z92.3 HISTORY OF RADIATION THERAPY: ICD-10-CM

## 2025-03-21 DIAGNOSIS — C50.919 CARCINOMA OF BREAST METASTATIC TO LUNG, UNSPECIFIED LATERALITY: ICD-10-CM

## 2025-03-21 DIAGNOSIS — C78.00 CARCINOMA OF BREAST METASTATIC TO LUNG, UNSPECIFIED LATERALITY: ICD-10-CM

## 2025-03-21 PROCEDURE — 36415 COLL VENOUS BLD VENIPUNCTURE: CPT | Performed by: INTERNAL MEDICINE

## 2025-03-21 PROCEDURE — G0463 HOSPITAL OUTPT CLINIC VISIT: HCPCS | Performed by: RADIOLOGY

## 2025-03-21 RX ORDER — GABAPENTIN 300 MG/1
1 CAPSULE ORAL 3 TIMES DAILY
COMMUNITY
Start: 2025-03-10

## 2025-03-21 RX ORDER — CYCLOBENZAPRINE HCL 10 MG
1 TABLET ORAL 3 TIMES DAILY
COMMUNITY
Start: 2025-01-27

## 2025-03-21 RX ORDER — EXEMESTANE 25 MG/1
1 TABLET ORAL DAILY
COMMUNITY
Start: 2024-12-26

## 2025-03-21 RX ORDER — PREDNISONE 50 MG/1
50 TABLET ORAL DAILY
Qty: 14 TABLET | Refills: 0 | Status: SHIPPED | OUTPATIENT
Start: 2025-03-21 | End: 2025-04-04

## 2025-03-21 RX ORDER — HYDROCODONE BITARTRATE AND ACETAMINOPHEN 10; 325 MG/1; MG/1
1 TABLET ORAL EVERY 6 HOURS
COMMUNITY
Start: 2025-02-26

## 2025-03-21 NOTE — PROGRESS NOTES
aromatase inhibitor therapy    Hot flashes related to aromatase inhibitor therapy    Bone lesion  -     MRI PELVIS W WO CONTRAST; Future    Generalized pain        Assessment & Plan  HR positive/HER2 negative breast cancer stage IV (pulmonary metastasis)    Her molecular profile is ER 70%, IL 5%, HER-2 IHC zero.   She is status post completion of SBRT to the right upper lobe and left lower lobe pulmonary lesions.   The PET scan results indicate a significant decrease in the SUV of the target pulmonary lesions that received SBRT. There is a subtle spot on the left iliac bone that may be cancerous    The current treatment plan will be maintained, including the continuation of Aromasin. An MRI of the pelvis will be ordered to further investigate the abnormality detected in the left iliac bone during the PET scan. The MRI will be conducted at [facility], and the results will be compared to the previous PET scan.     The goal of the treatment is to manage the metastatic breast cancer effectively while monitoring any new developments. The MRI will help determine if there is a need to adjust the treatment plan based on the findings in the left iliac bone. Potential side effects of Aromasin include hot flashes, which the patient is currently experiencing and managing with prescribed medication. Supportive care measures include monitoring for any new pain or symptoms and addressing them promptly.    Risks and benefits of continuing the current treatment were discussed. The benefit of maintaining the current regimen is the observed decrease in cancer markers and the stability of the disease. The risk includes the possibility of the subtle spot on the pelvis being cancerous, which will be further investigated with the MRI. Alternatives to the current treatment were discussed.  Will check HER2 ultralow.    2. Side effects of aromatase inhibitor.  She has tolerated the treatment well. She does not report any hot flashes,

## 2025-03-21 NOTE — PROGRESS NOTES
[facility]  Radiation Oncology Clinic   Arnoldo Alas MD, FACR  Ronnie CRIOSSTOMO  _______________________________________________  [facility]  Department of Radiation Oncology  [address] [place] [id number]  Office: [phone]  Fax: [phone]    DATE: 03/21/2025  PATIENT: Susanna Wood  1965                         MEDICAL RECORD #: [id number]    1. Carcinoma of left breast metastatic to lung    2. History of radiation therapy    3. Non-smoker                                            REASON FOR VISIT:    Chief Complaint   Patient presents with    Breast Cancer     Susanna Wood is a very pleasant 60 y.o. patient that has completed radiation therapy and returns to the clinic today for routine follow up exam.     HISTORY OF PRESENT ILLNESS  Diagnosed with metastatic breast cancer to the lungs. She completed 6000 cGy in 8 fractions to the left and right lung on 11/11/2024.     2006 - Neoadjuvant chemotherapy with AC x 4 followed by Taxol in [place]    2007 - Lumpectomy with axillary lymph node dissection  1.9 cm grade 2 with no lymphovascular space invasion  1/14 lymph nodes positive for malignancy  ER, IA +  (patient did not take hormonal therapy due to cost)  Her-2 neg    2007 -  Completed Radiation course:  Adjuvant radiation to whole breast/axillary region    Recurrence:  04/2013 - Noted a mass in right axillary region, slightly tender to palpation.    04/2013 - Lymph node biopsy:  Adenocarcinoma     2013 - Neoadjuvant Taxotere x 2, followed by Doxil    10/2013 - PET Scan:  2.8 cm right axillary lymph node with uptake    10/14/2013 - Mastectomy and axillary dissection:  Right breast:  benign with fibrosis, no malignancy  Axillary contents demonstrated invasive ductal carcinoma involving fibroadipose tissue and tendinous tissues, 3 benign lymph nodes  [place] grade 3  3.0 cm in greatest gross  dimensions  Carcinoma permeates among soft tissues in the right axilla with no apparent involvement of lymph nodes    11/04/2013 - Appointment with Dr. Azeem Watts/[place]c - [place]:  She will see Dr. Butler for chest wall and axillary LN XRT. She will start tamoxifen after radiation.  Will also check PET/CT scan.  She will follow-up in 2 weeks.    11/11/2013 - PET Scan:  Unremarkable    12/2013 - 01/23/2014 - Completed Radiation course:  Adjuvant radiation to chest wall and axillary lymph nodes    02/2014 - Hormonal Therapy course:  Tamoxifen    Recurrence:  2014 - Surgical resection of the axillary skin pathology demonstrating tumor at cauterized margin    09/19/2016 - Bilateral exchange, nipple reconstruction and fat grafting and removal of PAC    2016 - Hormonal Therapy - Switched to aromatase inhibitor, patient was non-compliant    01/11/2017 - 06/17/2018 - Chemotherapy course:  Single agent Abraxane x 13 cycles    03/15/2017 - CT Chest:  The main finding is interval decrease in maximal diameter of essentially all of the multiple metastatic pulmonary nodules.   The average difference in approximately 25%. Some of the much smaller ones have disappeared.   There are no airspace infiltrates. There are no effusions.   Bilateral hilar and aortopulmonary window adenopathy is also similarly smaller.    06/07/2017 - CT Chest:  Multiple lung nodules seen bilaterally.  Most of the lung nodules show interval improvement with decreased size by 1-3 mm.   Mediastinal and bilateral hilar adenopathy seen with improvement compared to prior examination as well.  No pleural or pericardial fluid seen. No airspace filling opacities seen. No infiltrates seen with no acute inflammatory processes seen. Correlate clinically. Consider further workup depending on clinical findings.    07/24/2017 - PET Scan:  Marked improvement   Minimally abnormal  Has excellent response to chemo    02/01/2018 - PET Scan:  Numerous bilateral lung  masses and nodules, the majority of which do not have appreciable abnormal FDG uptake.   The largest mass in the left infrahilar region of the left lower lobe demonstrates a maximum SUV of 3.3.   Mediastinal lymphadenopathy.   No evidence of metastatic disease in the abdomen or pelvis    06/21/2018 - CT Chest:  Compared to previous exam of 6/7/17, there is a interval increase in number of nodules, size of many lesions, interval development of bilateral small pleural effusions. Also increase in size of mediastinal lymph nodes.    06/21/2018 - CT Abdomen/Pelvis:  There are no masses in the liver, pancreas, or either kidney. There are no adrenal nodules. There is no inflammatory process in the abdomen or pelvis.   There is no ascites. A normal appendix was seen.   There is no bowel obstruction.   There are left-sided colonic diverticula but diverticulitis.    06/27/2018 - 09/17/2018 - Chemotherapy course:  Gemzar x 3, dc'd due to progression of disease    09/28/2018 - CT Angio Chest:  Multiple lung mets, mild bilateral hilar and mediastinal LN mets    10/10/2018 -  Chemotherapy course:  Ibrance/Faslodex    12/14/2018 - CT Chest:  Definite decrease in the maximal diameter and volume of all of the left and right metastatic nodules. No interval development of any new nodules.   There are no pulmonary infiltrates at this time. There are no effusions.   Somewhat enlarged paratracheal and left axillary lymph nodes are relatively unchanged.    04/22/2019 - MRI Brain:  There is no acute hemorrhage or infarct or midline shift. No enhancing lesions in the brain and there is no evidence of metastatic disease.  Periventricular and subcortical hyperintensity white matter changes. Findings could be due to chronic small vessel disease although multiple sclerosis is not excluded.  Overall there is no significant change from prior study.    04/22/20219 - MRI Cervical Spine:  Unremarkable     07/27/2019 - CT Neck:  Mild degenerative  disc disease C6-C7, C7-T1  Vertebral bodies are normal height  No compression deformity seen  No spondylolisthesis is seen  No focal bony lesions seen  Consider follow-up depending on clinical findings    07/27/2019 - CT Angio Chest:  There is no pulmonary embolism or lower extremity DVT  There is no aortic dissection  There are no pulmonary infiltrates or effusions  There are no findings of acute congestive heart failure  When compared to the study of 1/5/17 there has been essentially complete disappearance of the pulmonary metastatic disease  Several tiny, flat peripheral nodules are the only sequela.  The tiny ones on the right are unchanged.  The tiny ones on the left are even smaller than on the study of 12/14/18  There is no longer any active metastatic disease in either lung    12/19/2019 - CT Chest:  Small tiny subpleural nodules right upper and right midlung field as well as left lung base has remained unchanged compared to last CT pulmonary angiogram. No new focal parenchymal abnormality seen read no pleural effusion seen. No adenopathy seen.    12/19/2019 - CT Abdomen/Pelvis:  There is no abdominal or pelvic mass, adenopathy, or fluid collection.  While there are no lytic or sclerotic bony lesion, possibility of osteopenia and/or osteoporosis is raised. Additional evaluation by DEXA bone densitometry study would be of benefit.    02/25/2021 - CT Chest with contrast:  Tiny nodules in the right lung described above probably represent small foci of pleural thickening due to chronic inflammatory process or small noncalcified granulomas. No features to suggest malignancy or metastatic disease.   Bilateral breast implants without complication.     02/25/2021 - CT Abdomen/Pelvis with contrast:  No evidence of metastatic disease in the abdomen or pelvis.     08/19/2021 - CT Abdomen/Pelvis with contrast:  A stable CT scan of the abdomen and pelvis. No finding to suggest neoplastic/metastatic disease.      08/19/2021 - CT Chest with contrast:  Left lower lobe pleural-based nodular 1.7 x 0.9 cm is indeterminate. PET/CT recommended.   Feeding defect in the left lower lobe bronchus versus a postobstructive airspace disease. This too may be further characterized with PET CT versus direct visualization.     09/30/2021 - CT Chest with contrast:  Similar left lower lobe pleural-based nodule or opacity. This appears less rounded than prior.   Left chest wall collaterals suggest chronic left subclavian vein stenosis.   Coronary artery calcifications.     11/19/2021 - CT Angiogram Chest:  No evidence of pulmonary embolism.   The lungs are poorly expanded but unremarkable.     12/08/2021 - MRI Lumbar Spine without contrast:  No significant change since the previous study.   Chronic degenerative changes.   A persistent mild degenerative retrolisthesis is of L5 over S1.   Prominent disc osteophyte complexes and facetal arthropathy and resultant mild spinal stenosis at L4-5 and neural foraminal stenosis at L4-5 and L5-S1 as detailed above.     01/06/2022 - CT Chest without contrast:  Stable chest CT with an unchanged tiny subpleural nodule within the right upper lobe.     02/25/2022 - CT Abdomen/Pelvis without contrast:  Gastric wall thickening is nonspecific. It could be an artifact of under distention. Gastritis and other etiologies are not ruled out.   The unenhanced solid organs demonstrate no focal abnormality.   Diverticulosis of the colon. Under distention of portions of the colon.   Prior hysterectomy.   Mild sclerotic changes in the subarticular femoral heads bilaterally is stable. Early AVN is possible. MRI would be more definitive. The signal abnormality is greater on the right side.     05/10/2022 -  Skin, revision of left chest scar:   Cicatrix identified with foreign body type response, negative for evidence of malignancy.     07/12/2022 - CT Chest without contrast:  Apical fibrosis.   Focal fibrosis in subpleural  location in right lower lobe due to thoracic osteophytes.   Atelectatic bands in bilateral lower lobes.   Atherosclerotic wall calcifications in arch of aorta and coronaries.   Degenerative changes in spine.     07/18/2022 - CT Chest with contrast:  Bibasilar subsegmental atelectasis, stable and unchanged   Prominent osteophyte formation out in the lower thoracic spine with associated subsegmental atelectasis   No pulmonary nodules, masses, pleural effusion or pneumothorax   Bilateral breast implants   Stable interval appearance since recent study 7/12/22 07/18/2022 - CT Abdomen/Pelvis with contrast:  Fecal loaded colon possible constipation.   Scattered colon diverticulosis. No acute diverticulitis.   No acute abdominopelvic abnormality .     09/30/2022 - CT Head without contrast:  No acute hemorrhage     02/01/2023 - CT Chest with contrast:  A 2.2 cm solid pulmonary nodule in the inferomedial left lower lobe. PET/CT versus tissue sampling is recommended for further characterization. Metastatic disease cannot be excluded.     02/01/2023 - CT Abdomen/Pelvis with contrast:  No evidence of metastatic disease in the abdomen or pelvis.     02/01/2023 - Bone Scan:  No findings to suggest osteoblastic metastatic disease.     02/06/2023 - PET Scan:   Left medial lower lobe nodule in the superior segment of the left lower lobe has increased FDG uptake max SUV 3.9 measures 1.5 x 1.1 cm.  No additional suspicious focal hypermetabolic uptake or pathologic appearing adenopathy otherwise noted.     02/22/2023 - CT Chest without contrast:  No suspicious masses or nodules.  Lungs are clear.   Follow up as clinically indicated.     02/23/2023 - Bronchoscopy/EBUS:  Mass, left lower lobe superior segment, EBUS (ThinPrep and cell block section):   No malignant cells identified.   Benign bronchial epithelial cells and alveolar macrophages in a background of fibrin.     06/08/2023 - CT Chest without contrast:  Stable bilateral lung  nodules measuring up to 1.9 x 1.5 cm. No new or enlarging lung nodule identified.   Stable mildly prominent left subpectoral lymph nodes.   Additional chronic and incidental findings is also stable, as detailed above.     06/08/2023 - CT Abdomen/Pelvis with contrast:  No evidence of visceral metastatic disease abdomen or pelvis.   Areas of sclerosis in the bilateral femoral heads may suggest avascular necrosis.  Several scattered sclerotic foci in the pelvis bilaterally.  Given findings of suspected avascular necrosis and bone islands versus bone metastases in the breast cancer patient without prior imaging for comparison, recommend MRI of the pelvis and bilateral hips without and with contrast.     12/14/2023 - CT Chest without contrast:  Right upper lobe and left lower lobe nodules show significant interval increase in size.  Interval progression is suggestive of metastatic disease.  PET-CT or CT guided biopsy is recommended for further workup.   7 mm nodule in the right lower lobe appears grossly stable.     12/14/2023 - CT Abdomen/Pelvis with contrast:  No evidence of metastatic disease in the abdomen or pelvis.   Focal narrowing of the gastric body is identified.  This is favored to represent peristalsis.  Attention on follow-up imaging is recommended.     12/14/2023 - Bone Scan:  The thoracic and lumbar spine demonstrate age appropriate findings as discussed in the report.   There are normal findings in the rib cage and pelvis.   There are age appropriate findings in joints in the upper and lower extremities.   There are normal findings in the head and neck.   No overtly pathologic appearing bone lesion is identified.     12/22/2023 - PET Scan:  The PET CT examination demonstrates continued increased activity in the 2.5 x 2.1 cm pulmonary nodular density seen in the medial left upper lobe on the patient's prior examination.  Correcting for differences between the prior and current software package there has  been an interval increase in activity.  The examination also demonstrates a new FDG avid nodule along the medial pleural margin of the right upper lobe with activity consistent with increased likelihood of malignancy.  No additional significantly FDG avid pulmonary parenchymal or pleural based lesions or hilar or mediastinal lymph nodes are identified.  Additional details are contained in the report.   The examination demonstrates a generally stable, physiologic distribution of activity in the abdomen and pelvis, as described in the report.   The examination demonstrates a generally stable, physiologic distribution of activity in the included portions of the head and neck, as described in the report.   The examination demonstrates a generally stable, physiologic distribution of activity in the included portions of the skeleton and extremeties, as described in the report.     01/03/2024 - CT Chest without contrast:  Nodule in the right upper lobe anteriorly medially, slightly larger now measuring 1.5 x 2.0 cm.   Noncalcified nodule in the left lower lobe superiorly medially measuring 2.5 cm, unchanged.   Noncalcified nodule in the right lower lobe measuring 0.7 cm, unchanged.   Atherosclerosis and coronary artery calcification.   Mild degenerative changes of the spine.   Bilateral breast implants.  Surgical clips in the right axilla.   Diverticulosis of the colon.   Otherwise unremarkable noncontrast CT scan of the chest.     01/04/2024 - Bronchoscopy with biopsy:   Lung, left lower lobe, transbronchial biopsy:   Fragments of bronchial mucosa with submucosa exhibiting anthracotic pigment deposition.   No granulomata identified.   Fragments of clotted blood.   No histologic evidence of malignancy.   Lung, left lower lobe, fine-needle aspiration, ThinPrep preparation (1):   Background blood.   Scattered small, mature lymphocytes, scattered neutrophils and scant macrophages.   Scant ciliated columnar bronchial  epithelial cells.   Negative for malignant cells.   Lung, bronchial washings, ThinPrep preparation (1) and cell block:   Abundant blood.   Ciliated columnar bronchial epithelial cells.   Negative for malignant cells.     02/02/2024 - Robotic Navigational Bronchoscopy - Dr. Price:  Lung Left lower lobe:  Bronchial epithelial cells and mixed inflammation, no malignant cells identified.   Left lower lobe;   Adenocarcinoma, consistent with metastasis from patient's known breast primary.   ER 70%, KS 5%, HER2 IHC 0     06/20/2024 - CT Chest with contrast:  Image 29 1.5 x 2 cm nodule stable and unchanged right upper lobe   Image 45 left infrahilar nodule 1.6 x 2.1 cm previously was 2.1 x 2.5 cm.   Previous  0.7 cm nodule right lower lobe is not present on this exam     06/20/2024 - CT Abdomen/Pelvis with contrast:  No evidence of metastatic disease within the abdomen or pelvis   Osteonecrosis suspected in the hips bilaterally.   Mild hepatic steatosis.   Diverticulosis without evidence of diverticulitis.     06/20/2024 - Bone Scan:  The thoracic and lumbar spine demonstrate generally stable findings as described in the report.   There are normal findings in the rib cage and pelvis.   There are age appropriate findings in joints in the upper lower extremities.   There are normal findings in the head and neck.   No new overtly pathologic appearing bone lesion is identified.    09/18/2024 - CT Chest with contrast:  Abnormal exam.  Progression of neoplastic disease in the thorax with enlarging pulmonary parenchymal metastases bilaterally as above and enlarging mediastinal and left retro hilar nodes.  Left lower lobe segmental airway narrowing.     09/18/2024 - CT Abdomen/Pelvis with contrast:  No evidence of metastatic disease in the abdomen or pelvis.   No acute abnormality in the abdomen or pelvis.     09/20/2024 - Appointment with Dr. Gross:  ASSESSMENT:  Metastatic breast cancer, hormone sensitive, stage IV ER 70%,  AR 5%, HER2 IHC 0  March 2024-  Essentially, pulmonary nodules with biopsy-proven breast cancer ER 70%, AR 5%, HER2 IHC 0. Tempus liquid biopsy showed no mutation.  PET scan showed evidence of 2 nodules with high SUV uptake. No other evidence of metastatic disease.  Patient has received several lines of treatment in the past to include chemotherapy and endocrine therapy.  Most recent line of therapy include Faslodex and Ibrance. She has stopped Ibrance more than a year ago. I recommended to resume Ibrance and Aromasin.  September 2024-  9/18/24 CT Chest W Contrast Narrowing of the left lower lobe posterior segmental airway secondary to enlarging left retro hilar mass lesion today measuring 2.9 x 2.1 cm. Stable 4 mm nodule anterolateral right upper lobe axial image 25. Interval increase in size of mesially right lung apical lobulated mass measuring 2.1 x 1.8 cm. 3 mm subpleural stable nodule right middle lobe stable 6 x 4 mm nodule right lower lobe. Impression: Progression of neoplastic disease in the thorax with enlarging pulmonary parenchymal metastases bilaterally as above and enlarging mediastinal and left retro hilar nodes. Left lower lobe segmental airway narrowing.   9/18/24 CT Abd/Pelvis W IV Contrast (oral) No evidence of metastatic disease in the abdomen or pelvis. No acute abnormality in the abdomen or pelvis. Please see dedicated CT chest for findings above the diaphragm.Incidental findings as above.   9/20/2024-I had a lengthy discussion with the patient regarding results of CT scans.  Plan:  Continue Aromasin and Ibrance 100mg PO days 3 weeks on/1 week off  Repeat PET scan.  Discussed with radiation oncology if the sites are targetable to SBRT and no other evidence of disease. If so, she will continue exemestane and palbociclib.  PLAN:  RTC with MD in 2 weeks   CBC CMP CA 15-3 CA 27.29 CEA today  Continue Vitamin B12 inj today and every 4 weeks  Recommend PET Scan next available  Referral to Dr. Felix  after PET scan  Repeat CT Chest,Abdomen,Pelvis Scans in 6 weeks   Continue Lomotil 2.5 mg every 6 hours as needed  Continue follow-up with PCP routinely  Continue Eliquis 5 mg BID for history of DVT left upper extremity  Continue follow-up with /Pulmonology, 10/7/24  Continue follow-up with Dr. Manuel Ballesteros/General Surgery, 11/16/24  Continue Aromasin 25mg daily   Continue Ibrance 100mg D1-21 every 28 days  Continue Phenergan 25mg every 6 hrs as needed  Continue Allopurinol 200 mg daily  Follow Up:  Return in about 2 weeks (around 10/4/2024).   Sched PET Scan next available  Then, Referral to Dr. Felix     09/26/2024 - PET Scan:  The PET CT examination demonstrates an increase in the level of activity in the right lung apical lobulated mass seen on the recent CT and prior PET CT exams from SUV max of 3.59 to 6.73. The 2.9 x 2.1 cm left retro hilar nodular mass/adenopathy seen on both exams demonstrates SUV max of 7.03 compared to 3.94 previously.  No additional significantly FDG avid pulmonary parenchymal lesions are seen elsewhere. There are additional modestly positive nodes in the upper mediastinum and subcarinal space listed in the report.  a physiologic distribution of activity in the thorax, as described in the report.   The PET CT examination demonstrates a generally stable, physiologic distribution of activity in the abdomen and pelvis, as described in the report.   The PET CT examination demonstrates a generally stable, physiologic distribution of activity in the included portions of the head and neck, as described in the report.   The PET CT examination demonstrates a generally stable, physiologic distribution of activity in the included portions of the skeleton and extremities,  as described in the report.     10/03/2024 - Appointment with :  Despite her previous radiation therapy, she is a candidate for stereotactic ablative radiation therapy (SABR) to each of the lung masses in a  best effort for local control.  The patient will continue systemic therapy with Ibrance and Abraxane per my discussion with Dr. Gross.  Following this discussion and in consideration of the diagnostic data/evaluation of the patient, I recommended a course of stereotactic radiosurgery to the isolated bilateral lung lesions described above. I anticipate 5000 cGy over 4 treatments each. Will simulate treatment fields with 4D CT with MIPS to begin the planning process, final course pending. Continue ongoing management per primary care physician and other specialists.     10/04/2024 - Appointment with :  Metastatic breast cancer, hormone sensitive, stage IV ER 70%, CT 5%, HER2 IHC 0  March 2024-  Essentially, pulmonary nodules with biopsy-proven breast cancer ER 70%, CT 5%, HER2 IHC 0.  Tempus liquid biopsy showed no mutation.  PET scan showed evidence of 2 nodules with high SUV uptake. No other evidence of metastatic disease.  Patient has received several lines of treatment in the past to include chemotherapy and endocrine therapy.  Most recent line of therapy include Faslodex and Ibrance. She has stopped Ibrance more than a year ago. I recommended to resume Ibrance and Aromasin.  September 2024-  9/18/24 CT Chest W Contrast Narrowing of the left lower lobe posterior segmental airway secondary to enlarging left retro hilar mass lesion today measuring 2.9 x 2.1 cm. Stable 4 mm nodule anterolateral right upper lobe axial image 25. Interval increase in size of mesially right lung apical lobulated mass measuring 2.1 x 1.8 cm. 3 mm subpleural stable nodule right middle lobe stable 6 x 4 mm nodule right lower lobe. Impression: Progression of neoplastic disease in the thorax with enlarging pulmonary parenchymal metastases bilaterally as above and enlarging mediastinal and left retro hilar nodes. Left lower lobe segmental airway narrowing.   9/18/24 CT Abd/Pelvis W IV Contrast (oral) No evidence of metastatic  disease in the abdomen or pelvis. No acute abnormality in the abdomen or pelvis. Please see dedicated CT chest for findings above the diaphragm.Incidental findings as above.   9/26/24 PET Scan The PET CT examination demonstrates an increase in the level of activity in the right lung apical lobulated mass seen on the recent CT and prior PET CT exams from SUV max of 3.59 to 6.73. The 2.9 x 2.1 cm left retro hilar nodular mass/adenopathy seen on both exams demonstrates SUV max of 7.03 compared to 3.94 previously. No additional significantly FDG avid pulmonary parenchymal lesions are seen elsewhere. There are additional modestly positive nodes in the upper mediastinum and subcarinal space listed in the report. a physiologic distribution of activity in the thorax, as described in the report.The PET CT examination demonstrates a generally stable, physiologic distribution of activity in the abdomen and pelvis, as described in the report.The PET CT examination demonstrates a generally stable, physiologic distribution of activity in the included portions of the head and neck, as described in the report.The PET CT examination demonstrates a generally stable, physiologic distribution of activity in the included portions of the skeleton and extremities, as described in the report.  My interpretation-evidence of abnormal SUV uptake in the right upper lobe lung lesion as well as left hilar lesion. This is biopsy-proven metastatic breast cancer. No other evidence of abnormal metastatic disease.  9/20/2024-I had a lengthy discussion with the patient regarding results of CT scans.  Plan:  -Continue Aromasin and Ibrance 100mg PO days 3 weeks on/1 week off  -Discussed with radiation oncology, Lavon Alas on 10/4/2024-proceed with SBRT   PLAN:  RTC with MD in weeks   CBC CMP CA 15-3 CA 27.29 CEA today  Continue Aromasin 25mg daily   Continue Ibrance 100mg D1-21 every 28 days  Continue Vitamin B12 inj every 4 weeks, next due  10/18/24  Repeat CT Chest, Abdomen, Pelvis Scans in Dec 2024 to assess disease response  Continue Lomotil 2.5 mg every 6 hours as needed  Continue follow-up with PCP routinely  Continue follow-up with /Pulmonology, 10/7/24  Continue follow-up with Dr. Manuel Ballesteros/General Surgery, 11/16/24  Continue follow-up with Dr. Lavon Alas at [facility] for SBRT radiation therapy, 10/7/24  Continue Phenergan 25mg every 6 hrs as needed  Care plan discussed with Dr. Laovn Alas on 10/4/2024 for coordination of care  Proceed with SBRT treatment  Follow Up:  Return in about 7 weeks (around 11/22/2024) for CBC, CMP, cEA, CA 15-3, CA 27-29 , Appointment with Dr. Gross.     10/07/2024 - Appointment with Juan Simpson MD:  Continue current management of metastatic disease to the lung per oncology. Agree with SBRT to both lesions.  Continue as needed use of albuterol.  Return in about 6 months (around 4/7/2025).    10/21/2024 - 11/11/2024 - Completed radiation course:  Received 6000 cGY in 8 fractions to the left and right lung.     11/06/2024 - Appointment with :  PLAN:   Follow-up in 6 months for physical exam  Proceed with stereotactic radiation to her lung metastases.  We will discuss further fat grafting and other interventions in 6 months    12/13/2024 - CT Abdomen/Pelvis with contrast:  Compared with 09/18/2024.  No evidence of metastatic disease to the abdomen/pelvis.   Colonic diverticulosis without diverticulitis.   Increased fecal burden that may indicate constipation.   Sclerosis in the right femoral head but without collapse.  This may represent avascular necrosis.     12/13/2024 - CT Chest with contrast:  Compared with 09/18/2024.  There are new left retropectoral lymph nodes ranging in size up to 1.3 cm.  Consider a PET / CT scan to evaluate for recurrence.  The previous 2.9 x 2.1 cm left lower lobe mass along the margin of the descending thoracic aorta now measures 2.0 x 1.5 cm with  improved patency of the previously narrowed left lower lobe bronchus.  Stable 1.7 x 2.0 cm right anterior paramediastinal soft tissue mass.   Stable additional sub 0.5 cm nodules as noted above.     12/13/2024 - Bone Scan:  The thoracic and lumbar spine demonstrate generally stable findings with good correlation with available CT images as described in the report.   There are normal findings in the rib cage and pelvis.   There are age appropriate findings in joints in the upper and lower extremities.   There are normal findings in the head and neck.   No new overtly pathologic appearing bone lesion is identified.     12/16/2024 - Appointment with :  PLAN:  RTC with MD in 6 weeks   CBC CMP CEA CA 15-3 CA 27.29 CEA today  Continue Aromasin 25mg daily   Continue Ibrance 100mg D1-21 every 28 days  Continue Phenergan 25mg every 6 hrs as needed  Continue Vitamin B12 inj every 4 weeks  Repeat CT Chest, Abdomen, Pelvis Scans in 3 months to assess disease response, around March 2025  Continue Lomotil 2.5 mg every 6 hours as needed  Continue follow-up with PCP routinely  Continue follow-up with /[facility]mon[facility], 4/7/25  Continue follow-up with Dr. Manuel Ballesteros/[facility] General Surgery, 5/7/25  Continue follow-up with Dr. Lavon Alas at [facility] for SBRT radiation therapy, 12/17/24  Follow Up:  Return in about 6 weeks (around 1/27/2025) for CBC, CMP, Appointment with Dr. Gross.   Sched B12 in 4 weeks     12/17/2024 - Appointment with :  On exam, I do not see evidence for recurrent disease at this time, but there is radiographic evidence that warrants further investigation and follow-up with PET/CT scan is recommended.  I have discussed case directly with Dr. Gross who agrees with the following plan:  We will continue routine follow-up/surveillance as discussed in this visit, with follow up PET/CT scan before visit and I have instructed her to continue to see the other health care providers as per  their scheduling.  Return in about 3 months (around 3/24/2025) for Office Visit with Ronnie - review PET/CT results.    01/19/2025 - CT Lumbar spine without contrast:  Mild multilevel degenerative disc disease and degenerative endplate change.   No specific CT evidence of spinal stenosis.   No acute osseous abnormality.     01/27/2025 - Appointment with :  She is currently on the last week of her Ibrance cycle and is scheduled for a week off the following week. Her most recent CT scan was performed in December 2024. She is also on Aromasin as part of her treatment regimen.   PLAN:  RTC with MD in 7 weeks after PET scan at [facility]  CBC CMP CEA CA 15-3 CA 27.29 CEA today  Continue Aromasin 25mg daily   Hold Ibrance 100mg D1-21 every 28 days resume in 2 weeks   Continue Phenergan 25mg every 6 hrs as needed  Continue Vitamin B12 inj every 4 weeks  Repeat CT Chest, Abdomen, Pelvis Scans in 3 months to assess disease response, around March 2025  Proceed with PET Scan at [facility], 3/17/25  Continue Lomotil 2.5 mg every 6 hours as needed  Continue follow-up with PCP routinely  Continue follow-up with /[facility] Pulmonology, 4/7/25  Continue follow-up with Dr. Manuel Ballesteros/[facility] General Surgery, 5/7/25  Continue follow-up with ANDRESSA Kirkland/[facility] Radiation Therapy, 3/21/25  Follow Up:  Return in about 7 weeks (around 3/20/2025) for CBC, CMP, Tumor Markers, Appointment with Dr. Gross.     03/06/2025 - MRI Lumbar spine without contrast:  HISTORY:  Lumbar spondylosis.  Low back pain.  Right sciatica.  History of breast cancer 2006   TECHNIQUE:  Multi-sequential multiplanar MRI lumbar spine without intravenous contrast.   COMPARISON:  Lumbar spine MRI dated 12/08/2021.     FINDINGS:   There is moderate loss of disc height at L5-S1 with grade 1 retrolisthesis.   There is normal bone marrow signal.  The vertebral body heights are maintained.   The lumbar lordosis is maintained.   The conus medullaris terminates at  L1-L2. The cauda equina is normal in signal and morphology.   There is no epidural fluid collection/hematoma.   The prevertebral and paraspinal soft tissues are unremarkable.   The visualized portions of the abdominopelvic viscera are grossly unremarkable.     T12-L1: There is no significant disc herniation, spinal canal stenosis, or neuroforaminal stenosis.   L1-L2: There is no significant disc herniation, spinal canal stenosis, or neuroforaminal stenosis.   L2-L3: There is bilateral facet hypertrophy.  There is no significant disc herniation, spinal canal stenosis, or neuroforaminal stenosis.   L3-L4: There is bilateral facet hypertrophy.  There is no significant disc herniation, spinal canal stenosis, or neuroforaminal stenosis.   L4-L5: There is a small left foraminal disc protrusion.  There is bilateral ligamentum flavum and facet hypertrophy, with mild left neural foraminal stenosis.  There is no definite spinal canal stenosis.   L5-S1: There is grade 1 retrolisthesis.  There is a small posterior disc bulge.  Mild bilateral facet hypertrophy is noted.  There is mild left neural foraminal stenosis.  There is no significant spinal canal stenosis.     Multilevel degenerative changes as detailed above, with mild left neural foraminal stenosis at L4-L5 and L5-S1.     03/17/2025 - PET Scan:  Metastatic nodules in the medial aspect of the right upper lobe as well as within the medial aspect of the left lower lobe adjacent to the descending thoracic aorta shows slight interval decrease in size and diminished metabolic activity when compared to more remote PET/CT. There is adjacent consolidation within the lung felt to represent posttreatment change.  There is a metabolically active right infrahilar nodule or node with SUVs increased metabolic activity measuring SUVs of 4.5 . This was not definitely appreciated on previous outside PET CT from September of last year. No additional metabolically active mediastinal nodes  or pulmonary nodules present.  There is a focus of abnormal metabolic activity identified associated with the upper posterior margin of the left iliac wing. This is also a new from the previous exam and suspicious for a bony metastatic lesion.   No additional sites of abnormal musculoskeletal activity identified except for fat necrosis within the gluteal regions bilaterally related to injection. Further characterization of this iliac lesion could be obtained with an MRI of the pelvis with and without contrast if it would change the patient's treatment regimen.    03/24/2025 - Scheduled appointment with :    04/07/2025 - Scheduled appointment with Juan Simpson MD:      ---------------------------------------------------------------------------------------------------------------------------------------------------------------------------      History of Grade III Adenocarcinoma of right breast diagnosed 2006  Stage IIIA, [id number]  ER/WI Positive, HER-2 dell/ihc 1+  Recurrence  April 2013 in the right axillary region  Recurrence:  2014 in the axillary skin  Metastatic disease:  01/05/2017- Metastatic disease with lung, hilar, and axillary lymph node mets        History obtained from  PATIENT and CHART    PAST MEDICAL HISTORY  Past Medical History:   Diagnosis Date    Breast cancer       PAST SURGICAL HISTORY  Past Surgical History:   Procedure Laterality Date    BREAST LUMPECTOMY      HYSTERECTOMY      MASTECTOMY        FAMILY HISTORY  family history includes Prostate cancer in her father.    SOCIAL HISTORY  Social History     Tobacco Use    Smoking status: Never    Smokeless tobacco: Never   Substance Use Topics    Alcohol use: Not Currently    Drug use: Never      ALLERGIES  Clindamycin/lincomycin     MEDICATIONS  Current Outpatient Medications   Medication Sig Dispense Refill    cyclobenzaprine (FLEXERIL) 10 MG tablet Take 1 tablet by mouth 3 times a day.      exemestane (AROMASIN) 25 MG  "tablet Take 1 tablet by mouth Daily.      gabapentin (NEURONTIN) 300 MG capsule Take 1 capsule by mouth 3 times a day.      HYDROcodone-acetaminophen (NORCO)  MG per tablet Take 1 tablet by mouth Every 6 (Six) Hours.      Ibrance 100 MG capsule capsule Take 1 capsule by mouth Daily.      predniSONE (DELTASONE) 50 MG tablet Take 1 tablet by mouth Daily for 14 days. Start tapering dose after completion of this prescription. 14 tablet 0     No current facility-administered medications for this visit.      The following portions of the patient's history were reviewed and updated as appropriate: allergies, current medications, past family history, past medical history, past social history, past surgical history and problem list.    REVIEW OF SYSTEMS  Review of Systems   Constitutional:  Positive for fatigue.   HENT: Negative.     Eyes: Negative.    Respiratory:  Positive for cough (productive white sputum), shortness of breath and wheezing.         \"Heaviness in chest\"      Cardiovascular: Negative.    Gastrointestinal: Negative.    Endocrine: Negative.    Genitourinary: Negative.    Musculoskeletal:  Positive for back pain (low back/right hip d/t sciatica).   Skin: Negative.    Allergic/Immunologic: Negative.    Neurological: Negative.    Hematological: Negative.    Psychiatric/Behavioral: Negative.       Implant: None.     PHYSICAL EXAM  VITAL SIGNS:   Vitals:    03/21/25 1014   BP: 119/79   Pulse: 81   SpO2: 99%  Comment: room air   Weight: 76 kg (167 lb 9.6 oz)   Height: 167.6 cm (66\")   PainSc: 7    PainLoc: Hip  Comment: right sided; low back      Physical Exam  Vitals and nursing note reviewed.   Constitutional:       General: She is not in acute distress.     Appearance: Normal appearance. She is normal weight. She is not ill-appearing.   Eyes:      Extraocular Movements: Extraocular movements intact.      Pupils: Pupils are equal, round, and reactive to light.   Cardiovascular:      Rate and Rhythm: Normal " rate and regular rhythm.      Heart sounds: Normal heart sounds.   Pulmonary:      Effort: Pulmonary effort is normal.      Breath sounds: Normal breath sounds.   Abdominal:      General: There is no distension.      Palpations: Abdomen is soft.      Tenderness: There is no abdominal tenderness.   Musculoskeletal:         General: Normal range of motion.      Right forearm: Normal.      Left forearm: Normal.      Right hand: Normal.      Left hand: Normal.      Cervical back: Normal and normal range of motion.      Thoracic back: Normal.      Lumbar back: Normal.      Right lower leg: Normal.      Left lower leg: Normal.   Lymphadenopathy:      Cervical: No cervical adenopathy.   Neurological:      General: No focal deficit present.      Mental Status: She is alert and oriented to person, place, and time.      Cranial Nerves: No cranial nerve deficit.   Psychiatric:         Mood and Affect: Mood normal.         Behavior: Behavior normal.         Thought Content: Thought content normal.         Judgment: Judgment normal.         Performance Status: ECOG (1) Restricted in physically strenuous activity, ambulatory and able to do work of light nature    Clinical Quality Measures  - Pain Documented by Standardized Tool, FPS Susanna Montelongo Israel reports a pain score of 7.  Given her pain assessment as noted, treatment options were discussed and the following options were decided upon as a follow-up plan to address the patient's pain: continuation of current treatment plan for pain.  Pain Medications              cyclobenzaprine (FLEXERIL) 10 MG tablet Take 1 tablet by mouth 3 times a day.    gabapentin (NEURONTIN) 300 MG capsule Take 1 capsule by mouth 3 times a day.    HYDROcodone-acetaminophen (NORCO)  MG per tablet Take 1 tablet by mouth Every 6 (Six) Hours.    predniSONE (DELTASONE) 50 MG tablet Take 1 tablet by mouth Daily for 14 days. Start tapering dose after completion of this prescription.          - Body  Mass Index Screening and Follow-Up Plan  BMI is >= 25 and <30. (Overweight) The following options were offered after discussion;: none (medical contraindication)    - Tobacco Use: Screening and Cessation Intervention  Social History    Tobacco Use      Smoking status: Never      Smokeless tobacco: Never    - Advanced Care Planning Advance Care Planning   ACP discussion was held with the patient during this visit. Patient does not have an advance directive, information provided.    - PHQ-2 Depression Screening:  Little interest or pleasure in doing things? Not at all   Feeling down, depressed, or hopeless? Not at all   PHQ-2 Total Score 0     ASSESSMENT AND PLAN  1. Carcinoma of left breast metastatic to lung    2. History of radiation therapy    3. Non-smoker        No orders of the defined types were placed in this encounter.    Recommendations: Susanna Wood is status post completion of SABR radiation therapy to the lung and presents to our clinic today for surveillance exam and to review imaging. Diagnosed with metastatic lung nodules. She completed concurrent treatments 6000 cGy in 8 fractions to the left and right lungs on 11/11/2024 with a good early radiographic response to treatment.  Continues on Ibrance and Aromasin under the direction of Dr. Gross.  She will address the findings of new metabolic uptake in the right infrahilar area and small bony lesion of the left iliac wing, for which she is asymptomatic, with Dr. Gross on her upcoming scheduled follow-up visit.    PET Scan completed on 03/17/2025 revealed metastatic nodules in the medial aspect of the right upper lobe as well as within the medial aspect of the left lower lobe adjacent to the descending thoracic aorta shows slight interval decrease in size and diminished metabolic activity when compared to more remote PET/CT. There is adjacent consolidation within the lung felt to represent posttreatment change. There is a metabolically  active right infrahilar nodule or node with SUVs as measured above. This was not definitely appreciated on previous outside PET CT from September of last year. No additional metabolically active mediastinal nodes or pulmonary nodules present. There is a focus of abnormal metabolic activity identified associated with the upper posterior margin of the left iliac wing. This is also a new from the previous exam and suspicious for a bony metastatic lesion. No additional sites of abnormal musculoskeletal activity identified except for fat necrosis within the gluteal regions bilaterally related to injection. Further characterization of this iliac lesion could be obtained with an MRI of the pelvis with and without contrast if it would change the patient's treatment regimen.    Today she presents with symptoms of subacute radiation pneumonitis - We will treat with prednisone 0.75 mg/kg ideal body weight (for her 50 mg/day) for two weeks followed by a taper TBD either by Dr. Simpson in her routine scheduled follow-up or with me if he does not provide a tapering schedule.  The patient was instructed to notify us if she comes to the end of the 2 weeks of steroid treatment with no definite plan beyond that point.    We will continue routine follow-up/surveillance as discussed in this visit, with follow up in 6 months with scan before visit and I have instructed her to continue to see the other health care providers as per their scheduling.    Return in about 6 months (around 9/21/2025), or if symptoms worsen or fail to improve, for Office Visit.     Time Spent: I spent 45 minutes caring for Susanna on this date of service. This time includes time spent by me in the following activities: preparing for the visit, reviewing tests, obtaining and/or reviewing a separately obtained history, performing a medically appropriate examination and/or evaluation, counseling and educating the patient/family/caregiver, ordering medications,  tests, or procedures, referring and communicating with other health care professionals, documenting information in the medical record, independently interpreting results and communicating that information with the patient/family/caregiver, and care coordination.   Lavon Alas MD  03/21/2025

## 2025-03-21 NOTE — TELEPHONE ENCOUNTER
I called patient and left message for her to call us back to see if she would like to reschedule her missed appointment from this morning.

## 2025-03-24 ENCOUNTER — OFFICE VISIT (OUTPATIENT)
Dept: HEMATOLOGY | Age: 60
End: 2025-03-24
Payer: MEDICARE

## 2025-03-24 ENCOUNTER — TRANSCRIBE ORDERS (OUTPATIENT)
Dept: ADMINISTRATIVE | Facility: HOSPITAL | Age: 60
End: 2025-03-24
Payer: MEDICARE

## 2025-03-24 ENCOUNTER — HOSPITAL ENCOUNTER (OUTPATIENT)
Dept: INFUSION THERAPY | Age: 60
Discharge: HOME | End: 2025-03-24
Payer: MEDICARE

## 2025-03-24 VITALS
DIASTOLIC BLOOD PRESSURE: 83 MMHG | WEIGHT: 169 LBS | SYSTOLIC BLOOD PRESSURE: 157 MMHG | RESPIRATION RATE: 18 BRPM | HEART RATE: 70 BPM | OXYGEN SATURATION: 98 % | BODY MASS INDEX: 27.28 KG/M2 | TEMPERATURE: 97 F

## 2025-03-24 DIAGNOSIS — C50.919 CARCINOMA OF BREAST METASTATIC TO LUNG, UNSPECIFIED LATERALITY: ICD-10-CM

## 2025-03-24 DIAGNOSIS — C50.011 CARCINOMA OF AREOLA OF RIGHT BREAST IN FEMALE, UNSPECIFIED ESTROGEN RECEPTOR STATUS: ICD-10-CM

## 2025-03-24 DIAGNOSIS — T45.1X5A HOT FLASHES RELATED TO AROMATASE INHIBITOR THERAPY: ICD-10-CM

## 2025-03-24 DIAGNOSIS — C78.00 CARCINOMA OF BREAST METASTATIC TO LUNG, UNSPECIFIED LATERALITY: ICD-10-CM

## 2025-03-24 DIAGNOSIS — D50.8 OTHER IRON DEFICIENCY ANEMIA: Primary | ICD-10-CM

## 2025-03-24 DIAGNOSIS — Z71.89 CARE PLAN DISCUSSED WITH PATIENT: ICD-10-CM

## 2025-03-24 DIAGNOSIS — E53.8 VITAMIN B 12 DEFICIENCY: ICD-10-CM

## 2025-03-24 DIAGNOSIS — M89.9 BONE LESION: Primary | ICD-10-CM

## 2025-03-24 DIAGNOSIS — R23.2 HOT FLASHES RELATED TO AROMATASE INHIBITOR THERAPY: ICD-10-CM

## 2025-03-24 DIAGNOSIS — C50.011 CARCINOMA OF AREOLA OF RIGHT BREAST IN FEMALE, UNSPECIFIED ESTROGEN RECEPTOR STATUS: Primary | ICD-10-CM

## 2025-03-24 DIAGNOSIS — Z79.811 ENCOUNTER FOR MONITORING AROMATASE INHIBITOR THERAPY: ICD-10-CM

## 2025-03-24 DIAGNOSIS — M89.9 BONE LESION: ICD-10-CM

## 2025-03-24 DIAGNOSIS — Z51.81 ENCOUNTER FOR MONITORING AROMATASE INHIBITOR THERAPY: ICD-10-CM

## 2025-03-24 DIAGNOSIS — R52 GENERALIZED PAIN: ICD-10-CM

## 2025-03-24 LAB
ALBUMIN SERPL-MCNC: 4.1 G/DL (ref 3.5–5.2)
ALP SERPL-CCNC: 106 U/L (ref 35–104)
ALT SERPL-CCNC: 9 U/L (ref 5–33)
ANION GAP SERPL CALCULATED.3IONS-SCNC: 12 MMOL/L (ref 7–19)
AST SERPL-CCNC: 16 U/L (ref 5–32)
BASOPHILS # BLD: 0.01 K/UL (ref 0–0.2)
BASOPHILS NFR BLD: 0.1 % (ref 0–1)
BILIRUB SERPL-MCNC: 0.7 MG/DL (ref 0–1.2)
BUN SERPL-MCNC: 14 MG/DL (ref 6–20)
CA 15-3: 21 U/ML (ref 0–25)
CALCIUM SERPL-MCNC: 8.6 MG/DL (ref 8.6–10)
CEA SERPL-MCNC: 1.9 NG/ML (ref 0–4.7)
CHLORIDE SERPL-SCNC: 104 MMOL/L (ref 98–107)
CO2 SERPL-SCNC: 26 MMOL/L (ref 22–29)
CREAT SERPL-MCNC: 0.8 MG/DL (ref 0.5–0.9)
EOSINOPHIL # BLD: 0 K/UL (ref 0–0.6)
EOSINOPHIL NFR BLD: 0 % (ref 0–5)
ERYTHROCYTE [DISTWIDTH] IN BLOOD BY AUTOMATED COUNT: 13.1 % (ref 11.5–14.5)
GLUCOSE SERPL-MCNC: 104 MG/DL (ref 70–99)
HCT VFR BLD AUTO: 39.5 % (ref 37–47)
HGB BLD-MCNC: 13.1 G/DL (ref 12–16)
LYMPHOCYTES # BLD: 1.17 K/UL (ref 1.1–4.5)
LYMPHOCYTES NFR BLD: 11.6 % (ref 20–40)
MCH RBC QN AUTO: 29.9 PG (ref 27–31)
MCHC RBC AUTO-ENTMCNC: 33.2 G/DL (ref 33–37)
MCV RBC AUTO: 90.2 FL (ref 81–99)
MONOCYTES # BLD: 0.42 K/UL (ref 0–0.9)
MONOCYTES NFR BLD: 4.2 % (ref 1–10)
NEUTROPHILS # BLD: 8.49 K/UL (ref 1.5–7.5)
NEUTS SEG NFR BLD: 83.9 % (ref 50–65)
PLATELET # BLD AUTO: 343 K/UL (ref 130–400)
PMV BLD AUTO: 8.5 FL (ref 9.4–12.3)
POTASSIUM SERPL-SCNC: 4.3 MMOL/L (ref 3.5–5.1)
PROT SERPL-MCNC: 7.1 G/DL (ref 6.4–8.3)
RBC # BLD AUTO: 4.38 M/UL (ref 4.2–5.4)
SODIUM SERPL-SCNC: 142 MMOL/L (ref 136–145)
WBC # BLD AUTO: 10.11 K/UL (ref 4.8–10.8)

## 2025-03-24 PROCEDURE — 3017F COLORECTAL CA SCREEN DOC REV: CPT | Performed by: INTERNAL MEDICINE

## 2025-03-24 PROCEDURE — 82378 CARCINOEMBRYONIC ANTIGEN: CPT

## 2025-03-24 PROCEDURE — 36415 COLL VENOUS BLD VENIPUNCTURE: CPT

## 2025-03-24 PROCEDURE — 1036F TOBACCO NON-USER: CPT | Performed by: INTERNAL MEDICINE

## 2025-03-24 PROCEDURE — G2211 COMPLEX E/M VISIT ADD ON: HCPCS | Performed by: INTERNAL MEDICINE

## 2025-03-24 PROCEDURE — 6360000002 HC RX W HCPCS: Performed by: INTERNAL MEDICINE

## 2025-03-24 PROCEDURE — G8417 CALC BMI ABV UP PARAM F/U: HCPCS | Performed by: INTERNAL MEDICINE

## 2025-03-24 PROCEDURE — 85025 COMPLETE CBC W/AUTO DIFF WBC: CPT

## 2025-03-24 PROCEDURE — 80053 COMPREHEN METABOLIC PANEL: CPT

## 2025-03-24 PROCEDURE — 86300 IMMUNOASSAY TUMOR CA 15-3: CPT

## 2025-03-24 PROCEDURE — 99213 OFFICE O/P EST LOW 20 MIN: CPT

## 2025-03-24 PROCEDURE — G8427 DOCREV CUR MEDS BY ELIG CLIN: HCPCS | Performed by: INTERNAL MEDICINE

## 2025-03-24 PROCEDURE — 96372 THER/PROPH/DIAG INJ SC/IM: CPT

## 2025-03-24 PROCEDURE — 99214 OFFICE O/P EST MOD 30 MIN: CPT | Performed by: INTERNAL MEDICINE

## 2025-03-24 RX ORDER — IPRATROPIUM BROMIDE AND ALBUTEROL SULFATE 2.5; .5 MG/3ML; MG/3ML
1 SOLUTION RESPIRATORY (INHALATION) EVERY 4 HOURS
Qty: 180 ML | Refills: 5 | Status: SHIPPED | OUTPATIENT
Start: 2025-03-24 | End: 2025-03-27 | Stop reason: SDUPTHER

## 2025-03-24 RX ORDER — CYANOCOBALAMIN 1000 UG/ML
1000 INJECTION, SOLUTION INTRAMUSCULAR; SUBCUTANEOUS ONCE
OUTPATIENT
Start: 2025-04-21 | End: 2025-04-21

## 2025-03-24 RX ORDER — CYANOCOBALAMIN 1000 UG/ML
1000 INJECTION, SOLUTION INTRAMUSCULAR; SUBCUTANEOUS ONCE
Status: COMPLETED | OUTPATIENT
Start: 2025-03-24 | End: 2025-03-24

## 2025-03-24 RX ADMIN — CYANOCOBALAMIN 1000 MCG: 1000 INJECTION, SOLUTION INTRAMUSCULAR; SUBCUTANEOUS at 10:19

## 2025-03-26 LAB — CANCER AG27-29 SERPL-ACNC: 37.5 U/ML

## 2025-03-27 ENCOUNTER — OFFICE VISIT (OUTPATIENT)
Dept: PULMONOLOGY | Age: 60
End: 2025-03-27
Payer: MEDICARE

## 2025-03-27 VITALS
BODY MASS INDEX: 26.93 KG/M2 | SYSTOLIC BLOOD PRESSURE: 146 MMHG | TEMPERATURE: 97.4 F | OXYGEN SATURATION: 97 % | DIASTOLIC BLOOD PRESSURE: 85 MMHG | WEIGHT: 167.6 LBS | HEIGHT: 66 IN | HEART RATE: 82 BPM

## 2025-03-27 DIAGNOSIS — C78.02 CARCINOMA OF LEFT BREAST METASTATIC TO LUNG: ICD-10-CM

## 2025-03-27 DIAGNOSIS — J41.1 BRONCHITIS, MUCOPURULENT RECURRENT (HCC): ICD-10-CM

## 2025-03-27 DIAGNOSIS — R06.2 WHEEZING: ICD-10-CM

## 2025-03-27 DIAGNOSIS — R05.2 SUBACUTE COUGH: ICD-10-CM

## 2025-03-27 DIAGNOSIS — J70.0 POST-RADIATION PNEUMONITIS: ICD-10-CM

## 2025-03-27 DIAGNOSIS — J70.0 RADIATION PNEUMONITIS: Primary | ICD-10-CM

## 2025-03-27 DIAGNOSIS — J20.9 ACUTE PURULENT BRONCHITIS: ICD-10-CM

## 2025-03-27 DIAGNOSIS — R13.19 ESOPHAGEAL DYSPHAGIA: ICD-10-CM

## 2025-03-27 DIAGNOSIS — C50.912 CARCINOMA OF LEFT BREAST METASTATIC TO LUNG: ICD-10-CM

## 2025-03-27 DIAGNOSIS — C78.02 MALIGNANT NEOPLASM METASTATIC TO BOTH LUNGS (HCC): ICD-10-CM

## 2025-03-27 DIAGNOSIS — C78.01 MALIGNANT NEOPLASM METASTATIC TO BOTH LUNGS (HCC): ICD-10-CM

## 2025-03-27 PROCEDURE — 3017F COLORECTAL CA SCREEN DOC REV: CPT | Performed by: INTERNAL MEDICINE

## 2025-03-27 PROCEDURE — G8427 DOCREV CUR MEDS BY ELIG CLIN: HCPCS | Performed by: INTERNAL MEDICINE

## 2025-03-27 PROCEDURE — 99214 OFFICE O/P EST MOD 30 MIN: CPT | Performed by: INTERNAL MEDICINE

## 2025-03-27 PROCEDURE — G8417 CALC BMI ABV UP PARAM F/U: HCPCS | Performed by: INTERNAL MEDICINE

## 2025-03-27 PROCEDURE — 3023F SPIROM DOC REV: CPT | Performed by: INTERNAL MEDICINE

## 2025-03-27 PROCEDURE — 1036F TOBACCO NON-USER: CPT | Performed by: INTERNAL MEDICINE

## 2025-03-27 RX ORDER — BENZONATATE 100 MG/1
100 CAPSULE ORAL 3 TIMES DAILY PRN
Qty: 30 CAPSULE | Refills: 0 | Status: SHIPPED | OUTPATIENT
Start: 2025-03-27 | End: 2025-04-03

## 2025-03-27 RX ORDER — IPRATROPIUM BROMIDE AND ALBUTEROL SULFATE 2.5; .5 MG/3ML; MG/3ML
1 SOLUTION RESPIRATORY (INHALATION) EVERY 4 HOURS
Qty: 180 ML | Refills: 5 | Status: SHIPPED | OUTPATIENT
Start: 2025-03-27

## 2025-03-27 RX ORDER — ALBUTEROL SULFATE 90 UG/1
2 AEROSOL, METERED RESPIRATORY (INHALATION) 4 TIMES DAILY PRN
Qty: 18 G | Refills: 5 | Status: SHIPPED | OUTPATIENT
Start: 2025-03-27

## 2025-03-27 RX ORDER — PREDNISONE 10 MG/1
TABLET ORAL
Qty: 35 TABLET | Refills: 0 | Status: SHIPPED | OUTPATIENT
Start: 2025-03-27

## 2025-03-27 RX ORDER — BUDESONIDE, GLYCOPYRROLATE, AND FORMOTEROL FUMARATE 160; 9; 4.8 UG/1; UG/1; UG/1
2 AEROSOL, METERED RESPIRATORY (INHALATION) 2 TIMES DAILY
Qty: 1 EACH | Refills: 11 | Status: SHIPPED | OUTPATIENT
Start: 2025-03-27

## 2025-03-27 ASSESSMENT — ENCOUNTER SYMPTOMS
RHINORRHEA: 0
WHEEZING: 1
BACK PAIN: 0
APNEA: 0
SHORTNESS OF BREATH: 1
CHEST TIGHTNESS: 0
ANAL BLEEDING: 0
COUGH: 0
ABDOMINAL DISTENTION: 0
ABDOMINAL PAIN: 0

## 2025-04-02 ENCOUNTER — TELEPHONE (OUTPATIENT)
Dept: HEMATOLOGY | Age: 60
End: 2025-04-02

## 2025-04-04 ENCOUNTER — APPOINTMENT (OUTPATIENT)
Dept: GENERAL RADIOLOGY | Age: 60
DRG: 871 | End: 2025-04-04
Payer: MEDICARE

## 2025-04-04 ENCOUNTER — HOSPITAL ENCOUNTER (INPATIENT)
Age: 60
LOS: 4 days | Discharge: HOME | DRG: 871 | End: 2025-04-09
Attending: STUDENT IN AN ORGANIZED HEALTH CARE EDUCATION/TRAINING PROGRAM | Admitting: STUDENT IN AN ORGANIZED HEALTH CARE EDUCATION/TRAINING PROGRAM
Payer: MEDICARE

## 2025-04-04 DIAGNOSIS — J18.9 PNEUMONIA OF LEFT LOWER LOBE DUE TO INFECTIOUS ORGANISM: Primary | ICD-10-CM

## 2025-04-04 DIAGNOSIS — B34.8 RHINOVIRUS INFECTION: ICD-10-CM

## 2025-04-04 DIAGNOSIS — J18.9 SEPSIS DUE TO PNEUMONIA (HCC): ICD-10-CM

## 2025-04-04 DIAGNOSIS — C78.01 MALIGNANT NEOPLASM METASTATIC TO BOTH LUNGS (HCC): ICD-10-CM

## 2025-04-04 DIAGNOSIS — C78.02 MALIGNANT NEOPLASM METASTATIC TO BOTH LUNGS (HCC): ICD-10-CM

## 2025-04-04 DIAGNOSIS — J12.3 PNEUMONIA DUE TO HUMAN METAPNEUMOVIRUS: ICD-10-CM

## 2025-04-04 DIAGNOSIS — A41.9 SEPSIS DUE TO PNEUMONIA (HCC): ICD-10-CM

## 2025-04-04 LAB
ALBUMIN SERPL-MCNC: 3.8 G/DL (ref 3.5–5.2)
ALP SERPL-CCNC: 105 U/L (ref 35–104)
ALT SERPL-CCNC: 11 U/L (ref 10–35)
ANION GAP SERPL CALCULATED.3IONS-SCNC: 10 MMOL/L (ref 8–16)
AST SERPL-CCNC: 20 U/L (ref 10–35)
B PARAP IS1001 DNA NPH QL NAA+NON-PROBE: NOT DETECTED
B PERT.PT PRMT NPH QL NAA+NON-PROBE: NOT DETECTED
BACTERIA URNS QL MICRO: NEGATIVE /HPF
BASOPHILS # BLD: 0 K/UL (ref 0–0.2)
BASOPHILS NFR BLD: 0.2 % (ref 0–1)
BILIRUB SERPL-MCNC: 0.7 MG/DL (ref 0.2–1.2)
BILIRUB UR QL STRIP: NEGATIVE
BUN SERPL-MCNC: 11 MG/DL (ref 6–20)
C PNEUM DNA NPH QL NAA+NON-PROBE: NOT DETECTED
CALCIUM SERPL-MCNC: 8.9 MG/DL (ref 8.6–10)
CHLORIDE SERPL-SCNC: 104 MMOL/L (ref 98–107)
CLARITY UR: CLEAR
CO2 SERPL-SCNC: 24 MMOL/L (ref 22–29)
COLOR UR: YELLOW
CREAT SERPL-MCNC: 0.7 MG/DL (ref 0.5–0.9)
CRYSTALS URNS MICRO: ABNORMAL /HPF
EOSINOPHIL # BLD: 0 K/UL (ref 0–0.6)
EOSINOPHIL NFR BLD: 0.3 % (ref 0–5)
EPI CELLS #/AREA URNS AUTO: 4 /HPF (ref 0–5)
ERYTHROCYTE [DISTWIDTH] IN BLOOD BY AUTOMATED COUNT: 13.9 % (ref 11.5–14.5)
FLUAV RNA NPH QL NAA+NON-PROBE: NOT DETECTED
FLUBV RNA NPH QL NAA+NON-PROBE: NOT DETECTED
GLUCOSE SERPL-MCNC: 108 MG/DL (ref 70–99)
GLUCOSE UR STRIP.AUTO-MCNC: NEGATIVE MG/DL
HADV DNA NPH QL NAA+NON-PROBE: NOT DETECTED
HCOV 229E RNA NPH QL NAA+NON-PROBE: NOT DETECTED
HCOV HKU1 RNA NPH QL NAA+NON-PROBE: NOT DETECTED
HCOV NL63 RNA NPH QL NAA+NON-PROBE: NOT DETECTED
HCOV OC43 RNA NPH QL NAA+NON-PROBE: NOT DETECTED
HCT VFR BLD AUTO: 37 % (ref 37–47)
HGB BLD-MCNC: 12.3 G/DL (ref 12–16)
HGB UR STRIP.AUTO-MCNC: ABNORMAL MG/L
HMPV RNA NPH QL NAA+NON-PROBE: DETECTED
HPIV1 RNA NPH QL NAA+NON-PROBE: NOT DETECTED
HPIV2 RNA NPH QL NAA+NON-PROBE: NOT DETECTED
HPIV3 RNA NPH QL NAA+NON-PROBE: NOT DETECTED
HPIV4 RNA NPH QL NAA+NON-PROBE: NOT DETECTED
HYALINE CASTS #/AREA URNS AUTO: 0 /HPF (ref 0–8)
IMM GRANULOCYTES # BLD: 0 K/UL
KETONES UR STRIP.AUTO-MCNC: NEGATIVE MG/DL
LACTATE BLDV-SCNC: 0.7 MMOL/L (ref 0.5–1.9)
LEUKOCYTE ESTERASE UR QL STRIP.AUTO: ABNORMAL
LYMPHOCYTES # BLD: 0.8 K/UL (ref 1.1–4.5)
LYMPHOCYTES NFR BLD: 7.9 % (ref 20–40)
M PNEUMO DNA NPH QL NAA+NON-PROBE: NOT DETECTED
MAGNESIUM SERPL-MCNC: 1.8 MG/DL (ref 1.6–2.6)
MCH RBC QN AUTO: 29.6 PG (ref 27–31)
MCHC RBC AUTO-ENTMCNC: 33.2 G/DL (ref 33–37)
MCV RBC AUTO: 89.2 FL (ref 81–99)
MONOCYTES # BLD: 0.6 K/UL (ref 0–0.9)
MONOCYTES NFR BLD: 5.4 % (ref 0–10)
NEUTROPHILS # BLD: 9.1 K/UL (ref 1.5–7.5)
NEUTS SEG NFR BLD: 85.8 % (ref 50–65)
NITRITE UR QL STRIP.AUTO: NEGATIVE
PH UR STRIP.AUTO: 7 [PH] (ref 5–8)
PLATELET # BLD AUTO: 225 K/UL (ref 130–400)
PMV BLD AUTO: 8.8 FL (ref 9.4–12.3)
POTASSIUM SERPL-SCNC: 3.5 MMOL/L (ref 3.5–5)
PROT SERPL-MCNC: 6.6 G/DL (ref 6.4–8.3)
PROT UR STRIP.AUTO-MCNC: NEGATIVE MG/DL
RBC # BLD AUTO: 4.15 M/UL (ref 4.2–5.4)
RBC #/AREA URNS AUTO: 11 /HPF (ref 0–4)
RSV RNA NPH QL NAA+NON-PROBE: NOT DETECTED
RV+EV RNA NPH QL NAA+NON-PROBE: DETECTED
SARS-COV-2 RNA NPH QL NAA+NON-PROBE: NOT DETECTED
SODIUM SERPL-SCNC: 138 MMOL/L (ref 136–145)
SP GR UR STRIP.AUTO: 1.02 (ref 1–1.03)
UROBILINOGEN UR STRIP.AUTO-MCNC: 1 E.U./DL
WBC # BLD AUTO: 10.6 K/UL (ref 4.8–10.8)
WBC #/AREA URNS AUTO: 1 /HPF (ref 0–5)

## 2025-04-04 PROCEDURE — 85025 COMPLETE CBC W/AUTO DIFF WBC: CPT

## 2025-04-04 PROCEDURE — 6360000002 HC RX W HCPCS: Performed by: NURSE PRACTITIONER

## 2025-04-04 PROCEDURE — 36415 COLL VENOUS BLD VENIPUNCTURE: CPT

## 2025-04-04 PROCEDURE — 83605 ASSAY OF LACTIC ACID: CPT

## 2025-04-04 PROCEDURE — 87040 BLOOD CULTURE FOR BACTERIA: CPT

## 2025-04-04 PROCEDURE — 94640 AIRWAY INHALATION TREATMENT: CPT

## 2025-04-04 PROCEDURE — 0202U NFCT DS 22 TRGT SARS-COV-2: CPT

## 2025-04-04 PROCEDURE — 2580000003 HC RX 258: Performed by: NURSE PRACTITIONER

## 2025-04-04 PROCEDURE — 96375 TX/PRO/DX INJ NEW DRUG ADDON: CPT

## 2025-04-04 PROCEDURE — 96374 THER/PROPH/DIAG INJ IV PUSH: CPT

## 2025-04-04 PROCEDURE — 99285 EMERGENCY DEPT VISIT HI MDM: CPT

## 2025-04-04 PROCEDURE — 71045 X-RAY EXAM CHEST 1 VIEW: CPT

## 2025-04-04 PROCEDURE — 80053 COMPREHEN METABOLIC PANEL: CPT

## 2025-04-04 PROCEDURE — 81001 URINALYSIS AUTO W/SCOPE: CPT

## 2025-04-04 PROCEDURE — 83735 ASSAY OF MAGNESIUM: CPT

## 2025-04-04 PROCEDURE — 2500000003 HC RX 250 WO HCPCS: Performed by: NURSE PRACTITIONER

## 2025-04-04 RX ORDER — ALBUTEROL SULFATE 0.83 MG/ML
2.5 SOLUTION RESPIRATORY (INHALATION) EVERY 6 HOURS PRN
Status: DISCONTINUED | OUTPATIENT
Start: 2025-04-04 | End: 2025-04-09 | Stop reason: HOSPADM

## 2025-04-04 RX ORDER — 0.9 % SODIUM CHLORIDE 0.9 %
1000 INTRAVENOUS SOLUTION INTRAVENOUS ONCE
Status: COMPLETED | OUTPATIENT
Start: 2025-04-04 | End: 2025-04-04

## 2025-04-04 RX ORDER — ONDANSETRON 2 MG/ML
4 INJECTION INTRAMUSCULAR; INTRAVENOUS ONCE
Status: COMPLETED | OUTPATIENT
Start: 2025-04-04 | End: 2025-04-04

## 2025-04-04 RX ORDER — ACETYLCYSTEINE 200 MG/ML
600 SOLUTION ORAL; RESPIRATORY (INHALATION) ONCE
Status: COMPLETED | OUTPATIENT
Start: 2025-04-04 | End: 2025-04-04

## 2025-04-04 RX ADMIN — ALBUTEROL SULFATE 2.5 MG: 2.5 SOLUTION RESPIRATORY (INHALATION) at 23:07

## 2025-04-04 RX ADMIN — ONDANSETRON 4 MG: 2 INJECTION, SOLUTION INTRAMUSCULAR; INTRAVENOUS at 22:17

## 2025-04-04 RX ADMIN — SODIUM CHLORIDE 1000 ML: 0.9 INJECTION, SOLUTION INTRAVENOUS at 22:16

## 2025-04-04 RX ADMIN — ACETYLCYSTEINE 600 MG: 200 SOLUTION ORAL; RESPIRATORY (INHALATION) at 23:07

## 2025-04-04 RX ADMIN — WATER 125 MG: 1 INJECTION INTRAMUSCULAR; INTRAVENOUS; SUBCUTANEOUS at 23:48

## 2025-04-04 RX ADMIN — CEFEPIME 2000 MG: 2 INJECTION, POWDER, FOR SOLUTION INTRAVENOUS at 23:58

## 2025-04-05 PROBLEM — J18.9 SEPSIS DUE TO PNEUMONIA (HCC): Status: ACTIVE | Noted: 2025-04-05

## 2025-04-05 PROBLEM — A41.9 SEPSIS DUE TO PNEUMONIA (HCC): Status: ACTIVE | Noted: 2025-04-05

## 2025-04-05 LAB
ANION GAP SERPL CALCULATED.3IONS-SCNC: 11 MMOL/L (ref 8–16)
BASOPHILS # BLD: 0 K/UL (ref 0–0.2)
BASOPHILS NFR BLD: 0.2 % (ref 0–1)
BUN SERPL-MCNC: 9 MG/DL (ref 6–20)
CALCIUM SERPL-MCNC: 8 MG/DL (ref 8.6–10)
CHLORIDE SERPL-SCNC: 108 MMOL/L (ref 98–107)
CO2 SERPL-SCNC: 20 MMOL/L (ref 22–29)
CREAT SERPL-MCNC: 0.6 MG/DL (ref 0.5–0.9)
EOSINOPHIL # BLD: 0 K/UL (ref 0–0.6)
EOSINOPHIL NFR BLD: 0.1 % (ref 0–5)
ERYTHROCYTE [DISTWIDTH] IN BLOOD BY AUTOMATED COUNT: 14.1 % (ref 11.5–14.5)
GLUCOSE SERPL-MCNC: 204 MG/DL (ref 70–99)
HCT VFR BLD AUTO: 38.2 % (ref 37–47)
HGB BLD-MCNC: 12.3 G/DL (ref 12–16)
IMM GRANULOCYTES # BLD: 0 K/UL
LACTATE BLDV-SCNC: 1.7 MG/DL (ref 0.5–1.9)
LACTATE BLDV-SCNC: 2 MG/DL (ref 0.5–1.9)
LYMPHOCYTES # BLD: 0.4 K/UL (ref 1.1–4.5)
LYMPHOCYTES NFR BLD: 3.4 % (ref 20–40)
MCH RBC QN AUTO: 29.9 PG (ref 27–31)
MCHC RBC AUTO-ENTMCNC: 32.2 G/DL (ref 33–37)
MCV RBC AUTO: 92.9 FL (ref 81–99)
MONOCYTES # BLD: 0.1 K/UL (ref 0–0.9)
MONOCYTES NFR BLD: 1.3 % (ref 0–10)
NEUTROPHILS # BLD: 10.6 K/UL (ref 1.5–7.5)
NEUTS SEG NFR BLD: 94.6 % (ref 50–65)
PLATELET # BLD AUTO: 214 K/UL (ref 130–400)
PMV BLD AUTO: 8.9 FL (ref 9.4–12.3)
POTASSIUM SERPL-SCNC: 3.6 MMOL/L (ref 3.5–5)
PROCALCITONIN: 0.08 NG/ML (ref 0–0.09)
RBC # BLD AUTO: 4.11 M/UL (ref 4.2–5.4)
SODIUM SERPL-SCNC: 139 MMOL/L (ref 136–145)
WBC # BLD AUTO: 11.2 K/UL (ref 4.8–10.8)

## 2025-04-05 PROCEDURE — 6370000000 HC RX 637 (ALT 250 FOR IP)

## 2025-04-05 PROCEDURE — 94150 VITAL CAPACITY TEST: CPT

## 2025-04-05 PROCEDURE — 94640 AIRWAY INHALATION TREATMENT: CPT

## 2025-04-05 PROCEDURE — 99223 1ST HOSP IP/OBS HIGH 75: CPT | Performed by: INTERNAL MEDICINE

## 2025-04-05 PROCEDURE — 6370000000 HC RX 637 (ALT 250 FOR IP): Performed by: NURSE PRACTITIONER

## 2025-04-05 PROCEDURE — 83605 ASSAY OF LACTIC ACID: CPT

## 2025-04-05 PROCEDURE — 6370000000 HC RX 637 (ALT 250 FOR IP): Performed by: STUDENT IN AN ORGANIZED HEALTH CARE EDUCATION/TRAINING PROGRAM

## 2025-04-05 PROCEDURE — 6360000002 HC RX W HCPCS: Performed by: NURSE PRACTITIONER

## 2025-04-05 PROCEDURE — 80048 BASIC METABOLIC PNL TOTAL CA: CPT

## 2025-04-05 PROCEDURE — 85025 COMPLETE CBC W/AUTO DIFF WBC: CPT

## 2025-04-05 PROCEDURE — 94760 N-INVAS EAR/PLS OXIMETRY 1: CPT

## 2025-04-05 PROCEDURE — 87070 CULTURE OTHR SPECIMN AEROBIC: CPT

## 2025-04-05 PROCEDURE — 84145 PROCALCITONIN (PCT): CPT

## 2025-04-05 PROCEDURE — 36415 COLL VENOUS BLD VENIPUNCTURE: CPT

## 2025-04-05 PROCEDURE — 2500000003 HC RX 250 WO HCPCS: Performed by: NURSE PRACTITIONER

## 2025-04-05 PROCEDURE — 87205 SMEAR GRAM STAIN: CPT

## 2025-04-05 PROCEDURE — 94669 MECHANICAL CHEST WALL OSCILL: CPT

## 2025-04-05 PROCEDURE — 6360000002 HC RX W HCPCS

## 2025-04-05 PROCEDURE — 2580000003 HC RX 258

## 2025-04-05 PROCEDURE — 1200000000 HC SEMI PRIVATE

## 2025-04-05 PROCEDURE — 2580000003 HC RX 258: Performed by: NURSE PRACTITIONER

## 2025-04-05 RX ORDER — HYDROXYZINE HYDROCHLORIDE 10 MG/1
10 TABLET, FILM COATED ORAL 3 TIMES DAILY PRN
Status: DISCONTINUED | OUTPATIENT
Start: 2025-04-05 | End: 2025-04-09 | Stop reason: HOSPADM

## 2025-04-05 RX ORDER — SODIUM CHLORIDE 9 MG/ML
INJECTION, SOLUTION INTRAVENOUS CONTINUOUS
Status: DISCONTINUED | OUTPATIENT
Start: 2025-04-05 | End: 2025-04-06

## 2025-04-05 RX ORDER — BENZONATATE 100 MG/1
100 CAPSULE ORAL 3 TIMES DAILY PRN
Status: DISCONTINUED | OUTPATIENT
Start: 2025-04-05 | End: 2025-04-09 | Stop reason: HOSPADM

## 2025-04-05 RX ORDER — SODIUM CHLORIDE 0.9 % (FLUSH) 0.9 %
5-40 SYRINGE (ML) INJECTION EVERY 12 HOURS SCHEDULED
Status: DISCONTINUED | OUTPATIENT
Start: 2025-04-05 | End: 2025-04-09 | Stop reason: HOSPADM

## 2025-04-05 RX ORDER — HYDROCODONE BITARTRATE AND ACETAMINOPHEN 7.5; 325 MG/1; MG/1
1 TABLET ORAL EVERY 6 HOURS PRN
Refills: 0 | Status: DISCONTINUED | OUTPATIENT
Start: 2025-04-05 | End: 2025-04-09 | Stop reason: HOSPADM

## 2025-04-05 RX ORDER — ACETAMINOPHEN 325 MG/1
650 TABLET ORAL EVERY 6 HOURS PRN
Status: DISCONTINUED | OUTPATIENT
Start: 2025-04-05 | End: 2025-04-09 | Stop reason: HOSPADM

## 2025-04-05 RX ORDER — GUAIFENESIN 600 MG/1
600 TABLET, EXTENDED RELEASE ORAL 2 TIMES DAILY
Status: DISCONTINUED | OUTPATIENT
Start: 2025-04-05 | End: 2025-04-09 | Stop reason: HOSPADM

## 2025-04-05 RX ORDER — ACETYLCYSTEINE 200 MG/ML
600 SOLUTION ORAL; RESPIRATORY (INHALATION) 2 TIMES DAILY PRN
Status: DISCONTINUED | OUTPATIENT
Start: 2025-04-05 | End: 2025-04-09 | Stop reason: HOSPADM

## 2025-04-05 RX ORDER — ARFORMOTEROL TARTRATE 15 UG/2ML
15 SOLUTION RESPIRATORY (INHALATION)
Status: DISCONTINUED | OUTPATIENT
Start: 2025-04-05 | End: 2025-04-09 | Stop reason: HOSPADM

## 2025-04-05 RX ORDER — EXEMESTANE 25 MG/1
25 TABLET ORAL DAILY
Status: DISCONTINUED | OUTPATIENT
Start: 2025-04-06 | End: 2025-04-09 | Stop reason: HOSPADM

## 2025-04-05 RX ORDER — SODIUM CHLORIDE 0.9 % (FLUSH) 0.9 %
5-40 SYRINGE (ML) INJECTION PRN
Status: DISCONTINUED | OUTPATIENT
Start: 2025-04-05 | End: 2025-04-09 | Stop reason: HOSPADM

## 2025-04-05 RX ORDER — ENOXAPARIN SODIUM 100 MG/ML
40 INJECTION SUBCUTANEOUS DAILY
Status: DISCONTINUED | OUTPATIENT
Start: 2025-04-05 | End: 2025-04-09 | Stop reason: HOSPADM

## 2025-04-05 RX ORDER — BUDESONIDE 0.5 MG/2ML
0.5 INHALANT ORAL
Status: DISCONTINUED | OUTPATIENT
Start: 2025-04-05 | End: 2025-04-09 | Stop reason: HOSPADM

## 2025-04-05 RX ORDER — SODIUM CHLORIDE 9 MG/ML
INJECTION, SOLUTION INTRAVENOUS PRN
Status: DISCONTINUED | OUTPATIENT
Start: 2025-04-05 | End: 2025-04-09 | Stop reason: HOSPADM

## 2025-04-05 RX ORDER — ONDANSETRON 4 MG/1
4 TABLET, FILM COATED ORAL EVERY 6 HOURS PRN
Status: DISCONTINUED | OUTPATIENT
Start: 2025-04-05 | End: 2025-04-09 | Stop reason: HOSPADM

## 2025-04-05 RX ORDER — GABAPENTIN 300 MG/1
600 CAPSULE ORAL 3 TIMES DAILY
Status: DISCONTINUED | OUTPATIENT
Start: 2025-04-05 | End: 2025-04-09 | Stop reason: HOSPADM

## 2025-04-05 RX ORDER — ACETAMINOPHEN 650 MG/1
650 SUPPOSITORY RECTAL EVERY 6 HOURS PRN
Status: DISCONTINUED | OUTPATIENT
Start: 2025-04-05 | End: 2025-04-09 | Stop reason: HOSPADM

## 2025-04-05 RX ORDER — ALBUTEROL SULFATE 0.83 MG/ML
2.5 SOLUTION RESPIRATORY (INHALATION) EVERY 4 HOURS PRN
Status: DISCONTINUED | OUTPATIENT
Start: 2025-04-05 | End: 2025-04-09 | Stop reason: HOSPADM

## 2025-04-05 RX ORDER — 0.9 % SODIUM CHLORIDE 0.9 %
1000 INTRAVENOUS SOLUTION INTRAVENOUS ONCE
Status: COMPLETED | OUTPATIENT
Start: 2025-04-05 | End: 2025-04-05

## 2025-04-05 RX ADMIN — SODIUM CHLORIDE 1000 ML: 0.9 INJECTION, SOLUTION INTRAVENOUS at 01:38

## 2025-04-05 RX ADMIN — GUAIFENESIN 600 MG: 600 TABLET ORAL at 14:18

## 2025-04-05 RX ADMIN — GUAIFENESIN 600 MG: 600 TABLET ORAL at 20:59

## 2025-04-05 RX ADMIN — WATER 40 MG: 1 INJECTION INTRAMUSCULAR; INTRAVENOUS; SUBCUTANEOUS at 12:05

## 2025-04-05 RX ADMIN — GABAPENTIN 600 MG: 300 CAPSULE ORAL at 21:00

## 2025-04-05 RX ADMIN — ENOXAPARIN SODIUM 40 MG: 100 INJECTION SUBCUTANEOUS at 08:38

## 2025-04-05 RX ADMIN — IPRATROPIUM BROMIDE 0.5 MG: 0.5 SOLUTION RESPIRATORY (INHALATION) at 10:30

## 2025-04-05 RX ADMIN — CEFTRIAXONE 1000 MG: 1 INJECTION, POWDER, FOR SOLUTION INTRAMUSCULAR; INTRAVENOUS at 01:36

## 2025-04-05 RX ADMIN — WATER 40 MG: 1 INJECTION INTRAMUSCULAR; INTRAVENOUS; SUBCUTANEOUS at 18:16

## 2025-04-05 RX ADMIN — AZITHROMYCIN MONOHYDRATE 500 MG: 500 INJECTION, POWDER, LYOPHILIZED, FOR SOLUTION INTRAVENOUS at 01:40

## 2025-04-05 RX ADMIN — CEFEPIME 2000 MG: 2 INJECTION, POWDER, FOR SOLUTION INTRAVENOUS at 09:37

## 2025-04-05 RX ADMIN — ARFORMOTEROL TARTRATE 15 MCG: 15 SOLUTION RESPIRATORY (INHALATION) at 07:30

## 2025-04-05 RX ADMIN — GABAPENTIN 600 MG: 300 CAPSULE ORAL at 14:18

## 2025-04-05 RX ADMIN — ONDANSETRON HYDROCHLORIDE 4 MG: 4 TABLET, FILM COATED ORAL at 06:06

## 2025-04-05 RX ADMIN — GABAPENTIN 600 MG: 300 CAPSULE ORAL at 08:37

## 2025-04-05 RX ADMIN — SODIUM CHLORIDE: 0.9 INJECTION, SOLUTION INTRAVENOUS at 23:48

## 2025-04-05 RX ADMIN — WATER 40 MG: 1 INJECTION INTRAMUSCULAR; INTRAVENOUS; SUBCUTANEOUS at 23:46

## 2025-04-05 RX ADMIN — IPRATROPIUM BROMIDE 0.5 MG: 0.5 SOLUTION RESPIRATORY (INHALATION) at 07:30

## 2025-04-05 RX ADMIN — ACETAMINOPHEN 650 MG: 325 TABLET ORAL at 01:28

## 2025-04-05 RX ADMIN — WATER 40 MG: 1 INJECTION INTRAMUSCULAR; INTRAVENOUS; SUBCUTANEOUS at 06:06

## 2025-04-05 RX ADMIN — SODIUM CHLORIDE, PRESERVATIVE FREE 10 ML: 5 INJECTION INTRAVENOUS at 08:40

## 2025-04-05 RX ADMIN — BUDESONIDE INHALATION 500 MCG: 0.5 SUSPENSION RESPIRATORY (INHALATION) at 07:30

## 2025-04-05 RX ADMIN — BUDESONIDE INHALATION 500 MCG: 0.5 SUSPENSION RESPIRATORY (INHALATION) at 19:23

## 2025-04-05 RX ADMIN — ARFORMOTEROL TARTRATE 15 MCG: 15 SOLUTION RESPIRATORY (INHALATION) at 19:23

## 2025-04-05 RX ADMIN — IPRATROPIUM BROMIDE 0.5 MG: 0.5 SOLUTION RESPIRATORY (INHALATION) at 19:23

## 2025-04-05 RX ADMIN — CEFEPIME 2000 MG: 2 INJECTION, POWDER, FOR SOLUTION INTRAVENOUS at 18:20

## 2025-04-05 RX ADMIN — IPRATROPIUM BROMIDE 0.5 MG: 0.5 SOLUTION RESPIRATORY (INHALATION) at 16:21

## 2025-04-05 RX ADMIN — ACETAMINOPHEN 650 MG: 325 TABLET ORAL at 12:09

## 2025-04-05 RX ADMIN — BENZONATATE 100 MG: 100 CAPSULE ORAL at 21:00

## 2025-04-05 RX ADMIN — SODIUM CHLORIDE: 0.9 INJECTION, SOLUTION INTRAVENOUS at 05:09

## 2025-04-05 RX ADMIN — SODIUM CHLORIDE, PRESERVATIVE FREE 10 ML: 5 INJECTION INTRAVENOUS at 21:02

## 2025-04-05 RX ADMIN — AZITHROMYCIN MONOHYDRATE 500 MG: 500 INJECTION, POWDER, LYOPHILIZED, FOR SOLUTION INTRAVENOUS at 23:46

## 2025-04-05 SDOH — ECONOMIC STABILITY: INCOME INSECURITY: IN THE PAST 12 MONTHS, HAS THE ELECTRIC, GAS, OIL, OR WATER COMPANY THREATENED TO SHUT OFF SERVICE IN YOUR HOME?: NO

## 2025-04-05 SDOH — ECONOMIC STABILITY: INCOME INSECURITY: HOW HARD IS IT FOR YOU TO PAY FOR THE VERY BASICS LIKE FOOD, HOUSING, MEDICAL CARE, AND HEATING?: NOT HARD AT ALL

## 2025-04-05 SDOH — ECONOMIC STABILITY: FOOD INSECURITY: WITHIN THE PAST 12 MONTHS, YOU WORRIED THAT YOUR FOOD WOULD RUN OUT BEFORE YOU GOT MONEY TO BUY MORE.: NEVER TRUE

## 2025-04-05 ASSESSMENT — PAIN SCALES - GENERAL
PAINLEVEL_OUTOF10: 0
PAINLEVEL_OUTOF10: 6

## 2025-04-05 ASSESSMENT — PATIENT HEALTH QUESTIONNAIRE - PHQ9
SUM OF ALL RESPONSES TO PHQ QUESTIONS 1-9: 0
SUM OF ALL RESPONSES TO PHQ QUESTIONS 1-9: 0
2. FEELING DOWN, DEPRESSED OR HOPELESS: NOT AT ALL
SUM OF ALL RESPONSES TO PHQ QUESTIONS 1-9: 0
SUM OF ALL RESPONSES TO PHQ QUESTIONS 1-9: 0
1. LITTLE INTEREST OR PLEASURE IN DOING THINGS: NOT AT ALL

## 2025-04-05 ASSESSMENT — PAIN DESCRIPTION - LOCATION: LOCATION: RIB CAGE

## 2025-04-05 ASSESSMENT — PAIN DESCRIPTION - DESCRIPTORS: DESCRIPTORS: SORE

## 2025-04-05 NOTE — ED NOTES
Lymphocytes Absolute 0.8 (*)     All other components within normal limits   COMPREHENSIVE METABOLIC PANEL W/ REFLEX TO MG FOR LOW K - Abnormal; Notable for the following components:    Glucose 108 (*)     Alkaline Phosphatase 105 (*)     All other components within normal limits   URINALYSIS WITH REFLEX TO CULTURE - Abnormal; Notable for the following components:    Blood, Urine TRACE (*)     Leukocyte Esterase, Urine SMALL (*)     All other components within normal limits   MICROSCOPIC URINALYSIS - Abnormal; Notable for the following components:    RBC, UA 11 (*)     All other components within normal limits     Background  Allergies:   Allergies   Allergen Reactions    Clindamycin/Lincomycin Hives, Itching and Rash     Current Medications:   Medications Administered         acetylcysteine (MUCOMYST) 20 % solution 600 mg Admin Date  04/04/2025 Action  Given Dose  600 mg Rate   Route  Inhalation Documented By  Dali Alfaro RCP        albuterol (PROVENTIL) (2.5 MG/3ML) 0.083% nebulizer solution 2.5 mg Admin Date  04/04/2025 Action  Given Dose  2.5 mg Rate   Route  Nebulization Documented By  Dali Alfaro RCP        ceFEPIme (MAXIPIME) 2,000 mg in sodium chloride 0.9 % 100 mL IVPB (Beda0Stn) Admin Date  04/04/2025 Action  New Bag Dose  2,000 mg Rate  200 mL/hr Route  IntraVENous Documented By  Tara Vang RN        methylPREDNISolone sodium succ (SOLU-MEDROL) 125 mg in sterile water 2 mL injection Admin Date  04/04/2025 Action  Given Dose  125 mg Rate   Route  IntraVENous Documented By  Laura Acosta RN        ondansetron (ZOFRAN) injection 4 mg Admin Date  04/04/2025 Action  Given Dose  4 mg Rate   Route  IntraVENous Documented By  Laura Acosta RN        sodium chloride 0.9 % bolus 1,000 mL Admin Date  04/04/2025 Action  New Bag Dose  1,000 mL Rate  983.6 mL/hr Route  IntraVENous Documented By  Laura Acosta RN            History:   Past Medical History:   Diagnosis Date

## 2025-04-05 NOTE — ED PROVIDER NOTES
MG FOR LOW K - Abnormal; Notable for the following components:    Glucose 108 (*)     Alkaline Phosphatase 105 (*)     All other components within normal limits   URINALYSIS WITH REFLEX TO CULTURE - Abnormal; Notable for the following components:    Blood, Urine TRACE (*)     Leukocyte Esterase, Urine SMALL (*)     All other components within normal limits   MICROSCOPIC URINALYSIS - Abnormal; Notable for the following components:    RBC, UA 11 (*)     All other components within normal limits   CULTURE, BLOOD 1   CULTURE, BLOOD 2   LACTIC ACID   MAGNESIUM   LACTATE, SEPSIS   LACTATE, SEPSIS   BASIC METABOLIC PANEL W/ REFLEX TO MG FOR LOW K   CBC WITH AUTO DIFFERENTIAL       All other labs were within normal range or not returned as of this dictation.    EMERGENCY DEPARTMENT COURSE and DIFFERENTIALDIAGNOSIS/MDM:   Vitals:    Vitals:    04/04/25 2302 04/04/25 2308 04/04/25 2332 04/05/25 0001   BP: 105/62  114/63 108/70   Pulse: (!) 101 (!) 105 (!) 116 (!) 107   Resp: 24 19 18 12   Temp:       TempSrc:       SpO2: 93% 92% 96% 98%   Weight:       Height:               MDM  Number of Diagnoses or Management Options  Malignant neoplasm metastatic to both lungs (HCC): new and requires workup  Pneumonia due to human metapneumovirus: new and requires workup  Pneumonia of left lower lobe due to infectious organism: new and requires workup  Rhinovirus infection: new and requires workup  Diagnosis management comments: Yandy Dickinson is a 60 y.o. female who presents to the emergency department with a fever that started today.  Pt had 101.2 temp at home and took tylenol.  Patient has had a cough for weeks that is getting worse.  She has had some vomiting.  Patient has a reoccurrence of breast cancer that is metastatic to her lungs.  She was first diagnosed in 2006 and had a reoccurrence in 2013.  See Hernán and has also seen Felisa.  She is on 50 mg prednisone but did not take her dose today.  SHe was diagnosed with

## 2025-04-05 NOTE — H&P
* No resolved hospital problems. *     Principal Problem:    Sepsis due to LLL pneumonia/Human metapneumovirus/Rhinovirus   -rocephin 1g iv daily  -azithromycin 500mg iv dailiy x3 doses  -atrovent neb qid  -albuterol neb q4hrs prn  -mucomyst neb bid prn  -spot check spo2 prn  -droplet isolation   -30cc/kg sepsis fluid bolus  -ns at 100cc/hr  -goal map >65  -pressors as warranted  -ensure adequate iv access  -lactate  -I's and O's  -daily weight  Active Problems:    Carcinoma of female breast/Carcinoma of left breast metastatic to lung  -consult oncology  Resolved Problems:    * No resolved hospital problems. *  Signed:  BRITANY Henry - CNP, 4/5/2025 12:08 AM

## 2025-04-05 NOTE — CONSULTS
ONCOLOGY CONSULTATION    Patient:  Yandy Dickinson  YOB: 1965  Date of Service: 4/5/2025  MRN: [id number]   Primary Care Physician: Tomi Teague MD  Advance Directive: Full Code    Reason for Consult: Continuity of care    Requesting Physician: Minerva Conner MD      CHIEF COMPLAINT:    Chief Complaint   Patient presents with    Shortness of Breath    Fever    Vomiting     Taking chemo pill, has breast and lung cancer         History Obtained From: The patient and EMR      HISTORY OF PRESENT ILLNESS:      Yandy Dickinson is a very pleasant 60-year-old -American female with a diagnosis of stage IV ER/OH positive, HER2/bruce negative breast cancer with oligometastatic disease in the lungs.  She was treated with SBRT to the isolated RUL and LLL lung lesions 11/11/2024.  A suspicious lesion was found on the left iliac bone on PET scan 3/17/2025 at [facility].  An outpatient MRI of the pelvis at [facility] is pending to compare to the PET scan.  She continues on treatment with palliative Aromasin and palbociclib that was initiated in March 2024.    Yandy presented to the ED at [facility] on 4/4/2025 with increasing cough, shortness of breath, fever nausea and vomiting.  A respiratory PCR panel was positive for rhinovirus and metapneumovirus.    A lactic acid level of 2.0 with repeat of 1.7  A chest x-ray 4/4/2025 identified a left midlung infiltrative area consistent with pneumonia.  Blood cultures were drawn  She was admitted and placed on antibiotics with Maxipime and Zithromax.  Solu-Medrol also initiated.                  Past Medical History:    Past Medical History:   Diagnosis Date    Asthma     Breast cancer     Breast cancer with lung mets     Chronic back pain     GERD (gastroesophageal reflux disease)     Hx of blood clots     Hypertension     Lung cancer (HCC)     Neuropathy     Post chemo treatment neuropathy         Past Surgical

## 2025-04-06 LAB
ANION GAP SERPL CALCULATED.3IONS-SCNC: 9 MMOL/L (ref 8–16)
BASOPHILS # BLD: 0 K/UL (ref 0–0.2)
BASOPHILS NFR BLD: 0.1 % (ref 0–1)
BUN SERPL-MCNC: 15 MG/DL (ref 6–20)
CALCIUM SERPL-MCNC: 8.2 MG/DL (ref 8.6–10)
CHLORIDE SERPL-SCNC: 111 MMOL/L (ref 98–107)
CO2 SERPL-SCNC: 22 MMOL/L (ref 22–29)
CREAT SERPL-MCNC: 0.7 MG/DL (ref 0.5–0.9)
EOSINOPHIL # BLD: 0 K/UL (ref 0–0.6)
EOSINOPHIL NFR BLD: 0 % (ref 0–5)
ERYTHROCYTE [DISTWIDTH] IN BLOOD BY AUTOMATED COUNT: 13.9 % (ref 11.5–14.5)
GLUCOSE SERPL-MCNC: 133 MG/DL (ref 70–99)
HCT VFR BLD AUTO: 35.7 % (ref 37–47)
HGB BLD-MCNC: 11.2 G/DL (ref 12–16)
IMM GRANULOCYTES # BLD: 0.1 K/UL
LYMPHOCYTES # BLD: 0.7 K/UL (ref 1.1–4.5)
LYMPHOCYTES NFR BLD: 4.9 % (ref 20–40)
MCH RBC QN AUTO: 29.5 PG (ref 27–31)
MCHC RBC AUTO-ENTMCNC: 31.4 G/DL (ref 33–37)
MCV RBC AUTO: 93.9 FL (ref 81–99)
MONOCYTES # BLD: 0.3 K/UL (ref 0–0.9)
MONOCYTES NFR BLD: 2.2 % (ref 0–10)
NEUTROPHILS # BLD: 13.3 K/UL (ref 1.5–7.5)
NEUTS SEG NFR BLD: 92.2 % (ref 50–65)
PLATELET # BLD AUTO: 204 K/UL (ref 130–400)
PMV BLD AUTO: 8.7 FL (ref 9.4–12.3)
POTASSIUM SERPL-SCNC: 3.9 MMOL/L (ref 3.5–5)
RBC # BLD AUTO: 3.8 M/UL (ref 4.2–5.4)
SODIUM SERPL-SCNC: 142 MMOL/L (ref 136–145)
WBC # BLD AUTO: 14.4 K/UL (ref 4.8–10.8)

## 2025-04-06 PROCEDURE — 94150 VITAL CAPACITY TEST: CPT

## 2025-04-06 PROCEDURE — 1200000000 HC SEMI PRIVATE

## 2025-04-06 PROCEDURE — 2580000003 HC RX 258: Performed by: NURSE PRACTITIONER

## 2025-04-06 PROCEDURE — 6370000000 HC RX 637 (ALT 250 FOR IP): Performed by: NURSE PRACTITIONER

## 2025-04-06 PROCEDURE — 99232 SBSQ HOSP IP/OBS MODERATE 35: CPT | Performed by: INTERNAL MEDICINE

## 2025-04-06 PROCEDURE — 2580000003 HC RX 258

## 2025-04-06 PROCEDURE — 6360000002 HC RX W HCPCS

## 2025-04-06 PROCEDURE — 94640 AIRWAY INHALATION TREATMENT: CPT

## 2025-04-06 PROCEDURE — 6370000000 HC RX 637 (ALT 250 FOR IP)

## 2025-04-06 PROCEDURE — 80048 BASIC METABOLIC PNL TOTAL CA: CPT

## 2025-04-06 PROCEDURE — 36415 COLL VENOUS BLD VENIPUNCTURE: CPT

## 2025-04-06 PROCEDURE — 6360000002 HC RX W HCPCS: Performed by: NURSE PRACTITIONER

## 2025-04-06 PROCEDURE — 94669 MECHANICAL CHEST WALL OSCILL: CPT

## 2025-04-06 PROCEDURE — 2500000003 HC RX 250 WO HCPCS: Performed by: NURSE PRACTITIONER

## 2025-04-06 PROCEDURE — 85025 COMPLETE CBC W/AUTO DIFF WBC: CPT

## 2025-04-06 PROCEDURE — 6360000002 HC RX W HCPCS: Performed by: STUDENT IN AN ORGANIZED HEALTH CARE EDUCATION/TRAINING PROGRAM

## 2025-04-06 PROCEDURE — 2700000000 HC OXYGEN THERAPY PER DAY

## 2025-04-06 PROCEDURE — 94760 N-INVAS EAR/PLS OXIMETRY 1: CPT

## 2025-04-06 PROCEDURE — 6370000000 HC RX 637 (ALT 250 FOR IP): Performed by: STUDENT IN AN ORGANIZED HEALTH CARE EDUCATION/TRAINING PROGRAM

## 2025-04-06 RX ORDER — HYDRALAZINE HYDROCHLORIDE 20 MG/ML
20 INJECTION INTRAMUSCULAR; INTRAVENOUS EVERY 6 HOURS PRN
Status: DISCONTINUED | OUTPATIENT
Start: 2025-04-06 | End: 2025-04-09 | Stop reason: HOSPADM

## 2025-04-06 RX ORDER — PROMETHAZINE HYDROCHLORIDE AND CODEINE PHOSPHATE 6.25; 1 MG/5ML; MG/5ML
5 SYRUP ORAL EVERY 4 HOURS PRN
Status: DISCONTINUED | OUTPATIENT
Start: 2025-04-06 | End: 2025-04-09 | Stop reason: HOSPADM

## 2025-04-06 RX ORDER — FAMOTIDINE 20 MG/1
20 TABLET, FILM COATED ORAL 2 TIMES DAILY PRN
Status: DISCONTINUED | OUTPATIENT
Start: 2025-04-06 | End: 2025-04-09 | Stop reason: HOSPADM

## 2025-04-06 RX ORDER — GUAIFENESIN/DEXTROMETHORPHAN 100-10MG/5
5 SYRUP ORAL EVERY 4 HOURS PRN
Status: DISCONTINUED | OUTPATIENT
Start: 2025-04-06 | End: 2025-04-09 | Stop reason: HOSPADM

## 2025-04-06 RX ORDER — FAMOTIDINE 20 MG/1
20 TABLET, FILM COATED ORAL 2 TIMES DAILY PRN
Status: DISCONTINUED | OUTPATIENT
Start: 2025-04-06 | End: 2025-04-06

## 2025-04-06 RX ADMIN — GABAPENTIN 600 MG: 300 CAPSULE ORAL at 21:22

## 2025-04-06 RX ADMIN — ACETAMINOPHEN 650 MG: 325 TABLET ORAL at 21:21

## 2025-04-06 RX ADMIN — IPRATROPIUM BROMIDE 0.5 MG: 0.5 SOLUTION RESPIRATORY (INHALATION) at 18:58

## 2025-04-06 RX ADMIN — WATER 40 MG: 1 INJECTION INTRAMUSCULAR; INTRAVENOUS; SUBCUTANEOUS at 06:32

## 2025-04-06 RX ADMIN — BUDESONIDE INHALATION 500 MCG: 0.5 SUSPENSION RESPIRATORY (INHALATION) at 18:58

## 2025-04-06 RX ADMIN — BUDESONIDE INHALATION 500 MCG: 0.5 SUSPENSION RESPIRATORY (INHALATION) at 06:09

## 2025-04-06 RX ADMIN — HYDRALAZINE HYDROCHLORIDE 20 MG: 20 INJECTION INTRAMUSCULAR; INTRAVENOUS at 21:24

## 2025-04-06 RX ADMIN — ARFORMOTEROL TARTRATE 15 MCG: 15 SOLUTION RESPIRATORY (INHALATION) at 06:09

## 2025-04-06 RX ADMIN — Medication 5 ML: at 21:22

## 2025-04-06 RX ADMIN — IPRATROPIUM BROMIDE 0.5 MG: 0.5 SOLUTION RESPIRATORY (INHALATION) at 15:05

## 2025-04-06 RX ADMIN — CEFEPIME 2000 MG: 2 INJECTION, POWDER, FOR SOLUTION INTRAVENOUS at 10:23

## 2025-04-06 RX ADMIN — ONDANSETRON HYDROCHLORIDE 4 MG: 4 TABLET, FILM COATED ORAL at 00:56

## 2025-04-06 RX ADMIN — FAMOTIDINE 20 MG: 20 TABLET, FILM COATED ORAL at 00:56

## 2025-04-06 RX ADMIN — GUAIFENESIN SYRUP AND DEXTROMETHORPHAN 5 ML: 100; 10 SYRUP ORAL at 13:07

## 2025-04-06 RX ADMIN — GUAIFENESIN SYRUP AND DEXTROMETHORPHAN 5 ML: 100; 10 SYRUP ORAL at 08:09

## 2025-04-06 RX ADMIN — CEFEPIME 2000 MG: 2 INJECTION, POWDER, FOR SOLUTION INTRAVENOUS at 17:29

## 2025-04-06 RX ADMIN — GUAIFENESIN 600 MG: 600 TABLET ORAL at 21:22

## 2025-04-06 RX ADMIN — WATER 40 MG: 1 INJECTION INTRAMUSCULAR; INTRAVENOUS; SUBCUTANEOUS at 11:57

## 2025-04-06 RX ADMIN — GABAPENTIN 600 MG: 300 CAPSULE ORAL at 13:59

## 2025-04-06 RX ADMIN — GUAIFENESIN 600 MG: 600 TABLET ORAL at 08:02

## 2025-04-06 RX ADMIN — SODIUM CHLORIDE, PRESERVATIVE FREE 10 ML: 5 INJECTION INTRAVENOUS at 08:02

## 2025-04-06 RX ADMIN — ARFORMOTEROL TARTRATE 15 MCG: 15 SOLUTION RESPIRATORY (INHALATION) at 18:58

## 2025-04-06 RX ADMIN — GABAPENTIN 600 MG: 300 CAPSULE ORAL at 08:02

## 2025-04-06 RX ADMIN — IPRATROPIUM BROMIDE 0.5 MG: 0.5 SOLUTION RESPIRATORY (INHALATION) at 06:09

## 2025-04-06 RX ADMIN — IPRATROPIUM BROMIDE 0.5 MG: 0.5 SOLUTION RESPIRATORY (INHALATION) at 10:05

## 2025-04-06 RX ADMIN — SODIUM CHLORIDE: 0.9 INJECTION, SOLUTION INTRAVENOUS at 11:07

## 2025-04-06 RX ADMIN — ENOXAPARIN SODIUM 40 MG: 100 INJECTION SUBCUTANEOUS at 08:03

## 2025-04-06 RX ADMIN — WATER 40 MG: 1 INJECTION INTRAMUSCULAR; INTRAVENOUS; SUBCUTANEOUS at 17:29

## 2025-04-06 RX ADMIN — CEFEPIME 2000 MG: 2 INJECTION, POWDER, FOR SOLUTION INTRAVENOUS at 01:03

## 2025-04-06 ASSESSMENT — PAIN SCALES - GENERAL
PAINLEVEL_OUTOF10: 1
PAINLEVEL_OUTOF10: 8
PAINLEVEL_OUTOF10: 0

## 2025-04-06 ASSESSMENT — PAIN DESCRIPTION - LOCATION: LOCATION: ABDOMEN

## 2025-04-06 ASSESSMENT — PAIN DESCRIPTION - ORIENTATION: ORIENTATION: MID;UPPER

## 2025-04-06 ASSESSMENT — PAIN DESCRIPTION - DESCRIPTORS: DESCRIPTORS: ACHING;THROBBING

## 2025-04-06 ASSESSMENT — PAIN SCALES - WONG BAKER: WONGBAKER_NUMERICALRESPONSE: NO HURT

## 2025-04-07 ENCOUNTER — APPOINTMENT (OUTPATIENT)
Dept: INFUSION THERAPY | Age: 60
End: 2025-04-07
Payer: MEDICARE

## 2025-04-07 ENCOUNTER — TELEPHONE (OUTPATIENT)
Dept: HEMATOLOGY | Age: 60
End: 2025-04-07

## 2025-04-07 LAB
ANION GAP SERPL CALCULATED.3IONS-SCNC: 9 MMOL/L (ref 8–16)
BACTERIA SPEC RESP CULT: NORMAL
BASOPHILS # BLD: 0 K/UL (ref 0–0.2)
BASOPHILS NFR BLD: 0 % (ref 0–1)
BUN SERPL-MCNC: 13 MG/DL (ref 6–20)
CALCIUM SERPL-MCNC: 8.4 MG/DL (ref 8.6–10)
CHLORIDE SERPL-SCNC: 111 MMOL/L (ref 98–107)
CO2 SERPL-SCNC: 23 MMOL/L (ref 22–29)
CREAT SERPL-MCNC: 0.6 MG/DL (ref 0.5–0.9)
EOSINOPHIL # BLD: 0 K/UL (ref 0–0.6)
EOSINOPHIL NFR BLD: 0 % (ref 0–5)
ERYTHROCYTE [DISTWIDTH] IN BLOOD BY AUTOMATED COUNT: 14 % (ref 11.5–14.5)
GLUCOSE SERPL-MCNC: 117 MG/DL (ref 70–99)
GRAM STN SPEC: NORMAL
HCT VFR BLD AUTO: 36.9 % (ref 37–47)
HGB BLD-MCNC: 11.6 G/DL (ref 12–16)
HYPOCHROMIA BLD QL SMEAR: ABNORMAL
IMM GRANULOCYTES # BLD: 0.1 K/UL
LYMPHOCYTES # BLD: 1.1 K/UL (ref 1.1–4.5)
LYMPHOCYTES NFR BLD: 6 % (ref 20–40)
MAGNESIUM SERPL-MCNC: 1.9 MG/DL (ref 1.6–2.6)
MCH RBC QN AUTO: 29.7 PG (ref 27–31)
MCHC RBC AUTO-ENTMCNC: 31.4 G/DL (ref 33–37)
MCV RBC AUTO: 94.6 FL (ref 81–99)
MONOCYTES # BLD: 0 K/UL (ref 0–0.9)
MONOCYTES NFR BLD: 0 % (ref 0–10)
NEUTROPHILS # BLD: 17.9 K/UL (ref 1.5–7.5)
NEUTS SEG NFR BLD: 94 % (ref 50–65)
PLATELET # BLD AUTO: 232 K/UL (ref 130–400)
PLATELET SLIDE REVIEW: ADEQUATE
PMV BLD AUTO: 8.7 FL (ref 9.4–12.3)
POTASSIUM SERPL-SCNC: 3.5 MMOL/L (ref 3.5–5)
RBC # BLD AUTO: 3.9 M/UL (ref 4.2–5.4)
SODIUM SERPL-SCNC: 143 MMOL/L (ref 136–145)
WBC # BLD AUTO: 19 K/UL (ref 4.8–10.8)

## 2025-04-07 PROCEDURE — 83735 ASSAY OF MAGNESIUM: CPT

## 2025-04-07 PROCEDURE — 36415 COLL VENOUS BLD VENIPUNCTURE: CPT

## 2025-04-07 PROCEDURE — 6370000000 HC RX 637 (ALT 250 FOR IP)

## 2025-04-07 PROCEDURE — 6360000002 HC RX W HCPCS

## 2025-04-07 PROCEDURE — 2500000003 HC RX 250 WO HCPCS: Performed by: NURSE PRACTITIONER

## 2025-04-07 PROCEDURE — 2580000003 HC RX 258

## 2025-04-07 PROCEDURE — 6370000000 HC RX 637 (ALT 250 FOR IP): Performed by: EMERGENCY MEDICINE

## 2025-04-07 PROCEDURE — 94669 MECHANICAL CHEST WALL OSCILL: CPT

## 2025-04-07 PROCEDURE — 99231 SBSQ HOSP IP/OBS SF/LOW 25: CPT | Performed by: INTERNAL MEDICINE

## 2025-04-07 PROCEDURE — 1200000000 HC SEMI PRIVATE

## 2025-04-07 PROCEDURE — 6370000000 HC RX 637 (ALT 250 FOR IP): Performed by: NURSE PRACTITIONER

## 2025-04-07 PROCEDURE — 85025 COMPLETE CBC W/AUTO DIFF WBC: CPT

## 2025-04-07 PROCEDURE — 6360000002 HC RX W HCPCS: Performed by: NURSE PRACTITIONER

## 2025-04-07 PROCEDURE — 80048 BASIC METABOLIC PNL TOTAL CA: CPT

## 2025-04-07 PROCEDURE — 94640 AIRWAY INHALATION TREATMENT: CPT

## 2025-04-07 PROCEDURE — 94150 VITAL CAPACITY TEST: CPT

## 2025-04-07 PROCEDURE — 2580000003 HC RX 258: Performed by: NURSE PRACTITIONER

## 2025-04-07 PROCEDURE — 94760 N-INVAS EAR/PLS OXIMETRY 1: CPT

## 2025-04-07 RX ORDER — ACETYLCYSTEINE 200 MG/ML
600 SOLUTION ORAL; RESPIRATORY (INHALATION)
Status: DISCONTINUED | OUTPATIENT
Start: 2025-04-07 | End: 2025-04-09 | Stop reason: HOSPADM

## 2025-04-07 RX ADMIN — GUAIFENESIN 600 MG: 600 TABLET ORAL at 08:06

## 2025-04-07 RX ADMIN — GABAPENTIN 600 MG: 300 CAPSULE ORAL at 21:54

## 2025-04-07 RX ADMIN — IPRATROPIUM BROMIDE 0.5 MG: 0.5 SOLUTION RESPIRATORY (INHALATION) at 19:15

## 2025-04-07 RX ADMIN — ENOXAPARIN SODIUM 40 MG: 100 INJECTION SUBCUTANEOUS at 08:05

## 2025-04-07 RX ADMIN — WATER 40 MG: 1 INJECTION INTRAMUSCULAR; INTRAVENOUS; SUBCUTANEOUS at 00:33

## 2025-04-07 RX ADMIN — IPRATROPIUM BROMIDE 0.5 MG: 0.5 SOLUTION RESPIRATORY (INHALATION) at 10:41

## 2025-04-07 RX ADMIN — Medication 5 ML: at 08:06

## 2025-04-07 RX ADMIN — ACETYLCYSTEINE 600 MG: 200 SOLUTION ORAL; RESPIRATORY (INHALATION) at 19:15

## 2025-04-07 RX ADMIN — ARFORMOTEROL TARTRATE 15 MCG: 15 SOLUTION RESPIRATORY (INHALATION) at 19:15

## 2025-04-07 RX ADMIN — SODIUM CHLORIDE, PRESERVATIVE FREE 10 ML: 5 INJECTION INTRAVENOUS at 08:11

## 2025-04-07 RX ADMIN — IPRATROPIUM BROMIDE 0.5 MG: 0.5 SOLUTION RESPIRATORY (INHALATION) at 06:36

## 2025-04-07 RX ADMIN — ACETAMINOPHEN 650 MG: 325 TABLET ORAL at 10:40

## 2025-04-07 RX ADMIN — GABAPENTIN 600 MG: 300 CAPSULE ORAL at 13:43

## 2025-04-07 RX ADMIN — WATER 40 MG: 1 INJECTION INTRAMUSCULAR; INTRAVENOUS; SUBCUTANEOUS at 05:54

## 2025-04-07 RX ADMIN — Medication 5 ML: at 13:44

## 2025-04-07 RX ADMIN — ACETYLCYSTEINE 600 MG: 200 SOLUTION ORAL; RESPIRATORY (INHALATION) at 10:41

## 2025-04-07 RX ADMIN — ARFORMOTEROL TARTRATE 15 MCG: 15 SOLUTION RESPIRATORY (INHALATION) at 06:36

## 2025-04-07 RX ADMIN — IPRATROPIUM BROMIDE 0.5 MG: 0.5 SOLUTION RESPIRATORY (INHALATION) at 14:10

## 2025-04-07 RX ADMIN — BUDESONIDE INHALATION 500 MCG: 0.5 SUSPENSION RESPIRATORY (INHALATION) at 19:15

## 2025-04-07 RX ADMIN — CEFEPIME 2000 MG: 2 INJECTION, POWDER, FOR SOLUTION INTRAVENOUS at 10:43

## 2025-04-07 RX ADMIN — LIDOCAINE HYDROCHLORIDE: 20 SOLUTION ORAL at 23:23

## 2025-04-07 RX ADMIN — CEFEPIME 2000 MG: 2 INJECTION, POWDER, FOR SOLUTION INTRAVENOUS at 02:18

## 2025-04-07 RX ADMIN — AZITHROMYCIN MONOHYDRATE 500 MG: 500 INJECTION, POWDER, LYOPHILIZED, FOR SOLUTION INTRAVENOUS at 00:34

## 2025-04-07 RX ADMIN — GUAIFENESIN 600 MG: 600 TABLET ORAL at 21:54

## 2025-04-07 RX ADMIN — AZITHROMYCIN MONOHYDRATE 500 MG: 500 INJECTION, POWDER, LYOPHILIZED, FOR SOLUTION INTRAVENOUS at 23:57

## 2025-04-07 RX ADMIN — GABAPENTIN 600 MG: 300 CAPSULE ORAL at 08:06

## 2025-04-07 RX ADMIN — CEFEPIME 2000 MG: 2 INJECTION, POWDER, FOR SOLUTION INTRAVENOUS at 18:13

## 2025-04-07 RX ADMIN — BUDESONIDE INHALATION 500 MCG: 0.5 SUSPENSION RESPIRATORY (INHALATION) at 06:36

## 2025-04-07 ASSESSMENT — PAIN DESCRIPTION - DESCRIPTORS: DESCRIPTORS: ACHING;DISCOMFORT

## 2025-04-07 ASSESSMENT — PAIN - FUNCTIONAL ASSESSMENT: PAIN_FUNCTIONAL_ASSESSMENT: ACTIVITIES ARE NOT PREVENTED

## 2025-04-07 ASSESSMENT — PAIN DESCRIPTION - LOCATION: LOCATION: RIB CAGE

## 2025-04-07 NOTE — TELEPHONE ENCOUNTER
I called patient and left detailed voicemail about their appointment on 04/11/2025. I made patient aware not to arrive any earlier than the appointment time and to come at the time of the follow up not the time of the lab appointment if it is different than the follow up appt time. I also made patient aware to eat and drink plenty of water to hydrate properly before coming to these appointments because this will make their lab draw much easier. Made patient aware that we are now located at the [facility] at [address] [place] [address]. Located between [facility] and the [facility]

## 2025-04-08 LAB
ANION GAP SERPL CALCULATED.3IONS-SCNC: 7 MMOL/L (ref 8–16)
BASOPHILS # BLD: 0 K/UL (ref 0–0.2)
BASOPHILS NFR BLD: 0.1 % (ref 0–1)
BUN SERPL-MCNC: 19 MG/DL (ref 6–20)
CALCIUM SERPL-MCNC: 7.7 MG/DL (ref 8.6–10)
CHLORIDE SERPL-SCNC: 106 MMOL/L (ref 98–107)
CO2 SERPL-SCNC: 28 MMOL/L (ref 22–29)
CREAT SERPL-MCNC: 0.7 MG/DL (ref 0.5–0.9)
EOSINOPHIL # BLD: 0 K/UL (ref 0–0.6)
EOSINOPHIL NFR BLD: 0 % (ref 0–5)
ERYTHROCYTE [DISTWIDTH] IN BLOOD BY AUTOMATED COUNT: 14 % (ref 11.5–14.5)
GLUCOSE SERPL-MCNC: 92 MG/DL (ref 70–99)
HCT VFR BLD AUTO: 36.6 % (ref 37–47)
HGB BLD-MCNC: 11.8 G/DL (ref 12–16)
IMM GRANULOCYTES # BLD: 0.1 K/UL
LYMPHOCYTES # BLD: 1.5 K/UL (ref 1.1–4.5)
LYMPHOCYTES NFR BLD: 14.4 % (ref 20–40)
MCH RBC QN AUTO: 29.4 PG (ref 27–31)
MCHC RBC AUTO-ENTMCNC: 32.2 G/DL (ref 33–37)
MCV RBC AUTO: 91.3 FL (ref 81–99)
MONOCYTES # BLD: 0.5 K/UL (ref 0–0.9)
MONOCYTES NFR BLD: 4.9 % (ref 0–10)
NEUTROPHILS # BLD: 8.1 K/UL (ref 1.5–7.5)
NEUTS SEG NFR BLD: 79.9 % (ref 50–65)
PLATELET # BLD AUTO: 236 K/UL (ref 130–400)
PMV BLD AUTO: 8.7 FL (ref 9.4–12.3)
POTASSIUM SERPL-SCNC: 3.6 MMOL/L (ref 3.5–5)
RBC # BLD AUTO: 4.01 M/UL (ref 4.2–5.4)
SODIUM SERPL-SCNC: 141 MMOL/L (ref 136–145)
WBC # BLD AUTO: 10.2 K/UL (ref 4.8–10.8)

## 2025-04-08 PROCEDURE — 94760 N-INVAS EAR/PLS OXIMETRY 1: CPT

## 2025-04-08 PROCEDURE — 99232 SBSQ HOSP IP/OBS MODERATE 35: CPT | Performed by: INTERNAL MEDICINE

## 2025-04-08 PROCEDURE — 6360000002 HC RX W HCPCS

## 2025-04-08 PROCEDURE — 2580000003 HC RX 258

## 2025-04-08 PROCEDURE — 2580000003 HC RX 258: Performed by: NURSE PRACTITIONER

## 2025-04-08 PROCEDURE — 6360000002 HC RX W HCPCS: Performed by: HOSPITALIST

## 2025-04-08 PROCEDURE — 94669 MECHANICAL CHEST WALL OSCILL: CPT

## 2025-04-08 PROCEDURE — 2500000003 HC RX 250 WO HCPCS: Performed by: NURSE PRACTITIONER

## 2025-04-08 PROCEDURE — 94640 AIRWAY INHALATION TREATMENT: CPT

## 2025-04-08 PROCEDURE — 6360000002 HC RX W HCPCS: Performed by: NURSE PRACTITIONER

## 2025-04-08 PROCEDURE — 94150 VITAL CAPACITY TEST: CPT

## 2025-04-08 PROCEDURE — 2500000003 HC RX 250 WO HCPCS: Performed by: HOSPITALIST

## 2025-04-08 PROCEDURE — 85025 COMPLETE CBC W/AUTO DIFF WBC: CPT

## 2025-04-08 PROCEDURE — 1200000000 HC SEMI PRIVATE

## 2025-04-08 PROCEDURE — 80048 BASIC METABOLIC PNL TOTAL CA: CPT

## 2025-04-08 PROCEDURE — 6370000000 HC RX 637 (ALT 250 FOR IP)

## 2025-04-08 PROCEDURE — 36415 COLL VENOUS BLD VENIPUNCTURE: CPT

## 2025-04-08 RX ADMIN — CEFEPIME 2000 MG: 2 INJECTION, POWDER, FOR SOLUTION INTRAVENOUS at 17:30

## 2025-04-08 RX ADMIN — SODIUM CHLORIDE, PRESERVATIVE FREE 10 ML: 5 INJECTION INTRAVENOUS at 07:49

## 2025-04-08 RX ADMIN — IPRATROPIUM BROMIDE 0.5 MG: 0.5 SOLUTION RESPIRATORY (INHALATION) at 18:10

## 2025-04-08 RX ADMIN — BUDESONIDE INHALATION 500 MCG: 0.5 SUSPENSION RESPIRATORY (INHALATION) at 18:10

## 2025-04-08 RX ADMIN — Medication 5 ML: at 17:30

## 2025-04-08 RX ADMIN — GABAPENTIN 600 MG: 300 CAPSULE ORAL at 07:49

## 2025-04-08 RX ADMIN — ARFORMOTEROL TARTRATE 15 MCG: 15 SOLUTION RESPIRATORY (INHALATION) at 07:34

## 2025-04-08 RX ADMIN — IPRATROPIUM BROMIDE 0.5 MG: 0.5 SOLUTION RESPIRATORY (INHALATION) at 07:35

## 2025-04-08 RX ADMIN — GUAIFENESIN SYRUP AND DEXTROMETHORPHAN 5 ML: 100; 10 SYRUP ORAL at 21:49

## 2025-04-08 RX ADMIN — IPRATROPIUM BROMIDE 0.5 MG: 0.5 SOLUTION RESPIRATORY (INHALATION) at 14:19

## 2025-04-08 RX ADMIN — METHYLPREDNISOLONE SODIUM SUCCINATE 40 MG: 40 INJECTION INTRAMUSCULAR; INTRAVENOUS at 07:49

## 2025-04-08 RX ADMIN — GUAIFENESIN 600 MG: 600 TABLET ORAL at 07:49

## 2025-04-08 RX ADMIN — CEFEPIME 2000 MG: 2 INJECTION, POWDER, FOR SOLUTION INTRAVENOUS at 02:47

## 2025-04-08 RX ADMIN — ENOXAPARIN SODIUM 40 MG: 100 INJECTION SUBCUTANEOUS at 07:48

## 2025-04-08 RX ADMIN — GUAIFENESIN 600 MG: 600 TABLET ORAL at 21:46

## 2025-04-08 RX ADMIN — BUDESONIDE INHALATION 500 MCG: 0.5 SUSPENSION RESPIRATORY (INHALATION) at 07:34

## 2025-04-08 RX ADMIN — ACETYLCYSTEINE 600 MG: 200 SOLUTION ORAL; RESPIRATORY (INHALATION) at 18:11

## 2025-04-08 RX ADMIN — ACETYLCYSTEINE 600 MG: 200 SOLUTION ORAL; RESPIRATORY (INHALATION) at 07:35

## 2025-04-08 RX ADMIN — CEFEPIME 2000 MG: 2 INJECTION, POWDER, FOR SOLUTION INTRAVENOUS at 10:47

## 2025-04-08 RX ADMIN — ARFORMOTEROL TARTRATE 15 MCG: 15 SOLUTION RESPIRATORY (INHALATION) at 18:10

## 2025-04-08 RX ADMIN — SODIUM CHLORIDE: 0.9 INJECTION, SOLUTION INTRAVENOUS at 10:46

## 2025-04-08 RX ADMIN — Medication 5 ML: at 21:53

## 2025-04-08 RX ADMIN — Medication 5 ML: at 07:49

## 2025-04-08 RX ADMIN — GABAPENTIN 600 MG: 300 CAPSULE ORAL at 21:46

## 2025-04-09 VITALS
BODY MASS INDEX: 26.48 KG/M2 | OXYGEN SATURATION: 100 % | HEART RATE: 97 BPM | WEIGHT: 164.8 LBS | SYSTOLIC BLOOD PRESSURE: 148 MMHG | RESPIRATION RATE: 16 BRPM | DIASTOLIC BLOOD PRESSURE: 88 MMHG | HEIGHT: 66 IN | TEMPERATURE: 97.7 F

## 2025-04-09 PROCEDURE — 6360000002 HC RX W HCPCS

## 2025-04-09 PROCEDURE — 2500000003 HC RX 250 WO HCPCS: Performed by: HOSPITALIST

## 2025-04-09 PROCEDURE — 99231 SBSQ HOSP IP/OBS SF/LOW 25: CPT | Performed by: INTERNAL MEDICINE

## 2025-04-09 PROCEDURE — 6360000002 HC RX W HCPCS: Performed by: HOSPITALIST

## 2025-04-09 PROCEDURE — 6370000000 HC RX 637 (ALT 250 FOR IP)

## 2025-04-09 PROCEDURE — 94669 MECHANICAL CHEST WALL OSCILL: CPT

## 2025-04-09 PROCEDURE — 94760 N-INVAS EAR/PLS OXIMETRY 1: CPT

## 2025-04-09 PROCEDURE — 2580000003 HC RX 258

## 2025-04-09 PROCEDURE — 94640 AIRWAY INHALATION TREATMENT: CPT

## 2025-04-09 PROCEDURE — 2500000003 HC RX 250 WO HCPCS: Performed by: NURSE PRACTITIONER

## 2025-04-09 PROCEDURE — 6360000002 HC RX W HCPCS: Performed by: NURSE PRACTITIONER

## 2025-04-09 RX ORDER — GUAIFENESIN 600 MG/1
600 TABLET, EXTENDED RELEASE ORAL 2 TIMES DAILY
Qty: 60 TABLET | Refills: 0 | Status: SHIPPED | OUTPATIENT
Start: 2025-04-09

## 2025-04-09 RX ORDER — PROMETHAZINE HYDROCHLORIDE AND CODEINE PHOSPHATE 6.25; 1 MG/5ML; MG/5ML
5 SYRUP ORAL EVERY 4 HOURS PRN
Qty: 90 ML | Refills: 0 | Status: SHIPPED | OUTPATIENT
Start: 2025-04-09 | End: 2025-04-09

## 2025-04-09 RX ORDER — GUAIFENESIN/DEXTROMETHORPHAN 100-10MG/5
5 SYRUP ORAL EVERY 4 HOURS PRN
Qty: 120 ML | Refills: 0 | Status: SHIPPED | OUTPATIENT
Start: 2025-04-09 | End: 2025-04-19

## 2025-04-09 RX ORDER — PROMETHAZINE HYDROCHLORIDE AND CODEINE PHOSPHATE 6.25; 1 MG/5ML; MG/5ML
5 SYRUP ORAL EVERY 4 HOURS PRN
Qty: 90 ML | Refills: 0 | Status: SHIPPED | OUTPATIENT
Start: 2025-04-09 | End: 2025-04-12

## 2025-04-09 RX ADMIN — GUAIFENESIN 600 MG: 600 TABLET ORAL at 07:44

## 2025-04-09 RX ADMIN — ACETYLCYSTEINE 600 MG: 200 SOLUTION ORAL; RESPIRATORY (INHALATION) at 06:20

## 2025-04-09 RX ADMIN — GABAPENTIN 600 MG: 300 CAPSULE ORAL at 07:44

## 2025-04-09 RX ADMIN — CEFEPIME 2000 MG: 2 INJECTION, POWDER, FOR SOLUTION INTRAVENOUS at 03:17

## 2025-04-09 RX ADMIN — METHYLPREDNISOLONE SODIUM SUCCINATE 40 MG: 40 INJECTION INTRAMUSCULAR; INTRAVENOUS at 07:44

## 2025-04-09 RX ADMIN — BUDESONIDE INHALATION 500 MCG: 0.5 SUSPENSION RESPIRATORY (INHALATION) at 06:21

## 2025-04-09 RX ADMIN — ARFORMOTEROL TARTRATE 15 MCG: 15 SOLUTION RESPIRATORY (INHALATION) at 06:21

## 2025-04-09 RX ADMIN — IPRATROPIUM BROMIDE 0.5 MG: 0.5 SOLUTION RESPIRATORY (INHALATION) at 06:21

## 2025-04-09 RX ADMIN — SODIUM CHLORIDE, PRESERVATIVE FREE 10 ML: 5 INJECTION INTRAVENOUS at 07:45

## 2025-04-09 NOTE — CARE COORDINATION
04/09/25 1005   IMM Letter   IMM Letter given to Patient/Family/Significant other/Guardian/POA/by: sudeep fowler sw   IMM Letter date given: 04/09/25   IMM Letter time given: 0945     Second IMM given to patient and explained with patient verbalizing understanding.  All questions and concerns addressed     Signed letter placed in pt soft chart   Patient declined waiting 4 hr period prior to discharge.   Electronically signed by Sudeep Fowler on 4/9/2025 at 10:05 AM

## 2025-04-09 NOTE — PLAN OF CARE
Problem: Chronic Conditions and Co-morbidities  Goal: Patient's chronic conditions and co-morbidity symptoms are monitored and maintained or improved  Outcome: Progressing     Problem: Discharge Planning  Goal: Discharge to home or other facility with appropriate resources  Outcome: Progressing  Flowsheets (Taken 4/7/2025 2205)  Discharge to home or other facility with appropriate resources: Identify barriers to discharge with patient and caregiver     Problem: Pain  Goal: Verbalizes/displays adequate comfort level or baseline comfort level  Outcome: Progressing     Problem: Respiratory - Adult  Goal: Achieves optimal ventilation and oxygenation  Outcome: Progressing  Flowsheets (Taken 4/7/2025 2205)  Achieves optimal ventilation and oxygenation: Assess for changes in respiratory status     
  Problem: Chronic Conditions and Co-morbidities  Goal: Patient's chronic conditions and co-morbidity symptoms are monitored and maintained or improved  Outcome: Progressing  Flowsheets (Taken 4/8/2025 2146)  Care Plan - Patient's Chronic Conditions and Co-Morbidity Symptoms are Monitored and Maintained or Improved:   Monitor and assess patient's chronic conditions and comorbid symptoms for stability, deterioration, or improvement   Collaborate with multidisciplinary team to address chronic and comorbid conditions and prevent exacerbation or deterioration   Update acute care plan with appropriate goals if chronic or comorbid symptoms are exacerbated and prevent overall improvement and discharge     Problem: Discharge Planning  Goal: Discharge to home or other facility with appropriate resources  Outcome: Progressing  Flowsheets (Taken 4/8/2025 2146)  Discharge to home or other facility with appropriate resources:   Identify barriers to discharge with patient and caregiver   Arrange for needed discharge resources and transportation as appropriate   Identify discharge learning needs (meds, wound care, etc)   Arrange for interpreters to assist at discharge as needed   Refer to discharge planning if patient needs post-hospital services based on physician order or complex needs related to functional status, cognitive ability or social support system     Problem: Pain  Goal: Verbalizes/displays adequate comfort level or baseline comfort level  Outcome: Progressing     Problem: Respiratory - Adult  Goal: Achieves optimal ventilation and oxygenation  Outcome: Progressing  Flowsheets (Taken 4/8/2025 2146)  Achieves optimal ventilation and oxygenation:   Assess for changes in respiratory status   Assess for changes in mentation and behavior   Position to facilitate oxygenation and minimize respiratory effort   Oxygen supplementation based on oxygen saturation or arterial blood gases   Assess the need for suctioning and 
  Problem: Discharge Planning  Goal: Discharge to home or other facility with appropriate resources  4/5/2025 1435 by Teresa Headley RN  Outcome: Progressing  4/5/2025 0337 by Bk Ram RN  Outcome: Progressing     Problem: Respiratory - Adult  Goal: Achieves optimal ventilation and oxygenation  Outcome: Progressing     Problem: Pain  Goal: Verbalizes/displays adequate comfort level or baseline comfort level  Outcome: Progressing     
  Problem: Discharge Planning  Goal: Discharge to home or other facility with appropriate resources  4/6/2025 2353 by Bk Ram RN  Outcome: Progressing  4/6/2025 1532 by Teresa Headley RN  Outcome: Progressing  4/6/2025 1531 by Teresa Headley RN  Outcome: Progressing  4/6/2025 1531 by Teresa Headley, RN  Outcome: Not Progressing     Problem: Respiratory - Adult  Goal: Achieves optimal ventilation and oxygenation  4/6/2025 2353 by Bk Ram RN  Outcome: Progressing  4/6/2025 1532 by Teresa Headley RN  Outcome: Progressing  4/6/2025 1531 by Teresa Headley, RN  Outcome: Progressing  4/6/2025 1531 by Teresa Headley RN  Outcome: Not Progressing     Problem: Pain  Goal: Verbalizes/displays adequate comfort level or baseline comfort level  4/6/2025 2353 by Bk Ram RN  Outcome: Progressing  4/6/2025 1532 by Teresa Headley, RN  Outcome: Progressing  4/6/2025 1531 by Teresa Headley, RN  Outcome: Progressing  4/6/2025 1531 by Teresa Headley RN  Outcome: Not Progressing     Problem: Safety - Adult  Goal: Free from fall injury  4/6/2025 2353 by Bk Ram RN  Outcome: Progressing  4/6/2025 1532 by Teresa Headley RN  Outcome: Progressing  4/6/2025 1531 by Teresa Headley, RN  Outcome: Progressing  4/6/2025 1531 by Teresa Headley, RN  Outcome: Not Progressing     Problem: Discharge Planning  Goal: Discharge to home or other facility with appropriate resources  4/6/2025 2353 by Bk Ram RN  Outcome: Progressing  4/6/2025 1532 by Teresa Headley, RN  Outcome: Progressing  4/6/2025 1531 by Teresa Headley, RN  Outcome: Progressing  4/6/2025 1531 by Teresa Headley RN  Outcome: Not Progressing     Problem: Respiratory - Adult  Goal: Achieves optimal ventilation and oxygenation  4/6/2025 2353 by Bk Ram RN  Outcome: Progressing  4/6/2025 1532 by Teresa Headley, RN  Outcome: Progressing  4/6/2025 1531 by Teresa Headley RN  Outcome: Progressing  4/6/2025 1531 by Teresa Headley RN  Outcome: Not Progressing     Problem: Pain  Goal: Verbalizes/displays 
  Problem: Discharge Planning  Goal: Discharge to home or other facility with appropriate resources  Outcome: Progressing     
by Teresa Headley RN  Outcome: Not Progressing  4/6/2025 0235 by Bk Ram RN  Outcome: Progressing     Problem: Safety - Adult  Goal: Free from fall injury  4/6/2025 1532 by Teresa Headley RN  Outcome: Progressing  4/6/2025 1531 by Teresa Headley RN  Outcome: Progressing  4/6/2025 1531 by Teresa Headley RN  Outcome: Not Progressing  4/6/2025 0235 by Bk Ram RN  Outcome: Progressing

## 2025-04-09 NOTE — DISCHARGE SUMMARY
taper by 10 mg every third day to off     promethazine 25 MG tablet  Commonly known as: PHENERGAN  Take 1 tablet by mouth every 6 hours as needed for Nausea     scopolamine transdermal patch  Commonly known as: TRANSDERM-SCOP  Place 1 patch onto the skin every 72 hours     Ventolin  (90 Base) MCG/ACT inhaler  Generic drug: albuterol sulfate HFA  Inhale 2 puffs into the lungs 4 times daily as needed for Wheezing            ASK your doctor about these medications      HYDROcodone-acetaminophen 7.5-325 MG per tablet  Commonly known as: NORCO               Where to Get Your Medications        These medications were sent to [facility]/pharmacy #[id number] - [place], [place] - [address] - P [phone] - F [phone] [address] [facility] [address]      Phone: [phone]   amoxicillin-clavulanate 875-125 MG per tablet  guaiFENesin 600 MG extended release tablet  guaiFENesin-dextromethorphan 100-10 MG/5ML syrup  promethazine-codeine 6.25-10 MG/5ML syrup         Discharge Instructions:   Follow up with Tomi Teague MD in 7 days.  Take medications as directed.  Resume activity as tolerated.    Diet: ADULT DIET; Regular     Disposition: Patient is Stableand will be discharged to Home.    Time spent on discharge 35 minutes spent in assessing patient, reviewing medications, discussion with nursing, confirming safe discharge plan and preparation of discharge summary.    Signed:  BRITANY Cuellar - CNP, 4/9/2025 8:22 AM       EMR Dragon/Transcription disclaimer:   Much of this encounter note is an electronic transcription/translation of spoken language to printed text. The electronic translation of spoken language may permit erroneous, or at times, nonsensical words or phrases to be inadvertently transcribed; although attempts have made to review the note for such errors, some may still exist.

## 2025-04-09 NOTE — PROGRESS NOTES
ONCOLOGY PROGRESS NOTE     Patient:  Yandy Dickinson  YOB: 1965  Date of Service: 4/6/2025  MRN: [id number]   Primary Care Physician: Tomi Teague MD  Advance Directive: Full Code    Reason for Consult: Continuity of care    Requesting Physician: Minerva Conner MD      CHIEF COMPLAINT:    Chief Complaint   Patient presents with    Shortness of Breath    Fever    Vomiting     Taking chemo pill, has breast and lung cancer     Subjective:  Cough persists, still not feeling better, Tessalon Perles added during the night.  Discussed with patient and bedside RN      HISTORY OF PRESENT ILLNESS:    Yandy Dickinson is a very pleasant 60-year-old -American female with a diagnosis of stage IV ER/SD positive, HER2/bruce negative breast cancer with oligometastatic disease in the lungs.  She was treated with SBRT to the isolated RUL and LLL lung lesions 11/11/2024.  A suspicious lesion was found on the left iliac bone on PET scan 3/17/2025 at [facility].  An outpatient MRI of the pelvis at [facility] is pending to compare to the PET scan.  She continues on treatment with palliative Aromasin and palbociclib that was initiated in March 2024.    Yandy presented to the ED at [facility] on 4/4/2025 with increasing cough, shortness of breath, fever nausea and vomiting.  A respiratory PCR panel was positive for rhinovirus and metapneumovirus.    A lactic acid level of 2.0 with repeat of 1.7  A chest x-ray 4/4/2025 identified a left midlung infiltrative area consistent with pneumonia.  Blood cultures were drawn  She was admitted and placed on antibiotics with Maxipime and Zithromax.  Solu-Medrol also initiated.          Past Medical History:    Past Medical History:   Diagnosis Date    Asthma     Breast cancer     Breast cancer with lung mets     Chronic back pain     GERD (gastroesophageal reflux disease)     Hx of blood clots     Hypertension     Lung cancer 
                                                                            ONCOLOGY PROGRESS NOTE     Patient:  Yandy Dickinson  YOB: 1965  Date of Service: 4/8/2025  MRN: [id number]   Primary Care Physician: Tomi Teague MD      Subjective: Remains afebrile.  Reviewed interval notes. Still coughing an wheezing    HISTORY OF PRESENT ILLNESS:    Yandy Dickinson is a very pleasant 60-year-old -American female with a diagnosis of stage IV ER/MN positive, HER2/bruce negative breast cancer with oligometastatic disease in the lungs.  She was treated with SBRT to the isolated RUL and LLL lung lesions 11/11/2024.  A suspicious lesion was found on the left iliac bone on PET scan 3/17/2025 at [facility].  An outpatient MRI of the pelvis at [facility] is pending to compare to the PET scan.  She continues on treatment with palliative Aromasin and palbociclib that was initiated in March 2024.    Yandy presented to the ED at [facility] on 4/4/2025 with increasing cough, shortness of breath, fever nausea and vomiting.  A respiratory PCR panel was positive for rhinovirus and metapneumovirus.    A lactic acid level of 2.0 with repeat of 1.7  A chest x-ray 4/4/2025 identified a left midlung infiltrative area consistent with pneumonia.  Blood cultures were drawn  She was admitted and placed on antibiotics with Maxipime and Zithromax.  Solu-Medrol also initiated.      Past Medical History:    Past Medical History:   Diagnosis Date    Asthma     Breast cancer     Breast cancer with lung mets     Chronic back pain     GERD (gastroesophageal reflux disease)     Hx of blood clots     Hypertension     Lung cancer (HCC)     Neuropathy     Post chemo treatment neuropathy         Past Surgical History:    Past Surgical History:   Procedure Laterality Date    BREAST LUMPECTOMY Right     2006    BREAST RECONSTRUCTION Left 09/17/2021    Revision left reconstructed breast, implant performed by Alejandro Garsia MD at [facility] OR    BREAST RECONSTRUCTION 
  [facility]      Patient:  Yandy Dickinson  YOB: 1965  Date of Service: 4/8/2025  MRN: [id number]   Acct: [id number]   Primary Care Physician: Tomi Teague MD  Advance Directive: Full Code  Admit Date: 4/4/2025       Hospital Day: 3  Portions of this note have been copied forward, however, changed to reflect the most current clinical status of this patient.  CHIEF COMPLAINT shortness of breath    SUBJECTIVE: states minimal improvement of breathing    Cumulative hospital course   60-year-old with stage IV breast cancer with metastasis to lung, HTN, neuropathy, GERD, with complaints of shortness of breath.  Workup in ER CXR left midlung pneumonia, respiratory panel rhinovirus and human metapneumovirus initiated on cefepime and azithromycin.  She has had ongoing frequent nonproductive cough, currently tolerating chest percussion and Mucinex.  Adding Mucomyst 4/7, decreased IV steroids to daily and continuing to encourage I-S and Acapella use. Intermittent dry heaves Promethazine DM helping some per her report. Noted minimal improvement of breathing, has been having productive cough.   Objective:   VITALS:  /84   Pulse (!) 104   Temp 99.3 °F (37.4 °C)   Resp 16   Ht 1.676 m (5' 5.98\")   Wt 74.8 kg (164 lb 12.8 oz)   SpO2 96%   BMI 26.61 kg/m²   24HR INTAKE/OUTPUT:    Intake/Output Summary (Last 24 hours) at 4/8/2025 1046  Last data filed at 4/8/2025 1015  Gross per 24 hour   Intake 2103.8 ml   Output --   Net 2103.8 ml       Physical Exam  Vitals and nursing note reviewed.   Constitutional:       General: She is not in acute distress.     Appearance: Normal appearance. She is ill-appearing.   HENT:      Mouth/Throat:      Mouth: Mucous membranes are moist.      Pharynx: Oropharynx is clear.   Eyes:      Extraocular Movements: Extraocular movements intact.      Conjunctiva/sclera: Conjunctivae normal.      Pupils: Pupils are equal, round, and reactive to light.   Cardiovascular: 
  [facility]    Progress Note    Patient:  Yandy Dickinson  YOB: 1965  Date of Service: 4/6/2025  MRN: [id number]   Acct: [id number]   Primary Care Physician: Tomi Teague MD  Advance Directive: Full Code  Admit Date: 4/4/2025       Hospital Day: 1    Portions of this note have been copied forward, however, updated to reflect the most current clinical status of this patient.     CHIEF COMPLAINT: SOB    SUBJECTIVE: Ongoing cough, intermittent SOB    CUMULATIVE HOSPITAL COURSE:     This patient is a 60-year-old female with breast and lung malignancy followed by Dr. Jim, hypertension, neuropathy, GERD who presented to [facility] ED on 4/4 for evaluation of shortness of breath and vomiting despite completing course of steroids.  In ED, screened positive for sepsis with tachycardia, tachypnea, fever, and left midlung pneumonia on CXR.  Lactic 2.0 with repeat 1.7.  Also positive for rhinovirus and human metapneumovirus on PCR. Otherwise labwork is stable.  She received sepsis fluid bolus, cefepime, and Solu-Medrol in ED.  Admitted to hospitalist with consult oncology for further evaluation and management. Cefepime/azithromycin continues.  She continues to report productive cough. Added chest percussion and Mucinex. Promethazine DM added on 4/6 due to nausea and vomiting after her ongoing coughing fits. Otherwise lab work and vital signs stable. Continued expiratory wheezing.     Objective   VITALS: BP (!) 148/85   Pulse 96   Temp 98.4 °F (36.9 °C) (Oral)   Resp 20   Ht 1.676 m (5' 6\")   Wt 74.8 kg (165 lb)   SpO2 96%   BMI 26.63 kg/m²     24HR INTAKE/OUTPUT:   Intake/Output Summary (Last 24 hours) at 4/6/2025 1057  Last data filed at 4/5/2025 1824  Gross per 24 hour   Intake 720 ml   Output --   Net 720 ml     Physical Exam  Constitutional:       Appearance: Normal appearance. She is normal weight.   Cardiovascular:      Rate and Rhythm: Normal rate and regular rhythm.      Pulses: Normal 
  Pharmacy Adjustment per [facility] protocol    Yandy Dickinson is a 60 y.o. female. Pharmacy has adjusted medications per [facility] protocol.    Recent Labs     04/04/25  2216 04/05/25  0230   BUN 11 9       Recent Labs     04/04/25  2216 04/05/25  0230   CREATININE 0.7 0.6       Estimated Creatinine Clearance: 106 mL/min (based on SCr of 0.6 mg/dL).    Height:   Ht Readings from Last 1 Encounters:   04/04/25 1.676 m (5' 6\")     Weight:  Wt Readings from Last 1 Encounters:   04/05/25 74.8 kg (165 lb)    BMI:  BMI Readings from Last 1 Encounters:   04/05/25 26.63 kg/m²         Plan: Adjust the following medications based on [facility] protocol:           Cefepime to 2000 mg IV every 8 hours extended infusion over 240 minutes (loading dose given in ED just before midnight)      Electronically signed by Rosa Villarreal RPH on 4/5/2025 at 8:45 AM    
  [facility]    Progress Note    Patient:  Yandy Dickinson  YOB: 1965  Date of Service: 4/5/2025  MRN: [id number]   Acct: [id number]   Primary Care Physician: Tomi Teague MD  Advance Directive: Full Code  Admit Date: 4/4/2025       Hospital Day: 0    Portions of this note have been copied forward, however, updated to reflect the most current clinical status of this patient.     CHIEF COMPLAINT: SOB    SUBJECTIVE: Ongoing cough, intermittent SOB    CUMULATIVE HOSPITAL COURSE:     This patient is a 60-year-old female with breast and lung malignancy followed by Dr. Jim, hypertension, neuropathy, GERD who presented to [facility] ED on 4/4 for evaluation of shortness of breath and vomiting despite completing course of steroids.  In ED, screened positive for sepsis with tachycardia, tachypnea, fever, and LLL pneumonia on CXR.  Lactic 2.0 with repeat 1.7.  Also positive for rhinovirus and human metapneumovirus on PCR. Otherwise labwork is stable.  She received sepsis fluid bolus, cefepime, and Solu-Medrol in ED.  Admitted to hospitalist with consult oncology for further evaluation and management. Cefepime/azithromycin continues.  She continues to report productive cough, will add chest percussion and Mucinex.  Otherwise lab work and vital signs stable.    Objective   VITALS: /86   Pulse (!) 102   Temp 98.6 °F (37 °C)   Resp 16   Ht 1.676 m (5' 6\")   Wt 74.8 kg (165 lb)   SpO2 100%   BMI 26.63 kg/m²     24HR INTAKE/OUTPUT:   Intake/Output Summary (Last 24 hours) at 4/5/2025 1342  Last data filed at 4/5/2025 1001  Gross per 24 hour   Intake 240 ml   Output --   Net 240 ml     Physical Exam  Constitutional:       Appearance: Normal appearance. She is normal weight.   Cardiovascular:      Rate and Rhythm: Normal rate and regular rhythm.      Pulses: Normal pulses.   Pulmonary:      Effort: Pulmonary effort is normal.      Breath sounds: Wheezing (Expiratory, bilateral) present. 
4 Eyes Skin Assessment     NAME:  Yandy Dickinson  YOB: 1965  MEDICAL RECORD NUMBER:  [id number]    The patient is being assessed for  Admission    I agree that at least one RN has performed a thorough Head to Toe Skin Assessment on the patient. ALL assessment sites listed below have been assessed.      Areas assessed by both nurses:    Head, Face, Ears, Shoulders, Back, Chest, Arms, Elbows, Hands, Sacrum. Buttock, Coccyx, Ischium, Legs. Feet and Heels, and Under Medical Devices         Does the Patient have a Wound? No noted wound(s)       Jose Prevention initiated by RN: No  Wound Care Orders initiated by RN: No    Pressure Injury (Stage 3,4, Unstageable, DTI, NWPT, and Complex wounds) if present, place Wound referral order by RN under : No    New Ostomies, if present place, Ostomy referral order under : No     Nurse 1 eSignature: Electronically signed by Bk Ram RN on 4/5/25 at 3:36 AM CDT    **SHARE this note so that the co-signing nurse can place an eSignature**    Nurse 2 eSignature: {Esignature:[id number]}   
CLINICAL PHARMACY NOTE: MEDS TO BEDS    Total # of Prescriptions Filled: 1   The following medications were delivered to the patient:  Discharge Medication List as of 4/9/2025 10:16 AM        START taking these medications    Details   guaiFENesin (MUCINEX) 600 MG extended release tablet Take 1 tablet by mouth 2 times daily, Disp-60 tablet, R-0Normal      guaiFENesin-dextromethorphan (ROBITUSSIN DM) 100-10 MG/5ML syrup Take 5 mLs by mouth every 4 hours as needed for Cough, Disp-120 mL, R-0Normal      amoxicillin-clavulanate (AUGMENTIN) 875-125 MG per tablet Take 1 tablet by mouth 2 times daily for 2 days, Disp-4 tablet, R-0Normal         Promethazine-codeine 6.25-10 soln      Additional Documentation:  Patient picked up inside pharmacy. Paid with cash.   
Sent via perfect serve to Dr. Barger    4/5/25 9:57 AM   [phone] Hospital or Facility: [facility] From: Patricia Woodard RE: GENNARO HAWTHORNE 1965 RM: [phone] cancer pt Need Callback: NO CALLBACK REQ 3rd Floor - Med/Surg FYI  Unread   
MD Ady at [facility] OR    BREAST RECONSTRUCTION Bilateral 12/9/2022    CRESCENT LEFT MASTOPEXY, FAT GRAFTING RIGHT LATERAL NIPPLE & FAT GRAFTING INFERIOR LEFT MIDLINE performed by Alejandro Garsia MD at [facility] OR    BREAST RECONSTRUCTION N/A 4/30/2024    LEFT INFERIOR BREAST FAT GRAFTING performed by Alejandro Garsia MD at [facility] OR    BREAST SURGERY N/A 8/11/2023    FAT GRAFTING LEFT BREAST & REPOSITIONING RIGHT NIPPLE performed by Alejandro Garsia MD at [facility] OR    BRONCHOSCOPY Left 2/23/2023    BRONCHOSCOPY ADD ON COMPUTER ASSISTED ROBOTIC performed by Ashley Morrison MD at [facility] Endoscopy    BRONCHOSCOPY Left 1/4/2024    MONARCH BRONCHOSCOPY/TRANSBRONCHIAL NEEDLE BIOPSY performed by Ashley Morrison MD at [facility] Endoscopy    BRONCHOSCOPY  1/4/2024    BRONCHOSCOPY BIOPSY BRONCHUS performed by Ashley Morrison MD at [facility] Endoscopy    COSMETIC SURGERY  2016    Bilateral breast implants    EMBOLECTOMY Left 9/26/2021    LEFT UPPER EXTREMITY  MECHANICAL SUCTION THROMBECTOMY performed by Zully Tovar DO at [facility] OR    HYSTERECTOMY (CERVIX STATUS UNKNOWN)      HYSTERECTOMY, TOTAL ABDOMINAL (CERVIX REMOVED)  2015    MASTECTOMY, BILATERAL      Right 2013 & Left 2016    SCAR REVISION Left 5/10/2022    SCAR REVISION LEFT PORT SITE performed by Alejandro Garsia MD at [facility] OR    TUNNELED VENOUS PORT PLACEMENT      VASCULAR SURGERY N/A 10/6/2021    REMOVAL OF MEDIPORT performed by Zully Tovar DO at [facility] OR         Immunizations:      There is no immunization history on file for this patient.      Current Hospital Medications:    Current Facility-Administered Medications   Medication Dose Route Frequency Provider Last Rate Last Admin    methylPREDNISolone sodium succ (SOLU-MEDROL) 40 mg in sterile water 1 mL injection  40 mg IntraVENous Daily Minerva Conner MD   40 mg at 04/08/25 0749    acetylcysteine (MUCOMYST) 20 % solution 600 mg  600 mg Inhalation BID RT Jose Durán, APRN - CNP   600 mg at 
diet     Karina Noel, MS, RD, LD  Contact: [phone]    
sounds. No murmur heard.  Pulmonary:      Effort: Pulmonary effort is normal. No respiratory distress.      Breath sounds: Wheezing (throughout) and rhonchi (throughout) present.   Chest:      Chest wall: No tenderness.   Abdominal:      General: Bowel sounds are normal. There is no distension.      Palpations: Abdomen is soft.      Tenderness: There is no abdominal tenderness. There is no guarding or rebound.   Musculoskeletal:         General: No swelling. Normal range of motion.      Cervical back: Normal range of motion and neck supple. No rigidity or tenderness.      Right lower leg: No edema.      Left lower leg: No edema.   Skin:     General: Skin is warm and dry.      Capillary Refill: Capillary refill takes less than 2 seconds.   Neurological:      General: No focal deficit present.      Mental Status: She is alert and oriented to person, place, and time.      Cranial Nerves: No cranial nerve deficit.      Motor: No weakness.   Psychiatric:         Mood and Affect: Mood normal.         Behavior: Behavior normal.             Medications:      sodium chloride        [START ON 4/8/2025] methylPREDNISolone  40 mg IntraVENous Daily    acetylcysteine  600 mg Inhalation BID RT    sodium chloride flush  5-40 mL IntraVENous 2 times per day    enoxaparin  40 mg SubCUTAneous Daily    azithromycin  500 mg IntraVENous Q24H    ipratropium  0.5 mg Nebulization 4x Daily RT    exemestane  25 mg Oral Daily    gabapentin  600 mg Oral TID    budesonide  0.5 mg Nebulization BID RT    arformoterol tartrate  15 mcg Nebulization BID RT    cefepime  2,000 mg IntraVENous Q8H    guaiFENesin  600 mg Oral BID     guaiFENesin-dextromethorphan, promethazine-codeine, hydrALAZINE, famotidine, sodium chloride flush, sodium chloride, acetaminophen **OR** acetaminophen, albuterol, acetylcysteine, ondansetron, HYDROcodone-acetaminophen, hydrOXYzine HCl, benzonatate, albuterol  ADULT DIET; Regular     Lab and other Data:     Recent Labs     
outpatient MRI of the pelvis at [facility] is pending to compare to the PET scan.  She continues on treatment with palliative Aromasin and palbociclib that was initiated in March 2024.      #Left midlung zone pneumonia-viral pneumonia    Yandy presented to the ED at [facility] on 4/4/2025 with increasing cough, shortness of breath, fever nausea and vomiting.  A respiratory PCR panel was positive for rhinovirus and metapneumovirus.    A lactic acid level of 2.0 with repeat of 1.7  A chest x-ray 4/4/2025 identified a left midlung infiltrative area consistent with pneumonia.  Blood cultures were drawn  She was admitted and placed on antibiotics with Maxipime and Zithromax.  Solu-Medrol also initiated.  Tessalon Perles added during the night due to persistent cough    # Disposition-patient does not feel well this morning.  Patient does not feel safe to go home today.  She would like to stay another day      Kassi Jim MD    04/07/25  6:19 AM

## 2025-04-10 ENCOUNTER — CARE COORDINATION (OUTPATIENT)
Dept: CASE MANAGEMENT | Age: 60
End: 2025-04-10

## 2025-04-10 DIAGNOSIS — J18.9 SEPSIS DUE TO PNEUMONIA (HCC): Primary | ICD-10-CM

## 2025-04-10 DIAGNOSIS — A41.9 SEPSIS DUE TO PNEUMONIA (HCC): Primary | ICD-10-CM

## 2025-04-10 LAB
BACTERIA BLD CULT ORG #2: NORMAL
BACTERIA BLD CULT: NORMAL

## 2025-04-10 PROCEDURE — 1111F DSCHRG MED/CURRENT MED MERGE: CPT | Performed by: INTERNAL MEDICINE

## 2025-04-10 NOTE — CARE COORDINATION
follow up appointments. Patient has hospital follow up appointment scheduled within 7 days of discharge.   Future Appointments         Provider Specialty Dept Phone    4/15/2025 9:45 AM Precious Kearney APRN - CNP Pulmonology [phone]    4/15/2025 11:15 AM Tomi Tegaue MD Internal Medicine [phone]    4/22/2025 8:15 AM Kassi Jim MD Hematology and Oncology [phone]    4/22/2025 8:15 AM SCHEDULE, [facility] MED ONC Infusion Therapy [phone]    5/7/2025 1:00 PM Kamron Wilkinson PA-C General Surgery [phone]            Care Transition Nurse provided contact information.  Plan for follow-up call in 6-10 days based on severity of symptoms and risk factors.  Plan for next call: symptom management-respiratory status, HFU, med changes, sciatica any better.       Anuel Brar RN

## 2025-04-11 ENCOUNTER — APPOINTMENT (OUTPATIENT)
Dept: INFUSION THERAPY | Age: 60
End: 2025-04-11
Payer: MEDICARE

## 2025-04-15 ENCOUNTER — HOSPITAL ENCOUNTER (OUTPATIENT)
Dept: GENERAL RADIOLOGY | Age: 60
Discharge: HOME | End: 2025-04-15
Payer: MEDICARE

## 2025-04-15 ENCOUNTER — OFFICE VISIT (OUTPATIENT)
Dept: INTERNAL MEDICINE | Age: 60
End: 2025-04-15

## 2025-04-15 ENCOUNTER — OFFICE VISIT (OUTPATIENT)
Dept: PULMONOLOGY | Age: 60
End: 2025-04-15
Payer: MEDICARE

## 2025-04-15 VITALS
TEMPERATURE: 98 F | HEIGHT: 69 IN | OXYGEN SATURATION: 93 % | BODY MASS INDEX: 24.53 KG/M2 | DIASTOLIC BLOOD PRESSURE: 74 MMHG | HEART RATE: 99 BPM | WEIGHT: 165.6 LBS | SYSTOLIC BLOOD PRESSURE: 113 MMHG

## 2025-04-15 VITALS
OXYGEN SATURATION: 98 % | HEIGHT: 69 IN | DIASTOLIC BLOOD PRESSURE: 78 MMHG | BODY MASS INDEX: 24.59 KG/M2 | WEIGHT: 166 LBS | SYSTOLIC BLOOD PRESSURE: 138 MMHG | HEART RATE: 104 BPM

## 2025-04-15 DIAGNOSIS — C50.912 CARCINOMA OF LEFT BREAST METASTATIC TO LUNG: ICD-10-CM

## 2025-04-15 DIAGNOSIS — J18.9 PNEUMONIA OF LEFT LUNG DUE TO INFECTIOUS ORGANISM, UNSPECIFIED PART OF LUNG: ICD-10-CM

## 2025-04-15 DIAGNOSIS — C78.01 MALIGNANT NEOPLASM METASTATIC TO BOTH LUNGS (HCC): ICD-10-CM

## 2025-04-15 DIAGNOSIS — J18.9 SEPSIS DUE TO PNEUMONIA (HCC): ICD-10-CM

## 2025-04-15 DIAGNOSIS — L98.492 CHEST WALL ULCER, WITH FAT LAYER EXPOSED (HCC): ICD-10-CM

## 2025-04-15 DIAGNOSIS — M06.062 RHEUMATOID ARTHRITIS INVOLVING BOTH KNEES WITH NEGATIVE RHEUMATOID FACTOR (HCC): ICD-10-CM

## 2025-04-15 DIAGNOSIS — J41.1 BRONCHITIS, MUCOPURULENT RECURRENT (HCC): ICD-10-CM

## 2025-04-15 DIAGNOSIS — A41.9 SEPSIS DUE TO PNEUMONIA (HCC): ICD-10-CM

## 2025-04-15 DIAGNOSIS — C78.02 MALIGNANT NEOPLASM METASTATIC TO BOTH LUNGS (HCC): ICD-10-CM

## 2025-04-15 DIAGNOSIS — J70.0 RADIATION PNEUMONITIS: Primary | ICD-10-CM

## 2025-04-15 DIAGNOSIS — J70.0 POST-RADIATION PNEUMONITIS: ICD-10-CM

## 2025-04-15 DIAGNOSIS — R06.2 WHEEZING: ICD-10-CM

## 2025-04-15 DIAGNOSIS — M06.061 RHEUMATOID ARTHRITIS INVOLVING BOTH KNEES WITH NEGATIVE RHEUMATOID FACTOR (HCC): ICD-10-CM

## 2025-04-15 DIAGNOSIS — C78.02 CARCINOMA OF LEFT BREAST METASTATIC TO LUNG: ICD-10-CM

## 2025-04-15 DIAGNOSIS — J70.0 POST-RADIATION PNEUMONITIS: Primary | ICD-10-CM

## 2025-04-15 PROCEDURE — 1036F TOBACCO NON-USER: CPT | Performed by: NURSE PRACTITIONER

## 2025-04-15 PROCEDURE — G8420 CALC BMI NORM PARAMETERS: HCPCS | Performed by: NURSE PRACTITIONER

## 2025-04-15 PROCEDURE — 71046 X-RAY EXAM CHEST 2 VIEWS: CPT

## 2025-04-15 PROCEDURE — 1111F DSCHRG MED/CURRENT MED MERGE: CPT | Performed by: NURSE PRACTITIONER

## 2025-04-15 PROCEDURE — 3017F COLORECTAL CA SCREEN DOC REV: CPT | Performed by: NURSE PRACTITIONER

## 2025-04-15 PROCEDURE — 99214 OFFICE O/P EST MOD 30 MIN: CPT | Performed by: NURSE PRACTITIONER

## 2025-04-15 PROCEDURE — G8427 DOCREV CUR MEDS BY ELIG CLIN: HCPCS | Performed by: NURSE PRACTITIONER

## 2025-04-15 RX ORDER — BUDESONIDE, GLYCOPYRROLATE, AND FORMOTEROL FUMARATE 160; 9; 4.8 UG/1; UG/1; UG/1
2 AEROSOL, METERED RESPIRATORY (INHALATION) 2 TIMES DAILY
Qty: 1 EACH | Refills: 11 | Status: SHIPPED | OUTPATIENT
Start: 2025-04-15

## 2025-04-15 RX ORDER — PREDNISONE 10 MG/1
TABLET ORAL
Qty: 35 TABLET | Refills: 0 | Status: SHIPPED | OUTPATIENT
Start: 2025-04-15

## 2025-04-15 SDOH — ECONOMIC STABILITY: FOOD INSECURITY: WITHIN THE PAST 12 MONTHS, THE FOOD YOU BOUGHT JUST DIDN'T LAST AND YOU DIDN'T HAVE MONEY TO GET MORE.: NEVER TRUE

## 2025-04-15 SDOH — ECONOMIC STABILITY: FOOD INSECURITY: WITHIN THE PAST 12 MONTHS, YOU WORRIED THAT YOUR FOOD WOULD RUN OUT BEFORE YOU GOT MONEY TO BUY MORE.: NEVER TRUE

## 2025-04-15 ASSESSMENT — ENCOUNTER SYMPTOMS
BACK PAIN: 0
NAUSEA: 0
COUGH: 1
SHORTNESS OF BREATH: 1
SINUS PRESSURE: 0
EYE DISCHARGE: 0
ABDOMINAL DISTENTION: 0
EYES NEGATIVE: 1
SORE THROAT: 0
TROUBLE SWALLOWING: 0
WHEEZING: 1
WHEEZING: 0
ABDOMINAL PAIN: 0
COUGH: 1
BLOOD IN STOOL: 0
GASTROINTESTINAL NEGATIVE: 1
ALLERGIC/IMMUNOLOGIC NEGATIVE: 1
SHORTNESS OF BREATH: 1
EYE ITCHING: 0
VOMITING: 0

## 2025-04-15 NOTE — PROGRESS NOTES
Post-Discharge Transitional Care Management Services      Yandy Dickinson   YOB: 1965    Date of Visit:  4/15/2025  30 Day Post-Discharge Date: 5/9/2025    Allergies   Allergen Reactions    Clindamycin/Lincomycin Hives, Itching and Rash     Outpatient Medications Marked as Taking for the 4/15/25 encounter (Office Visit) with Tomi Teague MD   Medication Sig Dispense Refill    guaiFENesin (MUCINEX) 600 MG extended release tablet Take 1 tablet by mouth 2 times daily 60 tablet 0    guaiFENesin-dextromethorphan (ROBITUSSIN DM) 100-10 MG/5ML syrup Take 5 mLs by mouth every 4 hours as needed for Cough 120 mL 0    predniSONE (DELTASONE) 10 MG tablet 40 mg a day and taper by 10 mg every third day to off 35 tablet 0    Budeson-Glycopyrrol-Formoterol (BREZTRI AEROSPHERE) 160-9-4.8 MCG/ACT AERO Inhale 2 puffs into the lungs 2 times daily 1 each 11    ipratropium 0.5 mg-albuterol 2.5 mg (DUONEB) 0.5-2.5 (3) MG/3ML SOLN nebulizer solution Inhale 3 mLs into the lungs every 4 hours 180 mL 5    VENTOLIN  (90 Base) MCG/ACT inhaler Inhale 2 puffs into the lungs 4 times daily as needed for Wheezing 18 g 5    palbociclib (IBRANCE) 100 MG capsule TAKE ONE CAPSULE BY MOUTH ONCE A DAY ON DAYS 1-21 FOLLOWED BY 7 DAYS OFF FOR A 28 DAY CYCLE 21 capsule 5    scopolamine (TRANSDERM-SCOP) transdermal patch Place 1 patch onto the skin every 72 hours 4 patch 0    exemestane (AROMASIN) 25 MG tablet TAKE 1 TABLET BY MOUTH EVERY DAY 90 tablet 1    promethazine (PHENERGAN) 25 MG tablet Take 1 tablet by mouth every 6 hours as needed for Nausea 100 tablet 5    Respiratory Therapy Supplies (NEBULIZER/TUBING/MOUTHPIECE) KIT 1 kit by Does not apply route daily as needed (wheezing) 1 kit 2    HYDROcodone-acetaminophen (NORCO) 7.5-325 MG per tablet Take 1 tablet by mouth every 6 hours as needed for Pain.      hydrOXYzine HCl (ATARAX) 10 MG tablet Take 1 tablet by mouth 3 times daily as needed for Itching      cyclobenzaprine

## 2025-04-15 NOTE — PROGRESS NOTES
Pulmonary and Sleep Medicine    Yandy Dickinson (:  1965) is a 60 y.o. female,Established patient, here for evaluation of the following chief complaint(s):  Follow-up (2 Week F/U Radiation pneumonitis/CXR  )      Referring physician:  No referring provider defined for this encounter.     ASSESSMENT/PLAN:  1. Post-radiation pneumonitis  -     Budeson-Glycopyrrol-Formoterol (BREZTRI AEROSPHERE) 160-9-4.8 MCG/ACT AERO; Inhale 2 puffs into the lungs 2 times daily, Disp-1 each, R-11Normal        ***       No follow-ups on file.    SUBJECTIVE/OBJECTIVE:        HPI    She tapering steroids and completed antibiotics.     Continue the following medications as reported by the patient:    Prior to Visit Medications    Medication Sig Taking? Authorizing Provider   guaiFENesin (MUCINEX) 600 MG extended release tablet Take 1 tablet by mouth 2 times daily Yes Jose Durán, APRN - CNP   guaiFENesin-dextromethorphan (ROBITUSSIN DM) 100-10 MG/5ML syrup Take 5 mLs by mouth every 4 hours as needed for Cough Yes Jose Durán, APRN - CNP   predniSONE (DELTASONE) 10 MG tablet 40 mg a day and taper by 10 mg every third day to off Yes Ashley Morrison MD   Budeson-Glycopyrrol-Formoterol (BREZTRI AEROSPHERE) 160-9-4.8 MCG/ACT AERO Inhale 2 puffs into the lungs 2 times daily Yes Ashley Morrison MD   ipratropium 0.5 mg-albuterol 2.5 mg (DUONEB) 0.5-2.5 (3) MG/3ML SOLN nebulizer solution Inhale 3 mLs into the lungs every 4 hours Yes Ashley Morrison MD   VENTOLIN  (90 Base) MCG/ACT inhaler Inhale 2 puffs into the lungs 4 times daily as needed for Wheezing Yes Ashley Morrison MD   palbociclib (IBRANCE) 100 MG capsule TAKE ONE CAPSULE BY MOUTH ONCE A DAY ON DAYS 1-21 FOLLOWED BY 7 DAYS OFF FOR A 28 DAY CYCLE Yes Kassi Jim MD   scopolamine (TRANSDERM-SCOP) transdermal patch Place 1 patch onto the skin every 72 hours Yes Tomi Teague MD   exemestane (AROMASIN) 25 MG

## 2025-04-15 NOTE — PROGRESS NOTES
Pulmonary and Sleep Medicine    Yandy Dickinson (:  1965) is a 60 y.o. female,Established patient, here for evaluation of the following chief complaint(s):  Follow-up (2 Week F/U Radiation pneumonitis/CXR  )      Referring physician:  No referring provider defined for this encounter.     ASSESSMENT/PLAN:  1. Post-radiation pneumonitis  -     Budeson-Glycopyrrol-Formoterol (BREZTRI AEROSPHERE) 160-9-4.8 MCG/ACT AERO; Inhale 2 puffs into the lungs 2 times daily, Disp-1 each, R-11Normal  -     predniSONE (DELTASONE) 10 MG tablet; 40 mg a day and taper by 10 mg every third day to off, Disp-35 tablet, R-0Normal  2. Malignant neoplasm metastatic to both lungs (HCC)  3. Carcinoma of left breast metastatic to lung  4. Wheezing  5. Pneumonia of left lung due to infectious organism, unspecified part of lung        Breztri sample given, sent new RX. Repeat CXR today. Continue prednisone, follow up in 2 weeks.        Return in about 2 weeks (around 2025).    SUBJECTIVE/OBJECTIVE:        HPI    Patient presents for follow up for post radiation pneumonitis and pneumonia. She was admitted from ED on  after presenting with shortness of breath. Respiratory viral panel was positive for Rhinovirus and Human Metapneumovirus. She was admitted and treated with antibiotics and steroids. She completed antibiotics 2 days ago and continues steroid taper. She will complete this in 6 days. She continues to have wheezing. She is using albuterol rescue inhaler and was unable to get Breztri.     Continue the following medications as reported by the patient:    Prior to Visit Medications    Medication Sig Taking? Authorizing Provider   Budeson-Glycopyrrol-Formoterol (BREZTRI AEROSPHERE) 160-9-4.8 MCG/ACT AERO Inhale 2 puffs into the lungs 2 times daily Yes Precious Kearney APRN - CNP   predniSONE (DELTASONE) 10 MG tablet 40 mg a day and taper by 10 mg every third day to off Yes Precious Kearney APRN - CNP   guaiFENesin

## 2025-04-17 ENCOUNTER — TELEPHONE (OUTPATIENT)
Dept: PULMONOLOGY | Age: 60
End: 2025-04-17

## 2025-04-17 ENCOUNTER — CARE COORDINATION (OUTPATIENT)
Dept: CASE MANAGEMENT | Age: 60
End: 2025-04-17

## 2025-04-17 ENCOUNTER — TELEPHONE (OUTPATIENT)
Dept: HEMATOLOGY | Age: 60
End: 2025-04-17

## 2025-04-17 DIAGNOSIS — C78.02 MALIGNANT NEOPLASM METASTATIC TO BOTH LUNGS (HCC): ICD-10-CM

## 2025-04-17 DIAGNOSIS — C78.01 MALIGNANT NEOPLASM METASTATIC TO BOTH LUNGS (HCC): ICD-10-CM

## 2025-04-17 DIAGNOSIS — J18.9 PNEUMONIA OF LEFT LUNG DUE TO INFECTIOUS ORGANISM, UNSPECIFIED PART OF LUNG: ICD-10-CM

## 2025-04-17 DIAGNOSIS — J70.0 POST-RADIATION PNEUMONITIS: Primary | ICD-10-CM

## 2025-04-17 NOTE — TELEPHONE ENCOUNTER
----- Message from BRITANY Gaytan - CNP sent at 4/17/2025  9:07 AM CDT -----  Regarding: CT/schedule  Please let patient know: X-ray shows improvement however area of concern has not completely cleared. I would like her to have Chest CT in 2 weeks - please move appointment after scan.     I sent in prednisone for her to use if wheezing/shortness of breath worsens when she completes current course of steroids. She does not have to start this if she is feeling better at that time.     Thank you!

## 2025-04-17 NOTE — CARE COORDINATION
Care Transitions Note    Follow Up Call     Attempted to reach patient for transitions of care follow up.  Unable to reach patient.      Outreach Attempts:   Multiple attempts to contact patient at phone numbers on file.     Care Summary Note: Attempted to make contact with Yandy for CT f/u call without success.  Unable to leave a message regarding intent of call and call back information.  Will try again at a later time.      Follow Up Appointment:   Future Appointments         Provider Specialty Dept Phone    4/22/2025 8:15 AM Kassi Jim MD Hematology and Oncology [phone]    4/22/2025 8:15 AM SCHEDULE, [facility] MED ONC Infusion Therapy [phone]    4/22/2025 2:45 PM Ashley Morrison MD Pulmonology [phone]    5/2/2025 3:30 PM (Arrive by 3:00 PM) [facility] CT RM 2 Radiology [phone]    5/7/2025 1:00 PM Kamron Wilkinson PA-C General Surgery [phone]            Plan for follow-up call in 6-10 days based on severity of symptoms and risk factors. Plan for next call: symptom management-JAMES Brar RN

## 2025-04-17 NOTE — TELEPHONE ENCOUNTER
I called patient and left detailed voicemail about their appointment on 04/22/2025. I made patient aware not to arrive any earlier than the appointment time and to come at the time of the follow up not the time of the lab appointment if it is different than the follow up appt time. I also made patient aware to eat and drink plenty of water to hydrate properly before coming to these appointments because this will make their lab draw much easier. Made patient aware that we are now located at the [facility] at [address] [place] [address]. Located between [facility] and the [facility].

## 2025-04-21 ENCOUNTER — HOSPITAL ENCOUNTER (OUTPATIENT)
Dept: MRI IMAGING | Facility: HOSPITAL | Age: 60
Discharge: HOME | End: 2025-04-21
Admitting: INTERNAL MEDICINE
Payer: MEDICARE

## 2025-04-21 DIAGNOSIS — C50.919 CARCINOMA OF BREAST METASTATIC TO LUNG, UNSPECIFIED LATERALITY (HCC): ICD-10-CM

## 2025-04-21 DIAGNOSIS — M89.9 BONE LESION: ICD-10-CM

## 2025-04-21 DIAGNOSIS — C78.00 CARCINOMA OF BREAST METASTATIC TO LUNG, UNSPECIFIED LATERALITY (HCC): ICD-10-CM

## 2025-04-21 DIAGNOSIS — C50.011 CARCINOMA OF AREOLA OF RIGHT BREAST IN FEMALE, UNSPECIFIED ESTROGEN RECEPTOR STATUS (HCC): Primary | ICD-10-CM

## 2025-04-21 PROCEDURE — 25510000001 GADOPICLENOL 0.5 MMOL/ML SOLUTION: Performed by: INTERNAL MEDICINE

## 2025-04-21 PROCEDURE — A9579 GAD-BASE MR CONTRAST NOS,1ML: HCPCS | Performed by: INTERNAL MEDICINE

## 2025-04-21 PROCEDURE — 72197 MRI PELVIS W/O & W/DYE: CPT

## 2025-04-21 RX ADMIN — GADOPICLENOL 7.5 ML: 485.1 INJECTION INTRAVENOUS at 14:00

## 2025-04-21 NOTE — PROGRESS NOTES
metastatic breast cancer (pulmonary metastasis)  Yandy Dickinson is a very pleasant 60-year-old -American female with a diagnosis of stage IV ER/IA positive, HER2/bruce negative breast cancer with oligometastatic disease in the lungs.  She was treated with SBRT to the isolated RUL and LLL lung lesions 11/11/2024.  A suspicious lesion was found on the left iliac bone on PET scan 3/17/2025 at [facility].   She continues on treatment with palliative Aromasin and palbociclib that was initiated in March 2024.    4/21/25 MRI Pelvis W WO Contrast ([facility]) There is abnormal increased T2 and diminished T1 signal in the proximal RIGHT femur. I suspect an area of osteonecrosis in the anterior aspect of the femoral head. The marrow signal changes are fairly diffuse in the proximal RIGHT femur rather than a focal lesion. This amount of edema could be related to ongoing osteonecrosis in the femoral head. The area of interest previously seen in the LEFT iliac wing is small and very difficult to delineate on this exam. This is at the cephalad cortical margin along the cortex and remains indeterminate. This area is high on T2 and low on T1 in regards to signal and measures approximately 1.3 x 0.9 x 1.0 cm.  At the area of increased uptake on the PET/CT at the cephalad aspect of the LEFT iliac wing, there is a small area of increased T2 signal at the insertion site of the quadratus lumborum muscle. Subtle focal increased T2 signal at this location with mild diminished T1 signal in the cortex but no true destructive bony lesion identified. This may represent an area of enthesitis at the muscular insertion; however, a very subtle/early metastatic lesion is not entirely excluded but felt to be less likely. Short-term follow-up is recommended in 3 months.  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

## 2025-04-22 ENCOUNTER — OFFICE VISIT (OUTPATIENT)
Dept: HEMATOLOGY | Age: 60
End: 2025-04-22
Payer: MEDICARE

## 2025-04-22 ENCOUNTER — HOSPITAL ENCOUNTER (OUTPATIENT)
Dept: INFUSION THERAPY | Age: 60
Discharge: HOME | End: 2025-04-22
Payer: MEDICARE

## 2025-04-22 VITALS
HEART RATE: 86 BPM | TEMPERATURE: 97.8 F | BODY MASS INDEX: 24.35 KG/M2 | RESPIRATION RATE: 16 BRPM | HEIGHT: 69 IN | OXYGEN SATURATION: 96 % | WEIGHT: 164.4 LBS | SYSTOLIC BLOOD PRESSURE: 154 MMHG | DIASTOLIC BLOOD PRESSURE: 85 MMHG

## 2025-04-22 DIAGNOSIS — C50.011 CARCINOMA OF AREOLA OF RIGHT BREAST IN FEMALE, UNSPECIFIED ESTROGEN RECEPTOR STATUS (HCC): ICD-10-CM

## 2025-04-22 DIAGNOSIS — Z51.81 ENCOUNTER FOR MONITORING AROMATASE INHIBITOR THERAPY: ICD-10-CM

## 2025-04-22 DIAGNOSIS — T45.1X5A CHEMOTHERAPY-INDUCED NEUROPATHY: ICD-10-CM

## 2025-04-22 DIAGNOSIS — Z79.811 ENCOUNTER FOR MONITORING AROMATASE INHIBITOR THERAPY: ICD-10-CM

## 2025-04-22 DIAGNOSIS — C50.919 CARCINOMA OF BREAST METASTATIC TO LUNG, UNSPECIFIED LATERALITY (HCC): Primary | ICD-10-CM

## 2025-04-22 DIAGNOSIS — D50.8 OTHER IRON DEFICIENCY ANEMIA: Primary | ICD-10-CM

## 2025-04-22 DIAGNOSIS — G62.0 CHEMOTHERAPY-INDUCED NEUROPATHY: ICD-10-CM

## 2025-04-22 DIAGNOSIS — C78.00 CARCINOMA OF BREAST METASTATIC TO LUNG, UNSPECIFIED LATERALITY (HCC): Primary | ICD-10-CM

## 2025-04-22 DIAGNOSIS — E53.8 VITAMIN B 12 DEFICIENCY: ICD-10-CM

## 2025-04-22 DIAGNOSIS — I82.722: ICD-10-CM

## 2025-04-22 DIAGNOSIS — C50.919 CARCINOMA OF BREAST METASTATIC TO LUNG, UNSPECIFIED LATERALITY (HCC): ICD-10-CM

## 2025-04-22 DIAGNOSIS — Z71.89 CARE PLAN DISCUSSED WITH PATIENT: ICD-10-CM

## 2025-04-22 DIAGNOSIS — C78.00 CARCINOMA OF BREAST METASTATIC TO LUNG, UNSPECIFIED LATERALITY (HCC): ICD-10-CM

## 2025-04-22 LAB
ALBUMIN SERPL-MCNC: 4.1 G/DL (ref 3.5–5.2)
ALP SERPL-CCNC: 95 U/L (ref 35–104)
ALT SERPL-CCNC: 7 U/L (ref 5–33)
ANION GAP SERPL CALCULATED.3IONS-SCNC: 11 MMOL/L (ref 7–19)
AST SERPL-CCNC: 17 U/L (ref 5–32)
BASOPHILS # BLD: 0.03 K/UL (ref 0–0.2)
BASOPHILS NFR BLD: 0.7 % (ref 0–1)
BILIRUB SERPL-MCNC: 0.7 MG/DL (ref 0–1.2)
BUN SERPL-MCNC: 10 MG/DL (ref 6–20)
CA 15-3: 20 U/ML (ref 0–25)
CALCIUM SERPL-MCNC: 8.6 MG/DL (ref 8.6–10)
CEA SERPL-MCNC: 2.3 NG/ML (ref 0–4.7)
CHLORIDE SERPL-SCNC: 107 MMOL/L (ref 98–107)
CO2 SERPL-SCNC: 24 MMOL/L (ref 22–29)
CREAT SERPL-MCNC: 0.8 MG/DL (ref 0.5–0.9)
EOSINOPHIL # BLD: 0.01 K/UL (ref 0–0.6)
EOSINOPHIL NFR BLD: 0.2 % (ref 0–5)
ERYTHROCYTE [DISTWIDTH] IN BLOOD BY AUTOMATED COUNT: 14 % (ref 11.5–14.5)
GLUCOSE SERPL-MCNC: 102 MG/DL (ref 70–99)
HCT VFR BLD AUTO: 37.2 % (ref 37–47)
HGB BLD-MCNC: 12 G/DL (ref 12–16)
LYMPHOCYTES # BLD: 0.98 K/UL (ref 1.1–4.5)
LYMPHOCYTES NFR BLD: 22.9 % (ref 20–40)
MCH RBC QN AUTO: 29.6 PG (ref 27–31)
MCHC RBC AUTO-ENTMCNC: 32.3 G/DL (ref 33–37)
MCV RBC AUTO: 91.6 FL (ref 81–99)
MONOCYTES # BLD: 0.25 K/UL (ref 0–0.9)
MONOCYTES NFR BLD: 5.8 % (ref 1–10)
NEUTROPHILS # BLD: 3.01 K/UL (ref 1.5–7.5)
NEUTS SEG NFR BLD: 70.4 % (ref 50–65)
PLATELET # BLD AUTO: 305 K/UL (ref 130–400)
PMV BLD AUTO: 8.2 FL (ref 9.4–12.3)
POTASSIUM SERPL-SCNC: 4.1 MMOL/L (ref 3.5–5.1)
PROT SERPL-MCNC: 7 G/DL (ref 6.4–8.3)
RBC # BLD AUTO: 4.06 M/UL (ref 4.2–5.4)
SODIUM SERPL-SCNC: 142 MMOL/L (ref 136–145)
WBC # BLD AUTO: 4.28 K/UL (ref 4.8–10.8)

## 2025-04-22 PROCEDURE — 1111F DSCHRG MED/CURRENT MED MERGE: CPT | Performed by: INTERNAL MEDICINE

## 2025-04-22 PROCEDURE — G8427 DOCREV CUR MEDS BY ELIG CLIN: HCPCS | Performed by: INTERNAL MEDICINE

## 2025-04-22 PROCEDURE — G8420 CALC BMI NORM PARAMETERS: HCPCS | Performed by: INTERNAL MEDICINE

## 2025-04-22 PROCEDURE — G2211 COMPLEX E/M VISIT ADD ON: HCPCS | Performed by: INTERNAL MEDICINE

## 2025-04-22 PROCEDURE — 85025 COMPLETE CBC W/AUTO DIFF WBC: CPT

## 2025-04-22 PROCEDURE — 99214 OFFICE O/P EST MOD 30 MIN: CPT | Performed by: INTERNAL MEDICINE

## 2025-04-22 PROCEDURE — 1036F TOBACCO NON-USER: CPT | Performed by: INTERNAL MEDICINE

## 2025-04-22 PROCEDURE — 99213 OFFICE O/P EST LOW 20 MIN: CPT

## 2025-04-22 PROCEDURE — 3017F COLORECTAL CA SCREEN DOC REV: CPT | Performed by: INTERNAL MEDICINE

## 2025-04-22 PROCEDURE — 82378 CARCINOEMBRYONIC ANTIGEN: CPT

## 2025-04-22 PROCEDURE — 86300 IMMUNOASSAY TUMOR CA 15-3: CPT

## 2025-04-22 PROCEDURE — 36415 COLL VENOUS BLD VENIPUNCTURE: CPT

## 2025-04-22 PROCEDURE — 6360000002 HC RX W HCPCS: Performed by: INTERNAL MEDICINE

## 2025-04-22 PROCEDURE — 80053 COMPREHEN METABOLIC PANEL: CPT

## 2025-04-22 PROCEDURE — 96372 THER/PROPH/DIAG INJ SC/IM: CPT

## 2025-04-22 RX ORDER — CYANOCOBALAMIN 1000 UG/ML
1000 INJECTION, SOLUTION INTRAMUSCULAR; SUBCUTANEOUS ONCE
Status: COMPLETED | OUTPATIENT
Start: 2025-04-22 | End: 2025-04-22

## 2025-04-22 RX ORDER — GABAPENTIN 300 MG/1
600 CAPSULE ORAL 3 TIMES DAILY
Qty: 180 CAPSULE | Refills: 0 | Status: SHIPPED | OUTPATIENT
Start: 2025-04-22 | End: 2025-05-22

## 2025-04-22 RX ORDER — CYANOCOBALAMIN 1000 UG/ML
1000 INJECTION, SOLUTION INTRAMUSCULAR; SUBCUTANEOUS ONCE
Status: CANCELLED | OUTPATIENT
Start: 2025-04-22 | End: 2025-04-22

## 2025-04-22 RX ADMIN — CYANOCOBALAMIN 1000 MCG: 1000 INJECTION, SOLUTION INTRAMUSCULAR; SUBCUTANEOUS at 08:39

## 2025-04-23 LAB — CANCER AG27-29 SERPL-ACNC: 40.3 U/ML

## 2025-04-24 ENCOUNTER — CARE COORDINATION (OUTPATIENT)
Dept: CASE MANAGEMENT | Age: 60
End: 2025-04-24

## 2025-04-24 NOTE — CARE COORDINATION
Care Transitions Note    Follow Up Call     Reason for Admission: PNA/Sepsis     Attempted to reach patient for transitions of care follow up.  Unable to reach patient.      Outreach Attempts:   HIPAA compliant voicemail left for patient.     Follow Up Appointment:   Future Appointments         Provider Specialty Dept Phone    5/2/2025 3:30 PM (Arrive by 3:00 PM) [facility] CT  2 Radiology [phone]    5/7/2025 1:00 PM Kamron Wilkinson PA-C General Surgery [phone]    5/8/2025 1:15 PM Ashley Morrison MD Pulmonology [phone]    5/20/2025 9:30 AM Kassi Jim MD Hematology and Oncology [phone]    5/20/2025 9:30 AM SCHEDULE, [facility] MED ONC [place] Infusion Therapy [phone]            Plan for follow-up call in 2-5 days based on severity of symptoms and risk factors. Plan for next call: symptom management-.  self management-.    Stephanie Aparicio LPN

## 2025-04-28 ENCOUNTER — CARE COORDINATION (OUTPATIENT)
Dept: CASE MANAGEMENT | Age: 60
End: 2025-04-28

## 2025-04-28 NOTE — CARE COORDINATION
Care Transitions Note    Final Call     Attempted to reach patient for transitions of care follow up.  Unable to reach patient.      Outreach Attempts:   Multiple attempts to contact patient at phone numbers on file.     Patient closed (unable to reach patient) from the Care Transitions program on today.      Handoff:   Patient was not referred to the ACM team due to unable to contact patient.      Care Summary Note: Attempt #3 to make contact with Yandy for a f/u without success.  Unable to leave a message regarding intent of call and call back information.  Will discharge from CTN services at this time due to inability to make contact.      Assessments:  Care Transitions Subsequent and Final Call    Subsequent and Final Calls  Care Transitions Interventions  Other Interventions:              Upcoming Appointments:    Future Appointments         Provider Specialty Dept Phone    5/6/2025 9:30 AM (Arrive by 9:00 AM) [facility] CT RM 2 Radiology [phone]    5/7/2025 1:00 PM Kamron Wilkinson PA-C General Surgery [phone]    5/8/2025 1:15 PM Ashley Morrison MD Pulmonology [phone]    5/20/2025 9:30 AM Kassi Jim MD Hematology and Oncology [phone]    5/20/2025 9:30 AM SCHEDULE, [facility] MED ONC [place] Infusion Therapy [phone]            Anuel Brar RN

## 2025-05-06 ENCOUNTER — HOSPITAL ENCOUNTER (OUTPATIENT)
Dept: CT IMAGING | Age: 60
Discharge: HOME | End: 2025-05-06
Payer: MEDICARE

## 2025-05-06 DIAGNOSIS — C78.02 MALIGNANT NEOPLASM METASTATIC TO BOTH LUNGS (HCC): ICD-10-CM

## 2025-05-06 DIAGNOSIS — J70.0 POST-RADIATION PNEUMONITIS: ICD-10-CM

## 2025-05-06 DIAGNOSIS — C78.01 MALIGNANT NEOPLASM METASTATIC TO BOTH LUNGS (HCC): ICD-10-CM

## 2025-05-06 DIAGNOSIS — J18.9 PNEUMONIA OF LEFT LUNG DUE TO INFECTIOUS ORGANISM, UNSPECIFIED PART OF LUNG: ICD-10-CM

## 2025-05-06 PROCEDURE — 71250 CT THORAX DX C-: CPT

## 2025-05-07 ENCOUNTER — OFFICE VISIT (OUTPATIENT)
Dept: SURGERY | Age: 60
End: 2025-05-07
Payer: MEDICARE

## 2025-05-07 VITALS — HEART RATE: 91 BPM | WEIGHT: 164 LBS | HEIGHT: 66 IN | OXYGEN SATURATION: 96 % | BODY MASS INDEX: 26.36 KG/M2

## 2025-05-07 DIAGNOSIS — Z98.890 STATUS POST BILATERAL BREAST RECONSTRUCTION: Primary | ICD-10-CM

## 2025-05-07 PROCEDURE — 3017F COLORECTAL CA SCREEN DOC REV: CPT | Performed by: PHYSICIAN ASSISTANT

## 2025-05-07 PROCEDURE — 99213 OFFICE O/P EST LOW 20 MIN: CPT | Performed by: PHYSICIAN ASSISTANT

## 2025-05-07 PROCEDURE — 1036F TOBACCO NON-USER: CPT | Performed by: PHYSICIAN ASSISTANT

## 2025-05-07 PROCEDURE — G8427 DOCREV CUR MEDS BY ELIG CLIN: HCPCS | Performed by: PHYSICIAN ASSISTANT

## 2025-05-07 PROCEDURE — 1111F DSCHRG MED/CURRENT MED MERGE: CPT | Performed by: PHYSICIAN ASSISTANT

## 2025-05-07 PROCEDURE — G8417 CALC BMI ABV UP PARAM F/U: HCPCS | Performed by: PHYSICIAN ASSISTANT

## 2025-05-07 NOTE — PROGRESS NOTES
HISTORY OF PRESENT ILLNESS:  Ms. Dickinson is a 60 y.o.  female who presents today for exam following Left breast fat grafting on 4/30/2024.    She is status post exploration repositioning of right nipple with Bilateral fat grafting on 8/11/2023.     She has now developed pulmonary mets.  She is on Ibrance and exemestane.  She is anticipating stereotactic radiation.    We should not spend too much time and energy on fat grafting and her implants at present.  I think she should focus much more on her current disease and we will discuss further interventions in about 6 months if she is doing well.    She is now reasonably happy with her right nipple.  She would like some more fullness in the inferior left breast.  She has gotten very good take on her prior fat grafting but it clearly could use some more.    This is following a left mastectomy reconstruction due to lateral drift on 9/17/2021.      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  It was ER/VA positive and she underwent neoadjuvant chemotherapy with AC followed by Taxol.  In 2007 she underwent a right lumpectomy with axillary node dissection and received adjuvant radiation therapy to the breast and axilla.  She did not take antihormonal therapy due to cost.    She recurred in 2013 and underwent 2 cycles of Taxotere followed by Doxil and then underwent right mastectomy and axillary node dissection.  This was followed by completion of radiation therapy to her chest wall and axillary nodes.    In 2016 she underwent implant exchange and a prophylactic mastectomy on the left.  She was also switched to aromatase inhibitor.    She subsequent developed metastatic disease with lung, hilar, and axillary metastases and has been on Abraxane, Gemzar, and is now doing monthly Faslodex with palbociclib    She originally had Schwertner implants.  590 cc on the right.  She has an 800 cc implant on the left.    She is now status post revision of

## 2025-05-08 ENCOUNTER — OFFICE VISIT (OUTPATIENT)
Dept: PULMONOLOGY | Age: 60
End: 2025-05-08
Payer: MEDICARE

## 2025-05-08 VITALS
TEMPERATURE: 97.5 F | OXYGEN SATURATION: 94 % | HEART RATE: 92 BPM | RESPIRATION RATE: 20 BRPM | BODY MASS INDEX: 25.5 KG/M2 | SYSTOLIC BLOOD PRESSURE: 109 MMHG | DIASTOLIC BLOOD PRESSURE: 73 MMHG | WEIGHT: 158 LBS

## 2025-05-08 DIAGNOSIS — C78.01 MALIGNANT NEOPLASM METASTATIC TO BOTH LUNGS (HCC): ICD-10-CM

## 2025-05-08 DIAGNOSIS — R05.3 CHRONIC COUGH: ICD-10-CM

## 2025-05-08 DIAGNOSIS — J20.9 ACUTE BRONCHITIS, UNSPECIFIED ORGANISM: Primary | ICD-10-CM

## 2025-05-08 DIAGNOSIS — C50.912 CARCINOMA OF LEFT BREAST METASTATIC TO LUNG (HCC): ICD-10-CM

## 2025-05-08 DIAGNOSIS — C78.02 MALIGNANT NEOPLASM METASTATIC TO BOTH LUNGS (HCC): ICD-10-CM

## 2025-05-08 DIAGNOSIS — J70.0 POST-RADIATION PNEUMONITIS: ICD-10-CM

## 2025-05-08 DIAGNOSIS — R91.1 LUNG NODULE: ICD-10-CM

## 2025-05-08 DIAGNOSIS — C78.02 CARCINOMA OF LEFT BREAST METASTATIC TO LUNG (HCC): ICD-10-CM

## 2025-05-08 PROCEDURE — 3017F COLORECTAL CA SCREEN DOC REV: CPT | Performed by: INTERNAL MEDICINE

## 2025-05-08 PROCEDURE — 1111F DSCHRG MED/CURRENT MED MERGE: CPT | Performed by: INTERNAL MEDICINE

## 2025-05-08 PROCEDURE — G8427 DOCREV CUR MEDS BY ELIG CLIN: HCPCS | Performed by: INTERNAL MEDICINE

## 2025-05-08 PROCEDURE — 1036F TOBACCO NON-USER: CPT | Performed by: INTERNAL MEDICINE

## 2025-05-08 PROCEDURE — 99214 OFFICE O/P EST MOD 30 MIN: CPT | Performed by: INTERNAL MEDICINE

## 2025-05-08 PROCEDURE — G8417 CALC BMI ABV UP PARAM F/U: HCPCS | Performed by: INTERNAL MEDICINE

## 2025-05-08 RX ORDER — PREDNISONE 10 MG/1
TABLET ORAL
Qty: 35 TABLET | Refills: 0 | Status: SHIPPED | OUTPATIENT
Start: 2025-05-08

## 2025-05-08 RX ORDER — CEFUROXIME AXETIL 500 MG/1
500 TABLET ORAL 2 TIMES DAILY
Qty: 14 TABLET | Refills: 0 | Status: SHIPPED | OUTPATIENT
Start: 2025-05-08 | End: 2025-05-15

## 2025-05-08 RX ORDER — PROMETHAZINE HYDROCHLORIDE AND CODEINE PHOSPHATE 6.25; 1 MG/5ML; MG/5ML
5 SYRUP ORAL EVERY 4 HOURS PRN
Qty: 300 ML | Refills: 0 | Status: SHIPPED | OUTPATIENT
Start: 2025-05-08 | End: 2025-05-09

## 2025-05-08 ASSESSMENT — ENCOUNTER SYMPTOMS
ANAL BLEEDING: 0
SHORTNESS OF BREATH: 1
RHINORRHEA: 0
ABDOMINAL PAIN: 0
APNEA: 0
BACK PAIN: 0
CHEST TIGHTNESS: 0
ABDOMINAL DISTENTION: 0
WHEEZING: 1
COUGH: 1

## 2025-05-08 NOTE — PROGRESS NOTES
Pulmonary and Sleep Medicine    Yandy Dickinson (:  1965) is a 60 y.o. female,Established patient, here for evaluation of the following chief complaint(s):  Follow-up (1 week follow up-  Post-radiation pneumonitis./Pt states that she is still experiencing a cough that has worsened. )      Referring physician:  No referring provider defined for this encounter.     ASSESSMENT/PLAN:  1. Acute bronchitis, unspecified organism  -     predniSONE (DELTASONE) 10 MG tablet; 40 mg a day and taper by 10 mg every third day to off, Disp-35 tablet, R-0Normal  -     cefUROXime (CEFTIN) 500 MG tablet; Take 1 tablet by mouth 2 times daily for 7 days, Disp-14 tablet, R-0Normal  2. Lung nodule  3. Post-radiation pneumonitis  4. Malignant neoplasm metastatic to both lungs (HCC)  5. Carcinoma of left breast metastatic to lung (HCC)        Will treat for acute bronchitis  Will discuss with Dr. Jim further steps in her care. Bronch Biopsy may not be helpful if not therapeutic options are available.        Ashley Morrison MD, [facility], [facility]    Return in about 2 weeks (around 2025).    SUBJECTIVE/OBJECTIVE:        She is here for follow up on the lung nodules.  She had a CT of the chest done that showed atelectatic changes at the left hilum with likely extrinsic compression of her major airways.  She does have a 1.1 cm left lower lobe nodule.  She is feeling congested and wheezy.          Continue the following medications as reported by the patient:    Prior to Visit Medications    Medication Sig Taking? Authorizing Provider   gabapentin (NEURONTIN) 300 MG capsule Take 2 capsules by mouth 3 times daily for 30 days. Yes Kassi Jim MD   Budeson-Glycopyrrol-Formoterol (BREZTRI AEROSPHERE) 160-9-4.8 MCG/ACT AERO Inhale 2 puffs into the lungs 2 times daily Yes Precious Kearney APRN - CNP   guaiFENesin (MUCINEX) 600 MG extended release tablet Take 1 tablet by mouth 2 times daily Yes Jose Durán APRN - CNP

## 2025-05-09 DIAGNOSIS — C78.02 MALIGNANT NEOPLASM METASTATIC TO BOTH LUNGS (HCC): ICD-10-CM

## 2025-05-09 DIAGNOSIS — R05.3 CHRONIC COUGH: ICD-10-CM

## 2025-05-09 DIAGNOSIS — C78.01 MALIGNANT NEOPLASM METASTATIC TO BOTH LUNGS (HCC): ICD-10-CM

## 2025-05-09 NOTE — TELEPHONE ENCOUNTER
[facility] mail order pharmacy called to inform us that they do not dispense at 300 mls. They only do stock bottles at 473 mls. They advised to send to patients local pharmacy. I have updated the pharmacy if you could please resend the medication.

## 2025-05-10 RX ORDER — PROMETHAZINE HYDROCHLORIDE AND CODEINE PHOSPHATE 6.25; 1 MG/5ML; MG/5ML
5 SYRUP ORAL EVERY 4 HOURS PRN
Qty: 473 ML | Refills: 0 | Status: SHIPPED | OUTPATIENT
Start: 2025-05-10 | End: 2025-05-20

## 2025-05-15 ENCOUNTER — TELEPHONE (OUTPATIENT)
Dept: HEMATOLOGY | Age: 60
End: 2025-05-15

## 2025-05-15 NOTE — TELEPHONE ENCOUNTER
I called patient and reminded patient of their appt on 05/20/2025 and patient confirmed they would be here. I also let patient know that we have moved into our new cancer facility and asked patient if they were aware of where we were now located, and patient voiced understanding of our new location. Patient knows not to arrive early and that we will get labs at the time of the follow up appointment and not the lab appointment time. I also made patient aware to eat and drink plenty of water to hydrate properly before coming to these appointments because this will make their lab draw much easier.    Patient states she wants to talk to one of Dr. Jim's nurses about her new mass and her collapsed lung. She thought she would need in sooner that 05/20/25 so I sent a message to his nurses and one of them will call the patient.

## 2025-05-19 DIAGNOSIS — C50.919 CARCINOMA OF BREAST METASTATIC TO LUNG, UNSPECIFIED LATERALITY (HCC): Primary | ICD-10-CM

## 2025-05-19 DIAGNOSIS — C78.00 CARCINOMA OF BREAST METASTATIC TO LUNG, UNSPECIFIED LATERALITY (HCC): Primary | ICD-10-CM

## 2025-05-19 NOTE — PROGRESS NOTES
consolidative opacity involving both the upper and lower lobes is similar to prior, however there are new superimposed left lower lobe small nodules measuring up to 11 mm suspicious for recurrent disease.Redemonstrated chronic mildly prominent left subpectoral lymph nodes measuring up to 10 mm.     I personally reviewed CT chest with patient.  Evidence of a new left lower lobe pulmonary nodules likely represent metastatic disease progression.        ASSESSMENT  Orders Placed This Encounter   Procedures    CT CHEST W CONTRAST     Standing Status:   Future     Expected Date:   7/7/2025     Expiration Date:   11/20/2026     Scheduling Instructions:      Sched in July 2025     STAT Creatinine as needed::   No     Reason for exam::   COMPARE TO PREVIOUS SCANS MAY 2025 TO ASSESS LLL NODULE NOTED ON PREVIOUS SCANS     Reason for exam::   CURRENT CHEMO PATIENT WITH METASTATIC BREAST CANCER    CT ABDOMEN PELVIS W IV CONTRAST Additional Contrast? Oral     Standing Status:   Future     Expected Date:   7/7/2025     Expiration Date:   11/20/2026     Scheduling Instructions:      Sched in July 2025     Additional Contrast?:   Oral     STAT Creatinine as needed::   No     Reason for exam::   COMPARE TO PREVIOUS SCANS MAY 2025 TO ASSESS LLL NODULE NOTED ON PREVIOUS SCANS     Reason for exam::   CURRENT CHEMO PATIENT WITH METASTATIC BREAST CANCER    NM BONE SCAN WHOLE BODY     Standing Status:   Future     Expected Date:   7/7/2025     Expiration Date:   11/20/2026     Scheduling Instructions:      Sched in July 2025     Reason for exam::   COMPARE TO PREVIOUS SCANS FOR SURVEILLANCE     Reason for exam::   METASTATIC BREAST CANCER      Yandy was seen today for follow-up.    Diagnoses and all orders for this visit:    Carcinoma of breast metastatic to lung, unspecified laterality (HCC)  -     CT CHEST W CONTRAST; Future  -     CT ABDOMEN PELVIS W IV CONTRAST Additional Contrast? Oral; Future  -     NM BONE SCAN WHOLE BODY;

## 2025-05-20 ENCOUNTER — OFFICE VISIT (OUTPATIENT)
Dept: HEMATOLOGY | Age: 60
End: 2025-05-20
Payer: MEDICARE

## 2025-05-20 ENCOUNTER — HOSPITAL ENCOUNTER (OUTPATIENT)
Dept: INFUSION THERAPY | Age: 60
Discharge: HOME | End: 2025-05-20
Payer: MEDICARE

## 2025-05-20 VITALS
HEIGHT: 66 IN | TEMPERATURE: 97.7 F | DIASTOLIC BLOOD PRESSURE: 92 MMHG | BODY MASS INDEX: 26.12 KG/M2 | WEIGHT: 162.5 LBS | SYSTOLIC BLOOD PRESSURE: 132 MMHG | OXYGEN SATURATION: 97 % | HEART RATE: 91 BPM

## 2025-05-20 DIAGNOSIS — C50.919 CARCINOMA OF BREAST METASTATIC TO LUNG, UNSPECIFIED LATERALITY (HCC): Primary | ICD-10-CM

## 2025-05-20 DIAGNOSIS — C78.00 CARCINOMA OF BREAST METASTATIC TO LUNG, UNSPECIFIED LATERALITY (HCC): ICD-10-CM

## 2025-05-20 DIAGNOSIS — E53.8 VITAMIN B 12 DEFICIENCY: Primary | ICD-10-CM

## 2025-05-20 DIAGNOSIS — Z51.81 ENCOUNTER FOR MONITORING AROMATASE INHIBITOR THERAPY: ICD-10-CM

## 2025-05-20 DIAGNOSIS — C50.011 CARCINOMA OF AREOLA OF RIGHT BREAST IN FEMALE, UNSPECIFIED ESTROGEN RECEPTOR STATUS (HCC): ICD-10-CM

## 2025-05-20 DIAGNOSIS — T45.1X5A ANTINEOPLASTIC CHEMOTHERAPY INDUCED ANEMIA: ICD-10-CM

## 2025-05-20 DIAGNOSIS — Z71.89 CARE PLAN DISCUSSED WITH PATIENT: ICD-10-CM

## 2025-05-20 DIAGNOSIS — C50.919 CARCINOMA OF BREAST METASTATIC TO LUNG, UNSPECIFIED LATERALITY (HCC): ICD-10-CM

## 2025-05-20 DIAGNOSIS — Z79.811 ENCOUNTER FOR MONITORING AROMATASE INHIBITOR THERAPY: ICD-10-CM

## 2025-05-20 DIAGNOSIS — C78.00 CARCINOMA OF BREAST METASTATIC TO LUNG, UNSPECIFIED LATERALITY (HCC): Primary | ICD-10-CM

## 2025-05-20 DIAGNOSIS — D64.81 ANTINEOPLASTIC CHEMOTHERAPY INDUCED ANEMIA: ICD-10-CM

## 2025-05-20 DIAGNOSIS — C78.00 MALIGNANT NEOPLASM METASTATIC TO LUNG, UNSPECIFIED LATERALITY (HCC): ICD-10-CM

## 2025-05-20 DIAGNOSIS — Z79.899 MEDICATION MANAGEMENT: ICD-10-CM

## 2025-05-20 LAB
ALBUMIN SERPL-MCNC: 4.3 G/DL (ref 3.5–5.2)
ALP SERPL-CCNC: 117 U/L (ref 35–104)
ALT SERPL-CCNC: 12 U/L (ref 5–33)
ANION GAP SERPL CALCULATED.3IONS-SCNC: 12 MMOL/L (ref 7–19)
AST SERPL-CCNC: 21 U/L (ref 5–32)
BASOPHILS # BLD: 0.03 K/UL (ref 0–0.2)
BASOPHILS NFR BLD: 0.5 % (ref 0–1)
BILIRUB SERPL-MCNC: 1.1 MG/DL (ref 0–1.2)
BUN SERPL-MCNC: 13 MG/DL (ref 6–20)
CA 15-3: 25 U/ML (ref 0–25)
CALCIUM SERPL-MCNC: 8.4 MG/DL (ref 8.6–10)
CEA SERPL-MCNC: 2.4 NG/ML (ref 0–4.7)
CHLORIDE SERPL-SCNC: 105 MMOL/L (ref 98–107)
CO2 SERPL-SCNC: 24 MMOL/L (ref 22–29)
CREAT SERPL-MCNC: 0.8 MG/DL (ref 0.5–0.9)
EOSINOPHIL # BLD: 0.01 K/UL (ref 0–0.6)
EOSINOPHIL NFR BLD: 0.2 % (ref 0–5)
ERYTHROCYTE [DISTWIDTH] IN BLOOD BY AUTOMATED COUNT: 14.8 % (ref 11.5–14.5)
GLUCOSE SERPL-MCNC: 96 MG/DL (ref 70–99)
HCT VFR BLD AUTO: 40 % (ref 37–47)
HGB BLD-MCNC: 13.5 G/DL (ref 12–16)
LYMPHOCYTES # BLD: 1.03 K/UL (ref 1.1–4.5)
LYMPHOCYTES NFR BLD: 16.1 % (ref 20–40)
MCH RBC QN AUTO: 30.8 PG (ref 27–31)
MCHC RBC AUTO-ENTMCNC: 33.8 G/DL (ref 33–37)
MCV RBC AUTO: 91.1 FL (ref 81–99)
MONOCYTES # BLD: 0.24 K/UL (ref 0–0.9)
MONOCYTES NFR BLD: 3.8 % (ref 1–10)
NEUTROPHILS # BLD: 5.07 K/UL (ref 1.5–7.5)
NEUTS SEG NFR BLD: 79.2 % (ref 50–65)
PLATELET # BLD AUTO: 354 K/UL (ref 130–400)
PMV BLD AUTO: 8.5 FL (ref 9.4–12.3)
POTASSIUM SERPL-SCNC: 3.9 MMOL/L (ref 3.5–5.1)
PROT SERPL-MCNC: 7.4 G/DL (ref 6.4–8.3)
RBC # BLD AUTO: 4.39 M/UL (ref 4.2–5.4)
SODIUM SERPL-SCNC: 141 MMOL/L (ref 136–145)
WBC # BLD AUTO: 6.39 K/UL (ref 4.8–10.8)

## 2025-05-20 PROCEDURE — 6360000002 HC RX W HCPCS: Performed by: INTERNAL MEDICINE

## 2025-05-20 PROCEDURE — 80053 COMPREHEN METABOLIC PANEL: CPT

## 2025-05-20 PROCEDURE — 85025 COMPLETE CBC W/AUTO DIFF WBC: CPT

## 2025-05-20 PROCEDURE — G2211 COMPLEX E/M VISIT ADD ON: HCPCS | Performed by: INTERNAL MEDICINE

## 2025-05-20 PROCEDURE — 96372 THER/PROPH/DIAG INJ SC/IM: CPT

## 2025-05-20 PROCEDURE — 86300 IMMUNOASSAY TUMOR CA 15-3: CPT

## 2025-05-20 PROCEDURE — G8417 CALC BMI ABV UP PARAM F/U: HCPCS | Performed by: INTERNAL MEDICINE

## 2025-05-20 PROCEDURE — 99214 OFFICE O/P EST MOD 30 MIN: CPT | Performed by: INTERNAL MEDICINE

## 2025-05-20 PROCEDURE — 36415 COLL VENOUS BLD VENIPUNCTURE: CPT

## 2025-05-20 PROCEDURE — G8427 DOCREV CUR MEDS BY ELIG CLIN: HCPCS | Performed by: INTERNAL MEDICINE

## 2025-05-20 PROCEDURE — 82378 CARCINOEMBRYONIC ANTIGEN: CPT

## 2025-05-20 PROCEDURE — 99213 OFFICE O/P EST LOW 20 MIN: CPT

## 2025-05-20 PROCEDURE — 3017F COLORECTAL CA SCREEN DOC REV: CPT | Performed by: INTERNAL MEDICINE

## 2025-05-20 PROCEDURE — 1036F TOBACCO NON-USER: CPT | Performed by: INTERNAL MEDICINE

## 2025-05-20 RX ORDER — CYANOCOBALAMIN 1000 UG/ML
1000 INJECTION, SOLUTION INTRAMUSCULAR; SUBCUTANEOUS ONCE
Status: COMPLETED | OUTPATIENT
Start: 2025-05-20 | End: 2025-05-20

## 2025-05-20 RX ORDER — CYANOCOBALAMIN 1000 UG/ML
1000 INJECTION, SOLUTION INTRAMUSCULAR; SUBCUTANEOUS ONCE
OUTPATIENT
Start: 2025-06-17 | End: 2025-06-17

## 2025-05-20 RX ADMIN — CYANOCOBALAMIN 1000 MCG: 1000 INJECTION, SOLUTION INTRAMUSCULAR; SUBCUTANEOUS at 10:52

## 2025-05-21 ENCOUNTER — TELEPHONE (OUTPATIENT)
Dept: PULMONOLOGY | Age: 60
End: 2025-05-21

## 2025-05-21 ENCOUNTER — PREP FOR PROCEDURE (OUTPATIENT)
Dept: PULMONOLOGY | Age: 60
End: 2025-05-21

## 2025-05-21 DIAGNOSIS — R91.1 LUNG NODULE: Primary | ICD-10-CM

## 2025-05-21 LAB — CANCER AG27-29 SERPL-ACNC: 41.3 U/ML

## 2025-05-21 NOTE — TELEPHONE ENCOUNTER
M for patient to return call regarding her upcoming bronchoscopy procedure.  She will have her bronch on Tues, 5/27 with an arrival time of 0700 at the out patient waiting room in the [facility].

## 2025-05-21 NOTE — PROGRESS NOTES
Disc Dr. Jim who is concerned about possible recurrent breast cancer and recommends a biopsy of the left lower lobe nodule.  Will proceed with the biopsy.

## 2025-05-23 ENCOUNTER — ANESTHESIA EVENT (OUTPATIENT)
Dept: ENDOSCOPY | Age: 60
End: 2025-05-23
Payer: MEDICARE

## 2025-05-27 ENCOUNTER — ANCILLARY PROCEDURE (OUTPATIENT)
Dept: ENDOSCOPY | Age: 60
End: 2025-05-27
Attending: INTERNAL MEDICINE
Payer: MEDICARE

## 2025-05-27 ENCOUNTER — ANESTHESIA (OUTPATIENT)
Dept: ENDOSCOPY | Age: 60
End: 2025-05-27
Payer: MEDICARE

## 2025-05-27 ENCOUNTER — HOSPITAL ENCOUNTER (OUTPATIENT)
Age: 60
Setting detail: OUTPATIENT SURGERY
Discharge: HOME | End: 2025-05-27
Attending: INTERNAL MEDICINE | Admitting: INTERNAL MEDICINE
Payer: MEDICARE

## 2025-05-27 VITALS
HEART RATE: 94 BPM | SYSTOLIC BLOOD PRESSURE: 129 MMHG | RESPIRATION RATE: 16 BRPM | HEIGHT: 66 IN | TEMPERATURE: 98 F | WEIGHT: 165 LBS | BODY MASS INDEX: 26.52 KG/M2 | DIASTOLIC BLOOD PRESSURE: 76 MMHG | OXYGEN SATURATION: 99 %

## 2025-05-27 DIAGNOSIS — R91.1 LUNG NODULE: ICD-10-CM

## 2025-05-27 PROCEDURE — 88342 IMHCHEM/IMCYTCHM 1ST ANTB: CPT | Performed by: PATHOLOGY

## 2025-05-27 PROCEDURE — 2709999900 HC NON-CHARGEABLE SUPPLY: Performed by: INTERNAL MEDICINE

## 2025-05-27 PROCEDURE — 88305 TISSUE EXAM BY PATHOLOGIST: CPT

## 2025-05-27 PROCEDURE — 6360000002 HC RX W HCPCS

## 2025-05-27 PROCEDURE — 2720000010 HC SURG SUPPLY STERILE: Performed by: INTERNAL MEDICINE

## 2025-05-27 PROCEDURE — 88341 IMHCHEM/IMCYTCHM EA ADD ANTB: CPT

## 2025-05-27 PROCEDURE — 2500000003 HC RX 250 WO HCPCS: Performed by: NURSE ANESTHETIST, CERTIFIED REGISTERED

## 2025-05-27 PROCEDURE — 2580000003 HC RX 258: Performed by: ANESTHESIOLOGY

## 2025-05-27 PROCEDURE — 31654 BRONCH EBUS IVNTJ PERPH LES: CPT | Performed by: INTERNAL MEDICINE

## 2025-05-27 PROCEDURE — 88173 CYTOPATH EVAL FNA REPORT: CPT

## 2025-05-27 PROCEDURE — 3700000001 HC ADD 15 MINUTES (ANESTHESIA): Performed by: INTERNAL MEDICINE

## 2025-05-27 PROCEDURE — 88112 CYTOPATH CELL ENHANCE TECH: CPT | Performed by: PATHOLOGY

## 2025-05-27 PROCEDURE — 7100000001 HC PACU RECOVERY - ADDTL 15 MIN: Performed by: INTERNAL MEDICINE

## 2025-05-27 PROCEDURE — 88173 CYTOPATH EVAL FNA REPORT: CPT | Performed by: PATHOLOGY

## 2025-05-27 PROCEDURE — 3609011900 HC BRONCHOSCOPY NEEDLE BX TRACHEA MAIN STEM&/BRON: Performed by: INTERNAL MEDICINE

## 2025-05-27 PROCEDURE — 88305 TISSUE EXAM BY PATHOLOGIST: CPT | Performed by: PATHOLOGY

## 2025-05-27 PROCEDURE — 31627 NAVIGATIONAL BRONCHOSCOPY: CPT | Performed by: INTERNAL MEDICINE

## 2025-05-27 PROCEDURE — 6360000002 HC RX W HCPCS: Performed by: NURSE ANESTHETIST, CERTIFIED REGISTERED

## 2025-05-27 PROCEDURE — 7100000010 HC PHASE II RECOVERY - FIRST 15 MIN: Performed by: INTERNAL MEDICINE

## 2025-05-27 PROCEDURE — 31624 DX BRONCHOSCOPE/LAVAGE: CPT | Performed by: INTERNAL MEDICINE

## 2025-05-27 PROCEDURE — 7100000000 HC PACU RECOVERY - FIRST 15 MIN: Performed by: INTERNAL MEDICINE

## 2025-05-27 PROCEDURE — 31628 BRONCHOSCOPY/LUNG BX EACH: CPT | Performed by: INTERNAL MEDICINE

## 2025-05-27 PROCEDURE — 88342 IMHCHEM/IMCYTCHM 1ST ANTB: CPT

## 2025-05-27 PROCEDURE — 88300 SURGICAL PATH GROSS: CPT

## 2025-05-27 PROCEDURE — 3609011800 HC BRONCHOSCOPY/TRANSBRONCHIAL LUNG BIOPSY: Performed by: INTERNAL MEDICINE

## 2025-05-27 PROCEDURE — 88112 CYTOPATH CELL ENHANCE TECH: CPT

## 2025-05-27 PROCEDURE — 3609010800 HC BRONCHOSCOPY ALVEOLAR LAVAGE: Performed by: INTERNAL MEDICINE

## 2025-05-27 PROCEDURE — 6370000000 HC RX 637 (ALT 250 FOR IP): Performed by: ANESTHESIOLOGY

## 2025-05-27 PROCEDURE — 3700000000 HC ANESTHESIA ATTENDED CARE: Performed by: INTERNAL MEDICINE

## 2025-05-27 PROCEDURE — 7100000011 HC PHASE II RECOVERY - ADDTL 15 MIN: Performed by: INTERNAL MEDICINE

## 2025-05-27 PROCEDURE — 3603165200 HC BRNCHSC EBUS GUIDED SAMPL 1/2 NODE STATION/STRUX: Performed by: INTERNAL MEDICINE

## 2025-05-27 PROCEDURE — 88341 IMHCHEM/IMCYTCHM EA ADD ANTB: CPT | Performed by: PATHOLOGY

## 2025-05-27 PROCEDURE — 2500000003 HC RX 250 WO HCPCS

## 2025-05-27 PROCEDURE — 31629 BRONCHOSCOPY/NEEDLE BX EACH: CPT | Performed by: INTERNAL MEDICINE

## 2025-05-27 RX ORDER — SODIUM CHLORIDE 9 MG/ML
INJECTION, SOLUTION INTRAVENOUS PRN
Status: DISCONTINUED | OUTPATIENT
Start: 2025-05-27 | End: 2025-05-27 | Stop reason: HOSPADM

## 2025-05-27 RX ORDER — SODIUM CHLORIDE, SODIUM LACTATE, POTASSIUM CHLORIDE, CALCIUM CHLORIDE 600; 310; 30; 20 MG/100ML; MG/100ML; MG/100ML; MG/100ML
INJECTION, SOLUTION INTRAVENOUS CONTINUOUS
Status: DISCONTINUED | OUTPATIENT
Start: 2025-05-27 | End: 2025-05-27 | Stop reason: HOSPADM

## 2025-05-27 RX ORDER — HYDROMORPHONE HYDROCHLORIDE 1 MG/ML
0.5 INJECTION, SOLUTION INTRAMUSCULAR; INTRAVENOUS; SUBCUTANEOUS EVERY 5 MIN PRN
Status: DISCONTINUED | OUTPATIENT
Start: 2025-05-27 | End: 2025-05-27 | Stop reason: HOSPADM

## 2025-05-27 RX ORDER — SODIUM CHLORIDE 0.9 % (FLUSH) 0.9 %
5-40 SYRINGE (ML) INJECTION EVERY 12 HOURS SCHEDULED
Status: DISCONTINUED | OUTPATIENT
Start: 2025-05-27 | End: 2025-05-27 | Stop reason: HOSPADM

## 2025-05-27 RX ORDER — FAMOTIDINE 20 MG/1
20 TABLET, FILM COATED ORAL ONCE
Status: DISCONTINUED | OUTPATIENT
Start: 2025-05-27 | End: 2025-05-27 | Stop reason: HOSPADM

## 2025-05-27 RX ORDER — SODIUM CHLORIDE 0.9 % (FLUSH) 0.9 %
5-40 SYRINGE (ML) INJECTION PRN
Status: DISCONTINUED | OUTPATIENT
Start: 2025-05-27 | End: 2025-05-27 | Stop reason: HOSPADM

## 2025-05-27 RX ORDER — PHENYLEPHRINE HYDROCHLORIDE 10 MG/ML
INJECTION INTRAVENOUS
Status: DISCONTINUED | OUTPATIENT
Start: 2025-05-27 | End: 2025-05-27 | Stop reason: SDUPTHER

## 2025-05-27 RX ORDER — NALOXONE HYDROCHLORIDE 0.4 MG/ML
INJECTION, SOLUTION INTRAMUSCULAR; INTRAVENOUS; SUBCUTANEOUS PRN
Status: DISCONTINUED | OUTPATIENT
Start: 2025-05-27 | End: 2025-05-27 | Stop reason: HOSPADM

## 2025-05-27 RX ORDER — LIDOCAINE HYDROCHLORIDE 10 MG/ML
1 INJECTION, SOLUTION EPIDURAL; INFILTRATION; INTRACAUDAL; PERINEURAL
Status: DISCONTINUED | OUTPATIENT
Start: 2025-05-27 | End: 2025-05-27 | Stop reason: HOSPADM

## 2025-05-27 RX ORDER — SCOPOLAMINE 1 MG/3D
1 PATCH, EXTENDED RELEASE TRANSDERMAL
Status: DISCONTINUED | OUTPATIENT
Start: 2025-05-27 | End: 2025-05-27 | Stop reason: HOSPADM

## 2025-05-27 RX ORDER — METOCLOPRAMIDE HYDROCHLORIDE 5 MG/ML
10 INJECTION INTRAMUSCULAR; INTRAVENOUS
Status: COMPLETED | OUTPATIENT
Start: 2025-05-27 | End: 2025-05-27

## 2025-05-27 RX ORDER — MIDAZOLAM HYDROCHLORIDE 1 MG/ML
INJECTION, SOLUTION INTRAMUSCULAR; INTRAVENOUS
Status: DISCONTINUED | OUTPATIENT
Start: 2025-05-27 | End: 2025-05-27 | Stop reason: SDUPTHER

## 2025-05-27 RX ORDER — DIPHENHYDRAMINE HYDROCHLORIDE 50 MG/ML
12.5 INJECTION, SOLUTION INTRAMUSCULAR; INTRAVENOUS
Status: DISCONTINUED | OUTPATIENT
Start: 2025-05-27 | End: 2025-05-27 | Stop reason: HOSPADM

## 2025-05-27 RX ORDER — LIDOCAINE HYDROCHLORIDE 10 MG/ML
INJECTION, SOLUTION EPIDURAL; INFILTRATION; INTRACAUDAL; PERINEURAL
Status: DISCONTINUED | OUTPATIENT
Start: 2025-05-27 | End: 2025-05-27 | Stop reason: SDUPTHER

## 2025-05-27 RX ORDER — IPRATROPIUM BROMIDE AND ALBUTEROL SULFATE 2.5; .5 MG/3ML; MG/3ML
1 SOLUTION RESPIRATORY (INHALATION) ONCE
Status: COMPLETED | OUTPATIENT
Start: 2025-05-27 | End: 2025-05-27

## 2025-05-27 RX ORDER — PROPOFOL 10 MG/ML
INJECTION, EMULSION INTRAVENOUS
Status: DISCONTINUED | OUTPATIENT
Start: 2025-05-27 | End: 2025-05-27 | Stop reason: SDUPTHER

## 2025-05-27 RX ORDER — DEXAMETHASONE SODIUM PHOSPHATE 10 MG/ML
INJECTION, SOLUTION INTRAMUSCULAR; INTRAVENOUS
Status: DISCONTINUED | OUTPATIENT
Start: 2025-05-27 | End: 2025-05-27 | Stop reason: SDUPTHER

## 2025-05-27 RX ORDER — ONDANSETRON 2 MG/ML
INJECTION INTRAMUSCULAR; INTRAVENOUS
Status: DISCONTINUED | OUTPATIENT
Start: 2025-05-27 | End: 2025-05-27 | Stop reason: SDUPTHER

## 2025-05-27 RX ORDER — ROCURONIUM BROMIDE 10 MG/ML
INJECTION, SOLUTION INTRAVENOUS
Status: DISCONTINUED | OUTPATIENT
Start: 2025-05-27 | End: 2025-05-27 | Stop reason: SDUPTHER

## 2025-05-27 RX ORDER — FENTANYL CITRATE 50 UG/ML
INJECTION, SOLUTION INTRAMUSCULAR; INTRAVENOUS
Status: DISCONTINUED | OUTPATIENT
Start: 2025-05-27 | End: 2025-05-27 | Stop reason: SDUPTHER

## 2025-05-27 RX ORDER — HYDROMORPHONE HYDROCHLORIDE 1 MG/ML
0.25 INJECTION, SOLUTION INTRAMUSCULAR; INTRAVENOUS; SUBCUTANEOUS EVERY 5 MIN PRN
Status: DISCONTINUED | OUTPATIENT
Start: 2025-05-27 | End: 2025-05-27 | Stop reason: HOSPADM

## 2025-05-27 RX ORDER — MIDAZOLAM HYDROCHLORIDE 2 MG/2ML
2 INJECTION, SOLUTION INTRAMUSCULAR; INTRAVENOUS
Status: DISCONTINUED | OUTPATIENT
Start: 2025-05-27 | End: 2025-05-27 | Stop reason: HOSPADM

## 2025-05-27 RX ADMIN — METOCLOPRAMIDE 10 MG: 5 INJECTION, SOLUTION INTRAMUSCULAR; INTRAVENOUS at 10:18

## 2025-05-27 RX ADMIN — PHENYLEPHRINE HYDROCHLORIDE 100 MCG: 10 INJECTION INTRAVENOUS at 09:31

## 2025-05-27 RX ADMIN — MIDAZOLAM 2 MG: 1 INJECTION INTRAMUSCULAR; INTRAVENOUS at 09:06

## 2025-05-27 RX ADMIN — PHENYLEPHRINE HYDROCHLORIDE 100 MCG: 10 INJECTION INTRAVENOUS at 09:24

## 2025-05-27 RX ADMIN — DEXAMETHASONE SODIUM PHOSPHATE 10 MG: 10 INJECTION, SOLUTION INTRAMUSCULAR; INTRAVENOUS at 09:22

## 2025-05-27 RX ADMIN — PROPOFOL 150 MG: 10 INJECTION, EMULSION INTRAVENOUS at 08:55

## 2025-05-27 RX ADMIN — ROCURONIUM BROMIDE 40 MG: 100 INJECTION INTRAVENOUS at 08:55

## 2025-05-27 RX ADMIN — SUGAMMADEX 150 MG: 100 INJECTION, SOLUTION INTRAVENOUS at 09:46

## 2025-05-27 RX ADMIN — PHENYLEPHRINE HYDROCHLORIDE 100 MCG: 10 INJECTION INTRAVENOUS at 09:37

## 2025-05-27 RX ADMIN — PHENYLEPHRINE HYDROCHLORIDE 100 MCG: 10 INJECTION INTRAVENOUS at 09:10

## 2025-05-27 RX ADMIN — ONDANSETRON 4 MG: 2 INJECTION, SOLUTION INTRAMUSCULAR; INTRAVENOUS at 09:17

## 2025-05-27 RX ADMIN — FENTANYL CITRATE 100 MCG: 50 INJECTION INTRAMUSCULAR; INTRAVENOUS at 08:48

## 2025-05-27 RX ADMIN — SODIUM CHLORIDE, SODIUM LACTATE, POTASSIUM CHLORIDE, AND CALCIUM CHLORIDE: .6; .31; .03; .02 INJECTION, SOLUTION INTRAVENOUS at 08:28

## 2025-05-27 RX ADMIN — LIDOCAINE HYDROCHLORIDE 50 MG: 10 INJECTION, SOLUTION EPIDURAL; INFILTRATION; INTRACAUDAL; PERINEURAL at 08:55

## 2025-05-27 RX ADMIN — IPRATROPIUM BROMIDE AND ALBUTEROL SULFATE 1 DOSE: 2.5; .5 SOLUTION RESPIRATORY (INHALATION) at 08:44

## 2025-05-27 ASSESSMENT — ENCOUNTER SYMPTOMS: SHORTNESS OF BREATH: 1

## 2025-05-27 ASSESSMENT — PAIN - FUNCTIONAL ASSESSMENT: PAIN_FUNCTIONAL_ASSESSMENT: 0-10

## 2025-05-27 NOTE — INTERVAL H&P NOTE
Update History & Physical    The patient's History and Physical of May 8, 2025 was reviewed with the patient and I examined the patient. There was no change. The surgical site was confirmed by the patient and me.     Plan: The risks, benefits, expected outcome, and alternative to the recommended procedure have been discussed with the patient. Patient understands and wants to proceed with the procedure.     Electronically signed by Ashley Morrison MD on 5/27/2025 at 8:47 AM

## 2025-05-27 NOTE — ANESTHESIA PRE PROCEDURE
Department of Anesthesiology  Preprocedure Note       Name:  Yandy Dickinson   Age:  60 y.o.  :  1965                                          MRN:  [id number]         Date:  2025      Surgeon: Surgeon(s):  Ashley Morrison MD    Procedure: Procedure(s):  BRONCHOSCOPY COMPUTER ASSISTED ROBOTIC    Medications prior to admission:   Prior to Admission medications    Medication Sig Start Date End Date Taking? Authorizing Provider   gabapentin (NEURONTIN) 300 MG capsule Take 2 capsules by mouth 3 times daily for 30 days. 25 Yes Kassi Jim MD   Budeson-Glycopyrrol-Formoterol (BREZTRI AEROSPHERE) 160-9-4.8 MCG/ACT AERO Inhale 2 puffs into the lungs 2 times daily 4/15/25  Yes Precious Kearney APRN - CNP   guaiFENesin (MUCINEX) 600 MG extended release tablet Take 1 tablet by mouth 2 times daily 25  Yes Jose Durán APRN - CNP   ipratropium 0.5 mg-albuterol 2.5 mg (DUONEB) 0.5-2.5 (3) MG/3ML SOLN nebulizer solution Inhale 3 mLs into the lungs every 4 hours 3/27/25  Yes Ashley Morrison MD   VENTOLIN  (90 Base) MCG/ACT inhaler Inhale 2 puffs into the lungs 4 times daily as needed for Wheezing 3/27/25  Yes Ashley Morrison MD   palbociclib (IBRANCE) 100 MG capsule TAKE ONE CAPSULE BY MOUTH ONCE A DAY ON DAYS 1-21 FOLLOWED BY 7 DAYS OFF FOR A 28 DAY CYCLE 25  Yes Kassi Jim MD   scopolamine (TRANSDERM-SCOP) transdermal patch Place 1 patch onto the skin every 72 hours 24  Yes Tomi Teague MD   exemestane (AROMASIN) 25 MG tablet TAKE 1 TABLET BY MOUTH EVERY DAY 24  Yes Kassi Jim MD   promethazine (PHENERGAN) 25 MG tablet Take 1 tablet by mouth every 6 hours as needed for Nausea 24  Yes Kassi Jim MD   Respiratory Therapy Supplies (NEBULIZER/TUBING/MOUTHPIECE) KIT 1 kit by Does not apply route daily as needed (wheezing) 24  Yes Ashley Morrison MD   HYDROcodone-acetaminophen (NORCO)

## 2025-05-27 NOTE — H&P (VIEW-ONLY)
Pulmonary and Sleep Medicine    Yandy Dickinson (:  1965) is a 60 y.o. female,Established patient, here for evaluation of the following chief complaint(s):  Follow-up (1 week follow up-  Post-radiation pneumonitis./Pt states that she is still experiencing a cough that has worsened. )      Referring physician:  No referring provider defined for this encounter.     ASSESSMENT/PLAN:  1. Acute bronchitis, unspecified organism  -     predniSONE (DELTASONE) 10 MG tablet; 40 mg a day and taper by 10 mg every third day to off, Disp-35 tablet, R-0Normal  -     cefUROXime (CEFTIN) 500 MG tablet; Take 1 tablet by mouth 2 times daily for 7 days, Disp-14 tablet, R-0Normal  2. Lung nodule  3. Post-radiation pneumonitis  4. Malignant neoplasm metastatic to both lungs (HCC)  5. Carcinoma of left breast metastatic to lung (HCC)        Will treat for acute bronchitis  Will discuss with Dr. Jim further steps in her care. Bronch Biopsy may not be helpful if not therapeutic options are available.        Ash[facility] Morrison MD, [facility], [facility]    Return in about 2 weeks (around 2025).    SUBJECTIVE/OBJECTIVE:        She is here for follow up on the lung nodules.  She had a CT of the chest done that showed atelectatic changes at the left hilum with likely extrinsic compression of her major airways.  She does have a 1.1 cm left lower lobe nodule.  She is feeling congested and wheezy.          Continue the following medications as reported by the patient:    Prior to Visit Medications    Medication Sig Taking? Authorizing Provider   gabapentin (NEURONTIN) 300 MG capsule Take 2 capsules by mouth 3 times daily for 30 days. Yes Kassi Jim MD   Budeson-Glycopyrrol-Formoterol (BREZTRI AEROSPHERE) 160-9-4.8 MCG/ACT AERO Inhale 2 puffs into the lungs 2 times daily Yes Precious Kearney APRN - CNP   guaiFENesin (MUCINEX) 600 MG extended release tablet Take 1 tablet by mouth 2 times daily Yes Jose Durán APRN - CNP

## 2025-05-27 NOTE — PROCEDURES
After consent and under general anesthesia patient underwent bronchoscopy through the endotracheal tube.  Distal trachea was normal.  Melisa was sharp and midline.  Right and left bronchial trees were explored.  There was no endobronchial disease seen.  The Ion robotic assisted bronchoscopy platform was then used to navigate to the left lower lobe nodule.  Successful navigation was confirmed using radial EBUS which yielded concentric view of the left lower lobe lesion.  Transbronchial needle biopsies and transbronchial needle aspirates were obtained from the left lower lobe lesion and submitted for cytology and pathology.  Rapid onsite evaluation was performed by the Cyt[employer].  Bronchoalveolar lavage was also obtained from the left lower lobe and submitted for cytology.  Linear EBUS scope was then used to survey the mediastinum which did not identify any pathologically enlarged lymphadenopathy.    Complications: None.  Estimated blood loss: 2 cc.  Specimen:  1.  Left lower lobe nodule transbronchial biopsy for pathology.  2.  Left lower lobe nodule transbronchial needle aspirate for cytology.  3.  Left lower lobe bronchoalveolar lavage for cytology.  Disposition: Postanesthesia recovery per anesthesia service

## 2025-05-27 NOTE — ANESTHESIA POSTPROCEDURE EVALUATION
Department of Anesthesiology  Postprocedure Note    Patient: Yandy Dickinson  MRN: [id number]  YOB: 1965  Date of evaluation: 5/27/2025    Procedure Summary       Date: 05/27/25 Room / Location: [place] [address] / [facility]    Anesthesia Start: 0848 Anesthesia Stop: 1004    Procedures:       BRONCHOSCOPY COMPUTER ASSISTED ROBOTIC (Left: Chest)      BRONCHOSCOPY ALVEOLAR LAVAGE Diagnosis:       Lung nodule      (Lung nodule [R91.1])    Surgeons: Ashley Morrison MD Responsible Provider: Ruthann Oliver APRN - CRNA    Anesthesia Type: General ASA Status: 3            Anesthesia Type: General    Dena Phase I: Dena Score: 9    Dena Phase II:      Anesthesia Post Evaluation    Patient location during evaluation: PACU  Patient participation: complete - patient participated  Level of consciousness: sleepy but conscious  Pain score: 0  Airway patency: patent  Nausea & Vomiting: no nausea and no vomiting  Cardiovascular status: hemodynamically stable and blood pressure returned to baseline  Respiratory status: acceptable and nasal cannula  Hydration status: stable  Pain management: adequate    No notable events documented.

## 2025-05-27 NOTE — H&P
ipratropium 0.5 mg-albuterol 2.5 mg (DUONEB) 0.5-2.5 (3) MG/3ML SOLN nebulizer solution Inhale 3 mLs into the lungs every 4 hours Yes Ashley Morrison MD   VENTOLIN  (90 Base) MCG/ACT inhaler Inhale 2 puffs into the lungs 4 times daily as needed for Wheezing Yes Ashley Morrison MD   palbociclib (IBRANCE) 100 MG capsule TAKE ONE CAPSULE BY MOUTH ONCE A DAY ON DAYS 1-21 FOLLOWED BY 7 DAYS OFF FOR A 28 DAY CYCLE Yes Kassi Jim MD   scopolamine (TRANSDERM-SCOP) transdermal patch Place 1 patch onto the skin every 72 hours Yes Tomi Teague MD   exemestane (AROMASIN) 25 MG tablet TAKE 1 TABLET BY MOUTH EVERY DAY Yes Kassi Jim MD   promethazine (PHENERGAN) 25 MG tablet Take 1 tablet by mouth every 6 hours as needed for Nausea Yes Kassi Jim MD   Respiratory Therapy Supplies (NEBULIZER/TUBING/MOUTHPIECE) KIT 1 kit by Does not apply route daily as needed (wheezing) Yes Ashley Morrison MD   HYDROcodone-acetaminophen (NORCO) 7.5-325 MG per tablet Take 1 tablet by mouth every 6 hours as needed for Pain. Yes ProviderArcenio MD   hydrOXYzine HCl (ATARAX) 10 MG tablet Take 1 tablet by mouth 3 times daily as needed for Itching Yes Arcenio Edouard MD   cyclobenzaprine (FLEXERIL) 10 MG tablet Take 1 tablet by mouth 3 times daily as needed for Muscle spasms Yes Nahum Cowan APRN - CNP   ondansetron (ZOFRAN) 4 MG tablet Take 1 tablet by mouth every 8 hours as needed for Nausea or Vomiting Yes Arcenio Edouard MD   NARCAN 4 MG/0.1ML LIQD nasal spray 1 spray as needed  Patient not taking: Reported on 5/8/2025  ProviderArcenio MD        Review of Systems   Constitutional:  Negative for activity change, appetite change, chills, diaphoresis and fatigue.   HENT:  Negative for congestion, dental problem, drooling, ear discharge, postnasal drip and rhinorrhea.    Eyes:  Negative for visual disturbance.   Respiratory:  Positive for cough,

## 2025-05-29 DIAGNOSIS — Z17.0 CARCINOMA OF AREOLA OF RIGHT BREAST IN FEMALE, ESTROGEN RECEPTOR POSITIVE (HCC): Primary | ICD-10-CM

## 2025-05-29 DIAGNOSIS — C78.00 CARCINOMA OF BREAST METASTATIC TO LUNG, UNSPECIFIED LATERALITY (HCC): ICD-10-CM

## 2025-05-29 DIAGNOSIS — C50.919 CARCINOMA OF BREAST METASTATIC TO LUNG, UNSPECIFIED LATERALITY (HCC): ICD-10-CM

## 2025-05-29 DIAGNOSIS — C50.011 CARCINOMA OF AREOLA OF RIGHT BREAST IN FEMALE, ESTROGEN RECEPTOR POSITIVE (HCC): Primary | ICD-10-CM

## 2025-06-05 ENCOUNTER — APPOINTMENT (OUTPATIENT)
Dept: CT IMAGING | Age: 60
DRG: 178 | End: 2025-06-05
Payer: MEDICARE

## 2025-06-05 ENCOUNTER — APPOINTMENT (OUTPATIENT)
Dept: GENERAL RADIOLOGY | Age: 60
DRG: 178 | End: 2025-06-05
Payer: MEDICARE

## 2025-06-05 ENCOUNTER — HOSPITAL ENCOUNTER (INPATIENT)
Age: 60
LOS: 8 days | Discharge: HOME | DRG: 178 | End: 2025-06-13
Attending: STUDENT IN AN ORGANIZED HEALTH CARE EDUCATION/TRAINING PROGRAM | Admitting: STUDENT IN AN ORGANIZED HEALTH CARE EDUCATION/TRAINING PROGRAM
Payer: MEDICARE

## 2025-06-05 DIAGNOSIS — J98.11 ATELECTASIS OF LEFT LUNG: ICD-10-CM

## 2025-06-05 DIAGNOSIS — R07.9 CHEST PAIN, UNSPECIFIED TYPE: Primary | ICD-10-CM

## 2025-06-05 DIAGNOSIS — R06.02 SHORTNESS OF BREATH: ICD-10-CM

## 2025-06-05 DIAGNOSIS — J98.11 ATELECTASIS: ICD-10-CM

## 2025-06-05 PROBLEM — J18.9 COMMUNITY ACQUIRED PNEUMONIA OF LEFT LOWER LOBE OF LUNG: Status: ACTIVE | Noted: 2025-06-05

## 2025-06-05 LAB
ALBUMIN SERPL-MCNC: 4.4 G/DL (ref 3.5–5.2)
ALP SERPL-CCNC: 109 U/L (ref 35–104)
ALT SERPL-CCNC: 9 U/L (ref 10–35)
ANION GAP SERPL CALCULATED.3IONS-SCNC: 11 MMOL/L (ref 8–16)
AST SERPL-CCNC: 19 U/L (ref 10–35)
B PARAP IS1001 DNA NPH QL NAA+NON-PROBE: NOT DETECTED
B PERT.PT PRMT NPH QL NAA+NON-PROBE: NOT DETECTED
BASOPHILS # BLD: 0 K/UL (ref 0–0.2)
BASOPHILS NFR BLD: 0.2 % (ref 0–1)
BILIRUB SERPL-MCNC: 0.9 MG/DL (ref 0.2–1.2)
BNP BLD-MCNC: 89 PG/ML (ref 0–124)
BUN SERPL-MCNC: 12 MG/DL (ref 6–20)
C PNEUM DNA NPH QL NAA+NON-PROBE: NOT DETECTED
CALCIUM SERPL-MCNC: 9.4 MG/DL (ref 8.6–10)
CHLORIDE SERPL-SCNC: 106 MMOL/L (ref 98–107)
CO2 SERPL-SCNC: 25 MMOL/L (ref 22–29)
CREAT SERPL-MCNC: 0.8 MG/DL (ref 0.5–0.9)
EOSINOPHIL # BLD: 0 K/UL (ref 0–0.6)
EOSINOPHIL NFR BLD: 0.1 % (ref 0–5)
ERYTHROCYTE [DISTWIDTH] IN BLOOD BY AUTOMATED COUNT: 14.8 % (ref 11.5–14.5)
FLUAV RNA NPH QL NAA+NON-PROBE: NOT DETECTED
FLUBV RNA NPH QL NAA+NON-PROBE: NOT DETECTED
GLUCOSE SERPL-MCNC: 99 MG/DL (ref 70–99)
HADV DNA NPH QL NAA+NON-PROBE: NOT DETECTED
HCOV 229E RNA NPH QL NAA+NON-PROBE: NOT DETECTED
HCOV HKU1 RNA NPH QL NAA+NON-PROBE: NOT DETECTED
HCOV NL63 RNA NPH QL NAA+NON-PROBE: NOT DETECTED
HCOV OC43 RNA NPH QL NAA+NON-PROBE: NOT DETECTED
HCT VFR BLD AUTO: 41.9 % (ref 37–47)
HGB BLD-MCNC: 13.9 G/DL (ref 12–16)
HMPV RNA NPH QL NAA+NON-PROBE: NOT DETECTED
HPIV1 RNA NPH QL NAA+NON-PROBE: NOT DETECTED
HPIV2 RNA NPH QL NAA+NON-PROBE: NOT DETECTED
HPIV3 RNA NPH QL NAA+NON-PROBE: NOT DETECTED
HPIV4 RNA NPH QL NAA+NON-PROBE: NOT DETECTED
IMM GRANULOCYTES # BLD: 0 K/UL
LACTATE BLDV-SCNC: 0.6 MMOL/L (ref 0.5–1.9)
LYMPHOCYTES # BLD: 1.4 K/UL (ref 1.1–4.5)
LYMPHOCYTES NFR BLD: 15.7 % (ref 20–40)
M PNEUMO DNA NPH QL NAA+NON-PROBE: NOT DETECTED
MCH RBC QN AUTO: 30.2 PG (ref 27–31)
MCHC RBC AUTO-ENTMCNC: 33.2 G/DL (ref 33–37)
MCV RBC AUTO: 90.9 FL (ref 81–99)
MONOCYTES # BLD: 0.5 K/UL (ref 0–0.9)
MONOCYTES NFR BLD: 5.6 % (ref 0–10)
MRSA DNA SPEC QL NAA+PROBE: NOT DETECTED
NEUTROPHILS # BLD: 7.2 K/UL (ref 1.5–7.5)
NEUTS SEG NFR BLD: 78.2 % (ref 50–65)
PLATELET # BLD AUTO: 322 K/UL (ref 130–400)
PMV BLD AUTO: 8.3 FL (ref 9.4–12.3)
POTASSIUM SERPL-SCNC: 3.5 MMOL/L (ref 3.5–5.1)
PROCALCITONIN: 0.06 NG/ML (ref 0–0.09)
PROT SERPL-MCNC: 7.1 G/DL (ref 6.4–8.3)
RBC # BLD AUTO: 4.61 M/UL (ref 4.2–5.4)
RSV RNA NPH QL NAA+NON-PROBE: NOT DETECTED
RV+EV RNA NPH QL NAA+NON-PROBE: NOT DETECTED
SARS-COV-2 RNA NPH QL NAA+NON-PROBE: NOT DETECTED
SODIUM SERPL-SCNC: 142 MMOL/L (ref 136–145)
TROPONIN, HIGH SENSITIVITY: <6 NG/L (ref 0–14)
WBC # BLD AUTO: 9.2 K/UL (ref 4.8–10.8)

## 2025-06-05 PROCEDURE — 93005 ELECTROCARDIOGRAM TRACING: CPT | Performed by: STUDENT IN AN ORGANIZED HEALTH CARE EDUCATION/TRAINING PROGRAM

## 2025-06-05 PROCEDURE — 80053 COMPREHEN METABOLIC PANEL: CPT

## 2025-06-05 PROCEDURE — 2580000003 HC RX 258: Performed by: NURSE PRACTITIONER

## 2025-06-05 PROCEDURE — 96367 TX/PROPH/DG ADDL SEQ IV INF: CPT

## 2025-06-05 PROCEDURE — 84145 PROCALCITONIN (PCT): CPT

## 2025-06-05 PROCEDURE — 83880 ASSAY OF NATRIURETIC PEPTIDE: CPT

## 2025-06-05 PROCEDURE — 1200000000 HC SEMI PRIVATE

## 2025-06-05 PROCEDURE — 6360000002 HC RX W HCPCS: Performed by: NURSE PRACTITIONER

## 2025-06-05 PROCEDURE — 99285 EMERGENCY DEPT VISIT HI MDM: CPT

## 2025-06-05 PROCEDURE — 6370000000 HC RX 637 (ALT 250 FOR IP): Performed by: NURSE PRACTITIONER

## 2025-06-05 PROCEDURE — 71045 X-RAY EXAM CHEST 1 VIEW: CPT

## 2025-06-05 PROCEDURE — 6370000000 HC RX 637 (ALT 250 FOR IP)

## 2025-06-05 PROCEDURE — 87040 BLOOD CULTURE FOR BACTERIA: CPT

## 2025-06-05 PROCEDURE — 71275 CT ANGIOGRAPHY CHEST: CPT

## 2025-06-05 PROCEDURE — 6360000004 HC RX CONTRAST MEDICATION

## 2025-06-05 PROCEDURE — 96375 TX/PRO/DX INJ NEW DRUG ADDON: CPT

## 2025-06-05 PROCEDURE — 99222 1ST HOSP IP/OBS MODERATE 55: CPT | Performed by: INTERNAL MEDICINE

## 2025-06-05 PROCEDURE — 2500000003 HC RX 250 WO HCPCS: Performed by: NURSE PRACTITIONER

## 2025-06-05 PROCEDURE — 85025 COMPLETE CBC W/AUTO DIFF WBC: CPT

## 2025-06-05 PROCEDURE — 36415 COLL VENOUS BLD VENIPUNCTURE: CPT

## 2025-06-05 PROCEDURE — 2580000003 HC RX 258

## 2025-06-05 PROCEDURE — 96365 THER/PROPH/DIAG IV INF INIT: CPT

## 2025-06-05 PROCEDURE — 84484 ASSAY OF TROPONIN QUANT: CPT

## 2025-06-05 PROCEDURE — 0202U NFCT DS 22 TRGT SARS-COV-2: CPT

## 2025-06-05 PROCEDURE — 6360000002 HC RX W HCPCS

## 2025-06-05 PROCEDURE — 96368 THER/DIAG CONCURRENT INF: CPT

## 2025-06-05 PROCEDURE — 96366 THER/PROPH/DIAG IV INF ADDON: CPT

## 2025-06-05 PROCEDURE — 83605 ASSAY OF LACTIC ACID: CPT

## 2025-06-05 PROCEDURE — 87641 MR-STAPH DNA AMP PROBE: CPT

## 2025-06-05 RX ORDER — EXEMESTANE 25 MG/1
25 TABLET ORAL DAILY
Status: DISCONTINUED | OUTPATIENT
Start: 2025-06-06 | End: 2025-06-13 | Stop reason: HOSPADM

## 2025-06-05 RX ORDER — CODEINE PHOSPHATE AND GUAIFENESIN 10; 100 MG/5ML; MG/5ML
5 SOLUTION ORAL ONCE
Status: COMPLETED | OUTPATIENT
Start: 2025-06-05 | End: 2025-06-05

## 2025-06-05 RX ORDER — ACETAMINOPHEN 325 MG/1
650 TABLET ORAL EVERY 6 HOURS PRN
Status: DISCONTINUED | OUTPATIENT
Start: 2025-06-05 | End: 2025-06-13 | Stop reason: HOSPADM

## 2025-06-05 RX ORDER — POTASSIUM CHLORIDE 1500 MG/1
40 TABLET, EXTENDED RELEASE ORAL PRN
Status: DISCONTINUED | OUTPATIENT
Start: 2025-06-05 | End: 2025-06-13 | Stop reason: HOSPADM

## 2025-06-05 RX ORDER — ACETAMINOPHEN 650 MG/1
650 SUPPOSITORY RECTAL EVERY 6 HOURS PRN
Status: DISCONTINUED | OUTPATIENT
Start: 2025-06-05 | End: 2025-06-13 | Stop reason: HOSPADM

## 2025-06-05 RX ORDER — ONDANSETRON 4 MG/1
4 TABLET, ORALLY DISINTEGRATING ORAL EVERY 8 HOURS PRN
Status: DISCONTINUED | OUTPATIENT
Start: 2025-06-05 | End: 2025-06-13 | Stop reason: HOSPADM

## 2025-06-05 RX ORDER — SODIUM CHLORIDE 9 MG/ML
INJECTION, SOLUTION INTRAVENOUS CONTINUOUS
Status: ACTIVE | OUTPATIENT
Start: 2025-06-05 | End: 2025-06-06

## 2025-06-05 RX ORDER — GUAIFENESIN 600 MG/1
600 TABLET, EXTENDED RELEASE ORAL 2 TIMES DAILY
Status: DISCONTINUED | OUTPATIENT
Start: 2025-06-05 | End: 2025-06-13 | Stop reason: HOSPADM

## 2025-06-05 RX ORDER — ONDANSETRON 2 MG/ML
4 INJECTION INTRAMUSCULAR; INTRAVENOUS ONCE
Status: COMPLETED | OUTPATIENT
Start: 2025-06-05 | End: 2025-06-05

## 2025-06-05 RX ORDER — SODIUM CHLORIDE 9 MG/ML
INJECTION, SOLUTION INTRAVENOUS PRN
Status: DISCONTINUED | OUTPATIENT
Start: 2025-06-05 | End: 2025-06-13 | Stop reason: HOSPADM

## 2025-06-05 RX ORDER — ENOXAPARIN SODIUM 100 MG/ML
40 INJECTION SUBCUTANEOUS DAILY
Status: DISCONTINUED | OUTPATIENT
Start: 2025-06-05 | End: 2025-06-13 | Stop reason: HOSPADM

## 2025-06-05 RX ORDER — IPRATROPIUM BROMIDE AND ALBUTEROL SULFATE 2.5; .5 MG/3ML; MG/3ML
1 SOLUTION RESPIRATORY (INHALATION)
Status: DISCONTINUED | OUTPATIENT
Start: 2025-06-05 | End: 2025-06-13 | Stop reason: HOSPADM

## 2025-06-05 RX ORDER — HYDROXYZINE HYDROCHLORIDE 10 MG/1
10 TABLET, FILM COATED ORAL 3 TIMES DAILY PRN
Status: DISCONTINUED | OUTPATIENT
Start: 2025-06-05 | End: 2025-06-13 | Stop reason: HOSPADM

## 2025-06-05 RX ORDER — MORPHINE SULFATE 4 MG/ML
4 INJECTION, SOLUTION INTRAMUSCULAR; INTRAVENOUS ONCE
Refills: 0 | Status: COMPLETED | OUTPATIENT
Start: 2025-06-05 | End: 2025-06-05

## 2025-06-05 RX ORDER — IOPAMIDOL 755 MG/ML
75 INJECTION, SOLUTION INTRAVASCULAR
Status: COMPLETED | OUTPATIENT
Start: 2025-06-05 | End: 2025-06-05

## 2025-06-05 RX ORDER — HYDROCODONE BITARTRATE AND ACETAMINOPHEN 10; 325 MG/1; MG/1
1 TABLET ORAL EVERY 6 HOURS PRN
Status: DISCONTINUED | OUTPATIENT
Start: 2025-06-05 | End: 2025-06-13 | Stop reason: HOSPADM

## 2025-06-05 RX ORDER — GABAPENTIN 600 MG/1
600 TABLET ORAL 3 TIMES DAILY
Status: DISCONTINUED | OUTPATIENT
Start: 2025-06-05 | End: 2025-06-13 | Stop reason: HOSPADM

## 2025-06-05 RX ORDER — POLYETHYLENE GLYCOL 3350 17 G/17G
17 POWDER, FOR SOLUTION ORAL DAILY PRN
Status: DISCONTINUED | OUTPATIENT
Start: 2025-06-05 | End: 2025-06-13 | Stop reason: HOSPADM

## 2025-06-05 RX ORDER — SODIUM CHLORIDE 0.9 % (FLUSH) 0.9 %
5-40 SYRINGE (ML) INJECTION PRN
Status: DISCONTINUED | OUTPATIENT
Start: 2025-06-05 | End: 2025-06-13 | Stop reason: HOSPADM

## 2025-06-05 RX ORDER — SODIUM CHLORIDE 0.9 % (FLUSH) 0.9 %
5-40 SYRINGE (ML) INJECTION EVERY 12 HOURS SCHEDULED
Status: DISCONTINUED | OUTPATIENT
Start: 2025-06-05 | End: 2025-06-13 | Stop reason: HOSPADM

## 2025-06-05 RX ORDER — MAGNESIUM SULFATE IN WATER 40 MG/ML
2000 INJECTION, SOLUTION INTRAVENOUS PRN
Status: DISCONTINUED | OUTPATIENT
Start: 2025-06-05 | End: 2025-06-13 | Stop reason: HOSPADM

## 2025-06-05 RX ORDER — ONDANSETRON 2 MG/ML
4 INJECTION INTRAMUSCULAR; INTRAVENOUS EVERY 6 HOURS PRN
Status: DISCONTINUED | OUTPATIENT
Start: 2025-06-05 | End: 2025-06-13 | Stop reason: HOSPADM

## 2025-06-05 RX ORDER — POTASSIUM CHLORIDE 7.45 MG/ML
10 INJECTION INTRAVENOUS PRN
Status: DISCONTINUED | OUTPATIENT
Start: 2025-06-05 | End: 2025-06-13 | Stop reason: HOSPADM

## 2025-06-05 RX ORDER — ACETYLCYSTEINE 200 MG/ML
600 SOLUTION ORAL; RESPIRATORY (INHALATION) EVERY 12 HOURS PRN
Status: DISCONTINUED | OUTPATIENT
Start: 2025-06-05 | End: 2025-06-07

## 2025-06-05 RX ORDER — KETOROLAC TROMETHAMINE 30 MG/ML
30 INJECTION, SOLUTION INTRAMUSCULAR; INTRAVENOUS ONCE
Status: COMPLETED | OUTPATIENT
Start: 2025-06-05 | End: 2025-06-05

## 2025-06-05 RX ADMIN — SODIUM CHLORIDE: 0.9 INJECTION, SOLUTION INTRAVENOUS at 23:15

## 2025-06-05 RX ADMIN — KETOROLAC TROMETHAMINE 30 MG: 30 INJECTION, SOLUTION INTRAMUSCULAR at 14:19

## 2025-06-05 RX ADMIN — IOPAMIDOL 75 ML: 755 INJECTION, SOLUTION INTRAVENOUS at 14:11

## 2025-06-05 RX ADMIN — VANCOMYCIN HYDROCHLORIDE 1750 MG: 10 INJECTION, POWDER, LYOPHILIZED, FOR SOLUTION INTRAVENOUS at 15:22

## 2025-06-05 RX ADMIN — HYDROCODONE BITARTRATE AND ACETAMINOPHEN 1 TABLET: 10; 325 TABLET ORAL at 23:08

## 2025-06-05 RX ADMIN — GUAIFENESIN AND CODEINE PHOSPHATE 5 ML: 100; 10 SOLUTION ORAL at 16:44

## 2025-06-05 RX ADMIN — CEFEPIME 2000 MG: 2 INJECTION, POWDER, FOR SOLUTION INTRAVENOUS at 15:00

## 2025-06-05 RX ADMIN — GABAPENTIN 600 MG: 600 TABLET, FILM COATED ORAL at 23:08

## 2025-06-05 RX ADMIN — MORPHINE SULFATE 4 MG: 4 INJECTION, SOLUTION INTRAMUSCULAR; INTRAVENOUS at 16:01

## 2025-06-05 RX ADMIN — GUAIFENESIN 600 MG: 600 TABLET, EXTENDED RELEASE ORAL at 23:08

## 2025-06-05 RX ADMIN — ENOXAPARIN SODIUM 40 MG: 100 INJECTION SUBCUTANEOUS at 23:07

## 2025-06-05 RX ADMIN — ONDANSETRON 4 MG: 2 INJECTION, SOLUTION INTRAMUSCULAR; INTRAVENOUS at 16:01

## 2025-06-05 RX ADMIN — SODIUM CHLORIDE, PRESERVATIVE FREE 10 ML: 5 INJECTION INTRAVENOUS at 23:07

## 2025-06-05 SDOH — ECONOMIC STABILITY: FOOD INSECURITY: WITHIN THE PAST 12 MONTHS, YOU WORRIED THAT YOUR FOOD WOULD RUN OUT BEFORE YOU GOT MONEY TO BUY MORE.: NEVER TRUE

## 2025-06-05 SDOH — ECONOMIC STABILITY: INCOME INSECURITY: HOW HARD IS IT FOR YOU TO PAY FOR THE VERY BASICS LIKE FOOD, HOUSING, MEDICAL CARE, AND HEATING?: NOT HARD AT ALL

## 2025-06-05 SDOH — ECONOMIC STABILITY: INCOME INSECURITY: IN THE PAST 12 MONTHS, HAS THE ELECTRIC, GAS, OIL, OR WATER COMPANY THREATENED TO SHUT OFF SERVICE IN YOUR HOME?: NO

## 2025-06-05 ASSESSMENT — PAIN SCALES - GENERAL
PAINLEVEL_OUTOF10: 9
PAINLEVEL_OUTOF10: 7
PAINLEVEL_OUTOF10: 10

## 2025-06-05 ASSESSMENT — PAIN DESCRIPTION - LOCATION
LOCATION: CHEST
LOCATION: CHEST

## 2025-06-05 ASSESSMENT — PATIENT HEALTH QUESTIONNAIRE - PHQ9
SUM OF ALL RESPONSES TO PHQ QUESTIONS 1-9: 2
1. LITTLE INTEREST OR PLEASURE IN DOING THINGS: SEVERAL DAYS
2. FEELING DOWN, DEPRESSED OR HOPELESS: SEVERAL DAYS
SUM OF ALL RESPONSES TO PHQ QUESTIONS 1-9: 2

## 2025-06-05 ASSESSMENT — PAIN DESCRIPTION - ORIENTATION: ORIENTATION: INNER

## 2025-06-05 ASSESSMENT — PAIN DESCRIPTION - DESCRIPTORS: DESCRIPTORS: ACHING;SORE

## 2025-06-05 NOTE — PROGRESS NOTES
[facility]   Pharmacy Pharmacokinetic Monitoring Service - Vancomycin     Yandy Dickinson is a 60 y.o. female starting on vancomycin therapy for pneumonia CAP. Pharmacy consulted by Pastora ZAMAN for monitoring and adjustment.    Target Concentration: Goal AUC/BUCK 400-600 mg*hr/L    Additional Antimicrobials: Cefepime    Pertinent Laboratory Values:   Wt Readings from Last 1 Encounters:   06/05/25 74.8 kg (165 lb)     Temp Readings from Last 1 Encounters:   06/05/25 97.7 °F (36.5 °C)     Estimated Creatinine Clearance: 79 mL/min (based on SCr of 0.8 mg/dL).  Recent Labs     06/05/25  1254   CREATININE 0.8   BUN 12   WBC 9.2     Procalcitonin: N/A    Pertinent Cultures:  Culture Date Source Results   06/05/25 Blood x 2 ordered   MRSA Nasal Swab: not ordered. Order placed by pharmacy.    Plan:  Dosing recommendations based on Bayesian software  Start vancomycin 1750 mg IV x 1 dose followed by 1000 mg IV Q 12 hours.  Anticipated AUC of 490 and trough concentration of 13.1 at steady state  Renal labs as indicated   Vancomycin concentration ordered for 06/06/25 @ 1500   Pharmacy will continue to monitor patient and adjust therapy as indicated    Thank you for the consult,  Hugh Burroughs RPH  6/5/2025 3:02 PM

## 2025-06-05 NOTE — H&P
[facility]      Hospitalist - History & Physical      PCP: Tomi Teague MD    Date of Admission: 6/5/2025    Date of Service: 6/5/2025    Chief Complaint: Chest pain and shortness of breath     History Of Present Illness:   The patient is a 60 y.o. female who presented to [facility] ED for evaluation of chest pain and shortness of breath. Pt has history of breast malignancy with metastasis to lungs followed by Dr. Jim and Dr. Roberto, hypertension and neuropathy.     Pt reports intermittent chest pain with worsening cough and shortness of breath from baseline since yesterday. She has had episodes of nausea and vomiting today. She denies fevers as well as abdominal pain. She underwent bronchoscopy by Dr. Roberto 5/27/2025.     In ED, CTA pulmonary-Negative for CTA evidence to suggest pulmonary embolism Interval progression in consolidative infiltrate in the left lower lobe, perhaps representing post radiation pneumonitis and/or infection. The previously shown 11 mm diameter left lower lobe nodule is not discretely visualized on the current examination, perhaps because it is obscured by surrounding infiltrate, or perhaps the nodule has decreased in size or resolved. 5 mm diameter nodule in the left lower lobe, which appears new versus prior CT Complete atelectasis of the left upper lobe, and complete occlusion of the left upper lobe bronchus. Pt is admitted inpatient to hospitalist.        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 *     Principal Problem:    Community acquired pneumonia of left lower lobe of lung  -cefepime 2g iv q12hrs  -pharmacy to dose vancomycin  -sputum culture  -procalcitonin  -mrsa nasal swab  -duoneb breathing treatment q4hrs  -mucomyst neb q12hrs prn  -mucinex 600mg po bid  -ns at 75cc/hr  -continuous pulse oximetry  -bnp  -I's and O's  -daily weight   Active Problems:    Carcinoma of female breast with metastasis to lung      -consult oncology   -consult pulmonology   -npo after midnight    RUDY (obstructive sleep apnea)   -cpap/bipap qhs  Resolved Problems:    * No resolved hospital problems. *  Signed:  BRITANY Henry - CNP, 6/5/2025 5:05 PM

## 2025-06-05 NOTE — ED PROVIDER NOTES
[facility] EMERGENCY DEPARTMENT  EMERGENCY DEPARTMENT ENCOUNTER      Pt Name: Yandy Dickinson  MRN: [id number]  Birthdate 1965  Date of evaluation: 6/5/2025  Provider: Pastora Torres PA-C    CHIEF COMPLAINT       Chief Complaint   Patient presents with    Chest Pain     Chest pain worse with breathing onset yesterday, recent diagnosis of cancer with lung nodules per pt    Shortness of Breath     HISTORY OF PRESENT ILLNESS   (Location/Symptom, Timing/Onset,Context/Setting, Quality, Duration, Modifying Factors, Severity)  Note limiting factors.   HPI    Yandy Dickinson is a 60 y.o. female with a past medical history significant for breast cancer with lung metastasis, hypertension, neuropathy, pneumonia who presents to the emergency department with a chief complaint of pleuritic chest pain.  Patient states that yesterday she started having chest pain that is worse with breathing.  She is currently being treated for breast cancer with lung metastasis.  Also recently had a bronchoscopy.  States that since the bronchoscopy she has been having clear mucus with the cough, but denies fevers, chills.  Endorses shortness of breath, and pleuritic chest pain.  Localizes her pain to the substernal region.  Denies leg swelling, weakness, numbness, tingling, palpitations and has no additional complaints at this time.    Patient endorses a history of blood clots, had a clot in her left upper extremity, is not on blood thinners currently.    In chart review, she did have bronchoscopy completed on 5/27, by Dr. Alcantara.  Lesion in the left lower lobe was aspirated and needle biopsies were sent off.  There is no endobronchial disease seen, no evidence of pathologically enlarged lymphadenopathy in the mediastinum at that time.    Nursing Notes were reviewed.    Limitations to history: None  Outside historians none    REVIEW OF SYSTEMS    (2-9 systems for level 4, 10 or more for level 5)     Review of Systems

## 2025-06-05 NOTE — ED NOTES
ED TO INPATIENT SBAR HANDOFF    Patient Name: Yandy Dickinson   : 1965  60 y.o.   Family/Caregiver Present: Yes  Code Status Order: Full Code    C-SSRS: Risk of Suicide: No Risk  Sitter No  Restraints:         Situation  Chief Complaint:   Chief Complaint   Patient presents with    Chest Pain     Chest pain worse with breathing onset yesterday, recent diagnosis of cancer with lung nodules per pt    Shortness of Breath     Patient Diagnosis: Community acquired pneumonia of left lower lobe of lung [J18.9]     Brief Description of Patient's Condition: Pt presented to ED w c/o CP and SOB, CP worse with deep breathing. Pt is being treated for Breast cancer w mets to Lung. CT scan showed atelectasis and pneumonia. Started on cefepime and vanco. Has been given toradol and morphine for pain. A&o. VSS. 18g R AC. Up ad nikita.  Mental Status: oriented and alert  Arrived from: home    Imaging:   CTA PULMONARY W CONTRAST   Final Result   Negative for CTA evidence to suggest pulmonary embolism   Interval progression in consolidative infiltrate in the left lower lobe, perhaps representing post radiation pneumonitis and/or infection. The previously shown 11 mm diameter left lower lobe nodule is not discretely visualized on the current examination,    perhaps because it is obscured by surrounding infiltrate, or perhaps the nodule has decreased in size or resolved.   5 mm diameter nodule in the left lower lobe, which appears new versus prior CT   Complete atelectasis of the left upper lobe, and complete occlusion of the left upper lobe bronchus   Trace left pleural effusion   Hepatic steatosis   Diverticulosis        All CT scans are performed using dose optimization techniques as appropriate to the performed exam and include    at least one of the following: Automated exposure control, adjustment of the mA and/or kV according to size, and the use of iterative reconstruction technique.       Assessment 0 06/05/25 1257   Dressing Status New dressing applied;Clean, dry & intact 06/05/25 1257   Dressing Intervention New 06/05/25 1257     Pertinent or High Risk Medications/Drips: no   If Yes, please provide details:    Blood Product Administration: no  If Yes, please provide details:    Sepsis Risk Score      Admitted with Sepsis? No    Recommendation  Incomplete orders: hospitalist  Patient Belongings: w pt  Additional Comments: will need IV pole  If any further questions, please call Sending RN at [phone]    Electronically signed by: Electronically signed by Suresh Burroughs RN on 6/5/2025 at 4:55 PM

## 2025-06-05 NOTE — PROGRESS NOTES
Pharmacy Adjustment per [facility] protocol    Yandy Dickinson is a 60 y.o. female. Pharmacy has adjusted medications per [facility] protocol.    Recent Labs     06/05/25  1254   BUN 12       Recent Labs     06/05/25  1254   CREATININE 0.8       Estimated Creatinine Clearance: 79 mL/min (based on SCr of 0.8 mg/dL).    Height:   Ht Readings from Last 1 Encounters:   05/27/25 1.676 m (5' 6\")     Weight:  Wt Readings from Last 1 Encounters:   06/05/25 74.8 kg (165 lb)    BMI:  BMI Readings from Last 1 Encounters:   06/05/25 26.63 kg/m²         Plan: Adjust the following medications based on [facility] protocol:  Cefepime to 2000 mg IV every 8 hours extended infusion over 240 minutes (loading dose of 2 gm was given in ED at 1500 today)                   Electronically signed by Coretta Nicole RPH on 6/5/2025 at 5:09 PM

## 2025-06-05 NOTE — CONSULTS
adenopathy is also similarly smaller.  06/07/2017- CT Chest W Contrast Multiple lung nodules seen bilaterally. Most of the lung nodules show interval improvement with decreased size by 1 to 3mm. Mediastinal and bilateral hilar adenopathy seen with improvement.  07/24/2017- PET/CT scan showed marked improvement, is minimal abnormal, has excellent response to therapy  02/01/2018- PET/CT scan Numerous bilateral lung masses and nodules, the majority of which do not have appreciable abnormal FDG uptake. The largest mass in the left infrahilar region of the left lower lobe demonstrates a maximum SUV of 3.3. Mediastinal lymphadenopathy. No evidence of metastatic disease in the abdomen or pelvis.   06/21/2018- CT Chest/Abdomen/Pelvis Multiple lung mets, mild bilateral hilar and mediastinal lymph nodes. No masses or evidence of metastatic disease in the abdomen or pelvis  06/27/2018-09/17/2018- Gemzar  09/28/2018- CT Pulmonary Multiple lung mets, mild bilateral hilar and mediastinal lymph nodes  10/10/2018-12/21/2020- Faslodex every 4 weeks  12/14/2018- CT Chest showed definite decrease in the maximal diameter and volume of all left and right metastatic nodules. Somewhat enlarged paratracheal and left axillary lymph nodes are relatively unchanged.   04/22/2019- MRI Cervical Spine W WO Contrast Unremarkable  04/22/2019- MRI Brain W WO Contrast No enhancing lesions in the brain and no evidence of metastatic disease.  07/27/2019- CTA Pulmonary showed essentially complete disappearance of the pulmonary metastatic disease. Several tiny flat peripheral nodules are the only sequela. The tiny ones on the right are unchanged, the tiny ones on the left are even smaller. There is no longer any active metastatic disease in either lung.   12/19/2019- CT Chest/Abdomen/Pelvis Small, tiny subpleural nodules right upper and right midlung field as well as left lung base has remained unchanged. No new focal parenchymal abnormality seen. No  characterization of this iliac lesion could be obtained with an MRI of the pelvis with and without contrast if it would change the patient's treatment regimen.    3/24/25 Tumor Markers:  CEA 1.9, CA 15-3-21, CA 27.29- 37.5   4/21/25 MRI Pelvis W WO Contrast ([facility]) There is abnormal increased T2 and diminished T1 signal in the proximal RIGHT femur. I suspect an area of osteonecrosis in the anterior aspect of the femoral head. The marrow signal changes are fairly diffuse in the proximal RIGHT femur rather than a focal lesion. This amount of edema could be related to ongoing osteonecrosis in the femoral head. The area of interest previously seen in the LEFT iliac wing is small and very difficult to delineate on this exam. This is at the cephalad cortical margin along the cortex and remains indeterminate. This area is high on T2 and low on T1 in regards to signal and measures approximately 1.3 x 0.9 x 1.0 cm.  At the area of increased uptake on the PET/CT at the cephalad aspect of the LEFT iliac wing, there is a small area of increased T2 signal at the insertion site of the quadratus lumborum muscle. Subtle focal increased T2 signal at this location with mild diminished T1 signal in the cortex but no true destructive bony lesion identified. This may represent an area of enthesitis at the muscular insertion; however, a very subtle/early metastatic lesion is not entirely excluded but felt to be less likely. Short-term follow-up is recommended in 3 months.  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 I don't see a definite discrete bone lesion in the proximal femur to suggest metastatic disease in the proximal femur; however, this area also can be followed in 3 months. I suspect that this is an acute/ongoing area of

## 2025-06-06 ENCOUNTER — ANESTHESIA EVENT (OUTPATIENT)
Dept: ENDOSCOPY | Age: 60
End: 2025-06-06
Payer: MEDICARE

## 2025-06-06 ENCOUNTER — ANCILLARY PROCEDURE (OUTPATIENT)
Dept: ENDOSCOPY | Age: 60
DRG: 178 | End: 2025-06-06
Attending: INTERNAL MEDICINE
Payer: MEDICARE

## 2025-06-06 ENCOUNTER — APPOINTMENT (OUTPATIENT)
Dept: GENERAL RADIOLOGY | Age: 60
DRG: 178 | End: 2025-06-06
Payer: MEDICARE

## 2025-06-06 ENCOUNTER — ANESTHESIA (OUTPATIENT)
Dept: ENDOSCOPY | Age: 60
End: 2025-06-06
Payer: MEDICARE

## 2025-06-06 PROBLEM — J98.11 ATELECTASIS: Status: ACTIVE | Noted: 2025-06-05

## 2025-06-06 LAB
ANION GAP SERPL CALCULATED.3IONS-SCNC: 13 MMOL/L (ref 8–16)
APPEARANCE FLD: NORMAL
BASOPHILS NFR FLD MANUAL: 6 %
BODY FLD TYPE: NORMAL
BUN SERPL-MCNC: 13 MG/DL (ref 6–20)
CALCIUM SERPL-MCNC: 9 MG/DL (ref 8.6–10)
CHLORIDE SERPL-SCNC: 108 MMOL/L (ref 98–107)
CLOT EVALUATION: NORMAL
CO2 SERPL-SCNC: 24 MMOL/L (ref 22–29)
COLOR FLD: NORMAL
CREAT SERPL-MCNC: 0.8 MG/DL (ref 0.5–0.9)
ERYTHROCYTE [DISTWIDTH] IN BLOOD BY AUTOMATED COUNT: 15.2 % (ref 11.5–14.5)
GLUCOSE SERPL-MCNC: 112 MG/DL (ref 70–99)
HCT VFR BLD AUTO: 38.6 % (ref 37–47)
HGB BLD-MCNC: 12.4 G/DL (ref 12–16)
KOH PREP SPEC: NORMAL
LYMPHOCYTES NFR FLD: 49 %
MACROPHAGES NFR FLD MANUAL: 8 %
MCH RBC QN AUTO: 29.8 PG (ref 27–31)
MCHC RBC AUTO-ENTMCNC: 32.1 G/DL (ref 33–37)
MCV RBC AUTO: 92.8 FL (ref 81–99)
MONOCYTES NFR FLD: 10 %
NEUTROPHILS NFR FLD: 27 %
NUCLEATED CELLS FLUID: 38 /CUMM
PLATELET # BLD AUTO: 308 K/UL (ref 130–400)
PMV BLD AUTO: 9.2 FL (ref 9.4–12.3)
POTASSIUM SERPL-SCNC: 3.6 MMOL/L (ref 3.5–5)
RBC # BLD AUTO: 4.16 M/UL (ref 4.2–5.4)
RBC # FLD: 6500 /CUMM
SODIUM SERPL-SCNC: 145 MMOL/L (ref 136–145)
TOTAL CELLS COUNTED FLD: 100
VANCOMYCIN TROUGH SERPL-MCNC: 8.9 UG/ML (ref 10–20)
WBC # BLD AUTO: 8 K/UL (ref 4.8–10.8)

## 2025-06-06 PROCEDURE — 0B9G8ZX DRAINAGE OF LEFT UPPER LUNG LOBE, VIA NATURAL OR ARTIFICIAL OPENING ENDOSCOPIC, DIAGNOSTIC: ICD-10-PCS | Performed by: INTERNAL MEDICINE

## 2025-06-06 PROCEDURE — 89051 BODY FLUID CELL COUNT: CPT

## 2025-06-06 PROCEDURE — 2580000003 HC RX 258: Performed by: INTERNAL MEDICINE

## 2025-06-06 PROCEDURE — 3700000001 HC ADD 15 MINUTES (ANESTHESIA): Performed by: INTERNAL MEDICINE

## 2025-06-06 PROCEDURE — 0BC88ZZ EXTIRPATION OF MATTER FROM LEFT UPPER LOBE BRONCHUS, VIA NATURAL OR ARTIFICIAL OPENING ENDOSCOPIC: ICD-10-PCS | Performed by: INTERNAL MEDICINE

## 2025-06-06 PROCEDURE — 6360000002 HC RX W HCPCS: Performed by: INTERNAL MEDICINE

## 2025-06-06 PROCEDURE — 2709999900 HC NON-CHARGEABLE SUPPLY: Performed by: INTERNAL MEDICINE

## 2025-06-06 PROCEDURE — 88305 TISSUE EXAM BY PATHOLOGIST: CPT | Performed by: PATHOLOGY

## 2025-06-06 PROCEDURE — 99231 SBSQ HOSP IP/OBS SF/LOW 25: CPT | Performed by: INTERNAL MEDICINE

## 2025-06-06 PROCEDURE — 6360000002 HC RX W HCPCS

## 2025-06-06 PROCEDURE — 2580000003 HC RX 258

## 2025-06-06 PROCEDURE — 6370000000 HC RX 637 (ALT 250 FOR IP): Performed by: NURSE PRACTITIONER

## 2025-06-06 PROCEDURE — 36415 COLL VENOUS BLD VENIPUNCTURE: CPT

## 2025-06-06 PROCEDURE — 80202 ASSAY OF VANCOMYCIN: CPT

## 2025-06-06 PROCEDURE — 87206 SMEAR FLUORESCENT/ACID STAI: CPT

## 2025-06-06 PROCEDURE — 87116 MYCOBACTERIA CULTURE: CPT

## 2025-06-06 PROCEDURE — 87077 CULTURE AEROBIC IDENTIFY: CPT

## 2025-06-06 PROCEDURE — 3609011300 HC BRONCHOSCOPY BRONCHIAL/ENDOBRNCL BX 1+ SITES: Performed by: INTERNAL MEDICINE

## 2025-06-06 PROCEDURE — 87205 SMEAR GRAM STAIN: CPT

## 2025-06-06 PROCEDURE — 99222 1ST HOSP IP/OBS MODERATE 55: CPT | Performed by: INTERNAL MEDICINE

## 2025-06-06 PROCEDURE — 71045 X-RAY EXAM CHEST 1 VIEW: CPT

## 2025-06-06 PROCEDURE — 6370000000 HC RX 637 (ALT 250 FOR IP): Performed by: INTERNAL MEDICINE

## 2025-06-06 PROCEDURE — 1200000000 HC SEMI PRIVATE

## 2025-06-06 PROCEDURE — 31645 BRNCHSC W/THER ASPIR 1ST: CPT | Performed by: INTERNAL MEDICINE

## 2025-06-06 PROCEDURE — 87102 FUNGUS ISOLATION CULTURE: CPT

## 2025-06-06 PROCEDURE — 31625 BRONCHOSCOPY W/BIOPSY(S): CPT | Performed by: INTERNAL MEDICINE

## 2025-06-06 PROCEDURE — 31624 DX BRONCHOSCOPE/LAVAGE: CPT | Performed by: INTERNAL MEDICINE

## 2025-06-06 PROCEDURE — 80048 BASIC METABOLIC PNL TOTAL CA: CPT

## 2025-06-06 PROCEDURE — 2500000003 HC RX 250 WO HCPCS: Performed by: INTERNAL MEDICINE

## 2025-06-06 PROCEDURE — 6360000002 HC RX W HCPCS: Performed by: NURSE ANESTHETIST, CERTIFIED REGISTERED

## 2025-06-06 PROCEDURE — 7100000000 HC PACU RECOVERY - FIRST 15 MIN: Performed by: INTERNAL MEDICINE

## 2025-06-06 PROCEDURE — 87070 CULTURE OTHR SPECIMN AEROBIC: CPT

## 2025-06-06 PROCEDURE — 6360000002 HC RX W HCPCS: Performed by: NURSE PRACTITIONER

## 2025-06-06 PROCEDURE — 88305 TISSUE EXAM BY PATHOLOGIST: CPT

## 2025-06-06 PROCEDURE — 87015 SPECIMEN INFECT AGNT CONCNTJ: CPT

## 2025-06-06 PROCEDURE — 94761 N-INVAS EAR/PLS OXIMETRY MLT: CPT

## 2025-06-06 PROCEDURE — 94640 AIRWAY INHALATION TREATMENT: CPT

## 2025-06-06 PROCEDURE — 2500000003 HC RX 250 WO HCPCS: Performed by: NURSE ANESTHETIST, CERTIFIED REGISTERED

## 2025-06-06 PROCEDURE — 85027 COMPLETE CBC AUTOMATED: CPT

## 2025-06-06 PROCEDURE — 3700000000 HC ANESTHESIA ATTENDED CARE: Performed by: INTERNAL MEDICINE

## 2025-06-06 PROCEDURE — 2580000003 HC RX 258: Performed by: NURSE PRACTITIONER

## 2025-06-06 PROCEDURE — 6370000000 HC RX 637 (ALT 250 FOR IP): Performed by: STUDENT IN AN ORGANIZED HEALTH CARE EDUCATION/TRAINING PROGRAM

## 2025-06-06 PROCEDURE — 7100000001 HC PACU RECOVERY - ADDTL 15 MIN: Performed by: INTERNAL MEDICINE

## 2025-06-06 RX ORDER — METHYLPREDNISOLONE SODIUM SUCCINATE 125 MG/2ML
60 INJECTION INTRAMUSCULAR; INTRAVENOUS EVERY 6 HOURS
Status: DISCONTINUED | OUTPATIENT
Start: 2025-06-06 | End: 2025-06-07 | Stop reason: SDUPTHER

## 2025-06-06 RX ORDER — BUDESONIDE 0.5 MG/2ML
0.5 INHALANT ORAL
Status: DISCONTINUED | OUTPATIENT
Start: 2025-06-06 | End: 2025-06-13 | Stop reason: HOSPADM

## 2025-06-06 RX ORDER — NALOXONE HYDROCHLORIDE 0.4 MG/ML
INJECTION, SOLUTION INTRAMUSCULAR; INTRAVENOUS; SUBCUTANEOUS PRN
Status: DISCONTINUED | OUTPATIENT
Start: 2025-06-06 | End: 2025-06-06 | Stop reason: HOSPADM

## 2025-06-06 RX ORDER — ARFORMOTEROL TARTRATE 15 UG/2ML
15 SOLUTION RESPIRATORY (INHALATION)
Status: DISCONTINUED | OUTPATIENT
Start: 2025-06-06 | End: 2025-06-13 | Stop reason: HOSPADM

## 2025-06-06 RX ORDER — HYDROMORPHONE HYDROCHLORIDE 1 MG/ML
0.25 INJECTION, SOLUTION INTRAMUSCULAR; INTRAVENOUS; SUBCUTANEOUS EVERY 5 MIN PRN
Status: DISCONTINUED | OUTPATIENT
Start: 2025-06-06 | End: 2025-06-06 | Stop reason: HOSPADM

## 2025-06-06 RX ORDER — SODIUM CHLORIDE 0.9 % (FLUSH) 0.9 %
5-40 SYRINGE (ML) INJECTION EVERY 12 HOURS SCHEDULED
Status: DISCONTINUED | OUTPATIENT
Start: 2025-06-06 | End: 2025-06-06 | Stop reason: HOSPADM

## 2025-06-06 RX ORDER — WATER 10 ML/10ML
INJECTION INTRAMUSCULAR; INTRAVENOUS; SUBCUTANEOUS
Status: DISPENSED
Start: 2025-06-06 | End: 2025-06-07

## 2025-06-06 RX ORDER — FENTANYL CITRATE 50 UG/ML
INJECTION, SOLUTION INTRAMUSCULAR; INTRAVENOUS
Status: DISCONTINUED | OUTPATIENT
Start: 2025-06-06 | End: 2025-06-06 | Stop reason: SDUPTHER

## 2025-06-06 RX ORDER — ONDANSETRON 2 MG/ML
4 INJECTION INTRAMUSCULAR; INTRAVENOUS
Status: DISCONTINUED | OUTPATIENT
Start: 2025-06-06 | End: 2025-06-06 | Stop reason: HOSPADM

## 2025-06-06 RX ORDER — HYDROCODONE POLISTIREX AND CHLORPHENIRAMINE POLISTIREX 10; 8 MG/5ML; MG/5ML
5 SUSPENSION, EXTENDED RELEASE ORAL EVERY 12 HOURS PRN
Status: DISCONTINUED | OUTPATIENT
Start: 2025-06-06 | End: 2025-06-13 | Stop reason: HOSPADM

## 2025-06-06 RX ORDER — SODIUM CHLORIDE 9 MG/ML
INJECTION, SOLUTION INTRAVENOUS PRN
Status: DISCONTINUED | OUTPATIENT
Start: 2025-06-06 | End: 2025-06-06 | Stop reason: HOSPADM

## 2025-06-06 RX ORDER — HYDROMORPHONE HYDROCHLORIDE 1 MG/ML
0.5 INJECTION, SOLUTION INTRAMUSCULAR; INTRAVENOUS; SUBCUTANEOUS EVERY 5 MIN PRN
Status: DISCONTINUED | OUTPATIENT
Start: 2025-06-06 | End: 2025-06-06 | Stop reason: HOSPADM

## 2025-06-06 RX ORDER — PROPOFOL 10 MG/ML
INJECTION, EMULSION INTRAVENOUS
Status: DISCONTINUED | OUTPATIENT
Start: 2025-06-06 | End: 2025-06-06 | Stop reason: SDUPTHER

## 2025-06-06 RX ORDER — LIDOCAINE HYDROCHLORIDE 10 MG/ML
INJECTION, SOLUTION EPIDURAL; INFILTRATION; INTRACAUDAL; PERINEURAL
Status: DISCONTINUED | OUTPATIENT
Start: 2025-06-06 | End: 2025-06-06 | Stop reason: SDUPTHER

## 2025-06-06 RX ORDER — ROCURONIUM BROMIDE 10 MG/ML
INJECTION, SOLUTION INTRAVENOUS
Status: DISCONTINUED | OUTPATIENT
Start: 2025-06-06 | End: 2025-06-06 | Stop reason: SDUPTHER

## 2025-06-06 RX ORDER — SODIUM CHLORIDE 0.9 % (FLUSH) 0.9 %
5-40 SYRINGE (ML) INJECTION PRN
Status: DISCONTINUED | OUTPATIENT
Start: 2025-06-06 | End: 2025-06-06 | Stop reason: HOSPADM

## 2025-06-06 RX ORDER — ONDANSETRON 2 MG/ML
INJECTION INTRAMUSCULAR; INTRAVENOUS
Status: DISCONTINUED | OUTPATIENT
Start: 2025-06-06 | End: 2025-06-06 | Stop reason: SDUPTHER

## 2025-06-06 RX ADMIN — CEFEPIME 2000 MG: 2 INJECTION, POWDER, FOR SOLUTION INTRAVENOUS at 15:27

## 2025-06-06 RX ADMIN — IPRATROPIUM BROMIDE AND ALBUTEROL SULFATE 1 DOSE: 2.5; .5 SOLUTION RESPIRATORY (INHALATION) at 01:51

## 2025-06-06 RX ADMIN — VANCOMYCIN HYDROCHLORIDE 1000 MG: 1 INJECTION, POWDER, LYOPHILIZED, FOR SOLUTION INTRAVENOUS at 04:32

## 2025-06-06 RX ADMIN — FENTANYL CITRATE 100 MCG: 50 INJECTION INTRAMUSCULAR; INTRAVENOUS at 13:19

## 2025-06-06 RX ADMIN — ENOXAPARIN SODIUM 40 MG: 100 INJECTION SUBCUTANEOUS at 20:10

## 2025-06-06 RX ADMIN — IPRATROPIUM BROMIDE AND ALBUTEROL SULFATE 1 DOSE: 2.5; .5 SOLUTION RESPIRATORY (INHALATION) at 18:47

## 2025-06-06 RX ADMIN — PROPOFOL 200 MG: 10 INJECTION, EMULSION INTRAVENOUS at 13:19

## 2025-06-06 RX ADMIN — GABAPENTIN 600 MG: 600 TABLET, FILM COATED ORAL at 20:09

## 2025-06-06 RX ADMIN — METHYLPREDNISOLONE SODIUM SUCCINATE 60 MG: 125 INJECTION INTRAMUSCULAR; INTRAVENOUS at 20:09

## 2025-06-06 RX ADMIN — CEFEPIME 2000 MG: 2 INJECTION, POWDER, FOR SOLUTION INTRAVENOUS at 00:23

## 2025-06-06 RX ADMIN — LIDOCAINE HYDROCHLORIDE 50 MG: 10 INJECTION, SOLUTION EPIDURAL; INFILTRATION; INTRACAUDAL; PERINEURAL at 13:19

## 2025-06-06 RX ADMIN — SUGAMMADEX 200 MG: 100 INJECTION, SOLUTION INTRAVENOUS at 13:35

## 2025-06-06 RX ADMIN — ROCURONIUM BROMIDE 30 MG: 10 INJECTION, SOLUTION INTRAVENOUS at 13:19

## 2025-06-06 RX ADMIN — WATER 125 MG: 1 INJECTION INTRAMUSCULAR; INTRAVENOUS; SUBCUTANEOUS at 15:18

## 2025-06-06 RX ADMIN — BUDESONIDE INHALATION 500 MCG: 0.5 SUSPENSION RESPIRATORY (INHALATION) at 18:58

## 2025-06-06 RX ADMIN — ACETAMINOPHEN 650 MG: 325 TABLET ORAL at 02:28

## 2025-06-06 RX ADMIN — IPRATROPIUM BROMIDE AND ALBUTEROL SULFATE 1 DOSE: 2.5; .5 SOLUTION RESPIRATORY (INHALATION) at 22:18

## 2025-06-06 RX ADMIN — ARFORMOTEROL TARTRATE 15 MCG: 15 SOLUTION RESPIRATORY (INHALATION) at 18:58

## 2025-06-06 RX ADMIN — SODIUM CHLORIDE: 0.9 INJECTION, SOLUTION INTRAVENOUS at 15:21

## 2025-06-06 RX ADMIN — GUAIFENESIN 600 MG: 600 TABLET, EXTENDED RELEASE ORAL at 20:09

## 2025-06-06 RX ADMIN — HYDROCODONE BITARTRATE AND ACETAMINOPHEN 1 TABLET: 10; 325 TABLET ORAL at 20:41

## 2025-06-06 RX ADMIN — CEFEPIME 2000 MG: 2 INJECTION, POWDER, FOR SOLUTION INTRAVENOUS at 09:19

## 2025-06-06 RX ADMIN — VANCOMYCIN HYDROCHLORIDE 1000 MG: 1 INJECTION, POWDER, LYOPHILIZED, FOR SOLUTION INTRAVENOUS at 20:38

## 2025-06-06 RX ADMIN — ONDANSETRON 4 MG: 2 INJECTION INTRAMUSCULAR; INTRAVENOUS at 13:19

## 2025-06-06 RX ADMIN — IPRATROPIUM BROMIDE AND ALBUTEROL SULFATE 1 DOSE: 2.5; .5 SOLUTION RESPIRATORY (INHALATION) at 06:51

## 2025-06-06 RX ADMIN — Medication 5 ML: at 16:06

## 2025-06-06 ASSESSMENT — ENCOUNTER SYMPTOMS: SHORTNESS OF BREATH: 1

## 2025-06-06 ASSESSMENT — PAIN DESCRIPTION - LOCATION
LOCATION: CHEST
LOCATION: HEAD

## 2025-06-06 ASSESSMENT — PAIN DESCRIPTION - DESCRIPTORS
DESCRIPTORS: ACHING
DESCRIPTORS: ACHING

## 2025-06-06 ASSESSMENT — PAIN SCALES - GENERAL: PAINLEVEL_OUTOF10: 9

## 2025-06-06 NOTE — PROCEDURES
After consent and under general anesthesia patient underwent bronchoscopy through the endotracheal tube.  Distal trachea was normal.  Melisa was sharp and midline.  Right and left bronchial trees were explored.  There was no endobronchial disease on the right.  The left upper lobe superior segment bronchus was completely occluded with what seem to be mucous plug.  This was suctioned and aspirated to clear.  The mucosa was cobblestoned.  There was edema and external compression of the airways.  The mucosa was biopsied x 5 submitted in formalin for pathology.  Bronchoalveolar lavage was also obtained from the left upper lobe.  This was submitted for cytology and microbiology studies      Estimated blood loss: 0 cc.  Complications: None  Specimen:  1.  Left upper lobe endobronchial biopsy for pathology.  2.  Left upper lobe bronchoalveolar lavage for cytology.  Disposition: Postanesthesia recovery per anesthesia service.

## 2025-06-06 NOTE — CARE COORDINATION
Housing: Yes  Other Identified Issues/Barriers to RETURNING to current housing: na  Potential Assistance needed at discharge: N/A            Potential DME:    Patient expects to discharge to: House  Plan for transportation at discharge: Family    Financial    Payor: MEDICARE / Plan: MEDICARE PART A AND B / Product Type: *No Product type* /     Does insurance require precert for SNF: No    Potential assistance Purchasing Medications: No  Meds-to-Beds request:        [id number] - [place], [place] - [address] - P [phone] - F [phone]  [address]  [facility] [place] [address]  Phone: [phone] Fax: [phone]    [facility] - [place] - [address] [phone] - F [phone] [address] [place] [address]  Phone: [phone] Fax: [phone]      Notes:    Factors facilitating achievement of predicted outcomes: Family support, Motivated, Cooperative, and Pleasant    Barriers to discharge: Medical complications    Additional Case Management Notes: SW met with pt to discuss dc planning, pt lives alone has support, uses a cane but is independent still drives, she will return home at dc, denies any other needs at this time    The Plan for Transition of Care is related to the following treatment goals of Shortness of breath [R06.02]  Atelectasis of left lung [J98.11]  Chest pain, unspecified type [R07.9]  Community acquired pneumonia of left lower lobe of lung [J18.9]    IF APPLICABLE: The Patient and/or patient representative Yandy and her family were provided with a choice of provider and agrees with the discharge plan. Freedom of choice list with basic dialogue that supports the patient's individualized plan of care/goals and shares the quality data associated with the providers was provided to: Patient   Patient Representative Name:       The Patient and/or Patient Representative Agree with the Discharge Plan? Yes    MEAGHAN Vinson  Case  Management Department  Ph: [phone] Fax: [phone]

## 2025-06-06 NOTE — PLAN OF CARE
Problem: Safety - Adult  Goal: Free from fall injury  6/6/2025 1007 by Nga Engle RN  Outcome: Progressing  6/5/2025 2351 by Milagros Lr RN  Outcome: Progressing  Flowsheets (Taken 6/5/2025 2237)  Free From Fall Injury: Instruct family/caregiver on patient safety     Problem: ABCDS Injury Assessment  Goal: Absence of physical injury  6/6/2025 1007 by Nga Engle RN  Outcome: Progressing  6/5/2025 2351 by Milagros Lr RN  Outcome: Progressing  Flowsheets (Taken 6/5/2025 2237)  Absence of Physical Injury: Implement safety measures based on patient assessment

## 2025-06-06 NOTE — ANESTHESIA PRE PROCEDURE
Department of Anesthesiology  Preprocedure Note       Name:  Yandy Dickinson   Age:  60 y.o.  :  1965                                          MRN:  [id number]         Date:  2025      Surgeon: Surgeon(s):  Ashley Morrison MD    Procedure: Procedure(s):  BRONCHOSCOPY    Medications prior to admission:   Prior to Admission medications    Medication Sig Start Date End Date Taking? Authorizing Provider   predniSONE (DELTASONE) 10 MG tablet 40 mg a day and taper by 10 mg every third day to off  Patient not taking: Reported on 2025   Ashley Morrison MD   gabapentin (NEURONTIN) 300 MG capsule Take 2 capsules by mouth 3 times daily for 30 days. 25  Kassi Jim MD   Budeson-Glycopyrrol-Formoterol (BREZTRI AEROSPHERE) 160-9-4.8 MCG/ACT AERO Inhale 2 puffs into the lungs 2 times daily 4/15/25   Precious Kearney APRN - CNP   guaiFENesin (MUCINEX) 600 MG extended release tablet Take 1 tablet by mouth 2 times daily 25   Jose Durán APRN - CNP   ipratropium 0.5 mg-albuterol 2.5 mg (DUONEB) 0.5-2.5 (3) MG/3ML SOLN nebulizer solution Inhale 3 mLs into the lungs every 4 hours 3/27/25   Ashley Morrison MD   VENTOLIN  (90 Base) MCG/ACT inhaler Inhale 2 puffs into the lungs 4 times daily as needed for Wheezing 3/27/25   Ashley Morrison MD   palbociclib (IBRANCE) 100 MG capsule TAKE ONE CAPSULE BY MOUTH ONCE A DAY ON DAYS 1-21 FOLLOWED BY 7 DAYS OFF FOR A 28 DAY CYCLE 25   Kassi Jim MD   scopolamine (TRANSDERM-SCOP) transdermal patch Place 1 patch onto the skin every 72 hours 24   Tomi Teague MD   exemestane (AROMASIN) 25 MG tablet TAKE 1 TABLET BY MOUTH EVERY DAY 24   Kassi Jim MD   promethazine (PHENERGAN) 25 MG tablet Take 1 tablet by mouth every 6 hours as needed for Nausea 24   Kassi Jim MD   Respiratory Therapy Supplies (NEBULIZER/TUBING/MOUTHPIECE) KIT 1 kit by Does not

## 2025-06-06 NOTE — ANESTHESIA POSTPROCEDURE EVALUATION
Department of Anesthesiology  Postprocedure Note    Patient: Yandy Dickinson  MRN: [id number]  YOB: 1965  Date of evaluation: 6/6/2025    Procedure Summary       Date: 06/06/25 Room / Location: [place] [address] / [facility]    Anesthesia Start: 1315 Anesthesia Stop: 1400    Procedure: BRONCHOSCOPY BIOPSY BRONCHUS (Left) Diagnosis:       Atelectasis      (Atelectasis [J98.11])    Surgeons: Ashley Morrison MD Responsible Provider: Mary Lou Arroyo APRN - CRNA    Anesthesia Type: General ASA Status: 3            Anesthesia Type: General    Dena Phase I: Dena Score: 10    Dena Phase II:      Anesthesia Post Evaluation    Patient location during evaluation: PACU  Patient participation: complete - patient participated  Level of consciousness: awake and alert  Pain score: 0  Airway patency: patent  Nausea & Vomiting: no nausea and no vomiting  Cardiovascular status: blood pressure returned to baseline  Respiratory status: acceptable, spontaneous ventilation, nonlabored ventilation and room air  Hydration status: euvolemic  Comments: /71   Pulse 92   Temp 98.5 °F (36.9 °C) (Temporal)   Resp 17   Ht 1.676 m (5' 6\")   Wt 74.8 kg (165 lb)   SpO2 99%   BMI 26.63 kg/m²     Pain management: adequate    No notable events documented.

## 2025-06-06 NOTE — PLAN OF CARE
Problem: Safety - Adult  Goal: Free from fall injury  Outcome: Progressing  Flowsheets (Taken 6/5/2025 2237)  Free From Fall Injury: Instruct family/caregiver on patient safety     Problem: ABCDS Injury Assessment  Goal: Absence of physical injury  Outcome: Progressing  Flowsheets (Taken 6/5/2025 2237)  Absence of Physical Injury: Implement safety measures based on patient assessment

## 2025-06-06 NOTE — PROGRESS NOTES
MEDICAL ONCOLOGY PROGRESS NOTE     Pt Name: Yandy Dickinson  MRN: [id number]  YOB: 1965  Date of evaluation: 6/6/2025    Subjective-continues complain of cough.  Hopefully bronchoscopy today.  Afebrile.    HISTORY OF PRESENT ILLNESS:  Yandy Dickinson is a 60-year-old female with a significant past medical history of ER+/VT+/HER2- metastatic breast cancer with known pulmonary metastases, hypertension, peripheral neuropathy, history of pneumonia, and prior upper extremity venous thromboembolism, who presents to the ED with pleuritic chest pain.  She was recently hospitalized with respiratory viral infection.  The patient reports substernal chest pain that began yesterday and worsens with inspiration. She is currently on Aromasin (exemestane) and palbociclib for metastatic breast cancer and has undergone multiple prior systemic therapies. She recently received SBRT to lung lesions for oligoprogression and underwent bronchoscopy on 5/27/25 by Dr. Morrison for evaluation of a left lower lobe lesion, which was aspirated and biopsied. No endobronchial disease or mediastinal lymphadenopathy was seen during the procedure. Since the bronchoscopy, she has had clear mucus with cough but denies fever or chills. She endorses shortness of breath and pleuritic chest pain, but denies leg swelling, weakness, numbness, tingling, palpitations, or other symptoms. She has a remote history of an upper extremity clot but is not currently on anticoagulation.    Laboratory studies admission-WBC 9.2, hemoglobin 13.9, platelet count 322,000.  Chemistry remarkable for normal kidney function, normal LFTs.    6/5/25 CTA Chest negative for pulmonary embolism; shows new complete atelectasis of the left upper lobe due to bronchial occlusion, progressive left lower lobe consolidation possibly from radiation pneumonitis or infection, new 5 mm LLL nodule, trace left pleural effusion, hepatic steatosis, and generalized osseous  anteriorly medially, slightly larger now measuring 1.5 x 2.0 cm. Noncalcified nodule in the left lower lobe superiorly medially measuring 2.5 cm, unchanged. Noncalcified nodule in the right lower lobe measuring 0.7 cm, unchanged. Atherosclerosis and coronary artery calcification. Mild degenerative changes of the spine. Bilateral breast implants.  Surgical clips in the right axilla. Diverticulosis of the colon.  Otherwise unremarkable noncontrast CT scan of the chest.   1/12/24 Tumor Markers: CEA 2.2, CA 15-3-18, CA 27.29-23.1  2/2/24 Robotic Navigational Bronchoscopy, endobronchial ultrasound by Greer Franco biopsy  2/2/24 Cytopathology ([facility]): Lung Left lower lobe, Bronchial epithelial cells and mixed inflammation, no malignant cells identified. Left lower lobe; Adenocarcinoma, consistent with metastasis from patient's known breast primary.  ER 70%, UT 5%, HER2 IHC 0  2/16/2024-essentially, recurrent breast cancer with lung metastasis ER 70%, UT 5%, HER2 IHC 0, biopsy-proven.  Patient has 2 nodules in the lungs.  No other evidence of metastatic disease.  Discussed.  Sadi Rhoades possibility of SBRT. NGS liquid biopsy today.  2/16/24 Tumor Markers: CEA 2, CA 15-3-18, CA 27.29-21.4  3/17/24 Re-initiated treatment with palbociclib and Aromasin  3/29/24 Tumor Markers; CEA 1.9, CA 15-3-21, CA 27.29- 34.6  4/26/24 Tumor Markers:  CEA 2.4, CA 15-3- 25, CA 27.29-35.3  4/29/24 XR Ankle Right Negative radiographic images of the right ankle.   5/29/24 Tumor Markers: CEA 2, CA 1-3-27, CA 27.29-37.5  6/20/24 CT Chest W Contrast  Adjacent to the mediastinum the right upper lobe image 29 1.5 x 2 cm nodule is stable and unchanged. Left infrahilar region in the left lower lobe 1.6 x 2.1 cm nodule is present previously this was 2.1 x 2.5 cm.  This is decreased in size 0.7 cm nodule previously described the right lower lobe is not present on this exam..Image 29 1.5 x 2 cm nodule stable and unchanged right upper lobe.  Image 45 left

## 2025-06-06 NOTE — PROGRESS NOTES
[facility]   Pharmacy Pharmacokinetic Monitoring Service - Vancomycin    Consulting Provider: Pastora ZAMAN   Indication: PNA CAP  Target Concentration: Goal AUC/BUCK 400-600 mg*hr/L  Day of Therapy: 2  Additional Antimicrobials: Cefepime    Pertinent Laboratory Values:   Wt Readings from Last 1 Encounters:   06/05/25 74.8 kg (165 lb)     Temp Readings from Last 1 Encounters:   06/06/25 98.5 °F (36.9 °C) (Temporal)     Estimated Creatinine Clearance: 79 mL/min (based on SCr of 0.8 mg/dL).  Recent Labs     06/05/25  1254 06/06/25  0216   CREATININE 0.8 0.8   BUN 12 13   WBC 9.2 8.0     Procalcitonin:     Pertinent Cultures:  Culture Date Source Results   06/05/25 Blood x 2 NGTD   06/06/25 Bronchial lavage NGTD   MRSA Nasal Swab: was negative on 06/06, ordered for respiratory indication, pharmacy to contact provider about discontinuing vancomycin but Dr Palmer wanted to continue until reviews bronch    Recent vancomycin administrations                     vancomycin (VANCOCIN) 1,000 mg in sodium chloride 0.9 % 250 mL IVPB ([id number]) (mg) 1,000 mg New Bag 06/06/25 0432    vancomycin (VANCOCIN) 1,750 mg in sodium chloride 0.9 % 500 mL IVPB (mg) 1,750 mg New Bag 06/05/25 1522                    Assessment:  Date/Time Current Dose Concentration Timing of Concentration (h) AUC   06/06/25 @ 1556 1000 mg Q12H 8.9 11 hr 23 min 423   Note: Serum concentrations collected for AUC dosing may appear elevated if collected in close proximity to the dose administered, this is not necessarily an indication of toxicity    Plan:  Current dosing regimen is therapeutic  Continue current dose not yet Steady State, shows will be Css trough 11.7 and   Pharmacy will continue to monitor patient and adjust therapy as indicated    Thank you for the consult,  Coretta Nicole RPH  6/6/2025 5:21 PM

## 2025-06-06 NOTE — PROGRESS NOTES
Consulted pt on home CPAP/BiPAP order-pt states she does not/has never used one and does not have hx RUDY

## 2025-06-06 NOTE — PROGRESS NOTES
Daily Progress Note    Date:2025  Patient: Yandy Dickinson  : 1965  MRN:[id number]  CODE:Full Code No additional code details  PCP:Tomi Teague MD    Admit Date: 2025 12:48 PM   LOS: 1 day     Chief Complaint   Patient presents with    Chest Pain     Chest pain worse with breathing onset yesterday, recent diagnosis of cancer with lung nodules per pt    Shortness of Breath         Subjective: No change in dyspnea. Cough is ongoing but nonproductive. Denies any fevers or chills both now and prior to admission.        Hospital Summary: 60 y.o. female who presented to [facility] ED for evaluation of chest pain and shortness of breath. Pt has history of breast malignancy with metastasis to lungs followed by Dr. Jim and Dr. Roberto, hypertension and neuropathy.      Pt reports intermittent chest pain with worsening cough and shortness of breath from baseline since yesterday. She has had episodes of nausea and vomiting today. She denies fevers as well as abdominal pain. She underwent bronchoscopy by Dr. Roberto 2025.      In ED, CTA pulmonary-Negative for CTA evidence to suggest pulmonary embolism Interval progression in consolidative infiltrate in the left lower lobe, perhaps representing post radiation pneumonitis and/or infection. The previously shown 11 mm diameter left lower lobe nodule is not discretely visualized on the current examination, perhaps because it is obscured by surrounding infiltrate, or perhaps the nodule has decreased in size or resolved. 5 mm diameter nodule in the left lower lobe, which appears new versus prior CT Complete atelectasis of the left upper lobe, and complete occlusion of the left upper lobe bronchus. Pt is admitted inpatient to hospitalist.    Pulmonology consulted with plans for bronchoscopy on .   Empirically treated for possible obstructive pneumonia with Vancomycin and Cefepime.         Review of Systems   All other systems reviewed and are  81.0 - 99.0 fL    MCH 29.8 27.0 - 31.0 pg    MCHC 32.1 (L) 33.0 - 37.0 g/dL    RDW 15.2 (H) 11.5 - 14.5 %    Platelets 308 130 - 400 K/uL    MPV 9.2 (L) 9.4 - 12.3 fL             Current Facility-Administered Medications:     vancomycin (VANCOCIN) intermittent dosing (placeholder), , Other, RX Kenyatta, Pastora Torres PA-C    [COMPLETED] vancomycin (VANCOCIN) 1,750 mg in sodium chloride 0.9 % 500 mL IVPB, 1,750 mg, IntraVENous, Once, Stopped at 06/05/25 1722 **FOLLOWED BY** vancomycin (VANCOCIN) 1,000 mg in sodium chloride 0.9 % 250 mL IVPB ([id number]), 1,000 mg, IntraVENous, Q12H, Pastora Torres PA-C, Stopped at 06/06/25 0641    sodium chloride flush 0.9 % injection 5-40 mL, 5-40 mL, IntraVENous, 2 times per day, Ryan Eli APRN - CNP, 10 mL at 06/05/25 2307    sodium chloride flush 0.9 % injection 5-40 mL, 5-40 mL, IntraVENous, PRN, Ryan Eli APRN - CNP    0.9 % sodium chloride infusion, , IntraVENous, PRN, Ryan Eli APRN - CNP    potassium chloride (KLOR-CON M) extended release tablet 40 mEq, 40 mEq, Oral, PRN **OR** potassium bicarb-citric acid (EFFER-K) effervescent tablet 40 mEq, 40 mEq, Oral, PRN **OR** potassium chloride 10 mEq/100 mL IVPB (Peripheral Line), 10 mEq, IntraVENous, PRN, Ryan Eli APRN - CNP    magnesium sulfate 2000 mg in 50 mL IVPB premix, 2,000 mg, IntraVENous, PRN, Ryan Eli APRN - CNP    enoxaparin (LOVENOX) injection 40 mg, 40 mg, SubCUTAneous, Daily, Ryan Eli APRN - CNP, 40 mg at 06/05/25 2307    ondansetron (ZOFRAN-ODT) disintegrating tablet 4 mg, 4 mg, Oral, Q8H PRN **OR** ondansetron (ZOFRAN) injection 4 mg, 4 mg, IntraVENous, Q6H PRN, Ryan Eli, APRN - CNP    polyethylene glycol (GLYCOLAX) packet 17 g, 17 g, Oral, Daily PRN, Ryan Eli, APRN - CNP    acetaminophen (TYLENOL) tablet 650 mg, 650 mg, Oral, Q6H PRN, 650 mg at 06/06/25 0228 **OR** acetaminophen (TYLENOL) suppository 650 mg, 650 mg, Rectal, Q6H PRN, Ryan Eli, APRN - CNP

## 2025-06-06 NOTE — PROGRESS NOTES
4 Eyes Skin Assessment     NAME:  Yandy Dickinson  YOB: 1965  MEDICAL RECORD NUMBER:  [id number]    The patient is being assessed for  Admission    I agree that at least one RN has performed a thorough Head to Toe Skin Assessment on the patient. ALL assessment sites listed below have been assessed.      Areas assessed by both nurses:    Head, Face, Ears, Shoulders, Back, Chest, Arms, Elbows, Hands, Sacrum. Buttock, Coccyx, Ischium, and Legs. Feet and Heels        Does the Patient have a Wound? No noted wound(s)       Jose Prevention initiated by RN: No  Wound Care Orders initiated by RN: No    Pressure Injury (Stage 3,4, Unstageable, DTI, NWPT, and Complex wounds) if present, place Wound referral order by RN under : No    New Ostomies, if present place, Ostomy referral order under : No     Nurse 1 eSignature: Electronically signed by Milagros Lr RN on 6/5/25 at 10:47 PM CDT    **SHARE this note so that the co-signing nurse can place an eSignature**    Nurse 2 eSignature: Electronically signed by Kira Deluca RN on 6/5/25 at 11:44 PM CDT

## 2025-06-06 NOTE — PROGRESS NOTES
Pharmacy Adjustment per [facility] protocol    Yandy Dickinson is a 60 y.o. female. Pharmacy has adjusted medications per [facility] protocol.    Recent Labs     06/05/25  1254 06/06/25  0216   BUN 12 13       Recent Labs     06/05/25  1254 06/06/25  0216   CREATININE 0.8 0.8       Estimated Creatinine Clearance: 79 mL/min (based on SCr of 0.8 mg/dL).    Height:   Ht Readings from Last 1 Encounters:   06/05/25 1.676 m (5' 6\")     Weight:  Wt Readings from Last 1 Encounters:   06/05/25 74.8 kg (165 lb)    BMI:  BMI Readings from Last 1 Encounters:   06/05/25 26.63 kg/m²         Plan: Adjust the following medications based on [facility] protocol:           Cefepime 2000mg IV over 30 minute infusion every 8 hours instead of  4 hours infusion due to avoid compatibility issues with other antibiotics and due to restricted IV access.    Electronically signed by Abigail Christina RPH on 6/6/2025 at 6:50 PM

## 2025-06-06 NOTE — PROGRESS NOTES
Yandy Dickinson arrived to room # 416.   Presented with: community acquired pneumonia of left lower lobe of lung  Mental Status: Patient is oriented, alert, coherent, logical, thought processes intact, and able to concentrate and follow conversation.   Vitals:    06/05/25 1759   BP: 121/67   Pulse:    Resp:    Temp:    SpO2: 93%     Patient safety contract and falls prevention contract reviewed with patient Yes.  Oriented Patient to room.  Call light within reach. Yes.  Needs, issues or concerns expressed at this time: no.      Electronically signed by Milagros Lr RN on 6/5/2025 at 10:12 PM

## 2025-06-06 NOTE — CARE COORDINATION
06/06/25 0750   IMM Letter   IMM Letter given to Patient/Family/Significant other/Guardian/POA/by: MEAGHAN Vinson   IMM Letter date given: 06/06/25   IMM Letter time given: 0735     Second IMM given to patient and explained with patient verbalizing understanding.  All questions and concerns addressed   Patient declined waiting 4 hr period prior to discharge.   Signed letter placed in pt soft chart   Electronically signed by MEAGHAN Vinson on 6/6/2025 at 7:51 AM

## 2025-06-06 NOTE — PROGRESS NOTES
Pt purse locked in med room as security is unavailable to take belongings & room has no locked cabinet.

## 2025-06-06 NOTE — CONSULTS
Pulmonary and Critical Care Consult Note    [facility] YASEMIN Dickinson    MRN# [id number]    [id number]  6/6/2025   9:44 AM CDT    Referring Provider:Robin Palmer MD      Chief Complaint: chest pain.     Requesting physician: hospitalist    Reason for consult: lung atelectasis.       HPI: We have been consulted to see this 60 y.o. year old female born on 1965.  Patient presented to the emergency room last night due to chest pain.  She says her symptoms started about a day prior.  She denies any fevers or chills.  She says she has been coughing phlegm and more short of breath.  She had a CT of the chest done in the emergency room that showed according to my interpretation complete atelectasis of the left upper lobe.  I was asked see regarding the above.  She is feeling improved since admission.      Past Medical History      Past Medical History:   Diagnosis Date    Asthma     Breast cancer (HCC)     Breast cancer with lung mets     Chronic back pain     Collapsed lung     left lung    GERD (gastroesophageal reflux disease)     Hx of blood clots     Hypertension     Lung cancer (HCC)     Neuropathy     Post chemo treatment neuropathy    Pneumonia      SurgicalHistory  Past Surgical History:   Procedure Laterality Date    BREAST LUMPECTOMY Right     2006    BREAST RECONSTRUCTION Left 09/17/2021    Revision left reconstructed breast, implant performed by Alejandro Garsia MD at [facility] OR    BREAST RECONSTRUCTION Bilateral 12/9/2022    CRESCENT LEFT MASTOPEXY, FAT GRAFTING RIGHT LATERAL NIPPLE & FAT GRAFTING INFERIOR LEFT MIDLINE performed by Alejandro Garsia MD at [facility] OR    BREAST RECONSTRUCTION N/A 4/30/2024    LEFT INFERIOR BREAST FAT GRAFTING performed by Alejandro Garsia MD at [facility] OR    BREAST SURGERY N/A 8/11/2023    FAT GRAFTING LEFT BREAST & REPOSITIONING RIGHT NIPPLE performed by  markers   Recent Labs     06/05/25  1254 06/05/25  1448 06/06/25  0216   AST 19  --   --    ALT 9*  --   --    ALKPHOS 109*  --   --    BILITOT 0.9  --   --    CALCIUM 9.4  --  9.0   PROBNP 89  --   --    LACTA  --  0.6  --    PROCAL 0.06  --   --      Microbiology and Cultures No results for input(s): \"BC\", \"LABGRAM\", \"CULTRESP\", \"BFCX\" in the last 72 hours.      Radiographs reviewed:     CTA PULMONARY W CONTRAST  Result Date: 6/5/2025  Negative for CTA evidence to suggest pulmonary embolism Interval progression in consolidative infiltrate in the left lower lobe, perhaps representing post radiation pneumonitis and/or infection. The previously shown 11 mm diameter left lower lobe nodule is not discretely visualized on the current examination,  perhaps because it is obscured by surrounding infiltrate, or perhaps the nodule has decreased in size or resolved. 5 mm diameter nodule in the left lower lobe, which appears new versus prior CT Complete atelectasis of the left upper lobe, and complete occlusion of the left upper lobe bronchus Trace left pleural effusion Hepatic steatosis Diverticulosis  All CT scans are performed using dose optimization techniques as appropriate to the performed exam and include at least one of the following: Automated exposure control, adjustment of the mA and/or kV according to size, and the use of iterative reconstruction technique.  ______________________________________ Electronically signed by: JOSE LUIS JIMENEZ M.D. Date:     06/05/2025 Time:    14:16     XR CHEST PORTABLE  Result Date: 6/5/2025  There is new severe to complete atelectasis of the left upper lobe which is likely caused by an obstructing left hilar mass/lymphadenopathy. This would be better evaluated with a CT with intravenous contrast.   ______________________________________ Electronically signed by: JINA WARD M.D. Date:     06/05/2025 Time:    13:33        My radiograph interpretation/independent review of imaging: CT

## 2025-06-07 ENCOUNTER — APPOINTMENT (OUTPATIENT)
Dept: GENERAL RADIOLOGY | Age: 60
DRG: 178 | End: 2025-06-07
Payer: MEDICARE

## 2025-06-07 LAB
ACID FAST STN SPEC QL: NORMAL
ANION GAP SERPL CALCULATED.3IONS-SCNC: 12 MMOL/L (ref 8–16)
BUN SERPL-MCNC: 11 MG/DL (ref 6–20)
CALCIUM SERPL-MCNC: 9.5 MG/DL (ref 8.6–10)
CHLORIDE SERPL-SCNC: 107 MMOL/L (ref 98–107)
CO2 SERPL-SCNC: 23 MMOL/L (ref 22–29)
CREAT SERPL-MCNC: 0.7 MG/DL (ref 0.5–0.9)
ERYTHROCYTE [DISTWIDTH] IN BLOOD BY AUTOMATED COUNT: 14.7 % (ref 11.5–14.5)
GLUCOSE BLD-MCNC: 158 MG/DL (ref 70–99)
GLUCOSE BLD-MCNC: 170 MG/DL (ref 70–99)
GLUCOSE BLD-MCNC: 208 MG/DL (ref 70–99)
GLUCOSE BLD-MCNC: 242 MG/DL (ref 70–99)
GLUCOSE SERPL-MCNC: 250 MG/DL (ref 70–99)
HBA1C MFR BLD: 6 % (ref 4–5.6)
HCT VFR BLD AUTO: 41.3 % (ref 37–47)
HGB BLD-MCNC: 13.1 G/DL (ref 12–16)
MCH RBC QN AUTO: 29.8 PG (ref 27–31)
MCHC RBC AUTO-ENTMCNC: 31.7 G/DL (ref 33–37)
MCV RBC AUTO: 94.1 FL (ref 81–99)
PERFORMED ON: ABNORMAL
PLATELET # BLD AUTO: 329 K/UL (ref 130–400)
PMV BLD AUTO: 8.6 FL (ref 9.4–12.3)
POTASSIUM SERPL-SCNC: 4.2 MMOL/L (ref 3.5–5)
RBC # BLD AUTO: 4.39 M/UL (ref 4.2–5.4)
SODIUM SERPL-SCNC: 142 MMOL/L (ref 136–145)
WBC # BLD AUTO: 8.6 K/UL (ref 4.8–10.8)

## 2025-06-07 PROCEDURE — 83036 HEMOGLOBIN GLYCOSYLATED A1C: CPT

## 2025-06-07 PROCEDURE — 85027 COMPLETE CBC AUTOMATED: CPT

## 2025-06-07 PROCEDURE — 94640 AIRWAY INHALATION TREATMENT: CPT

## 2025-06-07 PROCEDURE — 94669 MECHANICAL CHEST WALL OSCILL: CPT

## 2025-06-07 PROCEDURE — 82962 GLUCOSE BLOOD TEST: CPT

## 2025-06-07 PROCEDURE — 80048 BASIC METABOLIC PNL TOTAL CA: CPT

## 2025-06-07 PROCEDURE — 2500000003 HC RX 250 WO HCPCS

## 2025-06-07 PROCEDURE — 6360000002 HC RX W HCPCS: Performed by: INTERNAL MEDICINE

## 2025-06-07 PROCEDURE — 2500000003 HC RX 250 WO HCPCS: Performed by: INTERNAL MEDICINE

## 2025-06-07 PROCEDURE — 6370000000 HC RX 637 (ALT 250 FOR IP): Performed by: STUDENT IN AN ORGANIZED HEALTH CARE EDUCATION/TRAINING PROGRAM

## 2025-06-07 PROCEDURE — 6370000000 HC RX 637 (ALT 250 FOR IP): Performed by: INTERNAL MEDICINE

## 2025-06-07 PROCEDURE — 1200000000 HC SEMI PRIVATE

## 2025-06-07 PROCEDURE — 94761 N-INVAS EAR/PLS OXIMETRY MLT: CPT

## 2025-06-07 PROCEDURE — 71045 X-RAY EXAM CHEST 1 VIEW: CPT

## 2025-06-07 PROCEDURE — 2580000003 HC RX 258: Performed by: INTERNAL MEDICINE

## 2025-06-07 PROCEDURE — 6360000002 HC RX W HCPCS: Performed by: STUDENT IN AN ORGANIZED HEALTH CARE EDUCATION/TRAINING PROGRAM

## 2025-06-07 PROCEDURE — 99232 SBSQ HOSP IP/OBS MODERATE 35: CPT | Performed by: INTERNAL MEDICINE

## 2025-06-07 PROCEDURE — 36415 COLL VENOUS BLD VENIPUNCTURE: CPT

## 2025-06-07 RX ORDER — WATER 10 ML/10ML
INJECTION INTRAMUSCULAR; INTRAVENOUS; SUBCUTANEOUS
Status: COMPLETED
Start: 2025-06-07 | End: 2025-06-07

## 2025-06-07 RX ORDER — GLUCAGON 1 MG/ML
1 KIT INJECTION PRN
Status: DISCONTINUED | OUTPATIENT
Start: 2025-06-07 | End: 2025-06-13 | Stop reason: HOSPADM

## 2025-06-07 RX ORDER — INSULIN LISPRO 100 [IU]/ML
0-4 INJECTION, SOLUTION INTRAVENOUS; SUBCUTANEOUS
Status: DISCONTINUED | OUTPATIENT
Start: 2025-06-07 | End: 2025-06-13 | Stop reason: HOSPADM

## 2025-06-07 RX ORDER — FAMOTIDINE 20 MG/1
20 TABLET, FILM COATED ORAL 2 TIMES DAILY
Status: DISCONTINUED | OUTPATIENT
Start: 2025-06-07 | End: 2025-06-13 | Stop reason: HOSPADM

## 2025-06-07 RX ORDER — ACETYLCYSTEINE 200 MG/ML
600 SOLUTION ORAL; RESPIRATORY (INHALATION) 2 TIMES DAILY
Status: DISPENSED | OUTPATIENT
Start: 2025-06-07 | End: 2025-06-10

## 2025-06-07 RX ORDER — DEXTROSE MONOHYDRATE 100 MG/ML
INJECTION, SOLUTION INTRAVENOUS CONTINUOUS PRN
Status: DISCONTINUED | OUTPATIENT
Start: 2025-06-07 | End: 2025-06-13 | Stop reason: HOSPADM

## 2025-06-07 RX ADMIN — BUDESONIDE INHALATION 500 MCG: 0.5 SUSPENSION RESPIRATORY (INHALATION) at 18:42

## 2025-06-07 RX ADMIN — METHYLPREDNISOLONE SODIUM SUCCINATE 60 MG: 125 INJECTION INTRAMUSCULAR; INTRAVENOUS at 08:22

## 2025-06-07 RX ADMIN — ENOXAPARIN SODIUM 40 MG: 100 INJECTION SUBCUTANEOUS at 21:27

## 2025-06-07 RX ADMIN — BUDESONIDE INHALATION 500 MCG: 0.5 SUSPENSION RESPIRATORY (INHALATION) at 06:44

## 2025-06-07 RX ADMIN — Medication 5 ML: at 11:32

## 2025-06-07 RX ADMIN — WATER 60 MG: 1 INJECTION INTRAMUSCULAR; INTRAVENOUS; SUBCUTANEOUS at 14:32

## 2025-06-07 RX ADMIN — IPRATROPIUM BROMIDE AND ALBUTEROL SULFATE 1 DOSE: 2.5; .5 SOLUTION RESPIRATORY (INHALATION) at 10:40

## 2025-06-07 RX ADMIN — INSULIN LISPRO 1 UNITS: 100 INJECTION, SOLUTION INTRAVENOUS; SUBCUTANEOUS at 21:26

## 2025-06-07 RX ADMIN — Medication 5 ML: at 23:20

## 2025-06-07 RX ADMIN — SODIUM CHLORIDE, PRESERVATIVE FREE 10 ML: 5 INJECTION INTRAVENOUS at 08:24

## 2025-06-07 RX ADMIN — IPRATROPIUM BROMIDE AND ALBUTEROL SULFATE 1 DOSE: 2.5; .5 SOLUTION RESPIRATORY (INHALATION) at 18:32

## 2025-06-07 RX ADMIN — GABAPENTIN 600 MG: 600 TABLET, FILM COATED ORAL at 21:27

## 2025-06-07 RX ADMIN — ARFORMOTEROL TARTRATE 15 MCG: 15 SOLUTION RESPIRATORY (INHALATION) at 18:42

## 2025-06-07 RX ADMIN — IPRATROPIUM BROMIDE AND ALBUTEROL SULFATE 1 DOSE: 2.5; .5 SOLUTION RESPIRATORY (INHALATION) at 22:05

## 2025-06-07 RX ADMIN — IPRATROPIUM BROMIDE AND ALBUTEROL SULFATE 1 DOSE: 2.5; .5 SOLUTION RESPIRATORY (INHALATION) at 14:51

## 2025-06-07 RX ADMIN — IPRATROPIUM BROMIDE AND ALBUTEROL SULFATE 1 DOSE: 2.5; .5 SOLUTION RESPIRATORY (INHALATION) at 06:30

## 2025-06-07 RX ADMIN — CEFEPIME 2000 MG: 2 INJECTION, POWDER, FOR SOLUTION INTRAVENOUS at 08:28

## 2025-06-07 RX ADMIN — VANCOMYCIN HYDROCHLORIDE 1000 MG: 1 INJECTION, POWDER, LYOPHILIZED, FOR SOLUTION INTRAVENOUS at 08:20

## 2025-06-07 RX ADMIN — CEFEPIME 2000 MG: 2 INJECTION, POWDER, FOR SOLUTION INTRAVENOUS at 15:30

## 2025-06-07 RX ADMIN — METHYLPREDNISOLONE SODIUM SUCCINATE 60 MG: 125 INJECTION INTRAMUSCULAR; INTRAVENOUS at 02:21

## 2025-06-07 RX ADMIN — ACETYLCYSTEINE 600 MG: 200 SOLUTION ORAL; RESPIRATORY (INHALATION) at 22:05

## 2025-06-07 RX ADMIN — IPRATROPIUM BROMIDE AND ALBUTEROL SULFATE 1 DOSE: 2.5; .5 SOLUTION RESPIRATORY (INHALATION) at 02:23

## 2025-06-07 RX ADMIN — ARFORMOTEROL TARTRATE 15 MCG: 15 SOLUTION RESPIRATORY (INHALATION) at 06:44

## 2025-06-07 RX ADMIN — CEFEPIME 2000 MG: 2 INJECTION, POWDER, FOR SOLUTION INTRAVENOUS at 23:20

## 2025-06-07 RX ADMIN — GUAIFENESIN 600 MG: 600 TABLET, EXTENDED RELEASE ORAL at 21:27

## 2025-06-07 RX ADMIN — GUAIFENESIN 600 MG: 600 TABLET, EXTENDED RELEASE ORAL at 08:20

## 2025-06-07 RX ADMIN — WATER 10 ML: 1 INJECTION INTRAMUSCULAR; INTRAVENOUS; SUBCUTANEOUS at 02:22

## 2025-06-07 RX ADMIN — CEFEPIME 2000 MG: 2 INJECTION, POWDER, FOR SOLUTION INTRAVENOUS at 00:14

## 2025-06-07 RX ADMIN — GABAPENTIN 600 MG: 600 TABLET, FILM COATED ORAL at 14:33

## 2025-06-07 RX ADMIN — GABAPENTIN 600 MG: 600 TABLET, FILM COATED ORAL at 08:20

## 2025-06-07 RX ADMIN — WATER 60 MG: 1 INJECTION INTRAMUSCULAR; INTRAVENOUS; SUBCUTANEOUS at 21:28

## 2025-06-07 RX ADMIN — FAMOTIDINE 20 MG: 20 TABLET, FILM COATED ORAL at 15:30

## 2025-06-07 RX ADMIN — SODIUM CHLORIDE, PRESERVATIVE FREE 10 ML: 5 INJECTION INTRAVENOUS at 21:30

## 2025-06-07 NOTE — PROGRESS NOTES
Daily Progress Note    Date:2025  Patient: Yandy Dickinson  : 1965  MRN:[id number]  CODE:Full Code No additional code details  PCP:Tomi Teague MD    Admit Date: 2025 12:48 PM   LOS: 2 days     Chief Complaint   Patient presents with    Chest Pain     Chest pain worse with breathing onset yesterday, recent diagnosis of cancer with lung nodules per pt    Shortness of Breath         Subjective: Dyspnea is somewhat better. Cough is not improved, remains mostly nonproductive. Not requiring supplemental O2.         Hospital Summary: 60 y.o. female who presented to [facility] ED for evaluation of chest pain and shortness of breath. Pt has history of breast malignancy with metastasis to lungs followed by Dr. Jim and Dr. Roberto, hypertension and neuropathy.      Pt reports intermittent chest pain with worsening cough and shortness of breath from baseline since yesterday. She has had episodes of nausea and vomiting today. She denies fevers as well as abdominal pain. She underwent bronchoscopy by Dr. Roberto 2025.      In ED, CTA pulmonary-Negative for CTA evidence to suggest pulmonary embolism Interval progression in consolidative infiltrate in the left lower lobe, perhaps representing post radiation pneumonitis and/or infection. The previously shown 11 mm diameter left lower lobe nodule is not discretely visualized on the current examination, perhaps because it is obscured by surrounding infiltrate, or perhaps the nodule has decreased in size or resolved. 5 mm diameter nodule in the left lower lobe, which appears new versus prior CT Complete atelectasis of the left upper lobe, and complete occlusion of the left upper lobe bronchus. Pt is admitted inpatient to hospitalist.    Pulmonology consulted. Underwent bronchoscopy  which showed a mucous plug in her left upper lobe bronchus, this was removed. JULIANNE endobronchial biopsies obtained and BAL sent for cytology and culture.  Empirically treated for

## 2025-06-07 NOTE — PLAN OF CARE
Problem: Safety - Adult  Goal: Free from fall injury  6/6/2025 2151 by Billie Donato RN  Outcome: Progressing  6/6/2025 1007 by Nga Engle RN  Outcome: Progressing     Problem: ABCDS Injury Assessment  Goal: Absence of physical injury  6/6/2025 2151 by Billie Donato RN  Outcome: Progressing  6/6/2025 1007 by Nga Engle RN  Outcome: Progressing

## 2025-06-07 NOTE — PROGRESS NOTES
generally stable findings with good correlation with available CT images as described in the report.  There are normal findings in the rib cage and pelvis.  There are age appropriate findings in joints in the upper and lower extremities. There are normal findings in the head and neck. No new overtly pathologic appearing bone lesion is identified.  12/16/24 Tumor Markers: CEA 2.2, CA 15-3-14, CA 27.29-21.9  1/19/25 Patient presented to [facility] Emergency Department: Complaints of leg pain.  1/19/25  CT Lumbar Spine WO Contrast  Disc space narrowing most severe at L5-S1. Mild multilevel degenerative disc disease and degenerative endplate change. No specific CT evidence of spinal stenosis. No acute osseous abnormality.   1/19/25 Vascular US DUP LE Venous Right  No evidence of deep vein or superficial vein thrombosis in the right lower extremity. Vessels demonstrate normal compressibility, color filling, and phasic and spontaneous flow.   1/27/25 Tumor Markers:  CEA 2.0, CA 15-3-16, CA 27.29-25.6  3/6/25 MRI Lumbar Spine WO Contrast  Multilevel degenerative changes as detailed above, with mild left neural foraminal stenosis at L4-L5 and L5-S1.   3/17/25 PET Scan ([facility]) A right upper lobe paramediastinal nodule measures 1.2 x 1.1 cm in size previously measured at 1.7 x 1.4 cm in size when measured at the same   level in the same manner on previous outside CT. This previously measured SUVs of 6.2 on outside PET CT. It measures maximum SUV of 3.9 on today's exam. There is a peripheral 3 mm subpleural nodule in the right upper lobe image 45 series 7 stable from the previous exam. Previously described subpectoral lymph nodes in the upper left chest are again demonstrated. The largest node is more laterally positioned and is a new finding from the previous outside PET CT. Previously measuring 1.4 x 0.9 cm in size on CT of the chest dated 12/13/2024 now measuring 1.2 x 0.9 cm in size. This demonstrates mild increased  2.5 mg (DUONEB) nebulizer solution 1 Dose  1 Dose Inhalation Q4H RT Ashley Morrison MD   1 Dose at 06/07/25 0630    acetylcysteine (MUCOMYST) 20 % solution 600 mg  600 mg Inhalation Q12H PRN Ashley Morrison MD           Allergies:   Allergies   Allergen Reactions    Clindamycin/Lincomycin Hives, Itching and Rash     Objective   BP (!) 149/86   Pulse 93   Temp 97.7 °F (36.5 °C) (Temporal)   Resp 16   Ht 1.676 m (5' 6\")   Wt 74.8 kg (165 lb)   SpO2 90%   BMI 26.63 kg/m²   PHYSICAL EXAM:  CONSTITUTIONAL: Alert, appropriate, no acute distress  EYES: Non icteric, EOM intact, pupils equal round   ENT: Mucus membranes moist,external inspection of ears and nose are normal  NECK: Supple, no masses.  No palpable thyroid mass  CHEST/LUNGS: CTA bilaterally, normal respiratory effort   CARDIOVASCULAR: RRR, no murmurs.  No lower extremity edema  ABDOMEN: soft non-tender, active bowel sounds, no HSM.  No palpable masses  EXTREMITIES: warm, full ROM in all 4 extremities, no focal weakness.  SKIN: warm, dry with no rashes or lesions  NEUROLOGIC: follows commands, non focal     LABORATORY RESULTS REVIEWED/ANALYZED BY ME:  Recent Labs     06/07/25  0113 06/06/25  0216 06/05/25  1254   WBC 8.6 8.0 9.2   HGB 13.1 12.4 13.9   HCT 41.3 38.6 41.9   MCV 94.1 92.8 90.9    308 322       Lab Results   Component Value Date     06/07/2025    K 4.2 06/07/2025     06/07/2025    CO2 23 06/07/2025    BUN 11 06/07/2025    CREATININE 0.7 06/07/2025    GLUCOSE 250 (H) 06/07/2025    CALCIUM 9.5 06/07/2025    BILITOT 0.9 06/05/2025    ALKPHOS 109 (H) 06/05/2025    AST 19 06/05/2025    ALT 9 (L) 06/05/2025    LABGLOM >90 06/07/2025    GFRAA >59 05/25/2022    AGRATIO 1.6 04/01/2021    GLOB 2.7 06/26/2024       Lab Results   Component Value Date    INR 0.94 02/22/2023    INR 1.22 (H) 11/19/2021    INR 1.20 (H) 10/06/2021    PROTIME 12.5 02/22/2023    PROTIME 15.5 (H) 11/19/2021    PROTIME 15.4 (H) 10/06/2021

## 2025-06-08 LAB
ANION GAP SERPL CALCULATED.3IONS-SCNC: 15 MMOL/L (ref 8–16)
BACTERIA SPEC RESP CULT: ABNORMAL
BACTERIA SPEC RESP CULT: ABNORMAL
BUN SERPL-MCNC: 10 MG/DL (ref 6–20)
CALCIUM SERPL-MCNC: 9.1 MG/DL (ref 8.6–10)
CHLORIDE SERPL-SCNC: 108 MMOL/L (ref 98–107)
CO2 SERPL-SCNC: 20 MMOL/L (ref 22–29)
CREAT SERPL-MCNC: 0.6 MG/DL (ref 0.5–0.9)
EKG P AXIS: 41 DEGREES
EKG P-R INTERVAL: 126 MS
EKG Q-T INTERVAL: 350 MS
EKG QRS DURATION: 78 MS
EKG QTC CALCULATION (BAZETT): 404 MS
EKG T AXIS: 71 DEGREES
ERYTHROCYTE [DISTWIDTH] IN BLOOD BY AUTOMATED COUNT: 14.9 % (ref 11.5–14.5)
GLUCOSE BLD-MCNC: 126 MG/DL (ref 70–99)
GLUCOSE BLD-MCNC: 178 MG/DL (ref 70–99)
GLUCOSE BLD-MCNC: 179 MG/DL (ref 70–99)
GLUCOSE BLD-MCNC: 244 MG/DL (ref 70–99)
GLUCOSE SERPL-MCNC: 212 MG/DL (ref 70–99)
GRAM STN SPEC: ABNORMAL
HCT VFR BLD AUTO: 36.3 % (ref 37–47)
HGB BLD-MCNC: 11.8 G/DL (ref 12–16)
MAGNESIUM SERPL-MCNC: 1.9 MG/DL (ref 1.6–2.6)
MCH RBC QN AUTO: 30.4 PG (ref 27–31)
MCHC RBC AUTO-ENTMCNC: 32.5 G/DL (ref 33–37)
MCV RBC AUTO: 93.6 FL (ref 81–99)
ORGANISM: ABNORMAL
PERFORMED ON: ABNORMAL
PLATELET # BLD AUTO: 301 K/UL (ref 130–400)
PMV BLD AUTO: 9 FL (ref 9.4–12.3)
POTASSIUM SERPL-SCNC: 3.2 MMOL/L (ref 3.5–5)
RBC # BLD AUTO: 3.88 M/UL (ref 4.2–5.4)
SODIUM SERPL-SCNC: 143 MMOL/L (ref 136–145)
WBC # BLD AUTO: 24.6 K/UL (ref 4.8–10.8)

## 2025-06-08 PROCEDURE — 85027 COMPLETE CBC AUTOMATED: CPT

## 2025-06-08 PROCEDURE — 2500000003 HC RX 250 WO HCPCS: Performed by: INTERNAL MEDICINE

## 2025-06-08 PROCEDURE — 99231 SBSQ HOSP IP/OBS SF/LOW 25: CPT | Performed by: INTERNAL MEDICINE

## 2025-06-08 PROCEDURE — 94761 N-INVAS EAR/PLS OXIMETRY MLT: CPT

## 2025-06-08 PROCEDURE — 6370000000 HC RX 637 (ALT 250 FOR IP): Performed by: INTERNAL MEDICINE

## 2025-06-08 PROCEDURE — 6370000000 HC RX 637 (ALT 250 FOR IP): Performed by: STUDENT IN AN ORGANIZED HEALTH CARE EDUCATION/TRAINING PROGRAM

## 2025-06-08 PROCEDURE — 1200000000 HC SEMI PRIVATE

## 2025-06-08 PROCEDURE — 6360000002 HC RX W HCPCS: Performed by: INTERNAL MEDICINE

## 2025-06-08 PROCEDURE — 6360000002 HC RX W HCPCS: Performed by: STUDENT IN AN ORGANIZED HEALTH CARE EDUCATION/TRAINING PROGRAM

## 2025-06-08 PROCEDURE — 82962 GLUCOSE BLOOD TEST: CPT

## 2025-06-08 PROCEDURE — 36415 COLL VENOUS BLD VENIPUNCTURE: CPT

## 2025-06-08 PROCEDURE — 83735 ASSAY OF MAGNESIUM: CPT

## 2025-06-08 PROCEDURE — 2500000003 HC RX 250 WO HCPCS: Performed by: STUDENT IN AN ORGANIZED HEALTH CARE EDUCATION/TRAINING PROGRAM

## 2025-06-08 PROCEDURE — 2580000003 HC RX 258: Performed by: INTERNAL MEDICINE

## 2025-06-08 PROCEDURE — 94669 MECHANICAL CHEST WALL OSCILL: CPT

## 2025-06-08 PROCEDURE — 80048 BASIC METABOLIC PNL TOTAL CA: CPT

## 2025-06-08 PROCEDURE — 94640 AIRWAY INHALATION TREATMENT: CPT

## 2025-06-08 RX ORDER — CALCIUM CARBONATE 500 MG/1
500 TABLET, CHEWABLE ORAL 3 TIMES DAILY PRN
Status: DISCONTINUED | OUTPATIENT
Start: 2025-06-08 | End: 2025-06-13 | Stop reason: HOSPADM

## 2025-06-08 RX ADMIN — HYDROCODONE BITARTRATE AND ACETAMINOPHEN 1 TABLET: 10; 325 TABLET ORAL at 20:35

## 2025-06-08 RX ADMIN — WATER 60 MG: 1 INJECTION INTRAMUSCULAR; INTRAVENOUS; SUBCUTANEOUS at 20:35

## 2025-06-08 RX ADMIN — IPRATROPIUM BROMIDE AND ALBUTEROL SULFATE 1 DOSE: 2.5; .5 SOLUTION RESPIRATORY (INHALATION) at 18:23

## 2025-06-08 RX ADMIN — CEFEPIME 2000 MG: 2 INJECTION, POWDER, FOR SOLUTION INTRAVENOUS at 07:47

## 2025-06-08 RX ADMIN — ARFORMOTEROL TARTRATE 15 MCG: 15 SOLUTION RESPIRATORY (INHALATION) at 18:40

## 2025-06-08 RX ADMIN — ENOXAPARIN SODIUM 40 MG: 100 INJECTION SUBCUTANEOUS at 20:35

## 2025-06-08 RX ADMIN — INSULIN LISPRO 2 UNITS: 100 INJECTION, SOLUTION INTRAVENOUS; SUBCUTANEOUS at 09:37

## 2025-06-08 RX ADMIN — BUDESONIDE INHALATION 500 MCG: 0.5 SUSPENSION RESPIRATORY (INHALATION) at 06:37

## 2025-06-08 RX ADMIN — IPRATROPIUM BROMIDE AND ALBUTEROL SULFATE 1 DOSE: 2.5; .5 SOLUTION RESPIRATORY (INHALATION) at 10:40

## 2025-06-08 RX ADMIN — FAMOTIDINE 20 MG: 20 TABLET, FILM COATED ORAL at 07:42

## 2025-06-08 RX ADMIN — GABAPENTIN 600 MG: 600 TABLET, FILM COATED ORAL at 07:42

## 2025-06-08 RX ADMIN — CEFEPIME 2000 MG: 2 INJECTION, POWDER, FOR SOLUTION INTRAVENOUS at 14:45

## 2025-06-08 RX ADMIN — GUAIFENESIN 600 MG: 600 TABLET, EXTENDED RELEASE ORAL at 07:42

## 2025-06-08 RX ADMIN — GABAPENTIN 600 MG: 600 TABLET, FILM COATED ORAL at 14:44

## 2025-06-08 RX ADMIN — ACETYLCYSTEINE 600 MG: 200 SOLUTION ORAL; RESPIRATORY (INHALATION) at 06:24

## 2025-06-08 RX ADMIN — SODIUM CHLORIDE, PRESERVATIVE FREE 10 ML: 5 INJECTION INTRAVENOUS at 20:35

## 2025-06-08 RX ADMIN — FAMOTIDINE 20 MG: 20 TABLET, FILM COATED ORAL at 20:34

## 2025-06-08 RX ADMIN — POTASSIUM CHLORIDE 40 MEQ: 1500 TABLET, EXTENDED RELEASE ORAL at 05:50

## 2025-06-08 RX ADMIN — ACETYLCYSTEINE 600 MG: 200 SOLUTION ORAL; RESPIRATORY (INHALATION) at 18:23

## 2025-06-08 RX ADMIN — IPRATROPIUM BROMIDE AND ALBUTEROL SULFATE 1 DOSE: 2.5; .5 SOLUTION RESPIRATORY (INHALATION) at 06:21

## 2025-06-08 RX ADMIN — POTASSIUM BICARBONATE 40 MEQ: 782 TABLET, EFFERVESCENT ORAL at 09:37

## 2025-06-08 RX ADMIN — HYDROCODONE BITARTRATE AND ACETAMINOPHEN 1 TABLET: 10; 325 TABLET ORAL at 08:34

## 2025-06-08 RX ADMIN — IPRATROPIUM BROMIDE AND ALBUTEROL SULFATE 1 DOSE: 2.5; .5 SOLUTION RESPIRATORY (INHALATION) at 22:01

## 2025-06-08 RX ADMIN — GABAPENTIN 600 MG: 600 TABLET, FILM COATED ORAL at 20:34

## 2025-06-08 RX ADMIN — WATER 60 MG: 1 INJECTION INTRAMUSCULAR; INTRAVENOUS; SUBCUTANEOUS at 02:27

## 2025-06-08 RX ADMIN — IPRATROPIUM BROMIDE AND ALBUTEROL SULFATE 1 DOSE: 2.5; .5 SOLUTION RESPIRATORY (INHALATION) at 01:59

## 2025-06-08 RX ADMIN — GUAIFENESIN 600 MG: 600 TABLET, EXTENDED RELEASE ORAL at 20:34

## 2025-06-08 RX ADMIN — IPRATROPIUM BROMIDE AND ALBUTEROL SULFATE 1 DOSE: 2.5; .5 SOLUTION RESPIRATORY (INHALATION) at 14:32

## 2025-06-08 RX ADMIN — BUDESONIDE INHALATION 500 MCG: 0.5 SUSPENSION RESPIRATORY (INHALATION) at 18:40

## 2025-06-08 RX ADMIN — WATER 60 MG: 1 INJECTION INTRAMUSCULAR; INTRAVENOUS; SUBCUTANEOUS at 07:42

## 2025-06-08 RX ADMIN — ARFORMOTEROL TARTRATE 15 MCG: 15 SOLUTION RESPIRATORY (INHALATION) at 06:37

## 2025-06-08 ASSESSMENT — PAIN SCALES - GENERAL
PAINLEVEL_OUTOF10: 4
PAINLEVEL_OUTOF10: 7
PAINLEVEL_OUTOF10: 6

## 2025-06-08 ASSESSMENT — PAIN DESCRIPTION - LOCATION
LOCATION: CHEST

## 2025-06-08 ASSESSMENT — PAIN DESCRIPTION - DESCRIPTORS
DESCRIPTORS: ACHING

## 2025-06-08 NOTE — PROGRESS NOTES
Sodium 143 136 - 145 mmol/L    Potassium reflex Magnesium 3.2 (L) 3.5 - 5.0 mmol/L    Chloride 108 (H) 98 - 107 mmol/L    CO2 20 (L) 22 - 29 mmol/L    Anion Gap 15 8 - 16 mmol/L    Glucose 212 (H) 70 - 99 mg/dL    BUN 10 6 - 20 mg/dL    Creatinine 0.6 0.5 - 0.9 mg/dL    Est, Glom Filt Rate >90 >60    Calcium 9.1 8.6 - 10.0 mg/dL   CBC    Collection Time: 06/08/25  2:35 AM   Result Value Ref Range    WBC 24.6 (H) 4.8 - 10.8 K/uL    RBC 3.88 (L) 4.20 - 5.40 M/uL    Hemoglobin 11.8 (L) 12.0 - 16.0 g/dL    Hematocrit 36.3 (L) 37.0 - 47.0 %    MCV 93.6 81.0 - 99.0 fL    MCH 30.4 27.0 - 31.0 pg    MCHC 32.5 (L) 33.0 - 37.0 g/dL    RDW 14.9 (H) 11.5 - 14.5 %    Platelets 301 130 - 400 K/uL    MPV 9.0 (L) 9.4 - 12.3 fL   Magnesium    Collection Time: 06/08/25  2:35 AM   Result Value Ref Range    Magnesium 1.9 1.6 - 2.6 mg/dL   POCT Glucose    Collection Time: 06/08/25  7:28 AM   Result Value Ref Range    POC Glucose 244 (H) 70 - 99 mg/dl    Performed on AccuChek              Current Facility-Administered Medications:     methylPREDNISolone sodium succ (SOLU-MEDROL) 60 mg in sterile water 0.96 mL injection, 60 mg, IntraVENous, Q12H, Robin Palmer MD    calcium carbonate (TUMS) chewable tablet 500 mg, 500 mg, Oral, TID PRN, Victor Manuel Barger MD    glucose chewable tablet 16 g, 4 tablet, Oral, PRN, Robin Palmer MD    dextrose bolus 10% 125 mL, 125 mL, IntraVENous, PRN **OR** dextrose bolus 10% 250 mL, 250 mL, IntraVENous, PRN, Robin Palmer MD    glucagon injection 1 mg, 1 mg, SubCUTAneous, PRN, Robin Palmer MD    dextrose 10 % infusion, , IntraVENous, Continuous PRN, Robin Palmer MD    insulin lispro (HUMALOG,ADMELOG) injection vial 0-4 Units, 0-4 Units, SubCUTAneous, 4x Daily AC & HS, Robin Palmer MD, 2 Units at 06/08/25 0937    acetylcysteine (MUCOMYST) 20 % solution 600 mg, 600 mg, Inhalation, BID, Robin Palmer MD, 600 mg at 06/08/25 0624    famotidine (PEPCID) tablet 20 mg, 20 mg, Oral, BID, Spencer      XR CHEST PORTABLE  Result Date: 6/5/2025  There is new severe to complete atelectasis of the left upper lobe which is likely caused by an obstructing left hilar mass/lymphadenopathy. This would be better evaluated with a CT with intravenous contrast.   ______________________________________ Electronically signed by: JINA WARD M.D. Date:     06/05/2025 Time:    13:33          Assessment/Plan  Principal Problem:    Community acquired pneumonia of left lower lobe of lung  Active Problems:    Carcinoma of female breast (HCC)    Status post bilateral mastectomy    Status post bilateral breast reconstruction    Chest pain    RUDY (obstructive sleep apnea)    Carcinoma of left breast metastatic to lung (HCC)    Malignant neoplasm metastatic to both lungs (HCC)    Chronic embolism and thrombosis of deep veins of left upper extremity (I82.722)    Atelectasis  Resolved Problems:    * No resolved hospital problems. *     Complete atelectasis of left upper lobe due to mucous plug  Possible post-obstructive pneumonia  -- CT chest reviewed, noted likely compressive atelectasis of left upper lobe with ongoing left lower lobe infiltrative process  -- Pt does not have clinical signs/symptoms of pneumonia  -- Procalcitonin 0.06, WBC normal  -- Pulmonology consulted, underwent bronchoscopy on 6/6 which showed mucous plug in left upper lobe bronchus, this was removed. Endobronchial biopsies and BAL sent.   -- Continue Cefepime; stop Vancomycin given negative MRSA nares  -- Continue IV Solumedrol  -- Follow up BAL culture and endobronchial biopsies  -- Continue Duonebs, Mucinex  -- Schedule Mucomyst nebs x3 days and add Acapella to help mobilize secretions    Chronic COPD  -- Continue ICS/LABA and Duonebs    Metastatic breast cancer  -- Oncology following      DVT prophylaxis: Lovenox    DISPOSITION:  Plans to return home at discharge    Full Code No additional code details         Complexity of medical decision making: High  Risk

## 2025-06-08 NOTE — PROGRESS NOTES
/[facility] Pulmonology: Fragments of bronchial mucosa with submucosa exhibiting anthracotic pigment deposition.  No granulomata identified.  Fragments of clotted blood.  No histologic evidence of malignancy.   1/5/24 CT Chest WO Contrast Nodule in the right upper lobe anteriorly medially, slightly larger now measuring 1.5 x 2.0 cm. Noncalcified nodule in the left lower lobe superiorly medially measuring 2.5 cm, unchanged. Noncalcified nodule in the right lower lobe measuring 0.7 cm, unchanged. Atherosclerosis and coronary artery calcification. Mild degenerative changes of the spine. Bilateral breast implants.  Surgical clips in the right axilla. Diverticulosis of the colon.  Otherwise unremarkable noncontrast CT scan of the chest.   1/12/24 Tumor Markers: CEA 2.2, CA 15-3-18, CA 27.29-23.1  2/2/24 Robotic Navigational Bronchoscopy, endobronchial ultrasound by Greer Franco biopsy  2/2/24 Cytopathology ([facility]): Lung Left lower lobe, Bronchial epithelial cells and mixed inflammation, no malignant cells identified. Left lower lobe; Adenocarcinoma, consistent with metastasis from patient's known breast primary.  ER 70%, WI 5%, HER2 IHC 0  2/16/2024-essentially, recurrent breast cancer with lung metastasis ER 70%, WI 5%, HER2 IHC 0, biopsy-proven.  Patient has 2 nodules in the lungs.  No other evidence of metastatic disease.  Discussed.  Sadi Rhoades possibility of SBRT. NGS liquid biopsy today.  2/16/24 Tumor Markers: CEA 2, CA 15-3-18, CA 27.29-21.4  3/17/24 Re-initiated treatment with palbociclib and Aromasin  3/29/24 Tumor Markers; CEA 1.9, CA 15-3-21, CA 27.29- 34.6  4/26/24 Tumor Markers:  CEA 2.4, CA 15-3- 25, CA 27.29-35.3  4/29/24 XR Ankle Right Negative radiographic images of the right ankle.   5/29/24 Tumor Markers: CEA 2, CA 1-3-27, CA 27.29-37.5  6/20/24 CT Chest W Contrast  Adjacent to the mediastinum the right upper lobe image 29 1.5 x 2 cm nodule is stable and unchanged. Left infrahilar region in the  signs of post-obstructive pneumonia.  Continue supportive care, pulmonary hygiene.  Cancer Management.  Continue Aromasin + palbociclib as tolerated.  Pending bronchoscopy/pathology, reassess disease progression and systemic treatment strategy.  Symptom Control  Address pleuritic pain with analgesics.  Monitor oxygen status and respiratory symptoms closely.  VTE Prophylaxis  Given history of prior clot and current immobility/hospitalization, initiate pharmacologic DVT prophylaxis unless contraindicated.      Victor Manuel Barger MD    06/08/25  8:54 AM

## 2025-06-09 ENCOUNTER — OUTSIDE FACILITY SERVICE (OUTPATIENT)
Age: 60
End: 2025-06-09
Payer: MEDICARE

## 2025-06-09 ENCOUNTER — TELEPHONE (OUTPATIENT)
Age: 60
End: 2025-06-09
Payer: MEDICARE

## 2025-06-09 ENCOUNTER — APPOINTMENT (OUTPATIENT)
Dept: GENERAL RADIOLOGY | Age: 60
DRG: 178 | End: 2025-06-09
Payer: MEDICARE

## 2025-06-09 LAB
BASOPHILS # BLD: 0 K/UL (ref 0–0.2)
BASOPHILS NFR BLD: 0.1 % (ref 0–1)
EOSINOPHIL # BLD: 0 K/UL (ref 0–0.6)
EOSINOPHIL NFR BLD: 0 % (ref 0–5)
ERYTHROCYTE [DISTWIDTH] IN BLOOD BY AUTOMATED COUNT: 15.2 % (ref 11.5–14.5)
GLUCOSE BLD-MCNC: 116 MG/DL (ref 70–99)
GLUCOSE BLD-MCNC: 124 MG/DL (ref 70–99)
GLUCOSE BLD-MCNC: 135 MG/DL (ref 70–99)
GLUCOSE BLD-MCNC: 143 MG/DL (ref 70–99)
GLUCOSE BLD-MCNC: 159 MG/DL (ref 70–99)
HCT VFR BLD AUTO: 37.7 % (ref 37–47)
HGB BLD-MCNC: 12.1 G/DL (ref 12–16)
IMM GRANULOCYTES # BLD: 0.3 K/UL
LYMPHOCYTES # BLD: 0.7 K/UL (ref 1.1–4.5)
LYMPHOCYTES NFR BLD: 2.7 % (ref 20–40)
MCH RBC QN AUTO: 30.2 PG (ref 27–31)
MCHC RBC AUTO-ENTMCNC: 32.1 G/DL (ref 33–37)
MCV RBC AUTO: 94 FL (ref 81–99)
MONOCYTES # BLD: 0.8 K/UL (ref 0–0.9)
MONOCYTES NFR BLD: 3 % (ref 0–10)
NEUTROPHILS # BLD: 24.1 K/UL (ref 1.5–7.5)
NEUTS SEG NFR BLD: 93.2 % (ref 50–65)
PERFORMED ON: ABNORMAL
PLATELET # BLD AUTO: 321 K/UL (ref 130–400)
PMV BLD AUTO: 9.2 FL (ref 9.4–12.3)
RBC # BLD AUTO: 4.01 M/UL (ref 4.2–5.4)
WBC # BLD AUTO: 25.8 K/UL (ref 4.8–10.8)

## 2025-06-09 PROCEDURE — 36415 COLL VENOUS BLD VENIPUNCTURE: CPT

## 2025-06-09 PROCEDURE — 82962 GLUCOSE BLOOD TEST: CPT

## 2025-06-09 PROCEDURE — 99231 SBSQ HOSP IP/OBS SF/LOW 25: CPT | Performed by: INTERNAL MEDICINE

## 2025-06-09 PROCEDURE — 1200000000 HC SEMI PRIVATE

## 2025-06-09 PROCEDURE — 85025 COMPLETE CBC W/AUTO DIFF WBC: CPT

## 2025-06-09 PROCEDURE — 6370000000 HC RX 637 (ALT 250 FOR IP): Performed by: INTERNAL MEDICINE

## 2025-06-09 PROCEDURE — 2580000003 HC RX 258: Performed by: INTERNAL MEDICINE

## 2025-06-09 PROCEDURE — 2500000003 HC RX 250 WO HCPCS: Performed by: STUDENT IN AN ORGANIZED HEALTH CARE EDUCATION/TRAINING PROGRAM

## 2025-06-09 PROCEDURE — 94640 AIRWAY INHALATION TREATMENT: CPT

## 2025-06-09 PROCEDURE — 2500000003 HC RX 250 WO HCPCS: Performed by: INTERNAL MEDICINE

## 2025-06-09 PROCEDURE — 6360000002 HC RX W HCPCS: Performed by: STUDENT IN AN ORGANIZED HEALTH CARE EDUCATION/TRAINING PROGRAM

## 2025-06-09 PROCEDURE — 6360000002 HC RX W HCPCS: Performed by: INTERNAL MEDICINE

## 2025-06-09 PROCEDURE — 94760 N-INVAS EAR/PLS OXIMETRY 1: CPT

## 2025-06-09 PROCEDURE — 94669 MECHANICAL CHEST WALL OSCILL: CPT

## 2025-06-09 PROCEDURE — 94761 N-INVAS EAR/PLS OXIMETRY MLT: CPT

## 2025-06-09 PROCEDURE — 6370000000 HC RX 637 (ALT 250 FOR IP): Performed by: STUDENT IN AN ORGANIZED HEALTH CARE EDUCATION/TRAINING PROGRAM

## 2025-06-09 PROCEDURE — 71045 X-RAY EXAM CHEST 1 VIEW: CPT

## 2025-06-09 RX ORDER — MECOBALAMIN 5000 MCG
10 TABLET,DISINTEGRATING ORAL NIGHTLY
Status: DISCONTINUED | OUTPATIENT
Start: 2025-06-09 | End: 2025-06-13 | Stop reason: HOSPADM

## 2025-06-09 RX ADMIN — IPRATROPIUM BROMIDE AND ALBUTEROL SULFATE 1 DOSE: 2.5; .5 SOLUTION RESPIRATORY (INHALATION) at 10:42

## 2025-06-09 RX ADMIN — WATER 40 MG: 1 INJECTION INTRAMUSCULAR; INTRAVENOUS; SUBCUTANEOUS at 22:10

## 2025-06-09 RX ADMIN — GABAPENTIN 600 MG: 600 TABLET, FILM COATED ORAL at 07:44

## 2025-06-09 RX ADMIN — BUDESONIDE INHALATION 500 MCG: 0.5 SUSPENSION RESPIRATORY (INHALATION) at 18:40

## 2025-06-09 RX ADMIN — CEFEPIME 2000 MG: 2 INJECTION, POWDER, FOR SOLUTION INTRAVENOUS at 23:54

## 2025-06-09 RX ADMIN — IPRATROPIUM BROMIDE AND ALBUTEROL SULFATE 1 DOSE: 2.5; .5 SOLUTION RESPIRATORY (INHALATION) at 21:45

## 2025-06-09 RX ADMIN — WATER 60 MG: 1 INJECTION INTRAMUSCULAR; INTRAVENOUS; SUBCUTANEOUS at 07:40

## 2025-06-09 RX ADMIN — SODIUM CHLORIDE, PRESERVATIVE FREE 10 ML: 5 INJECTION INTRAVENOUS at 07:47

## 2025-06-09 RX ADMIN — HYDROCODONE BITARTRATE AND ACETAMINOPHEN 1 TABLET: 10; 325 TABLET ORAL at 22:50

## 2025-06-09 RX ADMIN — IPRATROPIUM BROMIDE AND ALBUTEROL SULFATE 1 DOSE: 2.5; .5 SOLUTION RESPIRATORY (INHALATION) at 06:50

## 2025-06-09 RX ADMIN — GABAPENTIN 600 MG: 600 TABLET, FILM COATED ORAL at 22:09

## 2025-06-09 RX ADMIN — ARFORMOTEROL TARTRATE 15 MCG: 15 SOLUTION RESPIRATORY (INHALATION) at 18:40

## 2025-06-09 RX ADMIN — IPRATROPIUM BROMIDE AND ALBUTEROL SULFATE 1 DOSE: 2.5; .5 SOLUTION RESPIRATORY (INHALATION) at 18:23

## 2025-06-09 RX ADMIN — GABAPENTIN 600 MG: 600 TABLET, FILM COATED ORAL at 14:04

## 2025-06-09 RX ADMIN — FAMOTIDINE 20 MG: 20 TABLET, FILM COATED ORAL at 07:44

## 2025-06-09 RX ADMIN — ACETYLCYSTEINE 600 MG: 200 SOLUTION ORAL; RESPIRATORY (INHALATION) at 06:31

## 2025-06-09 RX ADMIN — IPRATROPIUM BROMIDE AND ALBUTEROL SULFATE 1 DOSE: 2.5; .5 SOLUTION RESPIRATORY (INHALATION) at 02:20

## 2025-06-09 RX ADMIN — ENOXAPARIN SODIUM 40 MG: 100 INJECTION SUBCUTANEOUS at 22:09

## 2025-06-09 RX ADMIN — GUAIFENESIN 600 MG: 600 TABLET, EXTENDED RELEASE ORAL at 22:09

## 2025-06-09 RX ADMIN — BUDESONIDE INHALATION 500 MCG: 0.5 SUSPENSION RESPIRATORY (INHALATION) at 06:50

## 2025-06-09 RX ADMIN — GUAIFENESIN 600 MG: 600 TABLET, EXTENDED RELEASE ORAL at 07:44

## 2025-06-09 RX ADMIN — ARFORMOTEROL TARTRATE 15 MCG: 15 SOLUTION RESPIRATORY (INHALATION) at 06:50

## 2025-06-09 RX ADMIN — IPRATROPIUM BROMIDE AND ALBUTEROL SULFATE 1 DOSE: 2.5; .5 SOLUTION RESPIRATORY (INHALATION) at 16:10

## 2025-06-09 RX ADMIN — Medication 5 ML: at 07:44

## 2025-06-09 RX ADMIN — ACETYLCYSTEINE 600 MG: 200 SOLUTION ORAL; RESPIRATORY (INHALATION) at 18:24

## 2025-06-09 RX ADMIN — FAMOTIDINE 20 MG: 20 TABLET, FILM COATED ORAL at 22:09

## 2025-06-09 RX ADMIN — CEFEPIME 2000 MG: 2 INJECTION, POWDER, FOR SOLUTION INTRAVENOUS at 07:46

## 2025-06-09 RX ADMIN — CEFEPIME 2000 MG: 2 INJECTION, POWDER, FOR SOLUTION INTRAVENOUS at 00:13

## 2025-06-09 RX ADMIN — SODIUM CHLORIDE, PRESERVATIVE FREE 10 ML: 5 INJECTION INTRAVENOUS at 22:13

## 2025-06-09 RX ADMIN — CEFEPIME 2000 MG: 2 INJECTION, POWDER, FOR SOLUTION INTRAVENOUS at 15:03

## 2025-06-09 RX ADMIN — Medication 10 MG: at 22:09

## 2025-06-09 ASSESSMENT — PAIN DESCRIPTION - ORIENTATION: ORIENTATION: RIGHT;LEFT

## 2025-06-09 ASSESSMENT — PAIN DESCRIPTION - LOCATION: LOCATION: RIB CAGE;HEAD

## 2025-06-09 ASSESSMENT — PAIN SCALES - GENERAL: PAINLEVEL_OUTOF10: 7

## 2025-06-09 ASSESSMENT — PAIN - FUNCTIONAL ASSESSMENT: PAIN_FUNCTIONAL_ASSESSMENT: ACTIVITIES ARE NOT PREVENTED

## 2025-06-09 ASSESSMENT — PAIN DESCRIPTION - DESCRIPTORS: DESCRIPTORS: ACHING

## 2025-06-09 NOTE — TELEPHONE ENCOUNTER
Message sent to [facility]    New Consult     Susanna Wood 1965     Kaitlyn @ [facility] [phone] called in consult. 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 Patient is in room 404.

## 2025-06-09 NOTE — PROGRESS NOTES
MEDICAL ONCOLOGY PROGRESS NOTE     Pt Name: Yandy Dickinson  MRN: [id number]  YOB: 1965  Date of evaluation: 6/9/2025    Subjective-shortness of breath improving, feeling overall better.  Reviewed notes from weekend.  Afebrile.  Remains tachycardic.    Bronchoscopy 6/6/2025 by Dr. Morrison.  JULIANNE mucous plugging encountered, suctioned, biopsy and BAL performed also.  Path pending    HISTORY OF PRESENT ILLNESS:  Yandy Dickinson is a 60-year-old female with a significant past medical history of ER+/GA+/HER2- metastatic breast cancer with known pulmonary metastases, hypertension, peripheral neuropathy, history of pneumonia, and prior upper extremity venous thromboembolism, who presents to the ED with pleuritic chest pain.  She was recently hospitalized with respiratory viral infection.  The patient reports substernal chest pain that began yesterday and worsens with inspiration. She is currently on Aromasin (exemestane) and palbociclib for metastatic breast cancer and has undergone multiple prior systemic therapies. She recently received SBRT to lung lesions for oligoprogression and underwent bronchoscopy on 5/27/25 by Dr. Morrison for evaluation of a left lower lobe lesion, which was aspirated and biopsied. No endobronchial disease or mediastinal lymphadenopathy was seen during the procedure. Since the bronchoscopy, she has had clear mucus with cough but denies fever or chills. She endorses shortness of breath and pleuritic chest pain, but denies leg swelling, weakness, numbness, tingling, palpitations, or other symptoms. She has a remote history of an upper extremity clot but is not currently on anticoagulation.    Laboratory studies admission-WBC 9.2, hemoglobin 13.9, platelet count 322,000.  Chemistry remarkable for normal kidney function, normal LFTs.    6/5/25 CTA Chest negative for pulmonary embolism; shows new complete atelectasis of the left upper lobe due to bronchial occlusion, progressive  midlung field as well as left lung base has remained unchanged. No new focal parenchymal abnormality seen. No adenopathy seen. There is no abdominal or pelvic mass, adenopathy or fluid collection. No lytic or sclerotic bony lesions, but there is a possibility of osteopenia and/or osteoporosis.   02/17/2020- DEXA Bone Density showed osteopenia of lumbar spine, T-score -1.8, and left femoral neck, T-score -2.0  2/25/21 Ct Abdomen Pelvis W Iv Contrast  No evidence of metastatic disease in the abdomen or pelvis.   2/26/21 Ct Chest W Contrast Tiny nodules in the right lung described above probably represent small foci of pleural thickening due to chronic inflammatory process or small noncalcified granulomas. No features to suggest malignancy or metastatic disease. Bilateral breast implants without complication.   3/1/2021-discussed the results CT chest abdomen pelvis.  Essentially no clear evidence of metastatic disease.  This is consistent with excellent response to treatment.  Continue current treatment.   4/1/2021 = 31.6 CA 27-29= 33.5 CEA= 1.6   6/3/21 CA 15-3= 23.3 CA 27-29= 25.6  CEA= 1.8   7/29/2021 CEA=1.7 CA 15-3=21.50 CA 27.29= 26.0  8/19/2021 CT Chest  Left lower lobe pleural-based nodular 1.7 x 0.9 cm is indeterminate. PET/CT recommended. Feeding defect in the left lower lobe bronchus versus a postobstructive airspace disease. This too may be further characterized with PET CT versus direct visualization.  8/19/2021 CT Abd/Pelvis A stable CT scan of the abdomen and pelvis. No finding to suggest neoplastic/metastatic disease.  9/28/2021 Final Diagnosis: Arm, excision of left arm thrombus:Fragments of organizing and organized thrombi with dystrophic calcification. Received is a container labeled \"ischemic, left arm\" Received in a fixative is a 3.6 x 2.6 x 1.4 cm aggregate of brown-tan soft tissue fragments and skin. Representative sections are submitted in a single cassette, numerous.  October 2021-she

## 2025-06-09 NOTE — PLAN OF CARE
Problem: Safety - Adult  Goal: Free from fall injury  6/9/2025 0913 by Teodoro Larkin, RN  Outcome: Progressing  6/8/2025 2155 by Billie Donato RN  Outcome: Progressing     Problem: ABCDS Injury Assessment  Goal: Absence of physical injury  6/9/2025 0913 by Teodoro Larkin, RN  Outcome: Progressing  6/8/2025 2155 by Billie Donato RN  Outcome: Progressing     Problem: Pain  Goal: Verbalizes/displays adequate comfort level or baseline comfort level  6/9/2025 0913 by Teodoro Larkin, RN  Outcome: Progressing  6/8/2025 2155 by Billie Donato RN  Outcome: Progressing

## 2025-06-09 NOTE — PROGRESS NOTES
Daily Progress Note    Date:2025  Patient: Yandy Dickinson  : 1965  MRN:[id number]  CODE:Full Code No additional code details  PCP:Tomi Teague MD    Admit Date: 2025 12:48 PM   LOS: 4 days     Chief Complaint   Patient presents with    Chest Pain     Chest pain worse with breathing onset yesterday, recent diagnosis of cancer with lung nodules per pt    Shortness of Breath         Subjective: Dyspnea is somewhat better. Cough is largely unchanged, still nonproductive. No fevers or chills.         Hospital Summary: 60 y.o. female who presented to [facility] ED for evaluation of chest pain and shortness of breath. Pt has history of breast malignancy with metastasis to lungs followed by Dr. Jim and Dr. Roberto, hypertension and neuropathy.      Pt reports intermittent chest pain with worsening cough and shortness of breath from baseline since yesterday. She has had episodes of nausea and vomiting today. She denies fevers as well as abdominal pain. She underwent bronchoscopy by Dr. Roberto 2025.      In ED, CTA pulmonary-Negative for CTA evidence to suggest pulmonary embolism Interval progression in consolidative infiltrate in the left lower lobe, perhaps representing post radiation pneumonitis and/or infection. The previously shown 11 mm diameter left lower lobe nodule is not discretely visualized on the current examination, perhaps because it is obscured by surrounding infiltrate, or perhaps the nodule has decreased in size or resolved. 5 mm diameter nodule in the left lower lobe, which appears new versus prior CT Complete atelectasis of the left upper lobe, and complete occlusion of the left upper lobe bronchus. Pt is admitted inpatient to hospitalist.    Pulmonology consulted. Underwent bronchoscopy  which showed a mucous plug in her left upper lobe bronchus, this was removed. JULIANNE endobronchial biopsies obtained and BAL sent for cytology and culture.  Empirically treated for possible   acetaminophen (TYLENOL) tablet 650 mg, 650 mg, Oral, Q6H PRN, 650 mg at 06/06/25 0228 **OR** acetaminophen (TYLENOL) suppository 650 mg, 650 mg, Rectal, Q6H PRN, Ashley Morrison MD    [Held by provider] exemestane (AROMASIN) tablet 25 mg (Patient Supplied), 25 mg, Oral, Daily, Ashley Morrison MD    gabapentin (NEURONTIN) tablet 600 mg, 600 mg, Oral, TID, Ashley Morrison MD, 600 mg at 06/09/25 0744    guaiFENesin (MUCINEX) extended release tablet 600 mg, 600 mg, Oral, BID, Ashley Morrison MD, 600 mg at 06/09/25 0744    HYDROcodone-acetaminophen (NORCO)  MG per tablet 1 tablet, 1 tablet, Oral, Q6H PRN, Ashley Morrison MD, 1 tablet at 06/08/25 2035    hydrOXYzine HCl (ATARAX) tablet 10 mg, 10 mg, Oral, TID PRN, Ashley Morrison MD    ipratropium 0.5 mg-albuterol 2.5 mg (DUONEB) nebulizer solution 1 Dose, 1 Dose, Inhalation, Q4H RT, Ashley Morrison MD, 1 Dose at 06/09/25 1042      Imaging:  CTA PULMONARY W CONTRAST  Result Date: 6/5/2025  Negative for CTA evidence to suggest pulmonary embolism Interval progression in consolidative infiltrate in the left lower lobe, perhaps representing post radiation pneumonitis and/or infection. The previously shown 11 mm diameter left lower lobe nodule is not discretely visualized on the current examination,  perhaps because it is obscured by surrounding infiltrate, or perhaps the nodule has decreased in size or resolved. 5 mm diameter nodule in the left lower lobe, which appears new versus prior CT Complete atelectasis of the left upper lobe, and complete occlusion of the left upper lobe bronchus Trace left pleural effusion Hepatic steatosis Diverticulosis  All CT scans are performed using dose optimization techniques as appropriate to the performed exam and include at least one of the following: Automated exposure control, adjustment of the mA and/or kV according to size, and the use of iterative

## 2025-06-10 ENCOUNTER — OUTSIDE FACILITY SERVICE (OUTPATIENT)
Age: 60
End: 2025-06-10

## 2025-06-10 LAB
ANION GAP SERPL CALCULATED.3IONS-SCNC: 14 MMOL/L (ref 8–16)
BACTERIA BLD CULT ORG #2: NORMAL
BACTERIA BLD CULT: NORMAL
BASOPHILS # BLD: 0 K/UL (ref 0–0.2)
BASOPHILS NFR BLD: 0.2 % (ref 0–1)
BUN SERPL-MCNC: 20 MG/DL (ref 6–20)
CALCIUM SERPL-MCNC: 8.1 MG/DL (ref 8.6–10)
CHLORIDE SERPL-SCNC: 106 MMOL/L (ref 98–107)
CO2 SERPL-SCNC: 19 MMOL/L (ref 22–29)
CREAT SERPL-MCNC: 0.8 MG/DL (ref 0.5–0.9)
EOSINOPHIL # BLD: 0 K/UL (ref 0–0.6)
EOSINOPHIL NFR BLD: 0 % (ref 0–5)
ERYTHROCYTE [DISTWIDTH] IN BLOOD BY AUTOMATED COUNT: 15.6 % (ref 11.5–14.5)
GLUCOSE BLD-MCNC: 104 MG/DL (ref 70–99)
GLUCOSE BLD-MCNC: 108 MG/DL (ref 70–99)
GLUCOSE BLD-MCNC: 108 MG/DL (ref 70–99)
GLUCOSE BLD-MCNC: 132 MG/DL (ref 70–99)
GLUCOSE SERPL-MCNC: 161 MG/DL (ref 70–99)
HCT VFR BLD AUTO: 36.7 % (ref 37–47)
HGB BLD-MCNC: 11.6 G/DL (ref 12–16)
IMM GRANULOCYTES # BLD: 0.5 K/UL
LYMPHOCYTES # BLD: 0.7 K/UL (ref 1.1–4.5)
LYMPHOCYTES NFR BLD: 3.9 % (ref 20–40)
MCH RBC QN AUTO: 29.8 PG (ref 27–31)
MCHC RBC AUTO-ENTMCNC: 31.6 G/DL (ref 33–37)
MCV RBC AUTO: 94.3 FL (ref 81–99)
MONOCYTES # BLD: 0.5 K/UL (ref 0–0.9)
MONOCYTES NFR BLD: 2.6 % (ref 0–10)
NEUTROPHILS # BLD: 16.7 K/UL (ref 1.5–7.5)
NEUTS SEG NFR BLD: 90.6 % (ref 50–65)
PERFORMED ON: ABNORMAL
PLATELET # BLD AUTO: 300 K/UL (ref 130–400)
PMV BLD AUTO: 9.3 FL (ref 9.4–12.3)
POTASSIUM SERPL-SCNC: 4.1 MMOL/L (ref 3.5–5)
RBC # BLD AUTO: 3.89 M/UL (ref 4.2–5.4)
SODIUM SERPL-SCNC: 139 MMOL/L (ref 136–145)
WBC # BLD AUTO: 18.4 K/UL (ref 4.8–10.8)

## 2025-06-10 PROCEDURE — 36415 COLL VENOUS BLD VENIPUNCTURE: CPT

## 2025-06-10 PROCEDURE — 2500000003 HC RX 250 WO HCPCS: Performed by: STUDENT IN AN ORGANIZED HEALTH CARE EDUCATION/TRAINING PROGRAM

## 2025-06-10 PROCEDURE — 6370000000 HC RX 637 (ALT 250 FOR IP): Performed by: INTERNAL MEDICINE

## 2025-06-10 PROCEDURE — 80048 BASIC METABOLIC PNL TOTAL CA: CPT

## 2025-06-10 PROCEDURE — 85025 COMPLETE CBC W/AUTO DIFF WBC: CPT

## 2025-06-10 PROCEDURE — 82962 GLUCOSE BLOOD TEST: CPT

## 2025-06-10 PROCEDURE — 6370000000 HC RX 637 (ALT 250 FOR IP): Performed by: STUDENT IN AN ORGANIZED HEALTH CARE EDUCATION/TRAINING PROGRAM

## 2025-06-10 PROCEDURE — 2580000003 HC RX 258: Performed by: INTERNAL MEDICINE

## 2025-06-10 PROCEDURE — 6360000002 HC RX W HCPCS: Performed by: STUDENT IN AN ORGANIZED HEALTH CARE EDUCATION/TRAINING PROGRAM

## 2025-06-10 PROCEDURE — 6360000002 HC RX W HCPCS: Performed by: INTERNAL MEDICINE

## 2025-06-10 PROCEDURE — 94669 MECHANICAL CHEST WALL OSCILL: CPT

## 2025-06-10 PROCEDURE — 2500000003 HC RX 250 WO HCPCS: Performed by: INTERNAL MEDICINE

## 2025-06-10 PROCEDURE — 94761 N-INVAS EAR/PLS OXIMETRY MLT: CPT

## 2025-06-10 PROCEDURE — 1200000000 HC SEMI PRIVATE

## 2025-06-10 PROCEDURE — 94640 AIRWAY INHALATION TREATMENT: CPT

## 2025-06-10 PROCEDURE — OUTSIDEPOS PR OUTSIDE POS PLACEHOLDER: Performed by: INTERNAL MEDICINE

## 2025-06-10 RX ORDER — SENNA AND DOCUSATE SODIUM 50; 8.6 MG/1; MG/1
2 TABLET, FILM COATED ORAL DAILY
Status: DISCONTINUED | OUTPATIENT
Start: 2025-06-10 | End: 2025-06-13 | Stop reason: HOSPADM

## 2025-06-10 RX ORDER — BISACODYL 10 MG
10 SUPPOSITORY, RECTAL RECTAL ONCE
Status: COMPLETED | OUTPATIENT
Start: 2025-06-10 | End: 2025-06-10

## 2025-06-10 RX ADMIN — AMPICILLIN SODIUM AND SULBACTAM SODIUM 3000 MG: 2; 1 INJECTION, POWDER, FOR SOLUTION INTRAMUSCULAR; INTRAVENOUS at 20:51

## 2025-06-10 RX ADMIN — IPRATROPIUM BROMIDE AND ALBUTEROL SULFATE 1 DOSE: 2.5; .5 SOLUTION RESPIRATORY (INHALATION) at 14:21

## 2025-06-10 RX ADMIN — ENOXAPARIN SODIUM 40 MG: 100 INJECTION SUBCUTANEOUS at 20:04

## 2025-06-10 RX ADMIN — Medication 10 MG: at 20:51

## 2025-06-10 RX ADMIN — WATER 40 MG: 1 INJECTION INTRAMUSCULAR; INTRAVENOUS; SUBCUTANEOUS at 20:02

## 2025-06-10 RX ADMIN — BUDESONIDE INHALATION 500 MCG: 0.5 SUSPENSION RESPIRATORY (INHALATION) at 18:21

## 2025-06-10 RX ADMIN — GABAPENTIN 600 MG: 600 TABLET, FILM COATED ORAL at 20:02

## 2025-06-10 RX ADMIN — BUDESONIDE INHALATION 500 MCG: 0.5 SUSPENSION RESPIRATORY (INHALATION) at 06:41

## 2025-06-10 RX ADMIN — WATER 40 MG: 1 INJECTION INTRAMUSCULAR; INTRAVENOUS; SUBCUTANEOUS at 08:36

## 2025-06-10 RX ADMIN — SODIUM CHLORIDE, PRESERVATIVE FREE 10 ML: 5 INJECTION INTRAVENOUS at 20:04

## 2025-06-10 RX ADMIN — BISACODYL 10 MG: 10 SUPPOSITORY RECTAL at 16:45

## 2025-06-10 RX ADMIN — AMPICILLIN SODIUM AND SULBACTAM SODIUM 3000 MG: 2; 1 INJECTION, POWDER, FOR SOLUTION INTRAMUSCULAR; INTRAVENOUS at 08:44

## 2025-06-10 RX ADMIN — GABAPENTIN 600 MG: 600 TABLET, FILM COATED ORAL at 08:37

## 2025-06-10 RX ADMIN — ARFORMOTEROL TARTRATE 15 MCG: 15 SOLUTION RESPIRATORY (INHALATION) at 06:41

## 2025-06-10 RX ADMIN — MAGNESIUM HYDROXIDE 30 ML: 400 SUSPENSION ORAL at 14:26

## 2025-06-10 RX ADMIN — IPRATROPIUM BROMIDE AND ALBUTEROL SULFATE 1 DOSE: 2.5; .5 SOLUTION RESPIRATORY (INHALATION) at 10:25

## 2025-06-10 RX ADMIN — IPRATROPIUM BROMIDE AND ALBUTEROL SULFATE 1 DOSE: 2.5; .5 SOLUTION RESPIRATORY (INHALATION) at 06:40

## 2025-06-10 RX ADMIN — ONDANSETRON 4 MG: 2 INJECTION, SOLUTION INTRAMUSCULAR; INTRAVENOUS at 08:36

## 2025-06-10 RX ADMIN — FAMOTIDINE 20 MG: 20 TABLET, FILM COATED ORAL at 20:02

## 2025-06-10 RX ADMIN — FAMOTIDINE 20 MG: 20 TABLET, FILM COATED ORAL at 08:36

## 2025-06-10 RX ADMIN — ARFORMOTEROL TARTRATE 15 MCG: 15 SOLUTION RESPIRATORY (INHALATION) at 18:21

## 2025-06-10 RX ADMIN — POLYETHYLENE GLYCOL 3350 17 G: 17 POWDER, FOR SOLUTION ORAL at 08:58

## 2025-06-10 RX ADMIN — IPRATROPIUM BROMIDE AND ALBUTEROL SULFATE 1 DOSE: 2.5; .5 SOLUTION RESPIRATORY (INHALATION) at 22:30

## 2025-06-10 RX ADMIN — GUAIFENESIN 600 MG: 600 TABLET, EXTENDED RELEASE ORAL at 20:02

## 2025-06-10 RX ADMIN — SODIUM CHLORIDE, PRESERVATIVE FREE 10 ML: 5 INJECTION INTRAVENOUS at 08:37

## 2025-06-10 RX ADMIN — IPRATROPIUM BROMIDE AND ALBUTEROL SULFATE 1 DOSE: 2.5; .5 SOLUTION RESPIRATORY (INHALATION) at 18:24

## 2025-06-10 RX ADMIN — GABAPENTIN 600 MG: 600 TABLET, FILM COATED ORAL at 14:26

## 2025-06-10 RX ADMIN — GUAIFENESIN 600 MG: 600 TABLET, EXTENDED RELEASE ORAL at 08:36

## 2025-06-10 RX ADMIN — AMPICILLIN SODIUM AND SULBACTAM SODIUM 3000 MG: 2; 1 INJECTION, POWDER, FOR SOLUTION INTRAMUSCULAR; INTRAVENOUS at 14:27

## 2025-06-10 RX ADMIN — SENNOSIDES AND DOCUSATE SODIUM 2 TABLET: 50; 8.6 TABLET ORAL at 14:26

## 2025-06-10 NOTE — CONSULTS
INFECTIOUS DISEASES CONSULT NOTE    Patient:  Yandy Dickinson 60 y.o. female  ROOM # [unfilled]  YOB: 1965  MRN: [id number]  CSN:  [id number]  Admit date: 6/5/2025   Admitting Physician: Robin Palmer MD  Primary Care Physician: Tomi Teague MD  REFERRING PROVIDER: No ref. 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  The addendum is to clarify chief complaint for the medical record.  Inadvertently left off the initial dictation.  Patient being seen to evaluate actinomyces recovered for bronchoscopy culture and for recommendations for treatment of right upper lobe infiltrate and left upper lung atelectasis.  CBL    History of Present Illness: Pleasant 60-year-old woman.  She indicates a long history of breast cancer.  She indicates she has had prior lung metastasis.  She indicates she feels symptoms that contributed to this hospitalization started the middle of last year.  She indicates she had just finished radiation therapy.  She also indicates she been taking 2 oral chemotherapies.  She feels that subsequent to that time she has had problems with dyspnea, fatigue, chest discomfort, coughing and sometimes nausea with vomiting when her cough is more severe.  She reports recent admission a couple of months ago with viral respiratory infection.  Get the sense there has been some improvement but then a resurgence of symptoms.  She is primarily had cough and some generalized chest discomfort.  In reviewing her temperature curve she has not had significant temperature elevation.  She describes having some collapse of lung and undergoing bronchoscopy.  Bronc cultures have grown actinomyces.  Infectious diseases asked to evaluate and offer recommendations.    Current Scheduled Medications:    methylPREDNISolone  40 mg IntraVENous Q12H    melatonin  10 mg Oral Nightly    insulin lispro  0-4 Units SubCUTAneous 4x Daily AC & HS     None pneumothoraces. No large pleural effusions. Cardiomediastinal cardiomediastinal silhouette is within normal limits. Stable appearance of   the bones.  IMPRESSION:  Slight worsening of the consolidation of the right upper lung.  Moderate improvement in left lung atelectasis      CT angiogram of the chest done June 5, 2025-copy of radiologist impression outlined below.  IMPRESSION:  Negative for CTA evidence to suggest pulmonary embolism  Interval progression in consolidative infiltrate in the left lower lobe, perhaps representing post radiation pneumonitis and/or infection. The previously shown 11 mm diameter left lower lobe nodule is not discretely visualized on the current examination,   perhaps because it is obscured by surrounding infiltrate, or perhaps the nodule has decreased in size or resolved.  5 mm diameter nodule in the left lower lobe, which appears new versus prior CT  Complete atelectasis of the left upper lobe, and complete occlusion of the left upper lobe bronchus  Trace left pleural effusion  Hepatic steatosis  Diverticulosis       [] Other:     Impression:   1.  Left upper lung atelectasis  2.  Infiltrate with air bronchograms right upper lung  3.  History of metastatic breast cancer  4.  Cough/pleuritic pain  5.  Positive bronchoscopy culture for actinomyces    She may have a combination of bacterial pulmonary infection, prior scarring from radiation treatment, cancer, actinomyces infection, or potentially other infection.  Hard to know whether the problem in the left upper lung is same as right upper lung area.  Would like to review CTs further and discuss with pulmonary, oncology, and general medicine.  Lean toward treating broadly for infection with Unasyn and subsequently Augmentin to cover actinomyces and also expand and microbial treatment with levofloxacin as well.  No improvement in right upper lung infiltrate/left upper lung atelectasis with broad antimicrobial therapy plus or minus

## 2025-06-10 NOTE — PLAN OF CARE
Problem: Safety - Adult  Goal: Free from fall injury  6/10/2025 1636 by Amisha Adams RN  Outcome: Progressing  6/10/2025 1008 by Amisha Adams RN  Outcome: Progressing  Flowsheets (Taken 6/10/2025 1008)  Free From Fall Injury: Instruct family/caregiver on patient safety     Problem: ABCDS Injury Assessment  Goal: Absence of physical injury  6/10/2025 1636 by Amisha Adams RN  Outcome: Progressing  6/10/2025 1008 by Amisha Adams RN  Outcome: Progressing  Flowsheets (Taken 6/10/2025 1008)  Absence of Physical Injury: Implement safety measures based on patient assessment     Problem: Pain  Goal: Verbalizes/displays adequate comfort level or baseline comfort level  6/10/2025 1636 by Amisha Adams RN  Outcome: Progressing  6/10/2025 1008 by Amisha Adams RN  Outcome: Progressing

## 2025-06-10 NOTE — PROGRESS NOTES
Daily Progress Note    Date:6/10/2025  Patient: Yandy Dickinson  : 1965  MRN:[id number]  CODE:Full Code No additional code details  PCP:Tomi Teague MD    Admit Date: 2025 12:48 PM   LOS: 5 days     Chief Complaint   Patient presents with    Chest Pain     Chest pain worse with breathing onset yesterday, recent diagnosis of cancer with lung nodules per pt    Shortness of Breath         Subjective: No significant change in cough or dyspnea from yesterday. Not requiring supplemental O2. Daily labs to be drawn this afternoon.  Hasn't had a BM in 5 days. Passing gas.         Hospital Summary: 60 y.o. female who presented to [facility] ED for evaluation of chest pain and shortness of breath. Pt has history of breast malignancy with metastasis to lungs followed by Dr. Jim and Dr. Roberto, hypertension and neuropathy.      Pt reports intermittent chest pain with worsening cough and shortness of breath from baseline since yesterday. She has had episodes of nausea and vomiting today. She denies fevers as well as abdominal pain. She underwent bronchoscopy by Dr. Roberto 2025.      In ED, CTA pulmonary-Negative for CTA evidence to suggest pulmonary embolism Interval progression in consolidative infiltrate in the left lower lobe, perhaps representing post radiation pneumonitis and/or infection. The previously shown 11 mm diameter left lower lobe nodule is not discretely visualized on the current examination, perhaps because it is obscured by surrounding infiltrate, or perhaps the nodule has decreased in size or resolved. 5 mm diameter nodule in the left lower lobe, which appears new versus prior CT Complete atelectasis of the left upper lobe, and complete occlusion of the left upper lobe bronchus. Pt is admitted inpatient to hospitalist.    Pulmonology consulted. Underwent bronchoscopy  which showed a mucous plug in her left upper lobe bronchus, this was removed. JULIANNE endobronchial biopsies obtained and  CONTRAST  Result Date: 6/5/2025  Negative for CTA evidence to suggest pulmonary embolism Interval progression in consolidative infiltrate in the left lower lobe, perhaps representing post radiation pneumonitis and/or infection. The previously shown 11 mm diameter left lower lobe nodule is not discretely visualized on the current examination,  perhaps because it is obscured by surrounding infiltrate, or perhaps the nodule has decreased in size or resolved. 5 mm diameter nodule in the left lower lobe, which appears new versus prior CT Complete atelectasis of the left upper lobe, and complete occlusion of the left upper lobe bronchus Trace left pleural effusion Hepatic steatosis Diverticulosis  All CT scans are performed using dose optimization techniques as appropriate to the performed exam and include at least one of the following: Automated exposure control, adjustment of the mA and/or kV according to size, and the use of iterative reconstruction technique.  ______________________________________ Electronically signed by: JOSE LUIS JIMENEZ M.D. Date:     06/05/2025 Time:    14:16     XR CHEST PORTABLE  Result Date: 6/5/2025  There is new severe to complete atelectasis of the left upper lobe which is likely caused by an obstructing left hilar mass/lymphadenopathy. This would be better evaluated with a CT with intravenous contrast.   ______________________________________ Electronically signed by: JINA WARD M.D. Date:     06/05/2025 Time:    13:33          Assessment/Plan  Principal Problem:    Community acquired pneumonia of left lower lobe of lung  Active Problems:    Carcinoma of female breast (HCC)    Status post bilateral mastectomy    Status post bilateral breast reconstruction    Chest pain    RUDY (obstructive sleep apnea)    Carcinoma of left breast metastatic to lung (HCC)    Malignant neoplasm metastatic to both lungs (HCC)    Chronic embolism and thrombosis of deep veins of left upper extremity (I82.722)

## 2025-06-10 NOTE — PLAN OF CARE
Problem: Safety - Adult  Goal: Free from fall injury  6/10/2025 1008 by Amisha Adams RN  Outcome: Progressing  6/9/2025 2344 by Juliette Mobley, RN  Outcome: Progressing     Problem: ABCDS Injury Assessment  Goal: Absence of physical injury  6/10/2025 1008 by Amisha Adams RN  Outcome: Progressing  6/9/2025 2344 by Juliette Mobley, RN  Outcome: Progressing     Problem: Pain  Goal: Verbalizes/displays adequate comfort level or baseline comfort level  6/10/2025 1008 by Amisha Adams RN  Outcome: Progressing  6/9/2025 2344 by Juliette Mobley, RN  Outcome: Progressing

## 2025-06-10 NOTE — PROGRESS NOTES
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    Interval History: She feels a little bit better.  She feels she is moving air little bit better.  Talking with her today, she looks a little stronger and her cough seems to be mobilizing some secretions.  She is tolerating Unasyn without side effect.  Discussed risks and benefits of adding levofloxacin to her treatment to broaden antimicrobial coverage.  Reviewed excellent oral bioavailability.  Discussed the fluoroquinolone specific risks including tendon soreness, tendon weakening and rarely tendon rupture.  She accepts risks of fluoroquinolone treatment if we feel that it may offer her benefit.    Note initiated Dina 10, 2025 when I saw her for ongoing follow-up and treatment.  Majority of interval history and review of follow-up studies completed on Dina 10, 2025.  Remainder of note completed and signed on June 11, 2025.  CBL    In/Out  No intake or output data in the 24 hours ending 06/10/25 1608  Allergies:   Allergies   Allergen Reactions    Clindamycin/Lincomycin Hives, Itching and Rash     Current Meds: ampicillin-sulbactam (UNASYN) 3,000 mg in sodium chloride 0.9 % 100 mL IVPB ([id number]), Q6H  magnesium hydroxide (MILK OF MAGNESIA) 400 MG/5ML suspension 30 mL, Daily  sennosides-docusate sodium (SENOKOT-S) 8.6-50 MG tablet 2 tablet, Daily  methylPREDNISolone sodium succ (SOLU-MEDROL) 40 mg in sterile water 1 mL injection, Q12H  melatonin disintegrating tablet 10 mg, Nightly  calcium carbonate (TUMS) chewable tablet 500 mg, TID PRN  glucose chewable tablet 16 g, PRN  dextrose bolus 10% 125 mL, PRN   Or  dextrose bolus

## 2025-06-11 ENCOUNTER — OUTSIDE FACILITY SERVICE (OUTPATIENT)
Age: 60
End: 2025-06-11

## 2025-06-11 LAB
ANION GAP SERPL CALCULATED.3IONS-SCNC: 9 MMOL/L (ref 8–16)
BASOPHILS # BLD: 0.1 K/UL (ref 0–0.2)
BASOPHILS NFR BLD: 0.3 % (ref 0–1)
BUN SERPL-MCNC: 21 MG/DL (ref 6–20)
CALCIUM SERPL-MCNC: 8.2 MG/DL (ref 8.6–10)
CHLORIDE SERPL-SCNC: 104 MMOL/L (ref 98–107)
CO2 SERPL-SCNC: 27 MMOL/L (ref 22–29)
CREAT SERPL-MCNC: 0.7 MG/DL (ref 0.5–0.9)
EOSINOPHIL # BLD: 0 K/UL (ref 0–0.6)
EOSINOPHIL NFR BLD: 0 % (ref 0–5)
ERYTHROCYTE [DISTWIDTH] IN BLOOD BY AUTOMATED COUNT: 15.2 % (ref 11.5–14.5)
GLUCOSE BLD-MCNC: 110 MG/DL (ref 70–99)
GLUCOSE BLD-MCNC: 126 MG/DL (ref 70–99)
GLUCOSE BLD-MCNC: 163 MG/DL (ref 70–99)
GLUCOSE BLD-MCNC: 188 MG/DL (ref 70–99)
GLUCOSE SERPL-MCNC: 131 MG/DL (ref 70–99)
HCT VFR BLD AUTO: 35.2 % (ref 37–47)
HGB BLD-MCNC: 11.5 G/DL (ref 12–16)
IMM GRANULOCYTES # BLD: 0.8 K/UL
LYMPHOCYTES # BLD: 0.8 K/UL (ref 1.1–4.5)
LYMPHOCYTES NFR BLD: 4.7 % (ref 20–40)
MCH RBC QN AUTO: 29.9 PG (ref 27–31)
MCHC RBC AUTO-ENTMCNC: 32.7 G/DL (ref 33–37)
MCV RBC AUTO: 91.7 FL (ref 81–99)
MONOCYTES # BLD: 0.6 K/UL (ref 0–0.9)
MONOCYTES NFR BLD: 3.7 % (ref 0–10)
NEUTROPHILS # BLD: 15 K/UL (ref 1.5–7.5)
NEUTS SEG NFR BLD: 87 % (ref 50–65)
PERFORMED ON: ABNORMAL
PLATELET # BLD AUTO: 287 K/UL (ref 130–400)
PMV BLD AUTO: 8.9 FL (ref 9.4–12.3)
POTASSIUM SERPL-SCNC: 4.5 MMOL/L (ref 3.5–5)
RBC # BLD AUTO: 3.84 M/UL (ref 4.2–5.4)
SODIUM SERPL-SCNC: 140 MMOL/L (ref 136–145)
WBC # BLD AUTO: 17.3 K/UL (ref 4.8–10.8)

## 2025-06-11 PROCEDURE — 94660 CPAP INITIATION&MGMT: CPT

## 2025-06-11 PROCEDURE — 6360000002 HC RX W HCPCS: Performed by: STUDENT IN AN ORGANIZED HEALTH CARE EDUCATION/TRAINING PROGRAM

## 2025-06-11 PROCEDURE — OUTSIDEPOS PR OUTSIDE POS PLACEHOLDER: Performed by: INTERNAL MEDICINE

## 2025-06-11 PROCEDURE — 94669 MECHANICAL CHEST WALL OSCILL: CPT

## 2025-06-11 PROCEDURE — 2580000003 HC RX 258: Performed by: INTERNAL MEDICINE

## 2025-06-11 PROCEDURE — 6360000002 HC RX W HCPCS: Performed by: INTERNAL MEDICINE

## 2025-06-11 PROCEDURE — 6370000000 HC RX 637 (ALT 250 FOR IP): Performed by: STUDENT IN AN ORGANIZED HEALTH CARE EDUCATION/TRAINING PROGRAM

## 2025-06-11 PROCEDURE — 6370000000 HC RX 637 (ALT 250 FOR IP): Performed by: INTERNAL MEDICINE

## 2025-06-11 PROCEDURE — 94761 N-INVAS EAR/PLS OXIMETRY MLT: CPT

## 2025-06-11 PROCEDURE — 2500000003 HC RX 250 WO HCPCS: Performed by: INTERNAL MEDICINE

## 2025-06-11 PROCEDURE — 94640 AIRWAY INHALATION TREATMENT: CPT

## 2025-06-11 PROCEDURE — 2500000003 HC RX 250 WO HCPCS: Performed by: STUDENT IN AN ORGANIZED HEALTH CARE EDUCATION/TRAINING PROGRAM

## 2025-06-11 PROCEDURE — 85025 COMPLETE CBC W/AUTO DIFF WBC: CPT

## 2025-06-11 PROCEDURE — 1200000000 HC SEMI PRIVATE

## 2025-06-11 PROCEDURE — 80048 BASIC METABOLIC PNL TOTAL CA: CPT

## 2025-06-11 PROCEDURE — 94760 N-INVAS EAR/PLS OXIMETRY 1: CPT

## 2025-06-11 PROCEDURE — 36415 COLL VENOUS BLD VENIPUNCTURE: CPT

## 2025-06-11 PROCEDURE — 99232 SBSQ HOSP IP/OBS MODERATE 35: CPT | Performed by: INTERNAL MEDICINE

## 2025-06-11 PROCEDURE — 82962 GLUCOSE BLOOD TEST: CPT

## 2025-06-11 RX ORDER — BISACODYL 10 MG
10 SUPPOSITORY, RECTAL RECTAL ONCE
Status: COMPLETED | OUTPATIENT
Start: 2025-06-11 | End: 2025-06-11

## 2025-06-11 RX ORDER — LEVOFLOXACIN 500 MG/1
750 TABLET, FILM COATED ORAL DAILY
Status: DISCONTINUED | OUTPATIENT
Start: 2025-06-12 | End: 2025-06-13 | Stop reason: HOSPADM

## 2025-06-11 RX ADMIN — HYDROCODONE BITARTRATE AND ACETAMINOPHEN 1 TABLET: 10; 325 TABLET ORAL at 09:10

## 2025-06-11 RX ADMIN — IPRATROPIUM BROMIDE AND ALBUTEROL SULFATE 1 DOSE: 2.5; .5 SOLUTION RESPIRATORY (INHALATION) at 21:51

## 2025-06-11 RX ADMIN — SENNOSIDES AND DOCUSATE SODIUM 2 TABLET: 50; 8.6 TABLET ORAL at 08:51

## 2025-06-11 RX ADMIN — BUDESONIDE INHALATION 500 MCG: 0.5 SUSPENSION RESPIRATORY (INHALATION) at 06:22

## 2025-06-11 RX ADMIN — WATER 40 MG: 1 INJECTION INTRAMUSCULAR; INTRAVENOUS; SUBCUTANEOUS at 20:52

## 2025-06-11 RX ADMIN — GUAIFENESIN 600 MG: 600 TABLET, EXTENDED RELEASE ORAL at 20:54

## 2025-06-11 RX ADMIN — AMPICILLIN SODIUM AND SULBACTAM SODIUM 3000 MG: 2; 1 INJECTION, POWDER, FOR SOLUTION INTRAMUSCULAR; INTRAVENOUS at 20:43

## 2025-06-11 RX ADMIN — INSULIN LISPRO 1 UNITS: 100 INJECTION, SOLUTION INTRAVENOUS; SUBCUTANEOUS at 17:16

## 2025-06-11 RX ADMIN — FAMOTIDINE 20 MG: 20 TABLET, FILM COATED ORAL at 08:50

## 2025-06-11 RX ADMIN — GABAPENTIN 600 MG: 600 TABLET, FILM COATED ORAL at 13:09

## 2025-06-11 RX ADMIN — AMPICILLIN SODIUM AND SULBACTAM SODIUM 3000 MG: 2; 1 INJECTION, POWDER, FOR SOLUTION INTRAMUSCULAR; INTRAVENOUS at 14:39

## 2025-06-11 RX ADMIN — AMPICILLIN SODIUM AND SULBACTAM SODIUM 3000 MG: 2; 1 INJECTION, POWDER, FOR SOLUTION INTRAMUSCULAR; INTRAVENOUS at 02:29

## 2025-06-11 RX ADMIN — IPRATROPIUM BROMIDE AND ALBUTEROL SULFATE 1 DOSE: 2.5; .5 SOLUTION RESPIRATORY (INHALATION) at 10:13

## 2025-06-11 RX ADMIN — IPRATROPIUM BROMIDE AND ALBUTEROL SULFATE 1 DOSE: 2.5; .5 SOLUTION RESPIRATORY (INHALATION) at 18:56

## 2025-06-11 RX ADMIN — GUAIFENESIN 600 MG: 600 TABLET, EXTENDED RELEASE ORAL at 08:51

## 2025-06-11 RX ADMIN — AMPICILLIN SODIUM AND SULBACTAM SODIUM 3000 MG: 2; 1 INJECTION, POWDER, FOR SOLUTION INTRAMUSCULAR; INTRAVENOUS at 09:00

## 2025-06-11 RX ADMIN — Medication 10 MG: at 23:20

## 2025-06-11 RX ADMIN — GABAPENTIN 600 MG: 600 TABLET, FILM COATED ORAL at 20:54

## 2025-06-11 RX ADMIN — GABAPENTIN 600 MG: 600 TABLET, FILM COATED ORAL at 08:50

## 2025-06-11 RX ADMIN — ARFORMOTEROL TARTRATE 15 MCG: 15 SOLUTION RESPIRATORY (INHALATION) at 06:22

## 2025-06-11 RX ADMIN — SODIUM CHLORIDE, PRESERVATIVE FREE 10 ML: 5 INJECTION INTRAVENOUS at 08:51

## 2025-06-11 RX ADMIN — IPRATROPIUM BROMIDE AND ALBUTEROL SULFATE 1 DOSE: 2.5; .5 SOLUTION RESPIRATORY (INHALATION) at 02:02

## 2025-06-11 RX ADMIN — IPRATROPIUM BROMIDE AND ALBUTEROL SULFATE 1 DOSE: 2.5; .5 SOLUTION RESPIRATORY (INHALATION) at 06:10

## 2025-06-11 RX ADMIN — BUDESONIDE INHALATION 500 MCG: 0.5 SUSPENSION RESPIRATORY (INHALATION) at 19:05

## 2025-06-11 RX ADMIN — FAMOTIDINE 20 MG: 20 TABLET, FILM COATED ORAL at 20:54

## 2025-06-11 RX ADMIN — ENOXAPARIN SODIUM 40 MG: 100 INJECTION SUBCUTANEOUS at 20:53

## 2025-06-11 RX ADMIN — ARFORMOTEROL TARTRATE 15 MCG: 15 SOLUTION RESPIRATORY (INHALATION) at 19:05

## 2025-06-11 RX ADMIN — IPRATROPIUM BROMIDE AND ALBUTEROL SULFATE 1 DOSE: 2.5; .5 SOLUTION RESPIRATORY (INHALATION) at 14:22

## 2025-06-11 RX ADMIN — BISACODYL 10 MG: 10 SUPPOSITORY RECTAL at 13:09

## 2025-06-11 RX ADMIN — SODIUM CHLORIDE, PRESERVATIVE FREE 10 ML: 5 INJECTION INTRAVENOUS at 21:00

## 2025-06-11 RX ADMIN — WATER 40 MG: 1 INJECTION INTRAMUSCULAR; INTRAVENOUS; SUBCUTANEOUS at 08:50

## 2025-06-11 RX ADMIN — POLYETHYLENE GLYCOL 3350 17 G: 17 POWDER, FOR SOLUTION ORAL at 23:29

## 2025-06-11 NOTE — PROGRESS NOTES
Pulmonary and Critical Care Progress note.    [facility] YASEMIN Dickinson    MRN# [id number]    Acct# [id number]  6/11/2025   5:42 PM CDT    Referring Provider:Martin Kelly MD      Chief Complaint: shortness of breath.     HPI: Denies new complaints. Feels some improved. Continues to cough.     Medications:  The following medications were reviewed and adjustments made when necessary.    ampicillin-sulbactam, 3,000 mg, IntraVENous, Q6H    magnesium hydroxide, 30 mL, Oral, Daily    sennosides-docusate sodium, 2 tablet, Oral, Daily    methylPREDNISolone, 40 mg, IntraVENous, Q12H    melatonin, 10 mg, Oral, Nightly    insulin lispro, 0-4 Units, SubCUTAneous, 4x Daily AC & HS    famotidine, 20 mg, Oral, BID    arformoterol tartrate, 15 mcg, Nebulization, BID RT    budesonide, 0.5 mg, Nebulization, BID RT    sodium chloride flush, 5-40 mL, IntraVENous, 2 times per day    enoxaparin, 40 mg, SubCUTAneous, Daily    [Held by provider] exemestane, 25 mg, Oral, Daily    gabapentin, 600 mg, Oral, TID    guaiFENesin, 600 mg, Oral, BID    ipratropium 0.5 mg-albuterol 2.5 mg, 1 Dose, Inhalation, Q4H RT         Physical Exam:  Vitals:    06/11/25 0910 06/11/25 1109 06/11/25 1422 06/11/25 1717   BP:  (!) 146/90  (!) 141/82   Pulse:  90 88 91   Resp: 18  20    Temp:  98.8 °F (37.1 °C)  97.3 °F (36.3 °C)   TempSrc:  Temporal  Temporal   SpO2:  97% 96% 98%   Weight:       Height:          Intake/Output Summary (Last 24 hours) at 6/11/2025 1742  Last data filed at 6/11/2025 1439  Gross per 24 hour   Intake 853.65 ml   Output --   Net 853.65 ml       General appearance:middle aged female in no distress.    HEENT:normocephalic atraumatic  Heart:S1S2 no murmurs  Lungs:diminished bilaterally no rubs or tenderness or dullness to percussion  Abdomen:Soft non tender no organomegaly  Extremities:no clubbing cyanosis or edema  Neuro:no focal findings  Skin:intact        The following lab data was reviewed:  CBC   Recent Labs

## 2025-06-11 NOTE — PROGRESS NOTES
[facility] Progress Note    Patient:  Yandy Dickinson  YOB: 1965  Date of Service: 6/11/2025  MRN: [id number]   Acct: [id number]   Primary Care Physician: Tomi Teague MD  Advance Directive: Full Code  Admit Date: 6/5/2025       Hospital Day: 6     NOTE: Portions of this note have been copied forward, however, changes made to reflect the most current clinical status of this patient.    CHIEF COMPLAINT:     Chief Complaint   Patient presents with    Chest Pain     Chest pain worse with breathing onset yesterday, recent diagnosis of cancer with lung nodules per pt    Shortness of Breath       6/11/2025 8:29 AM  Subjective / Interval History:   06/11/2025  Patient seen and examined this a.m.   No new complaints.    No acute changes or acute overnight event reported.   Laying comfortably in bed in no apparent acute distress.    Denies any acute complaints or distress.    Endorses overall improvement.        Review of Systems:   Review of Systems  ROS: 14 point review of systems is negative except as specifically addressed above.    ADULT DIET; Regular  No intake or output data in the 24 hours ending 06/11/25 0829    Medications:   dextrose      sodium chloride       Current Facility-Administered Medications   Medication Dose Route Frequency Provider Last Rate Last Admin    ampicillin-sulbactam (UNASYN) 3,000 mg in sodium chloride 0.9 % 100 mL IVPB ([id number])  3,000 mg IntraVENous Q6H Wilman Preciado MD   Stopped at 06/11/25 0259    magnesium hydroxide (MILK OF MAGNESIA) 400 MG/5ML suspension 30 mL  30 mL Oral Daily Robin Palmer MD   30 mL at 06/10/25 1426    sennosides-docusate sodium (SENOKOT-S) 8.6-50 MG tablet 2 tablet  2 tablet Oral Daily Robin Palmer MD   2 tablet at 06/10/25 1426    methylPREDNISolone sodium succ (SOLU-MEDROL) 40 mg in sterile water 1 mL injection  40 mg IntraVENous Q12H Robin Palmer MD   40 mg at 06/10/25 2002    melatonin disintegrating tablet 10 mg  10

## 2025-06-11 NOTE — PLAN OF CARE
Problem: Safety - Adult  Goal: Free from fall injury  Recent Flowsheet Documentation  Taken 6/11/2025 1327 by Kimberley Biggs RN  Free From Fall Injury: Instruct family/caregiver on patient safety     Problem: ABCDS Injury Assessment  Goal: Absence of physical injury  Recent Flowsheet Documentation  Taken 6/11/2025 1327 by Kimberley Biggs RN  Absence of Physical Injury: Implement safety measures based on patient assessment

## 2025-06-12 ENCOUNTER — APPOINTMENT (OUTPATIENT)
Dept: GENERAL RADIOLOGY | Age: 60
DRG: 178 | End: 2025-06-12
Payer: MEDICARE

## 2025-06-12 ENCOUNTER — OUTSIDE FACILITY SERVICE (OUTPATIENT)
Age: 60
End: 2025-06-12

## 2025-06-12 LAB
ANION GAP SERPL CALCULATED.3IONS-SCNC: 10 MMOL/L (ref 8–16)
BASOPHILS # BLD: 0.1 K/UL (ref 0–0.2)
BASOPHILS NFR BLD: 0.3 % (ref 0–1)
BUN SERPL-MCNC: 26 MG/DL (ref 6–20)
CALCIUM SERPL-MCNC: 8.2 MG/DL (ref 8.6–10)
CHLORIDE SERPL-SCNC: 103 MMOL/L (ref 98–107)
CO2 SERPL-SCNC: 26 MMOL/L (ref 22–29)
CREAT SERPL-MCNC: 0.7 MG/DL (ref 0.5–0.9)
EOSINOPHIL # BLD: 0 K/UL (ref 0–0.6)
EOSINOPHIL NFR BLD: 0 % (ref 0–5)
ERYTHROCYTE [DISTWIDTH] IN BLOOD BY AUTOMATED COUNT: 15.5 % (ref 11.5–14.5)
GLUCOSE BLD-MCNC: 110 MG/DL (ref 70–99)
GLUCOSE BLD-MCNC: 121 MG/DL (ref 70–99)
GLUCOSE BLD-MCNC: 162 MG/DL (ref 70–99)
GLUCOSE BLD-MCNC: 177 MG/DL (ref 70–99)
GLUCOSE SERPL-MCNC: 145 MG/DL (ref 70–99)
HCT VFR BLD AUTO: 36.2 % (ref 37–47)
HGB BLD-MCNC: 11.5 G/DL (ref 12–16)
IMM GRANULOCYTES # BLD: 1.3 K/UL
LYMPHOCYTES # BLD: 1 K/UL (ref 1.1–4.5)
LYMPHOCYTES NFR BLD: 4.8 % (ref 20–40)
MCH RBC QN AUTO: 30 PG (ref 27–31)
MCHC RBC AUTO-ENTMCNC: 31.8 G/DL (ref 33–37)
MCV RBC AUTO: 94.5 FL (ref 81–99)
MONOCYTES # BLD: 0.7 K/UL (ref 0–0.9)
MONOCYTES NFR BLD: 3.5 % (ref 0–10)
NEUTROPHILS # BLD: 16.9 K/UL (ref 1.5–7.5)
NEUTS SEG NFR BLD: 85.1 % (ref 50–65)
PERFORMED ON: ABNORMAL
PLATELET # BLD AUTO: 326 K/UL (ref 130–400)
PMV BLD AUTO: 9.7 FL (ref 9.4–12.3)
POTASSIUM SERPL-SCNC: 4.2 MMOL/L (ref 3.5–5)
RBC # BLD AUTO: 3.83 M/UL (ref 4.2–5.4)
SODIUM SERPL-SCNC: 139 MMOL/L (ref 136–145)
WBC # BLD AUTO: 19.9 K/UL (ref 4.8–10.8)

## 2025-06-12 PROCEDURE — 80048 BASIC METABOLIC PNL TOTAL CA: CPT

## 2025-06-12 PROCEDURE — 6360000002 HC RX W HCPCS: Performed by: STUDENT IN AN ORGANIZED HEALTH CARE EDUCATION/TRAINING PROGRAM

## 2025-06-12 PROCEDURE — 94760 N-INVAS EAR/PLS OXIMETRY 1: CPT

## 2025-06-12 PROCEDURE — 6370000000 HC RX 637 (ALT 250 FOR IP): Performed by: INTERNAL MEDICINE

## 2025-06-12 PROCEDURE — 6360000002 HC RX W HCPCS: Performed by: INTERNAL MEDICINE

## 2025-06-12 PROCEDURE — 82962 GLUCOSE BLOOD TEST: CPT

## 2025-06-12 PROCEDURE — 94669 MECHANICAL CHEST WALL OSCILL: CPT

## 2025-06-12 PROCEDURE — 94761 N-INVAS EAR/PLS OXIMETRY MLT: CPT

## 2025-06-12 PROCEDURE — OUTSIDEPOS PR OUTSIDE POS PLACEHOLDER: Performed by: INTERNAL MEDICINE

## 2025-06-12 PROCEDURE — 85025 COMPLETE CBC W/AUTO DIFF WBC: CPT

## 2025-06-12 PROCEDURE — 2709999900 HC NON-CHARGEABLE SUPPLY

## 2025-06-12 PROCEDURE — 6370000000 HC RX 637 (ALT 250 FOR IP): Performed by: STUDENT IN AN ORGANIZED HEALTH CARE EDUCATION/TRAINING PROGRAM

## 2025-06-12 PROCEDURE — 71045 X-RAY EXAM CHEST 1 VIEW: CPT

## 2025-06-12 PROCEDURE — 2500000003 HC RX 250 WO HCPCS: Performed by: INTERNAL MEDICINE

## 2025-06-12 PROCEDURE — 2500000003 HC RX 250 WO HCPCS: Performed by: STUDENT IN AN ORGANIZED HEALTH CARE EDUCATION/TRAINING PROGRAM

## 2025-06-12 PROCEDURE — 76937 US GUIDE VASCULAR ACCESS: CPT

## 2025-06-12 PROCEDURE — 1200000000 HC SEMI PRIVATE

## 2025-06-12 PROCEDURE — 36415 COLL VENOUS BLD VENIPUNCTURE: CPT

## 2025-06-12 PROCEDURE — 94640 AIRWAY INHALATION TREATMENT: CPT

## 2025-06-12 PROCEDURE — 99232 SBSQ HOSP IP/OBS MODERATE 35: CPT | Performed by: INTERNAL MEDICINE

## 2025-06-12 PROCEDURE — 2580000003 HC RX 258: Performed by: INTERNAL MEDICINE

## 2025-06-12 PROCEDURE — 36410 VNPNXR 3YR/> PHY/QHP DX/THER: CPT

## 2025-06-12 RX ORDER — PREDNISONE 5 MG/1
15 TABLET ORAL DAILY
Status: DISCONTINUED | OUTPATIENT
Start: 2025-06-16 | End: 2025-06-13 | Stop reason: HOSPADM

## 2025-06-12 RX ORDER — PREDNISONE 5 MG/1
5 TABLET ORAL DAILY
Status: DISCONTINUED | OUTPATIENT
Start: 2025-06-22 | End: 2025-06-13 | Stop reason: HOSPADM

## 2025-06-12 RX ORDER — PREDNISONE 20 MG/1
20 TABLET ORAL DAILY
Status: DISCONTINUED | OUTPATIENT
Start: 2025-06-13 | End: 2025-06-13 | Stop reason: HOSPADM

## 2025-06-12 RX ORDER — PREDNISONE 5 MG/1
10 TABLET ORAL DAILY
Status: DISCONTINUED | OUTPATIENT
Start: 2025-06-19 | End: 2025-06-13 | Stop reason: HOSPADM

## 2025-06-12 RX ADMIN — AMPICILLIN SODIUM AND SULBACTAM SODIUM 3000 MG: 2; 1 INJECTION, POWDER, FOR SOLUTION INTRAMUSCULAR; INTRAVENOUS at 19:42

## 2025-06-12 RX ADMIN — AMPICILLIN SODIUM AND SULBACTAM SODIUM 3000 MG: 2; 1 INJECTION, POWDER, FOR SOLUTION INTRAMUSCULAR; INTRAVENOUS at 08:15

## 2025-06-12 RX ADMIN — FAMOTIDINE 20 MG: 20 TABLET, FILM COATED ORAL at 20:30

## 2025-06-12 RX ADMIN — LEVOFLOXACIN 750 MG: 500 TABLET, FILM COATED ORAL at 08:09

## 2025-06-12 RX ADMIN — IPRATROPIUM BROMIDE AND ALBUTEROL SULFATE 1 DOSE: 2.5; .5 SOLUTION RESPIRATORY (INHALATION) at 22:07

## 2025-06-12 RX ADMIN — AMPICILLIN SODIUM AND SULBACTAM SODIUM 3000 MG: 2; 1 INJECTION, POWDER, FOR SOLUTION INTRAMUSCULAR; INTRAVENOUS at 14:31

## 2025-06-12 RX ADMIN — Medication 10 MG: at 20:29

## 2025-06-12 RX ADMIN — HYDROCODONE BITARTRATE AND ACETAMINOPHEN 1 TABLET: 10; 325 TABLET ORAL at 14:38

## 2025-06-12 RX ADMIN — SODIUM CHLORIDE, PRESERVATIVE FREE 10 ML: 5 INJECTION INTRAVENOUS at 20:30

## 2025-06-12 RX ADMIN — SENNOSIDES AND DOCUSATE SODIUM 2 TABLET: 50; 8.6 TABLET ORAL at 08:09

## 2025-06-12 RX ADMIN — AMPICILLIN SODIUM AND SULBACTAM SODIUM 3000 MG: 2; 1 INJECTION, POWDER, FOR SOLUTION INTRAMUSCULAR; INTRAVENOUS at 02:59

## 2025-06-12 RX ADMIN — GABAPENTIN 600 MG: 600 TABLET, FILM COATED ORAL at 20:30

## 2025-06-12 RX ADMIN — ENOXAPARIN SODIUM 40 MG: 100 INJECTION SUBCUTANEOUS at 20:29

## 2025-06-12 RX ADMIN — WATER 40 MG: 1 INJECTION INTRAMUSCULAR; INTRAVENOUS; SUBCUTANEOUS at 08:10

## 2025-06-12 RX ADMIN — ARFORMOTEROL TARTRATE 15 MCG: 15 SOLUTION RESPIRATORY (INHALATION) at 06:20

## 2025-06-12 RX ADMIN — FAMOTIDINE 20 MG: 20 TABLET, FILM COATED ORAL at 08:10

## 2025-06-12 RX ADMIN — GUAIFENESIN 600 MG: 600 TABLET, EXTENDED RELEASE ORAL at 20:30

## 2025-06-12 RX ADMIN — IPRATROPIUM BROMIDE AND ALBUTEROL SULFATE 1 DOSE: 2.5; .5 SOLUTION RESPIRATORY (INHALATION) at 06:10

## 2025-06-12 RX ADMIN — GABAPENTIN 600 MG: 600 TABLET, FILM COATED ORAL at 08:10

## 2025-06-12 RX ADMIN — GUAIFENESIN 600 MG: 600 TABLET, EXTENDED RELEASE ORAL at 08:09

## 2025-06-12 RX ADMIN — IPRATROPIUM BROMIDE AND ALBUTEROL SULFATE 1 DOSE: 2.5; .5 SOLUTION RESPIRATORY (INHALATION) at 14:10

## 2025-06-12 RX ADMIN — SODIUM CHLORIDE, PRESERVATIVE FREE 10 ML: 5 INJECTION INTRAVENOUS at 08:10

## 2025-06-12 RX ADMIN — GABAPENTIN 600 MG: 600 TABLET, FILM COATED ORAL at 14:32

## 2025-06-12 RX ADMIN — AMPICILLIN SODIUM AND SULBACTAM SODIUM 3000 MG: 2; 1 INJECTION, POWDER, FOR SOLUTION INTRAMUSCULAR; INTRAVENOUS at 14:41

## 2025-06-12 RX ADMIN — BUDESONIDE INHALATION 500 MCG: 0.5 SUSPENSION RESPIRATORY (INHALATION) at 18:14

## 2025-06-12 RX ADMIN — IPRATROPIUM BROMIDE AND ALBUTEROL SULFATE 1 DOSE: 2.5; .5 SOLUTION RESPIRATORY (INHALATION) at 10:05

## 2025-06-12 RX ADMIN — ARFORMOTEROL TARTRATE 15 MCG: 15 SOLUTION RESPIRATORY (INHALATION) at 18:14

## 2025-06-12 RX ADMIN — BUDESONIDE INHALATION 500 MCG: 0.5 SUSPENSION RESPIRATORY (INHALATION) at 06:20

## 2025-06-12 RX ADMIN — IPRATROPIUM BROMIDE AND ALBUTEROL SULFATE 1 DOSE: 2.5; .5 SOLUTION RESPIRATORY (INHALATION) at 01:48

## 2025-06-12 RX ADMIN — IPRATROPIUM BROMIDE AND ALBUTEROL SULFATE 1 DOSE: 2.5; .5 SOLUTION RESPIRATORY (INHALATION) at 18:07

## 2025-06-12 ASSESSMENT — PAIN DESCRIPTION - DESCRIPTORS: DESCRIPTORS: ACHING;THROBBING

## 2025-06-12 ASSESSMENT — PAIN DESCRIPTION - LOCATION: LOCATION: CHEST

## 2025-06-12 ASSESSMENT — PAIN DESCRIPTION - ORIENTATION: ORIENTATION: MID

## 2025-06-12 ASSESSMENT — PAIN DESCRIPTION - ONSET: ONSET: ON-GOING

## 2025-06-12 ASSESSMENT — PAIN SCALES - GENERAL: PAINLEVEL_OUTOF10: 7

## 2025-06-12 NOTE — PLAN OF CARE
Problem: Safety - Adult  Goal: Free from fall injury  6/12/2025 0552 by Mary Giron RN  Outcome: Progressing  6/12/2025 0552 by Mary Giron RN  Outcome: Progressing     Problem: ABCDS Injury Assessment  Goal: Absence of physical injury  6/12/2025 0552 by Mary Giron RN  Outcome: Progressing  6/12/2025 0552 by Mary Giron RN  Outcome: Progressing     Problem: Pain  Goal: Verbalizes/displays adequate comfort level or baseline comfort level  6/12/2025 0552 by Mary Giron RN  Outcome: Progressing  6/12/2025 0552 by Mary Giron RN  Outcome: Progressing  Flowsheets (Taken 6/12/2025 0005)  Verbalizes/displays adequate comfort level or baseline comfort level:   Encourage patient to monitor pain and request assistance   Assess pain using appropriate pain scale

## 2025-06-12 NOTE — CONSULTS
[facility] Vascular Access Team:  Consult Note    Patient: Yandy Dickinson  YOB: 1965   MRN: [id number]  Room: [address] [place] [address]     Attending Physician: Martin Kelly MD  Ordering Physician: Martin Kelly MD    Diagnosis:   Shortness of breath [R06.02]  Atelectasis of left lung [J98.11]  Chest pain, unspecified type [R07.9]  Community acquired pneumonia of left lower lobe of lung [J18.9]    Active LDAs:  Peripheral IV 06/12/25 Left;Proximal;Anterior Forearm (Active)   Number of days: 0       Peripheral IV 06/12/25 Right;Anterior Cephalic (Active)   Number of days: 0       Reason for Consult:  [facility] vascular access team consulted for placement of Ultrasound Guided Peripheral IV.    Indication(s):  Yandy Dickinson is a 60 y.o. female admitted to [address] [place] [address] for Community acquired pneumonia of left lower lobe of lung. Yandy Dickinson currently has a left forearm ultrasound guided PIV that is causing pain at the site and some coolness. Primary RN contacted this nurse to assess and possibly place a new PIV.     Findings:  Assessed patient's left forearm PIV and found there to be a small area of coolness with no redness or bruising. Since the patient c/o of pain at the site, opted to place a new PIV and remove the old one.     Successfully placed a 22 gauge 1.75\" ultrasound guided peripheral IV in the patient's right upper arm (cephalic vein) with one attempt and no complications. Patient tolerated well. Notified primary RN via secure messaging system.     Impression/Plan:  1. Ultrasound-Guided Peripheral IV is ready to be used    Thank you for your time and consult.     Electronically Signed By: Juanjose Stout RN on 6/12/2025 at 3:23 PM

## 2025-06-12 NOTE — PROGRESS NOTES
Pulmonary and Critical Care Progress note.    [facility] YASEMIN Dickinson    MRN# [id number]    [id number]  6/12/2025   5:42 PM CDT    Referring Provider:Martin Kelly MD      Chief Complaint: shortness of breath.     HPI: Denies new complaints. Feels some improved. Continues to cough.     Medications:  The following medications were reviewed and adjustments made when necessary.    predniSONE, 20 mg, Oral, Daily **FOLLOWED BY** [START ON 6/15/2025] predniSONE, 15 mg, Oral, Daily **FOLLOWED BY** [START ON 6/18/2025] predniSONE, 10 mg, Oral, Daily **FOLLOWED BY** [START ON 6/21/2025] predniSONE, 5 mg, Oral, Daily    levoFLOXacin, 750 mg, Oral, Daily    ampicillin-sulbactam, 3,000 mg, IntraVENous, Q6H    magnesium hydroxide, 30 mL, Oral, Daily    sennosides-docusate sodium, 2 tablet, Oral, Daily    melatonin, 10 mg, Oral, Nightly    insulin lispro, 0-4 Units, SubCUTAneous, 4x Daily AC & HS    famotidine, 20 mg, Oral, BID    arformoterol tartrate, 15 mcg, Nebulization, BID RT    budesonide, 0.5 mg, Nebulization, BID RT    sodium chloride flush, 5-40 mL, IntraVENous, 2 times per day    enoxaparin, 40 mg, SubCUTAneous, Daily    [Held by provider] exemestane, 25 mg, Oral, Daily    gabapentin, 600 mg, Oral, TID    guaiFENesin, 600 mg, Oral, BID    ipratropium 0.5 mg-albuterol 2.5 mg, 1 Dose, Inhalation, Q4H RT         Physical Exam:  Vitals:    06/12/25 0538 06/12/25 0600 06/12/25 0610 06/12/25 0811   BP: (!) 147/93   138/86   Pulse: 95  98 87   Resp: 18  20    Temp: 96.8 °F (36 °C)   98.1 °F (36.7 °C)   TempSrc:    Temporal   SpO2: 99%  98% 96%   Weight:  82.1 kg (181 lb 1.6 oz)     Height:          Intake/Output Summary (Last 24 hours) at 6/12/2025 1005  Last data filed at 6/11/2025 7074  Gross per 24 hour   Intake 813.65 ml   Output --   Net 813.65 ml       General appearance:middle aged female in no distress.    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 Lung re-expanded. Follow up CXR.   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 ID following. Antibiotics changed to target better actino. Per ID service.  Change steroids to p.o. prednisone.  Breast CA with lung mets. Oncology following.   Dyspnea due to all the above. Improved.   Follow up CXR, mobilize. Pulmonary toilet.            Ashley Morrison MD, [facility], [facility]    The above note was generated using voice recognition software.  Inadvertent typographical errors in transcription may have occurred.      Electronically signed by Ashley Morrison MD on 06/12/25 at 10:05 AM

## 2025-06-12 NOTE — CONSULTS
[facility] Vascular Access Team:  Consult Note    Patient: Yandy Dickinson  YOB: 1965   MRN: [id number]  Room: [address] [place] [address]     Attending Physician: Martin Kelly MD  Ordering Physician: Martin Kelly MD    Diagnosis:   Shortness of breath [R06.02]  Atelectasis of left lung [J98.11]  Chest pain, unspecified type [R07.9]  Community acquired pneumonia of left lower lobe of lung [J18.9]    Active LDAs:  Peripheral IV 06/12/25 Left;Proximal;Anterior Forearm (Active)   Number of days: 0       Reason for Consult:  [facility] vascular access team consulted for placement of Ultrasound Guided Peripheral IV.    Indication(s):  Yandy Dickinson is a 60 y.o. female admitted to [address] [place] [address] for Community acquired pneumonia of left lower lobe of lung. Yandy Dickinson currently has no IV access following removal of her previous PIV in her right forearm. She is reportedly a difficult venous access due to her history of cancer.     Findings:  Successfully placed a 22 gauge 1.75\" ultrasound guided peripheral IV in the patient's left forearm with one attempt and no complications. Patient tolerated well. Notified primary RN via secure messaging system.     Impression/Plan:  1. Ultrasound-Guided Peripheral IV is ready to be used    Thank you for your time and consult.     Electronically Signed By: Juanjose Stout RN on 6/12/2025 at 10:33 AM

## 2025-06-12 NOTE — PROGRESS NOTES
Infectious Diseases Progress Note    Patient:  Yandy Dickinson  YOB: 1965  MRN: [id number]   Admit date: 6/5/2025   Admitting Physician: Martin Kelly MD  Primary Care Physician: Tomi Teague MD    Chief Complaint: Seeing in follow-up of right upper lobe infiltrate with air bronchograms, left upper lung atelectasis, and bronchoscopy with actinomyces.    Interval History: She seems to be doing a little bit better.  She still has some cough.  Cough minimally productive.  Tolerating IV Unasyn.  Discussed risk of levofloxacin with her yesterday and again today.  She understands risks and is accepting of levofloxacin treatment.    In/Out    Intake/Output Summary (Last 24 hours) at 6/11/2025 2056  Last data filed at 6/11/2025 1837  Gross per 24 hour   Intake 1053.65 ml   Output --   Net 1053.65 ml     Allergies:   Allergies   Allergen Reactions    Clindamycin/Lincomycin Hives, Itching and Rash     Current Meds: ampicillin-sulbactam (UNASYN) 3,000 mg in sodium chloride 0.9 % 100 mL IVPB ([id number]), Q6H  magnesium hydroxide (MILK OF MAGNESIA) 400 MG/5ML suspension 30 mL, Daily  sennosides-docusate sodium (SENOKOT-S) 8.6-50 MG tablet 2 tablet, Daily  methylPREDNISolone sodium succ (SOLU-MEDROL) 40 mg in sterile water 1 mL injection, Q12H  melatonin disintegrating tablet 10 mg, Nightly  calcium carbonate (TUMS) chewable tablet 500 mg, TID PRN  glucose chewable tablet 16 g, PRN  dextrose bolus 10% 125 mL, PRN   Or  dextrose bolus 10% 250 mL, PRN  glucagon injection 1 mg, PRN  dextrose 10 % infusion, Continuous PRN  insulin lispro (HUMALOG,ADMELOG) injection vial 0-4 Units, 4x Daily AC & HS  famotidine (PEPCID) tablet 20 mg, BID  arformoterol tartrate (BROVANA) nebulizer solution 15 mcg, BID RT  budesonide (PULMICORT) nebulizer suspension 500 mcg, BID RT  HYDROcodone-chlorpheniramine (TUSSIONEX) 10-8 MG/5ML oral suspension 5 mL, Q12H PRN  sodium chloride flush 0.9 % injection 5-40 mL, 2 times per  day  sodium chloride flush 0.9 % injection 5-40 mL, PRN  0.9 % sodium chloride infusion, PRN  potassium chloride (KLOR-CON M) extended release tablet 40 mEq, PRN   Or  potassium bicarb-citric acid (EFFER-K) effervescent tablet 40 mEq, PRN   Or  potassium chloride 10 mEq/100 mL IVPB (Peripheral Line), PRN  magnesium sulfate 2000 mg in 50 mL IVPB premix, PRN  enoxaparin (LOVENOX) injection 40 mg, Daily  ondansetron (ZOFRAN-ODT) disintegrating tablet 4 mg, Q8H PRN   Or  ondansetron (ZOFRAN) injection 4 mg, Q6H PRN  polyethylene glycol (GLYCOLAX) packet 17 g, Daily PRN  acetaminophen (TYLENOL) tablet 650 mg, Q6H PRN   Or  acetaminophen (TYLENOL) suppository 650 mg, Q6H PRN  [Held by provider] exemestane (AROMASIN) tablet 25 mg (Patient Supplied), Daily  gabapentin (NEURONTIN) tablet 600 mg, TID  guaiFENesin (MUCINEX) extended release tablet 600 mg, BID  HYDROcodone-acetaminophen (NORCO)  MG per tablet 1 tablet, Q6H PRN  hydrOXYzine HCl (ATARAX) tablet 10 mg, TID PRN  ipratropium 0.5 mg-albuterol 2.5 mg (DUONEB) nebulizer solution 1 Dose, Q4H RT      Review of Systems see HPI    VitalSigns:  BP (!) 151/94   Pulse (!) 102   Temp 98.1 °F (36.7 °C)   Resp 18   Ht 1.676 m (5' 6\")   Wt 74.8 kg (165 lb)   SpO2 98%   BMI 26.63 kg/m²     Physical Exam  Line/IV site: No erythema, warmth, induration, or tenderness.  Some expiratory wheezing  Few scattered crackles    Lab Results:  CBC:   Recent Labs     06/09/25  0623 06/10/25  1325 06/11/25  0134   WBC 25.8* 18.4* 17.3*   HGB 12.1 11.6* 11.5*    300 287     BMP:  Recent Labs     06/10/25  1325 06/11/25  0134    140   K 4.1 4.5    104   CO2 19* 27   BUN 20 21*   CREATININE 0.8 0.7   GLUCOSE 161* 131*       6/11/2025 8:56 PM: I have personally reviewed the following sections of the medical record and pertinent findings and updates are outlined below. CBL:    [] Microbiology    [] Cardiology/Vascular    [] Pathology    [] Radiology    [] Other:

## 2025-06-12 NOTE — PLAN OF CARE
Problem: Safety - Adult  Goal: Free from fall injury  6/12/2025 1625 by Amisha Adams RN  Outcome: Progressing  6/12/2025 0552 by Mary Giron RN  Outcome: Progressing  6/12/2025 0552 by Mary Giron RN  Outcome: Progressing     Problem: ABCDS Injury Assessment  Goal: Absence of physical injury  6/12/2025 1625 by Amisha Adams RN  Outcome: Progressing  6/12/2025 0552 by Mary Giron RN  Outcome: Progressing  6/12/2025 0552 by Mary Giron RN  Outcome: Progressing     Problem: Pain  Goal: Verbalizes/displays adequate comfort level or baseline comfort level  6/12/2025 1625 by Amisha Adams RN  Outcome: Progressing  6/12/2025 0552 by Mary Giron RN  Outcome: Progressing  6/12/2025 0552 by Mary Giron RN  Outcome: Progressing  Flowsheets (Taken 6/12/2025 0005)  Verbalizes/displays adequate comfort level or baseline comfort level:   Encourage patient to monitor pain and request assistance   Assess pain using appropriate pain scale

## 2025-06-12 NOTE — PROGRESS NOTES
[facility] Hospital[facility] Progress Note    Patient:  Yandy Dickinson  YOB: 1965  Date of Service: 6/12/2025  MRN: [id number]   Acct: [id number]   Primary Care Physician: Tomi Teague MD  Advance Directive: Full Code  Admit Date: 6/5/2025       Hospital Day: 7     NOTE: Portions of this note have been copied forward, however, changes made to reflect the most current clinical status of this patient.    CHIEF COMPLAINT:     Chief Complaint   Patient presents with    Chest Pain     Chest pain worse with breathing onset yesterday, recent diagnosis of cancer with lung nodules per pt    Shortness of Breath       6/12/2025 8:56 AM  Subjective / Interval History:   06/12/2025  Patient seen and examined this a.m.   No new complaints.    Reports intermittent chest pain and dyspnea.  No acute changes or acute overnight event reported.   Laying comfortably in bed in no apparent acute distress.    Denies any acute complaints or distress.    Endorses overall improvement.        Review of Systems:   Review of Systems  ROS: 14 point review of systems is negative except as specifically addressed above.    ADULT DIET; Regular    Intake/Output Summary (Last 24 hours) at 6/12/2025 0856  Last data filed at 6/11/2025 1837  Gross per 24 hour   Intake 1053.65 ml   Output --   Net 1053.65 ml       Medications:   dextrose      sodium chloride       Current Facility-Administered Medications   Medication Dose Route Frequency Provider Last Rate Last Admin    levoFLOXacin (LEVAQUIN) tablet 750 mg  750 mg Oral Daily Wilman Preciado MD   750 mg at 06/12/25 0809    ampicillin-sulbactam (UNASYN) 3,000 mg in sodium chloride 0.9 % 100 mL IVPB ([id number])  3,000 mg IntraVENous Q6H Wilman Preciado MD   Stopped at 06/12/25 0839    magnesium hydroxide (MILK OF MAGNESIA) 400 MG/5ML suspension 30 mL  30 mL Oral Daily Robin Palmer MD   30 mL at 06/10/25 1426    sennosides-docusate sodium (SENOKOT-S) 8.6-50 MG tablet 2 tablet  2 tablet Oral  lung and breast cancer  COMPARISON: Chest radiograph 4/15/25 CT chest 5/6/25  TECHNIQUE: AP portable upright chest radiograph(s). 1 views total.  FINDINGS: No pneumothoraces. There is new severe to complete atelectasis of the left upper lobe. There is moderate volume loss of the left lung. The right lung is clear with no focal consolidations. The cardiac/mediastinal cardiomediastinal silhouette is largely obscured on the left side. 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 Surgical clips of the right axilla. Degenerative changes of the spine.       There is new severe to complete atelectasis of the left upper lobe which is likely caused by an obstructing left hilar mass/lymphadenopathy. This would be better evaluated with a CT with intravenous contrast.   ______________________________________ Electronically signed by: JINA WARD M.D. Date:     06/05/2025 Time:    13:33     Bronchoscopy  Result Date: 5/27/2025  No dictation           Objective:   Vitals:   /86   Pulse 87   Temp 98.1 °F (36.7 °C) (Temporal)   Resp 20   Ht 1.676 m (5' 6\")   Wt 82.1 kg (181 lb 1.6 oz)   SpO2 96%   BMI 29.23 kg/m²     Patient Vitals for the past 24 hrs:   BP Temp Temp src Pulse Resp SpO2 Weight   06/12/25 0811 138/86 98.1 °F (36.7 °C) Temporal 87 -- 96 % --   06/12/25 0610 -- -- -- 98 20 98 % --   06/12/25 0600 -- -- -- -- -- -- 82.1 kg (181 lb 1.6 oz)   06/12/25 0538 (!) 147/93 96.8 °F (36 °C) -- 95 18 99 % --   06/12/25 0005 (!) 160/96 97.7 °F (36.5 °C) -- 98 18 96 % --   06/11/25 1900 (!) 151/94 98.1 °F (36.7 °C) -- (!) 102 18 98 % --   06/11/25 1717 (!) 141/82 97.3 °F (36.3 °C) Temporal 91 -- 98 % --   06/11/25 1422 -- -- -- 88 20 96 % --   06/11/25 1109 (!) 146/90 98.8 °F (37.1 °C) Temporal 90 -- 97 % --   06/11/25 0910 -- -- -- -- 18 -- --       24HR INTAKE/OUTPUT:    Intake/Output Summary (Last 24 hours) at 6/12/2025 0856  Last data filed at 6/11/2025 1837  Gross per 24 hour   Intake 1053.65 ml   Output --

## 2025-06-13 VITALS
WEIGHT: 174.6 LBS | SYSTOLIC BLOOD PRESSURE: 132 MMHG | RESPIRATION RATE: 20 BRPM | HEIGHT: 66 IN | HEART RATE: 96 BPM | TEMPERATURE: 97.5 F | OXYGEN SATURATION: 96 % | BODY MASS INDEX: 28.06 KG/M2 | DIASTOLIC BLOOD PRESSURE: 72 MMHG

## 2025-06-13 LAB
GLUCOSE BLD-MCNC: 95 MG/DL (ref 70–99)
GLUCOSE BLD-MCNC: 96 MG/DL (ref 70–99)
PERFORMED ON: NORMAL
PERFORMED ON: NORMAL

## 2025-06-13 PROCEDURE — 6360000002 HC RX W HCPCS: Performed by: INTERNAL MEDICINE

## 2025-06-13 PROCEDURE — 94760 N-INVAS EAR/PLS OXIMETRY 1: CPT

## 2025-06-13 PROCEDURE — 82962 GLUCOSE BLOOD TEST: CPT

## 2025-06-13 PROCEDURE — 94669 MECHANICAL CHEST WALL OSCILL: CPT

## 2025-06-13 PROCEDURE — 6370000000 HC RX 637 (ALT 250 FOR IP): Performed by: INTERNAL MEDICINE

## 2025-06-13 PROCEDURE — 99231 SBSQ HOSP IP/OBS SF/LOW 25: CPT | Performed by: INTERNAL MEDICINE

## 2025-06-13 PROCEDURE — 6370000000 HC RX 637 (ALT 250 FOR IP): Performed by: STUDENT IN AN ORGANIZED HEALTH CARE EDUCATION/TRAINING PROGRAM

## 2025-06-13 PROCEDURE — 94640 AIRWAY INHALATION TREATMENT: CPT

## 2025-06-13 PROCEDURE — 2580000003 HC RX 258: Performed by: INTERNAL MEDICINE

## 2025-06-13 PROCEDURE — 2500000003 HC RX 250 WO HCPCS: Performed by: INTERNAL MEDICINE

## 2025-06-13 RX ORDER — PREDNISONE 5 MG/1
TABLET ORAL
Qty: 26 TABLET | Refills: 0 | Status: SHIPPED | OUTPATIENT
Start: 2025-06-14 | End: 2025-06-25

## 2025-06-13 RX ORDER — LEVOFLOXACIN 750 MG/1
750 TABLET, FILM COATED ORAL DAILY
Qty: 14 TABLET | Refills: 0 | Status: SHIPPED | OUTPATIENT
Start: 2025-06-14 | End: 2025-06-28

## 2025-06-13 RX ORDER — AMOXICILLIN AND CLAVULANATE POTASSIUM 562.5; 437.5; 62.5 MG/1; MG/1; MG/1
1 TABLET, MULTILAYER, EXTENDED RELEASE ORAL 2 TIMES DAILY
Qty: 28 TABLET | Refills: 0 | Status: SHIPPED | OUTPATIENT
Start: 2025-06-13 | End: 2025-06-27

## 2025-06-13 RX ADMIN — IPRATROPIUM BROMIDE AND ALBUTEROL SULFATE 1 DOSE: 2.5; .5 SOLUTION RESPIRATORY (INHALATION) at 01:53

## 2025-06-13 RX ADMIN — FAMOTIDINE 20 MG: 20 TABLET, FILM COATED ORAL at 08:35

## 2025-06-13 RX ADMIN — AMPICILLIN SODIUM AND SULBACTAM SODIUM 3000 MG: 2; 1 INJECTION, POWDER, FOR SOLUTION INTRAMUSCULAR; INTRAVENOUS at 08:34

## 2025-06-13 RX ADMIN — SENNOSIDES AND DOCUSATE SODIUM 2 TABLET: 50; 8.6 TABLET ORAL at 08:35

## 2025-06-13 RX ADMIN — SODIUM CHLORIDE, PRESERVATIVE FREE 10 ML: 5 INJECTION INTRAVENOUS at 08:36

## 2025-06-13 RX ADMIN — LEVOFLOXACIN 750 MG: 500 TABLET, FILM COATED ORAL at 08:35

## 2025-06-13 RX ADMIN — GABAPENTIN 600 MG: 600 TABLET, FILM COATED ORAL at 13:29

## 2025-06-13 RX ADMIN — AMPICILLIN SODIUM AND SULBACTAM SODIUM 3000 MG: 2; 1 INJECTION, POWDER, FOR SOLUTION INTRAMUSCULAR; INTRAVENOUS at 13:31

## 2025-06-13 RX ADMIN — AMPICILLIN SODIUM AND SULBACTAM SODIUM 3000 MG: 2; 1 INJECTION, POWDER, FOR SOLUTION INTRAMUSCULAR; INTRAVENOUS at 02:11

## 2025-06-13 RX ADMIN — GABAPENTIN 600 MG: 600 TABLET, FILM COATED ORAL at 08:35

## 2025-06-13 RX ADMIN — IPRATROPIUM BROMIDE AND ALBUTEROL SULFATE 1 DOSE: 2.5; .5 SOLUTION RESPIRATORY (INHALATION) at 06:15

## 2025-06-13 RX ADMIN — IPRATROPIUM BROMIDE AND ALBUTEROL SULFATE 1 DOSE: 2.5; .5 SOLUTION RESPIRATORY (INHALATION) at 10:15

## 2025-06-13 RX ADMIN — BUDESONIDE INHALATION 500 MCG: 0.5 SUSPENSION RESPIRATORY (INHALATION) at 06:15

## 2025-06-13 RX ADMIN — GUAIFENESIN 600 MG: 600 TABLET, EXTENDED RELEASE ORAL at 08:35

## 2025-06-13 RX ADMIN — IPRATROPIUM BROMIDE AND ALBUTEROL SULFATE 1 DOSE: 2.5; .5 SOLUTION RESPIRATORY (INHALATION) at 14:40

## 2025-06-13 RX ADMIN — ARFORMOTEROL TARTRATE 15 MCG: 15 SOLUTION RESPIRATORY (INHALATION) at 06:15

## 2025-06-13 RX ADMIN — PREDNISONE 20 MG: 20 TABLET ORAL at 08:35

## 2025-06-13 RX ADMIN — HYDROCODONE BITARTRATE AND ACETAMINOPHEN 1 TABLET: 10; 325 TABLET ORAL at 08:44

## 2025-06-13 ASSESSMENT — PAIN DESCRIPTION - DESCRIPTORS: DESCRIPTORS: ACHING

## 2025-06-13 ASSESSMENT — PAIN DESCRIPTION - LOCATION: LOCATION: CHEST

## 2025-06-13 ASSESSMENT — PAIN SCALES - GENERAL: PAINLEVEL_OUTOF10: 8

## 2025-06-13 NOTE — PLAN OF CARE
Problem: Safety - Adult  Goal: Free from fall injury  6/13/2025 0840 by Allison Saha RN  Outcome: Progressing  6/13/2025 0352 by Ayanna Chan RN  Outcome: Progressing     Problem: ABCDS Injury Assessment  Goal: Absence of physical injury  6/13/2025 0840 by Allison Saha RN  Outcome: Progressing  6/13/2025 0352 by Ayanna Chan RN  Outcome: Progressing     Problem: Pain  Goal: Verbalizes/displays adequate comfort level or baseline comfort level  6/13/2025 0840 by Allison Saha RN  Outcome: Progressing  6/13/2025 0352 by Ayanna Chan RN  Outcome: Progressing

## 2025-06-13 NOTE — PROGRESS NOTES
MEDICAL ONCOLOGY PROGRESS NOTE     Pt Name: Yandy Dickinson  MRN: [id number]  YOB: 1965  Date of evaluation: 6/13/2025    Subjective-feeling slightly better.  Cough is improving.  Reviewed notes ID, pulmonary.  Cultures growing Actinomyces odontolyticus.  Patient is current receive antibiotics.  Afebrile.    Bronchoscopy 6/6/2025 by Dr. Morrison.  JULIANNE mucous plugging encountered, suctioned, biopsy and BAL performed also.  Path pending    HISTORY OF PRESENT ILLNESS:  Yandy Dickinson is a 60-year-old female with a significant past medical history of ER+/AZ+/HER2- metastatic breast cancer with known pulmonary metastases, hypertension, peripheral neuropathy, history of pneumonia, and prior upper extremity venous thromboembolism, who presents to the ED with pleuritic chest pain.  She was recently hospitalized with respiratory viral infection.  The patient reports substernal chest pain that began yesterday and worsens with inspiration. She is currently on Aromasin (exemestane) and palbociclib for metastatic breast cancer and has undergone multiple prior systemic therapies. She recently received SBRT to lung lesions for oligoprogression and underwent bronchoscopy on 5/27/25 by Dr. Morrison for evaluation of a left lower lobe lesion, which was aspirated and biopsied. No endobronchial disease or mediastinal lymphadenopathy was seen during the procedure. Since the bronchoscopy, she has had clear mucus with cough but denies fever or chills. She endorses shortness of breath and pleuritic chest pain, but denies leg swelling, weakness, numbness, tingling, palpitations, or other symptoms. She has a remote history of an upper extremity clot but is not currently on anticoagulation.    Laboratory studies admission-WBC 9.2, hemoglobin 13.9, platelet count 322,000.  Chemistry remarkable for normal kidney function, normal LFTs.    6/5/25 CTA Chest negative for pulmonary embolism; shows new complete atelectasis of the  Fragments of bronchial mucosa with submucosa exhibiting anthracotic pigment deposition.  No granulomata identified.  Fragments of clotted blood.  No histologic evidence of malignancy.   1/5/24 CT Chest WO Contrast Nodule in the right upper lobe anteriorly medially, slightly larger now measuring 1.5 x 2.0 cm. Noncalcified nodule in the left lower lobe superiorly medially measuring 2.5 cm, unchanged. Noncalcified nodule in the right lower lobe measuring 0.7 cm, unchanged. Atherosclerosis and coronary artery calcification. Mild degenerative changes of the spine. Bilateral breast implants.  Surgical clips in the right axilla. Diverticulosis of the colon.  Otherwise unremarkable noncontrast CT scan of the chest.   1/12/24 Tumor Markers: CEA 2.2, CA 15-3-18, CA 27.29-23.1  2/2/24 Robotic Navigational Bronchoscopy, endobronchial ultrasound by Greer Franco biopsy  2/2/24 Cytopathology ([facility]): Lung Left lower lobe, Bronchial epithelial cells and mixed inflammation, no malignant cells identified. Left lower lobe; Adenocarcinoma, consistent with metastasis from patient's known breast primary.  ER 70%, ND 5%, HER2 IHC 0  2/16/2024-essentially, recurrent breast cancer with lung metastasis ER 70%, ND 5%, HER2 IHC 0, biopsy-proven.  Patient has 2 nodules in the lungs.  No other evidence of metastatic disease.  Discussed.  Sadi Rhoades possibility of SBRT. NGS liquid biopsy today.  2/16/24 Tumor Markers: CEA 2, CA 15-3-18, CA 27.29-21.4  3/17/24 Re-initiated treatment with palbociclib and Aromasin  3/29/24 Tumor Markers; CEA 1.9, CA 15-3-21, CA 27.29- 34.6  4/26/24 Tumor Markers:  CEA 2.4, CA 15-3- 25, CA 27.29-35.3  4/29/24 XR Ankle Right Negative radiographic images of the right ankle.   5/29/24 Tumor Markers: CEA 2, CA 1-3-27, CA 27.29-37.5  6/20/24 CT Chest W Contrast  Adjacent to the mediastinum the right upper lobe image 29 1.5 x 2 cm nodule is stable and unchanged. Left infrahilar region in the left lower lobe 1.6 x 2.1 cm

## 2025-06-13 NOTE — PROGRESS NOTES
CLINICAL PHARMACY NOTE: MEDS TO BEDS    Total # of Prescriptions Filled: 3   The following medications were delivered to the patient:  Current Discharge Medication List        START taking these medications    Details   levoFLOXacin (LEVAQUIN) 750 MG tablet Take 1 tablet by mouth daily for 14 days  Qty: 14 tablet, Refills: 0      amoxicillin-clavulanate (AUGMENTIN XR) 1000-62.5 MG per extended release tablet Take 1 tablet by mouth 2 times daily for 14 days  Qty: 28 tablet, Refills: 0         Prednisone 5mg tabs      Additional Documentation:  Delivered to patient bedside. No copay.

## 2025-06-13 NOTE — CARE COORDINATION
06/13/25 0904   IMM Letter   IMM Letter given to Patient/Family/Significant other/Guardian/POA/by: MEAGHAN Vinson   IMM Letter date given: 06/13/25   IMM Letter time given: 0805     Second IMM given to patient and explained with patient verbalizing understanding.  All questions and concerns addressed   Patient declined waiting 4 hr period prior to discharge.   Signed letter placed in pt soft chart   Electronically signed by MEAGHAN Vinson on 6/13/2025 at 9:05 AM

## 2025-06-13 NOTE — PLAN OF CARE
Problem: Safety - Adult  Goal: Free from fall injury  6/13/2025 0352 by Ayanna Chan RN  Outcome: Progressing  6/12/2025 1625 by Amisha Adams RN  Outcome: Progressing     Problem: ABCDS Injury Assessment  Goal: Absence of physical injury  6/13/2025 0352 by Ayanna Chan RN  Outcome: Progressing  6/12/2025 1625 by Amisha Adams RN  Outcome: Progressing     Problem: Pain  Goal: Verbalizes/displays adequate comfort level or baseline comfort level  6/13/2025 0352 by Ayanna Chan RN  Outcome: Progressing  6/12/2025 1625 by Amisha Adams RN  Outcome: Progressing

## 2025-06-13 NOTE — DISCHARGE SUMMARY
in the last 72 hours.  Sed Rate:  No results for input(s): \"SEDRATE\" in the last 72 hours.      HgBA1c:  No components found for: \"HGBA1C\"  FLP:    Lab Results   Component Value Date/Time    TRIG 35 03/16/2022 10:43 AM    HDL 61 03/16/2022 10:43 AM     TSH:    Lab Results   Component Value Date/Time    TSH 1.200 12/21/2021 10:45 AM     Troponin T: No results for input(s): \"TROPONINI\" in the last 72 hours.  Pro-BNP: No results for input(s): \"BNP\" in the last 72 hours.  INR: No results for input(s): \"INR\" in the last 72 hours.  ABGs: No results found for: \"PHART\", \"PO2ART\", \"XEO7IDF\"  UA:No results for input(s): \"NITRITE\", \"COLORU\", \"PHUR\", \"LABCAST\", \"WBCUA\", \"RBCUA\", \"MUCUS\", \"TRICHOMONAS\", \"YEAST\", \"BACTERIA\", \"CLARITYU\", \"SPECGRAV\", \"LEUKOCYTESUR\", \"UROBILINOGEN\", \"BILIRUBINUR\", \"BLOODU\", \"GLUCOSEU\", \"AMORPHOUS\" in the last 72 hours.    Invalid input(s): \"KETONESU\"      Culture Results:    No results for input(s): \"CXSURG\" in the last 720 hours.    Blood Culture Recent:   Recent Labs     06/05/25  1448   BC No growth after 5 days of incubation.       Cultures:   No results for input(s): \"CULTURE\" in the last 72 hours.  No results for input(s): \"BC\", \"BLOODCULT2\" in the last 72 hours.  No results for input(s): \"CXSURG\" in the last 72 hours.      No results for input(s): \"MG\", \"PHOS\" in the last 72 hours.  No results for input(s): \"AST\", \"ALT\", \"BILITOT\", \"ALKPHOS\" in the last 72 hours.    Invalid input(s): \"ALB\"        Significant Diagnostic Studies:   XR CHEST PORTABLE  Result Date: 6/6/2025  CHEST RADIOGRAPH PORTABLE  INDICATION: 60-year-old female patient follow up lung atelectasis  COMPARISON: Chest radiograph 6/5/25 at 13: 12  TECHNIQUE: AP portable upright chest radiograph(s). 1 views total.  FINDINGS: No pneumothoraces. There is moderate improvement in left upper lobe atelectasis although a mild amount remains. There is mild improvement in volume loss of the left lung. There are groundglass opacities of the  750 mg orally daily for 2 weeks  Follow-up 3 weeks\"        Constipation  Senokot-S 2 tabs daily and Milk of mag daily     Steroid induced hyperglycemia  HA1C 6.0%  Insulin on low dose Sliding scale   Monitored POC glucose     Chronic COPD  Continue ICS/LABA and Duonebs     Metastatic breast cancer  Followed by Oncology   Noemí for discharge from oncology standpoint.  Aromasin/palbociclib on hold  Follow-up with oncology outpatient, to discuss pathology report     Insomnia  Melatonin 10 mg QHS           Continued management of other chronic medical conditions         Physical Exam:  Vital Signs: /72   Pulse 96   Temp 97.5 °F (36.4 °C) (Temporal)   Resp 20   Ht 1.676 m (5' 6\")   Wt 79.2 kg (174 lb 9.7 oz)   SpO2 96%   BMI 28.18 kg/m²   Physical Exam  Vitals and nursing note reviewed.   Constitutional:       General: She is not in acute distress.     Appearance: Normal appearance. She is not ill-appearing, toxic-appearing or diaphoretic.   HENT:      Head: Normocephalic and atraumatic.      Right Ear: External ear normal.      Left Ear: External ear normal.      Nose: Nose normal. No congestion or rhinorrhea.      Mouth/Throat:      Mouth: Mucous membranes are moist.      Pharynx: Oropharynx is clear. No oropharyngeal exudate or posterior oropharyngeal erythema.   Eyes:      General: No scleral icterus.        Right eye: No discharge.         Left eye: No discharge.      Extraocular Movements: Extraocular movements intact.      Conjunctiva/sclera: Conjunctivae normal.      Pupils: Pupils are equal, round, and reactive to light.   Cardiovascular:      Rate and Rhythm: Normal rate and regular rhythm.      Pulses: Normal pulses.      Heart sounds: Normal heart sounds. No murmur heard.     No friction rub. No gallop.   Pulmonary:      Effort: Pulmonary effort is normal. No respiratory distress.      Breath sounds: Normal breath sounds. No stridor. No wheezing, rhonchi or rales.   Chest:      Chest wall: No

## 2025-06-16 ENCOUNTER — CARE COORDINATION (OUTPATIENT)
Dept: CASE MANAGEMENT | Age: 60
End: 2025-06-16

## 2025-06-16 NOTE — CARE COORDINATION
Care Transitions Note    Initial Call - Call within 2 business days of discharge: Yes    Patient Current Location:   Home:  [address]  [facility] [address]    Care Transition Nurse contacted the patient by telephone to perform post hospital discharge assessment, verified name and  as identifiers.  Provided introduction to self, and explanation of the Care Transition Nurse role.    Patient: Yandy Dickinson    Patient : 1965   MRN: [id number]    Reason for Admission: CAP, LLL, Brease Ca with Lung Mets, Other  Discharge Date: 25  RURS: Readmission Risk Score: 17.3    Last Discharge Facility       Date Complaint Diagnosis Description Type Department Provider    25 Chest Pain; Shortness of Breath Chest pain, unspecified type ... ED to Hosp-Admission (Discharged) (ADMITTED) [facility] Martin Kelly MD; [facility]...          Was this an external facility discharge? No    Spoke with patient briefly.  She said it was a bad time.  She asked if she could call me back in a few minutes.  I told her that would be fine.      Leydi Lal RN

## 2025-06-17 ENCOUNTER — CARE COORDINATION (OUTPATIENT)
Dept: CASE MANAGEMENT | Age: 60
End: 2025-06-17

## 2025-06-17 LAB
FUNGUS SPEC CULT: NORMAL
KOH PREP SPEC: NORMAL

## 2025-06-17 NOTE — CARE COORDINATION
Care Transitions Note    Initial Call - Call within 2 business days of discharge: Yes    Attempted to reach patient for transitions of care follow up. Unable to reach patient.    Outreach Attempts:   Multiple attempts to contact patient at phone numbers on file.   Unable to leave message.     Patient: Yandy Dickinson    Patient : 1965   MRN: [id number]    Reason for Admission: CAP, LLL, Breast Ca with Lung Mets, Other   Discharge Date: 25  RURS: Readmission Risk Score: 17.3    Last Discharge Facility       Date Complaint Diagnosis Description Type Department Provider    25 Chest Pain; Shortness of Breath Chest pain, unspecified type ... ED to Hosp-Admission (Discharged) (ADMITTED) [facility] ONC Martin Kelly MD; [facility]...          Was this an external facility discharge? No    Follow Up Appointment:   Patient has hospital follow up appointment scheduled within 7 days of discharge.    Future Appointments         Provider Specialty Dept Phone    2025 2:00 PM Tomi Teague MD Internal Medicine [phone]    2025 9:45 AM Precious Kearney APRN - CNP Pulmonology [phone]    2025 10:00 AM (Arrive by 9:30 AM) [facility] NM INJECTION ROOM Radiology [phone]    2025 11:30 AM (Arrive by 10:30 AM) [facility] CT RM 2 Radiology [phone]    2025 12:00 PM (Arrive by 11:30 AM) [facility] CT RM 2 Radiology [phone]    2025 1:00 PM (Arrive by 12:30 PM) [facility] NUC MED RM 2 Radiology [phone]    2025 10:30 AM Kassi Jim MD Hematology and Oncology [phone]    2025 10:30 AM SCHEDULE, [facility] MED ONC MA Infusion Therapy [phone]    2025 1:15 PM Kamron Wilkinson PA-C General Surgery [phone]          No further follow-up call indicated     Leydi Lal RN

## 2025-06-18 LAB
ACID FAST STN SPEC QL: NORMAL
MYCOBACTERIUM SPEC CULT: NORMAL

## 2025-06-24 LAB
FUNGUS SPEC CULT: NORMAL
KOH PREP SPEC: NORMAL

## 2025-06-25 LAB
ACID FAST STN SPEC QL: NORMAL
MYCOBACTERIUM SPEC CULT: NORMAL

## 2025-07-01 ENCOUNTER — OFFICE VISIT (OUTPATIENT)
Dept: PULMONOLOGY | Age: 60
End: 2025-07-01
Payer: MEDICARE

## 2025-07-01 ENCOUNTER — HOSPITAL ENCOUNTER (OUTPATIENT)
Dept: GENERAL RADIOLOGY | Age: 60
Discharge: HOME | End: 2025-07-01
Payer: MEDICARE

## 2025-07-01 VITALS
WEIGHT: 167.4 LBS | BODY MASS INDEX: 26.9 KG/M2 | DIASTOLIC BLOOD PRESSURE: 99 MMHG | HEART RATE: 97 BPM | OXYGEN SATURATION: 92 % | HEIGHT: 66 IN | SYSTOLIC BLOOD PRESSURE: 161 MMHG

## 2025-07-01 DIAGNOSIS — J98.11 ATELECTASIS: ICD-10-CM

## 2025-07-01 DIAGNOSIS — C78.02 CARCINOMA OF LEFT BREAST METASTATIC TO LUNG (HCC): ICD-10-CM

## 2025-07-01 DIAGNOSIS — J18.9 COMMUNITY ACQUIRED PNEUMONIA OF LEFT LOWER LOBE OF LUNG: Primary | ICD-10-CM

## 2025-07-01 DIAGNOSIS — C50.912 CARCINOMA OF LEFT BREAST METASTATIC TO LUNG (HCC): ICD-10-CM

## 2025-07-01 DIAGNOSIS — J18.9 COMMUNITY ACQUIRED PNEUMONIA OF LEFT LOWER LOBE OF LUNG: ICD-10-CM

## 2025-07-01 DIAGNOSIS — R91.1 LUNG NODULE: ICD-10-CM

## 2025-07-01 LAB
FUNGUS SPEC CULT: NORMAL
KOH PREP SPEC: NORMAL

## 2025-07-01 PROCEDURE — G8417 CALC BMI ABV UP PARAM F/U: HCPCS | Performed by: NURSE PRACTITIONER

## 2025-07-01 PROCEDURE — 1111F DSCHRG MED/CURRENT MED MERGE: CPT | Performed by: NURSE PRACTITIONER

## 2025-07-01 PROCEDURE — 3017F COLORECTAL CA SCREEN DOC REV: CPT | Performed by: NURSE PRACTITIONER

## 2025-07-01 PROCEDURE — 99214 OFFICE O/P EST MOD 30 MIN: CPT | Performed by: NURSE PRACTITIONER

## 2025-07-01 PROCEDURE — G8427 DOCREV CUR MEDS BY ELIG CLIN: HCPCS | Performed by: NURSE PRACTITIONER

## 2025-07-01 PROCEDURE — 1036F TOBACCO NON-USER: CPT | Performed by: NURSE PRACTITIONER

## 2025-07-01 PROCEDURE — 71046 X-RAY EXAM CHEST 2 VIEWS: CPT

## 2025-07-01 ASSESSMENT — ENCOUNTER SYMPTOMS
EYES NEGATIVE: 1
GASTROINTESTINAL NEGATIVE: 1
ALLERGIC/IMMUNOLOGIC NEGATIVE: 1
COUGH: 1
SHORTNESS OF BREATH: 1

## 2025-07-01 NOTE — PROGRESS NOTES
Pulmonary and Sleep Medicine    Yandy Dickinson (:  1965) is a 60 y.o. female,Established patient, here for evaluation of the following chief complaint(s):  Follow-Up from Hospital (2 week hfu)      Referring physician:  No referring provider defined for this encounter.     ASSESSMENT/PLAN:  1. Community acquired pneumonia of left lower lobe of lung  -     XR CHEST (2 VW); Future  2. Atelectasis  -     XR CHEST (2 VW); Future  3. Carcinoma of left breast metastatic to lung (HCC)  4. Lung nodule        Symptoms have improved. We will repeat chest x-ray today given recent pain to monitor atelectasis. Keep scheduled follow up with ID and Oncology. Blood pressure rechecked and improved prior to leaving clinic. Continue Breztri and PRN breathing treatments.        Return in about 6 weeks (around 2025).    SUBJECTIVE/OBJECTIVE:        HPI    Patient presents for follow up for pneumonia. She presented to ED with chest pain and shortness of breath. CTA was negative for PE however consolidative infiltrate of the left lower lobe was seen. Atelectasis of left lobe was also present. Bronchoscopy was completed on , mucous plug to left upper lobe was removed. BAL grew Actinomyces odontolyticus. Infectious disease was consulted and she was treated with IV antibiotics. She was discharged with 2 weeks of Augmentin and Levaquin. She has follow up with ID next week.   She had some pain over the weekend. Cough has improved and is less productive.     Continue the following medications as reported by the patient:    Prior to Visit Medications    Medication Sig Taking? Authorizing Provider   Budeson-Glycopyrrol-Formoterol (BREZTRI AEROSPHERE) 160-9-4.8 MCG/ACT AERO Inhale 2 puffs into the lungs 2 times daily Yes Precious Kearney APRN - CNP   guaiFENesin (MUCINEX) 600 MG extended release tablet Take 1 tablet by mouth 2 times daily Yes Jose Durán APRN - CNP   ipratropium 0.5 mg-albuterol 2.5 mg (DUONEB) 0.5-2.5 (3)

## 2025-07-02 ENCOUNTER — TELEPHONE (OUTPATIENT)
Dept: PULMONOLOGY | Age: 60
End: 2025-07-02

## 2025-07-02 LAB
ACID FAST STN SPEC QL: NORMAL
MYCOBACTERIUM SPEC CULT: NORMAL

## 2025-07-02 NOTE — TELEPHONE ENCOUNTER
----- Message from BRITANY Gaytan CNP sent at 7/2/2025  8:24 AM CDT -----  Regarding: x-ray  Please let patient know her chest x-ray shows improvement. Please call if new or worsening symptoms.

## 2025-07-07 LAB
ACID FAST STN SPEC QL: ABNORMAL
MYCOBACTERIUM SPEC CULT: ABNORMAL
ORGANISM: ABNORMAL

## 2025-07-08 LAB
FUNGUS SPEC CULT: NORMAL
KOH PREP SPEC: NORMAL

## 2025-07-09 ENCOUNTER — HOSPITAL ENCOUNTER (OUTPATIENT)
Dept: CT IMAGING | Age: 60
Discharge: HOME | End: 2025-07-09
Payer: MEDICARE

## 2025-07-09 ENCOUNTER — HOSPITAL ENCOUNTER (OUTPATIENT)
Dept: NUCLEAR MEDICINE | Age: 60
Discharge: HOME | End: 2025-07-11
Payer: MEDICARE

## 2025-07-09 DIAGNOSIS — C50.919 CARCINOMA OF BREAST METASTATIC TO LUNG, UNSPECIFIED LATERALITY (HCC): ICD-10-CM

## 2025-07-09 DIAGNOSIS — C78.00 CARCINOMA OF BREAST METASTATIC TO LUNG, UNSPECIFIED LATERALITY (HCC): ICD-10-CM

## 2025-07-09 PROCEDURE — 74177 CT ABD & PELVIS W/CONTRAST: CPT

## 2025-07-09 PROCEDURE — A9503 TC99M MEDRONATE: HCPCS | Performed by: INTERNAL MEDICINE

## 2025-07-09 PROCEDURE — 71260 CT THORAX DX C+: CPT

## 2025-07-09 PROCEDURE — 3430000000 HC RX DIAGNOSTIC RADIOPHARMACEUTICAL: Performed by: INTERNAL MEDICINE

## 2025-07-09 PROCEDURE — 6360000004 HC RX CONTRAST MEDICATION: Performed by: INTERNAL MEDICINE

## 2025-07-09 PROCEDURE — 78306 BONE IMAGING WHOLE BODY: CPT | Performed by: INTERNAL MEDICINE

## 2025-07-09 RX ORDER — TC 99M MEDRONATE 20 MG/10ML
20 INJECTION, POWDER, LYOPHILIZED, FOR SOLUTION INTRAVENOUS
Status: COMPLETED | OUTPATIENT
Start: 2025-07-09 | End: 2025-07-09

## 2025-07-09 RX ORDER — IOPAMIDOL 755 MG/ML
70 INJECTION, SOLUTION INTRAVASCULAR
Status: COMPLETED | OUTPATIENT
Start: 2025-07-09 | End: 2025-07-09

## 2025-07-09 RX ADMIN — TC 99M MEDRONATE 20 MILLICURIE: 20 INJECTION, POWDER, LYOPHILIZED, FOR SOLUTION INTRAVENOUS at 14:37

## 2025-07-09 RX ADMIN — IOPAMIDOL 70 ML: 755 INJECTION, SOLUTION INTRAVENOUS at 12:23

## 2025-07-10 ENCOUNTER — PATIENT ROUNDING (BHMG ONLY) (OUTPATIENT)
Age: 60
End: 2025-07-10
Payer: MEDICARE

## 2025-07-10 ENCOUNTER — OFFICE VISIT (OUTPATIENT)
Age: 60
End: 2025-07-10
Payer: MEDICARE

## 2025-07-10 VITALS
BODY MASS INDEX: 27.16 KG/M2 | WEIGHT: 169 LBS | SYSTOLIC BLOOD PRESSURE: 139 MMHG | HEART RATE: 97 BPM | DIASTOLIC BLOOD PRESSURE: 80 MMHG | TEMPERATURE: 98.4 F | HEIGHT: 66 IN | OXYGEN SATURATION: 94 %

## 2025-07-10 DIAGNOSIS — A42.9 ACTINOMYCES INFECTION: ICD-10-CM

## 2025-07-10 DIAGNOSIS — J18.9 POSTOBSTRUCTIVE PNEUMONIA: Primary | ICD-10-CM

## 2025-07-10 PROCEDURE — 1159F MED LIST DOCD IN RCRD: CPT | Performed by: INTERNAL MEDICINE

## 2025-07-10 PROCEDURE — 99214 OFFICE O/P EST MOD 30 MIN: CPT | Performed by: INTERNAL MEDICINE

## 2025-07-10 PROCEDURE — 1160F RVW MEDS BY RX/DR IN RCRD: CPT | Performed by: INTERNAL MEDICINE

## 2025-07-10 RX ORDER — IPRATROPIUM BROMIDE AND ALBUTEROL SULFATE 2.5; .5 MG/3ML; MG/3ML
3 SOLUTION RESPIRATORY (INHALATION) 4 TIMES DAILY PRN
COMMUNITY
Start: 2025-03-27

## 2025-07-10 RX ORDER — GUAIFENESIN 600 MG/1
600 TABLET, EXTENDED RELEASE ORAL 2 TIMES DAILY
COMMUNITY
Start: 2025-04-09

## 2025-07-10 RX ORDER — BENZONATATE 100 MG/1
100 CAPSULE ORAL 3 TIMES DAILY PRN
COMMUNITY
Start: 2025-03-27

## 2025-07-10 RX ORDER — LEVOFLOXACIN 750 MG/1
750 TABLET, FILM COATED ORAL DAILY
Qty: 10 TABLET | Refills: 0 | Status: SHIPPED | OUTPATIENT
Start: 2025-07-10 | End: 2025-07-20

## 2025-07-10 RX ORDER — BUDESONIDE, GLYCOPYRROLATE, AND FORMOTEROL FUMARATE 160; 9; 4.8 UG/1; UG/1; UG/1
2 AEROSOL, METERED RESPIRATORY (INHALATION) 2 TIMES DAILY
COMMUNITY
Start: 2025-04-15

## 2025-07-10 RX ORDER — ALBUTEROL SULFATE 90 UG/1
2 AEROSOL, METERED RESPIRATORY (INHALATION) EVERY 4 HOURS PRN
COMMUNITY
Start: 2025-03-27

## 2025-07-10 RX ORDER — AMOXICILLIN AND CLAVULANATE POTASSIUM 562.5; 437.5; 62.5 MG/1; MG/1; MG/1
1 TABLET, MULTILAYER, EXTENDED RELEASE ORAL EVERY 12 HOURS
Qty: 20 TABLET | Refills: 0 | Status: SHIPPED | OUTPATIENT
Start: 2025-07-10 | End: 2025-07-20

## 2025-07-10 NOTE — PATIENT INSTRUCTIONS
Schedule follow-up appointment in 4 to 5 weeks  Keep follow-up with pulmonary and oncology  Take Augmentin and levofloxacin for another 10 days (prescriptions being sent to [facility] in [place])

## 2025-07-10 NOTE — PROGRESS NOTES
July 10, 2025    Hello, may I speak with Susanna Wood?    My name is Essence Hernandez       I am  with [facility]  [address] [facility] [address] [phone].    Before we get started may I verify your date of birth? 1965    I am calling to officially welcome you to our practice and ask about your recent visit. Is this a good time to talk? yes    Tell me about your visit with us. What things went well?  it went good. He's keeping me out of the hospital.       We're always looking for ways to make our patients' experiences even better. Do you have recommendations on ways we may improve?  no    Overall were you satisfied with your first visit to our practice? yes       I appreciate you taking the time to speak with me today. Is there anything else I can do for you? no      Thank you, and have a great day.

## 2025-07-10 NOTE — PROGRESS NOTES
[facility] [place] - Infectious Diseases Progress Note    Patient:  Susanna Wood  YOB: 1965  MRN: [id number]   Primary Care Physician: Prashant Drake MD  Referring Physician: No ref. provider found     Chief Complaint: right upper lobe filtrate with air bronchograms left upper lung atelectasis and bronchoscopy with actinomyces with complaints of increased shortness of air and cough since last Saturday     Interval History/HPI: She is here today for follow-up.  She has had recurrent breast cancer with lung lesion.  She had had atelectasis in the left upper lung area.  Her previous cultures had shown actinomyces.  She had received a course of antibiotic treatment with levofloxacin and Augmentin.  She is here with her significant other today.  Both of them felt she had done reasonably well on the antibiotic treatment.  She reports definite improvement from when she was in the hospital.  She had indicated her cough had improved.  She finished her antibiotic treatment a couple weeks ago.  She indicates over the past few days she has had some increased congestion and cough.  She is not having fever.  She has ongoing follow-up with pulmonary and with oncology.  She has had recent chest/abdomen/pelvis CT scans and bone scan.  Discussed the results with her.    Allergies:   Allergies   Allergen Reactions    Clindamycin/Lincomycin Hives, Itching, Rash and Unknown - Low Severity     Current Scheduled Medications:   Current Outpatient Medications on File Prior to Visit   Medication Sig    benzonatate (TESSALON) 100 MG capsule Take 1 capsule by mouth 3 (Three) Times a Day As Needed for Cough.    Breztri Aerosphere 160-9-4.8 MCG/ACT aerosol inhaler Inhale 2 puffs 2 (Two) Times a Day.    cyclobenzaprine (FLEXERIL) 10 MG tablet Take 1 tablet by mouth 3 times a day. (Patient taking differently: Take 1 tablet by mouth 3 (Three) Times a Day As Needed for Muscle Spasms.)    exemestane (AROMASIN) 25 MG tablet Take 1  "tablet by mouth Daily.    gabapentin (NEURONTIN) 300 MG capsule Take 1 capsule by mouth 3 times a day.    guaiFENesin (MUCINEX) 600 MG 12 hr tablet Take 1 tablet by mouth 2 (Two) Times a Day.    HYDROcodone-acetaminophen (NORCO)  MG per tablet Take 1 tablet by mouth Every 6 (Six) Hours.    Ibrance 100 MG capsule capsule Take 1 capsule by mouth Daily.    ipratropium-albuterol (DUO-NEB) 0.5-2.5 mg/3 ml nebulizer Inhale 3 mL 4 (Four) Times a Day As Needed for Wheezing or Shortness of Air.    Ventolin  (90 Base) MCG/ACT inhaler Inhale 2 puffs Every 4 (Four) Hours As Needed for Wheezing or Shortness of Air.     No current facility-administered medications on file prior to visit.      Venous Access Review  Line/IV site: No current IV Access    Antimicrobial Review  Currently on antibiotics/antifungals: YES/NO: NO  Start Date of Therapy: Augmentin 6/13/2025 -6/27/2025  Levofloxacin 6/14/2025-  6/28/2025  If therapy completed, date complete: na  Estimated end of treatment date (EOT): 6/28/2025 and 6/27/2025    Review of Systems See HPI.    Vital Signs:  /80   Pulse 97   Temp 98.4 °F (36.9 °C) (Temporal)   Ht 167.6 cm (66\")   Wt 76.7 kg (169 lb)   SpO2 94%   BMI 27.28 kg/m²     Physical Exam  Vital signs - reviewed.  Lungs with few left midlung crackles  Heart without murmur  Abdomen soft and nontender    Lab/Imaging/Other Information:  Imaging reports outlined below reviewed and discussed with patient.    NM Bone Scan Whole Body (07/09/2025 14:36)   Impression      No typical scintigraphic evidence of osseous metastasis.    Findings most consistent with degenerative change involving the thoracic spine and right hip.      ______________________________________  Electronically signed by: STEPHEN HUMES M.D.  Date:     07/09/2025  Time:    15:42    CT Chest With Contrast Diagnostic (07/09/2025 12:23)   Impression      Findings suspicious for a left hilar mass /lymphadenopathy causing extrinsic " compression upon/complete obliteration of the left upper lobe bronchi and peripheral airways and complete atelectasis of the left upper lobe.    New left lower lobe lung nodule and a larger pre-existing left lower lobe nodule and new/enlarged or surrounding satellite nodules.    Stable nodules in the right lung.    Small left pleural effusion. Small pericardial effusion.    Stable likely radiation fibrosis of the medial lungs.    All CT scans are performed using dose optimization techniques as appropriate to the performed exam and include  at least one of the following: Automated exposure control, adjustment of the mA and/or kV according to size, and the use of iterative reconstruction technique.    ______________________________________  Electronically signed by: MARU WILLARD M.D.        CT Abdomen Pelvis With Contrast (07/09/2025 12:22)     CT Abdomen Pelvis With Contrast  Order: [id number]  Impression      No metastatic disease in the abdomen/pelvis.    All CT scans are performed using dose optimization techniques as appropriate to the performed exam and include  at least one of the following: Automated exposure control, adjustment of the mA and/or kV according to size, and the use of iterative reconstruction technique.    ______________________________________  Electronically signed by: MARU WILLARD M.D.  Date:     07/10/2025  Time:    08:26        XR Chest 2 View (07/01/2025 10:43)   Impression      Worsening severe atelectasis of the left lower lobe with associated new mild leftward mediastinal shift. Further evaluation recommended with a CT chest with intravenous contrast.      ______________________________________  Electronically signed by: MARU WILLARD M.D.  Date:     07/02/2025  Time:    13:50      Impression & Recommendations:   Diagnoses and all orders for this visit:    1. Postobstructive pneumonia (Primary)  -     levoFLOXacin (Levaquin) 750 MG tablet; Take 1 tablet by mouth Daily for 10 days.  Dispense: 10  tablet; Refill: 0  -     amoxicillin-clavulanate XR (AUGMENTIN XR) 1000-62.5 MG per 12 hr tablet; Take 1 tablet by mouth Every 12 (Twelve) Hours for 10 days.  Dispense: 20 tablet; Refill: 0    2. Actinomyces infection    She has had atelectasis in the left upper lung.  She has had mediastinal mass with likely postobstructive process.  Much of the chest CT findings may represent her cancer.  She also may have cancer of the postobstructive process.  She seems to have responded previously to Augmentin and levofloxacin.  Going to continue for an additional 10 days.  Encouraged her to keep her follow-up with pulmonary and with oncology.  Plan to see her back in 1 month to reassess.  She was comfortable with the plan we discussed.    Follow Up:   Patient Instructions   Schedule follow-up appointment in 4 to 5 weeks  Keep follow-up with pulmonary and oncology  Take Augmentin and levofloxacin for another 10 days (prescriptions being sent to [facility] in [facility])    Return for Schedule follow-up with infectious diseases in 4 to 5 weeks..  Patient was provided After Visit Summary.     Joaquin Patterson MD    CC: MD Angela Busby APRN Wederson Caludino, MD

## 2025-07-14 ENCOUNTER — TELEPHONE (OUTPATIENT)
Dept: HEMATOLOGY | Age: 60
End: 2025-07-14

## 2025-07-14 DIAGNOSIS — C78.00 CARCINOMA OF BREAST METASTATIC TO LUNG, UNSPECIFIED LATERALITY (HCC): Primary | ICD-10-CM

## 2025-07-14 DIAGNOSIS — C50.919 CARCINOMA OF BREAST METASTATIC TO LUNG, UNSPECIFIED LATERALITY (HCC): Primary | ICD-10-CM

## 2025-07-14 NOTE — PROGRESS NOTES
after PET scan  CBC CMP CEA CA 15-3 CA 27.29 CEA today for monitoring of toxicity and disease status  Continue Aromasin 25mg  daily, palliative treatment  Continue Ibrance 100mg D1-21 every 28 days, palliative treatment  Repeat PET Scan to assess lung disease  Continue Gabapentin 600 mg TID-script sent   Continue Phenergan 25mg every 6 hrs as needed  Continue Vitamin B12 inj today and every 4 weeks  Continue Lomotil 2.5 mg every 6 hours as needed  Continue follow-up with PCP routinely  Continue follow-up with /[facility], 5/22/25  Continue follow-up with Kamron Vora PA-C/ [facility] General Surgery, 11/13/25  Continue follow-up with Dr.Raymond Velasco/[facility], 9/22/25      Follow Up:   Return in about 2 weeks (around 7/30/2025) for NO LABS, Appointment with Dr. Jim/after PET Scan.   Sched PET scan ASAP     Lanny CLARK am pre-charting as a registered nurse for Kassi Jim MD. Electronically signed by Lanny Valente RN on 7/16/2025 at 10:36 PM CDT.    I have seen, examined and reviewed this patient medication list, appropriate labs and imaging studies. I reviewed relevant medical records and others physician’s notes. I discussed the plans of care with the patient. I answered all the questions to the patient’s satisfaction. I have also reviewed the chief complaint (CC) and part of the history (History of Present Illness (HPI), Past Family Social History (PFSH), or Review of Systems (ROS) and made changes when appropriated.       (Please note that portions of this note were completed with a voice recognition program. Efforts were made to edit the dictations but occasionally words are mis-transcribed.)Electronically signed by Kassi Jim MD on 7/16/2025 at 12:18 PM

## 2025-07-14 NOTE — TELEPHONE ENCOUNTER

## 2025-07-16 ENCOUNTER — OFFICE VISIT (OUTPATIENT)
Dept: HEMATOLOGY | Age: 60
End: 2025-07-16
Payer: MEDICARE

## 2025-07-16 ENCOUNTER — HOSPITAL ENCOUNTER (OUTPATIENT)
Dept: INFUSION THERAPY | Age: 60
Discharge: HOME | End: 2025-07-16
Payer: MEDICARE

## 2025-07-16 VITALS
TEMPERATURE: 97 F | HEART RATE: 94 BPM | WEIGHT: 166.9 LBS | DIASTOLIC BLOOD PRESSURE: 80 MMHG | BODY MASS INDEX: 26.94 KG/M2 | SYSTOLIC BLOOD PRESSURE: 129 MMHG | RESPIRATION RATE: 16 BRPM

## 2025-07-16 DIAGNOSIS — R91.8 OTHER NONSPECIFIC ABNORMAL FINDING OF LUNG FIELD: ICD-10-CM

## 2025-07-16 DIAGNOSIS — Z71.89 CARE PLAN DISCUSSED WITH PATIENT: ICD-10-CM

## 2025-07-16 DIAGNOSIS — C50.919 CARCINOMA OF BREAST METASTATIC TO LUNG, UNSPECIFIED LATERALITY (HCC): Primary | ICD-10-CM

## 2025-07-16 DIAGNOSIS — R59.0 HILAR LYMPHADENOPATHY: ICD-10-CM

## 2025-07-16 DIAGNOSIS — C78.00 MALIGNANT NEOPLASM METASTATIC TO LUNG, UNSPECIFIED LATERALITY (HCC): ICD-10-CM

## 2025-07-16 DIAGNOSIS — C78.00 CARCINOMA OF BREAST METASTATIC TO LUNG, UNSPECIFIED LATERALITY (HCC): ICD-10-CM

## 2025-07-16 DIAGNOSIS — Z79.811 ENCOUNTER FOR MONITORING AROMATASE INHIBITOR THERAPY: ICD-10-CM

## 2025-07-16 DIAGNOSIS — Z51.81 ENCOUNTER FOR MONITORING AROMATASE INHIBITOR THERAPY: ICD-10-CM

## 2025-07-16 DIAGNOSIS — C78.00 CARCINOMA OF BREAST METASTATIC TO LUNG, UNSPECIFIED LATERALITY (HCC): Primary | ICD-10-CM

## 2025-07-16 DIAGNOSIS — E53.8 VITAMIN B 12 DEFICIENCY: Primary | ICD-10-CM

## 2025-07-16 DIAGNOSIS — C50.919 CARCINOMA OF BREAST METASTATIC TO LUNG, UNSPECIFIED LATERALITY (HCC): ICD-10-CM

## 2025-07-16 DIAGNOSIS — C50.911 RECURRENT BREAST CANCER, RIGHT (HCC): ICD-10-CM

## 2025-07-16 LAB
ALBUMIN SERPL-MCNC: 4.1 G/DL (ref 3.5–5.2)
ALP SERPL-CCNC: 119 U/L (ref 35–104)
ALT SERPL-CCNC: 8 U/L (ref 5–33)
ANION GAP SERPL CALCULATED.3IONS-SCNC: 12 MMOL/L (ref 7–19)
AST SERPL-CCNC: 19 U/L (ref 5–32)
BASOPHILS # BLD: 0.02 K/UL (ref 0–0.2)
BASOPHILS NFR BLD: 0.3 % (ref 0–1)
BILIRUB SERPL-MCNC: 0.8 MG/DL (ref 0–1.2)
BUN SERPL-MCNC: 11 MG/DL (ref 6–20)
CA 15-3: 20 U/ML (ref 0–25)
CALCIUM SERPL-MCNC: 9 MG/DL (ref 8.6–10)
CEA SERPL-MCNC: 2 NG/ML (ref 0–4.7)
CHLORIDE SERPL-SCNC: 106 MMOL/L (ref 98–107)
CO2 SERPL-SCNC: 22 MMOL/L (ref 22–29)
CREAT SERPL-MCNC: 0.7 MG/DL (ref 0.5–0.9)
EOSINOPHIL # BLD: 0.01 K/UL (ref 0–0.6)
EOSINOPHIL NFR BLD: 0.1 % (ref 0–5)
ERYTHROCYTE [DISTWIDTH] IN BLOOD BY AUTOMATED COUNT: 12.9 % (ref 11.5–14.5)
GLUCOSE SERPL-MCNC: 94 MG/DL (ref 70–99)
HCT VFR BLD AUTO: 40.8 % (ref 37–47)
HGB BLD-MCNC: 13.4 G/DL (ref 12–16)
LYMPHOCYTES # BLD: 1.43 K/UL (ref 1.1–4.5)
LYMPHOCYTES NFR BLD: 18.2 % (ref 20–40)
MCH RBC QN AUTO: 29.8 PG (ref 27–31)
MCHC RBC AUTO-ENTMCNC: 32.8 G/DL (ref 33–37)
MCV RBC AUTO: 90.9 FL (ref 81–99)
MONOCYTES # BLD: 0.5 K/UL (ref 0–0.9)
MONOCYTES NFR BLD: 6.4 % (ref 1–10)
NEUTROPHILS # BLD: 5.88 K/UL (ref 1.5–7.5)
NEUTS SEG NFR BLD: 74.7 % (ref 50–65)
PLATELET # BLD AUTO: 297 K/UL (ref 130–400)
PMV BLD AUTO: 8.6 FL (ref 9.4–12.3)
POTASSIUM SERPL-SCNC: 4.1 MMOL/L (ref 3.5–5.1)
PROT SERPL-MCNC: 7.1 G/DL (ref 6.4–8.3)
RBC # BLD AUTO: 4.49 M/UL (ref 4.2–5.4)
SODIUM SERPL-SCNC: 140 MMOL/L (ref 136–145)
WBC # BLD AUTO: 7.86 K/UL (ref 4.8–10.8)

## 2025-07-16 PROCEDURE — 36415 COLL VENOUS BLD VENIPUNCTURE: CPT

## 2025-07-16 PROCEDURE — 99213 OFFICE O/P EST LOW 20 MIN: CPT

## 2025-07-16 PROCEDURE — 85025 COMPLETE CBC W/AUTO DIFF WBC: CPT

## 2025-07-16 PROCEDURE — 99214 OFFICE O/P EST MOD 30 MIN: CPT | Performed by: INTERNAL MEDICINE

## 2025-07-16 PROCEDURE — G2211 COMPLEX E/M VISIT ADD ON: HCPCS | Performed by: INTERNAL MEDICINE

## 2025-07-16 PROCEDURE — 82378 CARCINOEMBRYONIC ANTIGEN: CPT

## 2025-07-16 PROCEDURE — 86300 IMMUNOASSAY TUMOR CA 15-3: CPT

## 2025-07-16 PROCEDURE — 3017F COLORECTAL CA SCREEN DOC REV: CPT | Performed by: INTERNAL MEDICINE

## 2025-07-16 PROCEDURE — G8417 CALC BMI ABV UP PARAM F/U: HCPCS | Performed by: INTERNAL MEDICINE

## 2025-07-16 PROCEDURE — 80053 COMPREHEN METABOLIC PANEL: CPT

## 2025-07-16 PROCEDURE — 96372 THER/PROPH/DIAG INJ SC/IM: CPT

## 2025-07-16 PROCEDURE — G8427 DOCREV CUR MEDS BY ELIG CLIN: HCPCS | Performed by: INTERNAL MEDICINE

## 2025-07-16 PROCEDURE — 6360000002 HC RX W HCPCS: Performed by: INTERNAL MEDICINE

## 2025-07-16 PROCEDURE — 1036F TOBACCO NON-USER: CPT | Performed by: INTERNAL MEDICINE

## 2025-07-16 RX ORDER — CYANOCOBALAMIN 1000 UG/ML
1000 INJECTION, SOLUTION INTRAMUSCULAR; SUBCUTANEOUS ONCE
OUTPATIENT
Start: 2025-08-13 | End: 2025-08-13

## 2025-07-16 RX ORDER — CYANOCOBALAMIN 1000 UG/ML
1000 INJECTION, SOLUTION INTRAMUSCULAR; SUBCUTANEOUS ONCE
Status: COMPLETED | OUTPATIENT
Start: 2025-07-16 | End: 2025-07-16

## 2025-07-16 RX ADMIN — CYANOCOBALAMIN 1000 MCG: 1000 INJECTION, SOLUTION INTRAMUSCULAR; SUBCUTANEOUS at 10:49

## 2025-07-17 DIAGNOSIS — C50.011 CARCINOMA OF AREOLA OF RIGHT BREAST IN FEMALE, UNSPECIFIED ESTROGEN RECEPTOR STATUS (HCC): ICD-10-CM

## 2025-07-17 RX ORDER — EXEMESTANE 25 MG/1
25 TABLET ORAL DAILY
Qty: 90 TABLET | Refills: 1 | Status: SHIPPED | OUTPATIENT
Start: 2025-07-17

## 2025-07-18 LAB — CANCER AG27-29 SERPL-ACNC: 36.3 U/ML

## 2025-07-25 ENCOUNTER — TELEPHONE (OUTPATIENT)
Dept: HEMATOLOGY | Age: 60
End: 2025-07-25

## 2025-07-25 NOTE — TELEPHONE ENCOUNTER
I called patient and reminded patient of their appt on 07/30/2025 and patient confirmed they would be here. I also let patient know that we have moved into our new cancer facility and asked patient if they were aware of where we were now located, and patient voiced understanding of our new location. Patient knows not to arrive any earlier their appointment because we are unable to check patients in early and to come in well hydrated incase labs are needed at any time throughout their visit.

## 2025-07-28 ENCOUNTER — HOSPITAL ENCOUNTER (OUTPATIENT)
Dept: NUCLEAR MEDICINE | Age: 60
Discharge: HOME | End: 2025-07-30
Payer: MEDICARE

## 2025-07-28 DIAGNOSIS — R91.8 OTHER NONSPECIFIC ABNORMAL FINDING OF LUNG FIELD: ICD-10-CM

## 2025-07-28 DIAGNOSIS — C50.919 CARCINOMA OF BREAST METASTATIC TO LUNG, UNSPECIFIED LATERALITY (HCC): ICD-10-CM

## 2025-07-28 DIAGNOSIS — C50.911 RECURRENT BREAST CANCER, RIGHT (HCC): ICD-10-CM

## 2025-07-28 DIAGNOSIS — C78.00 MALIGNANT NEOPLASM METASTATIC TO LUNG, UNSPECIFIED LATERALITY (HCC): ICD-10-CM

## 2025-07-28 DIAGNOSIS — C78.00 CARCINOMA OF BREAST METASTATIC TO LUNG, UNSPECIFIED LATERALITY (HCC): ICD-10-CM

## 2025-07-28 DIAGNOSIS — R59.0 HILAR LYMPHADENOPATHY: ICD-10-CM

## 2025-07-28 LAB
GLUCOSE BLD-MCNC: 94 MG/DL (ref 70–99)
PERFORMED ON: NORMAL

## 2025-07-28 PROCEDURE — 99212 OFFICE O/P EST SF 10 MIN: CPT

## 2025-07-28 PROCEDURE — 78815 PET IMAGE W/CT SKULL-THIGH: CPT

## 2025-07-28 PROCEDURE — 3430000000 HC RX DIAGNOSTIC RADIOPHARMACEUTICAL: Performed by: INTERNAL MEDICINE

## 2025-07-28 PROCEDURE — 82962 GLUCOSE BLOOD TEST: CPT

## 2025-07-28 PROCEDURE — A9609 HC RX DIAGNOSTIC RADIOPHARMACEUTICAL: HCPCS | Performed by: INTERNAL MEDICINE

## 2025-07-28 RX ORDER — FLUDEOXYGLUCOSE F 18 200 MCI/ML
15 INJECTION, SOLUTION INTRAVENOUS ONCE
Status: COMPLETED | OUTPATIENT
Start: 2025-07-28 | End: 2025-07-28

## 2025-07-28 RX ADMIN — FLUDEOXYGLUCOSE F 18 15 MILLICURIE: 200 INJECTION, SOLUTION INTRAVENOUS at 11:41

## 2025-07-29 DIAGNOSIS — C50.011 CARCINOMA OF AREOLA OF RIGHT BREAST IN FEMALE, UNSPECIFIED ESTROGEN RECEPTOR STATUS (HCC): ICD-10-CM

## 2025-07-29 RX ORDER — PALBOCICLIB 100 MG/1
CAPSULE ORAL
Qty: 21 CAPSULE | Refills: 5 | Status: ACTIVE | OUTPATIENT
Start: 2025-07-29

## 2025-07-30 ENCOUNTER — OFFICE VISIT (OUTPATIENT)
Dept: HEMATOLOGY | Age: 60
End: 2025-07-30
Payer: MEDICARE

## 2025-07-30 ENCOUNTER — HOSPITAL ENCOUNTER (OUTPATIENT)
Dept: INFUSION THERAPY | Age: 60
Discharge: HOME | End: 2025-07-30
Payer: MEDICARE

## 2025-07-30 VITALS
HEART RATE: 91 BPM | HEIGHT: 66 IN | DIASTOLIC BLOOD PRESSURE: 78 MMHG | OXYGEN SATURATION: 98 % | SYSTOLIC BLOOD PRESSURE: 128 MMHG | BODY MASS INDEX: 26.87 KG/M2 | WEIGHT: 167.2 LBS | TEMPERATURE: 97.5 F

## 2025-07-30 DIAGNOSIS — C50.919 CARCINOMA OF BREAST METASTATIC TO LUNG, UNSPECIFIED LATERALITY (HCC): ICD-10-CM

## 2025-07-30 DIAGNOSIS — Z79.899 HIGH RISK MEDICATION USE: ICD-10-CM

## 2025-07-30 DIAGNOSIS — C50.011 CARCINOMA OF AREOLA OF RIGHT BREAST IN FEMALE, UNSPECIFIED ESTROGEN RECEPTOR STATUS (HCC): Primary | ICD-10-CM

## 2025-07-30 DIAGNOSIS — Z71.89 CARE PLAN DISCUSSED WITH PATIENT: ICD-10-CM

## 2025-07-30 DIAGNOSIS — R05.3 CHRONIC COUGH: ICD-10-CM

## 2025-07-30 DIAGNOSIS — C78.00 CARCINOMA OF BREAST METASTATIC TO LUNG, UNSPECIFIED LATERALITY (HCC): ICD-10-CM

## 2025-07-30 LAB
ACID FAST STN SPEC QL: ABNORMAL
MYCOBACTERIUM SPEC CULT: ABNORMAL
ORGANISM: ABNORMAL

## 2025-07-30 PROCEDURE — G2211 COMPLEX E/M VISIT ADD ON: HCPCS | Performed by: INTERNAL MEDICINE

## 2025-07-30 PROCEDURE — 3017F COLORECTAL CA SCREEN DOC REV: CPT | Performed by: INTERNAL MEDICINE

## 2025-07-30 PROCEDURE — G8417 CALC BMI ABV UP PARAM F/U: HCPCS | Performed by: INTERNAL MEDICINE

## 2025-07-30 PROCEDURE — 99214 OFFICE O/P EST MOD 30 MIN: CPT | Performed by: INTERNAL MEDICINE

## 2025-07-30 PROCEDURE — 1036F TOBACCO NON-USER: CPT | Performed by: INTERNAL MEDICINE

## 2025-07-30 PROCEDURE — G8427 DOCREV CUR MEDS BY ELIG CLIN: HCPCS | Performed by: INTERNAL MEDICINE

## 2025-07-30 PROCEDURE — 99212 OFFICE O/P EST SF 10 MIN: CPT

## 2025-07-30 RX ORDER — CODEINE PHOSPHATE AND GUAIFENESIN 10; 100 MG/5ML; MG/5ML
5 SOLUTION ORAL 3 TIMES DAILY PRN
Qty: 450 ML | Refills: 0 | Status: SHIPPED | OUTPATIENT
Start: 2025-07-30 | End: 2025-08-29

## 2025-08-07 ENCOUNTER — OFFICE VISIT (OUTPATIENT)
Age: 60
End: 2025-08-07
Payer: MEDICARE

## 2025-08-07 VITALS
SYSTOLIC BLOOD PRESSURE: 129 MMHG | HEART RATE: 83 BPM | WEIGHT: 169.8 LBS | DIASTOLIC BLOOD PRESSURE: 85 MMHG | BODY MASS INDEX: 27.29 KG/M2 | HEIGHT: 66 IN | TEMPERATURE: 98.6 F | OXYGEN SATURATION: 90 %

## 2025-08-07 DIAGNOSIS — J18.9 POSTOBSTRUCTIVE PNEUMONIA: Primary | ICD-10-CM

## 2025-08-07 DIAGNOSIS — A42.9 ACTINOMYCES INFECTION: ICD-10-CM

## 2025-08-07 PROCEDURE — 1159F MED LIST DOCD IN RCRD: CPT | Performed by: INTERNAL MEDICINE

## 2025-08-07 PROCEDURE — 99214 OFFICE O/P EST MOD 30 MIN: CPT | Performed by: INTERNAL MEDICINE

## 2025-08-07 PROCEDURE — 1160F RVW MEDS BY RX/DR IN RCRD: CPT | Performed by: INTERNAL MEDICINE

## 2025-08-07 RX ORDER — CODEINE PHOSPHATE AND GUAIFENESIN 10; 100 MG/5ML; MG/5ML
5 SOLUTION ORAL 3 TIMES DAILY PRN
COMMUNITY
Start: 2025-07-30 | End: 2025-08-30

## 2025-08-18 ENCOUNTER — OFFICE VISIT (OUTPATIENT)
Dept: PULMONOLOGY | Age: 60
End: 2025-08-18
Payer: MEDICARE

## 2025-08-18 VITALS
WEIGHT: 168 LBS | HEART RATE: 77 BPM | DIASTOLIC BLOOD PRESSURE: 79 MMHG | BODY MASS INDEX: 27 KG/M2 | SYSTOLIC BLOOD PRESSURE: 129 MMHG | HEIGHT: 66 IN | OXYGEN SATURATION: 100 % | RESPIRATION RATE: 20 BRPM | TEMPERATURE: 98.1 F

## 2025-08-18 DIAGNOSIS — C78.01 MALIGNANT NEOPLASM METASTATIC TO BOTH LUNGS (HCC): Primary | ICD-10-CM

## 2025-08-18 DIAGNOSIS — C78.00 CARCINOMA OF BREAST METASTATIC TO LUNG, UNSPECIFIED LATERALITY (HCC): ICD-10-CM

## 2025-08-18 DIAGNOSIS — C50.912 CARCINOMA OF LEFT BREAST METASTATIC TO LUNG (HCC): ICD-10-CM

## 2025-08-18 DIAGNOSIS — J98.11 ATELECTASIS: ICD-10-CM

## 2025-08-18 DIAGNOSIS — R05.3 CHRONIC COUGH: ICD-10-CM

## 2025-08-18 DIAGNOSIS — C78.02 CARCINOMA OF LEFT BREAST METASTATIC TO LUNG (HCC): ICD-10-CM

## 2025-08-18 DIAGNOSIS — C50.919 CARCINOMA OF BREAST METASTATIC TO LUNG, UNSPECIFIED LATERALITY (HCC): ICD-10-CM

## 2025-08-18 DIAGNOSIS — J70.0 POST-RADIATION PNEUMONITIS: ICD-10-CM

## 2025-08-18 DIAGNOSIS — C78.02 MALIGNANT NEOPLASM METASTATIC TO BOTH LUNGS (HCC): Primary | ICD-10-CM

## 2025-08-18 PROCEDURE — G8427 DOCREV CUR MEDS BY ELIG CLIN: HCPCS | Performed by: INTERNAL MEDICINE

## 2025-08-18 PROCEDURE — 99214 OFFICE O/P EST MOD 30 MIN: CPT | Performed by: INTERNAL MEDICINE

## 2025-08-18 PROCEDURE — G8417 CALC BMI ABV UP PARAM F/U: HCPCS | Performed by: INTERNAL MEDICINE

## 2025-08-18 PROCEDURE — 3017F COLORECTAL CA SCREEN DOC REV: CPT | Performed by: INTERNAL MEDICINE

## 2025-08-18 PROCEDURE — 1036F TOBACCO NON-USER: CPT | Performed by: INTERNAL MEDICINE

## 2025-08-18 RX ORDER — CODEINE PHOSPHATE AND GUAIFENESIN 10; 100 MG/5ML; MG/5ML
5 SOLUTION ORAL 3 TIMES DAILY PRN
Qty: 450 ML | Refills: 0 | Status: SHIPPED | OUTPATIENT
Start: 2025-08-18 | End: 2025-09-17

## 2025-08-18 ASSESSMENT — ENCOUNTER SYMPTOMS
BACK PAIN: 0
SHORTNESS OF BREATH: 1
CHEST TIGHTNESS: 0
APNEA: 0
ABDOMINAL DISTENTION: 0
COUGH: 0
ABDOMINAL PAIN: 0
RHINORRHEA: 0
ANAL BLEEDING: 0
WHEEZING: 1

## 2025-08-20 ENCOUNTER — HOSPITAL ENCOUNTER (OUTPATIENT)
Dept: INFUSION THERAPY | Age: 60
Discharge: HOME | End: 2025-08-20
Payer: MEDICARE

## 2025-08-20 DIAGNOSIS — E53.8 VITAMIN B 12 DEFICIENCY: Primary | ICD-10-CM

## 2025-08-20 PROCEDURE — 96372 THER/PROPH/DIAG INJ SC/IM: CPT

## 2025-08-20 PROCEDURE — 6360000002 HC RX W HCPCS: Performed by: INTERNAL MEDICINE

## 2025-08-20 RX ORDER — CYANOCOBALAMIN 1000 UG/ML
1000 INJECTION, SOLUTION INTRAMUSCULAR; SUBCUTANEOUS ONCE
Status: COMPLETED | OUTPATIENT
Start: 2025-08-20 | End: 2025-08-20

## 2025-08-20 RX ORDER — CYANOCOBALAMIN 1000 UG/ML
1000 INJECTION, SOLUTION INTRAMUSCULAR; SUBCUTANEOUS ONCE
OUTPATIENT
Start: 2025-09-10 | End: 2025-09-10

## 2025-08-20 RX ADMIN — CYANOCOBALAMIN 1000 MCG: 1000 INJECTION, SOLUTION INTRAMUSCULAR; SUBCUTANEOUS at 10:18

## 2025-09-02 DIAGNOSIS — S01.319A LACERATION OF EAR LOBE, UNSPECIFIED LATERALITY, INITIAL ENCOUNTER: Primary | ICD-10-CM

## 2025-09-04 ENCOUNTER — TELEPHONE (OUTPATIENT)
Dept: SURGERY | Age: 60
End: 2025-09-04

## (undated) DEVICE — SHEET,DRAPE,53X77,STERILE: Brand: MEDLINE

## (undated) DEVICE — 3M™ IOBAN™ 2 ANTIMICROBIAL INCISE DRAPE 6650EZ: Brand: IOBAN™ 2

## (undated) DEVICE — SOLUTION IV 1000ML 0.9% SOD CHL PH 5 INJ USP VIAFLX PLAS

## (undated) DEVICE — TOWEL,OR,DSP,ST,BLUE,DLX,4/PK,20PK/CS: Brand: MEDLINE

## (undated) DEVICE — LARYNGOSCOPE VID MILLER 2 MTL BLADE M HNDL CURAPLEX

## (undated) DEVICE — CURAVIEW LED LARYNGOSCOPE BLADE & HANDLE,DISPOSABLE,MILLER 2: Brand: CURAPLEX

## (undated) DEVICE — CURAVIEW LED LARYNGOSCOPE BLADE & HANDLE,DISPOSABLE,MAC 2: Brand: CURAPLEX

## (undated) DEVICE — RADIFOCUS GLIDEWIRE ADVANTAGE GUIDEWIRE: Brand: GLIDEWIRE ADVANTAGE

## (undated) DEVICE — Z DISCONTINUED USE 2272117 DRAPE SURG 3 QTR N INVASIVE 2 LAYR DISP

## (undated) DEVICE — SUTURE VCRL SZ 2-0 L36IN ABSRB UD L36MM CT-1 1/2 CIR J945H

## (undated) DEVICE — BIOPSY NEEDLE, 21G: Brand: FLEXISION

## (undated) DEVICE — SINGLE-USE BIOPSY FORCEPS: Brand: RADIAL JAW 4

## (undated) DEVICE — GLOVE SURG SZ 75 CRM LTX FREE POLYISOPRENE POLYMER BEAD ANTI

## (undated) DEVICE — SOLUTION IRRIG 1000ML 0.9% SOD CHL USP POUR PLAS BTL

## (undated) DEVICE — GUIDEWIRE VASC L260CM DIA0.035IN L15CM STR TIP PTFE S STL

## (undated) DEVICE — RADIFOCUS GLIDEWIRE: Brand: GLIDEWIRE

## (undated) DEVICE — CATHETER KIT 5 FR 21 GAX7 CM MICROINTRODUCER GUIDEWIRE STIFF

## (undated) DEVICE — STAPLER EXT SKIN 35 WIDE S STL STPL SQUEEZE HNDL VISISTAT

## (undated) DEVICE — VISION PROBE ADAPTER AND SUCTION ADAPTER

## (undated) DEVICE — SYSTEM SKIN CLSR 22CM DERMBND PRINEO

## (undated) DEVICE — GUIDEWIRE VASC L260CM DIA0.035IN TIP L7CM PTFE S STL STR

## (undated) DEVICE — SOLUTION IV 250ML 0.9% SOD CHL PH 5 INJ USP VIAFLX PLAS

## (undated) DEVICE — SUTURE ABSORBABLE MONOFILAMENT 3-0 SH 27 IN UD PDS + PDP416H

## (undated) DEVICE — ANGIOGRAPHY PK

## (undated) DEVICE — GLOVE SURG SZ 65 L12IN FNGR THK79MIL GRN LTX FREE

## (undated) DEVICE — TUBING FLUIDICS BRONCHOSCOPE

## (undated) DEVICE — SOLUTION IV IRRIG POUR BRL 0.9% SODIUM CHL 2F7124

## (undated) DEVICE — KIT INTRODUCER BRONCHOSCOPE PATIENT

## (undated) DEVICE — CLIP INT M L GRN TI TRNSVRS GRV CHEVRON SHP W/ PRECIS TIP

## (undated) DEVICE — GOWN,PREVENTION PLUS,2XL,ST,22/CS: Brand: MEDLINE

## (undated) DEVICE — TUBE ET 8.5MM NSL ORAL BASIC CUF INTMED MURPHY EYE RADPQ

## (undated) DEVICE — BRONCHOSCOPES MONARCH

## (undated) DEVICE — ADHESIVE SKIN CLOSURE WND 8.661X1.5 IN 22 CM LIQUIBAND SECUR

## (undated) DEVICE — BRA COMPR XL NYL LYCRA SPANDEX WHT CURAD

## (undated) DEVICE — MINI-FORCEPS: Brand: COREDX™

## (undated) DEVICE — GOWN,PREVENTION PLUS,XL,ST,24/CS: Brand: MEDLINE

## (undated) DEVICE — PROVE COVER: Brand: UNBRANDED

## (undated) DEVICE — ENDO KIT,LOURDES HOSPITAL: Brand: MEDLINE INDUSTRIES, INC.

## (undated) DEVICE — BANDAGE,GAUZE,4.5"X4.1YD,STERILE,LF: Brand: MEDLINE

## (undated) DEVICE — PACK,UNIVERSAL,NO GOWNS: Brand: MEDLINE

## (undated) DEVICE — MAJOR CDS

## (undated) DEVICE — Z DUP USE 2527635 PROBE OXMTR LNG AD 90 CM REPROCESSED WHT MAX-A-L MAX LTX

## (undated) DEVICE — NEEDLE BRONCHSCP 21GA HNDL SHTH NDL STYL PERIVIEW FLX

## (undated) DEVICE — SINGLE USE BIOPSY VALVE MAJ-210: Brand: SINGLE USE BIOPSY VALVE (STERILE)

## (undated) DEVICE — SUTURE PDS II SZ 2-0 L18IN ABSRB VLT SH L26MM 1/2 CIR TAPR Z775D

## (undated) DEVICE — NEEDLE HYPO 18GA L1.5IN PNK POLYPR HUB S STL REG BVL STR

## (undated) DEVICE — SUTURE PDS + SZ 2 0 L27IN ABSRB VLT L36MM CT 1 1 2 CIR PDP339H

## (undated) DEVICE — SOLUTION IV 500ML 0.9% SOD CHL PH 5 INJ USP VIAFLX PLAS

## (undated) DEVICE — FORCEPS BIOPSY SMOOTH CUP

## (undated) DEVICE — SUTURE MNCRYL STRATAFIX PS 4-0 30CM

## (undated) DEVICE — CLIP INT SM WIDE RED TI TRNSVRS GRV CHEVRON SHP W/ PRECIS

## (undated) DEVICE — GOWN, ORBIS, XXLARGE, STERILE: Brand: MEDLINE

## (undated) DEVICE — RADIFOCUS GLIDECATH: Brand: GLIDECATH

## (undated) DEVICE — TUBE ET 7MM NSL ORAL BASIC CUF INTMED MURPHY EYE RADPQ MRK

## (undated) DEVICE — BLADE LARYNSCP SZ 3 TI DISP GLIDESCOPE LOPRO (SEE COMMENT)

## (undated) DEVICE — TUBE ET 7.5MM NSL ORAL BASIC CUF INTMED MURPHY EYE RADPQ

## (undated) DEVICE — BINDER ABD UNISX 4 PNL PREM 6INX6INX12IN L XL 4

## (undated) DEVICE — SUTURE ETHLN SZ 2-0 L30IN NONABSORBABLE BLK L36MM FSLX 3/8 1674H

## (undated) DEVICE — SINGLE USE SUCTION VALVE MAJ-209: Brand: SINGLE USE SUCTION VALVE (STERILE)

## (undated) DEVICE — ADAPTER,CATHETER/SYRINGE/LUER,STERILE: Brand: MEDLINE

## (undated) DEVICE — MINOR CDS: Brand: MEDLINE INDUSTRIES, INC.

## (undated) DEVICE — SUTURE VCRL SZ 3-0 L27IN ABSRB UD L26MM SH 1/2 CIR J416H

## (undated) DEVICE — JACKSON-PRATT 100CC BULB RESERVOIR: Brand: CARDINAL HEALTH

## (undated) DEVICE — PINNACLE INTRODUCER SHEATH: Brand: PINNACLE

## (undated) DEVICE — CLIP LIG M BLU TI HRT SHP WIRE HORZ 180 PER BX

## (undated) DEVICE — BLANKET WRM W40.2XL55.9IN IORT LO BODY + MISTRAL AIR

## (undated) DEVICE — CONTAINER,SPECIMEN,OR STERILE,4OZ: Brand: MEDLINE

## (undated) DEVICE — VERESS PNEUMOPERITONEUM NEEDLE: Brand: N.A.

## (undated) DEVICE — 3M™ STERI-DRAPE™ INSTRUMENT POUCH 1018: Brand: STERI-DRAPE™

## (undated) DEVICE — SWIVEL CONNECTOR

## (undated) DEVICE — ADAPTER TBNG DIA15MM SWVL FBROPT BRONCHSCP TERM 2 AXIS PEEP

## (undated) DEVICE — DRAPE KEYBOARD W26XL36IN ADH

## (undated) DEVICE — Device

## (undated) DEVICE — PATCHES NAVIGATION PATIENT

## (undated) DEVICE — SYRINGE MED 30ML STD CLR PLAS LUERLOCK TIP N CTRL DISP

## (undated) DEVICE — VALVE SHEATH BRONCHOSCOPE

## (undated) DEVICE — MEDIA CONTRAST INJ VISIPAQUE 150ML 320MG

## (undated) DEVICE — TRIEVER 16 EMBOLECTOMY CATHETER, 16 FR, 107 CM LENGTH: Brand: TRIEVER 16

## (undated) DEVICE — SURGICAL PROCEDURE PACK VASC LOURDES HOSP

## (undated) DEVICE — GAUZE,SPONGE,FLUFF,6"X6.75",STRL,10/TRAY: Brand: MEDLINE

## (undated) DEVICE — 3 ML SYRINGE WITH HYPODERMIC SAFETY NEEDLE: Brand: MAGELLAN

## (undated) DEVICE — C-ARM: Brand: UNBRANDED

## (undated) DEVICE — SUTURE VICRYL + SZ 2-0 L36IN ABSRB UD L36MM CT-1 1/2 CIR VCP945H

## (undated) DEVICE — SUTURE PROL SZ 6-0 L24IN NONABSORBABLE BLU L9.3MM BV-1 3/8 8805H

## (undated) DEVICE — GLOVE SURG SZ 7 L12IN FNGR THK79MIL GRN LTX FREE

## (undated) DEVICE — CHLORAPREP 26ML ORANGE

## (undated) DEVICE — CURAVIEW LED LARYNGOSCOPE BLADE & HANDLE,DISPOSABLE,MAC 3: Brand: CURAPLEX

## (undated) DEVICE — BANDAGE COMPR W4INXL15FT BGE E SGL LAYERED CLP CLSR

## (undated) DEVICE — GLOVE SURG SZ 85 L12IN FNGR ORTHO 126MIL CRM LTX FREE

## (undated) DEVICE — BLANKET WRM W29.9XL79.1IN UP BODY FORC AIR MISTRAL-AIR

## (undated) DEVICE — STYLET INTUB 14FR MAL ALUM CVR PLAS SHTH EXT LUBRICATED

## (undated) DEVICE — SEALER TISS L20CM DIA13MM ADV BPLR L CRV JAW OPN APPRCH

## (undated) DEVICE — SUTURE VCRL SZ 3-0 L18IN ABSRB UD W/O NDL POLYGLACTIN 910 J110T

## (undated) DEVICE — AEGIS 1" DISK 4MM HOLE, PEEL OPEN: Brand: MEDLINE

## (undated) DEVICE — FUNNEL 2 KELLER

## (undated) DEVICE — TUBE ET 8MM NSL ORAL BASIC CUF INTMED MURPHY EYE RADPQ MRK

## (undated) DEVICE — SUTURE MCRYL SZ 2-0 L27IN ABSRB UD L26MM SH UR-6 1/2 CIR D8550

## (undated) DEVICE — DECANTER FLD 9IN ST BG FOR ASEP TRNSF OF FLD

## (undated) DEVICE — SYRINGE MED 10ML LUERLOCK TIP W/O SFTY DISP

## (undated) DEVICE — SUTURE ETHILON SZ 2-0 L30IN NONABSORBABLE BLK L36MM FSLX 3/8 1674H

## (undated) DEVICE — ADHESIVE SKIN CLSR 0.7ML TOP DERMBND ADV

## (undated) DEVICE — TUBING ANGIO L72IN 900PSI CLR PVC M TO FEM AIRLESS ROT ADPT

## (undated) DEVICE — NG KIT, 21 GA, 10 PACK: Brand: SITE-RITE

## (undated) DEVICE — GLIDESHEATH BASIC HYDROPHILIC COATED INTRODUCER SHEATH: Brand: GLIDESHEATH

## (undated) DEVICE — AMBU AURA-I U SIZE 4, DISPOSABLE LARYNGEAL MASK: Brand: AURA-I

## (undated) DEVICE — CURAVIEW LED LARYNSCP BLDE

## (undated) DEVICE — STAPLER, SKIN, 35W, A: Brand: MEDLINE INDUSTRIES, INC.

## (undated) DEVICE — Device: Brand: ION

## (undated) DEVICE — DRAIN JACKSON PRATT ROUND 15FR: Brand: CARDINAL HEALTH

## (undated) DEVICE — NEEDLE ASPIR L130MM OD19GA PED PREMARKED EXT WRK CHAN

## (undated) DEVICE — LARYNGOSCOPE BLDE MAC HNDL M SZ 35 ST CURAPLEX CURAVIEW LED

## (undated) DEVICE — SUTURE VCRL SZ 4-0 L18IN ABSRB UD VCRL POLYGLACTIN 910 COAT J109T

## (undated) DEVICE — MASK O2 SAMPLING AD M LUER CAPNOXYGEN

## (undated) DEVICE — SUTURE PERMAHAND SZ 2-0 L18IN NONABSORBABLE BLK L26MM FS 685G